# Patient Record
Sex: FEMALE | Race: WHITE | NOT HISPANIC OR LATINO | Employment: OTHER | URBAN - METROPOLITAN AREA
[De-identification: names, ages, dates, MRNs, and addresses within clinical notes are randomized per-mention and may not be internally consistent; named-entity substitution may affect disease eponyms.]

---

## 2017-04-04 ENCOUNTER — TRANSCRIBE ORDERS (OUTPATIENT)
Dept: ADMINISTRATIVE | Facility: HOSPITAL | Age: 67
End: 2017-04-04

## 2017-04-04 DIAGNOSIS — Z13.820 SCREENING FOR OSTEOPOROSIS: Primary | ICD-10-CM

## 2017-04-12 ENCOUNTER — HOSPITAL ENCOUNTER (OUTPATIENT)
Dept: RADIOLOGY | Age: 67
Discharge: HOME | End: 2017-04-12
Payer: MEDICARE

## 2017-04-12 DIAGNOSIS — Z13.820 SCREENING FOR OSTEOPOROSIS: ICD-10-CM

## 2017-04-12 PROCEDURE — 77080 DXA BONE DENSITY AXIAL: CPT

## 2017-09-15 ENCOUNTER — GENERIC CONVERSION - ENCOUNTER (OUTPATIENT)
Dept: OTHER | Facility: OTHER | Age: 67
End: 2017-09-15

## 2017-09-26 ENCOUNTER — ALLSCRIPTS OFFICE VISIT (OUTPATIENT)
Dept: OTHER | Facility: OTHER | Age: 67
End: 2017-09-26

## 2017-10-27 NOTE — PROGRESS NOTES
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)   2  Anxiety disorder (300 00) (F41 9)    Plan  Anxiety    · LORazepam 1 MG Oral Tablet; 2 tablets at night time   Rx By: Mariella Alcantara; Dispense: 45 Days ; #:90 Tablet; Refill: 3;For: Anxiety; CARLEE = Y; Print Rx  Malignant neoplasm of upper-outer quadrant of right female breast    · (1) CANCER ANTIGEN 15-3; Status:Active; Requested for:10Sep2018; Perform:[facility]; [place]; For:Malignant neoplasm of upper-outer quadrant of right female breast; Ordered By:Proothi, Mode;   · (1) CBC/PLT/DIFF; Status:Active; Requested for:10Sep2018; Perform:[facility]; [place]; For:Malignant neoplasm of upper-outer quadrant of right female breast; Ordered By:Proothi, Mode;   · (1) COMPREHENSIVE METABOLIC PANEL; Status:Active; Requested for:10Sep2018; Perform:[facility]; [place]; For:Malignant neoplasm of upper-outer quadrant of right female breast; Ordered By:Proothi, Mode;   · MAMMO SCREENING LEFT W 3D & CAD; Status:Active; Requested GXJ:[id number]  01:00PM;    Perform:[facility]; Due:43Ycc5326; Last Updated By:Mikala Otero; 9/26/2017 10:01:12 AM;Ordered; For:Malignant neoplasm of upper-outer quadrant of right female breast; Ordered By:Proothi, Mode;   · We recommend that you examine your own breasts for lumps and other changes ;  Status:Complete;   Done: [id number]   Ordered; For:Malignant neoplasm of upper-outer quadrant of right female breast; Ordered By:Proothi, Mode;   · Follow-up visit in 1 year Evaluation and Treatment  Follow-up  Status: Hold For -  Scheduling  Requested for: 28LMS9809   Ordered; For: Malignant neoplasm of upper-outer quadrant of right female breast; Ordered By: Mariella Alcantara Performed:  Due: 27XLX9365    Discussion/Summary  Discussion Summary:   Follow-up visit for breast cancer and anxiety   She is under surveillance for breast cancer  She is on lorazepam at night time for anxiety and insomnia  Several years ago she was diagnosed to have recurrent right breast cancer in the right supraclavicular area   She had radiation and Femara for 10 years that she finished in April 2015  She is not on systemic therapy for cancer at present  She had metastatic workup in 2015 and that was negative  She comes to our office once yearly  She still has painful neuropathy in her right hand as before  she has arthritic symptoms  She has anxiety  Her son was recently diagnosed to have type 1 diabetes mellitus and hyperthyroidism  Patient is very much concerned about that  She has asked for renewal of her lorazepam  She has problem sleeping without lorazepam    She has been taking calcium and vitamin D to prevent osteoporosis  examination and test results are as recorded  Negative mammography for malignancy  She remains in remission from breast cancer  She will like to come back in one year  She will stay in touch  Condition discussed and explained  Questions answered  Also discussed self breast examination, eating healthy foods and exercises as tolerated  Renewed lorazepam prescription  Counseling Documentation With Imm: The patient was counseled regarding diagnostic results,-- prognosis,-- patient and family education,-- impressions  total time of encounter was 30 minutes-- and-- 20 minutes was spent counseling  Goals and Barriers: The patient has the current Goals: Cure from breast cancer  The patent has the current Barriers: No barriers  Patient's Capacity to Self-Care: Patient is able to Self-Care  Medication SE Review and Pt Understands Tx: The treatment plan was reviewed with the patient/guardian  The patient/guardian understands and agrees with the treatment plan   Self Referrals:   Self Referrals: No   Understands and agrees with treatment plan: The treatment plan was reviewed with the patient/guardian   The patient/guardian understands and agrees with the treatment plan      Chief Complaint  Chief Complaint: Chief Complaint:   The patient presents to the office today with breast cancer  Chief Complaint Free Text Note Form: Follow-up visit for breast cancer and anxiety      History of Present Illness  Free Text HPI: Follow-up visit for breast cancer and anxiety  She is under surveillance for breast cancer  She is on lorazepam at night time for anxiety and insomnia  Several years ago she was diagnosed to have recurrent right breast cancer in the right supraclavicular area   She had radiation and Femara for 10 years that she finished in April 2015  She is not on systemic therapy for cancer at present  She had metastatic workup in 2015 and that was negative  She comes to our office once yearly  She still has painful neuropathy in her right hand as before  she has arthritic symptoms  She has anxiety  Her son was recently diagnosed to have type 1 diabetes mellitus and hyperthyroidism  Patient is very much concerned about that  She has asked for renewal of her lorazepam  She has problem sleeping without lorazepam    She has been taking calcium and vitamin D to prevent osteoporosis  Review of Systems       Reviewed 14 systems  Other symptoms as in history of present illness  Complete-Female:   Constitutional: feeling tired-- and-- Tired at times, but-- No fever, no chills, feels well, no tiredness, no recent weight gain or weight loss,-- no fever,-- not feeling poorly,-- no chills-- and-- no recent weight loss  Eyes: No complaints of eye pain, no red eyes, no eyesight problems, no discharge, no dry eyes, no itching of eyes  ENT: no complaints of earache, no loss of hearing, no nose bleeds, no nasal discharge, no sore throat, no hoarseness  Cardiovascular: No complaints of slow heart rate, no fast heart rate, no chest pain, no palpitations, no leg claudication, no lower extremity edema     Respiratory: No complaints of shortness of breath, no wheezing, no cough, no SOB on exertion, no orthopnea, no PND  Gastrointestinal: No complaints of abdominal pain, no constipation, no nausea or vomiting, no diarrhea, no bloody stools  Genitourinary: No complaints of dysuria, no incontinence, no pelvic pain, no dysmenorrhea, no vaginal discharge or bleeding  Musculoskeletal: joint stiffness, but-- no arthralgias,-- no joint swelling,-- no myalgias-- and-- no limb swelling  Integumentary: No complaints of skin rash or lesions, no itching, no skin wounds, no breast pain or lump  Neurological: limb weakness-- and-- Right hand, but-- as noted in HPI,-- no headache,-- no numbness,-- no tingling,-- no confusion,-- no dizziness,-- no convulsions,-- no fainting-- and-- no difficulty walking  Psychiatric: anxiety-- and-- sleep disturbances, but-- no personality change,-- no depression-- and-- no emotional problems  Endocrine: No complaints of proptosis, no hot flashes, no muscle weakness, no deepening of the voice, no feelings of weakness  Hematologic/Lymphatic: No complaints of swollen glands, no swollen glands in the neck, does not bleed easily, does not bruise easily  ROS Reviewed:   ROS reviewed  Active Problems  1  Anxiety (300 00) (F41 9)   2  Anxiety disorder (300 00) (F41 9)   3  Breast cancer screening, high risk patient (V76 11) (Z12 31)   4  Cancer of breast, female (174 9) (C50 919)   5  Malignant Female Breast Neoplasm Of Ectopic Sites (174 8)   6  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)    Past Medical History           Reviewed and no change     Surgical History  Surgical History Reviewed: The surgical history was reviewed and updated today  Family History  Mother    1  Family history of diabetes mellitus (V18 0) (Z83 3)  Son    2  Family history of diabetes mellitus (V18 0) (Z83 3)   3   Family history of hyperthyroidism (V18 19) (Z83 49)  Family History Reviewed: The family history was reviewed and updated today  Social History   · Never A Smoker  Social History Reviewed: The social history was reviewed and is unchanged  Current Meds   1  Aspirin EC 81 MG Oral Tablet Delayed Release; TAKE 1 TABLET DAILY; Therapy: (Recorded:09Nov2012) to Recorded   2  BusPIRone HCl - 15 MG Oral Tablet; Therapy: 46MRG4270 to (Evaluate:38Zky4664) Recorded   3  Calcium 500 MG TABS Recorded   4  Femara 2 5 MG Oral Tablet; TAKE 1 TABLET DAILY; Therapy: 76RKF0285 to (Evaluate:12Mar2015)  Requested for: 90PIH3329; Last   Rx:17Mar2014 Ordered   5  Fish Oil CAPS; Therapy: (Gildardo Luna) to Recorded   6  Letrozole 2 5 MG Oral Tablet; take 1 tablet once daily; Therapy: 38QOF2290 to (Evaluate:12May2015)  Requested for: 13WHO4519; Last   Rx:13Nov2014 Ordered   7  Lisinopril 10 MG Oral Tablet; Therapy: 26AXB4150 to (Last Rx:99Mxc9648)  Requested for: 43KFD1627 Ordered   8  LORazepam 1 MG Oral Tablet; 2 tablets at night time; Therapy: 79WVZ2034 to (Evaluate:14Oct2017)  Requested for: 54Res1275; Last   Rx:54Eik4331 Ordered   9  MetFORMIN HCl ER (OSM) 1000 MG Oral Tablet Extended Release 24 Hour; Therapy: 73Nzy9552 to (Evaluate:18Oct2015) Recorded   10  Multiple Vitamins Oral Tablet Recorded   11  Pravastatin Sodium 40 MG Oral Tablet; Therapy: 96DEK8957 to (Evaluate:11Gnm8554) Recorded   12  Simvastatin 80 MG Oral Tablet; Therapy: 90LHD0577 to (Last Rx:61Vbj8134)  Requested for: 86Yfd2331 Ordered    Allergies  1  No Known Drug Allergies    Vitals  Vital Signs    Recorded: 29NDF5989 09:27AM   Temperature 97 7 F   Heart Rate 65   Respiration 15   Systolic 852   Diastolic 72   Height 5 ft 5 in   Weight 143 lb 5 oz   BMI Calculated 23 85   BSA Calculated 1 72   O2 Saturation 99   Pain Scale 7            Stable     Physical Exam                   Patient is alert and oriented and not in any acute distress  Stable vital signs  No icterus  No oral thrush  No palpable neck mass  Lung fields are clear to percussion and auscultation  Heart rate is regular     Abdomen soft and nontender  No palpable abdominal mass  No ascites  No edema of ankles  No calf tenderness  Has  neurological deficit in the right hand as before    No skin rash  No lymphedema  No peripheral palpable lymphadenopathy  Good distal arterial pulses  Performance status one  ECOG 1       Results/Data  Results Form:   Results   Lab Results Normal blood counts, chemistry and CA 15?3  Negative mammography for malignancy        Future Appointments    Date/Time Provider Specialty Site   10/02/2018 10:00 AM Shelby Cardenas MD Hematology Oncology [facility]     Signatures   Electronically signed by : Shazia Massey MD; Sep 26 2017 10:20AM EST                       (Author)

## 2018-01-12 VITALS
TEMPERATURE: 97.7 F | WEIGHT: 143.31 LBS | RESPIRATION RATE: 15 BRPM | HEART RATE: 65 BPM | HEIGHT: 65 IN | DIASTOLIC BLOOD PRESSURE: 72 MMHG | OXYGEN SATURATION: 99 % | SYSTOLIC BLOOD PRESSURE: 118 MMHG | BODY MASS INDEX: 23.88 KG/M2

## 2018-04-12 DIAGNOSIS — F41.9 ANXIETY: Primary | ICD-10-CM

## 2018-04-12 RX ORDER — LORAZEPAM 1 MG/1
TABLET ORAL
Qty: 90 TABLET | Refills: 3 | Status: SHIPPED | OUTPATIENT
Start: 2018-04-12 | End: 2018-10-16 | Stop reason: SDUPTHER

## 2018-04-12 NOTE — TELEPHONE ENCOUNTER
Requesting a refill of her Lorazepam 1mg  She takes 2 tabs at bedtime  Last time was given 90 tabs  [facility] [address] [facility] [address]

## 2018-08-23 LAB — HBA1C MFR BLD HPLC: 6.5 %

## 2018-09-10 DIAGNOSIS — C50.411 MALIGNANT NEOPLASM OF UPPER-OUTER QUADRANT OF RIGHT FEMALE BREAST (HCC): ICD-10-CM

## 2018-10-01 ENCOUNTER — TELEPHONE (OUTPATIENT)
Dept: HEMATOLOGY ONCOLOGY | Facility: CLINIC | Age: 68
End: 2018-10-01

## 2018-10-01 NOTE — TELEPHONE ENCOUNTER
Called Keny Huitron to see if she had her labs completed  She stated that she had them done at [facility]  Called and waiting for the results to be faxed over

## 2018-10-02 ENCOUNTER — OFFICE VISIT (OUTPATIENT)
Dept: HEMATOLOGY ONCOLOGY | Facility: CLINIC | Age: 68
End: 2018-10-02
Payer: MEDICARE

## 2018-10-02 VITALS
WEIGHT: 145.3 LBS | HEIGHT: 64 IN | OXYGEN SATURATION: 97 % | TEMPERATURE: 98.1 F | RESPIRATION RATE: 18 BRPM | HEART RATE: 73 BPM | DIASTOLIC BLOOD PRESSURE: 78 MMHG | BODY MASS INDEX: 24.81 KG/M2 | SYSTOLIC BLOOD PRESSURE: 130 MMHG

## 2018-10-02 DIAGNOSIS — Z85.3 HISTORY OF BREAST CANCER: Primary | ICD-10-CM

## 2018-10-02 DIAGNOSIS — R74.8 ELEVATED ALKALINE PHOSPHATASE LEVEL: ICD-10-CM

## 2018-10-02 PROCEDURE — 99214 OFFICE O/P EST MOD 30 MIN: CPT | Performed by: INTERNAL MEDICINE

## 2018-10-02 RX ORDER — ASPIRIN 81 MG/1
1 TABLET ORAL DAILY
COMMUNITY
End: 2019-01-16 | Stop reason: ALTCHOICE

## 2018-10-02 RX ORDER — METFORMIN HYDROCHLORIDE 1000 MG/1
1000 TABLET, FILM COATED, EXTENDED RELEASE ORAL 2 TIMES DAILY WITH MEALS
COMMUNITY
End: 2019-01-20 | Stop reason: HOSPADM

## 2018-10-02 RX ORDER — LISINOPRIL 5 MG/1
5 TABLET ORAL DAILY
COMMUNITY
End: 2019-04-22 | Stop reason: SDUPTHER

## 2018-10-02 RX ORDER — CHOLECALCIFEROL (VITAMIN D3) 125 MCG
2000 CAPSULE ORAL DAILY
COMMUNITY
End: 2019-08-13 | Stop reason: DRUGHIGH

## 2018-10-02 RX ORDER — PRAVASTATIN SODIUM 40 MG
40 TABLET ORAL DAILY
COMMUNITY
Start: 2018-08-15 | End: 2019-03-26 | Stop reason: ALTCHOICE

## 2018-10-02 NOTE — LETTER
October 2, 2018     [address]  [place] [id number]    Patient: Zuri Bhandari   YOB: 1950   Date of Visit: 10/2/2018       Dear Dr Gordon Guillen:    Thank you for referring Roberth Hermosillo to me for evaluation  Below are my notes for this consultation  If you have questions, please do not hesitate to call me  I look forward to following your patient along with you  Sincerely,        Eufemia De MD        CC: No Recipients  Eufemia De MD  10/2/2018 10:17 PM  Sign at close encounter    HPI:   In 1496-4626 patient was diagnosed to have recurrent right breast cancer in the right supraclavicular area and she had radiation and she took Femara for 10 years that she finished in 2015  Workup for metastatic disease in 2015 was negative and since then she has been under surveillance  She was having some pain in the left shoulder area where x-rays showed degenerative changes  She has been taking NSAIDs and will be starting physical therapy  Patient's PCP has been managing that  Patient states there is improvement in pain and functionality at left shoulder        Current Outpatient Prescriptions:     aspirin (ASPIRIN EC ADULT LOW STRENGTH) 81 mg EC tablet, Take 1 tablet by mouth daily, Disp: , Rfl:     Cholecalciferol (VITAMIN D3) 2000 units TABS, Take 2,000 Units by mouth daily, Disp: , Rfl:     liraglutide (VICTOZA) injection, Inject 0 6 mg under the skin, Disp: , Rfl:     lisinopril (ZESTRIL) 10 mg tablet, Take 10 mg by mouth, Disp: , Rfl:     LORazepam (ATIVAN) 1 mg tablet, Take 2 tablets of 1 mg Ativan at night, Disp: 90 tablet, Rfl: 3    metFORMIN (GLUMETZA) 1000 MG (MOD) 24 hr tablet, Take 1,000 mg by mouth, Disp: , Rfl:     MULTIPLE VITAMINS PO, Take by mouth, Disp: , Rfl:     Omega-3 Fatty Acids (FISH OIL PO), Take 2 g by mouth, Disp: , Rfl:     pravastatin (PRAVACHOL) 40 mg tablet, , Disp: , Rfl:     No Known Allergies     No history exists  ROS:  10/02/18 Reviewed 13 systems:  Presently no headaches, seizures, dizziness, diplopia, dysphagia, hoarseness, chest pain, palpitations, shortness of breath, cough, hemoptysis, abdominal pain, nausea, vomiting, change in bowel habits, melena, hematuria, fever, chills, bleeding, bone pains, skin rash, weight loss,  tiredness ,  weakness, numbness,  claudication and gait problem  No frequent infections  Not unusually sensitive to heat or cold  No swelling of the ankles  No swollen glands  Patient is anxious  No GYN symptoms Other symptoms are in HPI        /78 (BP Location: Left arm, Patient Position: Sitting, Cuff Size: Adult)   Pulse 73   Temp 98 1 °F (36 7 °C)   Resp 18   Ht 5' 4" (1 626 m)   Wt 65 9 kg (145 lb 4 8 oz)   SpO2 97%   BMI 24 94 kg/m²      Physical Exam:  Alert, oriented, not in distress, no icterus, no oral thrush, no palpable neck mass, clear lung fields, regular heart rate, abdomen  soft and non tender, no palpable abdominal mass, no ascites, no edema of ankles, no calf tenderness, no focal neurological deficit, no skin rash, no palpable lymphadenopathy, good arterial pulses, no clubbing  Patient is anxious  Performance status 1  Reconstructed surgery right breast   Also plastic surgery on left breast   No lymphedema  IMAGING:   Degenerative changes left shoulder    LABS:  Normal blood counts  Normal chemistry except for alkaline   Phosphatase 157 and blood sugar 104  Normal tumor marker CA 15-3  Normal TSH  Reviewed and discussed with patient  Assessment and plan: In 3670-0155 patient was diagnosed to have recurrent right breast cancer in the right supraclavicular area and she had radiation and she took Femara for 10 years that she finished in 2015  Workup for metastatic disease in 2015 was negative and since then she has been under surveillance  She was having some pain in the left shoulder area where x-rays showed degenerative changes  She has been taking NSAIDs and will be starting physical therapy  Patient's PCP has been managing that  Patient states there is improvement in pain and functionality at left shoulder     Physical examination and test results are as recorded and discussed  Breast cancer remains in remission  Goal is cure from breast cancer  Condition discussed and explained  Questions answered  She gets mammography annually  Discussed the importance of self-breast examination, eating healthy foods, staying active and health screening test   She wants to come back in 1 year  She knows that if there is a problem related to our speciality to please call our office and come sooner  She is self-care  For elevated alkaline phosphatase she will go for isoenzyme test this month and call to discuss results  1  History of breast cancer    - Cancer antigen 15-3; Future  - CBC and differential; Future  - Comprehensive metabolic panel; Future    2  Elevated alkaline phosphatase level    - Alkaline phosphatase, isoenzymes; Future        Patient voiced understanding and agreement in the discussion  Counseling / Coordination of Care   Greater than 50% of total time was spent with the patient and / or family counseling and / or coordination of care

## 2018-10-03 DIAGNOSIS — F41.9 ANXIETY: ICD-10-CM

## 2018-10-03 RX ORDER — LORAZEPAM 1 MG/1
TABLET ORAL
Qty: 90 TABLET | Refills: 1 | OUTPATIENT
Start: 2018-10-03

## 2018-10-08 LAB
ALP BONE CFR SERPL: 37 % (ref 28–66)
ALP INTEST CFR SERPL: 0 % (ref 1–24)
ALP LIVER CFR SERPL: 63 % (ref 25–69)
ALP MACROHEPATIC CFR SERPL: 0 %
ALP PLAC CFR SERPL: 0 %
ALP SERPL-CCNC: 120 U/L (ref 33–130)

## 2018-10-11 ENCOUNTER — TELEPHONE (OUTPATIENT)
Dept: HEMATOLOGY ONCOLOGY | Facility: CLINIC | Age: 68
End: 2018-10-11

## 2018-10-11 NOTE — TELEPHONE ENCOUNTER
PATIENT CALLED FOR REFILL OF LORAZEPAM 1MG, ALSO SHE WAS TOLD TO CALL    [address] [facility] HER RESULTS ARE IN  [phone]

## 2018-10-11 NOTE — TELEPHONE ENCOUNTER
Called and spoke with the patient in regards to her ALK phos isoenzyme results  Patient's levels were all within normal range  Patient's CA 15-3 level from 9/10/18 was 23  Patient verbally understood  Pulled [place] drug [place] on this patient  Patient's Ativan script written on 4/12/18 (that also had 3 refills) was initially filled on 9/3/18 and then refilled again on 10/3/18  Patient made aware that she should have 2 refills left and to contact her pharmacy  Patient directed to call her PCP in regards to pain that she is experiencing on her side

## 2018-10-16 ENCOUNTER — TELEPHONE (OUTPATIENT)
Dept: HEMATOLOGY ONCOLOGY | Facility: CLINIC | Age: 68
End: 2018-10-16

## 2018-10-16 DIAGNOSIS — F41.9 ANXIETY: ICD-10-CM

## 2018-10-16 RX ORDER — LORAZEPAM 1 MG/1
TABLET ORAL
Qty: 90 TABLET | Refills: 2 | Status: ON HOLD | OUTPATIENT
Start: 2018-10-16 | End: 2018-12-24

## 2018-10-16 NOTE — TELEPHONE ENCOUNTER
Patient's Ativan prescription from 2018 is   A new prescription for Ativan was [facility]

## 2018-10-16 NOTE — TELEPHONE ENCOUNTER
Iram Gardner called and wanted to know if she could get a refill on Lorazepam 1mg for 2 @bedtime  She called the pharmacy and they said there was no refill  [facility] [phone]  If you have any other questions for Iram Gardner please call her home phone

## 2018-11-07 ENCOUNTER — APPOINTMENT (EMERGENCY)
Dept: CT IMAGING | Facility: HOSPITAL | Age: 68
End: 2018-11-07
Payer: MEDICARE

## 2018-11-07 ENCOUNTER — HOSPITAL ENCOUNTER (EMERGENCY)
Facility: HOSPITAL | Age: 68
Discharge: HOME | End: 2018-11-07
Attending: EMERGENCY MEDICINE
Payer: MEDICARE

## 2018-11-07 VITALS
TEMPERATURE: 97.6 F | RESPIRATION RATE: 18 BRPM | SYSTOLIC BLOOD PRESSURE: 124 MMHG | DIASTOLIC BLOOD PRESSURE: 60 MMHG | OXYGEN SATURATION: 100 % | HEART RATE: 76 BPM

## 2018-11-07 DIAGNOSIS — S09.90XA INJURY OF HEAD, INITIAL ENCOUNTER: ICD-10-CM

## 2018-11-07 DIAGNOSIS — R55 SYNCOPE: ICD-10-CM

## 2018-11-07 DIAGNOSIS — R55 VASOVAGAL EPISODE: Primary | ICD-10-CM

## 2018-11-07 LAB
ALBUMIN SERPL BCP-MCNC: 3.3 G/DL (ref 3.5–5)
ALP SERPL-CCNC: 149 U/L (ref 46–116)
ALT SERPL W P-5'-P-CCNC: 23 U/L (ref 12–78)
ANION GAP SERPL CALCULATED.3IONS-SCNC: 7 MMOL/L (ref 4–13)
APTT PPP: 28 SECONDS (ref 24–36)
AST SERPL W P-5'-P-CCNC: 18 U/L (ref 5–45)
BASOPHILS # BLD AUTO: 0.04 THOUSANDS/ΜL (ref 0–0.1)
BASOPHILS NFR BLD AUTO: 1 % (ref 0–1)
BILIRUB SERPL-MCNC: 0.5 MG/DL (ref 0.2–1)
BUN SERPL-MCNC: 21 MG/DL (ref 5–25)
CALCIUM SERPL-MCNC: 9 MG/DL (ref 8.3–10.1)
CHLORIDE SERPL-SCNC: 105 MMOL/L (ref 100–108)
CO2 SERPL-SCNC: 27 MMOL/L (ref 21–32)
CREAT SERPL-MCNC: 0.98 MG/DL (ref 0.6–1.3)
EOSINOPHIL # BLD AUTO: 0.03 THOUSAND/ΜL (ref 0–0.61)
EOSINOPHIL NFR BLD AUTO: 0 % (ref 0–6)
ERYTHROCYTE [DISTWIDTH] IN BLOOD BY AUTOMATED COUNT: 13.9 % (ref 11.6–15.1)
GFR SERPL CREATININE-BSD FRML MDRD: 59 ML/MIN/1.73SQ M
GLUCOSE SERPL-MCNC: 89 MG/DL (ref 65–140)
HCT VFR BLD AUTO: 32.8 % (ref 34.8–46.1)
HGB BLD-MCNC: 10.6 G/DL (ref 11.5–15.4)
IMM GRANULOCYTES # BLD AUTO: 0.05 THOUSAND/UL (ref 0–0.2)
IMM GRANULOCYTES NFR BLD AUTO: 1 % (ref 0–2)
INR PPP: 1.17 (ref 0.86–1.17)
LYMPHOCYTES # BLD AUTO: 1.28 THOUSANDS/ΜL (ref 0.6–4.47)
LYMPHOCYTES NFR BLD AUTO: 17 % (ref 14–44)
MCH RBC QN AUTO: 28 PG (ref 26.8–34.3)
MCHC RBC AUTO-ENTMCNC: 32.3 G/DL (ref 31.4–37.4)
MCV RBC AUTO: 87 FL (ref 82–98)
MONOCYTES # BLD AUTO: 0.39 THOUSAND/ΜL (ref 0.17–1.22)
MONOCYTES NFR BLD AUTO: 5 % (ref 4–12)
NEUTROPHILS # BLD AUTO: 5.88 THOUSANDS/ΜL (ref 1.85–7.62)
NEUTS SEG NFR BLD AUTO: 76 % (ref 43–75)
NRBC BLD AUTO-RTO: 0 /100 WBCS
PLATELET # BLD AUTO: 362 THOUSANDS/UL (ref 149–390)
PMV BLD AUTO: 9.2 FL (ref 8.9–12.7)
POTASSIUM SERPL-SCNC: 4.9 MMOL/L (ref 3.5–5.3)
PROT SERPL-MCNC: 6.6 G/DL (ref 6.4–8.2)
PROTHROMBIN TIME: 14.6 SECONDS (ref 11.8–14.2)
RBC # BLD AUTO: 3.78 MILLION/UL (ref 3.81–5.12)
SODIUM SERPL-SCNC: 139 MMOL/L (ref 136–145)
TROPONIN I SERPL-MCNC: <0.02 NG/ML
WBC # BLD AUTO: 7.67 THOUSAND/UL (ref 4.31–10.16)

## 2018-11-07 PROCEDURE — 80053 COMPREHEN METABOLIC PANEL: CPT | Performed by: EMERGENCY MEDICINE

## 2018-11-07 PROCEDURE — 99284 EMERGENCY DEPT VISIT MOD MDM: CPT

## 2018-11-07 PROCEDURE — 70450 CT HEAD/BRAIN W/O DYE: CPT

## 2018-11-07 PROCEDURE — 96360 HYDRATION IV INFUSION INIT: CPT

## 2018-11-07 PROCEDURE — 85610 PROTHROMBIN TIME: CPT | Performed by: EMERGENCY MEDICINE

## 2018-11-07 PROCEDURE — 36415 COLL VENOUS BLD VENIPUNCTURE: CPT | Performed by: EMERGENCY MEDICINE

## 2018-11-07 PROCEDURE — 85730 THROMBOPLASTIN TIME PARTIAL: CPT | Performed by: EMERGENCY MEDICINE

## 2018-11-07 PROCEDURE — 93005 ELECTROCARDIOGRAM TRACING: CPT

## 2018-11-07 PROCEDURE — 84484 ASSAY OF TROPONIN QUANT: CPT | Performed by: EMERGENCY MEDICINE

## 2018-11-07 PROCEDURE — 96361 HYDRATE IV INFUSION ADD-ON: CPT

## 2018-11-07 PROCEDURE — 85025 COMPLETE CBC W/AUTO DIFF WBC: CPT | Performed by: EMERGENCY MEDICINE

## 2018-11-07 RX ADMIN — SODIUM CHLORIDE 1000 ML: 0.9 INJECTION, SOLUTION INTRAVENOUS at 17:52

## 2018-11-07 NOTE — ED PROVIDER NOTES
History  Chief Complaint   Patient presents with    Syncope     pt presents ambulatory s/p fall off chair +LOC +head strike s/p syncopal episode  Patient presents to the emergency department after a witnessed syncopal episode  Patient was a box constant was sitting down and she felt dizzy and felt overheated stating that the place where she was in the store felt warm  She denies chest pain palpitations but then had a very brief syncopal episode where she did strike her head  She has mild headache but denies neck pain  She states she feels currently asymptomatic and denies chest pain sob palpitations or dizziness at this time  She denies neck pain and denies long bone trauma  She denies numbness tingling or weakness in the extremities  She denies nausea or vomiting  She refuses analgesics for pain at this time  She states that she had eaten lunch and has been moving her bowels and urinating normally  She denies fever chills rash  Prior to Admission Medications   Prescriptions Last Dose Informant Patient Reported? Taking?    Cholecalciferol (VITAMIN D3) 2000 units TABS  Self Yes No   Sig: Take 2,000 Units by mouth daily   LORazepam (ATIVAN) 1 mg tablet   No No   Sig: Take 2 tablets of 1 mg Ativan at night   MULTIPLE VITAMINS PO  Self Yes No   Sig: Take by mouth   Omega-3 Fatty Acids (FISH OIL PO)  Self Yes No   Sig: Take 2 g by mouth   aspirin (ASPIRIN EC ADULT LOW STRENGTH) 81 mg EC tablet  Self Yes No   Sig: Take 1 tablet by mouth daily   liraglutide (VICTOZA) injection  Self Yes No   Sig: Inject 0 6 mg under the skin   lisinopril (ZESTRIL) 10 mg tablet  Self Yes No   Sig: Take 10 mg by mouth   metFORMIN (GLUMETZA) 1000 MG (MOD) 24 hr tablet  Self Yes No   Sig: Take 1,000 mg by mouth   pravastatin (PRAVACHOL) 40 mg tablet  Self Yes No      Facility-Administered Medications: None       Past Medical History:   Diagnosis Date    Cancer ([facility] [address] )     breast    Diabetes mellitus ([facility] [address] )     Tendonitis        Past Surgical History:   Procedure Laterality Date    CHOLECYSTECTOMY      MASTECTOMY      NECK SURGERY         History reviewed  No pertinent family history  I have reviewed and agree with the history as documented  Social History   Substance Use Topics    Smoking status: Never Smoker    Smokeless tobacco: Never Used    Alcohol use Not on file        Review of Systems   Constitutional: Negative for activity change, appetite change, chills, diaphoresis, fatigue, fever and unexpected weight change  HENT: Negative for congestion, drooling, rhinorrhea, sore throat, trouble swallowing and voice change  Eyes: Negative for photophobia and visual disturbance  Respiratory: Negative for cough, choking, chest tightness, shortness of breath, wheezing and stridor  Cardiovascular: Negative for chest pain, palpitations and leg swelling  Gastrointestinal: Negative for abdominal distention, abdominal pain, anal bleeding, blood in stool, constipation, diarrhea, nausea and vomiting  Genitourinary: Negative for difficulty urinating, flank pain, hematuria, urgency, vaginal bleeding, vaginal discharge and vaginal pain  Musculoskeletal: Negative for back pain, neck pain and neck stiffness  Skin: Negative for rash and wound  Neurological: Positive for syncope and headaches  Negative for dizziness, tremors, seizures, facial asymmetry, speech difficulty, weakness, light-headedness and numbness  Hematological: Does not bruise/bleed easily  Psychiatric/Behavioral: Negative for confusion  Physical Exam  Physical Exam   Constitutional: She is oriented to person, place, and time  She appears well-developed and well-nourished  Nontoxic appearance  No respiratory distress  Patient looks comfortable sitting upright on the stretcher  She can engage in normal conversation and answer simple questions  HENT:   Head: Normocephalic and atraumatic     Right Ear: External ear normal    Left Ear: External ear normal    Nose: Nose normal    Mouth/Throat: Oropharynx is clear and moist  No oropharyngeal exudate  Small frontal forehead abrasion  No bony step-offs or hematomas  No lacerations  Eyes: Pupils are equal, round, and reactive to light  Conjunctivae and EOM are normal    Neck: Normal range of motion  Neck supple  Cardiovascular: Normal rate, regular rhythm, normal heart sounds and intact distal pulses  Pulmonary/Chest: Effort normal and breath sounds normal  No respiratory distress  She has no wheezes  She has no rales  She exhibits no tenderness  Abdominal: Soft  Bowel sounds are normal  She exhibits no distension and no mass  There is no tenderness  There is no rebound and no guarding  No hernia  Musculoskeletal: Normal range of motion  She exhibits no edema, tenderness or deformity  Neurological: She is alert and oriented to person, place, and time  She has normal reflexes  She displays normal reflexes  No cranial nerve deficit or sensory deficit  She exhibits normal muscle tone  Coordination normal    Skin: Skin is warm and dry  No rash noted  Psychiatric: She has a normal mood and affect  Her behavior is normal  Judgment and thought content normal    Nursing note and vitals reviewed        Vital Signs  ED Triage Vitals   Temperature Pulse Respirations Blood Pressure SpO2   11/07/18 1615 11/07/18 1615 11/07/18 1615 11/07/18 1615 11/07/18 1615   97 6 °F (36 4 °C) 72 18 137/60 100 %      Temp Source Heart Rate Source Patient Position - Orthostatic VS BP Location FiO2 (%)   11/07/18 1615 11/07/18 1615 11/07/18 1800 11/07/18 1800 --   Oral Monitor Sitting Right arm       Pain Score       11/07/18 1800       No Pain           Vitals:    11/07/18 1615 11/07/18 1800 11/07/18 1900   BP: 137/60 131/60 124/60   Pulse: 72 76 76   Patient Position - Orthostatic VS:  Sitting Lying       Visual Acuity      ED Medications  Medications   sodium chloride 0 9 % bolus 1,000 mL (1,000 mL Intravenous New Bag 11/7/18 1752)       Diagnostic Studies  Results Reviewed     Procedure Component Value Units Date/Time    Protime-INR [[id number]]  (Abnormal) Collected:  11/07/18 1840    Lab Status:  Final result Specimen:  Blood from Arm, Left Updated:  11/07/18 1910     Protime 14 6 (H) seconds      INR 1 17    APTT [[id number]]  (Normal) Collected:  11/07/18 1840    Lab Status:  Final result Specimen:  Blood from Arm, Left Updated:  11/07/18 1910     PTT 28 seconds     Troponin I [[id number]]  (Normal) Collected:  11/07/18 1840    Lab Status:  Final result Specimen:  Blood from Arm, Left Updated:  11/07/18 1905     Troponin I <0 02 ng/mL     Comprehensive metabolic panel [[id number]]  (Abnormal) Collected:  11/07/18 1840    Lab Status:  Final result Specimen:  Blood from Arm, Left Updated:  11/07/18 1903     Sodium 139 mmol/L      Potassium 4 9 mmol/L      Chloride 105 mmol/L      CO2 27 mmol/L      ANION GAP 7 mmol/L      BUN 21 mg/dL      Creatinine 0 98 mg/dL      Glucose 89 mg/dL      Calcium 9 0 mg/dL      AST 18 U/L      ALT 23 U/L      Alkaline Phosphatase 149 (H) U/L      Total Protein 6 6 g/dL      Albumin 3 3 (L) g/dL      Total Bilirubin 0 50 mg/dL      eGFR 59 ml/min/1 73sq m     Narrative:         National Kid[employer] Disease Education Program recommendations are as follows:  GFR calculation is accurate only with a steady state creatinine  Chronic Kidney disease less than 60 ml/min/1 73 sq  meters  Kidney failure less than 15 ml/min/1 73 sq  meters      CBC and differential [[id number]]  (Abnormal) Collected:  11/07/18 1840    Lab Status:  Final result Specimen:  Blood from Arm, Left Updated:  11/07/18 1846     WBC 7 67 Thousand/uL      RBC 3 78 (L) Million/uL      Hemoglobin 10 6 (L) g/dL      Hematocrit 32 8 (L) %      MCV 87 fL      MCH 28 0 pg      MCHC 32 3 g/dL      RDW 13 9 %      MPV 9 2 fL      Platelets 210 Thousands/uL      nRBC 0 /100 WBCs      Neutrophils Relative 76 (H) %      Immat GRANS % 1 %      Lymphocytes Relative 17 %      Monocytes Relative 5 %      Eosinophils Relative 0 %      Basophils Relative 1 %      Neutrophils Absolute 5 88 Thousands/µL      Immature Grans Absolute 0 05 Thousand/uL      Lymphocytes Absolute 1 28 Thousands/µL      Monocytes Absolute 0 39 Thousand/µL      Eosinophils Absolute 0 03 Thousand/µL      Basophils Absolute 0 04 Thousands/µL                  CT head without contrast   Final Result by Guerda Alcala DO (11/07 1655)      No acute intracranial abnormality  Age-related atrophy and findings most compatible with chronic small vessel ischemic disease  Workstation performed: [id number]                    Procedures  ECG 12 Lead Documentation  Date/Time: 11/7/2018 4:41 PM  Performed by: Spenser Gray by: Jyoti Salas     ECG reviewed by me, the ED Provider: yes    Patient location:  ED  Previous ECG:     Previous ECG:  Unavailable  Interpretation:     Interpretation: normal    Rate:     ECG rate:  74  Rhythm:     Rhythm: sinus rhythm    Ectopy:     Ectopy: none    QRS:     QRS axis:  Normal    QRS intervals:  Normal  Conduction:     Conduction: normal    ST segments:     ST segments:  Normal  T waves:     T waves: normal             Phone Contacts  ED Phone Contact    ED Course  ED Course as of Nov 07 1932 Wed Nov 07, 2018 1927 Patient is stable for discharge  I suspect a benign vaso vagal episode  Patient feels baseline  Her lab workup including troponin and EKG are unremarkable  She is asymptomatic at discharge                                  [facility]  CritCare Time    Disposition  Final diagnoses:   Vasovagal episode   Syncope   Injury of head, initial encounter     Time reflects when diagnosis was documented in both MDM as applicable and the Disposition within this note     Time User Action Codes Description Comment    11/7/2018  7:31 PM Waqar Balderas Add [R55] Vasovagal episode     11/7/2018  7:31 PM Ashley Lamb Add [R55] Syncope 11/7/2018  7:31 PM Daryl Lamb Add [S09 [id number]] Injury of head, initial encounter       ED Disposition     ED Disposition Condition Comment    Discharge  [address] [facility] [address] discharge to home/self care  Condition at discharge: Stable        Follow-up Information     Follow up With Specialties Details Why [facility] physician  Schedule an appointment as soon as possible for a visit            Patient's Medications   Discharge Prescriptions    No medications on file     No discharge procedures on file      ED Provider  Electronically Signed by           Juwan Lopez MD  11/07/18 8982

## 2018-11-08 LAB
ATRIAL RATE: 74 BPM
P AXIS: 68 DEGREES
PR INTERVAL: 162 MS
QRS AXIS: 15 DEGREES
QRSD INTERVAL: 68 MS
QT INTERVAL: 382 MS
QTC INTERVAL: 424 MS
T WAVE AXIS: 29 DEGREES
VENTRICULAR RATE: 74 BPM

## 2018-11-08 PROCEDURE — 93010 ELECTROCARDIOGRAM REPORT: CPT | Performed by: INTERNAL MEDICINE

## 2018-11-08 NOTE — DISCHARGE INSTRUCTIONS
Head Injury   WHAT YOU NEED TO KNOW:   What causes a head injury? A head injury is most often caused by a blow to the head  This may occur from a fall, bicycle injury, sports injury, being struck in the head, or a motor vehicle accident  What are the symptoms of a head injury? You may have an open wound, swelling, or bruising on your head  Right after the injury, you may be confused  Symptoms may last anywhere from a few hours to a few weeks  You may have any of the following:  · Mild to moderate headache    · Dizziness or loss of balance    · Nausea or vomiting    · Change in mood, such as feeling restless or irritable    · Trouble thinking, remembering, or concentrating    · Ringing in the ears or neck pain    · Drowsiness or decreased amount of energy    · Trouble sleeping  How is a head injury diagnosed? · Tell your healthcare provider about your injury and symptoms  The provider will do an exam to check your brain function  He or she will check how your pupils react to light  He will check your memory, hand grasp, and balance  · You may need a CT scan to check for bleeding or major damage to your skull or brain  You may be given contrast liquid to help the pictures show up better  Tell a healthcare provider if you have ever had an allergic reaction to contrast liquid  How is a head injury treated? You may be given medicine to decrease pain  Other treatments may depend on how severe your head injury is  How can I manage my symptoms? · Rest  or do quiet activities for 24 to 48 hours  Limit your time watching TV, using the computer, or doing tasks that require a lot of thinking  Slowly return to your normal activities as directed  Do not play sports or do activities that may cause you to get hit in the head  Ask your healthcare provider when you can return to sports  · Apply ice  on your head for 15 to 20 minutes every hour or as directed  Use an ice pack, or put crushed ice in a plastic bag  Cover it with a towel before you apply it to your skin  Ice helps prevent tissue damage and decreases swelling and pain  · Have someone stay with you for 24 hours  or as directed  This person can monitor you for complications and call [phone]  When you are awake the person should ask you a few questions to see if you are thinking clearly  An example would be to ask your name or your address  How can I help prevent another head injury? · Wear a helmet that fits properly  Do this when you play sports, or ride a bike, scooter, or skateboard  Helmets help decrease your risk of a serious head injury  Talk to your healthcare provider about other ways you can protect yourself if you play sports  · Wear your seat belt every time you are in a car  This helps to decrease your risk for a head injury if you are in a car accident  Call 911 or have someone else call for any of the following:   · You cannot be woken  · You have a seizure  · You stop responding to others or you faint  · You have blurry or double vision  · Your speech becomes slurred or confused  · You have arm or leg weakness, loss of feeling, or new problems with coordination  · Your pupils are larger than usual or one pupil is a different size than the other  · You have blood or clear fluid coming out of your ears or nose  When should I seek immediate care? · You have repeated or forceful vomiting  · You feel confused  · Your headache gets worse or becomes severe  · You or someone caring for you notices that you are harder to wake than usual   When should I contact my healthcare provider? · Your symptoms last longer than 6 weeks after the injury  · You have questions or concerns about your condition or care  CARE AGREEMENT:   You have the right to help plan your care  Learn about your health condition and how it may be treated  Discuss treatment options with your caregivers to decide what care you want to receive  You always have the right to refuse treatment  The above information is an  only  It is not intended as medical advice for individual conditions or treatments  Talk to your doctor, nurse or pharmacist before following any medical regimen to see if it is safe and effective for you  © 2017 [employer] [employer] Information is for End User's use only and may not be sold, redistributed or otherwise used for commercial purposes  All illustrations and images included in CareNotes® are the copyrighted property of [employer]  or [employer]  Syncope   AMBULATORY CARE:   Syncope  is also called fainting or passing out  Syncope is a sudden, temporary loss of consciousness, followed by a fall from a standing or sitting position  Syncope ranges from not serious to a sign of a more serious condition that needs to be treated  You can control some health conditions that cause syncope  Your healthcare providers can help you create a plan to manage syncope and prevent episodes  Signs and symptoms that may occur before syncope include the following:   · Cold, clammy, and sweaty skin     · Fast breathing and a racing, pounding heartbeat     · Feeling more tired than usual     · Nausea, a warm feeling, and sweating    · A headache, or feeling lightheaded or dizzy    · Tingling sensation or numbness     · Spots in front of your eyes, blurred vision, or double vision  Seek care immediately if:   · You are bleeding because you hit your head when you fainted  · You suddenly have double vision, difficulty speaking, numbness, and cannot move your arms or legs  · You have chest pain and trouble breathing  · You vomit blood or material that looks like coffee grounds  · You see blood in your bowel movement  Contact your healthcare provider if:   · You have new or worsening symptoms  · You have another syncope episode      · You have a headache, fast heartbeat, or feel too dizzy to stand up     · You have questions or concerns about your condition or care  Treatment for syncope  depends on the cause of your syncope  To prevent syncope from happening again, you may  need any of the following:  · Medicines  may be needed to treat any medical conditions that are causing your syncope  These may include medicines to help your heart pump strongly and regularly  Your healthcare provider may also make changes to any medicines that are causing syncope  · Tilt training  involves training yourself to stand for 10 to 30 minutes each day against a wall  This helps your body decrease the effects of posture changes and reduces the number of fainting spells  Manage syncope:   · Keep a record of your syncope episodes  Include your symptoms and your activity before and after the episode  The record can help your healthcare provider find the cause of your syncope and help you manage episodes  · Sit or lie down when needed  This includes when you feel dizzy, your throat is getting tight, and your vision changes  Raise your legs above the level of your heart  · Take slow, deep breaths if you start to breathe faster with anxiety or fear  This can help decrease dizziness and the feeling that you might faint  · Check your blood pressure often  This is important if you take medicine to lower your blood pressure  Check your blood pressure when you are lying down and when you are standing  Ask how often to check during the day  Keep a record of your blood pressure numbers  Your healthcare provider may use the record to help plan your treatment  Prevent a syncope episode:   · Move slowly and let yourself get used to one position before you move to another position  This is very important when you change from a lying or sitting position to a standing position  Take some deep breaths before you stand up from a lying position  Stand up slowly  Sudden movements may cause a fainting spell   Sit on the side of the bed or couch for a few minutes before you stand up  If you are on bedrest, try to be upright for about 2 hours each day, or as directed  Do not lock your legs if you are standing for a long period of time  Move your legs and bend your knees to keep blood flowing  · Follow your healthcare provider's recommendations  Your provider may  recommend that you drink more liquids to prevent dehydration  You may also need to have more salt to keep your blood pressure from dropping too low and causing syncope  Your provider will tell you how much liquid and sodium to have each day  · Watch for signs of low blood sugar  These include hunger, nervousness, sweating, and fast or fluttery heartbeats  Talk with your healthcare provider about ways to keep your blood sugar level steady  · Do not strain if you are constipated  You may faint if you strain to have a bowel movement  Walking is the best way to get your bowels moving  Eat foods high in fiber to make it easier to have a bowel movement  Good examples are high-fiber cereals, beans, vegetables, and whole-grain breads  Prune juice may help make bowel movements softer  · Be careful in hot weather  Heat can cause a syncope episode  Limit activity done outside on hot days  Physical activity in hot weather can lead to dehydration  This can cause an episode  Follow up with your healthcare provider as directed:  Write down your questions so you remember to ask them during your visits  © 2017 [employer] [employer] Information is for End User's use only and may not be sold, redistributed or otherwise used for commercial purposes  All illustrations and images included in CareNotes® are the copyrighted property of [employer]  or [employer]  The above information is an  only  It is not intended as medical advice for individual conditions or treatments   Talk to your doctor, nurse or pharmacist before following any medical regimen to see if it is safe and effective for you  Syncope   WHAT YOU NEED TO KNOW:   What is syncope? Syncope is also called fainting or passing out  Syncope is a sudden, temporary loss of consciousness, followed by a fall from a standing or sitting position  A syncope episode is usually short  What causes syncope? Syncope is caused by a decrease in blood flow to the brain  When blood flow to the brain decreases, oxygen to the brain also decreases  Any of the following conditions may cause syncope:  · A heart condition, such as a narrow artery or an irregular heartbeat    · A medical condition such as severe anemia, uncontrolled diabetes, or a nerve disorder    · Dehydration    · Certain medicines, such as blood pressure medicines, heart medicines, or antidepressants    · Problems with the blood vessels of your brain    · A rapid drop in blood pressure after a body position change, such as moving from lying to sitting or standing    · Straining during bowel movements, a cough or sneeze, or a stressful or fearful situation    · A medical condition that affects your lungs, such as pneumonia or asthma, or hyperventilation (breathing too quickly)  What signs and symptoms may occur before syncope? · Cold, clammy, and sweaty skin     · Fast breathing and a racing, pounding heartbeat     · Feeling more tired than usual     · Nausea, a warm feeling, and sweating    · A headache, or feeling lightheaded or dizzy    · Tingling sensation or numbness     · Spots in front of your eyes, blurred vision, or double vision  How is the cause of syncope diagnosed? Your healthcare provider will examine you  He or she will ask if you have other medical conditions  He or she may order the following tests to find out what is causing your symptoms:  · Blood tests  may be done to check your electrolyte levels, such as sodium  A problem with your electrolytes can lead to syncope      · Telemetry  is continuous monitoring of your heart rhythm  Sticky pads placed on your skin connect to an EKG machine that records your heart rhythm  · An echocardiogram  is a type of ultrasound  Sound waves are used to show the structure and function of your heart  · A stress test  may show the changes that take place in your heart while it is under stress  Stress may be placed on your heart with exercise or medicine  Ask for more information about this test     · A tilt table test  is used to check your heart and your blood pressure when you change positions  You will lie on a table during this test  The table will move in different positions  The strength and rhythm of your heartbeat will be measured as the table moves  How is syncope treated? Treatment depends on the cause of your syncope  To prevent syncope from happening again, you may  need any of the following:  · Medicines  may be needed to help your heart pump strongly and regularly  Your healthcare provider may also make changes to any medicines that are causing syncope  · Tilt training  involves training yourself to stand for 10 to 30 minutes each day against a wall  This helps your body decrease the effects of posture changes and reduces the number of fainting spells  What can I do to manage syncope? · Keep a record of your syncope episodes  Include your symptoms and your activity before and after the episode  The record can help your healthcare provider find the cause of your syncope and help you manage episodes  · Sit or lie down when needed  This includes when you feel dizzy, your throat is getting tight, and your vision changes  Raise your legs above the level of your heart  · Take slow, deep breaths if you start to breathe faster with anxiety or fear  This can help decrease dizziness and the feeling that you might faint  · Check your blood pressure often  This is important if you take medicine to lower your blood pressure   Check your blood pressure when you are lying down and when you are standing  Ask how often to check during the day  Keep a record of your blood pressure numbers  Your healthcare provider may use the record to help plan your treatment  What can I do to prevent a syncope episode? · Move slowly and let yourself get used to one position before you move to another position  This is very important when you change from a lying or sitting position to a standing position  Take some deep breaths before you stand up from a lying position  Stand up slowly  Sudden movements may cause a fainting spell  Sit on the side of the bed or couch for a few minutes before you stand up  If you are on bedrest, try to be upright for about 2 hours each day, or as directed  Do not lock your legs if you are standing for a long period of time  Move your legs and bend your knees to keep blood flowing  · Follow your healthcare provider's recommendations  Your provider may  recommend that you drink more liquids to prevent dehydration  You may also need to have more salt to keep your blood pressure from dropping too low and causing syncope  Your provider will tell you how much liquid and sodium to have each day  · Watch for signs of low blood sugar  These include hunger, nervousness, sweating, and fast or fluttery heartbeats  Talk with your healthcare provider about ways to keep your blood sugar level steady  · Do not strain if you are constipated  You may faint if you strain to have a bowel movement  Walking is the best way to get your bowels moving  Eat foods high in fiber to make it easier to have a bowel movement  Good examples are high-fiber cereals, beans, vegetables, and whole-grain breads  Prune juice may help make bowel movements softer  · Be careful in hot weather  Heat can cause a syncope episode  Limit activity done outside on hot days  Physical activity in hot weather can lead to dehydration  This can cause an episode    When should I seek immediate care?   · You are bleeding because you hit your head when you fainted  · You suddenly have double vision, difficulty speaking, numbness, and cannot move your arms or legs  · You have chest pain and trouble breathing  · You vomit blood or material that looks like coffee grounds  · You see blood in your bowel movement  When should I contact my healthcare provider? · You have new or worsening symptoms  · You have another syncope episode  · You have a headache, fast heartbeat, or feel too dizzy to stand up  · You have questions or concerns about your condition or care  CARE AGREEMENT:   You have the right to help plan your care  Learn about your health condition and how it may be treated  Discuss treatment options with your caregivers to decide what care you want to receive  You always have the right to refuse treatment  The above information is an  only  It is not intended as medical advice for individual conditions or treatments  Talk to your doctor, nurse or pharmacist before following any medical regimen to see if it is safe and effective for you  © 2017 [facility] Information is for End User's use only and may not be sold, redistributed or otherwise used for commercial purposes  All illustrations and images included in CareNotes® are the copyrighted property of [employer]  or [employer]nie Car[employer]

## 2018-11-08 NOTE — ED NOTES
Discharge instruction given to patient, no questions or concerns at this time  Patient able to ambulate out without difficulty        Jarrett Lincoln RN  11/07/18 2908

## 2018-12-13 ENCOUNTER — TELEPHONE (OUTPATIENT)
Dept: HEMATOLOGY ONCOLOGY | Facility: CLINIC | Age: 68
End: 2018-12-13

## 2018-12-13 DIAGNOSIS — D64.9 ANEMIA, UNSPECIFIED TYPE: Primary | ICD-10-CM

## 2018-12-13 NOTE — TELEPHONE ENCOUNTER
Pt called and wanted to make sure that we were aware that she had an episode on 11/7/18 that she was shopping and past out  She was then taken to the ER to be evaluated  She was found to have very low BP and low iron  She is drinking protein shakes for her BP and eating a lot of red meats for iron as well as taking iron supplements  Also since her last visit on 10/2/18 she has had no appetite, losing 10 lbs and hassome severe low back pain at times  Pt wants to if if Dr Frieda Chapman would like to see her in the office or order some testing on her for anything  Pt is just concerned about everything that has happened within the past few months  Please call pt back

## 2018-12-13 NOTE — PROGRESS NOTES
Patient was in the emergency room on 11/07/2018 with vasovagal episode  She called today to report that her iron was low  I looked at the lab and hemoglobin was 10 6  I spoke with patient  She will be getting anemia workup and come to our office for visit    See orders

## 2018-12-15 ENCOUNTER — APPOINTMENT (OUTPATIENT)
Dept: LAB | Facility: CLINIC | Age: 68
End: 2018-12-15
Payer: MEDICARE

## 2018-12-15 DIAGNOSIS — D64.9 ANEMIA, UNSPECIFIED TYPE: ICD-10-CM

## 2018-12-15 LAB
BASOPHILS # BLD AUTO: 0.02 THOUSANDS/ΜL (ref 0–0.1)
BASOPHILS NFR BLD AUTO: 0 % (ref 0–1)
EOSINOPHIL # BLD AUTO: 0.02 THOUSAND/ΜL (ref 0–0.61)
EOSINOPHIL NFR BLD AUTO: 0 % (ref 0–6)
ERYTHROCYTE [DISTWIDTH] IN BLOOD BY AUTOMATED COUNT: 14.2 % (ref 11.6–15.1)
ERYTHROCYTE [SEDIMENTATION RATE] IN BLOOD: 74 MM/HOUR (ref 0–20)
FERRITIN SERPL-MCNC: 176 NG/ML (ref 8–388)
FOLATE SERPL-MCNC: >20 NG/ML (ref 3.1–17.5)
HCT VFR BLD AUTO: 32.5 % (ref 34.8–46.1)
HGB BLD-MCNC: 10.3 G/DL (ref 11.5–15.4)
IMM GRANULOCYTES # BLD AUTO: 0.01 THOUSAND/UL (ref 0–0.2)
IMM GRANULOCYTES NFR BLD AUTO: 0 % (ref 0–2)
IRON SATN MFR SERPL: 11 %
IRON SERPL-MCNC: 30 UG/DL (ref 50–170)
LDH SERPL-CCNC: 316 U/L (ref 81–234)
LYMPHOCYTES # BLD AUTO: 0.89 THOUSANDS/ΜL (ref 0.6–4.47)
LYMPHOCYTES NFR BLD AUTO: 20 % (ref 14–44)
MCH RBC QN AUTO: 27.5 PG (ref 26.8–34.3)
MCHC RBC AUTO-ENTMCNC: 31.7 G/DL (ref 31.4–37.4)
MCV RBC AUTO: 87 FL (ref 82–98)
MONOCYTES # BLD AUTO: 0.59 THOUSAND/ΜL (ref 0.17–1.22)
MONOCYTES NFR BLD AUTO: 13 % (ref 4–12)
NEUTROPHILS # BLD AUTO: 3.03 THOUSANDS/ΜL (ref 1.85–7.62)
NEUTS SEG NFR BLD AUTO: 67 % (ref 43–75)
NRBC BLD AUTO-RTO: 0 /100 WBCS
PLATELET # BLD AUTO: 367 THOUSANDS/UL (ref 149–390)
PMV BLD AUTO: 10.1 FL (ref 8.9–12.7)
RBC # BLD AUTO: 3.75 MILLION/UL (ref 3.81–5.12)
RETICS # AUTO: NORMAL 10*3/UL (ref 14097–95744)
RETICS # CALC: 1.06 % (ref 0.37–1.87)
TIBC SERPL-MCNC: 284 UG/DL (ref 250–450)
VIT B12 SERPL-MCNC: 126 PG/ML (ref 100–900)
WBC # BLD AUTO: 4.56 THOUSAND/UL (ref 4.31–10.16)

## 2018-12-15 PROCEDURE — 85025 COMPLETE CBC W/AUTO DIFF WBC: CPT

## 2018-12-15 PROCEDURE — 83550 IRON BINDING TEST: CPT

## 2018-12-15 PROCEDURE — 83540 ASSAY OF IRON: CPT

## 2018-12-15 PROCEDURE — 82728 ASSAY OF FERRITIN: CPT

## 2018-12-15 PROCEDURE — 82607 VITAMIN B-12: CPT

## 2018-12-15 PROCEDURE — 36415 COLL VENOUS BLD VENIPUNCTURE: CPT

## 2018-12-15 PROCEDURE — 85045 AUTOMATED RETICULOCYTE COUNT: CPT

## 2018-12-15 PROCEDURE — 82746 ASSAY OF FOLIC ACID SERUM: CPT

## 2018-12-15 PROCEDURE — 83615 LACTATE (LD) (LDH) ENZYME: CPT

## 2018-12-15 PROCEDURE — 85652 RBC SED RATE AUTOMATED: CPT

## 2018-12-18 ENCOUNTER — TELEPHONE (OUTPATIENT)
Dept: HEMATOLOGY ONCOLOGY | Facility: CLINIC | Age: 68
End: 2018-12-18

## 2018-12-18 ENCOUNTER — TRANSCRIBE ORDERS (OUTPATIENT)
Dept: LAB | Facility: CLINIC | Age: 68
End: 2018-12-18

## 2018-12-18 ENCOUNTER — APPOINTMENT (OUTPATIENT)
Dept: LAB | Facility: CLINIC | Age: 68
End: 2018-12-18
Payer: MEDICARE

## 2018-12-18 LAB — HEMOCCULT STL QL IA: POSITIVE

## 2018-12-18 PROCEDURE — G0328 FECAL BLOOD SCRN IMMUNOASSAY: HCPCS | Performed by: INTERNAL MEDICINE

## 2018-12-18 NOTE — TELEPHONE ENCOUNTER
Please inform pt she needs to make appt and labs will be reviewed then  Dr Jessica Lopez will not refill Robaxin  Call PCP

## 2018-12-18 NOTE — TELEPHONE ENCOUNTER
Pt called and wanted her lab results from 12/15/18  Also she wanted to know if Dr Ifeoma Smith will refill her Robaxin for her, she is taking it for her low back pain  She originally got it when she went to the ER at [facility] in November but she only has 2 pills left  She is taking the Robaxin 500mg, 1 tablet 3 times daily   Please call pt back

## 2018-12-18 NOTE — TELEPHONE ENCOUNTER
Called and informed patient of the information per Dr Ilan Sousa   Scheduled a F/U visit with Dr Ilan Sousa for 1/22/18 at 2:00 pm

## 2018-12-20 ENCOUNTER — TELEPHONE (OUTPATIENT)
Dept: HEMATOLOGY ONCOLOGY | Facility: CLINIC | Age: 68
End: 2018-12-20

## 2018-12-20 DIAGNOSIS — D50.8 OTHER IRON DEFICIENCY ANEMIA: Primary | ICD-10-CM

## 2018-12-20 DIAGNOSIS — R19.5 HEME POSITIVE STOOL: ICD-10-CM

## 2018-12-20 NOTE — TELEPHONE ENCOUNTER
Spoke with patient regarding her positive stool test   Advised her earlier follow-up with our office  she refused to come to any other office besides [facility]  She will come to [facility] on 1/15/19 at 8:30 with me  Please add patient to my schedule above  Also, please set up consult with GI for heme positive stool

## 2018-12-21 ENCOUNTER — HOSPITAL ENCOUNTER (EMERGENCY)
Facility: HOSPITAL | Age: 68
Discharge: HOME | End: 2018-12-21
Attending: EMERGENCY MEDICINE
Payer: MEDICARE

## 2018-12-21 ENCOUNTER — APPOINTMENT (EMERGENCY)
Dept: CT IMAGING | Facility: HOSPITAL | Age: 68
End: 2018-12-21
Payer: MEDICARE

## 2018-12-21 ENCOUNTER — TELEPHONE (OUTPATIENT)
Dept: HEMATOLOGY ONCOLOGY | Facility: CLINIC | Age: 68
End: 2018-12-21

## 2018-12-21 VITALS
WEIGHT: 123.24 LBS | SYSTOLIC BLOOD PRESSURE: 118 MMHG | TEMPERATURE: 98.9 F | OXYGEN SATURATION: 96 % | DIASTOLIC BLOOD PRESSURE: 56 MMHG | RESPIRATION RATE: 16 BRPM | HEART RATE: 72 BPM | BODY MASS INDEX: 21.15 KG/M2

## 2018-12-21 DIAGNOSIS — R10.9 ABDOMINAL PAIN: ICD-10-CM

## 2018-12-21 DIAGNOSIS — S22.080A COMPRESSION FRACTURE OF T12 VERTEBRA (HCC): Primary | ICD-10-CM

## 2018-12-21 LAB
ABO GROUP BLD: NORMAL
ALBUMIN SERPL BCP-MCNC: 3.3 G/DL (ref 3.5–5)
ALP SERPL-CCNC: 257 U/L (ref 46–116)
ALT SERPL W P-5'-P-CCNC: 22 U/L (ref 12–78)
ANION GAP SERPL CALCULATED.3IONS-SCNC: 14 MMOL/L (ref 4–13)
APTT PPP: 32 SECONDS (ref 26–38)
AST SERPL W P-5'-P-CCNC: 57 U/L (ref 5–45)
BASOPHILS # BLD AUTO: 0.03 THOUSANDS/ΜL (ref 0–0.1)
BASOPHILS NFR BLD AUTO: 1 % (ref 0–1)
BILIRUB SERPL-MCNC: 0.5 MG/DL (ref 0.2–1)
BILIRUB UR QL STRIP: NEGATIVE
BLD GP AB SCN SERPL QL: NEGATIVE
BUN SERPL-MCNC: 15 MG/DL (ref 5–25)
CALCIUM SERPL-MCNC: 9.1 MG/DL (ref 8.3–10.1)
CHLORIDE SERPL-SCNC: 102 MMOL/L (ref 100–108)
CK SERPL-CCNC: 37 U/L (ref 26–192)
CLARITY UR: CLEAR
CO2 SERPL-SCNC: 25 MMOL/L (ref 21–32)
COLOR UR: YELLOW
CREAT SERPL-MCNC: 0.68 MG/DL (ref 0.6–1.3)
EOSINOPHIL # BLD AUTO: 0.06 THOUSAND/ΜL (ref 0–0.61)
EOSINOPHIL NFR BLD AUTO: 2 % (ref 0–6)
ERYTHROCYTE [DISTWIDTH] IN BLOOD BY AUTOMATED COUNT: 14.3 % (ref 11.6–15.1)
GFR SERPL CREATININE-BSD FRML MDRD: 90 ML/MIN/1.73SQ M
GLUCOSE SERPL-MCNC: 151 MG/DL (ref 65–140)
GLUCOSE UR STRIP-MCNC: NEGATIVE MG/DL
HCT VFR BLD AUTO: 33 % (ref 34.8–46.1)
HGB BLD-MCNC: 10.7 G/DL (ref 11.5–15.4)
HGB UR QL STRIP.AUTO: NEGATIVE
IMM GRANULOCYTES # BLD AUTO: 0.02 THOUSAND/UL (ref 0–0.2)
IMM GRANULOCYTES NFR BLD AUTO: 1 % (ref 0–2)
INR PPP: 1.11 (ref 0.86–1.17)
KETONES UR STRIP-MCNC: NEGATIVE MG/DL
LACTATE SERPL-SCNC: 1.6 MMOL/L (ref 0.5–2)
LEUKOCYTE ESTERASE UR QL STRIP: NEGATIVE
LIPASE SERPL-CCNC: 123 U/L (ref 73–393)
LYMPHOCYTES # BLD AUTO: 0.99 THOUSANDS/ΜL (ref 0.6–4.47)
LYMPHOCYTES NFR BLD AUTO: 25 % (ref 14–44)
MCH RBC QN AUTO: 27.9 PG (ref 26.8–34.3)
MCHC RBC AUTO-ENTMCNC: 32.4 G/DL (ref 31.4–37.4)
MCV RBC AUTO: 86 FL (ref 82–98)
MONOCYTES # BLD AUTO: 0.6 THOUSAND/ΜL (ref 0.17–1.22)
MONOCYTES NFR BLD AUTO: 15 % (ref 4–12)
NEUTROPHILS # BLD AUTO: 2.32 THOUSANDS/ΜL (ref 1.85–7.62)
NEUTS SEG NFR BLD AUTO: 56 % (ref 43–75)
NITRITE UR QL STRIP: NEGATIVE
NRBC BLD AUTO-RTO: 0 /100 WBCS
PH UR STRIP.AUTO: 5.5 [PH] (ref 4.5–8)
PLATELET # BLD AUTO: 321 THOUSANDS/UL (ref 149–390)
PMV BLD AUTO: 9.2 FL (ref 8.9–12.7)
POTASSIUM SERPL-SCNC: 3.6 MMOL/L (ref 3.5–5.3)
PROT SERPL-MCNC: 6.8 G/DL (ref 6.4–8.2)
PROT UR STRIP-MCNC: NEGATIVE MG/DL
PROTHROMBIN TIME: 14 SECONDS (ref 11.8–14.2)
RBC # BLD AUTO: 3.84 MILLION/UL (ref 3.81–5.12)
RH BLD: POSITIVE
SODIUM SERPL-SCNC: 141 MMOL/L (ref 136–145)
SP GR UR STRIP.AUTO: 1.01 (ref 1–1.03)
SPECIMEN EXPIRATION DATE: NORMAL
UROBILINOGEN UR QL STRIP.AUTO: 0.2 E.U./DL
WBC # BLD AUTO: 4.02 THOUSAND/UL (ref 4.31–10.16)

## 2018-12-21 PROCEDURE — 80053 COMPREHEN METABOLIC PANEL: CPT | Performed by: EMERGENCY MEDICINE

## 2018-12-21 PROCEDURE — 96361 HYDRATE IV INFUSION ADD-ON: CPT

## 2018-12-21 PROCEDURE — 74177 CT ABD & PELVIS W/CONTRAST: CPT

## 2018-12-21 PROCEDURE — 85025 COMPLETE CBC W/AUTO DIFF WBC: CPT | Performed by: EMERGENCY MEDICINE

## 2018-12-21 PROCEDURE — 82550 ASSAY OF CK (CPK): CPT | Performed by: EMERGENCY MEDICINE

## 2018-12-21 PROCEDURE — 86900 BLOOD TYPING SEROLOGIC ABO: CPT | Performed by: EMERGENCY MEDICINE

## 2018-12-21 PROCEDURE — 86901 BLOOD TYPING SEROLOGIC RH(D): CPT | Performed by: EMERGENCY MEDICINE

## 2018-12-21 PROCEDURE — 36415 COLL VENOUS BLD VENIPUNCTURE: CPT | Performed by: EMERGENCY MEDICINE

## 2018-12-21 PROCEDURE — 83690 ASSAY OF LIPASE: CPT | Performed by: EMERGENCY MEDICINE

## 2018-12-21 PROCEDURE — 81003 URINALYSIS AUTO W/O SCOPE: CPT

## 2018-12-21 PROCEDURE — 85610 PROTHROMBIN TIME: CPT | Performed by: EMERGENCY MEDICINE

## 2018-12-21 PROCEDURE — 83605 ASSAY OF LACTIC ACID: CPT | Performed by: EMERGENCY MEDICINE

## 2018-12-21 PROCEDURE — 86850 RBC ANTIBODY SCREEN: CPT | Performed by: EMERGENCY MEDICINE

## 2018-12-21 PROCEDURE — 96374 THER/PROPH/DIAG INJ IV PUSH: CPT

## 2018-12-21 PROCEDURE — 96375 TX/PRO/DX INJ NEW DRUG ADDON: CPT

## 2018-12-21 PROCEDURE — 93005 ELECTROCARDIOGRAM TRACING: CPT

## 2018-12-21 PROCEDURE — 85730 THROMBOPLASTIN TIME PARTIAL: CPT | Performed by: EMERGENCY MEDICINE

## 2018-12-21 PROCEDURE — 99285 EMERGENCY DEPT VISIT HI MDM: CPT

## 2018-12-21 RX ORDER — SODIUM CHLORIDE 9 MG/ML
125 INJECTION, SOLUTION INTRAVENOUS CONTINUOUS
Status: DISCONTINUED | OUTPATIENT
Start: 2018-12-21 | End: 2018-12-21 | Stop reason: HOSPADM

## 2018-12-21 RX ORDER — OXYCODONE HYDROCHLORIDE AND ACETAMINOPHEN 5; 325 MG/1; MG/1
1 TABLET ORAL EVERY 4 HOURS PRN
Qty: 20 TABLET | Refills: 0 | Status: SHIPPED | OUTPATIENT
Start: 2018-12-21 | End: 2018-12-24 | Stop reason: HOSPADM

## 2018-12-21 RX ORDER — HYDROMORPHONE HCL/PF 1 MG/ML
0.5 SYRINGE (ML) INJECTION ONCE
Status: COMPLETED | OUTPATIENT
Start: 2018-12-21 | End: 2018-12-21

## 2018-12-21 RX ORDER — ONDANSETRON 2 MG/ML
4 INJECTION INTRAMUSCULAR; INTRAVENOUS ONCE
Status: COMPLETED | OUTPATIENT
Start: 2018-12-21 | End: 2018-12-21

## 2018-12-21 RX ADMIN — SODIUM CHLORIDE 1000 ML: 0.9 INJECTION, SOLUTION INTRAVENOUS at 02:52

## 2018-12-21 RX ADMIN — ONDANSETRON 4 MG: 2 INJECTION INTRAMUSCULAR; INTRAVENOUS at 03:03

## 2018-12-21 RX ADMIN — HYDROMORPHONE HYDROCHLORIDE 0.5 MG: 1 INJECTION, SOLUTION INTRAMUSCULAR; INTRAVENOUS; SUBCUTANEOUS at 03:05

## 2018-12-21 RX ADMIN — IOHEXOL 100 ML: 350 INJECTION, SOLUTION INTRAVENOUS at 04:40

## 2018-12-21 RX ADMIN — IOHEXOL 50 ML: 240 INJECTION, SOLUTION INTRATHECAL; INTRAVASCULAR; INTRAVENOUS; ORAL at 03:07

## 2018-12-21 NOTE — DISCHARGE INSTRUCTIONS
Abdominal Pain   WHAT YOU NEED TO KNOW:   Abdominal pain can be dull, achy, or sharp  You may have pain in one area of your abdomen, or in your entire abdomen  Your pain may be caused by a condition such as constipation, food sensitivity or poisoning, infection, or a blockage  Abdominal pain can also be from a hernia, appendicitis, or an ulcer  Liver, gallbladder, or kidney conditions can also cause abdominal pain  The cause of your abdominal pain may be unknown  DISCHARGE INSTRUCTIONS:   Return to the emergency department if:   · You have new chest pain or shortness of breath  · You have pulsing pain in your upper abdomen or lower back that suddenly becomes constant  · Your pain is in the right lower abdominal area and worsens with movement  · You have a fever over 100 4°F (38°C) or shaking chills  · You are vomiting and cannot keep food or liquids down  · Your pain does not improve or gets worse over the next 8 to 12 hours  · You see blood in your vomit or bowel movements, or they look black and tarry  · Your skin or the whites of your eyes turn yellow  · You are a woman and have a large amount of vaginal bleeding that is not your monthly period  Contact your healthcare provider if:   · You have pain in your lower back  · You are a man and have pain in your testicles  · You have pain when you urinate  · You have questions or concerns about your condition or care  Follow up with your healthcare provider within 24 hours or as directed:  Write down your questions so you remember to ask them during your visits  Medicines:   · Medicines  may be given to calm your stomach and prevent vomiting or to decrease pain  Ask how to take pain medicine safely  · Take your medicine as directed  Contact your healthcare provider if you think your medicine is not helping or if you have side effects  Tell him of her if you are allergic to any medicine   Keep a list of the medicines, vitamins, and herbs you take  Include the amounts, and when and why you take them  Bring the list or the pill bottles to follow-up visits  Carry your medicine list with you in case of an emergency  © 2017 [address] [employer] Information is for End User's use only and may not be sold, redistributed or otherwise used for commercial purposes  All illustrations and images included in CareNotes® are the copyrighted property of A D A M , Inc  or Arnie Carreon  The above information is an  only  It is not intended as medical advice for individual conditions or treatments  Talk to your doctor, nurse or pharmacist before following any medical regimen to see if it is safe and effective for you  Vertebral Compression Fracture   WHAT YOU NEED TO KNOW:   A vertebral compression fracture (VCF) is a break in a part of the vertebra  Vertebrae are the round, strong bones that form your spine  VCFs most often occur in the thoracic (middle) and lumbar (lower) areas of your spine  Fractures may be mild to severe  DISCHARGE INSTRUCTIONS:   Medicines: You may need any of the following:  · NSAIDs , such as ibuprofen, help decrease swelling, pain, and fever  This medicine is available with or without a doctor's order  NSAIDs can cause stomach bleeding or kidney problems in certain people  If you take blood thinner medicine, always ask if NSAIDs are safe for you  Always read the medicine label and follow directions  Do not give these medicines to children under 10months of age without direction from your child's healthcare provider  · Acetaminophen  decreases pain and fever  It is available without a doctor's order  Ask how much to take and how often to take it  Follow directions  Acetaminophen can cause liver damage if not taken correctly  · Prescription pain medicine  may be given  Ask your healthcare provider how to take this medicine safely      · Bisphosphonates and calcitonin  may be recommended to help your bones get stronger  They can decrease the pain of a VCF caused by osteoporosis, and decrease your risk for another fracture  · Take your medicine as directed  Contact your healthcare provider if you think your medicine is not helping or if you have side effects  Tell him or her if you are allergic to any medicine  Keep a list of the medicines, vitamins, and herbs you take  Include the amounts, and when and why you take them  Bring the list or the pill bottles to follow-up visits  Carry your medicine list with you in case of an emergency  Follow up with your healthcare provider as directed: You may need to return for x-rays or other tests  Write down your questions so you remember to ask them during your visits  Heat and ice:   · Apply ice  on your back for 15 to 20 minutes every hour or as directed  Use an ice pack, or put crushed ice in a plastic bag  Cover it with a towel  Ice helps prevent tissue damage and decreases swelling and pain  · Apply heat  on your back for 20 to 30 minutes every 2 hours for as many days as directed  Heat helps decrease pain and muscle spasms  Activity:   · Avoid activities that may make the pain worse, such as picking up heavy objects  When the pain decreases, begin normal, slow movements as directed by your healthcare provider  Your healthcare provider may have you do weight-bearing exercises such as walking  You may also do non-weight-bearing exercises such as swimming and bicycling  · You may need to use a walker or cane  Ask your healthcare provider for more information about how to use a cane or a walker  · When you  objects, bend at the hips and knees  Never bend from the waist only  Use bent knees and your leg muscles as you lift the object   While you lift the object, keep it close to your chest  Try not to twist or lift anything above your waist   Physical and occupational therapy:  Your healthcare provider may recommend physical and occupational therapy  A physical therapist teaches you exercises to help improve movement and strength, and to decrease pain  An occupational therapist teaches you skills to help with your daily activities  Manage pain during sleep:   · Do not sleep on a waterbed  Waterbeds do not provide good back support  · Sleep on a firm mattress  You may also put a ½ to 1-inch piece of plywood between the mattress and box spring  · Sleep on your back with a pillow under your knees  This will decrease pressure on your back  You may also sleep on your side with 1 or both of your knees bent and a pillow between them  It may also be helpful to sleep on your stomach with a pillow under you at waist level  Contact your healthcare provider if:   · You are not hungry, and you are losing weight  · You cannot sleep or rest because of back pain  · You have pain or swelling in your back that is getting worse, or does not go away  · You have questions or concerns about your condition or care  Return to the emergency department if:   · You feel lightheaded, short of breath, and have chest pain  · You cough up blood  · Your arm or leg feels warm, tender, and painful  It may look swollen and red  · You have new problems urinating or having bowel movements  · You have severe pain in your back after falling, bending forward, sneezing, or coughing strongly  · You suddenly cannot feel your legs  · You suddenly have trouble moving your arms or legs  © 2017 [address] [employer] Information is for End User's use only and may not be sold, redistributed or otherwise used for commercial purposes  All illustrations and images included in CareNotes® are the copyrighted property of [employer] , [employer]  or Arnie Carreon  The above information is an  only  It is not intended as medical advice for individual conditions or treatments   Talk to your doctor, nurse or pharmacist before following any medical regimen to see if it is safe and effective for you

## 2018-12-21 NOTE — ED PROVIDER NOTES
History  Chief Complaint   Patient presents with    Abdominal Pain     patient comes in with generalized abdominal pain that radiates to the lower back  Patient states she was here about a month ago and was diagnosed with anemia  She went to the doctor on saturday and had a stool sample brought into the doctor on tuesday and recieved a call today saying her stool had been positive for blood in it  Patient has nausea with no diarrhea or vomiting  Patient is a 76year old female with nausea and bilateral back pain with radiation to abdomen tonight  No diarrhea  No fever  Had stool tested for occult blood recent which was positive  Was last seen in this ED on 11/7/18 for syncope and has anemia  States she drove here  Has had prior ccy  [facility] website checked on this patient and last Rx filled was on 11/19/18 for ativan for 30 day supply  No urinary sx  Has had recent back x-rays which showed arthritic changes  History provided by:  Patient   used: No    Abdominal Pain   Associated symptoms: nausea    Associated symptoms: no diarrhea, no fever and no vomiting        Prior to Admission Medications   Prescriptions Last Dose Informant Patient Reported? Taking?    Cholecalciferol (VITAMIN D3) 2000 units TABS  Self Yes Yes   Sig: Take 2,000 Units by mouth daily   LORazepam (ATIVAN) 1 mg tablet   No Yes   Sig: Take 2 tablets of 1 mg Ativan at night   MULTIPLE VITAMINS PO  Self Yes Yes   Sig: Take by mouth   Omega-3 Fatty Acids (FISH OIL PO)  Self Yes Yes   Sig: Take 2 g by mouth   aspirin (ASPIRIN EC ADULT LOW STRENGTH) 81 mg EC tablet  Self Yes Yes   Sig: Take 1 tablet by mouth daily   liraglutide (VICTOZA) injection  Self Yes Yes   Sig: Inject 0 6 mg under the skin   lisinopril (ZESTRIL) 10 mg tablet  Self Yes Yes   Sig: Take 5 mg by mouth     metFORMIN (GLUMETZA) 1000 MG (MOD) 24 hr tablet  Self Yes Yes   Sig: Take 1,000 mg by mouth   pravastatin (PRAVACHOL) 40 mg tablet  Self Yes Yes Facility-Administered Medications: None       Past Medical History:   Diagnosis Date    [facility] [address] [facility]     breast    [facility]     Tendonitis        Past Surgical History:   Procedure Laterality Date    CHOLECYSTECTOMY      MASTECTOMY      Right side reconstruction    NECK SURGERY         History reviewed  No pertinent family history  I have reviewed and agree with the history as documented  Social History   Substance Use Topics    Smoking status: Never Smoker    Smokeless tobacco: Never Used    Alcohol use No        Review of Systems   Constitutional: Negative for fever  Gastrointestinal: Positive for abdominal pain and nausea  Negative for diarrhea and vomiting  Genitourinary: Negative for difficulty urinating  Musculoskeletal: Positive for back pain  All other systems reviewed and are negative  Physical Exam  Physical Exam   Constitutional: She is oriented to person, place, and time  She appears well-developed and well-nourished  She appears distressed (moderate)  HENT:   Head: Normocephalic and atraumatic  Mouth/Throat: Oropharynx is clear and moist    Eyes: No scleral icterus  Neck: No JVD present  No tracheal deviation present  Cardiovascular: Normal rate, regular rhythm and normal heart sounds  No murmur heard  Pulmonary/Chest: Effort normal and breath sounds normal  No stridor  No respiratory distress  She has no wheezes  She has no rales  Abdominal: Soft  Bowel sounds are normal  She exhibits no distension  There is tenderness (diffuse)  There is no rebound and no guarding  No CVAT  Musculoskeletal: She exhibits no edema or deformity  Neurological: She is alert and oriented to person, place, and time  Skin: Skin is warm and dry  No rash noted  Psychiatric:   Somewhat anxious  Nursing note and vitals reviewed        Vital Signs  ED Triage Vitals   Temperature Pulse Respirations Blood Pressure SpO2   12/21/18 0212 12/21/18 0210 12/21/18 0210 12/21/18 0210 12/21/18 0210   98 9 °F (37 2 °C) 75 16 136/75 99 %      Temp Source Heart Rate Source Patient Position - Orthostatic VS BP Location FiO2 (%)   12/21/18 0212 12/21/18 0210 12/21/18 0210 12/21/18 0210 --   Oral Monitor Lying Right arm       Pain Score       12/21/18 0305       8           Vitals:    12/21/18 0430 12/21/18 0500 12/21/18 0515 12/21/18 0530   BP:       Pulse: 70 74 72 72   Patient Position - Orthostatic VS:           Visual Acuity      ED Medications  Medications   sodium chloride 0 9 % infusion (not administered)   sodium chloride 0 9 % bolus 1,000 mL (0 mL Intravenous Stopped 12/21/18 0414)   ondansetron (ZOFRAN) injection 4 mg (4 mg Intravenous Given 12/21/18 0303)   HYDROmorphone (DILAUDID) injection 0 5 mg (0 5 mg Intravenous Given 12/21/18 0305)   iohexol (OMNIPAQUE) 240 MG/ML solution 50 mL (50 mL Oral Given 12/21/18 0307)   iohexol (OMNIPAQUE) 350 MG/ML injection (SINGLE-DOSE) 100 mL (100 mL Intravenous Given 12/21/18 0440)       Diagnostic Studies  Results Reviewed     Procedure Component Value Units Date/Time    ED Urine Macroscopic [[id number]] Collected:  12/21/18 0428    Lab Status:  Final result Specimen:  Urine Updated:  12/21/18 0430     Color, UA Yellow     Clarity, UA Clear     pH, UA 5 5     Leukocytes, UA Negative     Nitrite, UA Negative     Protein, UA Negative mg/dl      Glucose, UA Negative mg/dl      Ketones, UA Negative mg/dl      Urobilinogen, UA 0 2 E U /dl      Bilirubin, UA Negative     Blood, UA Negative     Specific Gravity, UA 1 010    Narrative:       CLINITEK RESULT    Lactic acid, plasma [[id number]]  (Normal) Collected:  12/21/18 0248    Lab Status:  Final result Specimen:  Blood from Arm, Left Updated:  12/21/18 0316     LACTIC ACID 1 6 mmol/L     Narrative:         Result may be elevated if tourniquet was used during collection      Comprehensive metabolic panel [[id number]]  (Abnormal) Collected:  12/21/18 0248    Lab Status:  Final result Specimen: Blood from Arm, Left Updated:  12/21/18 0313     Sodium 141 mmol/L      Potassium 3 6 mmol/L      Chloride 102 mmol/L      CO2 25 mmol/L      ANION GAP 14 (H) mmol/L      BUN 15 mg/dL      Creatinine 0 68 mg/dL      Glucose 151 (H) mg/dL      Calcium 9 1 mg/dL      AST 57 (H) U/L      ALT 22 U/L      Alkaline Phosphatase 257 (H) U/L      Total Protein 6 8 g/dL      Albumin 3 3 (L) g/dL      Total Bilirubin 0 50 mg/dL      eGFR 90 ml/min/1 73sq m     Narrative:         National Kidney Disease Education Program recommendations are as follows:  GFR calculation is accurate only with a steady state creatinine  Chronic Kidney disease less than 60 ml/min/1 73 sq  meters  Kidney failure less than 15 ml/min/1 73 sq  meters      Lipase [[id number]]  (Normal) Collected:  12/21/18 0248    Lab Status:  Final result Specimen:  Blood from Arm, Left Updated:  12/21/18 0313     Lipase 123 u/L     CK Total with Reflex CKMB [[id number]]  (Normal) Collected:  12/21/18 0248    Lab Status:  Final result Specimen:  Blood from Arm, Left Updated:  12/21/18 0313     Total CK 37 U/L     Protime-INR [[id number]]  (Normal) Collected:  12/21/18 0248    Lab Status:  Final result Specimen:  Blood from Arm, Left Updated:  12/21/18 0308     Protime 14 0 seconds      INR 1 11    APTT [[id number]]  (Normal) Collected:  12/21/18 0248    Lab Status:  Final result Specimen:  Blood from Arm, Left Updated:  12/21/18 0308     PTT 32 seconds     CBC and differential [[id number]]  (Abnormal) Collected:  12/21/18 0248    Lab Status:  Final result Specimen:  Blood from Arm, Left Updated:  12/21/18 0256     WBC 4 02 (L) Thousand/uL      RBC 3 84 Million/uL      Hemoglobin 10 7 (L) g/dL      Hematocrit 33 0 (L) %      MCV 86 fL      MCH 27 9 pg      MCHC 32 4 g/dL      RDW 14 3 %      MPV 9 2 fL      Platelets 921 Thousands/uL      nRBC 0 /100 WBCs      Neutrophils Relative 56 %      Immat GRANS % 1 %      Lymphocytes Relative 25 %      Monocytes Relative 15 (H) % Eosinophils Relative 2 %      Basophils Relative 1 %      Neutrophils Absolute 2 32 Thousands/µL      Immature Grans Absolute 0 02 Thousand/uL      Lymphocytes Absolute 0 99 Thousands/µL      Monocytes Absolute 0 60 Thousand/µL      Eosinophils Absolute 0 06 Thousand/µL      Basophils Absolute 0 03 Thousands/µL                  CT abdomen pelvis with contrast   ED Interpretation by Jacinto Newell MD (12/21 0521)   FINDINGS:      ABDOMEN      LOWER CHEST:  Mild hypoventilatory changes at the lung bases and lingula  LIVER/BILIARY TREE: Ami Reap is mild intra and extrahepatic bile duct distention within normal limits for patient's age and postcholecystectomy reservoir distention   Otherwise unremarkable  GALLBLADDER: Mary Lou Notice is surgically absent  SPLEEN:  Unremarkable  PANCREAS:  Unremarkable  ADRENAL GLANDS:  Unremarkable  KIDNEYS/URETERS: One or more sharply circumscribed subcentimeter renal hypodensities are noted  These lesions are too small to accurately characterize, but are statistically most likely to represent benign cortical renal cyst(s)   According to the    guidelines published in the [facility] Paper of the ACR Incidental Findings Committee (Radiology 2010), no further workup of these lesions is recommended   Additional right renal simple cyst(s) is(are) present  Marval Numbers otherwise unremarkable   No perinephric    collection  STOMACH AND BOWEL:  Moderate stool in the colon   No bowel obstruction   IBJMDD redundant sigmoid colon  APPENDIX:  A normal appendix was visualized  ABDOMINOPELVIC CAVITY:  No ascites or free intraperitoneal air  No lymphadenopathy  VESSELS:  Prominent left adnexal vessels may represent chronic pelvic vascular congestion syndrome in the appropriate clinical context   Otherwise unremarkable  PELVIS      REPRODUCTIVE ORGANS:  2 cm left uterine body intramural leiomyoma         URINARY BLADDER:  Unremarkable        ABDOMINAL WALL/INGUINAL REGIONS:  Subcentimeter ventral umbilical abdominal wall diastases containing fat   No bowel herniation  No inguinal hernia or mass  OSSEOUS STRUCTURES:  No acute fracture   Degenerative changes of the spine, pubic symphysis, and multiple joints   Multifocal patchy osseous sclerosis   Mild compression fracture T12 with 5 mm retropulsion   Trace adjacent soft tissue thickening   This    may represent acute to subacute pathologic fracture  Impression:        No acute intra-abdominal or intrapelvic process  Mild acute to subacute compression fracture T12 with 5 mm posterior retropulsion   Given multifocal diffuse patchy osseous sclerosis elsewhere, cannot exclude pathologic fracture  The study was marked in [facility] for immediate notification  Workstation performed: [id number]         Final Result by Sharmaine Valentin MD (12/21 0515)      No acute intra-abdominal or intrapelvic process  Mild acute to subacute compression fracture T12 with 5 mm posterior retropulsion  Given multifocal diffuse patchy osseous sclerosis elsewhere, cannot exclude pathologic fracture  The study was marked in [facility] for immediate notification  Workstation performed: [id number]                    Procedures  ECG 12 Lead Documentation  Date/Time: 12/21/2018 3:00 AM  Performed by: Jeremy Sol  Authorized by: Jeremy Sol     Indications / Diagnosis:  Abdominal pain  ECG reviewed by me, the ED Provider: yes    Patient location:  ED  Previous ECG:     Previous ECG:  Compared to current    Comparison ECG info:  11/7/18    Similarity:  Changes noted (PVC and PAC now)  Rate:     ECG rate:  77    ECG rate assessment: normal    Rhythm:     Rhythm: sinus rhythm    Ectopy:     Ectopy: PAC and PVCs      PVCs:  Infrequent  QRS:     QRS axis:  Normal  Conduction:     Conduction normal: low voltage      ST segments:     ST segments:  Normal  T waves:     T waves: normal Phone Contacts  ED Phone Contact    ED Course  ED Course as of Dec 21 0653   Fri Dec 21, 2018   0311 Labs and CT d/w patient  Patient fell about 1 week ago and was seen at [facility] and had back x-ray then  Patient will rest until this AM and states she is okay to drive home  MDM  Number of Diagnoses or Management Options  Diagnosis management comments: DDx including but not limited to: appendicitis, gastroenteritis, gastritis, PUD, GERD, gastroparesis, hepatitis, pancreatitis, colitis, enteritis, diverticulitis, food poisoning, mesenteric adenitis, mesenteric ischemia, IBD, IBS, ileus, bowel obstruction, volvulus, internal hernia, AAA, choledocholithiasis, perforated viscus, splenic etiology, constipation, pelvic pathology, renal colic, pyelonephritis, UTI, rhabdomyolysis; doubt cardiac etiology  CritCare Time    Disposition  Final diagnoses:   Compression fracture of T12 vertebra ([facility] [address] )   Abdominal pain     Time reflects when diagnosis was documented in both MDM as applicable and the Disposition within this note     Time User Action Codes Description Comment    12/21/2018  5:33 AM Althia Belts Add [S22 080A] Compression fracture of T12 vertebra ([facility] [address] )     12/21/2018  5:33 AM Althia Belts Add [R10 9] Abdominal pain       ED Disposition     None      Follow-up Information     Follow up With Specialties Details Why Contact Info Additional Information    Breanne Beach MD Family Medicine Call in 1 day Return sooner if increased pain, fever, vomiting, diarrhea, difficulty breathing or urinating, weakness, numbness, incontinence  No driving with percocet  Do not use acetaminophen with percocet    [facility] [address] Orthopedic Surgery Call in 1 day  [address] [phone]  [address] [place] [address] [facility] [place], [place], [place], [phone]          Patient's Medications   Discharge Prescriptions    OXYCODONE-ACETAMINOPHEN (PERCOCET) 5-325 MG PER TABLET    Take 1 tablet by mouth every 4 (four) hours as needed for moderate pain for up to 4 days Max Daily Amount: 6 tablets       Start Date: 12/21/2018End Date: 12/25/2018       Order Dose: 1 tablet       Quantity: 20 tablet    Refills: 0     No discharge procedures on file      ED Provider  Electronically Signed by           Maile Phoenix, MD  12/21/18 7278

## 2018-12-21 NOTE — ED NOTES
Pt is alert and oriented  Pt VSS  Pt ambulated safely with a steady gait off of unit        Rafael Baird RN  12/21/18 1979

## 2018-12-22 ENCOUNTER — HOSPITAL ENCOUNTER (EMERGENCY)
Facility: HOSPITAL | Age: 68
End: 2018-12-22
Attending: EMERGENCY MEDICINE | Admitting: EMERGENCY MEDICINE
Payer: MEDICARE

## 2018-12-22 ENCOUNTER — HOSPITAL ENCOUNTER (INPATIENT)
Facility: HOSPITAL | Age: 68
LOS: 1 days | Discharge: HOME HEALTH | DRG: 543 | End: 2018-12-24
Attending: HOSPITALIST | Admitting: HOSPITALIST
Payer: MEDICARE

## 2018-12-22 VITALS
OXYGEN SATURATION: 97 % | SYSTOLIC BLOOD PRESSURE: 134 MMHG | TEMPERATURE: 98.5 F | HEART RATE: 85 BPM | DIASTOLIC BLOOD PRESSURE: 62 MMHG | BODY MASS INDEX: 21.68 KG/M2 | WEIGHT: 126.32 LBS | RESPIRATION RATE: 18 BRPM

## 2018-12-22 DIAGNOSIS — M54.9 INTRACTABLE BACK PAIN: Primary | ICD-10-CM

## 2018-12-22 DIAGNOSIS — Z85.3 HISTORY OF BREAST CANCER: ICD-10-CM

## 2018-12-22 DIAGNOSIS — C50.911 MALIGNANT NEOPLASM OF RIGHT FEMALE BREAST, UNSPECIFIED ESTROGEN RECEPTOR STATUS, UNSPECIFIED SITE OF BREAST (HCC): Chronic | ICD-10-CM

## 2018-12-22 DIAGNOSIS — D50.8 OTHER IRON DEFICIENCY ANEMIA: Chronic | ICD-10-CM

## 2018-12-22 DIAGNOSIS — F41.9 ANXIETY: ICD-10-CM

## 2018-12-22 DIAGNOSIS — C79.9 METASTASIS (HCC): ICD-10-CM

## 2018-12-22 DIAGNOSIS — M48.54XA NON-TRAUMATIC COMPRESSION FRACTURE OF TWELFTH THORACIC VERTEBRA, INITIAL ENCOUNTER: ICD-10-CM

## 2018-12-22 DIAGNOSIS — F41.9 ANXIETY DISORDER: Chronic | ICD-10-CM

## 2018-12-22 DIAGNOSIS — R74.8 ELEVATED ALKALINE PHOSPHATASE LEVEL: ICD-10-CM

## 2018-12-22 DIAGNOSIS — G95.29 OTHER CORD COMPRESSION (HCC): ICD-10-CM

## 2018-12-22 DIAGNOSIS — M48.54XA: ICD-10-CM

## 2018-12-22 DIAGNOSIS — S22.009A THORACIC SPINE FRACTURE (HCC): Primary | ICD-10-CM

## 2018-12-22 LAB
ANION GAP SERPL CALCULATED.3IONS-SCNC: 10 MMOL/L (ref 4–13)
ATRIAL RATE: 77 BPM
BASOPHILS # BLD AUTO: 0.01 THOUSANDS/ΜL (ref 0–0.1)
BASOPHILS NFR BLD AUTO: 0 % (ref 0–1)
BUN SERPL-MCNC: 10 MG/DL (ref 5–25)
CALCIUM SERPL-MCNC: 9.5 MG/DL (ref 8.3–10.1)
CHLORIDE SERPL-SCNC: 100 MMOL/L (ref 100–108)
CO2 SERPL-SCNC: 27 MMOL/L (ref 21–32)
CREAT SERPL-MCNC: 0.7 MG/DL (ref 0.6–1.3)
EOSINOPHIL # BLD AUTO: 0.02 THOUSAND/ΜL (ref 0–0.61)
EOSINOPHIL NFR BLD AUTO: 0 % (ref 0–6)
ERYTHROCYTE [DISTWIDTH] IN BLOOD BY AUTOMATED COUNT: 14.1 % (ref 11.6–15.1)
GFR SERPL CREATININE-BSD FRML MDRD: 89 ML/MIN/1.73SQ M
GLUCOSE SERPL-MCNC: 210 MG/DL (ref 65–140)
HCT VFR BLD AUTO: 32.8 % (ref 34.8–46.1)
HGB BLD-MCNC: 10.5 G/DL (ref 11.5–15.4)
IMM GRANULOCYTES # BLD AUTO: 0.03 THOUSAND/UL (ref 0–0.2)
IMM GRANULOCYTES NFR BLD AUTO: 1 % (ref 0–2)
LYMPHOCYTES # BLD AUTO: 0.86 THOUSANDS/ΜL (ref 0.6–4.47)
LYMPHOCYTES NFR BLD AUTO: 18 % (ref 14–44)
MCH RBC QN AUTO: 27.2 PG (ref 26.8–34.3)
MCHC RBC AUTO-ENTMCNC: 32 G/DL (ref 31.4–37.4)
MCV RBC AUTO: 85 FL (ref 82–98)
MONOCYTES # BLD AUTO: 0.67 THOUSAND/ΜL (ref 0.17–1.22)
MONOCYTES NFR BLD AUTO: 14 % (ref 4–12)
NEUTROPHILS # BLD AUTO: 3.14 THOUSANDS/ΜL (ref 1.85–7.62)
NEUTS SEG NFR BLD AUTO: 67 % (ref 43–75)
NRBC BLD AUTO-RTO: 0 /100 WBCS
P AXIS: 65 DEGREES
PLATELET # BLD AUTO: 344 THOUSANDS/UL (ref 149–390)
PMV BLD AUTO: 9.4 FL (ref 8.9–12.7)
POTASSIUM SERPL-SCNC: 3.7 MMOL/L (ref 3.5–5.3)
PR INTERVAL: 152 MS
QRS AXIS: 0 DEGREES
QRSD INTERVAL: 76 MS
QT INTERVAL: 388 MS
QTC INTERVAL: 439 MS
RBC # BLD AUTO: 3.86 MILLION/UL (ref 3.81–5.12)
SODIUM SERPL-SCNC: 137 MMOL/L (ref 136–145)
T WAVE AXIS: 53 DEGREES
VENTRICULAR RATE: 77 BPM
WBC # BLD AUTO: 4.73 THOUSAND/UL (ref 4.31–10.16)

## 2018-12-22 PROCEDURE — 96376 TX/PRO/DX INJ SAME DRUG ADON: CPT

## 2018-12-22 PROCEDURE — 85025 COMPLETE CBC W/AUTO DIFF WBC: CPT | Performed by: EMERGENCY MEDICINE

## 2018-12-22 PROCEDURE — 36415 COLL VENOUS BLD VENIPUNCTURE: CPT | Performed by: EMERGENCY MEDICINE

## 2018-12-22 PROCEDURE — 1124F ACP DISCUSS-NO DSCNMKR DOCD: CPT | Performed by: FAMILY MEDICINE

## 2018-12-22 PROCEDURE — 82948 REAGENT STRIP/BLOOD GLUCOSE: CPT

## 2018-12-22 PROCEDURE — 99284 EMERGENCY DEPT VISIT MOD MDM: CPT

## 2018-12-22 PROCEDURE — 96374 THER/PROPH/DIAG INJ IV PUSH: CPT

## 2018-12-22 PROCEDURE — 93010 ELECTROCARDIOGRAM REPORT: CPT | Performed by: INTERNAL MEDICINE

## 2018-12-22 PROCEDURE — 80048 BASIC METABOLIC PNL TOTAL CA: CPT | Performed by: EMERGENCY MEDICINE

## 2018-12-22 RX ORDER — HYDROMORPHONE HCL/PF 1 MG/ML
0.5 SYRINGE (ML) INJECTION ONCE
Status: COMPLETED | OUTPATIENT
Start: 2018-12-22 | End: 2018-12-22

## 2018-12-22 RX ADMIN — HYDROMORPHONE HYDROCHLORIDE 0.5 MG: 1 INJECTION, SOLUTION INTRAMUSCULAR; INTRAVENOUS; SUBCUTANEOUS at 22:56

## 2018-12-22 RX ADMIN — HYDROMORPHONE HYDROCHLORIDE 0.5 MG: 1 INJECTION, SOLUTION INTRAMUSCULAR; INTRAVENOUS; SUBCUTANEOUS at 21:58

## 2018-12-23 ENCOUNTER — APPOINTMENT (INPATIENT)
Dept: RADIOLOGY | Facility: HOSPITAL | Age: 68
DRG: 543 | End: 2018-12-23
Payer: MEDICARE

## 2018-12-23 PROBLEM — D64.9 ANEMIA: Chronic | Status: ACTIVE | Noted: 2018-12-13

## 2018-12-23 PROBLEM — E11.9 DIABETES MELLITUS TYPE II, NON INSULIN DEPENDENT (HCC): Chronic | Status: ACTIVE | Noted: 2018-12-23

## 2018-12-23 PROBLEM — E83.42 HYPOMAGNESEMIA: Status: ACTIVE | Noted: 2018-12-23

## 2018-12-23 PROBLEM — Z85.3 HISTORY OF BREAST CANCER: Chronic | Status: ACTIVE | Noted: 2018-10-02

## 2018-12-23 PROBLEM — E11.9 DIABETES MELLITUS TYPE II, NON INSULIN DEPENDENT (HCC): Status: ACTIVE | Noted: 2018-12-23

## 2018-12-23 PROBLEM — E87.6 HYPOKALEMIA: Status: ACTIVE | Noted: 2018-12-23

## 2018-12-23 PROBLEM — M48.54XA COMPRESSION FRACTURE OF THORACIC SPINE, NON-TRAUMATIC (HCC): Status: ACTIVE | Noted: 2018-12-23

## 2018-12-23 PROBLEM — R19.5 HEME POSITIVE STOOL: Status: RESOLVED | Noted: 2018-12-20 | Resolved: 2018-12-23

## 2018-12-23 LAB
ALBUMIN SERPL BCP-MCNC: 3.1 G/DL (ref 3.5–5)
ALP SERPL-CCNC: 233 U/L (ref 46–116)
ALT SERPL W P-5'-P-CCNC: 22 U/L (ref 12–78)
ANION GAP SERPL CALCULATED.3IONS-SCNC: 6 MMOL/L (ref 4–13)
ANION GAP SERPL CALCULATED.3IONS-SCNC: 8 MMOL/L (ref 4–13)
AST SERPL W P-5'-P-CCNC: 26 U/L (ref 5–45)
BILIRUB SERPL-MCNC: 0.63 MG/DL (ref 0.2–1)
BUN SERPL-MCNC: 7 MG/DL (ref 5–25)
BUN SERPL-MCNC: 8 MG/DL (ref 5–25)
CALCIUM SERPL-MCNC: 8.5 MG/DL (ref 8.3–10.1)
CALCIUM SERPL-MCNC: 8.9 MG/DL (ref 8.3–10.1)
CHLORIDE SERPL-SCNC: 102 MMOL/L (ref 100–108)
CHLORIDE SERPL-SCNC: 102 MMOL/L (ref 100–108)
CO2 SERPL-SCNC: 26 MMOL/L (ref 21–32)
CO2 SERPL-SCNC: 28 MMOL/L (ref 21–32)
CREAT SERPL-MCNC: 0.45 MG/DL (ref 0.6–1.3)
CREAT SERPL-MCNC: 0.47 MG/DL (ref 0.6–1.3)
ERYTHROCYTE [DISTWIDTH] IN BLOOD BY AUTOMATED COUNT: 14.2 % (ref 11.6–15.1)
GFR SERPL CREATININE-BSD FRML MDRD: 102 ML/MIN/1.73SQ M
GFR SERPL CREATININE-BSD FRML MDRD: 103 ML/MIN/1.73SQ M
GLUCOSE SERPL-MCNC: 114 MG/DL (ref 65–140)
GLUCOSE SERPL-MCNC: 116 MG/DL (ref 65–140)
GLUCOSE SERPL-MCNC: 118 MG/DL (ref 65–140)
GLUCOSE SERPL-MCNC: 122 MG/DL (ref 65–140)
GLUCOSE SERPL-MCNC: 129 MG/DL (ref 65–140)
GLUCOSE SERPL-MCNC: 186 MG/DL (ref 65–140)
HCT VFR BLD AUTO: 29.6 % (ref 34.8–46.1)
HGB BLD-MCNC: 9.3 G/DL (ref 11.5–15.4)
MAGNESIUM SERPL-MCNC: 1.5 MG/DL (ref 1.6–2.6)
MCH RBC QN AUTO: 26.8 PG (ref 26.8–34.3)
MCHC RBC AUTO-ENTMCNC: 31.4 G/DL (ref 31.4–37.4)
MCV RBC AUTO: 85 FL (ref 82–98)
PLATELET # BLD AUTO: 282 THOUSANDS/UL (ref 149–390)
PMV BLD AUTO: 9.5 FL (ref 8.9–12.7)
POTASSIUM SERPL-SCNC: 3.2 MMOL/L (ref 3.5–5.3)
POTASSIUM SERPL-SCNC: 3.7 MMOL/L (ref 3.5–5.3)
PROT SERPL-MCNC: 7.1 G/DL (ref 6.4–8.2)
RBC # BLD AUTO: 3.47 MILLION/UL (ref 3.81–5.12)
SODIUM SERPL-SCNC: 136 MMOL/L (ref 136–145)
SODIUM SERPL-SCNC: 136 MMOL/L (ref 136–145)
WBC # BLD AUTO: 4.28 THOUSAND/UL (ref 4.31–10.16)

## 2018-12-23 PROCEDURE — 99223 1ST HOSP IP/OBS HIGH 75: CPT | Performed by: HOSPITALIST

## 2018-12-23 PROCEDURE — 99232 SBSQ HOSP IP/OBS MODERATE 35: CPT | Performed by: NURSE PRACTITIONER

## 2018-12-23 PROCEDURE — G8978 MOBILITY CURRENT STATUS: HCPCS

## 2018-12-23 PROCEDURE — 97167 OT EVAL HIGH COMPLEX 60 MIN: CPT

## 2018-12-23 PROCEDURE — G8987 SELF CARE CURRENT STATUS: HCPCS

## 2018-12-23 PROCEDURE — 80048 BASIC METABOLIC PNL TOTAL CA: CPT | Performed by: HOSPITALIST

## 2018-12-23 PROCEDURE — 84080 ASSAY ALKALINE PHOSPHATASES: CPT | Performed by: HOSPITALIST

## 2018-12-23 PROCEDURE — G8988 SELF CARE GOAL STATUS: HCPCS

## 2018-12-23 PROCEDURE — 72157 MRI CHEST SPINE W/O & W/DYE: CPT

## 2018-12-23 PROCEDURE — 97163 PT EVAL HIGH COMPLEX 45 MIN: CPT

## 2018-12-23 PROCEDURE — 85027 COMPLETE CBC AUTOMATED: CPT | Performed by: HOSPITALIST

## 2018-12-23 PROCEDURE — G8979 MOBILITY GOAL STATUS: HCPCS

## 2018-12-23 PROCEDURE — 83735 ASSAY OF MAGNESIUM: CPT | Performed by: HOSPITALIST

## 2018-12-23 PROCEDURE — 72158 MRI LUMBAR SPINE W/O & W/DYE: CPT

## 2018-12-23 PROCEDURE — 80053 COMPREHEN METABOLIC PANEL: CPT | Performed by: NURSE PRACTITIONER

## 2018-12-23 PROCEDURE — A9585 GADOBUTROL INJECTION: HCPCS | Performed by: INTERNAL MEDICINE

## 2018-12-23 PROCEDURE — 82948 REAGENT STRIP/BLOOD GLUCOSE: CPT

## 2018-12-23 PROCEDURE — 71260 CT THORAX DX C+: CPT

## 2018-12-23 PROCEDURE — 99222 1ST HOSP IP/OBS MODERATE 55: CPT | Performed by: NEUROLOGICAL SURGERY

## 2018-12-23 RX ORDER — POTASSIUM CHLORIDE 20 MEQ/1
40 TABLET, EXTENDED RELEASE ORAL ONCE
Status: COMPLETED | OUTPATIENT
Start: 2018-12-23 | End: 2018-12-23

## 2018-12-23 RX ORDER — OXYCODONE HYDROCHLORIDE 5 MG/1
5 TABLET ORAL EVERY 4 HOURS PRN
Status: DISCONTINUED | OUTPATIENT
Start: 2018-12-23 | End: 2018-12-24

## 2018-12-23 RX ORDER — ACETAMINOPHEN 325 MG/1
975 TABLET ORAL EVERY 8 HOURS SCHEDULED
Status: DISCONTINUED | OUTPATIENT
Start: 2018-12-23 | End: 2018-12-24

## 2018-12-23 RX ORDER — LORAZEPAM 1 MG/1
1 TABLET ORAL EVERY 8 HOURS PRN
Status: DISCONTINUED | OUTPATIENT
Start: 2018-12-23 | End: 2018-12-24 | Stop reason: HOSPADM

## 2018-12-23 RX ORDER — POLYETHYLENE GLYCOL 3350 17 G/17G
17 POWDER, FOR SOLUTION ORAL DAILY PRN
Status: DISCONTINUED | OUTPATIENT
Start: 2018-12-23 | End: 2018-12-24 | Stop reason: HOSPADM

## 2018-12-23 RX ORDER — SENNOSIDES 8.6 MG
1 TABLET ORAL DAILY
Status: DISCONTINUED | OUTPATIENT
Start: 2018-12-23 | End: 2018-12-24

## 2018-12-23 RX ORDER — FERROUS SULFATE 325(65) MG
325 TABLET ORAL
Status: DISCONTINUED | OUTPATIENT
Start: 2018-12-23 | End: 2018-12-24 | Stop reason: HOSPADM

## 2018-12-23 RX ORDER — PRAVASTATIN SODIUM 40 MG
40 TABLET ORAL
Status: DISCONTINUED | OUTPATIENT
Start: 2018-12-23 | End: 2018-12-24 | Stop reason: HOSPADM

## 2018-12-23 RX ORDER — MELATONIN
2000 DAILY
Status: DISCONTINUED | OUTPATIENT
Start: 2018-12-23 | End: 2018-12-24 | Stop reason: HOSPADM

## 2018-12-23 RX ORDER — ASPIRIN 81 MG/1
81 TABLET ORAL DAILY
Status: DISCONTINUED | OUTPATIENT
Start: 2018-12-23 | End: 2018-12-24 | Stop reason: HOSPADM

## 2018-12-23 RX ORDER — MAGNESIUM SULFATE HEPTAHYDRATE 40 MG/ML
2 INJECTION, SOLUTION INTRAVENOUS ONCE
Status: COMPLETED | OUTPATIENT
Start: 2018-12-23 | End: 2018-12-23

## 2018-12-23 RX ORDER — LISINOPRIL 5 MG/1
5 TABLET ORAL DAILY
Status: DISCONTINUED | OUTPATIENT
Start: 2018-12-23 | End: 2018-12-24 | Stop reason: HOSPADM

## 2018-12-23 RX ORDER — DOCUSATE SODIUM 100 MG/1
100 CAPSULE, LIQUID FILLED ORAL 2 TIMES DAILY
Status: DISCONTINUED | OUTPATIENT
Start: 2018-12-23 | End: 2018-12-24

## 2018-12-23 RX ORDER — CHLORAL HYDRATE 500 MG
2000 CAPSULE ORAL DAILY
Status: DISCONTINUED | OUTPATIENT
Start: 2018-12-23 | End: 2018-12-24 | Stop reason: HOSPADM

## 2018-12-23 RX ORDER — ONDANSETRON 2 MG/ML
4 INJECTION INTRAMUSCULAR; INTRAVENOUS EVERY 6 HOURS PRN
Status: DISCONTINUED | OUTPATIENT
Start: 2018-12-23 | End: 2018-12-24

## 2018-12-23 RX ORDER — MORPHINE SULFATE 15 MG/1
30 TABLET ORAL EVERY 4 HOURS PRN
Status: DISCONTINUED | OUTPATIENT
Start: 2018-12-23 | End: 2018-12-24 | Stop reason: HOSPADM

## 2018-12-23 RX ADMIN — INSULIN LISPRO 1 UNITS: 100 INJECTION, SOLUTION INTRAVENOUS; SUBCUTANEOUS at 11:22

## 2018-12-23 RX ADMIN — LORAZEPAM 1 MG: 1 TABLET ORAL at 22:08

## 2018-12-23 RX ADMIN — IOHEXOL 85 ML: 350 INJECTION, SOLUTION INTRAVENOUS at 13:22

## 2018-12-23 RX ADMIN — OXYCODONE HYDROCHLORIDE 5 MG: 5 TABLET ORAL at 16:23

## 2018-12-23 RX ADMIN — ACETAMINOPHEN 975 MG: 325 TABLET ORAL at 00:51

## 2018-12-23 RX ADMIN — ACETAMINOPHEN 975 MG: 325 TABLET ORAL at 06:40

## 2018-12-23 RX ADMIN — SENNOSIDES 8.6 MG: 8.6 TABLET, FILM COATED ORAL at 11:10

## 2018-12-23 RX ADMIN — PRAVASTATIN SODIUM 40 MG: 40 TABLET ORAL at 18:41

## 2018-12-23 RX ADMIN — LISINOPRIL 5 MG: 5 TABLET ORAL at 11:10

## 2018-12-23 RX ADMIN — ACETAMINOPHEN 975 MG: 325 TABLET ORAL at 14:59

## 2018-12-23 RX ADMIN — VITAMIN D, TAB 1000IU (100/BT) 2000 UNITS: 25 TAB at 11:11

## 2018-12-23 RX ADMIN — MORPHINE SULFATE 30 MG: 15 TABLET ORAL at 21:06

## 2018-12-23 RX ADMIN — MAGNESIUM SULFATE IN WATER 2 G: 40 INJECTION, SOLUTION INTRAVENOUS at 13:01

## 2018-12-23 RX ADMIN — DOCUSATE SODIUM 100 MG: 100 CAPSULE, LIQUID FILLED ORAL at 11:10

## 2018-12-23 RX ADMIN — GADOBUTROL 5 ML: 604.72 INJECTION INTRAVENOUS at 10:51

## 2018-12-23 RX ADMIN — FERROUS SULFATE TAB 325 MG (65 MG ELEMENTAL FE) 325 MG: 325 (65 FE) TAB at 11:10

## 2018-12-23 RX ADMIN — POTASSIUM CHLORIDE 40 MEQ: 1500 TABLET, EXTENDED RELEASE ORAL at 13:01

## 2018-12-23 RX ADMIN — DOCUSATE SODIUM 100 MG: 100 CAPSULE, LIQUID FILLED ORAL at 18:35

## 2018-12-23 RX ADMIN — OXYCODONE HYDROCHLORIDE 5 MG: 5 TABLET ORAL at 06:40

## 2018-12-23 RX ADMIN — ASPIRIN 81 MG: 81 TABLET, COATED ORAL at 11:11

## 2018-12-23 RX ADMIN — ENOXAPARIN SODIUM 40 MG: 40 INJECTION SUBCUTANEOUS at 11:11

## 2018-12-23 RX ADMIN — OXYCODONE HYDROCHLORIDE 5 MG: 5 TABLET ORAL at 00:52

## 2018-12-23 RX ADMIN — Medication 2000 MG: at 11:10

## 2018-12-23 RX ADMIN — ACETAMINOPHEN 975 MG: 325 TABLET ORAL at 22:09

## 2018-12-23 NOTE — PLAN OF CARE
Problem: Potential for Falls  Goal: Patient will remain free of falls  INTERVENTIONS:  - Assess patient frequently for physical needs  -  Identify cognitive and physical deficits and behaviors that affect risk of falls    -  [place] fall precautions as indicated by assessment   - Educate patient/family on patient safety including physical limitations  - Instruct patient to call for assistance with activity based on assessment  - Modify environment to reduce risk of injury  - Consider OT/PT consult to assist with strengthening/mobility   Outcome: Progressing      Problem: Prexisting or High Potential for Compromised Skin Integrity  Goal: Skin integrity is maintained or improved  INTERVENTIONS:  - Identify patients at risk for skin breakdown  - Assess and monitor skin integrity  - Assess and monitor nutrition and hydration status  - Monitor labs (i e  albumin)  - Assess for incontinence   - Turn and reposition patient  - Assist with mobility/ambulation  - Relieve pressure over bony prominences  - Avoid friction and shearing  - Provide appropriate hygiene as needed including keeping skin clean and dry  - Evaluate need for skin moisturizer/barrier cream  - Collaborate with interdisciplinary team (i e  Nutrition, Rehabilitation, etc )   - Patient/family teaching   Outcome: Progressing      Problem: PAIN - ADULT  Goal: Verbalizes/displays adequate comfort level or baseline comfort level  Interventions:  - Encourage patient to monitor pain and request assistance  - Assess pain using appropriate pain scale  - Administer analgesics based on type and severity of pain and evaluate response  - Implement non-pharmacological measures as appropriate and evaluate response  - Consider cultural and social influences on pain and pain management  - Notify physician/advanced practitioner if interventions unsuccessful or patient reports new pain   Outcome: Progressing      Problem: INFECTION - ADULT  Goal: Absence or prevention of progression during hospitalization  INTERVENTIONS:  - Assess and monitor for signs and symptoms of infection  - Monitor lab/diagnostic results  - Monitor all insertion sites, i e  indwelling lines, tubes, and drains  - Monitor endotracheal (as able) and nasal secretions for changes in amount and color  - [place] appropriate cooling/warming therapies per order  - Administer medications as ordered  - Instruct and encourage patient and family to use good hand hygiene technique  - Identify and instruct in appropriate isolation precautions for identified infection/condition   Outcome: Progressing    Goal: Absence of fever/infection during neutropenic period  INTERVENTIONS:  - Monitor WBC  - Implement neutropenic guidelines  Outcome: Progressing      Problem: SAFETY ADULT  Goal: Maintain or return to baseline ADL function  INTERVENTIONS:  -  Assess patient's ability to carry out ADLs; assess patient's baseline for ADL function and identify physical deficits which impact ability to perform ADLs (bathing, care of mouth/teeth, toileting, grooming, dressing, etc )  - Assess/evaluate cause of self-care deficits   - Assess range of motion  - Assess patient's mobility; develop plan if impaired  - Assess patient's need for assistive devices and provide as appropriate  - Encourage maximum independence but intervene and supervise when necessary  ¯ Involve family in performance of ADLs  ¯ Assess for home care needs following discharge   ¯ Request OT consult to assist with ADL evaluation and planning for discharge  ¯ Provide patient education as appropriate   Outcome: Progressing    Goal: Maintain or return mobility status to optimal level  INTERVENTIONS:  - Assess patient's baseline mobility status (ambulation, transfers, stairs, etc )    - Identify cognitive and physical deficits and behaviors that affect mobility  - Identify mobility aids required to assist with transfers and/or ambulation (gait belt, sit-to-stand, lift, walker, cane, etc )  - [place] fall precautions as indicated by assessment  - Record patient progress and toleration of activity level on Mobility SBAR; progress patient to next Phase/Stage  - Instruct patient to call for assistance with activity based on assessment  - Request Rehabilitation consult to assist with strengthening/weightbearing, etc    Outcome: Progressing      Problem: DISCHARGE PLANNING  Goal: Discharge to home or other facility with appropriate resources  INTERVENTIONS:  - Identify barriers to discharge w/patient and caregiver  - Arrange for needed discharge resources and transportation as appropriate  - Identify discharge learning needs (meds, wound care, etc )  - Arrange for interpretive services to assist at discharge as needed  - Refer to Case Management Department for coordinating discharge planning if the patient needs post-hospital services based on physician/advanced practitioner order or complex needs related to functional status, cognitive ability, or social support system   Outcome: Progressing      Problem: Knowledge Deficit  Goal: Patient/family/caregiver demonstrates understanding of disease process, treatment plan, medications, and discharge instructions  Complete learning assessment and assess knowledge base    Interventions:  - Provide teaching at level of understanding  - Provide teaching via preferred learning methods   Outcome: Progressing

## 2018-12-23 NOTE — ASSESSMENT & PLAN NOTE
Lab Results   Component Value Date    HGBA1C 6 5 08/23/2018       Recent Labs      12/23/18   0052   POCGLU  122     Hold oral hypoglycemic continue Accu-Cheks insulin sliding scale    Blood Sugar Average: Last 72 hrs:  Pt with bs of 122

## 2018-12-23 NOTE — PROGRESS NOTES
Progress Note - Zuri Bhandari 1950, 76 y o  female MRN: [id number]    Unit/Bed#: [facility] 928-01 Encounter: [id number]    Primary Care Provider: Reggie Gutierrez MD   Date and time admitted to hospital: 12/22/2018 11:47 PM        * Compression fracture of thoracic spine, non-traumatic [facility])   Assessment & Plan    CT of abdomen revealed "Mild acute to subacute compression fracture T12 with 5 mm posterior retropulsion  Given multifocal diffuse patchy osseous sclerosis elsewhere, cannot exclude pathologic fracture "  Neurosurgeon on  call ,Dr Norman Doan, recommended transferred to [facility] for neurosurgery evaluation  Will proceed with MRI of thoracic and lumbar spine as recommended by neurosurgeon ordered not yet done  Further recommendations and imaging study pending neurosurgery evaluation none at this time discussed with isaak ricardo  Continue current pain management including Tylenol around the clock  Acute pain management consult, will likely occur Monday   Apply TLSO brace, makes pt feel more secure   Will order ct chest abd pelvis w contrast per discussion with neuro surgery   Just received mri results back, noted to have diffuse mets discussed vaguely with pt at this time (very emotional)      Diabetes mellitus type II, non insulin dependent ([facility] [address] )   Assessment & Plan    Lab Results   Component Value Date    HGBA1C 6 5 08/23/2018       Recent Labs      12/23/18   0052   POCGLU  122     Hold oral hypoglycemic continue Accu-Cheks insulin sliding scale    Blood Sugar Average: Last 72 hrs:  Pt with bs of 122       Anemia   Assessment & Plan    Anemia workup as per onco showed low iron, has h/o  FOBT positive stool   Will defer need of GI to the primary team  Hemoglobin stable 9 3/29 6  Continue ferrous sulfate       Elevated alkaline phosphatase level   Assessment & Plan    Secondary to # 1    Will recheck      History of breast cancer   Assessment & Plan    Patient complaining on poor appetite approximately 10 lb weight lost and back pain since 2018 as documented by medical assistant on 18  Oncology to follow  Patient follows with oncologist Dr Lopez Pleasant Hopechrist Tijerina will see her later   Nutritionist consult placed     Anxiety disorder   Assessment & Plan    Continue Ativan p r n  Hypomagnesemia   Assessment & Plan    chk labs in am will order 2 grams mag with kdur      Hypokalemia   Assessment & Plan    Will replete with 40meq po and add iv mag   chk labs in am          VTE Pharmacologic Prophylaxis:   Pharmacologic: Enoxaparin (Lovenox)  Mechanical VTE Prophylaxis in Place: Yes    Patient Centered Rounds: I have performed bedside rounds with nursing staff today  Discussions with Specialists or Other Care Team Provider: nursing     Education and Discussions with Family / Patient: patient     Time Spent for Care: 45 minutes  More than 50% of total time spent on counseling and coordination of care as described above  Current Length of Stay: 0 day(s)    Current Patient Status: Inpatient   Certification Statement: The patient will continue to require additional inpatient hospital stay due to ongoing workup and monitoring     Discharge Plan:     Code Status: Level 1 - Full Code      Subjective:   More than 45 minutes spent with patient in regards to patient's history and patient is fearful of potential diagnosis  Patient reports  passed about 2 and off years ago and it is very difficult around the holidays  Patient reports pain in her upper back and in her left shoulder  She also reports poor appetite and loss of weight although she attributes some of it to depression  She feels it is hard still after these years in regards to loss of her   Pt is very emotional   Pt denies any fevers chills nausea or vomiting but rather constipation         Objective:     Vitals:   Temp (24hrs), Av 5 °F (36 9 °C), Min:97 7 °F (36 5 °C), Max:99 3 °F (37 4 °C)    Temp:  [97 7 °F (36 5 °C)-99 3 °F (37 4 °C)] 97 7 °F (36 5 °C)  HR:  [76-97] 76  Resp:  [18] 18  BP: (107-134)/(56-86) 126/75  SpO2:  [97 %-99 %] 97 %  Body mass index is 22 38 kg/m²  Input and Output Summary (last 24 hours): Intake/Output Summary (Last 24 hours) at 12/23/18 1244  Last data filed at 12/23/18 9468   Gross per 24 hour   Intake              240 ml   Output                0 ml   Net              240 ml       Physical Exam:     Physical Exam   Constitutional: She is oriented to person, place, and time  No distress  HENT:   Head: Normocephalic and atraumatic  Eyes: Conjunctivae are normal  Right eye exhibits no discharge  Left eye exhibits no discharge  No scleral icterus  Neck: No JVD present  No tracheal deviation present  No thyromegaly present  Cardiovascular: Normal rate  Exam reveals no gallop and no friction rub  No murmur heard  Pulmonary/Chest: No stridor  No respiratory distress  She has no wheezes  She has no rales  She exhibits no tenderness  Abdominal: She exhibits no distension and no mass  There is no tenderness  There is no rebound and no guarding  Musculoskeletal: She exhibits tenderness (left shoulder )  She exhibits no edema  Lymphadenopathy:     She has no cervical adenopathy  Neurological: She is oriented to person, place, and time  Skin: Skin is warm and dry  No rash noted  She is not diaphoretic  No erythema  No pallor     Psychiatric:   Depressed emotional          Additional Data:     Labs:      Results from last 7 days  Lab Units 12/23/18  0515 12/22/18  2157   WBC Thousand/uL 4 28* 4 73   HEMOGLOBIN g/dL 9 3* 10 5*   HEMATOCRIT % 29 6* 32 8*   PLATELETS Thousands/uL 282 344   NEUTROS PCT %  --  67   LYMPHS PCT %  --  18   MONOS PCT %  --  14*   EOS PCT %  --  0       Results from last 7 days  Lab Units 12/23/18  0515  12/21/18  0248   SODIUM mmol/L 136  < > 141   POTASSIUM mmol/L 3 2*  < > 3 6   CHLORIDE mmol/L 102  < > 102   CO2 mmol/L 28  < > 25   BUN mg/dL 8  < > 15   CREATININE mg/dL 0 45*  < > 0 68   ANION GAP mmol/L 6  < > 14*   CALCIUM mg/dL 8 5  < > 9 1   ALBUMIN g/dL  --   --  3 3*   TOTAL BILIRUBIN mg/dL  --   --  0 50   ALK PHOS U/L  --   --  257*   ALT U/L  --   --  22   AST U/L  --   --  57*   GLUCOSE RANDOM mg/dL 116  < > 151*   < > = values in this interval not displayed  Results from last 7 days  Lab Units 12/21/18  0248   INR  1 11       Results from last 7 days  Lab Units 12/23/18  1121 12/23/18  0052   POC GLUCOSE mg/dl 186* 122           Results from last 7 days  Lab Units 12/21/18  0248   LACTIC ACID mmol/L 1 6           * I Have Reviewed All Lab Data Listed Above  * Additional Pertinent Lab Tests Reviewed:  All Labs Within Last 24 Hours Reviewed    Imaging:    Imaging Reports Reviewed Today Include: reviewed       Recent Cultures (last 7 days):           Last 24 Hours Medication List:     Current Facility-Administered Medications:  acetaminophen 975 mg Oral Q8H [address] Nancy Daley MD   aspirin 81 mg Oral Daily Nancy Daley MD   cholecalciferol 2,000 Units Oral Daily Nancy Daley MD   docusate sodium 100 mg Oral BID Nancy Daley MD   enoxaparin 40 mg Subcutaneous Daily Nancy Daley MD   ferrous sulfate 325 mg Oral Daily With Breakfast Nancy Daley MD   fish oil 2,000 mg Oral Daily Nancy Daley MD   gadobutrol 5 mL Intravenous Once in imaging Xiomara Gonzales MD   insulin lispro 1-5 Units Subcutaneous TID AC Nancy Daley MD   insulin lispro 1-5 Units Subcutaneous HS Nancy Daley MD   lisinopril 5 mg Oral Daily Nancy Daley MD   LORazepam 1 mg Oral Q8H PRN Nancy Daley MD   magnesium sulfate 2 g Intravenous Once [employer], CRNP   morphine 30 mg Oral Q4H PRN Nancy Daley MD   morphine injection 2 mg Intravenous Q3H PRN Nancy Daley MD   ondansetron 4 mg Intravenous Q6H PRN Nancy Daley MD   oxyCODONE 5 mg Oral Q4H PRN Nancy Daley MD   polyethylene glycol 17 g Oral Daily PRN Nancy Daley MD potassium chloride 40 mEq Oral Once CAITIE Weeks   pravastatin 40 mg Oral Daily With Liya Hsieh MD   senna 1 tablet Oral Daily Timur Garzon MD        Today, Patient Was Seen By: CAITIE Weeks    ** Please Note: Dictation voice to text software may have been used in the creation of this document   **

## 2018-12-23 NOTE — PLAN OF CARE
Problem: OCCUPATIONAL THERAPY ADULT  Goal: Performs self-care activities at highest level of function for planned discharge setting  See evaluation for individualized goals  Treatment Interventions: ADL retraining, Functional transfer training, Endurance training, Patient/family training, Equipment evaluation/education, Compensatory technique education, Activityengagement          See flowsheet documentation for full assessment, interventions and recommendations  Limitation: Decreased ADL status, Decreased endurance, Decreased self-care trans, Decreased high-level ADLs  Prognosis: Good  Assessment: Pt is a 76 y o  female who [facility]/2018 with Compression fracture of thoracic spine, non-traumatic ([facility] [address] )   Pt's problem list also includes PMH of DM, previous surgery, cancer history and breast ca, anemia, anxiety disorder  At baseline pt was completing adls and mobility independently  Pt lives alone in multilevel home  Currently pt requires min assist for overall ADLS and min assist for functional mobility/transfers  Pt currently presents with impairments in the following categories -steps to enter environment, limited home support, difficulty performing ADLS and difficulty performing IADLS  activity tolerance, endurance and standing balance/tolerance  These impairments, as well as pt's fatigue, pain, spinal precautions, decreased caregiver support, risk for falls and home environment  limit pt's ability to safely engage in all baseline areas of occupation, includingbathing, dressing, toileting, functional mobility/transfers, community mobility, laundry , driving, house maintenance, medication management, meal prep, cleaning, social participation  and leisure activities  From OT standpoint, recommend home with family support upon D/C  OT will continue to follow to address the below stated goals        OT Discharge Recommendation: Home with family support

## 2018-12-23 NOTE — H&P
H&P- Trinidad Meyer 1950, 76 y o  female MRN: [id number]    Unit/Bed#: [facility] 928-01 Encounter: [id number]    Primary Care Provider: Amanda Lowery MD   Date and time admitted to hospital: 12/22/2018 11:47 PM        * Compression fracture of thoracic spine, non-traumatic [facility])   Assessment & Plan    CT of abdomen revealed "Mild acute to subacute compression fracture T12 with 5 mm posterior retropulsion  Given multifocal diffuse patchy osseous sclerosis elsewhere, cannot exclude pathologic fracture "  Neurosurgeon on  call ,Dr Naif Mcelroy, recommended transferred to [facility] for neurosurgery evaluation  Will proceed with MRI of thoracic and lumbar spine as recommended by neurosurgeon  Further recommendations and imaging study pending neurosurgery evaluation  Continue current pain management including Tylenol around the clock  Acute pain management consult  Apply TLSO braces     Elevated alkaline phosphatase level   Assessment & Plan    Secondary to # 1  Will recheck      History of breast cancer   Assessment & Plan    Patient complaining on poor appetite approximately 10 lb weight lost and back pain since October of 2018 as documented by medical assistant on 12/13/18  Oncology to follow  Patient follows with oncologist Dr Woodruff Anchors consult placed     Anemia   Assessment & Plan    Anemia workup as per onco showed low iron, has h/o  FOBT positive stool   Will defer need of GI to the primary team  Hemoglobin stable  Continue ferrous sulfate       Anxiety disorder   Assessment & Plan    Continue Ativan p r n       Diabetes mellitus type II, non insulin dependent [facility])   Assessment & Plan    Lab Results   Component Value Date    HGBA1C 6 5 08/23/2018       Recent Labs      12/23/18   0052   POCGLU  122     Hold oral hypoglycemic continue Accu-Cheks insulin sliding scale    Blood Sugar Average: Last 72 hrs:           VTE Prophylaxis: Enoxaparin (Lovenox)  / sequential compression device   Code Status: Full  POLST: There is no POLST form on file for this patient (pre-hospital)  Discussion with family:  No family members at bedside    Anticipated Length of Stay:  Patient will be admitted on an Inpatient basis with an anticipated length of stay of  > 2 midnights  Justification for Hospital Stay:  Neurosurgery and oncology consult    Total Time for Visit, including Counseling / Coordination of Care: 45 minutes  Greater than 50% of this total time spent on direct patient counseling and coordination of care  Chief Complaint:   Back pain    History of Present Illness:    Gloria Mcdonald is a 76 y o  female with history of right breast cancer diagnosed in 1996, in 2004- 2005 recurrence of right breast cancer in the right supraclavicular area, s/p Rt and Femara X 10  Years, finished in 2015 who presented to [facility] ER for evaluation of back pain  Upon my assessment patient reports that she has been having 8-10/10 lower back pain, getting worse at rest, nonradiating over the past 2 months   3 days ago she was seen in the emergency room of the other hospital,  x-ray was unremarkable and she  was discharged   given ongoing pain she went to the [facility] ER where imaging studies revealed "Mild acute to subacute compression fracture T12 with 5 mm posterior retropulsion  Given multifocal diffuse patchy osseous sclerosis elsewhere, cannot exclude pathologic fracture "  Patient denies neurologic deficit, bowel or urinary incontinence or retention  She relays a gradual unintentional weight loss ( approximately 10 lb over the past 2-3 months), decreased appetite and activities  She sustained fall in early November of 2018, no back trauma, CT of the head was negative   Alk phos elevated, hemoglobin at baseline 10 5    Neurosurgery on-call recommended transferred to [facility] for neurosurgery evaluation  Patient admitted on an  inpatient basis to the hospitalist service    Review of Systems:    Review of Systems   Constitutional: Positive for activity change, appetite change, fatigue and unexpected weight change  Musculoskeletal: Positive for back pain  Past Medical and Surgical History:     Past Medical History:   Diagnosis Date    Cancer [facility])     breast    Compression fracture of thoracic vertebra (HCC)     [facility] mellitus ([facility] [address] )     Tendonitis        Past Surgical History:   Procedure Laterality Date    CHOLECYSTECTOMY      MASTECTOMY      Right side reconstruction    NECK SURGERY      TONSILLECTOMY         Meds/Allergies:    Prior to Admission medications    Medication Sig Start Date End Date Taking? Authorizing Provider   aspirin (ASPIRIN EC ADULT LOW STRENGTH) 81 mg EC tablet Take 1 tablet by mouth daily    Historical Provider, MD   Cholecalciferol (VITAMIN D3) 2000 units TABS Take 2,000 Units by mouth daily    Historical Provider, MD   liraglutide (VICTOZA) injection Inject 0 6 mg under the skin daily      Historical Provider, MD   lisinopril (ZESTRIL) 5 mg tablet Take 5 mg by mouth daily      Historical Provider, MD   LORazepam (ATIVAN) 1 mg tablet Take 2 tablets of 1 mg Ativan at night 10/16/18   Dorota Bustamante MD   metFORMIN (GLUMETZA) 1000 MG (MOD) 24 hr tablet Take 1,000 mg by mouth 2 (two) times a day with meals      Historical Provider, MD   MULTIPLE VITAMINS PO Take by mouth    Historical Provider, MD   Omega-3 Fatty Acids (FISH OIL PO) Take 2 g by mouth    Historical Provider, MD   oxyCODONE-acetaminophen (PERCOCET) 5-325 mg per tablet Take 1 tablet by mouth every 4 (four) hours as needed for moderate pain for up to 4 days Max Daily Amount: 6 tablets 12/21/18 12/25/18  Dagoberto Plunkett MD   pravastatin (PRAVACHOL) 40 mg tablet Take 40 mg by mouth daily   8/15/18   Historical Provider, MD     I have reviewed home medications with a medical source (PCP, Pharmacy, other)  Allergies: No Known Allergies    Social History:     Marital Status:     Occupation: none  Patient Pre-hospital Living Situation: home  Patient Pre-hospital Level of Mobility: reg  Patient Pre-hospital Diet Restrictions: reg  Substance Use History:   History   Alcohol Use No     History   Smoking Status    Never Smoker   Smokeless Tobacco    Never Used     History   Drug Use No       Family History:    non-contributory    Physical Exam:     Vitals:   Blood Pressure: 107/86 (12/22/18 2345)  Pulse: 86 (12/22/18 2345)  Temperature: 99 3 °F (37 4 °C) (12/22/18 2345)  Temp Source: Oral (12/22/18 2345)  Respirations: 18 (12/22/18 2345)  Height: 5' 3" (160 cm) (12/22/18 2345)  SpO2: 97 % (12/22/18 2345)    Physical Exam   Constitutional: No distress  HENT:   Head: Normocephalic  Eyes: Pupils are equal, round, and reactive to light  Cardiovascular: Regular rhythm  Pulmonary/Chest: No respiratory distress  Abdominal: She exhibits no distension  Musculoskeletal: She exhibits tenderness  Spinal   Neurological: She is alert  No cranial nerve deficit  Psychiatric: She has a normal mood and affect  Additional Data:     Lab Results: I have personally reviewed pertinent reports          Results from last 7 days  Lab Units 12/22/18 2157   WBC Thousand/uL 4 73   HEMOGLOBIN g/dL 10 5*   HEMATOCRIT % 32 8*   PLATELETS Thousands/uL 344   NEUTROS PCT % 67   LYMPHS PCT % 18   MONOS PCT % 14*   EOS PCT % 0       Results from last 7 days  Lab Units 12/22/18 2157 12/21/18 0248   SODIUM mmol/L 137 141   POTASSIUM mmol/L 3 7 3 6   CHLORIDE mmol/L 100 102   CO2 mmol/L 27 25   BUN mg/dL 10 15   CREATININE mg/dL 0 70 0 68   ANION GAP mmol/L 10 14*   CALCIUM mg/dL 9 5 9 1   ALBUMIN g/dL  --  3 3*   TOTAL BILIRUBIN mg/dL  --  0 50   ALK PHOS U/L  --  257*   ALT U/L  --  22   AST U/L  --  57*   GLUCOSE RANDOM mg/dL 210* 151*       Results from last 7 days  Lab Units 12/21/18  0248   INR  1 11       Results from last 7 days  Lab Units 12/23/18  0052   POC GLUCOSE mg/dl 122           Results from last 7 days  Lab Units 12/21/18 0248 LACTIC ACID mmol/L 1 6       Imaging: I have personally reviewed pertinent reports  MRI inpatient order    (Results Pending)         Allscri\[facility] [place] [facility]\"" / [facility] Records Reviewed: Yes     ** Please Note: This note has been constructed using a voice recognition system   **

## 2018-12-23 NOTE — ASSESSMENT & PLAN NOTE
Patient complaining on poor appetite approximately 10 lb weight lost and back pain since October of 2018 as documented by medical assistant on 12/13/18  Oncology to follow  Patient follows with oncologist Dr Juliana Sage consult placed

## 2018-12-23 NOTE — ED PROVIDER NOTES
History  Chief Complaint   Patient presents with    Back Pain     pt presents with low back that radiates around abdomen  reports that she was diagnosed with compression fx on thursday morning  states "its so severe i want to be admitted this time"  reports fall was back in novenber when she passed out in store  also reports low grade fevers and loss of appetite       History provided by:  Patient  Back Pain   Location:  Thoracic spine  Quality:  Aching  Radiates to: Around to her abdomen  Pain severity:  Severe  Pain is:  Same all the time  Onset quality:  Gradual  Duration:  2 days  Timing:  Constant  Progression:  Worsening  Chronicity:  New  Context comment:  Diagnosed with a compression fracture, been taking Percocet not sleeping not eating pain severely worsening prompting repeat visit  Relieved by:  None tried  Worsened by:  Nothing  Ineffective treatments:  None tried  Associated symptoms: no abdominal pain, no abdominal swelling, no bladder incontinence, no bowel incontinence, no chest pain, no dysuria, no fever, no headaches, no numbness, no paresthesias, no tingling, no weakness and no weight loss        Prior to Admission Medications   Prescriptions Last Dose Informant Patient Reported? Taking?    Cholecalciferol (VITAMIN D3) 2000 units TABS  Self Yes No   Sig: Take 2,000 Units by mouth daily   LORazepam (ATIVAN) 1 mg tablet   No No   Sig: Take 2 tablets of 1 mg Ativan at night   MULTIPLE VITAMINS PO  Self Yes No   Sig: Take by mouth   Omega-3 Fatty Acids (FISH OIL PO)  Self Yes No   Sig: Take 2 g by mouth   aspirin (ASPIRIN EC ADULT LOW STRENGTH) 81 mg EC tablet  Self Yes No   Sig: Take 1 tablet by mouth daily   liraglutide (VICTOZA) injection  Self Yes No   Sig: Inject 0 6 mg under the skin daily     lisinopril (ZESTRIL) 5 mg tablet  Self Yes No   Sig: Take 5 mg by mouth daily     metFORMIN (GLUMETZA) 1000 MG (MOD) 24 hr tablet  Self Yes No   Sig: Take 1,000 mg by mouth 2 (two) times a day with meals oxyCODONE-acetaminophen (PERCOCET) 5-325 mg per tablet   No No   Sig: Take 1 tablet by mouth every 4 (four) hours as needed for moderate pain for up to 4 days Max Daily Amount: 6 tablets   pravastatin (PRAVACHOL) 40 mg tablet  Self Yes No   Sig: Take 40 mg by mouth daily        Facility-Administered Medications: None       Past Medical History:   Diagnosis Date    [facility] )     breast    Compression fracture of thoracic vertebra (HCC)     Diabetes mellitus ([facility] )     Tendonitis        Past Surgical History:   Procedure Laterality Date    CHOLECYSTECTOMY      MASTECTOMY      Right side reconstruction    NECK SURGERY      TONSILLECTOMY         No family history on file  I have reviewed and agree with the history as documented  Social History   Substance Use Topics    Smoking status: Never Smoker    Smokeless tobacco: Never Used    Alcohol use No        Review of Systems   Constitutional: Negative for activity change, chills, diaphoresis, fever and weight loss  HENT: Negative for congestion, sinus pressure and sore throat  Eyes: Negative for pain and visual disturbance  Respiratory: Negative for cough, chest tightness, shortness of breath, wheezing and stridor  Cardiovascular: Negative for chest pain and palpitations  Gastrointestinal: Negative for abdominal distention, abdominal pain, bowel incontinence, constipation, diarrhea, nausea and vomiting  Genitourinary: Negative for bladder incontinence, dysuria and frequency  Musculoskeletal: Positive for back pain  Negative for neck pain and neck stiffness  Skin: Negative for rash  Neurological: Negative for dizziness, tingling, speech difficulty, weakness, light-headedness, numbness, headaches and paresthesias  Physical Exam  Physical Exam   Constitutional: She is oriented to person, place, and time  She appears well-developed  No distress  HENT:   Head: Normocephalic and atraumatic     Eyes: Pupils are equal, round, and reactive to light  Neck: Normal range of motion  Neck supple  No tracheal deviation present  Cardiovascular: Normal rate, regular rhythm, normal heart sounds and intact distal pulses  No murmur heard  Pulmonary/Chest: Effort normal and breath sounds normal  No stridor  No respiratory distress  Abdominal: Soft  She exhibits no distension  There is no tenderness  There is no rebound and no guarding  Musculoskeletal: Normal range of motion  Neurological: She is alert and oriented to person, place, and time  Skin: Skin is warm and dry  She is not diaphoretic  No erythema  No pallor  Psychiatric: She has a normal mood and affect  Vitals reviewed        Vital Signs  ED Triage Vitals   Temperature Pulse Respirations Blood Pressure SpO2   12/22/18 2141 12/22/18 2133 12/22/18 2133 12/22/18 2133 12/22/18 2133   98 5 °F (36 9 °C) 97 18 133/56 99 %      Temp Source Heart Rate Source Patient Position - Orthostatic VS BP Location FiO2 (%)   12/22/18 2141 12/22/18 2133 12/22/18 2133 12/22/18 2133 --   Oral Monitor Sitting Right arm       Pain Score       12/22/18 2133       Worst Possible Pain           Vitals:    12/22/18 2133 12/22/18 2250   BP: 133/56 134/62   Pulse: 97 85   Patient Position - Orthostatic VS: Sitting Sitting       Visual Acuity      ED Medications  Medications   HYDROmorphone (DILAUDID) injection 0 5 mg (not administered)   HYDROmorphone (DILAUDID) injection 0 5 mg (0 5 mg Intravenous Given 12/22/18 2158)       Diagnostic Studies  Results Reviewed     Procedure Component Value Units Date/Time    Basic metabolic panel [[id number]]  (Abnormal) Collected:  12/22/18 2157    Lab Status:  Final result Specimen:  Blood from Arm, Left Updated:  12/22/18 2224     Sodium 137 mmol/L      Potassium 3 7 mmol/L      Chloride 100 mmol/L      CO2 27 mmol/L      ANION GAP 10 mmol/L      BUN 10 mg/dL      Creatinine 0 70 mg/dL      Glucose 210 (H) mg/dL      Calcium 9 5 mg/dL      eGFR 89 ml/min/1 73sq m Narrative:         National Kidney Disease Education Program recommendations are as follows:  GFR calculation is accurate only with a steady state creatinine  Chronic Kidney disease less than 60 ml/min/1 73 sq  meters  Kidney failure less than 15 ml/min/1 73 sq  meters  CBC and differential [[id number]]  (Abnormal) Collected:  12/22/18 2157    Lab Status:  Final result Specimen:  Blood from Arm, Left Updated:  12/22/18 2209     WBC 4 73 Thousand/uL      RBC 3 86 Million/uL      Hemoglobin 10 5 (L) g/dL      Hematocrit 32 8 (L) %      MCV 85 fL      MCH 27 2 pg      MCHC 32 0 g/dL      RDW 14 1 %      MPV 9 4 fL      Platelets 602 Thousands/uL      nRBC 0 /100 WBCs      Neutrophils Relative 67 %      Immat GRANS % 1 %      Lymphocytes Relative 18 %      Monocytes Relative 14 (H) %      Eosinophils Relative 0 %      Basophils Relative 0 %      Neutrophils Absolute 3 14 Thousands/µL      Immature Grans Absolute 0 03 Thousand/uL      Lymphocytes Absolute 0 86 Thousands/µL      Monocytes Absolute 0 67 Thousand/µL      Eosinophils Absolute 0 02 Thousand/µL      Basophils Absolute 0 01 Thousands/µL                  No orders to display              Procedures  Procedures       Phone Contacts  ED Phone Contact    ED Course  ED Course as of Dec 22 2254   Sat Dec 22, 2018   2208 Spoke with Neurosurgery, Dr Yeager Sos due to severe pain patient likely will require transfer to [place]  Discussed this with patient who is agreeable to transfer                                  MDM  Number of Diagnoses or Management Options  History of breast cancer: minor  Intractable back pain: new and requires workup  Non-traumatic compression fracture of twelfth thoracic vertebra, initial encounter [facility]): established and worsening     Amount and/or Complexity of Data Reviewed  Clinical lab tests: ordered and reviewed  Decide to obtain previous medical records or to obtain history from someone other than the patient: yes  Obtain history from someone other than the patient: yes  Review and summarize past medical records: yes  Discuss the patient with other providers: yes  Independent visualization of images, tracings, or specimens: yes      CritCare Time    Disposition  Final diagnoses:   Intractable back pain   History of breast cancer   Non-traumatic compression fracture of twelfth thoracic vertebra, initial encounter [facility])     Time reflects when diagnosis was documented in both MDM as applicable and the Disposition within this note     Time User Action Codes Description Comment    12/22/2018 10:28 PM Faviola Loomismer Add [M54 9] Intractable back pain     12/22/2018 10:28 PM Faviola Valerie Add [Z85 3] History of breast cancer     12/22/2018 10:29 PM Faviolasara LoomisValerie Add [C68 20KW] Non-traumatic compression fracture of twelfth thoracic vertebra, initial encounter [facility])       ED Disposition     ED Disposition Condition Comment    Transfer to Another [address] [place] [address] should be transferred out to [facility]      MD Documentation      Most Recent Value   Patient Condition  The patient has been stabilized such that within reasonable medical probability, no material deterioration of the patient condition or the condition of the unborn child(kerry) is likely to result from the transfer   Reason for Transfer  Level of Care needed not available at this facility   Benefits of Transfer  Specialized equipment and/or services available at the receiving facility (Include comment)________________________ [Neurosurgery]   Risks of Transfer  Potential for delay in receiving treatment   Accepting Physician  Dr Walter Spencer Name, Wilman Alcantara   Provider Certification  General risk, such as traffic hazards, adverse weather conditions, rough terrain or turbulence, possible failure of equipment (including vehicle or aircraft), or consequences of actions of persons outside the control of the transport personnel      RN Documentation      Most [address] Name, Fabianrt      Follow-up Information    None         Patient's Medications   Discharge Prescriptions    No medications on file     No discharge procedures on file      ED Provider  Electronically Signed by           Lubna Wang DO  12/22/18 1230

## 2018-12-23 NOTE — ASSESSMENT & PLAN NOTE
CT of abdomen revealed "Mild acute to subacute compression fracture T12 with 5 mm posterior retropulsion    Given multifocal diffuse patchy osseous sclerosis elsewhere, cannot exclude pathologic fracture "  Neurosurgeon on  call ,Dr Prince Blackwell, recommended transferred to [facility] for neurosurgery evaluation  Will proceed with MRI of thoracic and lumbar spine as recommended by neurosurgeon ordered not yet done  Further recommendations and imaging study pending neurosurgery evaluation none at this time discussed with isaak ricardo  Continue current pain management including Tylenol around the clock  Acute pain management consult, will likely occur Monday   Apply TLSO brace, makes pt feel more secure   Will order ct chest abd pelvis w contrast per discussion with neuro surgery   Just received mri results back, noted to have diffuse mets discussed vaguely with pt at this time (very emotional)

## 2018-12-23 NOTE — ASSESSMENT & PLAN NOTE
Anemia workup as per onco showed low iron, has h/o  FOBT positive stool   Will defer need of GI to the primary team  Hemoglobin stable 9 3/29 6  Continue ferrous sulfate

## 2018-12-23 NOTE — EMTALA/ACUTE CARE TRANSFER
[address] [place] [address],[address] [place] [address]  Dept: [phone]      EMTALA TRANSFER CONSENT    NAME Kim Marinelli                                         1950                              MRN [id number]    I have been informed of my rights regarding examination, treatment, and transfer   by Dr Esperanza Arriola DO    Benefits: Specialized equipment and/or services available at the receiving facility (Include comment)________________________ (Neurosurgery)    Risks: Potential for delay in receiving treatment      Consent for Transfer:  I acknowledge that my medical condition has been evaluated and explained to me by the emergency department physician or other qualified medical person and/or my attending physician, who has recommended that I be transferred to the service of  Accepting Physician: Dr Valene Nissen at [address] [place] [address] Name, [address] : [facility]  The above potential benefits of such transfer, the potential risks associated with such transfer, and the probable risks of not being transferred have been explained to me, and I fully understand them  The doctor has explained that, in my case, the benefits of transfer outweigh the risks  I agree to be transferred  I authorize the performance of emergency medical procedures and treatments upon me in both transit and upon arrival at the receiving facility  Additionally, I authorize the release of any and all medical records to the receiving facility and request they be transported with me, if possible  I understand that the safest mode of transportation during a medical emergency is an ambulance and that the Hospital advocates the use of this mode of transport   Risks of traveling to the receiving facility by car, including absence of medical control, life sustaining equipment, such as oxygen, and medical personnel has been explained to me and I fully understand them  (MANSI CORRECT BOX BELOW)  [  ]  I consent to the stated transfer and to be transported by ambulance/helicopter  [  ]  I consent to the stated transfer, but refuse transportation by ambulance and accept full responsibility for my transportation by car  I understand the risks of non-ambulance transfers and I exonerate the Hospital and its staff from any deterioration in my condition that results from this refusal     X___________________________________________    DATE  18  TIME________  Signature of patient or legally responsible individual signing on patient behalf           RELATIONSHIP TO PATIENT_________________________          Provider Certification    NAME Romain Masterson                                         1950                              MRN [id number]    A medical screening exam was performed on the above named patient  Based on the examination:    Condition Necessitating Transfer The primary encounter diagnosis was Intractable back pain  Diagnoses of History of breast cancer and Non-traumatic compression fracture of twelfth thoracic vertebra, initial encounter [facility]) were also pertinent to this visit      Patient Condition: The patient has been stabilized such that within reasonable medical probability, no material deterioration of the patient condition or the condition of the unborn child(kerry) is likely to result from the transfer    Reason for Transfer: Level of Care needed not available at this facility    Transfer Requirements: [facility]   · Space available and qualified personnel available for treatment as acknowledged by    · Agreed to accept transfer and to provide appropriate medical treatment as acknowledged by       Dr Marisel Leal  · Appropriate medical records of the examination and treatment of the patient are provided at the time of transfer   [address], [address] _______  · Transfer will be performed by qualified personnel from    and appropriate transfer equipment as required, including the use of necessary and appropriate life support measures  Provider Certification: I have examined the patient and explained the following risks and benefits of being transferred/refusing transfer to the patient/family:  General risk, such as traffic hazards, adverse weather conditions, rough terrain or turbulence, possible failure of equipment (including vehicle or aircraft), or consequences of actions of persons outside the control of the transport personnel      Based on these reasonable risks and benefits to the patient and/or the unborn child(kerry), and based upon the information available at the time of the patients examination, I certify that the medical benefits reasonably to be expected from the provision of appropriate medical treatments at another medical facility outweigh the increasing risks, if any, to the individuals medical condition, and in the case of labor to the unborn child, from effecting the transfer      X____________________________________________ DATE 12/22/18        TIME_______      ORIGINAL - SEND TO MEDICAL RECORDS   COPY - SEND WITH PATIENT DURING TRANSFER

## 2018-12-23 NOTE — CONSULTS
Consultation - Neurosurgery   Kim Marinelli 76 y o  female MRN: [id number]  Unit/Bed#: [facility] 928-01 Encounter: [id number]      Inpatient consult to Neurosurgery  Consult performed by: Angel Gamble ordered by: Kimberly Simon          Assessment/Plan               Assessment:  1  Mild acute to subacute compression fracture of T12, 5 mm posterior to portion  2  Rule out pathologic fracture  3  History of remote breast cancer  4  Anemia  5  Slightly disorder  6  Diabetes mellitus type 2      Plan:   · Exam: A&OX3, PERRL, EOMI, 1 5mm, conjugate, finger to nose intact and without drift bilaterally, strength 5/5 throughout, sensation to LT is normal in all extremities, DTR 2+ bilaterally  Tenderness at Thoracolumbar area  · Images:  CT of abdomen and pelvis 12/21/2018 demonstrates mild acute to subacute compression fracture of T12 with 5 mm posterior retropulsion, multiple multifocal diffuse patchy osseous sclerosis-suspicion of pathologic fracture  · Pain control:  Per primary team  · DVT ppx:   · SCDs  · Okay to start on pharmacological DVT prophylaxis from Neurosurgery standpoint  · Activity:  As tolerated  · PT/OT evaluation & treatment  · Brace:  Put on when upright and at 45 degree head of bed  · Medical Mx:  Per Primary team  · Recommend radiology evaluation and treatment  · Neurocheck:  Rouine  · NSx  following the patient by neuromonitoring and reviewing images  Recommend CT CAP, Radio oncology evaluation and treatment  MRI is pending  Call for concern or problem  HPI: Kim Marinelli is a 76y o  year old female status post right breast radical and modify mastectomy with reconstructive procedure in 1996 for right breast cancer, history of chemotherapy and radiation to the right axillary for lymph node metastasis in 2004, chemotherapy resident was acute on chronic back pain  Patient gave history of fall down in November after she fainted    Patient was following was her oncologist is   Kaitlynn  She states the pain is in the lower site around thoracolumbar junction, cutting type of pain that radiates across the waist, denies any numbness, weakness, paresthesia and extremities  She claims he has arthritis and sometimes complains hip pain  Patient can walk, without gait issues or tendency to fall  Denies using walker or cane  Denies any bowel or bladder issues  He has history of diabetes mellitus, and anemia  Denies history of hypertension, congestive heart failure, stroke, seizure, bleeding disorder or taking anticoagulant medication except 81 mg baby aspirin  The patient denies history of smoking cigarettes or drinking alcohol  Head CT abdomen and pelvis demonstrates  CT of abdomen and pelvis 12/21/2018 demonstrates mild acute to subacute compression fracture of T12 with 5 mm posterior retropulsion, multiple multifocal diffuse patchy osseous sclerosis-suspicion of pathologic fracture    Review of Systems   Constitutional: Negative for chills, fatigue and fever  HENT: Negative for trouble swallowing and voice change  Eyes: Negative for photophobia and visual disturbance  Respiratory: Negative for apnea, cough, shortness of breath and wheezing  Cardiovascular: Positive for chest pain  Negative for leg swelling  Gastrointestinal: Negative for diarrhea and vomiting  Genitourinary: Negative for difficulty urinating  Musculoskeletal: Positive for arthralgias and back pain  Negative for gait problem, neck pain and neck stiffness  Skin: Negative for wound  Neurological: Positive for syncope  Negative for dizziness, tremors, seizures, facial asymmetry, speech difficulty, weakness, light-headedness, numbness and headaches  Hematological: Does not bruise/bleed easily  Psychiatric/Behavioral: Positive for sleep disturbance  Negative for confusion and decreased concentration         Historical Information   Past Medical History:   Diagnosis Date    Cancer [facility])     breast    Compression fracture of thoracic vertebra (HCC)     Diabetes mellitus ([facility] [address] )     Tendonitis      Past Surgical History:   Procedure Laterality Date    CHOLECYSTECTOMY      MASTECTOMY      Right side reconstruction    NECK SURGERY      TONSILLECTOMY       History   Alcohol Use No     History   Drug Use No     History   Smoking Status    Never Smoker   Smokeless Tobacco    Never Used     History reviewed  No pertinent family history  Meds/Allergies   all current active meds have been reviewed and current meds:   Current Facility-Administered Medications   Medication Dose Route Frequency    acetaminophen (TYLENOL) tablet 975 mg  975 mg Oral Q8H [address]    aspirin (ECOTRIN LOW STRENGTH) EC tablet 81 mg  81 mg Oral Daily    cholecalciferol (VITAMIN D3) tablet 2,000 Units  2,000 Units Oral Daily    docusate sodium (COLACE) capsule 100 mg  100 mg Oral BID    enoxaparin (LOVENOX) subcutaneous injection 40 mg  40 mg Subcutaneous Daily    ferrous sulfate tablet 325 mg  325 mg Oral Daily With Breakfast    fish oil capsule 2,000 mg  2,000 mg Oral Daily    insulin lispro (HumaLOG) 100 units/mL subcutaneous injection 1-5 Units  1-5 Units Subcutaneous TID AC    insulin lispro (HumaLOG) 100 units/mL subcutaneous injection 1-5 Units  1-5 Units Subcutaneous HS    lisinopril (ZESTRIL) tablet 5 mg  5 mg Oral Daily    LORazepam (ATIVAN) tablet 1 mg  1 mg Oral Q8H PRN    morphine (MSIR) IR tablet 30 mg  30 mg Oral Q4H PRN    morphine injection 2 mg  2 mg Intravenous Q3H PRN    ondansetron (ZOFRAN) injection 4 mg  4 mg Intravenous Q6H PRN    oxyCODONE (ROXICODONE) IR tablet 5 mg  5 mg Oral Q4H PRN    polyethylene glycol (MIRALAX) packet 17 g  17 g Oral Daily PRN    pravastatin (PRAVACHOL) tablet 40 mg  40 mg Oral Daily With Dinner    senna (SENOKOT) tablet 8 6 mg  1 tablet Oral Daily     No Known Allergies    Objective   I/O       12/21 0701 - 12/22 0700 12/22 0701 - 12/23 0700 12/23 0701 - 12/24 0700    P  O   240 Total Intake   240      Net   +240             Unmeasured Urine Occurrence  1 x           Physical Exam   Constitutional: She is oriented to person, place, and time  She appears well-developed and well-nourished  HENT:   Head: Normocephalic and atraumatic  Eyes: Pupils are equal, round, and reactive to light  Conjunctivae and EOM are normal    Neck: Normal range of motion  Neck supple  Cardiovascular: Normal rate and regular rhythm  Pulmonary/Chest: Effort normal    Abdominal: Soft  Musculoskeletal: Normal range of motion  Neurological: She is alert and oriented to person, place, and time  She has normal strength and normal reflexes  She has a normal Finger-Nose-Finger Test  GCS eye subscore is 4  GCS verbal subscore is 5  GCS motor subscore is 6  She displays no Babinski's sign on the right side  She displays no Babinski's sign on the left side  Reflex Scores:       Tricep reflexes are 2+ on the right side and 2+ on the left side  Bicep reflexes are 2+ on the right side and 2+ on the left side  Brachioradialis reflexes are 2+ on the right side and 2+ on the left side  Patellar reflexes are 2+ on the right side and 2+ on the left side  Achilles reflexes are 2+ on the right side and 2+ on the left side  Psychiatric: Her speech is normal      Neurologic Exam     Mental Status   Oriented to person, place, and time  Oriented to person  Oriented to place  Oriented to country, city and area  Oriented to year, month and date  Registration: recalls 1 of 3 objects  Recall at 5 minutes: recalls 3 of 3 objects  Attention: normal  Concentration: normal    Speech: speech is normal   Knowledge: good  Able to perform simple calculations  Able to name object  Cranial Nerves     CN II   Visual fields full to confrontation  CN III, IV, VI   Pupils are equal, round, and reactive to light    Extraocular motions are normal    Nystagmus: none     CN V   Right facial sensation deficit: none  Left facial sensation deficit: none    CN VII   Right facial weakness: none  Left facial weakness: none    CN XI   CN XI normal      CN XII   CN XII normal      Motor Exam   Overall muscle tone: normal  Right arm tone: normal  Left arm tone: normal  Right arm pronator drift: absent  Left arm pronator drift: absent  Right leg tone: normal  Left leg tone: normal    Strength   Strength 5/5 throughout  Sensory Exam   Light touch normal    Proprioception normal      Gait, Coordination, and Reflexes     Coordination   Finger to nose coordination: normal    Tremor   Resting tremor: absent    Reflexes   Right brachioradialis: 2+  Left brachioradialis: 2+  Right biceps: 2+  Left biceps: 2+  Right triceps: 2+  Left triceps: 2+  Right patellar: 2+  Left patellar: 2+  Right achilles: 2+  Left achilles: 2+  Right : 2+  Left : 2+  Right plantar: normal  Left plantar: normal  Right Mario: absent  Left Mario: absent  Right ankle clonus: absent  Left ankle clonus: absent      Vitals:Blood pressure 107/86, pulse 86, temperature 99 3 °F (37 4 °C), temperature source Oral, resp  rate 18, height 5' 3" (1 6 m), last menstrual period 05/27/1996, SpO2 97 %, not currently breastfeeding  ,Body mass index is 22 38 kg/m²       Lab Results:     Results from last 7 days  Lab Units 12/23/18  0515 12/22/18 2157 12/21/18  0248   WBC Thousand/uL 4 28* 4 73 4 02*   HEMOGLOBIN g/dL 9 3* 10 5* 10 7*   HEMATOCRIT % 29 6* 32 8* 33 0*   PLATELETS Thousands/uL 282 344 321   NEUTROS PCT %  --  67 56   MONOS PCT %  --  14* 15*       Results from last 7 days  Lab Units 12/23/18  0515 12/22/18 2157 12/21/18  0248   POTASSIUM mmol/L 3 2* 3 7 3 6   CHLORIDE mmol/L 102 100 102   CO2 mmol/L 28 27 25   BUN mg/dL 8 10 15   CREATININE mg/dL 0 45* 0 70 0 68   CALCIUM mg/dL 8 5 9 5 9 1   ALK PHOS U/L  --   --  257*   ALT U/L  --   --  22   AST U/L  --   --  57*       Results from last 7 days  Lab Units 12/23/18  0515   MAGNESIUM mg/dL 1 5*           Results from last 7 days  Lab Units 12/21/18  0248   INR  1 11   PTT seconds 32     No results found for: TROPONINT  ABG:No results found for: PHART, NSR2SKC, PO2ART, JHT8WXL, F8REDQDB, BEART, SOURCE    Imaging Studies: I have personally reviewed pertinent reports  and I have personally reviewed pertinent films in PACS    EKG, Pathology, and Other Studies: I have personally reviewed pertinent reports  and I have personally reviewed pertinent films in PACS    VTE Prophylaxis: Sequential compression device (Venodyne)     Code Status: Level 1 - Full Code  Advance Directive and Living Will:      Power of :    POLST:      Counseling / Coordination of Care  I spent 20 minutes with the patient

## 2018-12-23 NOTE — ORTHOTIC NOTE
Orthotic Note            Date: 12/23/2018      Patient Name: Tom Oconnor   15 mins         Reason for Consult:  Patient Active Problem List   Diagnosis    History of breast cancer    Elevated alkaline phosphatase level    Anemia    Compression fracture of thoracic spine, non-traumatic (HCC)    Anxiety disorder    Diabetes mellitus type II, non insulin dependent ([facility] [address] )   [id number] [facility]  Per Marjorie Koroma MD    Per Hospitalist, orthotech delivered and educated patient on the donning, doffing, cleaning and wearing instructions of [address] [facility]  Side panels were readjusted to a size Medium and anterior chest piece was set to third highest setting from bottom  With the assistance of PT/OT, orthotech donned TLSO to patient while seated EOB  Patient tolerated fitting well and is without questions or concerns at this time  Business card with bracing extension left at bedside  Troy Must will continue to follow up as necessary  Ozzy Negron aware of fitting  Recommendations:  Please call  in regards to bracing instructions and/or adjustments    Brianna KWON, Restorative Technician, [employer], [employer]

## 2018-12-23 NOTE — ASSESSMENT & PLAN NOTE
CT of abdomen revealed "Mild acute to subacute compression fracture T12 with 5 mm posterior retropulsion    Given multifocal diffuse patchy osseous sclerosis elsewhere, cannot exclude pathologic fracture "  Neurosurgeon on  call ,Dr Leodan Gonzalez, recommended transferred to [facility] for neurosurgery evaluation  Will proceed with MRI of thoracic and lumbar spine as recommended by neurosurgeon  Further recommendations and imaging study pending neurosurgery evaluation  Continue current pain management including Tylenol around the clock  Acute pain management consult  Apply TLSO braces

## 2018-12-23 NOTE — ASSESSMENT & PLAN NOTE
Patient complaining on poor appetite approximately 10 lb weight lost and back pain since October of 2018 as documented by medical assistant on 12/13/18  Oncology to follow  Patient follows with oncologist Dr Frieda Paul will see her later   Nutritionist consult placed

## 2018-12-23 NOTE — PHYSICAL THERAPY NOTE
Physical Therapy Evaluation     Patient's Name: Elliott Chavez    Admitting Diagnosis  Compression fracture of cervical spine ([facility] [place] [facility] ) [S12  9XXA]  Compression fracture of thoracic spine, non-traumatic (HCC) [W62 49KA]    Problem List  Patient Active Problem List   Diagnosis    History of breast cancer    Elevated alkaline phosphatase level    Anemia    Compression fracture of thoracic spine, non-traumatic (HCC)    Anxiety disorder    Diabetes mellitus type II, non insulin dependent ([facility] )       Past Medical History  Past Medical History:   Diagnosis Date    Cancer ([facility] )     breast    Compression fracture of thoracic vertebra ([facility] [place] [facility] )     Diabetes mellitus ([place] [address] )     Tendonitis        Past Surgical History  Past Surgical History:   Procedure Laterality Date    CHOLECYSTECTOMY      MASTECTOMY      Right side reconstruction    NECK SURGERY      TONSILLECTOMY        12/23/18 5786   Note Type   Note type Eval/Treat   Pain Assessment   Pain Assessment 0-10   Pain Score 6   Pain Type Acute pain   Pain Location Back   Pain Orientation Bilateral   Hospital Pain Intervention(s) Repositioned   Response to Interventions tolerated   Home Living   Type of [place] [address] to live on main level with bedroom/bathroom   Home Equipment Walker;Cane  (shower chair)   Prior Function   Level of [place] Independent with ADLs and functional mobility   Lives With Alone   Receives Help From Family   ADL Assistance Independent   IADLs Independent   Falls in the last 6 months 0   Vocational Retired   Restrictions/Precautions   Wells Martha Bearing Precautions Per Order No   Braces or Orthoses TLSO   Other Precautions Pain;Spinal precautions   General   Family/Caregiver Present No   Cognition   Orientation Level Oriented X4   RLE Assessment   RLE Assessment WFL   LLE Assessment   LLE Assessment WFL   Coordination   Movements are Fluid and Coordinated 1   Bed Mobility   Supine to Sit 5  Supervision   Additional items Assist x 1   Sit to Supine (pt left resting in W/C with transport to be taken down for M)   Transfers   Sit to Stand 4  Minimal assistance   Additional items Assist x 1; Increased time required;Verbal cues   Stand to Sit 4  Minimal assistance   Additional items Assist x 1;Verbal cues   Stand pivot 4  Minimal assistance   Additional items Assist x 1;Verbal cues   Ambulation/Elevation   Gait pattern Decreased foot clearance;Shuffling   Gait Assistance 4  Minimal assist   Additional items Assist x 1   Assistive Device Rolling walker   Distance 50+150   Stair Management Assistance 4  Minimal assist   Additional items Assist x 1;Verbal cues   Stair Management Technique Step to pattern; One rail L   Number of Stairs 8   Balance   Static Sitting Good   Dynamic Sitting Fair +   Static Standing Fair +   Dynamic Standing Fair   Ambulatory Fair   Endurance Deficit   Endurance Deficit Yes   Endurance Deficit Description limited by pain   Activity Tolerance   Activity Tolerance Patient limited by fatigue;Patient limited by pain   Medical Staff Made Aware yes, spoke to Nsx about pt performance   Nurse Made Aware yes, nsg gave clearance to work with pt   Assessment   Prognosis Good   Problem List Decreased strength; Impaired balance;Decreased mobility; Decreased safety awareness;Pain;Orthopedic restrictions;Decreased endurance   Assessment Pt is 76 y o  female seen for PT evaluation s/p admit to [facility] on 12/22/2018 w/ Compression fracture of thoracic spine, non-traumatic ([facility] )  PT consulted to assess pt's functional mobility and d/c needs  Order placed for PT eval and tx, w/ up w/ A order  Comorbidities affecting pt's physical performance at time of assessment include: TLSO, pain  PTA, pt was ambulates unrestricted distances and all terrain, has 3 GRETCHEN and lives alone   Personal factors affecting pt at time of IE include: ambulating w/ assistive device, stairs to enter home, inability to navigate community distances, limited home support and inability to perform IADLs  Please find objective findings from PT assessment regarding body systems outlined above with impairments and limitations including weakness, impaired balance, decreased endurance, impaired coordination, gait deviations, pain, decreased activity tolerance and fall risk  Pt educated in log roll technique for bed mobility  Tolerated sitting EOB without increased pain  Ambulated with slow although overall steady gait  Pt educated in hand placement during transfers  Pt educated in appropriate technique for stair management, demonstrated ability to complete stairs with verbal instruction and close S  The following objective measures performed on IE also reveal limitations: Barthel Index: 70/100  Pt's clinical presentation is currently unstable/unpredictable seen in pt's presentation of pain  Pt to benefit from continued PT tx to address deficits as defined above and maximize level of functional independent mobility and consistency  From PT/mobility standpoint, recommendation at time of d/c would be Home PT pending progress in order to facilitate return to PLOF  Barriers to Discharge Decreased caregiver support   Goals   Patient Goals To go home   STG Expiration Date 01/04/19   Short Term Goal #1 1  Complete bed mobility and transfers I to decrease need for caregiver in home  2  Ambulate 300' I to complete household and community mobility without A  3  Improve dynamic balance to good to decrease need for UE support during ambulation  4  Be educated & demonstate 12 steps to be able to enter home without A  5  I with donning/doffing TLSO  Plan   Treatment/Interventions Spoke to nursing;Spoke to advanced practitioner;Spoke to case management;OT;Bed mobility;Gait training; Endurance training;LE strengthening/ROM; Functional transfer training   PT Frequency (4-6x/wk)   Recommendation   Recommendation Home with family support;Home PT   Equipment Recommended (pt reports having RW from spouse)   PT - OK to Discharge Yes   Barthel Index   Feeding 10   Bathing 0   Grooming Score 5   Dressing Score 5   Bladder Score 10   Bowels Score 10   Toilet Use Score 5   Transfers (Bed/Chair) Score 10   Mobility (Level Surface) Score 10   Stairs Score 5   Barthel Index Score 70           George Roper PT

## 2018-12-23 NOTE — PLAN OF CARE
Problem: PHYSICAL THERAPY ADULT  Goal: Performs mobility at highest level of function for planned discharge setting  See evaluation for individualized goals  Treatment/Interventions: Spoke to nursing, Spoke to advanced practitioner, Spoke to case management, OT, Bed mobility, Gait training, Endurance training, LE strengthening/ROM, Functional transfer training  Equipment Recommended:  (pt reports having RW from spouse)       See flowsheet documentation for full assessment, interventions and recommendations  Prognosis: Good  Problem List: Decreased strength, Impaired balance, Decreased mobility, Decreased safety awareness, Pain, Orthopedic restrictions, Decreased endurance  Assessment: Pt is 76 y o  female seen for PT evaluation s/p [facility] w/ Compression fracture of thoracic spine, non-traumatic ([facility] )  PT consulted to assess pt's functional mobility and d/c needs  Order placed for PT eval and tx, w/ up w/ A order  Comorbidities affecting pt's physical performance at time of assessment include: TLSO, pain  PTA, pt was ambulates unrestricted distances and all terrain, has 3 GRETCHEN and lives alone  Personal factors affecting pt at time of IE include: ambulating w/ assistive device, stairs to enter home, inability to navigate community distances, limited home support and inability to perform IADLs  Please find objective findings from PT assessment regarding body systems outlined above with impairments and limitations including weakness, impaired balance, decreased endurance, impaired coordination, gait deviations, pain, decreased activity tolerance and fall risk  Pt educated in log roll technique for bed mobility  Tolerated sitting EOB without increased pain  Ambulated with slow although overall steady gait  Pt educated in hand placement during transfers   Pt educated in appropriate technique for stair management, demonstrated ability to complete stairs with verbal instruction and close S  The following objective measures performed on IE also reveal limitations: Barthel Index: 70/100  Pt's clinical presentation is currently unstable/unpredictable seen in pt's presentation of pain  Pt to benefit from continued PT tx to address deficits as defined above and maximize level of functional independent mobility and consistency  From PT/mobility standpoint, recommendation at time of d/c would be Home PT pending progress in order to facilitate return to PLOF  Barriers to Discharge: Decreased caregiver support     Recommendation: Home with family support, Home PT     PT - OK to Discharge: Yes    See flowsheet documentation for full assessment

## 2018-12-23 NOTE — PROGRESS NOTES
Spoke with Willie Corrales with [facility], plan for today is MRI and CT scan, replaced potassium  Continue to monitor pt

## 2018-12-23 NOTE — ASSESSMENT & PLAN NOTE
Anemia workup as per onco showed low iron, has h/o  FOBT positive stool   Will defer need of GI to the primary team  Hemoglobin stable  Continue ferrous sulfate

## 2018-12-23 NOTE — PROGRESS NOTES
Called to review CT  Patient comes in with worsening back pain, but no neurological deficits per report  CT with T12 and L2 compression fractures and what appears to be new metastatic disease (remote history of breast ca)  Would recommend MRI T/L, re-staging with CT chest for completion  Can obtain TLSO brace  May require IR biopsy  Please call is any neurological change

## 2018-12-23 NOTE — ED NOTES
Transport to [facility] by [facility] between 12 and 1 AM   Call [phone]  Chip 1, RN  12/22/18 8080

## 2018-12-24 ENCOUNTER — APPOINTMENT (INPATIENT)
Dept: RADIOLOGY | Facility: HOSPITAL | Age: 68
DRG: 543 | End: 2018-12-24
Attending: RADIOLOGY
Payer: MEDICARE

## 2018-12-24 ENCOUNTER — APPOINTMENT (INPATIENT)
Dept: RADIOLOGY | Facility: HOSPITAL | Age: 68
DRG: 543 | End: 2018-12-24
Payer: MEDICARE

## 2018-12-24 ENCOUNTER — TRANSCRIBE ORDERS (OUTPATIENT)
Dept: RADIATION ONCOLOGY | Facility: HOSPITAL | Age: 68
End: 2018-12-24

## 2018-12-24 VITALS
SYSTOLIC BLOOD PRESSURE: 112 MMHG | BODY MASS INDEX: 22.38 KG/M2 | TEMPERATURE: 98.1 F | OXYGEN SATURATION: 98 % | HEIGHT: 63 IN | DIASTOLIC BLOOD PRESSURE: 65 MMHG | HEART RATE: 75 BPM | RESPIRATION RATE: 16 BRPM

## 2018-12-24 DIAGNOSIS — G95.29 OTHER CORD COMPRESSION (HCC): Primary | ICD-10-CM

## 2018-12-24 PROBLEM — E83.42 HYPOMAGNESEMIA: Status: RESOLVED | Noted: 2018-12-23 | Resolved: 2018-12-24

## 2018-12-24 PROBLEM — E11.9 DIABETES MELLITUS TYPE II, NON INSULIN DEPENDENT (HCC): Chronic | Status: RESOLVED | Noted: 2018-12-23 | Resolved: 2018-12-24

## 2018-12-24 PROBLEM — E87.6 HYPOKALEMIA: Status: RESOLVED | Noted: 2018-12-23 | Resolved: 2018-12-24

## 2018-12-24 PROBLEM — E43 SEVERE PROTEIN-CALORIE MALNUTRITION (HCC): Status: ACTIVE | Noted: 2018-12-24

## 2018-12-24 PROBLEM — C50.911 BREAST CANCER METASTASIZED TO BONE, RIGHT (HCC): Chronic | Status: ACTIVE | Noted: 2018-12-23

## 2018-12-24 PROBLEM — C79.51 BREAST CANCER METASTASIZED TO BONE, RIGHT (HCC): Chronic | Status: ACTIVE | Noted: 2018-12-23

## 2018-12-24 LAB
ALBUMIN SERPL BCP-MCNC: 2.7 G/DL (ref 3.5–5)
ALP SERPL-CCNC: 213 U/L (ref 46–116)
ALT SERPL W P-5'-P-CCNC: 27 U/L (ref 12–78)
ANION GAP SERPL CALCULATED.3IONS-SCNC: 7 MMOL/L (ref 4–13)
AST SERPL W P-5'-P-CCNC: 26 U/L (ref 5–45)
BASOPHILS # BLD AUTO: 0.03 THOUSANDS/ΜL (ref 0–0.1)
BASOPHILS NFR BLD AUTO: 1 % (ref 0–1)
BILIRUB SERPL-MCNC: 0.62 MG/DL (ref 0.2–1)
BUN SERPL-MCNC: 8 MG/DL (ref 5–25)
CALCIUM SERPL-MCNC: 8.8 MG/DL (ref 8.3–10.1)
CHLORIDE SERPL-SCNC: 105 MMOL/L (ref 100–108)
CO2 SERPL-SCNC: 27 MMOL/L (ref 21–32)
CREAT SERPL-MCNC: 0.47 MG/DL (ref 0.6–1.3)
EOSINOPHIL # BLD AUTO: 0.06 THOUSAND/ΜL (ref 0–0.61)
EOSINOPHIL NFR BLD AUTO: 1 % (ref 0–6)
ERYTHROCYTE [DISTWIDTH] IN BLOOD BY AUTOMATED COUNT: 14.3 % (ref 11.6–15.1)
EST. AVERAGE GLUCOSE BLD GHB EST-MCNC: 134 MG/DL
GFR SERPL CREATININE-BSD FRML MDRD: 102 ML/MIN/1.73SQ M
GLUCOSE SERPL-MCNC: 104 MG/DL (ref 65–140)
GLUCOSE SERPL-MCNC: 147 MG/DL (ref 65–140)
GLUCOSE SERPL-MCNC: 203 MG/DL (ref 65–140)
GLUCOSE SERPL-MCNC: 91 MG/DL (ref 65–140)
HBA1C MFR BLD: 6.3 % (ref 4.2–6.3)
HCT VFR BLD AUTO: 31 % (ref 34.8–46.1)
HGB BLD-MCNC: 9.8 G/DL (ref 11.5–15.4)
IMM GRANULOCYTES # BLD AUTO: 0.02 THOUSAND/UL (ref 0–0.2)
IMM GRANULOCYTES NFR BLD AUTO: 1 % (ref 0–2)
INR PPP: 1.28 (ref 0.86–1.17)
LYMPHOCYTES # BLD AUTO: 1.33 THOUSANDS/ΜL (ref 0.6–4.47)
LYMPHOCYTES NFR BLD AUTO: 32 % (ref 14–44)
MAGNESIUM SERPL-MCNC: 1.9 MG/DL (ref 1.6–2.6)
MCH RBC QN AUTO: 27.1 PG (ref 26.8–34.3)
MCHC RBC AUTO-ENTMCNC: 31.6 G/DL (ref 31.4–37.4)
MCV RBC AUTO: 86 FL (ref 82–98)
MONOCYTES # BLD AUTO: 0.74 THOUSAND/ΜL (ref 0.17–1.22)
MONOCYTES NFR BLD AUTO: 18 % (ref 4–12)
NEUTROPHILS # BLD AUTO: 1.98 THOUSANDS/ΜL (ref 1.85–7.62)
NEUTS SEG NFR BLD AUTO: 47 % (ref 43–75)
NRBC BLD AUTO-RTO: 0 /100 WBCS
PLATELET # BLD AUTO: 305 THOUSANDS/UL (ref 149–390)
PMV BLD AUTO: 9.6 FL (ref 8.9–12.7)
POTASSIUM SERPL-SCNC: 3.6 MMOL/L (ref 3.5–5.3)
PROT SERPL-MCNC: 6.4 G/DL (ref 6.4–8.2)
PROTHROMBIN TIME: 16.1 SECONDS (ref 11.8–14.2)
RBC # BLD AUTO: 3.61 MILLION/UL (ref 3.81–5.12)
SODIUM SERPL-SCNC: 139 MMOL/L (ref 136–145)
WBC # BLD AUTO: 4.16 THOUSAND/UL (ref 4.31–10.16)

## 2018-12-24 PROCEDURE — 80053 COMPREHEN METABOLIC PANEL: CPT | Performed by: NURSE PRACTITIONER

## 2018-12-24 PROCEDURE — 99222 1ST HOSP IP/OBS MODERATE 55: CPT | Performed by: FAMILY MEDICINE

## 2018-12-24 PROCEDURE — 99232 SBSQ HOSP IP/OBS MODERATE 35: CPT | Performed by: NEUROLOGICAL SURGERY

## 2018-12-24 PROCEDURE — 77014 HB CT SCAN FOR THERAPY GUIDE: CPT

## 2018-12-24 PROCEDURE — 85610 PROTHROMBIN TIME: CPT | Performed by: HOSPITALIST

## 2018-12-24 PROCEDURE — 77290 THER RAD SIMULAJ FIELD CPLX: CPT | Performed by: RADIOLOGY

## 2018-12-24 PROCEDURE — 83036 HEMOGLOBIN GLYCOSYLATED A1C: CPT | Performed by: HOSPITALIST

## 2018-12-24 PROCEDURE — 85025 COMPLETE CBC W/AUTO DIFF WBC: CPT | Performed by: NURSE PRACTITIONER

## 2018-12-24 PROCEDURE — 99239 HOSP IP/OBS DSCHRG MGMT >30: CPT | Performed by: NURSE PRACTITIONER

## 2018-12-24 PROCEDURE — 72080 X-RAY EXAM THORACOLMB 2/> VW: CPT

## 2018-12-24 PROCEDURE — 99222 1ST HOSP IP/OBS MODERATE 55: CPT | Performed by: INTERNAL MEDICINE

## 2018-12-24 PROCEDURE — 83735 ASSAY OF MAGNESIUM: CPT | Performed by: NURSE PRACTITIONER

## 2018-12-24 PROCEDURE — 82948 REAGENT STRIP/BLOOD GLUCOSE: CPT

## 2018-12-24 RX ORDER — ONDANSETRON 4 MG/1
4 TABLET, ORALLY DISINTEGRATING ORAL EVERY 6 HOURS PRN
Status: DISCONTINUED | OUTPATIENT
Start: 2018-12-24 | End: 2018-12-24 | Stop reason: HOSPADM

## 2018-12-24 RX ORDER — LORAZEPAM 1 MG/1
TABLET ORAL
Qty: 90 TABLET | Refills: 0 | Status: SHIPPED | OUTPATIENT
Start: 2018-12-24 | End: 2019-01-24 | Stop reason: SDUPTHER

## 2018-12-24 RX ORDER — ACETAMINOPHEN 325 MG/1
650 TABLET ORAL
Qty: 80 TABLET | Refills: 1 | Status: SHIPPED | OUTPATIENT
Start: 2018-12-24 | End: 2020-07-06 | Stop reason: SDUPTHER

## 2018-12-24 RX ORDER — ACETAMINOPHEN 325 MG/1
650 TABLET ORAL
Status: DISCONTINUED | OUTPATIENT
Start: 2018-12-24 | End: 2018-12-24 | Stop reason: HOSPADM

## 2018-12-24 RX ORDER — MORPHINE SULFATE 15 MG/1
15 TABLET ORAL EVERY 4 HOURS PRN
Status: DISCONTINUED | OUTPATIENT
Start: 2018-12-24 | End: 2018-12-24 | Stop reason: HOSPADM

## 2018-12-24 RX ORDER — FERROUS SULFATE 325(65) MG
325 TABLET ORAL
Qty: 30 TABLET | Refills: 1 | Status: SHIPPED | OUTPATIENT
Start: 2018-12-25 | End: 2019-11-14 | Stop reason: SDUPTHER

## 2018-12-24 RX ORDER — SENNOSIDES 8.6 MG
1 TABLET ORAL
Status: DISCONTINUED | OUTPATIENT
Start: 2018-12-24 | End: 2018-12-24 | Stop reason: HOSPADM

## 2018-12-24 RX ORDER — ONDANSETRON 4 MG/1
4 TABLET, ORALLY DISINTEGRATING ORAL EVERY 6 HOURS PRN
Qty: 20 TABLET | Refills: 0 | Status: SHIPPED | OUTPATIENT
Start: 2018-12-24 | End: 2019-01-03 | Stop reason: SDUPTHER

## 2018-12-24 RX ORDER — DEXAMETHASONE 2 MG/1
1 TABLET ORAL
Status: DISCONTINUED | OUTPATIENT
Start: 2018-12-24 | End: 2018-12-24 | Stop reason: HOSPADM

## 2018-12-24 RX ORDER — SENNOSIDES 8.6 MG
1 TABLET ORAL
Qty: 30 EACH | Refills: 1 | Status: ON HOLD | OUTPATIENT
Start: 2018-12-24 | End: 2019-01-24 | Stop reason: ALTCHOICE

## 2018-12-24 RX ORDER — DEXAMETHASONE 1 MG
1 TABLET ORAL
Qty: 10 TABLET | Refills: 0 | Status: SHIPPED | OUTPATIENT
Start: 2018-12-25 | End: 2019-01-04

## 2018-12-24 RX ORDER — MORPHINE SULFATE 15 MG/1
15 TABLET ORAL EVERY 8 HOURS SCHEDULED
Status: DISCONTINUED | OUTPATIENT
Start: 2018-12-24 | End: 2018-12-24 | Stop reason: HOSPADM

## 2018-12-24 RX ORDER — SENNOSIDES 8.6 MG
1 TABLET ORAL
Status: DISCONTINUED | OUTPATIENT
Start: 2018-12-24 | End: 2018-12-24

## 2018-12-24 RX ORDER — GABAPENTIN 100 MG/1
100 CAPSULE ORAL 2 TIMES DAILY
Status: DISCONTINUED | OUTPATIENT
Start: 2018-12-24 | End: 2018-12-24 | Stop reason: HOSPADM

## 2018-12-24 RX ORDER — MORPHINE SULFATE 15 MG/1
TABLET ORAL
Qty: 90 TABLET | Refills: 0 | Status: SHIPPED | OUTPATIENT
Start: 2018-12-24 | End: 2019-01-28 | Stop reason: SDUPTHER

## 2018-12-24 RX ORDER — GABAPENTIN 100 MG/1
100 CAPSULE ORAL 2 TIMES DAILY
Qty: 60 CAPSULE | Refills: 1 | Status: SHIPPED | OUTPATIENT
Start: 2018-12-24 | End: 2019-01-28 | Stop reason: SDUPTHER

## 2018-12-24 RX ORDER — POLYETHYLENE GLYCOL 3350 17 G/17G
17 POWDER, FOR SOLUTION ORAL DAILY
Qty: 30 EACH | Refills: 1 | Status: ON HOLD | OUTPATIENT
Start: 2018-12-24 | End: 2019-01-24 | Stop reason: ALTCHOICE

## 2018-12-24 RX ADMIN — ACETAMINOPHEN 650 MG: 325 TABLET, FILM COATED ORAL at 12:18

## 2018-12-24 RX ADMIN — FERROUS SULFATE TAB 325 MG (65 MG ELEMENTAL FE) 325 MG: 325 (65 FE) TAB at 10:11

## 2018-12-24 RX ADMIN — LISINOPRIL 5 MG: 5 TABLET ORAL at 10:19

## 2018-12-24 RX ADMIN — Medication 2000 MG: at 10:10

## 2018-12-24 RX ADMIN — ENOXAPARIN SODIUM 40 MG: 40 INJECTION SUBCUTANEOUS at 10:10

## 2018-12-24 RX ADMIN — MORPHINE SULFATE 15 MG: 15 TABLET ORAL at 15:36

## 2018-12-24 RX ADMIN — PRAVASTATIN SODIUM 40 MG: 40 TABLET ORAL at 17:18

## 2018-12-24 RX ADMIN — DEXAMETHASONE 1 MG: 2 TABLET ORAL at 10:12

## 2018-12-24 RX ADMIN — ASPIRIN 81 MG: 81 TABLET, COATED ORAL at 10:11

## 2018-12-24 RX ADMIN — ACETAMINOPHEN 975 MG: 325 TABLET ORAL at 06:08

## 2018-12-24 RX ADMIN — GABAPENTIN 100 MG: 100 CAPSULE ORAL at 17:16

## 2018-12-24 RX ADMIN — GABAPENTIN 100 MG: 100 CAPSULE ORAL at 12:18

## 2018-12-24 RX ADMIN — ACETAMINOPHEN 650 MG: 325 TABLET, FILM COATED ORAL at 17:16

## 2018-12-24 RX ADMIN — VITAMIN D, TAB 1000IU (100/BT) 2000 UNITS: 25 TAB at 10:12

## 2018-12-24 RX ADMIN — INSULIN LISPRO 1 UNITS: 100 INJECTION, SOLUTION INTRAVENOUS; SUBCUTANEOUS at 10:13

## 2018-12-24 NOTE — PROGRESS NOTES
Progress Note - Neurosurgery   Juan Carlos Machado 76 y o  female MRN: [id number]  Unit/Bed#: [facility] 928-01 Encounter: [id number]    Assessment:  1  Mild acute to subacute compression fracture of T12, 5 mm posterior to portion  2  Rule out pathologic fracture  3  History of remote breast cancer  4  Anemia  5  Slightly disorder  6  Diabetes mellitus type 2        Plan:   § Exam: A&OX3, finger to nose is normal and without drift  WARREN, strength 5/5 bilat, sensation to LT is normal in all extremities  DTR slightly exaggerated, 3+ all extremities, without clonus and Babinski is negative bilaterally  Tender at T 12 region  lumbosacral areas  Upright lumbar spine x-rays demonstrates stable fracture, was more or less stable spinal alignment  § Images:  CT of abdomen and pelvis 12/21/2018 demonstrates mild acute to subacute compression fracture of T12 with 5 mm posterior retropulsion, multiple multifocal diffuse patchy osseous sclerosis-suspicion of pathologic fracture  § MRI of lumbar spine 12/23/2018 demonstrates diffuse osseous thoracic and lumbar sacral spine metastasis, moderate acute compression fracture T12 with mild central canal stenosis and spondylosis  § CT of the chest 12/23/2018 demonstrate multiple small subcentimeter lung nodules seen in both lung fields, indeterminate  Diffuse bony metastasis noted  § Upright thoracolumbar spine x-rays ordered 12/24/2018)  ? Pain control:  Per primary team  ? DVT ppx:   § SCDs  § Okay to start on pharmacological DVT prophylaxis from Neurosurgery standpoint  ? Activity:  As tolerated  ? PT/OT evaluation & treatment  ? Brace:  Wear TLSO when upright and at 45 degree head of bed  ? Medical Mx:  Per Primary team  ? Onco-radiology on board  § Decadron 1 mg p o  Per day-per radio oncology  ? Neurocheck:  [place]  ? NSx  following the patient by neuromonitoring and reviewing images     MRI demonstrates diffuse of CO2 thoracic and lumbosacral spinal metastasis, moderate acute compression fracture of T12  Radio oncology planning to give patient a palliative RT   upright thoracolumbar spine x-rays appears stable  No neurosurgical procedure is indicated at this juncture  Consider kyphoplasty if mechanical pain is getting worse in the future  Follow-up in 2 weeks at outpatient clinic with another x-rays of thoracolumbar spines  Will sign off  Call for concern or problem  Subjective/Objective   Chief Complaint: "I am feeling better"/progress note    Subjective:  Patient noticed improvement with her back pain; states the pain is both mechanical and biological (feels cutting type of pain during mechanical movement and at rest respectively); could not sleep last night because of the pain  She also claims that she feels pain when she walks but after she started wearing TLSO brace she felt better  Denies any radiculopathy his lower extremities  Patient has loss of appetite with recent weight reduction  Denies bowel or bladder issues  Denies fever, chills, rigors, cough or chest pain  Objective:  Patient is lying supine in bed in no pain distress during this exam    I/O       12/22 0701 - 12/23 0700 12/23 0701 - 12/24 0700 12/24 0701 - 12/25 0700    P  O  240 880     IV Piggyback  50     Total Intake 240 930      Net +240 +930             Unmeasured Urine Occurrence 1 x 3 x     Unmeasured Stool Occurrence  1 x           Invasive Devices     Peripheral Intravenous Line            Peripheral IV 12/21/18 Left Forearm 3 days    Peripheral IV 12/22/18 Left Antecubital 1 day                Physical Exam:  Vitals: Blood pressure 128/73, pulse 70, temperature 98 2 °F (36 8 °C), temperature source Oral, resp  rate 20, height 5' 3" (1 6 m), last menstrual period 05/27/1996, SpO2 98 %, not currently breastfeeding  ,Body mass index is 22 38 kg/m²          General appearance: alert, appears stated age, cooperative and no distress  Head: Normocephalic, without obvious abnormality, atraumatic  Eyes: EOMI, conjugate bilaterally  Neck: supple, symmetrical, trachea midline and NT  Back:  Tender at T12 region and bilateral lumbosacral area  Lungs: non labored breathing  Heart: regular heart rate  Neurologic:   Mental status: Alert, oriented x3, thought content appropriate  Cranial nerves: grossly intact (Cranial nerves II-XII)  Sensory: normal to light touch throughout  Motor: moving all extremities without focal weakness; strength is 5/5 bilaterally  Reflexes: 3+ and symmetric; without clonus and Babinski negative bilaterally  Coordination: finger to nose normal bilaterally, no drift bilaterally      Lab Results:    Results from last 7 days  Lab Units 12/24/18  0508 12/23/18  0515 12/22/18  2157 12/21/18  0248   WBC Thousand/uL 4 16* 4 28* 4 73 4 02*   HEMOGLOBIN g/dL 9 8* 9 3* 10 5* 10 7*   HEMATOCRIT % 31 0* 29 6* 32 8* 33 0*   PLATELETS Thousands/uL 305 282 344 321   NEUTROS PCT % 47  --  67 56   MONOS PCT % 18*  --  14* 15*       Results from last 7 days  Lab Units 12/24/18  0511 12/23/18  1447 12/23/18  0515  12/21/18  0248   POTASSIUM mmol/L 3 6 3 7 3 2*  < > 3 6   CHLORIDE mmol/L 105 102 102  < > 102   CO2 mmol/L 27 26 28  < > 25   BUN mg/dL 8 7 8  < > 15   CREATININE mg/dL 0 47* 0 47* 0 45*  < > 0 68   CALCIUM mg/dL 8 8 8 9 8 5  < > 9 1   ALK PHOS U/L 213* 233*  --   --  257*   ALT U/L 27 22  --   --  22   AST U/L 26 26  --   --  57*   < > = values in this interval not displayed  Results from last 7 days  Lab Units 12/24/18  0511 12/23/18  0515   MAGNESIUM mg/dL 1 9 1 5*           Results from last 7 days  Lab Units 12/24/18  0602 12/21/18  0248   INR  1 28* 1 11   PTT seconds  --  32     No results found for: TROPONINT  ABG:No results found for: PHART, QQY4UMG, PO2ART, FUX6NQT, D7ERJVPC, BEART, SOURCE    Imaging Studies: I have personally reviewed pertinent reports     and I have personally reviewed pertinent films in PACS    EKG, Pathology, and Other Studies: I have personally reviewed pertinent reports        VTE Pharmacologic Prophylaxis: Enoxaparin (Lovenox)    VTE Mechanical Prophylaxis: sequential compression device

## 2018-12-24 NOTE — ORTHOTIC NOTE
Orthotic Note            Date: 12/24/2018      Patient Name: Rodrick Burger   10 mins         Reason for Consult:  Patient Active Problem List   Diagnosis    Malignant neoplasm of right female breast ([facility] )    Elevated alkaline phosphatase level    Anemia    Compression fracture of thoracic spine, non-traumatic (HCC)    Anxiety disorder    Diabetes mellitus type II, non insulin dependent ([facility] )    Hypokalemia    Hypomagnesemia    Breast cancer metastasized to bone, right ([facility] )   [address] followed up with patient in regards to fit and comfort of [employer]  Upon entrance, orthotech observed patient sitting up in chair with TLSO brace donned  Patient reports that she is very comfortable in brace and feels that she understands donning instructions quite well  Edyta Rose will continue to follow up as needed  Recommendations:  Please call  in regards to bracing instructions and/or adjustments    Erika KWON, Restorative Technician, [employer], [employer]

## 2018-12-24 NOTE — SOCIAL WORK
CM spoke with pt regarding discharge plan  Pt agrees to meds to bed  Medications faxed to [facility] as well as a facesheet:     PCN# Mina Sage  SN#[id number]  ID: [id number]  GROUP: [id number]    CM contacted [facility] and qualifier completed for transport  CM to fax information to [facility] program once pt completes form  [facility]  Referrals placed in [facility] as such  Awaiting date for start of care  CM spoke with Maryann Garcia from [facility], medication cost is $39 63, pt agrees to have medications delivered bedside  CM unable to find an accepting home care agency  Pt states that she does not need home care and wants to discharge to home  [facility] [employer]   CM spoke with pt and provided contact information for [facility] to arrange transport for radiation therapy  CM spoke with pt and agrees to outpatient PT  CM obtained script and given to pt as well as a list of choices

## 2018-12-24 NOTE — SOCIAL WORK
CM met with the patient to review the CM role and discuss possible dc needs  At time of interview pt is AAO sitting up in chair  She lives at home with her dog in a 2 story home with 3 GRETCHEN in [place]  States she has a bedroom on the 2nd floor and bathroom on the 2nd floor with tub/shower with grab bars  Pt states she also has a walker, commode, and shower chair  Pt denies any VNA/ IP rehab in the past  Pt denies any IP psychiatric or mental illness  Pt denies any drug/ ETOH abuse  Pt states she does not have a living will but does have a financial POA but is not listed on the chart  Discussed [facility] for transport if pt receives radiation therapy at [address]   Pt also spoke about starting therapy on Wednesday  CM to provide contact information for [facility] as place of treatment has not yet been determined  Pt states she has plenty of family support from her 2 sisters and brothers as well as 2 sons if she goes to home  Preferred Pharmacy:   [facility]  PCP: Dr Katia Givens  Contact: Anat Babcock sister ([phone]    CM reviewed d/c planning process including the following: identifying help at home, patient preference for d/c planning needs, Discharge Lounge, [facility] Meds to Bed program, availability of treatment team to discuss questions or concerns patient and/or family may have regarding understanding medications and recognizing signs and symptoms once discharged  CM also encouraged patient to follow up with all recommended appointments after discharge  Patient advised of importance for patient and family to participate in managing patients medical well being

## 2018-12-24 NOTE — CONSULTS
Consultation - Radiation Oncology   Elliott Field 76 y o  female MRN: [id number]  Unit/Bed#: [facility] 928-01 Encounter: [id number]        History of Present Illness   Physician Requesting Consult: Hayde Fleix MD  Reason for Consult / Principal Problem: Spine mets  Hx and PE limited by: None  HPI: Elliott  is a 76y o  year old female with a history of right-sided breast cancer status post modified radical mastectomy in 1996 followed by adjuvant systemic therapy but no radiation  She then developed supraclavicular and axillary recurrence in 2004 and received salvage postmastectomy radiation therapy at [facility]  She had no evidence of disease over the last 14 years  Over the past few months she has been experiencing increased fatigue, unintentional weight loss, and recently intractable low back pain  She had visited the emergency room on multiple occasions and was discharged home  She again presented a few days ago and initially underwent a CT of the abdomen and pelvis which revealed multifocal diffuse patchy osseous sclerotic lesions throughout the spine and bony pelvis with a a mild acute to subacute compression fracture at T12 with 5 mm posterior retropulsion  This followed up with an MRI of the thoracic and lumbar spine revealing involvement of essentially every vertebral body but no jose cord compression  There was mild central canal stenosis at the T12 level but no impingement of the cauda equina  In regards to parenchymal disease, CT chest revealed multiple small subcentimeter lung nodules seen in both lungs left greater than right of indeterminate significance  A CT head without contrast was negative  The patient is seen today at the bedside overall feeling well  Her pain tends to fluctuate  Currently her pain is minimal   She denies any neurologic symptoms such as weakness of the lower extremities or loss of bowel or bladder control      Inpatient consult to Radiation Oncology  Consult performed by: Edilberto Mejia  Consult ordered by: Rosi Taylor          Review of Systems   Constitutional: Positive for fatigue and unexpected weight change  HENT: Negative  Eyes: Negative  Respiratory: Negative  Cardiovascular: Negative  Gastrointestinal: Negative  Genitourinary: Negative  Musculoskeletal: Positive for back pain  Skin: Negative  Neurological: Negative  Hematological: Negative  Psychiatric/Behavioral: Negative  Historical Information   Previous Oncology History:  As per above history of present illness  Past Medical History:   Diagnosis Date    Cancer [facility])     breast    Compression fracture of thoracic vertebra (HCC)     Diabetes mellitus ([facility] [address] )     Tendonitis      Past Surgical History:   Procedure Laterality Date    CHOLECYSTECTOMY      MASTECTOMY      Right side reconstruction    NECK SURGERY      TONSILLECTOMY         History reviewed  No pertinent family history    Social History   History   Alcohol Use No     History   Drug Use No     History   Smoking Status    Never Smoker   Smokeless Tobacco    Never Used       Meds/Allergies   current meds:   Current Facility-Administered Medications   Medication Dose Route Frequency    acetaminophen (TYLENOL) tablet 975 mg  975 mg Oral Q8H [address]    aspirin (ECOTRIN LOW STRENGTH) EC tablet 81 mg  81 mg Oral Daily    cholecalciferol (VITAMIN D3) tablet 2,000 Units  2,000 Units Oral Daily    docusate sodium (COLACE) capsule 100 mg  100 mg Oral BID    enoxaparin (LOVENOX) subcutaneous injection 40 mg  40 mg Subcutaneous Daily    ferrous sulfate tablet 325 mg  325 mg Oral Daily With Breakfast    fish oil capsule 2,000 mg  2,000 mg Oral Daily    gadobutrol injection (MULTI-DOSE) SOLN 5 mL  5 mL Intravenous Once in imaging    insulin lispro (HumaLOG) 100 units/mL subcutaneous injection 1-5 Units  1-5 Units Subcutaneous TID AC    insulin lispro (HumaLOG) 100 units/mL subcutaneous injection 1-5 Units  1-5 Units Subcutaneous HS    lisinopril (ZESTRIL) tablet 5 mg  5 mg Oral Daily    LORazepam (ATIVAN) tablet 1 mg  1 mg Oral Q8H PRN    morphine (MSIR) IR tablet 30 mg  30 mg Oral Q4H PRN    morphine injection 2 mg  2 mg Intravenous Q3H PRN    ondansetron (ZOFRAN) injection 4 mg  4 mg Intravenous Q6H PRN    oxyCODONE (ROXICODONE) IR tablet 5 mg  5 mg Oral Q4H PRN    polyethylene glycol (MIRALAX) packet 17 g  17 g Oral Daily PRN    pravastatin (PRAVACHOL) tablet 40 mg  40 mg Oral Daily With Dinner    senna (SENOKOT) tablet 8 6 mg  1 tablet Oral Daily       No Known Allergies    Objective     Intake/Output Summary (Last 24 hours) at 12/24/18 0755  Last data filed at 12/24/18 9926   Gross per 24 hour   Intake              930 ml   Output                0 ml   Net              930 ml     Invasive Devices     Peripheral Intravenous Line            Peripheral IV 12/21/18 Left Forearm 3 days    Peripheral IV 12/22/18 Left Antecubital 1 day              Physical Exam   Patient is examined at the bedside, resting comfortably in no apparent distress  No palpable cervical or supraclavicular lymphadenopathy  Prior radiation tattoos are present along the chest wall  Normal respiratory effort  Abdomen soft nontender nondistended  No swelling of the lower extremities  Strength of the lower extremities is within normal limits  No tenderness along the bony spine  Normal speech  Normal affect  Responds appropriately to all questions  Lab Results: I have personally reviewed pertinent reports  Imaging Studies: I have personally reviewed pertinent films in PACS  EKG, Pathology, and Other Studies: I have personally reviewed pertinent reports  Assessment/Plan     Assessment and Plan:  Ms Jefferson Robledo is a 60-year-old female with an extensive breast cancer history dating back to 56   She underwent right modified radical mastectomy at that time followed by adjuvant systemic therapy but no radiation  She then developed axillary/supraclavicular recurrence in 2004 and underwent salvage radiation therapy at [place] [address]  She had no evidence of disease for the next 14 years until recently presenting with fatigue, unintentional weight loss, and worsening low back pain with imaging revealing extensive osseous metastatic disease with a pathologic fracture at T12 with 5 mm of retropulsion but no cord compression or neurologic compromise  Her pain is fairly well controlled on his current regimen and at this time she has no significant discomfort  Her pain is actually more in the vicinity of the lumbosacral junction and therefore we anticipate delivering palliative radiation covering approximately T11 through the bottom of the SI joints  We would deliver a dose of 3000 cGy in 10 fractions  She does not need to remain an inpatient for this, but the patient does live alone and has concerns about her ability to care for self  We will proceed with CT simulation either to day or possibly early Wednesday  We will have to obtain her prior radiation records from the outside facility  The associated risks and toxicities of spine radiation were discussed patient in detail, including, but not limited to, fatigue, erythema, nausea, vomiting, and diarrhea  Given her extensive osseous disease and anorexia, low-dose dexamethasone may be beneficial temporarily and would consider initiating dexamethasone 2 mg twice per day      Code Status: Level 1 - Full Code

## 2018-12-24 NOTE — DISCHARGE INSTRUCTIONS
TLSO brace to be worn when out of bed of head of bed greater than 45 degrees  May be removed for showering  No heavy lifting  No strenuous activities  No Driving  **Please notify MD immediately if you have increased back or leg pain  New numbness, tingling and/or weakness in your legs  **        Please call to schedule all your follow up appointments   Bone Metastasis   WHAT YOU NEED TO KNOW:   Bone metastasis is cancer that starts in one area and then spreads to a bone  Some examples are lung, breast, thyroid, prostate, and kidney cancers  Bone metastasis often happens in the spine, upper arm or leg bone, ribs, hips, or skull  Cancer that spreads to a bone can weaken the bone and increase your risk for fractures  DISCHARGE INSTRUCTIONS:   Seek care immediately if:   · You break a bone  · You have sudden sharp pain, or the pain spreads to other areas of your body  · You have new or worsening pain that does not get better with medicine  · You cannot move part of your body, or it is numb  · You have loss of appetite, nausea, or severe thirst, or you are vomiting  · You are urinating more than usual, or you feel tired, weak, confused, or sleepy  · You have back or neck pain, constipation, and trouble urinating  Contact your healthcare provider if:   · You have muscle weakness, or you are unusually tired  · You have new pain that seems to be coming from a bone  · You have questions or concerns about your condition or care  Medicines:   · Prescription pain medicine  may be given  Ask how to take this medicine safely  · NSAIDs , such as ibuprofen, help decrease swelling, pain, and fever  This medicine is available with or without a doctor's order  NSAIDs can cause stomach bleeding or kidney problems in certain people  If you take blood thinner medicine, always ask your healthcare provider if NSAIDs are safe for you  Always read the medicine label and follow directions      · Take your medicine as directed  Contact your healthcare provider if you think your medicine is not helping or if you have side effects  Tell him or her if you are allergic to any medicine  Keep a list of the medicines, vitamins, and herbs you take  Include the amounts, and when and why you take them  Bring the list or the pill bottles to follow-up visits  Carry your medicine list with you in case of an emergency  Follow up with your healthcare provider or oncologist as directed: You may need ongoing tests, bone scans, or treatment  Write down your questions so you remember to ask them during your visits  Do not smoke:  Smoking increases your risk for new or returning cancer, and can cause bone loss  Smoking can also delay healing after treatment  Ask your healthcare provider for information if you currently smoke and need help quitting  Limit or do not drink alcohol as directed:  Alcohol can decrease bone mineral density and weaken your bones  Limit alcohol to 2 drinks per day if you are a man  Limit alcohol to 1 drink per day if you are a woman  A drink of alcohol is 12 ounces of beer, 5 ounces of wine, or 1½ ounces of liquor  Manage your bone metastasis:   · Prevent falls  Wear shoes that fit well and have soles that   Wear shoes both inside and outside  Remove objects from walkways and stairs so you do not trip on them  Place cords for telephones and lamps out of the way so that you do not need to walk over them  Remove small rugs or secure them with double-sided tape  Install bright lights in your home  Use night lights to help light paths to the bathroom or kitchen  · Eat a variety of healthy foods  Healthy foods include fruits, vegetables, lean meats, beans, and low-fat dairy products  Your healthcare provider may recommend that you get more calcium and vitamin D  Calcium and vitamin D work together to build and protect bones   Good sources of calcium are dairy products, broccoli, tofu, almonds, and canned sardines  Vitamin D is in fish oils, some vegetables, and fortified milk, cereal, and bread  Vitamin D is also formed in the skin when it is exposed to the sun  Ask your healthcare provider how much sunlight is safe for you  · Go to physical or occupational therapy as directed  A physical therapist can teach you safe exercises to strengthen your bones and muscles  Strong muscles can help protect bones  An occupational therapist can teach you how to do your daily activities safely  · Keep a pain diary  Include where you feel the pain and if anything helped relieve it  Bring your pain diary to follow-up visits with your healthcare providers  © 2017 [employer] Information is for End User's use only and may not be sold, redistributed or otherwise used for commercial purposes  All illustrations and images included in CareNotes® are the copyrighted property of [employer] Inc  or [employer]  The above information is an  only  It is not intended as medical advice for individual conditions or treatments  Talk to your doctor, nurse or pharmacist before following any medical regimen to see if it is safe and effective for you

## 2018-12-24 NOTE — PHYSICIAN ADVISOR
Current patient class: Inpatient  The patient is currently on Hospital Day: 2 at [address] [place] [address]      This patient was originally admitted to the hospital under observation status  After admission the patient was reevaluated and determined to require further hospitalization  The patient is now documented to require at least a 2nd midnight in the hospital  As such the patient is now expected to satisfy the 2 midnight benchmark and is therefore eligible for inpatient admission  After review of the relevant documentation, labs, vital signs and test results, the patient is appropriate for INPATIENT ADMISSION  Admission to the hospital as an inpatient is a complex decision making process which requires the practitioner to consider the patients presenting complaint, history and physical examination and all relevant testing  With this in mind, in this case, the patient was deemed appropriate for INPATIENT ADMISSION  After review of the documentation and testing available at the time of the admission I concur with this clinical determination of medical necessity  Rationale is as follows: The patient is a 76 yrs Female who presented to the ED at 12/22/18 9:41 pm at [address] for back pain - she  was taking percocet at home however presented to ED for worsening pain and transferred to [address] [place] [address] for Neurosurgery evaluation   Neurosurgery recommended MRI thoracic and lumbar spine - which appears to show diffuse mets to T and L spine , T12 pathologic fracture - recommended radiation oncology and medical oncology consult    Patient  Currently on PO and IV narcotics for pain control and acute pain service has been consulted   Patient appropriate for inpatient admission for ongoing workup and management of pathologic fracture and pain control          The patients vitals on arrival were ED Triage Vitals   Temperature Pulse Respirations Blood Pressure SpO2 12/22/18 2345 12/22/18 2345 12/22/18 2345 12/22/18 2345 12/22/18 2345   99 3 °F (37 4 °C) 86 18 107/86 97 %      Temp Source Heart Rate Source Patient Position - Orthostatic VS BP Location FiO2 (%)   12/22/18 2345 12/23/18 1559 12/22/18 2345 12/22/18 2345 --   Oral Monitor Lying Left arm       Pain Score       12/23/18 0051       6           Past Medical History:   Diagnosis Date    Cancer [facility])     breast    Compression fracture of thoracic vertebra (HCC)     Diabetes mellitus ([facility] [address] )     Tendonitis      Past Surgical History:   Procedure Laterality Date    CHOLECYSTECTOMY      MASTECTOMY      Right side reconstruction    NECK SURGERY      TONSILLECTOMY         The patient was admitted to the hospital at [place] [address] on 12/23/18 for the following diagnosis:  Compression fracture of cervical spine ([facility] [address] ) [S12  9XXA]  Compression fracture of thoracic spine, non-traumatic ([facility] [address] ) [M48 54XA]    Consults have been placed to:   IP CONSULT TO ACUTE PAIN SERVICE  IP CONSULT TO NEUROSURGERY  IP CONSULT TO ONCOLOGY  IP CONSULT TO NUTRITION SERVICES  IP CONSULT TO RADIATION ONCOLOGY    Vitals:    12/22/18 2345 12/23/18 1110 12/23/18 1559 12/23/18 1625   BP: 107/86 126/75 128/73    BP Location: Left arm  Left arm    Pulse: 86 76 70    Resp: 18 18 20    Temp: 99 3 °F (37 4 °C) 97 7 °F (36 5 °C) 98 2 °F (36 8 °C)    TempSrc: Oral  Oral    SpO2: 97% 97% 96% 98%   Height: 5' 3" (1 6 m)          Most recent labs:    Recent Labs      12/21/18   0248   12/23/18   0515  12/23/18   1447   WBC  4 02*   < >  4 28*   --    HGB  10 7*   < >  9 3*   --    HCT  33 0*   < >  29 6*   --    PLT  321   < >  282   --    K  3 6   < >  3 2*  3 7   CALCIUM  9 1   < >  8 5  8 9   BUN  15   < >  8  7   CREATININE  0 68   < >  0 45*  0 47*   LIPASE  123   --    --    --    INR  1 11   --    --    --    CKTOTAL  37   --    --    --    AST  57*   --    --   26   ALT  22   --    --   22   ALKPHOS  257*   --    --   233*    < > = values in this interval not displayed         Scheduled Meds:  Current Facility-Administered Medications:  acetaminophen 975 mg Oral Q8H [address] Ashley Montano MD   aspirin 81 mg Oral Daily Ashley Montano MD   cholecalciferol 2,000 Units Oral Daily Ashley Montano MD   docusate sodium 100 mg Oral BID Ashley Montano MD   enoxaparin 40 mg Subcutaneous Daily Ashley Montano MD   ferrous sulfate 325 mg Oral Daily With Breakfast Ashley Montano MD   fish oil 2,000 mg Oral Daily Ashley Montano MD   gadobutrol 5 mL Intravenous Once in imaging Aren Abel MD   insulin lispro 1-5 Units Subcutaneous TID AC Ashley Montano MD   insulin lispro 1-5 Units Subcutaneous HS Ashley Montano MD   lisinopril 5 mg Oral Daily Ashley Montano MD   LORazepam 1 mg Oral Q8H PRN Ashley Montano MD   morphine 30 mg Oral Q4H PRN Ashley Montano MD   morphine injection 2 mg Intravenous Q3H PRN Ashley Montano MD   ondansetron 4 mg Intravenous Q6H PRN Ashley Montano MD   oxyCODONE 5 mg Oral Q4H PRN Ashley Montano MD   polyethylene glycol 17 g Oral Daily PRN Ashley Montano MD   pravastatin 40 mg Oral Daily With Joy Gandhi MD   senna 1 tablet Oral Daily Ashley Montano MD     Continuous Infusions:   PRN Meds: gadobutrol    LORazepam    morphine    morphine injection    ondansetron    oxyCODONE    polyethylene glycol    Surgical procedures (if appropriate):

## 2018-12-24 NOTE — CONSULTS
Consultation - Palliative and Supportive Care   Esteban Salcedo 76 y o  female [id number]    Patient Active Problem List   Diagnosis    Malignant neoplasm of right female breast ([address] )    Elevated alkaline phosphatase level    Anemia    Compression fracture of thoracic spine, non-traumatic (HCC)    Anxiety disorder    Diabetes mellitus type II, non insulin dependent (HCC)    Hypokalemia    Hypomagnesemia   - Pathologic fracture of T12  - Acute grief re: medical illness    Plan:  1  Symptom management - back pain improved  Poor appetite persists  Anxiety controlled, but suboptimally managed   - Defer SSRI/SNRI management at this time, will reconsider mirtazapine as an outpt  - Start low-dose steroids; pt has anxiety and DM that would likely be worsened by high-dose steroids  - Scheduled MSIR at 15mg PO TID with hold parameters   - PRN MSIR 15-30mg q4hrs PRN    - start gabapentin 100mg PO BID   - continue with home lorazepam for neuropathy and anxiety    2  Goals - full care, no limits   - Pt competent, but a but overwhelmed by new dx of Stage IV disease    - Son involved in pt's cares, supportive  She is otherwise , and has anxiety that is rather disabling to her  - Recommend home care f/up with [facility]  - Pt to consider chemo as an outpt  XRT to begin Wednesday  Code Status: FULL - Level 1   Decisional apparatus:  Patient is competent on my exam today  If competence is lost, patient's substitute decision maker would default to children by [place] Act 169  Advance Directive / Living Will / POLST:  None on file     I have reviewed the patient's controlled substance dispensing history in the Prescription Drug Monitoring Program in compliance with the [facility] regulations before prescribing any controlled substances  We appreciate the invitation to be involved in this patient's care  We will return Wednesday, after holiday    Please do not hesitate to reach our on call provider through our clinic answering service at  should you have acute symptom control concerns  Tayo Rodriguez MD  Palliative and Supportive Care  Pager: [phone]       IDENTIFICATION:  Inpatient consult to Palliative Care  Consult performed by: Saida Oliveira ordered by: Lyndon Hughes        Physician Requesting Consult: Joanna Alfaro MD  Reason for Consult / Principal Problem: pain  Hx and PE limited by: none    HISTORY OF PRESENT ILLNESS:       Chris Jain is a 76 y o  female who presents with back pain  Pt is known to have a decades-old history of breast cancer, first dxe3d in '96 in R breast   She had that breast removed (modified radical), and took standard therapy  A recurrence in 2004 was noted in the ipsilateral axilla, and she underwent XRT to the area, as well as restarting hormonal therapy  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, likely as a result of tumor compression +/- radiation neuropathy  She stopped hormonal therapy in roughly 2015  She has had no evidence of disease since her axillary recurrence  Over past few weeks, she has lost weight; had progressive back pain with worsening function; impaired sleep d/t pain; and an inability to tolerate certain foods -- pranav meats -- d/t taste changes  She is brought to hospital for an inablity to control pain, and scans and studies show no acute neurologic injury, but with multilevel spinal involvement from tumors, and increased signal in sternum consistent with metastasis  There are signs of pulmonary nodularities as well  Review of Systems   Constitution: Positive for decreased appetite and weight loss  Negative for diaphoresis, weakness and malaise/fatigue  HENT: Negative for ear discharge, hoarse voice and nosebleeds  Eyes: Negative for vision loss in left eye and vision loss in right eye  Cardiovascular: Negative for chest pain, cyanosis and dyspnea on exertion     Respiratory: Negative for cough, snoring and sputum production  Endocrine: Negative for polydipsia, polyphagia and polyuria  Hematologic/Lymphatic: Negative for adenopathy and bleeding problem  Skin: Negative for flushing, itching and poor wound healing  Musculoskeletal: Negative for falls, joint pain and joint swelling  Gastrointestinal: Negative for bloating, abdominal pain, diarrhea and dysphagia  Genitourinary: Negative for flank pain, frequency and hematuria  Neurological: Negative for difficulty with concentration, disturbances in coordination and excessive daytime sleepiness  Psychiatric/Behavioral: Negative for depression, hallucinations and suicidal ideas  The patient has insomnia  Grief; shock       Past Medical History:   Diagnosis Date    Cancer [facility])     breast    Compression fracture of thoracic vertebra (HCC)     Diabetes mellitus ([facility] )     Tendonitis      Past Surgical History:   Procedure Laterality Date    CHOLECYSTECTOMY      MASTECTOMY      Right side reconstruction    NECK SURGERY      TONSILLECTOMY       Social History     Social History    Marital status:      Spouse name: N/A    Number of children: N/A    Years of education: N/A     Occupational History    Not on file  Social History Main Topics    Smoking status: Never Smoker    Smokeless tobacco: Never Used    Alcohol use No    Drug use: No    Sexual activity: Not on file     Other Topics Concern    Not on file     Social History Narrative    No narrative on file     History reviewed  No pertinent family history      MEDICATIONS / ALLERGIES:    current meds:   Current Facility-Administered Medications   Medication Dose Route Frequency    acetaminophen (TYLENOL) tablet 650 mg  650 mg Oral 4x Daily (with meals and at bedtime)    aspirin (ECOTRIN LOW STRENGTH) EC tablet 81 mg  81 mg Oral Daily    cholecalciferol (VITAMIN D3) tablet 2,000 Units  2,000 Units Oral Daily    dexamethasone (DECADRON) tablet 1 mg  1 mg Oral Daily With Breakfast    enoxaparin (LOVENOX) subcutaneous injection 40 mg  40 mg Subcutaneous Daily    ferrous sulfate tablet 325 mg  325 mg Oral Daily With Breakfast    fish oil capsule 2,000 mg  2,000 mg Oral Daily    gadobutrol injection (MULTI-DOSE) SOLN 5 mL  5 mL Intravenous Once in imaging    insulin lispro (HumaLOG) 100 units/mL subcutaneous injection 1-5 Units  1-5 Units Subcutaneous TID AC    insulin lispro (HumaLOG) 100 units/mL subcutaneous injection 1-5 Units  1-5 Units Subcutaneous HS    lisinopril (ZESTRIL) tablet 5 mg  5 mg Oral Daily    LORazepam (ATIVAN) tablet 1 mg  1 mg Oral Q8H PRN    morphine (MSIR) IR tablet 15 mg  15 mg Oral Q4H PRN    morphine (MSIR) IR tablet 15 mg  15 mg Oral Q8H [address]    morphine (MSIR) IR tablet 30 mg  30 mg Oral Q4H PRN    ondansetron (ZOFRAN-ODT) dispersible tablet 4 mg  4 mg Oral Q6H PRN    polyethylene glycol (MIRALAX) packet 17 g  17 g Oral Daily PRN    pravastatin (PRAVACHOL) tablet 40 mg  40 mg Oral Daily With Dinner    senna (SENOKOT) tablet 8 6 mg  1 tablet Oral HS       No Known Allergies    OBJECTIVE:    Physical Exam  Physical Exam   Constitutional: She is oriented to person, place, and time  She appears well-developed and well-nourished  No distress  Wearing spinal brace   HENT:   Head: Normocephalic and atraumatic  Right Ear: External ear normal    Left Ear: External ear normal    Mouth/Throat: Oropharynx is clear and moist    Eyes: Pupils are equal, round, and reactive to light  Conjunctivae and EOM are normal  Right eye exhibits no discharge  Left eye exhibits no discharge  Neck: Normal range of motion  No tracheal deviation present  Cardiovascular: Normal rate and regular rhythm  Pulmonary/Chest: Effort normal  No stridor  No respiratory distress  Abdominal: Soft  She exhibits no distension  Musculoskeletal: She exhibits no deformity  Neurological: She is alert and oriented to person, place, and time   No cranial nerve deficit  Skin: Skin is warm and dry  No rash noted  She is not diaphoretic  No erythema  No pallor  Psychiatric: She has a normal mood and affect  Her behavior is normal  Judgment and thought content normal        Lab Results:   I have personally reviewed pertinent labs  , CBC:   Lab Results   Component Value Date    WBC 4 16 (L) 12/24/2018    HGB 9 8 (L) 12/24/2018    HCT 31 0 (L) 12/24/2018    MCV 86 12/24/2018     12/24/2018    MCH 27 1 12/24/2018    MCHC 31 6 12/24/2018    RDW 14 3 12/24/2018    MPV 9 6 12/24/2018    NRBC 0 12/24/2018   , CMP:   Lab Results   Component Value Date    SODIUM 139 12/24/2018    K 3 6 12/24/2018     12/24/2018    CO2 27 12/24/2018    BUN 8 12/24/2018    CREATININE 0 47 (L) 12/24/2018    CALCIUM 8 8 12/24/2018    AST 26 12/24/2018    ALT 27 12/24/2018    ALKPHOS 213 (H) 12/24/2018    EGFR 102 12/24/2018   elevated AlkPhos likely an effect of bony destruction  Imaging Studies: reviewed imaging reports, indicating disease as stated above  EKG, Pathology, and Other Studies: none new    Counseling / Coordination of Care  Total floor / unit time spent today 80+ minutes  Greater than 50% of total time was spent with the patient and / or family counseling and / or coordination of care   A description of the counseling / coordination of care: adjustment of parenteral controlled substances for advanced pain and symptoms, and review of the patient's controlled substance dispensing history in the Prescription Drug Monitoring Program in compliance with the [facility] regulations before prescribing said controlled substances; consideration of hospice vs treatment; symptom review; review and assessment of decisional apparatus and capacity, applicable legal codes and extant documents;

## 2018-12-24 NOTE — UTILIZATION REVIEW
Initial Clinical Review    Admission: Date/Time/Statement: 12/23/18 @ 0018     Orders Placed This Encounter   Procedures    Inpatient Admission     Standing Status:   Standing     Number of Occurrences:   1     Order Specific Question:   Admitting Physician     Answer:   Mitch Matias     Order Specific Question:   Level of Care     Answer:   Med Surg [16]     Order Specific Question:   Estimated length of stay     Answer:   More than 2 Midnights     Order Specific Question:   Certification     Answer:   I certify that inpatient services are medically necessary for this patient for a duration of greater than two midnights  See H&P and MD Progress Notes for additional information about the patient's course of treatment  ED: Date/Time/Mode of Arrival: [address] [place] [address] ED    Chief Complaint: back pain  History of Illness: Ivette Roper is a 76 y o  female with history of right breast cancer diagnosed in 1996, in 2004- 2005 recurrence of right breast cancer in the right supraclavicular area, s/p Rt and Femara X 10  Years, finished in 2015 who presented to [facility] for evaluation of back pain  Upon my assessment patient reports that she has been having 8-10/10 lower back pain, getting worse at rest, nonradiating over the past 2 months   3 days ago she was seen in the emergency room of the other hospital,  x-ray was unremarkable and she  was discharged   given ongoing pain she went to the [facility] where imaging studies revealed "Mild acute to subacute compression fracture T12 with 5 mm posterior retropulsion   Given multifocal diffuse patchy osseous sclerosis elsewhere, cannot exclude pathologic fracture "  Patient denies neurologic deficit, bowel or urinary incontinence or retention  She relays a gradual unintentional weight loss ( approximately 10 lb over the past 2-3 months), decreased appetite and activities    She sustained fall in early November of 2018, no back trauma, CT of the head was negative   Neurosurgery on-call recommended transferred to [facility] for neurosurgery evaluation      ED Vital Signs:   ED Triage Vitals   Temperature Pulse Respirations Blood Pressure SpO2   12/22/18 2345 12/22/18 2345 12/22/18 2345 12/22/18 2345 12/22/18 2345   99 3 °F (37 4 °C) 86 18 107/86 97 %      Temp Source Heart Rate Source Patient Position - Orthostatic VS BP Location FiO2 (%)   12/22/18 2345 12/23/18 1559 12/22/18 2345 12/22/18 2345 --   Oral Monitor Lying Left arm       Pain Score       12/23/18 0051       6        Wt Readings from Last 1 Encounters:   12/22/18 57 3 kg (126 lb 5 2 oz)       Vital Signs (abnormal): WNL    Abnormal Labs/Diagnostic Test Results: HGB 10 5, HCT 32 8  GLUCOSE 210    CT A/P 12/21 -- Mild acute to subacute compression fracture T12 with 5 mm posterior retropulsion   Given multifocal diffuse patchy osseous sclerosis elsewhere, cannot exclude pathologic fracture  MRI T-SPINE 12/23 -- Development of diffuse thoracic vertebral metastases   Moderate acute compression deformity T12 with moderate central canal stenosis without spinal cord compression  Probable mediastinal adenopathy    MRI LUMBAR SPINE 12/23 --Diffuse osseous thoracic and lumbosacral spinal metastases  Moderate acute compression fracture T12 with mild central canal stenosis  Multilevel degenerative spondylosis    CT CHEST 12/23 -- Diffuse osseous thoracic and lumbosacral spinal metastases  Moderate acute compression fracture T12 with mild central canal stenosis  Multilevel degenerative spondylosis    Medical/Surgical History:    Active Ambulatory Problems     Diagnosis Date Noted    History of breast cancer 10/02/2018    Elevated alkaline phosphatase level 10/02/2018    Anemia 12/13/2018     Resolved Ambulatory Problems     Diagnosis Date Noted    Heme positive stool 12/20/2018     Past Medical History:   Diagnosis Date    Cancer [facility])     Compression fracture of thoracic vertebra ([address] )     Diabetes mellitus ([address] )     Tendonitis        Admitting Diagnosis: Compression fracture of cervical spine ([address] ) [S12  9XXA]  Compression fracture of thoracic spine, non-traumatic (HCC) [Z95 [id number]]    Age/Sex: 76 y o  female    Assessment/Plan:   * Compression fracture of thoracic spine, non-traumatic (HCC)   Assessment & Plan     CT of abdomen revealed "Mild acute to subacute compression fracture T12 with 5 mm posterior retropulsion   Given multifocal diffuse patchy osseous sclerosis elsewhere, cannot exclude pathologic fracture "  Neurosurgeon on  call ,Dr Norman Doan, recommended transferred to [facility] for neurosurgery evaluation  Will proceed with MRI of thoracic and lumbar spine as recommended by neurosurgeon  Further recommendations and imaging study pending neurosurgery evaluation  Continue current pain management including Tylenol around the clock  Acute pain management consult  Apply TLSO braces     Anticipated Length of Stay:  Patient will be admitted on an Inpatient basis with an anticipated length of stay of  > 2 midnights     Justification for Hospital Stay:  Neurosurgery and oncology consult         Admission Orders:  Scheduled Meds:   Current Facility-Administered Medications:  acetaminophen 975 mg Oral Q8H [address] Queen Zeynep MD   aspirin 81 mg Oral Daily Queen Zeynep MD   cholecalciferol 2,000 Units Oral Daily Queen Zeynep MD   docusate sodium 100 mg Oral BID Queen Zeynep MD   enoxaparin 40 mg Subcutaneous Daily Queen Zeynep MD   ferrous sulfate 325 mg Oral Daily With Breakfast Queen Zeynep MD   fish oil 2,000 mg Oral Daily Queen Zeynep MD   gadobutrol 5 mL Intravenous Once in imaging Amanda Sloan MD   insulin lispro 1-5 Units Subcutaneous TID AC Queen Zeynep MD   insulin lispro 1-5 Units Subcutaneous HS Queen Zeynep MD   lisinopril 5 mg Oral Daily Queen Zeynep MD   LORazepam 1 mg Oral Q8H PRN Queen Zeynep MD   morphine 30 mg Oral Q4H PRN Adebayo Ladd Huey Rob MD   morphine injection 2 mg Intravenous Q3H PRN Yoko Kaz, MD   ondansetron 4 mg Intravenous Q6H PRN Yoko Mccurdy MD   oxyCODONE 5 mg Oral Q4H PRN Yoko Mccurdy, MD   polyethylene glycol 17 g Oral Daily PRN Yoko Mccurdy, MD   pravastatin 40 mg Oral Daily With Lennice Burkitt, MD   senna 1 tablet Oral Daily Yokoroger Mccurdy, MD     SPINE BRACE  SCD'S  FINGERSTICK GLUCOSE CHECKS AC & HS  UP WITH ASSIST  CONS CARB DIET  PT/OT EVALS  CONSULT NS, ACUTE PAIN SERVICE, ONCOLOGY, RADIATION ONCOLOGY

## 2018-12-24 NOTE — CONSULTS
Patient: Marina Hernandez  Patient MRN: [id number]  Service date: 12/24/2018  Attending Physician:       CHIEF COMPLAIN  No chief complaint on file  Heme / Oncology history:  Marina Hernandez is a 76 y o  female     History of breast cancer  - initially diagnosed in 1996:  Right breast mastectomy followed by adjuvant chemotherapy, unclear chemo, no adjuvant radiation   - locally relapsed with supraclavicular lymph node in 2004, received salvage radiation at [facility], then on [address] [place] [address] for 10 years from  2005 to 2015  - not presented with back pain and diffuse bone lesion  HISTORY OF PRESENT ILLNESS:  Marina Hernandez is a 76 y o  female who has below described history of breast cancer, anxiety disorder, history of anemia presents with diffuse bone lesion, Oncology was consulted for comanagement  Patient confirmed above history, reported also has been losing weight for the past 2 months, has been having the pain for about 2 months  PROBLEM LIST:  Patient Active Problem List   Diagnosis    Elevated alkaline phosphatase level    Anemia    Compression fracture of thoracic spine, non-traumatic (HCC)    Anxiety disorder    Breast cancer metastasized to bone, right [facility])       ASSESSMENT/PLAN:  Marina Hernandez is a 76 y o  female with:    1) diffuse metastatic bone lesion  - most likely malignancy, possible recurrent breast cancer   - neurosurgery has evaluate patient, considered radiation therapy, possible kyphoplasty in the future  - right on evaluate patient, recommend palliative radiation possible as outpatient  - further medical oncology standpoint, patient will 1st need biopsy to establish diagnosis, also for receptor status if this is a recurrent breast cancer  Systemic treatment option will be depending on further analysis  Regarding 1 to do the biopsy, due to the holiday, patient decided to postpone,  arrange biopsy as outpatient      - follow with primary oncologist appointment has been made     - recommend to give a dose of Zometa IV  2)  history of breast cancer  - as above  3):  Anemia normocytic    4) leukocytopenia mild  greater than 50% of the time spent in counseling or coordination of care including discussions of treatment instructions  All of the patient's questions were answered to their satisfactory during this discussion   Silviano Head MD PhD  Hematology / [facility]:   has a past medical history of Cancer [facility]); Compression fracture of thoracic vertebra ([facility] ); [facility] ); and Tendonitis  PAST SURGICAL HISTORY:   has a past surgical history that includes Cholecystectomy; Neck surgery; Mastectomy; and Tonsillectomy      CURRENT MEDICATIONS  Scheduled Meds:  Current Facility-Administered Medications:  acetaminophen 650 mg Oral 4x Daily (with meals and at bedtime) Kaia Tenorio MD   aspirin 81 mg Oral Daily Cesar Menezes MD   cholecalciferol 2,000 Units Oral Daily Cesar Menezes MD   dexamethasone 1 mg Oral Daily With Breakfast Kaia Tenorio MD   enoxaparin 40 mg Subcutaneous Daily Cesar Menezes MD   ferrous sulfate 325 mg Oral Daily With Breakfast Cesar Menezes MD   fish oil 2,000 mg Oral Daily Cesar Menezes MD   gabapentin 100 mg Oral BID Kaia Tenorio MD   gadobutrol 5 mL Intravenous Once in imaging Meet Roper MD   insulin lispro 1-5 Units Subcutaneous TID AC Cesar Menezes MD   insulin lispro 1-5 Units Subcutaneous HS Cesar Menezes MD   lisinopril 5 mg Oral Daily Cesar Menezes MD   LORazepam 1 mg Oral Q8H PRN Cesar Menezes MD   morphine 15 mg Oral Q4H PRN Kaia Tenorio MD   morphine 15 mg Oral [facility] Kaia Tenorio MD   morphine 30 mg Oral Q4H PRN Cesar Menezes MD   ondansetron 4 mg Oral Q6H PRN Kaia Tenorio MD   polyethylene glycol 17 g Oral Daily PRN Cesar Menezes MD   pravastatin 40 mg Oral Daily With [facility] Yolanda Rocha MD   senna 1 tablet Oral HS Sasha Raza MD     Continuous Infusions:   PRN Meds: gadobutrol    LORazepam    morphine    morphine    ondansetron    polyethylene glycol    SOCIAL HISTORY:   reports that she has never smoked  She has never used smokeless tobacco  She reports that she does not drink alcohol or use drugs  FAMILY HISTORY:  family history is not on file  ALLERGIES:  has No Known Allergies  REVIEW OF SYSTEMS:  Please note that a 14-point review of systems was performed to include Constitutional, HEENT, Respiratory, CVS, GI, , Musculoskeletal, Integumentary, Neurologic, Rheumatologic, Endocrinologic, Psychiatric, Lymphatic, and Hematologic/Oncologic systems were reviewed and are negative unless otherwise stated in HPI  Positive and negative findings pertinent to this evaluation are incorporated into the history of present illness  PHYSICAL EXAMINATION:  Vital Signs: Temp:  [98 °F (36 7 °C)-98 2 °F (36 8 °C)] 98 °F (36 7 °C)  HR:  [70-78] 78  Resp:  [18-20] 18  BP: (116-128)/(67-73) 116/67  Body mass index is 22 38 kg/m²  Body surface area is 1 59 meters squared  Constitutional: Alert and oriented  HEENT: Anicteric, PERRLA  Chest: Decreased breathing sound bilaterally, No wheezes/rales/rhonchi  CVS: Regular rhythm  Normal rate  Abdomen: Soft, nontender, nondistended  No palpable organomegaly  Extremities: No cyanosis/clubbing/edema  Integumentary: No obvious rashes or bruises  Musculoskeletal: No obvious bony or joint deformities  Psychiatric: Appropriate affect and mood  Lymph Node Survey: No palpable preauricular, submandibular, cervical, supraclavicular, axillary, epitrochlear or inguinal lymphadenopathy      LABS:    Results from last 7 days  Lab Units 12/24/18  0508 12/23/18  0515 12/22/18  2157 12/21/18  0248   WBC Thousand/uL 4 16* 4 28* 4 73 4 02*   HEMATOCRIT % 31 0* 29 6* 32 8* 33 0*   PLATELETS Thousands/uL 305 282 344 321   NEUTROS PCT % 47  -- 67 56   MONOS PCT % 18*  --  14* 15*       Results from last 7 days  Lab Units 12/24/18  0511 12/23/18  1447 12/23/18  0515   POTASSIUM mmol/L 3 6 3 7 3 2*   CHLORIDE mmol/L 105 102 102   CO2 mmol/L 27 26 28   BUN mg/dL 8 7 8       Results from last 7 days  Lab Units 12/24/18  0511 12/23/18  1447 12/23/18  0515 12/21/18  0248   MAGNESIUM mg/dL 1 9  --  1 5*  --    ALBUMIN g/dL 2 7* 3 1*  --  3 3*   ALK PHOS U/L 213* 233*  --  257*   ALT U/L 27 22  --  22   AST U/L 26 26  --  57*       Results from last 7 days  Lab Units 12/24/18  0602 12/21/18  0248   INR  1 28* 1 11   PTT seconds  --  32           Invalid input(s): TNI,  PCT              IMAGING:  CT chest w contrast   Final Result   Multiple Small subcentimeter lung nodule seen in the both lungs left greater than right  These are indeterminate  Suggest short interval follow-up CT at 3 months      Diffuse bony metastatic disease      Compression deformity with the fracture of superior and inferior endplate and retropulsion of posterior vertebral margin with about 50-60% loss of vertebral height and moderate central canal narrowing at T12 vertebra as seen on the recent MRI               Workstation performed: [id number]         MRI thoracic spine w wo contrast   Final Result   Development of diffuse thoracic vertebral metastases    Moderate acute compression deformity T12 with moderate central canal stenosis without spinal cord compression      Probable mediastinal adenopathy      Findings personally discussed by phone with Dr Rajesh Benavides at 12:15 PM, 12/23/2018               Workstation performed: [id number]         MRI lumbar spine w wo contrast   Final Result   Diffuse osseous thoracic and lumbosacral spinal metastases      Moderate acute compression fracture T12 with mild central canal stenosis      Multilevel degenerative spondylosis      Findings personally discussed by phone with Dr Rajesh Benavides at 12:15 PM, 12/23/2018            Workstation performed: [id number]         CT RADIATION THERAPY    (Results Pending)   XR spine thoracolumbar 2 vw    (Results Pending)

## 2018-12-24 NOTE — DISCHARGE SUMMARY
Discharge Summary - [facility]    Patient Information: Lebron Ye 76 y o  female MRN: [id number]  Unit/Bed#: [facility] 928-01 Encounter: [id number]    Discharging Physician / Practitioner: CAITIE Jama  PCP: All Russell MD  Admission Date: 12/22/2018  Discharge Date: 12/24/18    Disposition:     Home with VNA Services (Reminder: Complete face to face encounter)    Reason for Admission: back pain     Discharge Diagnoses:     Principal Problem:    Compression fracture of thoracic spine, non-traumatic ([facility] [address] )  Active Problems:    Malignant neoplasm of right female breast ([facility] [address] )    Elevated alkaline phosphatase level    Anemia    Diabetes mellitus type II, non insulin dependent (HCC)    Anxiety disorder    Hypokalemia    Hypomagnesemia    Breast cancer metastasized to bone, right ([facility] [address] )  Resolved Problems:    * No resolved hospital problems  *      Consultations During Hospital Stay:  · Dr Christine Sol for palliative care   · Dr Kaity Obando radiation oncology   · Dr Shobha Miller neurosurgery     Procedures Performed:     · CT abdomen and pelvis with IV contrast: No acute intra-abdominal or intrapelvic process  Mild acute to subacute compression fracture T12 with 5 mm posterior retropulsion   Given multifocal diffuse patchy osseous sclerosis elsewhere, cannot exclude pathologic fracture    · MRI thoracic spine with and without contrast:Development of diffuse thoracic vertebral metastases   Moderate acute compression deformity T12 with moderate central canal stenosis without spinal cord compression  Probable mediastinal adenopathy  · CT chest with contrast:Multiple Small subcentimeter lung nodule seen in the both lungs left greater than right   These are indeterminate   Suggest short interval follow-up CT at 3 months  Diffuse bony metastatic disease  Compression deformity with the fracture of superior and inferior endplate and retropulsion of posterior vertebral margin with about 50-60% loss of vertebral height and moderate central canal narrowing at T12 vertebra as seen on the recent MRI    Significant Findings / Test Results:     · See above     Incidental Findings:   CT chest with contrast:Multiple Small subcentimeter lung nodule seen in the both lungs left greater than right   These are indeterminate   Suggest short interval follow-up CT at 3 months    Test Results Pending at Discharge (will require follow up): · None      Outpatient Tests Requested:  · Call schedule follow-up within 1 week with PCP  · Follow-up with Oncology on Thursday  · Call to schedule follow-up with palliative care  · Call to schedule follow-up with Radiation Oncology    Complications:  None     Hospital Course:     Sylvester Mckeon is a 76 y o  female patient who originally presented to the hospital on 12/22/2018 due to back pain  Patient has history of right breast cancer diagnosed 1996, in 2004 through 2005 recurrence of right breast cancer in the right supraclavicular area  Patient presented to [facility] for evaluation of back pain  Patient reported pain was an 8/10 on the lower back getting worse at rest, nonradiating over the past 2 months  Three days prior to admission patient had gone to another hospital which she was discharged status post x-ray revealing no acute findings  Patient then went to [facility] where images revealed mild acute to subacute compression fracture of T12 with 5 mm posterior retropulsion  Neurosurgery were contacted and patient was transferred to [facility] for further evaluation of pathologic fracture  Patient had denied any neurological deficits, bowel or urinary incontinence or retention  She related gradually unintentional weight loss of approximately 10 lb over the last 2-3 months  Pt was transferred to [place], with further imaging revealing metastatic disease of the spine  At this time patient has been fitted with the TLSO brace for stability    She seen Radiation Oncology for radiation treatment as early as this coming week  Patient will continue to follow with Oncology as prior, 1st appointment scheduled day after Christmas  Palliative medicine a prescribed medications for discharge referring SSRI/SNRI management this however reconsider mirtazapine as an outpatient, in anxious patient  Recommendations to start low-dose steroids in setting of anxiety with continue to monitor  And will order MSIR at 50 mg p o  T i d  With hold parameters  And MSIR 15-30 mg q 4 hours p r n  [place] Patient will be started on gabapentin 100 mg p o  B i d  And will continue her home lorazepam for neuropathy and anxiety  Patient is extremely overwhelmed by new diagnosis of stage IV disease  However patient does have very supportive family although she lives by herself  She recently lost her spouse approximately 2-1/2 years ago to cancer  She will discharged home with follow-up VNA, who will see patient after the Oc holiday  Patient will undergo x-ray of spine with brace to see if cervically aligned and then patient will be medically stable for discharge home  In discussion with Oncology patient will as soon as possible require bone biopsy that will be coordinated by Dr Duglas Villareal as patient has decided to go home rather than stay for biopsy over holiday  Condition at Discharge: fair     Discharge Day Visit / Exam:     Subjective:  Patient is a little anxious filling out forms eager to leave and go home before Christmas  Patient has some pain but improved  Patient wearing brace  No complaints at this time understands plan of care discussed with son at bedside patient to be discharged today with plan for outpatient visit with Dr Duglas Villareal day after Christmas  Patient then has appointment for radiation treatment Wednesday    Vitals: Blood Pressure: 116/67 (12/24/18 1018)  Pulse: 78 (12/24/18 1018)  Temperature: 98 °F (36 7 °C) (12/24/18 1018)  Temp Source: Oral (12/24/18 1018)  Respirations: 18 (12/24/18 1018)  Height: 5' 3" (160 cm) (12/22/18 2345)  SpO2: 97 % (12/24/18 1018)  Exam:   Physical Exam   Constitutional: She is oriented to person, place, and time  Eyes: Conjunctivae are normal  Right eye exhibits no discharge  Left eye exhibits no discharge  No scleral icterus  Neck: No JVD present  No tracheal deviation present  No thyromegaly present  Cardiovascular: Normal rate  Exam reveals no gallop and no friction rub  No murmur heard  Pulmonary/Chest: No stridor  No respiratory distress  She has no wheezes  She has no rales  She exhibits no tenderness  Abdominal: She exhibits no distension and no mass  There is no tenderness  There is no rebound and no guarding  Musculoskeletal: She exhibits no edema, tenderness or deformity  Lymphadenopathy:     She has no cervical adenopathy  Neurological: She is oriented to person, place, and time  Skin: No rash noted  No erythema  No pallor  Psychiatric: She has a normal mood and affect  Discussion with Family: son at bedside     Discharge instructions/Information to patient and family:   See after visit summary for information provided to patient and family  Provisions for Follow-Up Care:  See after visit summary for information related to follow-up care and any pertinent home health orders  Planned Readmission: high risk      Discharge Statement:  I spent 50 minutes discharging the patient  This time was spent on the day of discharge  I had direct contact with the patient on the day of discharge  Greater than 50% of the total time was spent examining patient, answering all patient questions, arranging and discussing plan of care with patient as well as directly providing post-discharge instructions  Additional time then spent on discharge activities  Discharge Medications:  See after visit summary for reconciled discharge medications provided to patient and family        ** Please Note: This note has been constructed using a voice recognition system **

## 2018-12-26 ENCOUNTER — APPOINTMENT (OUTPATIENT)
Dept: RADIATION ONCOLOGY | Facility: HOSPITAL | Age: 68
End: 2018-12-26
Attending: RADIOLOGY
Payer: MEDICARE

## 2018-12-26 LAB — ALP BONE SERPL-MCNC: 62.6 UG/L

## 2018-12-26 PROCEDURE — 77334 RADIATION TREATMENT AID(S): CPT | Performed by: RADIOLOGY

## 2018-12-26 PROCEDURE — 77295 3-D RADIOTHERAPY PLAN: CPT | Performed by: RADIOLOGY

## 2018-12-26 PROCEDURE — 77387 GUIDANCE FOR RADJ TX DLVR: CPT | Performed by: RADIOLOGY

## 2018-12-26 PROCEDURE — 77300 RADIATION THERAPY DOSE PLAN: CPT | Performed by: RADIOLOGY

## 2018-12-26 PROCEDURE — 77412 RADIATION TX DELIVERY LVL 3: CPT | Performed by: RADIOLOGY

## 2018-12-26 PROCEDURE — 77417 THER RADIOLOGY PORT IMAGE(S): CPT | Performed by: RADIOLOGY

## 2018-12-27 ENCOUNTER — APPOINTMENT (OUTPATIENT)
Dept: RADIATION ONCOLOGY | Facility: HOSPITAL | Age: 68
End: 2018-12-27
Attending: RADIOLOGY
Payer: MEDICARE

## 2018-12-27 ENCOUNTER — OFFICE VISIT (OUTPATIENT)
Dept: HEMATOLOGY ONCOLOGY | Facility: CLINIC | Age: 68
End: 2018-12-27
Payer: MEDICARE

## 2018-12-27 VITALS
DIASTOLIC BLOOD PRESSURE: 62 MMHG | OXYGEN SATURATION: 98 % | BODY MASS INDEX: 22.68 KG/M2 | WEIGHT: 128 LBS | TEMPERATURE: 98.4 F | HEIGHT: 63 IN | HEART RATE: 84 BPM | RESPIRATION RATE: 20 BRPM | SYSTOLIC BLOOD PRESSURE: 130 MMHG

## 2018-12-27 DIAGNOSIS — D50.8 OTHER IRON DEFICIENCY ANEMIA: Chronic | ICD-10-CM

## 2018-12-27 DIAGNOSIS — F41.9 ANXIETY DISORDER, UNSPECIFIED TYPE: Chronic | ICD-10-CM

## 2018-12-27 DIAGNOSIS — R74.8 ELEVATED ALKALINE PHOSPHATASE LEVEL: ICD-10-CM

## 2018-12-27 DIAGNOSIS — Z17.0 MALIGNANT NEOPLASM OF RIGHT BREAST IN FEMALE, ESTROGEN RECEPTOR POSITIVE, UNSPECIFIED SITE OF BREAST (HCC): Primary | ICD-10-CM

## 2018-12-27 DIAGNOSIS — M48.54XA: ICD-10-CM

## 2018-12-27 DIAGNOSIS — C79.51 BREAST CANCER METASTASIZED TO BONE, RIGHT (HCC): Primary | Chronic | ICD-10-CM

## 2018-12-27 DIAGNOSIS — C50.911 MALIGNANT NEOPLASM OF RIGHT BREAST IN FEMALE, ESTROGEN RECEPTOR POSITIVE, UNSPECIFIED SITE OF BREAST (HCC): Primary | ICD-10-CM

## 2018-12-27 DIAGNOSIS — C79.51 BONE METASTASIS (HCC): ICD-10-CM

## 2018-12-27 DIAGNOSIS — C50.911 BREAST CANCER METASTASIZED TO BONE, RIGHT (HCC): Primary | Chronic | ICD-10-CM

## 2018-12-27 PROBLEM — R91.8 LUNG NODULES: Status: ACTIVE | Noted: 2018-12-27

## 2018-12-27 PROCEDURE — 77387 GUIDANCE FOR RADJ TX DLVR: CPT | Performed by: RADIOLOGY

## 2018-12-27 PROCEDURE — 77331 SPECIAL RADIATION DOSIMETRY: CPT | Performed by: RADIOLOGY

## 2018-12-27 PROCEDURE — 99215 OFFICE O/P EST HI 40 MIN: CPT | Performed by: INTERNAL MEDICINE

## 2018-12-27 PROCEDURE — 77412 RADIATION TX DELIVERY LVL 3: CPT | Performed by: RADIOLOGY

## 2018-12-27 NOTE — LETTER
December 27, 2018     [address]  [facility] [address]    Patient: Chris Jain   YOB: 1950   Date of Visit: 12/27/2018       Dear Dr Huong Zimmer:    Thank you for referring Navid Llamas to me for evaluation  Below are my notes for this consultation  If you have questions, please do not hesitate to call me  I look forward to following your patient along with you  Sincerely,        Reyes Cisse MD        CC: No Recipients  Reyes Cisse MD  12/27/2018 11:25 PM  Sign at close encounter    HPI:   Patient is here with her sister and brother-in-law  She is here because of extensive bony metastatic disease to thoracic and lumbar spine with acute compression fracture of T12 and patient has restarted radiation to T12 area after she was evaluated by medical oncologist, radiation oncologist and neurosurgeon at [facility] last week  She also had CT scan chest that showed bilateral sub cm multiple nodules suspected to be metastatic  Patient had not have biopsy to confirm metastatic cancer from previously known breast cancer probably because of urgency and patient in lot of pain and no other primary site on CT scans and known to have breast cancer  Breast cancer was initially diagnosed in 1996 and patient had right breast mastectomy followed by adjuvant chemotherapy  She had recurrent disease in the right supraclavicular lymph node in 2004 and for that she had surgery and [facility] for 10 years until 2015  Evaluation in 2015 was negative for any evidence of metastatic disease but she has metastatic disease now  1 option is to hold radiation and biopsy T12 because biopsy after completion of radiation may or may not show metastatic disease  Discussed this frankly with patient and her family that we do not have tissue confirmation    They would prefer to continue with radiation and taken for granted that what we are dealing with is metastatic cancer from breast cancer  Alkaline phosphatase  level is high  Patient is wearing a special back support to support collapsed T12 and help controlling pain with immobilization  Patient has noticed decreased appetite  Minimal exertional dyspnea  Tiredness  Recently she was found to have anemia and Hemoccult-positive stool and she has appointment with a gastroenterologist     She is anxious  Current Outpatient Prescriptions:     acetaminophen (TYLENOL) 325 mg tablet, Take 2 tablets (650 mg total) by mouth 4 (four) times a day (with meals and at bedtime), Disp: 80 tablet, Rfl: 1    aspirin (ASPIRIN EC ADULT LOW STRENGTH) 81 mg EC tablet, Take 1 tablet by mouth daily, Disp: , Rfl:     Cholecalciferol (VITAMIN D3) 2000 units TABS, Take 2,000 Units by mouth daily, Disp: , Rfl:     dexamethasone (DECADRON) 1 mg tablet, Take 1 tablet (1 mg total) by mouth daily with breakfast for 10 days, Disp: 10 tablet, Rfl: 0    ferrous sulfate 325 (65 Fe) mg tablet, Take 1 tablet (325 mg total) by mouth daily with breakfast, Disp: 30 tablet, Rfl: 1    gabapentin (NEURONTIN) 100 mg capsule, Take 1 capsule (100 mg total) by mouth 2 (two) times a day, Disp: 60 capsule, Rfl: 1    liraglutide (VICTOZA) injection, Inject 0 6 mg under the skin daily  , Disp: , Rfl:     lisinopril (ZESTRIL) 5 mg tablet, Take 5 mg by mouth daily  , Disp: , Rfl:     LORazepam (ATIVAN) 1 mg tablet, May use one daily PRN anxiety/panic  Take 2mg (2 tablets) qhs , Disp: 90 tablet, Rfl: 0    metFORMIN (GLUMETZA) 1000 MG (MOD) 24 hr tablet, Take 1,000 mg by mouth 2 (two) times a day with meals  , Disp: , Rfl:     morphine (MSIR) 15 mg tablet, Use 1 tab TID scheduled to anticipate cancer pain    May have 1-2 tabs q4hrs PRN mod-severe pain , Disp: 90 tablet, Rfl: 0    MULTIPLE VITAMINS PO, Take by mouth, Disp: , Rfl:     Omega-3 Fatty Acids (FISH OIL PO), Take 2 g by mouth, Disp: , Rfl:     ondansetron (ZOFRAN-ODT) 4 mg disintegrating tablet, Take 1 tablet (4 mg total) by mouth every 6 (six) hours as needed for nausea or vomiting, Disp: 20 tablet, Rfl: 0    polyethylene glycol (MIRALAX) 17 g packet, Take 17 g by mouth daily, Disp: 30 each, Rfl: 1    pravastatin (PRAVACHOL) 40 mg tablet, Take 40 mg by mouth daily  , Disp: , Rfl:     senna (SENOKOT) 8 6 mg, Take 1 tablet (8 6 mg total) by mouth daily at bedtime Hold for loose stool , Disp: 30 each, Rfl: 1    No Known Allergies     No history exists  ROS:  12/27/18 Reviewed 13 systems:  Presently no other neurological, cardiac, pulmonary, GI and  symptoms other than mentioned above in HPI  No symptom like     fever, chills, bleeding, bone pains, skin rash,  weakness, numbness,  claudication and gait problem  No frequent infections  Not unusually sensitive to heat or cold  No swelling of the ankles  No swollen glands  Patient is anxious  Other symptoms are in HPI        /62 (BP Location: Left arm, Patient Position: Sitting, Cuff Size: Adult)   Pulse 84   Temp 98 4 °F (36 9 °C)   Resp 20   Ht 5' 3 25" (1 607 m)   Wt 58 1 kg (128 lb)   LMP 05/27/1996 (Approximate)   SpO2 98%   BMI 22 50 kg/m²      Physical Exam:    Patient is alert and oriented  She is not in acute distress  Vital signs are stable  There is no scleral icterus  No oral thrush  No palpable neck mass  Lung fields are clear to percussion and auscultation  Heart rate is regular  No palpable abdominal mass  No ascites  No edema of ankles  No calf tenderness  No peripheral palpable lymphadenopathy in neck and axillary areas  No focal neurological deficit  No skin rash  She has back brace  She is anxious  Performance status 2 on the ECOG scale                                   IMPRESSION:  Diffuse osseous thoracic and lumbosacral spinal metastases     Moderate acute compression fracture T12 with mild central canal stenosis     Multilevel degenerative spondylosis     Findings personally discussed by phone with Dr Filiberto Curiel at 12:15 PM, 12/23/2018           Workstation performed: [id number]      Imaging     MRI lumbar spine w wo contrast (Order #[id number]) on 12/23/2018 - Imaging Information     IMPRESSION:  Development of diffuse thoracic vertebral metastases  Moderate acute compression deformity T12 with moderate central canal stenosis without spinal cord compression     Probable mediastinal adenopathy     Findings personally discussed by phone with Dr Filiberto Curiel at 12:15 PM, 12/23/2018              Workstation performed: [id number]      Imaging     MRI thoracic spine w wo contrast (Order #[id number]) on 12/23/2018 - Imaging Information   IMPRESSION:  Multiple Small subcentimeter lung nodule seen in the both lungs left greater than right  These are indeterminate  Suggest short interval follow-up CT at 3 months     Diffuse bony metastatic disease     Compression deformity with the fracture of superior and inferior endplate and retropulsion of posterior vertebral margin with about 50-60% loss of vertebral height and moderate central canal narrowing at T12 vertebra as seen on the recent MRI              Workstation performed: [id number]      Imaging     CT chest w contrast (Order #[id number]) on 12/23/2018 - Imaging Information   IMPRESSION:     No acute intra-abdominal or intrapelvic process      Mild acute to subacute compression fracture T12 with 5 mm posterior retropulsion    Given multifocal diffuse patchy osseous sclerosis elsewhere, cannot exclude pathologic fracture      The study was marked in EPIC for immediate notification                     Workstation performed: [id number]      Imaging     CT abdomen pelvis with contrast (Order #[id number]) on 12/21/2018 - Imaging Information     LABS:  Results for orders placed or performed during the hospital encounter of 09/80/38   Basic metabolic panel   Result Value Ref Range    Sodium 136 136 - 145 mmol/L    Potassium 3 2 (L) 3 5 - 5 3 mmol/L    Chloride 102 100 - 108 mmol/L    CO2 28 21 - 32 mmol/L    ANION GAP 6 4 - 13 mmol/L    BUN 8 5 - 25 mg/dL    Creatinine 0 45 (L) 0 60 - 1 30 mg/dL    Glucose 116 65 - 140 mg/dL    Calcium 8 5 8 3 - 10 1 mg/dL    eGFR 103 ml/min/1 73sq m   Magnesium   Result Value Ref Range    Magnesium 1 5 (L) 1 6 - 2 6 mg/dL   CBC (With Platelets)   Result Value Ref Range    WBC 4 28 (L) 4 31 - 10 16 Thousand/uL    RBC 3 47 (L) 3 81 - 5 12 Million/uL    Hemoglobin 9 3 (L) 11 5 - 15 4 g/dL    Hematocrit 29 6 (L) 34 8 - 46 1 %    MCV 85 82 - 98 fL    MCH 26 8 26 8 - 34 3 pg    MCHC 31 4 31 4 - 37 4 g/dL    RDW 14 2 11 6 - 15 1 %    Platelets 800 272 - 934 Thousands/uL    MPV 9 5 8 9 - 12 7 fL   Alkaline phosphatase, bone specific   Result Value Ref Range    Tandem-R Ostase 62 6 ug/L   Comprehensive metabolic panel   Result Value Ref Range    Sodium 136 136 - 145 mmol/L    Potassium 3 7 3 5 - 5 3 mmol/L    Chloride 102 100 - 108 mmol/L    CO2 26 21 - 32 mmol/L    ANION GAP 8 4 - 13 mmol/L    BUN 7 5 - 25 mg/dL    Creatinine 0 47 (L) 0 60 - 1 30 mg/dL    Glucose 114 65 - 140 mg/dL    Calcium 8 9 8 3 - 10 1 mg/dL    AST 26 5 - 45 U/L    ALT 22 12 - 78 U/L    Alkaline Phosphatase 233 (H) 46 - 116 U/L    Total Protein 7 1 6 4 - 8 2 g/dL    Albumin 3 1 (L) 3 5 - 5 0 g/dL    Total Bilirubin 0 63 0 20 - 1 00 mg/dL    eGFR 102 ml/min/1 73sq m   Protime-INR   Result Value Ref Range    Protime 16 1 (H) 11 8 - 14 2 seconds    INR 1 28 (H) 0 86 - 1 17   Hemoglobin A1c w/EAG Estimation (Orders if not completed within the last 90 days)   Result Value Ref Range    Hemoglobin A1C 6 3 4 2 - 6 3 %     mg/dl   CBC and differential   Result Value Ref Range    WBC 4 16 (L) 4 31 - 10 16 Thousand/uL    RBC 3 61 (L) 3 81 - 5 12 Million/uL    Hemoglobin 9 8 (L) 11 5 - 15 4 g/dL    Hematocrit 31 0 (L) 34 8 - 46 1 %    MCV 86 82 - 98 fL    MCH 27 1 26 8 - 34 3 pg    MCHC 31 6 31 4 - 37 4 g/dL    RDW 14 3 11 6 - 15 1 %    MPV 9 6 8 9 - 12 7 fL    Platelets 101 218 - 828 Thousands/uL    nRBC 0 /100 WBCs    Neutrophils Relative 47 43 - 75 %    Immat GRANS % 1 0 - 2 %    Lymphocytes Relative 32 14 - 44 %    Monocytes Relative 18 (H) 4 - 12 %    Eosinophils Relative 1 0 - 6 %    Basophils Relative 1 0 - 1 %    Neutrophils Absolute 1 98 1 85 - 7 62 Thousands/µL    Immature Grans Absolute 0 02 0 00 - 0 20 Thousand/uL    Lymphocytes Absolute 1 33 0 60 - 4 47 Thousands/µL    Monocytes Absolute 0 74 0 17 - 1 22 Thousand/µL    Eosinophils Absolute 0 06 0 00 - 0 61 Thousand/µL    Basophils Absolute 0 03 0 00 - 0 10 Thousands/µL   Comprehensive metabolic panel   Result Value Ref Range    Sodium 139 136 - 145 mmol/L    Potassium 3 6 3 5 - 5 3 mmol/L    Chloride 105 100 - 108 mmol/L    CO2 27 21 - 32 mmol/L    ANION GAP 7 4 - 13 mmol/L    BUN 8 5 - 25 mg/dL    Creatinine 0 47 (L) 0 60 - 1 30 mg/dL    Glucose 91 65 - 140 mg/dL    Calcium 8 8 8 3 - 10 1 mg/dL    AST 26 5 - 45 U/L    ALT 27 12 - 78 U/L    Alkaline Phosphatase 213 (H) 46 - 116 U/L    Total Protein 6 4 6 4 - 8 2 g/dL    Albumin 2 7 (L) 3 5 - 5 0 g/dL    Total Bilirubin 0 62 0 20 - 1 00 mg/dL    eGFR 102 ml/min/1 73sq m   Magnesium   Result Value Ref Range    Magnesium 1 9 1 6 - 2 6 mg/dL   Fingerstick Glucose (POCT)   Result Value Ref Range    POC Glucose 186 (H) 65 - 140 mg/dl   Fingerstick Glucose (POCT)   Result Value Ref Range    POC Glucose 118 65 - 140 mg/dl   Fingerstick Glucose (POCT)   Result Value Ref Range    POC Glucose 129 65 - 140 mg/dl   Fingerstick Glucose (POCT)   Result Value Ref Range    POC Glucose 203 (H) 65 - 140 mg/dl   Fingerstick Glucose (POCT)   Result Value Ref Range    POC Glucose 104 65 - 140 mg/dl   Fingerstick Glucose (POCT)   Result Value Ref Range    POC Glucose 147 (H) 65 - 140 mg/dl     Labs, Imaging, & Other studies:   All pertinent labs and imaging studies were personally reviewed    Lab Results   Component Value Date    K 3 6 12/24/2018     12/24/2018    CO2 27 12/24/2018    BUN 8 12/24/2018    CREATININE 0 47 (L) 12/24/2018    CALCIUM 8 8 12/24/2018    AST 26 12/24/2018    ALT 27 12/24/2018    ALKPHOS 213 (H) 12/24/2018    EGFR 102 12/24/2018     Lab Results   Component Value Date    WBC 4 16 (L) 12/24/2018    HGB 9 8 (L) 12/24/2018    HCT 31 0 (L) 12/24/2018    MCV 86 12/24/2018     12/24/2018       Reviewed and discussed with patient  Assessment and plan:  Patient is here with her sister and brother-in-law  She is here because of extensive bony metastatic disease to thoracic and lumbar spine with acute compression fracture of T12 and patient has restarted radiation to T12 area after she was evaluated by medical oncologist, radiation oncologist and neurosurgeon at [facility] last week  She also had CT scan chest that showed bilateral sub cm multiple nodules suspected to be metastatic  Patient had not have biopsy to confirm metastatic cancer from previously known breast cancer probably because of urgency and patient in lot of pain and no other primary site on CT scans and known to have breast cancer  Breast cancer was initially diagnosed in 1996 and patient had right breast mastectomy followed by adjuvant chemotherapy  She had recurrent disease in the right supraclavicular lymph node in 2004 and for that she had surgery and [facility] for 10 years until 2015  Evaluation in 2015 was negative for any evidence of metastatic disease but she has metastatic disease now  1 option is to hold radiation and biopsy T12 because biopsy after completion of radiation may or may not show metastatic disease  Discussed this frankly with patient and her family that we do not have tissue confirmation  They would prefer to continue with radiation and taken for granted that what we are dealing with is metastatic cancer from breast cancer  Alkaline phosphatase  level is high    Patient is wearing a special back support to support collapsed T12 and help controlling pain with immobilization  Patient has noticed decreased appetite  Minimal exertional dyspnea  Tiredness  Recently she was found to have anemia and Hemoccult-positive stool and she has appointment with a gastroenterologist     She is anxious     Physical examination and test results are as recorded and discussed  Discussed total picture in very much detail  Discussed systemic therapy and I would favor a combination of Faslodex and Ibrance and also Xgeva with calcium and vitamin-D  Discussed these medications with her and the family in very much detail, side effects and benefits  Patient signed informed consent for Faslodex Freddy Olson and To[place] Pipe  She has no problem with her teeth  These medications will start soon  Patient already had CT brain, CT chest abdomen and pelvis and MRI scans of thoracic and lumbar spine  She will have bone scan  If there is going to be an easily available site to biopsy she would have a chance to have the biopsy for tissue confirmation  In the meantime she and her family are comfortable with present approach  All discussed in detail  Questions answered  Also discussed the importance of self-breast examination, eating healthy foods, nutritional supplements, staying active, wearing a back brace and health screening tests later  Patient is capable of self-care with some assistance   1  Breast cancer metastasized to bone, right ([facility] [address] )    - NM bone scan whole body; Future  - Cancer antigen 27 29  - CBC and differential  - CEA    2  Elevated alkaline phosphatase level      3  Bone metastasis ([facility] [address] )    - NM bone scan whole body; Future  - Cancer antigen 27 29  - CBC and differential  - CEA    4  Other iron deficiency anemia    - Ferritin  - Iron Saturation %    5  Anxiety disorder, unspecified type      6   Non-traumatic compression fracture of first thoracic vertebra, initial encounter [facility])          Patient voiced understanding and agreement in the discussion  Counseling / Coordination of Care:  50 min   Greater than 50% of total time was spent with the patient and / or family counseling and / or coordination of care

## 2018-12-27 NOTE — PATIENT INSTRUCTIONS
Blood tests and bone scan as ordered    Starting on Faslodex, Ibrance and Xgeva with calcium and vitamin-D

## 2018-12-27 NOTE — PROGRESS NOTES
HPI:   Patient is here with her sister and brother-in-law  She is here because of extensive bony metastatic disease to thoracic and lumbar spine with acute compression fracture of T12 and patient has restarted radiation to T12 area after she was evaluated by medical oncologist, radiation oncologist and neurosurgeon at [facility] last week  She also had CT scan chest that showed bilateral sub cm multiple nodules suspected to be metastatic  Patient had not have biopsy to confirm metastatic cancer from previously known breast cancer probably because of urgency and patient in lot of pain and no other primary site on CT scans and known to have breast cancer  Breast cancer was initially diagnosed in 1996 and patient had right breast mastectomy followed by adjuvant chemotherapy  She had recurrent disease in the right supraclavicular lymph node in 2004 and for that she had surgery and [facility] for 10 years until 2015  Evaluation in 2015 was negative for any evidence of metastatic disease but she has metastatic disease now  1 option is to hold radiation and biopsy T12 because biopsy after completion of radiation may or may not show metastatic disease  Discussed this frankly with patient and her family that we do not have tissue confirmation  They would prefer to continue with radiation and taken for granted that what we are dealing with is metastatic cancer from breast cancer  Alkaline phosphatase  level is high  Patient is wearing a special back support to support collapsed T12 and help controlling pain with immobilization  Patient has noticed decreased appetite  Minimal exertional dyspnea  Tiredness  Recently she was found to have anemia and Hemoccult-positive stool and she has appointment with a gastroenterologist     She is anxious                              Current Outpatient Prescriptions:     acetaminophen (TYLENOL) 325 mg tablet, Take 2 tablets (650 mg total) by mouth 4 (four) times a day (with meals and at bedtime), Disp: 80 tablet, Rfl: 1    aspirin (ASPIRIN EC ADULT LOW STRENGTH) 81 mg EC tablet, Take 1 tablet by mouth daily, Disp: , Rfl:     Cholecalciferol (VITAMIN D3) 2000 units TABS, Take 2,000 Units by mouth daily, Disp: , Rfl:     dexamethasone (DECADRON) 1 mg tablet, Take 1 tablet (1 mg total) by mouth daily with breakfast for 10 days, Disp: 10 tablet, Rfl: 0    ferrous sulfate 325 (65 Fe) mg tablet, Take 1 tablet (325 mg total) by mouth daily with breakfast, Disp: 30 tablet, Rfl: 1    gabapentin (NEURONTIN) 100 mg capsule, Take 1 capsule (100 mg total) by mouth 2 (two) times a day, Disp: 60 capsule, Rfl: 1    liraglutide (VICTOZA) injection, Inject 0 6 mg under the skin daily  , Disp: , Rfl:     lisinopril (ZESTRIL) 5 mg tablet, Take 5 mg by mouth daily  , Disp: , Rfl:     LORazepam (ATIVAN) 1 mg tablet, May use one daily PRN anxiety/panic  Take 2mg (2 tablets) qhs , Disp: 90 tablet, Rfl: 0    metFORMIN (GLUMETZA) 1000 MG (MOD) 24 hr tablet, Take 1,000 mg by mouth 2 (two) times a day with meals  , Disp: , Rfl:     morphine (MSIR) 15 mg tablet, Use 1 tab TID scheduled to anticipate cancer pain  May have 1-2 tabs q4hrs PRN mod-severe pain , Disp: 90 tablet, Rfl: 0    MULTIPLE VITAMINS PO, Take by mouth, Disp: , Rfl:     Omega-3 Fatty Acids (FISH OIL PO), Take 2 g by mouth, Disp: , Rfl:     ondansetron (ZOFRAN-ODT) 4 mg disintegrating tablet, Take 1 tablet (4 mg total) by mouth every 6 (six) hours as needed for nausea or vomiting, Disp: 20 tablet, Rfl: 0    polyethylene glycol (MIRALAX) 17 g packet, Take 17 g by mouth daily, Disp: 30 each, Rfl: 1    pravastatin (PRAVACHOL) 40 mg tablet, Take 40 mg by mouth daily  , Disp: , Rfl:     senna (SENOKOT) 8 6 mg, Take 1 tablet (8 6 mg total) by mouth daily at bedtime Hold for loose stool , Disp: 30 each, Rfl: 1    No Known Allergies     No history exists         ROS:  12/27/18 Reviewed 13 systems:  Presently no other neurological, cardiac, pulmonary, GI and  symptoms other than mentioned above in HPI  No symptom like     fever, chills, bleeding, bone pains, skin rash,  weakness, numbness,  claudication and gait problem  No frequent infections  Not unusually sensitive to heat or cold  No swelling of the ankles  No swollen glands  Patient is anxious  Other symptoms are in HPI        /62 (BP Location: Left arm, Patient Position: Sitting, Cuff Size: Adult)   Pulse 84   Temp 98 4 °F (36 9 °C)   Resp 20   Ht 5' 3 25" (1 607 m)   Wt 58 1 kg (128 lb)   LMP 05/27/1996 (Approximate)   SpO2 98%   BMI 22 50 kg/m²     Physical Exam:    Patient is alert and oriented  She is not in acute distress  Vital signs are stable  There is no scleral icterus  No oral thrush  No palpable neck mass  Lung fields are clear to percussion and auscultation  Heart rate is regular  No palpable abdominal mass  No ascites  No edema of ankles  No calf tenderness  No peripheral palpable lymphadenopathy in neck and axillary areas  No focal neurological deficit  No skin rash  She has back brace  She is anxious  Performance status 2 on the ECOG scale  IMPRESSION:  Diffuse osseous thoracic and lumbosacral spinal metastases     Moderate acute compression fracture T12 with mild central canal stenosis     Multilevel degenerative spondylosis     Findings personally discussed by phone with Dr Nita Gupta at 12:15 PM, 12/23/2018           Workstation performed: [id number]      Imaging     MRI lumbar spine w wo contrast (Order #[id number]) on 12/23/2018 - Imaging Information     IMPRESSION:  Development of diffuse thoracic vertebral metastases    Moderate acute compression deformity T12 with moderate central canal stenosis without spinal cord compression     Probable mediastinal adenopathy     Findings personally discussed by phone with Dr Nita Gupta at 12:15 PM, 12/23/2018              Workstation performed: [id number]      Imaging     MRI thoracic spine w wo contrast (Order #[id number]) on 12/23/2018 - Imaging Information   IMPRESSION:  Multiple Small subcentimeter lung nodule seen in the both lungs left greater than right  These are indeterminate  Suggest short interval follow-up CT at 3 months     Diffuse bony metastatic disease     Compression deformity with the fracture of superior and inferior endplate and retropulsion of posterior vertebral margin with about 50-60% loss of vertebral height and moderate central canal narrowing at T12 vertebra as seen on the recent MRI              Workstation performed: [id number]      Imaging     CT chest w contrast (Order #[id number]) on 12/23/2018 - Imaging Information   IMPRESSION:     No acute intra-abdominal or intrapelvic process      Mild acute to subacute compression fracture T12 with 5 mm posterior retropulsion    Given multifocal diffuse patchy osseous sclerosis elsewhere, cannot exclude pathologic fracture      The study was marked in EPIC for immediate notification                     Workstation performed: [id number]      Imaging     CT abdomen pelvis with contrast (Order #[id number]) on 12/21/2018 - Imaging Information     LABS:  Results for orders placed or performed during the hospital encounter of 71/57/60   Basic metabolic panel   Result Value Ref Range    Sodium 136 136 - 145 mmol/L    Potassium 3 2 (L) 3 5 - 5 3 mmol/L    Chloride 102 100 - 108 mmol/L    CO2 28 21 - 32 mmol/L    ANION GAP 6 4 - 13 mmol/L    BUN 8 5 - 25 mg/dL    Creatinine 0 45 (L) 0 60 - 1 30 mg/dL    Glucose 116 65 - 140 mg/dL    Calcium 8 5 8 3 - 10 1 mg/dL    eGFR 103 ml/min/1 73sq m   Magnesium   Result Value Ref Range    Magnesium 1 5 (L) 1 6 - 2 6 mg/dL   CBC (With Platelets)   Result Value Ref Range    WBC 4 28 (L) 4 31 - 10 16 Thousand/uL    RBC 3 47 (L) 3 81 - 5 12 Million/uL    Hemoglobin 9 3 (L) 11 5 - 15 4 g/dL    Hematocrit 29 6 (L) 34 8 - 46 1 %    MCV 85 82 - 98 fL MCH 26 8 26 8 - 34 3 pg    MCHC 31 4 31 4 - 37 4 g/dL    RDW 14 2 11 6 - 15 1 %    Platelets 464 155 - 902 Thousands/uL    MPV 9 5 8 9 - 12 7 fL   Alkaline phosphatase, bone specific   Result Value Ref Range    Tandem-R Ostase 62 6 ug/L   Comprehensive metabolic panel   Result Value Ref Range    Sodium 136 136 - 145 mmol/L    Potassium 3 7 3 5 - 5 3 mmol/L    Chloride 102 100 - 108 mmol/L    CO2 26 21 - 32 mmol/L    ANION GAP 8 4 - 13 mmol/L    BUN 7 5 - 25 mg/dL    Creatinine 0 47 (L) 0 60 - 1 30 mg/dL    Glucose 114 65 - 140 mg/dL    Calcium 8 9 8 3 - 10 1 mg/dL    AST 26 5 - 45 U/L    ALT 22 12 - 78 U/L    Alkaline Phosphatase 233 (H) 46 - 116 U/L    Total Protein 7 1 6 4 - 8 2 g/dL    Albumin 3 1 (L) 3 5 - 5 0 g/dL    Total Bilirubin 0 63 0 20 - 1 00 mg/dL    eGFR 102 ml/min/1 73sq m   Protime-INR   Result Value Ref Range    Protime 16 1 (H) 11 8 - 14 2 seconds    INR 1 28 (H) 0 86 - 1 17   Hemoglobin A1c w/EAG Estimation (Orders if not completed within the last 90 days)   Result Value Ref Range    Hemoglobin A1C 6 3 4 2 - 6 3 %     mg/dl   CBC and differential   Result Value Ref Range    WBC 4 16 (L) 4 31 - 10 16 Thousand/uL    RBC 3 61 (L) 3 81 - 5 12 Million/uL    Hemoglobin 9 8 (L) 11 5 - 15 4 g/dL    Hematocrit 31 0 (L) 34 8 - 46 1 %    MCV 86 82 - 98 fL    MCH 27 1 26 8 - 34 3 pg    MCHC 31 6 31 4 - 37 4 g/dL    RDW 14 3 11 6 - 15 1 %    MPV 9 6 8 9 - 12 7 fL    Platelets 676 362 - 913 Thousands/uL    nRBC 0 /100 WBCs    Neutrophils Relative 47 43 - 75 %    Immat GRANS % 1 0 - 2 %    Lymphocytes Relative 32 14 - 44 %    Monocytes Relative 18 (H) 4 - 12 %    Eosinophils Relative 1 0 - 6 %    Basophils Relative 1 0 - 1 %    Neutrophils Absolute 1 98 1 85 - 7 62 Thousands/µL    Immature Grans Absolute 0 02 0 00 - 0 20 Thousand/uL    Lymphocytes Absolute 1 33 0 60 - 4 47 Thousands/µL    Monocytes Absolute 0 74 0 17 - 1 22 Thousand/µL    Eosinophils Absolute 0 06 0 00 - 0 61 Thousand/µL    Basophils Absolute 0 03 0 00 - 0 10 Thousands/µL   Comprehensive metabolic panel   Result Value Ref Range    Sodium 139 136 - 145 mmol/L    Potassium 3 6 3 5 - 5 3 mmol/L    Chloride 105 100 - 108 mmol/L    CO2 27 21 - 32 mmol/L    ANION GAP 7 4 - 13 mmol/L    BUN 8 5 - 25 mg/dL    Creatinine 0 47 (L) 0 60 - 1 30 mg/dL    Glucose 91 65 - 140 mg/dL    Calcium 8 8 8 3 - 10 1 mg/dL    AST 26 5 - 45 U/L    ALT 27 12 - 78 U/L    Alkaline Phosphatase 213 (H) 46 - 116 U/L    Total Protein 6 4 6 4 - 8 2 g/dL    Albumin 2 7 (L) 3 5 - 5 0 g/dL    Total Bilirubin 0 62 0 20 - 1 00 mg/dL    eGFR 102 ml/min/1 73sq m   Magnesium   Result Value Ref Range    Magnesium 1 9 1 6 - 2 6 mg/dL   Fingerstick Glucose (POCT)   Result Value Ref Range    POC Glucose 186 (H) 65 - 140 mg/dl   Fingerstick Glucose (POCT)   Result Value Ref Range    POC Glucose 118 65 - 140 mg/dl   Fingerstick Glucose (POCT)   Result Value Ref Range    POC Glucose 129 65 - 140 mg/dl   Fingerstick Glucose (POCT)   Result Value Ref Range    POC Glucose 203 (H) 65 - 140 mg/dl   Fingerstick Glucose (POCT)   Result Value Ref Range    POC Glucose 104 65 - 140 mg/dl   Fingerstick Glucose (POCT)   Result Value Ref Range    POC Glucose 147 (H) 65 - 140 mg/dl     Labs, Imaging, & Other studies:   All pertinent labs and imaging studies were personally reviewed    Lab Results   Component Value Date    K 3 6 12/24/2018     12/24/2018    CO2 27 12/24/2018    BUN 8 12/24/2018    CREATININE 0 47 (L) 12/24/2018    CALCIUM 8 8 12/24/2018    AST 26 12/24/2018    ALT 27 12/24/2018    ALKPHOS 213 (H) 12/24/2018    EGFR 102 12/24/2018     Lab Results   Component Value Date    WBC 4 16 (L) 12/24/2018    HGB 9 8 (L) 12/24/2018    HCT 31 0 (L) 12/24/2018    MCV 86 12/24/2018     12/24/2018       Reviewed and discussed with patient  Assessment and plan:  Patient is here with her sister and brother-in-law    She is here because of extensive bony metastatic disease to thoracic and lumbar spine with acute compression fracture of T12 and patient has restarted radiation to T12 area after she was evaluated by medical oncologist, radiation oncologist and neurosurgeon at [facility] last week  She also had CT scan chest that showed bilateral sub cm multiple nodules suspected to be metastatic  Patient had not have biopsy to confirm metastatic cancer from previously known breast cancer probably because of urgency and patient in lot of pain and no other primary site on CT scans and known to have breast cancer  Breast cancer was initially diagnosed in 1996 and patient had right breast mastectomy followed by adjuvant chemotherapy  She had recurrent disease in the right supraclavicular lymph node in 2004 and for that she had surgery and [facility] for 10 years until 2015  Evaluation in 2015 was negative for any evidence of metastatic disease but she has metastatic disease now  1 option is to hold radiation and biopsy T12 because biopsy after completion of radiation may or may not show metastatic disease  Discussed this frankly with patient and her family that we do not have tissue confirmation  They would prefer to continue with radiation and taken for granted that what we are dealing with is metastatic cancer from breast cancer  Alkaline phosphatase  level is high  Patient is wearing a special back support to support collapsed T12 and help controlling pain with immobilization  Patient has noticed decreased appetite  Minimal exertional dyspnea  Tiredness  Recently she was found to have anemia and Hemoccult-positive stool and she has appointment with a gastroenterologist     She is anxious     Physical examination and test results are as recorded and discussed  Discussed total picture in very much detail  Discussed systemic therapy and I would favor a combination of Faslodex and Ibrance and also Xgeva with calcium and vitamin-D    Discussed these medications with her and the family in very much detail, side effects and benefits  Patient signed informed consent for Faslodex Keith Blankenshipoe and Santosh Villasenor  She has no problem with her teeth  These medications will start soon  Patient already had CT brain, CT chest abdomen and pelvis and MRI scans of thoracic and lumbar spine  She will have bone scan  If there is going to be an easily available site to biopsy she would have a chance to have the biopsy for tissue confirmation  In the meantime she and her family are comfortable with present approach  All discussed in detail  Questions answered  Also discussed the importance of self-breast examination, eating healthy foods, nutritional supplements, staying active, wearing a back brace and health screening tests later  Patient is capable of self-care with some assistance   1  Breast cancer metastasized to bone, right ([facility] [place] [address] )    - NM bone scan whole body; Future  - Cancer antigen 27 29  - CBC and differential  - CEA    2  Elevated alkaline phosphatase level      3  Bone metastasis ([facility] [place] [address] )    - NM bone scan whole body; Future  - Cancer antigen 27 29  - CBC and differential  - CEA    4  Other iron deficiency anemia    - Ferritin  - Iron Saturation %    5  Anxiety disorder, unspecified type      6  Non-traumatic compression fracture of first thoracic vertebra, initial encounter [facility])          Patient voiced understanding and agreement in the discussion  Counseling / Coordination of Care:  50 min   Greater than 50% of total time was spent with the patient and / or family counseling and / or coordination of care

## 2018-12-28 ENCOUNTER — APPOINTMENT (OUTPATIENT)
Dept: RADIATION ONCOLOGY | Facility: HOSPITAL | Age: 68
End: 2018-12-28
Attending: RADIOLOGY
Payer: MEDICARE

## 2018-12-28 ENCOUNTER — TELEPHONE (OUTPATIENT)
Dept: HEMATOLOGY ONCOLOGY | Facility: CLINIC | Age: 68
End: 2018-12-28

## 2018-12-28 ENCOUNTER — DOCUMENTATION (OUTPATIENT)
Dept: HEMATOLOGY ONCOLOGY | Facility: CLINIC | Age: 68
End: 2018-12-28

## 2018-12-28 PROCEDURE — 77387 GUIDANCE FOR RADJ TX DLVR: CPT | Performed by: RADIOLOGY

## 2018-12-28 PROCEDURE — 77412 RADIATION TX DELIVERY LVL 3: CPT | Performed by: RADIOLOGY

## 2018-12-28 NOTE — PROGRESS NOTES
Approved lucia thru [address] [place] [address] Drive ID#  [id number]     [facility] CARD ID# [id number]  N#  [id number]  [facility] [place] [id number]  [facility]  [id number]     VALID 12-28-18  THRU 11-29-19

## 2018-12-28 NOTE — TELEPHONE ENCOUNTER
Dr Aidan Smyth from [facility] wants to hold off the medication IBRANCE 125 mg that was prescribed yesterday until 01/09/2019 when the patient will be done with the radiation treatment  It is okay patient keep infusion  Any further discussion please call [phone]  Thanks

## 2018-12-30 ENCOUNTER — TELEPHONE (OUTPATIENT)
Dept: HEMATOLOGY ONCOLOGY | Facility: CLINIC | Age: 68
End: 2018-12-30

## 2018-12-30 NOTE — TELEPHONE ENCOUNTER
Call from Radiation oncologist Dr Arcadio Viramontes to our office that he would like Ibrance to be delayed until completion of radiation on 01/09/2019  I spoke with patient on 12/28/2018 and advised accordingly    She can start Faslodex as scheduled this coming week but not Ibrance until completion of radiation to her spine

## 2018-12-31 ENCOUNTER — TELEPHONE (OUTPATIENT)
Dept: NUTRITION | Facility: HOSPITAL | Age: 68
End: 2018-12-31

## 2018-12-31 ENCOUNTER — APPOINTMENT (OUTPATIENT)
Dept: RADIATION ONCOLOGY | Facility: HOSPITAL | Age: 68
End: 2018-12-31
Attending: RADIOLOGY
Payer: MEDICARE

## 2018-12-31 DIAGNOSIS — C79.51 SECONDARY MALIGNANT NEOPLASM OF BONE AND BONE MARROW (HCC): Primary | ICD-10-CM

## 2018-12-31 DIAGNOSIS — C79.52 SECONDARY MALIGNANT NEOPLASM OF BONE AND BONE MARROW (HCC): Primary | ICD-10-CM

## 2018-12-31 PROCEDURE — 77387 GUIDANCE FOR RADJ TX DLVR: CPT | Performed by: RADIOLOGY

## 2018-12-31 PROCEDURE — 77412 RADIATION TX DELIVERY LVL 3: CPT | Performed by: RADIOLOGY

## 2018-12-31 RX ORDER — LAMOTRIGINE 25 MG/1
500 TABLET ORAL ONCE
Status: COMPLETED | OUTPATIENT
Start: 2019-01-02 | End: 2019-01-02

## 2018-12-31 NOTE — TELEPHONE ENCOUNTER
Called pt to establish care after receiving notification from Jose Grant that pt experiencing nutrition impact symptoms that may warrant oncology nutrition care (nausea, wt loss, diarrhea)  Per our phone discussion pt reports nausea/diarrhea resolved today but still having trouble consuming adequate amounts of food and experiencing dysgeusia, especially with meats  Briefly discussed small, frequent meals and trying more plant-based protein sources  Pt agreeable to discussing further in full nutrition consult (to be carried out via phone call) this coming Friday 1/4/19

## 2019-01-02 ENCOUNTER — APPOINTMENT (OUTPATIENT)
Dept: RADIATION ONCOLOGY | Facility: HOSPITAL | Age: 69
End: 2019-01-02
Attending: RADIOLOGY
Payer: MEDICARE

## 2019-01-02 ENCOUNTER — HOSPITAL ENCOUNTER (OUTPATIENT)
Dept: INFUSION CENTER | Facility: HOSPITAL | Age: 69
Discharge: HOME | End: 2019-01-02
Payer: MEDICARE

## 2019-01-02 ENCOUNTER — TELEPHONE (OUTPATIENT)
Dept: HEMATOLOGY ONCOLOGY | Facility: CLINIC | Age: 69
End: 2019-01-02

## 2019-01-02 VITALS
HEART RATE: 78 BPM | SYSTOLIC BLOOD PRESSURE: 110 MMHG | DIASTOLIC BLOOD PRESSURE: 54 MMHG | RESPIRATION RATE: 16 BRPM | TEMPERATURE: 98.8 F

## 2019-01-02 PROCEDURE — 96401 CHEMO ANTI-NEOPL SQ/IM: CPT

## 2019-01-02 PROCEDURE — 77387 GUIDANCE FOR RADJ TX DLVR: CPT | Performed by: RADIOLOGY

## 2019-01-02 PROCEDURE — 77412 RADIATION TX DELIVERY LVL 3: CPT | Performed by: RADIOLOGY

## 2019-01-02 PROCEDURE — 77336 RADIATION PHYSICS CONSULT: CPT | Performed by: RADIOLOGY

## 2019-01-02 PROCEDURE — 96402 CHEMO HORMON ANTINEOPL SQ/IM: CPT

## 2019-01-02 RX ADMIN — DENOSUMAB 120 MG: 120 INJECTION SUBCUTANEOUS at 08:44

## 2019-01-02 RX ADMIN — FULVESTRANT 500 MG: 50 INJECTION INTRAMUSCULAR at 08:50

## 2019-01-02 NOTE — TELEPHONE ENCOUNTER
Pt had chemo and radiation today and indicated that she is not having a dark stool, very lose and feeling nausea  Wanted to let Dr Arben Sneed know

## 2019-01-02 NOTE — PROGRESS NOTES
Education and hand out to pt on Faslodex  Pt  receiving Radiation   Having some problems with nausea  She is taking ondansetron,and Ativan  Radiation nurse told her to take ondansetron every 6 hours as ordered  Instructed to call physician with further nausea issues  Pt also having some difficulty eating and drinking fluids  She states that she is aware of it and trying hard to drink enough  Instructed to call physician if unable to take in enough fluids  Pt to radiation at 10 am today  Instructed her to speak with the nurse and see the physician there if needed

## 2019-01-02 NOTE — TELEPHONE ENCOUNTER
Spoke with pt who had first Faslodex today and started [place]  Has been receiving RT  Informed sx most likely not from Faslodex  Has hx of heme positive stool and supposed to see a GI doctor but lost phone # and provided  Asked if needed anything for nausea and has Ondansetron and using  Pt will call GI for an appt

## 2019-01-03 ENCOUNTER — APPOINTMENT (OUTPATIENT)
Dept: RADIATION ONCOLOGY | Facility: HOSPITAL | Age: 69
End: 2019-01-03
Attending: RADIOLOGY
Payer: MEDICARE

## 2019-01-03 DIAGNOSIS — C79.51 BONE METASTASIS (HCC): Primary | ICD-10-CM

## 2019-01-03 DIAGNOSIS — C50.911 MALIGNANT NEOPLASM OF RIGHT FEMALE BREAST, UNSPECIFIED ESTROGEN RECEPTOR STATUS, UNSPECIFIED SITE OF BREAST (HCC): Chronic | ICD-10-CM

## 2019-01-03 PROCEDURE — 77412 RADIATION TX DELIVERY LVL 3: CPT | Performed by: STUDENT IN AN ORGANIZED HEALTH CARE EDUCATION/TRAINING PROGRAM

## 2019-01-03 PROCEDURE — 77387 GUIDANCE FOR RADJ TX DLVR: CPT | Performed by: STUDENT IN AN ORGANIZED HEALTH CARE EDUCATION/TRAINING PROGRAM

## 2019-01-03 PROCEDURE — 77417 THER RADIOLOGY PORT IMAGE(S): CPT | Performed by: STUDENT IN AN ORGANIZED HEALTH CARE EDUCATION/TRAINING PROGRAM

## 2019-01-03 RX ORDER — ONDANSETRON 4 MG/1
4 TABLET, ORALLY DISINTEGRATING ORAL EVERY 6 HOURS PRN
Qty: 20 TABLET | Refills: 0 | Status: SHIPPED | OUTPATIENT
Start: 2019-01-03 | End: 2019-01-05 | Stop reason: SDUPTHER

## 2019-01-04 ENCOUNTER — DOCUMENTATION (OUTPATIENT)
Dept: HEMATOLOGY ONCOLOGY | Facility: CLINIC | Age: 69
End: 2019-01-04

## 2019-01-04 ENCOUNTER — OFFICE VISIT (OUTPATIENT)
Dept: NUTRITION | Facility: HOSPITAL | Age: 69
End: 2019-01-04

## 2019-01-04 ENCOUNTER — APPOINTMENT (OUTPATIENT)
Dept: RADIATION ONCOLOGY | Facility: HOSPITAL | Age: 69
End: 2019-01-04
Attending: RADIOLOGY
Payer: MEDICARE

## 2019-01-04 DIAGNOSIS — Z71.3 NUTRITIONAL COUNSELING: Primary | ICD-10-CM

## 2019-01-04 PROCEDURE — 77387 GUIDANCE FOR RADJ TX DLVR: CPT | Performed by: RADIOLOGY

## 2019-01-04 PROCEDURE — 77412 RADIATION TX DELIVERY LVL 3: CPT | Performed by: RADIOLOGY

## 2019-01-04 NOTE — PROGRESS NOTES
12/27/2018 received notification pt will be starting Ibrance 125 mg  However, rx ins information is needed so I can submit for auth     12/28/2018  Received ins information  IBETH  ID #[id number]  [facility] [place] #[id number]  PCN #[id number]  GRP #[id number]    Asked clinical if the patient was ER/AK positive and HER-2 negative since it was not stated in the chart  12/31/2018 reminded clinical that the insurance needs to know if the pt is ER/AK positive and HER-2 neg    1/2/2018 per clinical the only thing she was able to find is that the pt is ER/AK positive  I had to restart the auth since there was too much time that lapsed between the initiation of the auth and the answering of the question  Restarted the auth  The insurance needs to know if the pt is HER-2 negative  Auth request can not be completed without this answer  1/3/2018  Asked Dr Farshad Veliz this question    1/4/2018  Received a response back from Dr Farshad Veliz  Per Dr Josh Pedroza, the pt was dx to have breast ca in 1996 and recurrent disease in the clavicular area in 2004, now metastatic in 2018  There was tissue diagnosis in 1996 and 2004 but we were not checking HER-2 status at that time  She had tested positive for hormone receptors  Historically she should be HER-2 negative to have indolent course and recurrence after a long time  She did not have a biopsy done at this time  He would say that it would be safe to say that she was HER-2 negative  Answered the questiion stating she is HER-2 negative  Submitted for auth    Received approval letter from [employer]  Garrel League is valid from 10/6/2018 through 1/3/2022  Notified clinical, [employer], and financial   Per financial pt is receiving a $15,000 lucia from [employer]

## 2019-01-04 NOTE — PATIENT INSTRUCTIONS
· In times of decreased appetite, continue small, frequent meals as able  · Incorporate a source of protein with every meal and snack  For example, pair pretzels with cottage cheese or peanut butter, pair fruit with a cup of yogurt  · If meats taste poorly, incorporating sweet flavors to their preparation may help improve their taste  Consider marinating in fruit-based marinade, sweet and sour sauce, honey, lemon juice, or sweetener  · Soy sauce or italian dressing may also help to improve the taste of meat  · Try meat cold or at room temperature as it may taste better this way  · If you remain unable to tolerate meats, incorporate alternative protein sources such as eggs, tofu, dairy, beans, nuts/seeds and nut butters, edamame, protein powders, etc   · Continue to drink oral nutrition supplements like Boost or Ensure as able  Boost Plus, Ensure Plus, and Ensure Enlive are 3 good, high-calorie options  Choose those with 350 or more calories per bottle

## 2019-01-04 NOTE — PROGRESS NOTES
Outpatient Oncology Nutrition Education  Type of Consult:  Nutrition Education  Care Location: Telephone Call    Reason for referral: RN request ([address] d/t pt experiencing nausea, diarrhea, wt loss  Malnutrition Screening Tool (MST) Triggers: scored a 2 indicating 2-13# (0 9-6 kg) recent wt loss and is eating poorly due to a decreased appetite  Discussion/Summary: Melania Kline declined offer for full assessment at this time  Nutrition education re: current nutrition impact symptoms performed instead  We reviewed MNT strategies for poor appetite and dysgeusia  Regarding poor appetite, discussed small, frequent meals that include a source of protein  Discussed ways to add calories or protein to current meal and snack options  Reviewed dietary sources of protein  Encouraged intake of nutritional supplements, encouraged choosing those with 350+ calories such as Ensure Plus or Boost Plus  Pt has dysgeusia to meats  Encouraged patient to try sweet or fruity marinades or trying meats/fish cold or at room temperature as this can sometimes help to improve the taste of meats  We discussed alternative protein sources such as dairy, eggs, beans, nut butters, etc  Pt reports she was having nausea and loose stools but these issues have resolved with medications  Discussed:   · the importance of weight maintenance during CA tx  · indications & use of oral nutrition supplements    · how to modify foods for anticipated nutrition impact symptoms pt may experience during CA tx  · high protein foods to include at all meals and snacks    · ways to increase overall calorie intake    · encouraged eating every 2-3 hours (5-6 small meals/day)  · MNT for: dysgeusia and poor appetite      All questions and concerns addressed during todays visit  Melania Kline has RD contact information      Barriers: None  Readiness to change: action  Comprehension: verbalizes understanding  Expected Compliance: good    Materials Provided: not applicable - phone call visit  Encouraged pt to ask for Eating Hints book at her next visit to Radiation Oncology department  Nutrition Recommendations:  · In times of decreased appetite, continue small, frequent meals as able  · Incorporate a source of protein with every meal and snack  For example, pair pretzels with cottage cheese or peanut butter, pair fruit with a cup of yogurt  · If meats taste poorly, incorporating sweet flavors to their preparation may help improve their taste  Consider marinating in fruit-based marinade, sweet and sour sauce, honey, lemon juice, or sweetener  · Soy sauce or italian dressing may also help to improve the taste of meat  · Try meat cold or at room temperature as it may taste better this way  · If you remain unable to tolerate meats, incorporate alternative protein sources such as eggs, tofu, dairy, beans, nuts/seeds and nut butters, edamame, protein powders, etc   · Continue to drink oral nutrition supplements like Boost or Ensure as able  Boost Plus, Ensure Plus, and Ensure Enlive are 3 good, high-calorie options  Choose those with 350 or more calories per bottle      Follow Up Plan: Patient to call RD as needed  Recommend Referral to Other Providers: none at this time

## 2019-01-05 DIAGNOSIS — C50.911 MALIGNANT NEOPLASM OF RIGHT FEMALE BREAST, UNSPECIFIED ESTROGEN RECEPTOR STATUS, UNSPECIFIED SITE OF BREAST (HCC): Chronic | ICD-10-CM

## 2019-01-05 RX ORDER — ONDANSETRON 4 MG/1
4 TABLET, ORALLY DISINTEGRATING ORAL EVERY 6 HOURS PRN
Qty: 20 TABLET | Refills: 1 | Status: SHIPPED | OUTPATIENT
Start: 2019-01-05 | End: 2019-01-05 | Stop reason: SDUPTHER

## 2019-01-05 RX ORDER — ONDANSETRON 4 MG/1
4 TABLET, ORALLY DISINTEGRATING ORAL EVERY 6 HOURS PRN
Qty: 20 TABLET | Refills: 1 | Status: SHIPPED | OUTPATIENT
Start: 2019-01-05 | End: 2019-01-28 | Stop reason: SDUPTHER

## 2019-01-07 ENCOUNTER — APPOINTMENT (OUTPATIENT)
Dept: RADIATION ONCOLOGY | Facility: HOSPITAL | Age: 69
End: 2019-01-07
Attending: RADIOLOGY
Payer: MEDICARE

## 2019-01-07 PROCEDURE — 77387 GUIDANCE FOR RADJ TX DLVR: CPT | Performed by: RADIOLOGY

## 2019-01-07 PROCEDURE — 77412 RADIATION TX DELIVERY LVL 3: CPT | Performed by: RADIOLOGY

## 2019-01-08 ENCOUNTER — APPOINTMENT (OUTPATIENT)
Dept: RADIATION ONCOLOGY | Facility: HOSPITAL | Age: 69
End: 2019-01-08
Payer: MEDICARE

## 2019-01-08 ENCOUNTER — HOSPITAL ENCOUNTER (OUTPATIENT)
Dept: INFUSION CENTER | Facility: HOSPITAL | Age: 69
Discharge: HOME | End: 2019-01-08
Payer: MEDICARE

## 2019-01-08 ENCOUNTER — APPOINTMENT (OUTPATIENT)
Dept: RADIATION ONCOLOGY | Facility: HOSPITAL | Age: 69
End: 2019-01-08
Attending: RADIOLOGY
Payer: MEDICARE

## 2019-01-08 ENCOUNTER — TELEPHONE (OUTPATIENT)
Dept: HEMATOLOGY ONCOLOGY | Facility: CLINIC | Age: 69
End: 2019-01-08

## 2019-01-08 VITALS
TEMPERATURE: 97.5 F | SYSTOLIC BLOOD PRESSURE: 111 MMHG | HEART RATE: 72 BPM | RESPIRATION RATE: 18 BRPM | DIASTOLIC BLOOD PRESSURE: 56 MMHG

## 2019-01-08 DIAGNOSIS — C79.51 BREAST CANCER METASTASIZED TO BONE, RIGHT (HCC): Primary | Chronic | ICD-10-CM

## 2019-01-08 DIAGNOSIS — C50.911 BREAST CANCER METASTASIZED TO BONE, RIGHT (HCC): Primary | Chronic | ICD-10-CM

## 2019-01-08 PROCEDURE — 77412 RADIATION TX DELIVERY LVL 3: CPT | Performed by: RADIOLOGY

## 2019-01-08 PROCEDURE — 77387 GUIDANCE FOR RADJ TX DLVR: CPT | Performed by: RADIOLOGY

## 2019-01-08 PROCEDURE — 96360 HYDRATION IV INFUSION INIT: CPT

## 2019-01-08 PROCEDURE — 96361 HYDRATE IV INFUSION ADD-ON: CPT

## 2019-01-08 RX ADMIN — SODIUM CHLORIDE 1000 ML: 0.9 INJECTION, SOLUTION INTRAVENOUS at 11:30

## 2019-01-08 NOTE — TELEPHONE ENCOUNTER
Call from [facility] in [place] to report pt's BP 92/62 and appears dehydrated  Tomorrow is last RT treatment  Pt continues to complain of dry heaves and is using Ondansetron  Med Onc treatment is Tripp Almaraz, Faslodex and [employer]  [facility] will schedule pt for hydration

## 2019-01-08 NOTE — PLAN OF CARE
Problem: Potential for Falls  Goal: Patient will remain free of falls  INTERVENTIONS:  - Assess patient frequently for physical needs  -  Identify cognitive and physical deficits and behaviors that affect risk of falls    -  [place] fall precautions as indicated by assessment   - Educate patient/family on patient safety including physical limitations  - Instruct patient to call for assistance with activity based on assessment  - Modify environment to reduce risk of injury  - Consider OT/PT consult to assist with strengthening/mobility   Outcome: Progressing

## 2019-01-09 ENCOUNTER — APPOINTMENT (OUTPATIENT)
Dept: RADIATION ONCOLOGY | Facility: HOSPITAL | Age: 69
End: 2019-01-09
Attending: RADIOLOGY
Payer: MEDICARE

## 2019-01-09 PROCEDURE — 77336 RADIATION PHYSICS CONSULT: CPT | Performed by: RADIOLOGY

## 2019-01-10 ENCOUNTER — TELEPHONE (OUTPATIENT)
Dept: HEMATOLOGY ONCOLOGY | Facility: CLINIC | Age: 69
End: 2019-01-10

## 2019-01-10 NOTE — TELEPHONE ENCOUNTER
Called pt  Has not started [place] yet  Stated not to start until RT completed which was completed yesterday  Has not had labs yet  Instructed to get labs then start Ibrance  Verbalized understanding

## 2019-01-10 NOTE — TELEPHONE ENCOUNTER
The patient called stating that she was supposed to start ibrance today the day after radiation  She stated that she was really sick with neusea and vomiting yesterday, she wants to make sure she is still okay to start the ibrance today  She is feeling a little better today  Please call with instruction

## 2019-01-14 ENCOUNTER — HOSPITAL ENCOUNTER (OUTPATIENT)
Dept: NUCLEAR MEDICINE | Facility: HOSPITAL | Age: 69
Discharge: HOME | End: 2019-01-14
Attending: INTERNAL MEDICINE
Payer: MEDICARE

## 2019-01-14 DIAGNOSIS — C79.51 BREAST CANCER METASTASIZED TO BONE, RIGHT (HCC): Chronic | ICD-10-CM

## 2019-01-14 DIAGNOSIS — C50.911 BREAST CANCER METASTASIZED TO BONE, RIGHT (HCC): Chronic | ICD-10-CM

## 2019-01-14 DIAGNOSIS — C79.51 BONE METASTASIS (HCC): ICD-10-CM

## 2019-01-14 PROCEDURE — A9503 TC99M MEDRONATE: HCPCS

## 2019-01-14 PROCEDURE — 78306 BONE IMAGING WHOLE BODY: CPT

## 2019-01-15 ENCOUNTER — OFFICE VISIT (OUTPATIENT)
Dept: GASTROENTEROLOGY | Facility: AMBULARY SURGERY CENTER | Age: 69
End: 2019-01-15
Payer: MEDICARE

## 2019-01-15 VITALS
RESPIRATION RATE: 14 BRPM | DIASTOLIC BLOOD PRESSURE: 72 MMHG | HEART RATE: 83 BPM | HEIGHT: 63 IN | SYSTOLIC BLOOD PRESSURE: 112 MMHG | BODY MASS INDEX: 20.8 KG/M2 | WEIGHT: 117.4 LBS | TEMPERATURE: 99.7 F

## 2019-01-15 DIAGNOSIS — R19.5 HEME POSITIVE STOOL: ICD-10-CM

## 2019-01-15 DIAGNOSIS — D50.8 OTHER IRON DEFICIENCY ANEMIA: ICD-10-CM

## 2019-01-15 DIAGNOSIS — R11.0 NAUSEA: Primary | ICD-10-CM

## 2019-01-15 DIAGNOSIS — R63.0 LOSS OF APPETITE: ICD-10-CM

## 2019-01-15 PROBLEM — D64.9 ABSOLUTE ANEMIA: Status: ACTIVE | Noted: 2019-01-15

## 2019-01-15 PROCEDURE — 99214 OFFICE O/P EST MOD 30 MIN: CPT | Performed by: INTERNAL MEDICINE

## 2019-01-15 RX ORDER — LAMOTRIGINE 25 MG/1
250 TABLET ORAL ONCE
Status: COMPLETED | OUTPATIENT
Start: 2019-01-16 | End: 2019-01-16

## 2019-01-15 RX ORDER — OMEPRAZOLE 40 MG/1
40 CAPSULE, DELAYED RELEASE ORAL DAILY
Qty: 30 CAPSULE | Refills: 3 | Status: SHIPPED | OUTPATIENT
Start: 2019-01-15 | End: 2019-05-31 | Stop reason: SDUPTHER

## 2019-01-15 RX ORDER — SUCRALFATE ORAL 1 G/10ML
1 SUSPENSION ORAL 2 TIMES DAILY
Qty: 420 ML | Refills: 2 | Status: SHIPPED | OUTPATIENT
Start: 2019-01-15 | End: 2019-02-26

## 2019-01-15 NOTE — LETTER
January 15, 2019     Ricco Cotto, [address] [facility]  [address] [place] [address]    Patient: Baron Tovar   YOB: 1950   Date of Visit: 1/15/2019       Dear Dr Todd Schmid:    Thank you for referring Cori Scott to me for evaluation  Below are my notes for this consultation  If you have questions, please do not hesitate to call me  I look forward to following your patient along with you  Sincerely,        David Miles MD        CC: MD David Domínguez MD  1/15/2019  4:58 PM  Sign at close encounter  Consultation - [address] [facility]  Baron Tovar 76 y o  female MRN: [id number]  Unit/Bed#:  Encounter: [id number]        Consults    ASSESSMENT/PLAN:   1  Positive FOBT /nausea/ poor p o  Intake /weight loss- with reports of black color stool several weeks ago, suspect may have peptic ulcer disease versus gastritis versus radiation induced esophagitis  - will start on  Pantoprazole 40 mg daily   -  Start on Carafate 1 g b i d , 2 hr separate from other medications  -  Discussed avoiding all NSAIDs, take acetaminophen if necessary for fever or headaches  -  Monitor for melena, hematochezia or coffee-ground emesis  Patient is instructed to go to the emergency room if she has  Any signs of overt bleeding   - will plan for EGD as soon as possible to assess for peptic ulcer disease   -  Meanwhile discussed with patient eating small meals, supplementing with nutritional supplements such as Ensure or boost   - while she does need a colonoscopy for positive fecal occult blood tests, will proceed with EGD 1st as patient has had difficulty tolerating oral intake    Once patient is better able to tolerate liquids,  Will give her prep for colonoscopy, will schedule follow-up after EGD            ______________________________________________________________________    Reason for Consult / Principal Problem: [unfilled]    HPI: Claude Marine Clem Joshua is a 76y o  year old female  With history of breast cancer, now with  Bony metastatic disease, status post radiation, last treatment 1 week ago, presents for evaluation of nausea and poor appetite  Patient states that ever since she began chemo treatment, she has been increasingly nauseous and has been unable to keep anything down  She denies dysphagia, hematemesis  Or odynophagia  She states that several weeks ago she did have dark-colored stools, but they have now   Become normal   She states that she also had diarrhea at the beginning of the radiation but was told that this may be secondary to treatment  She states that diarrhea is also improving  She is most concerned about the weight loss and poor appetite  She recently underwent stool testing which came back positive for blood  She states that she had colonoscopy over 10 years ago, does not recall the results  Denies family history of GI malignancy  Most recent blood work shows hemoglobin of 9 8, MCV 86, platelets 212, INR 8 06  BUN was 8, alkaline phosphatase 213, albumin 2 7  Review of Systems: The remainder of the review of systems was negative except for the pertinent positives noted in HPI  Historical Information   Past Medical History:   Diagnosis Date    Cancer [facility])     breast 1996    [facility]     bone      Compression fracture of thoracic vertebra (HCC)     Diabetes mellitus ([facility] )     Tendonitis      Past Surgical History:   Procedure Laterality Date    CHOLECYSTECTOMY      MASTECTOMY      Right side reconstruction    NECK SURGERY      TONSILLECTOMY       Social History   History   Alcohol Use No     History   Drug Use No     History   Smoking Status    Never Smoker   Smokeless Tobacco    Never Used     History reviewed  No pertinent family history  Meds/Allergies       (Not in a hospital admission)  No current facility-administered medications for this visit        Facility-Administered Medications Ordered in Other Visits   Medication Dose Route Frequency    [START ON 1/16/2019] fulvestrant (FASLODEX) IM injection 250 mg  250 mg Intramuscular Once    And    [START ON 1/16/2019] fulvestrant (FASLODEX) IM injection 250 mg  250 mg Intramuscular Once       No Known Allergies    Objective     Blood pressure 112/72, pulse 83, temperature 99 7 °F (37 6 °C), temperature source Tympanic, resp  rate 14, height 5' 3" (1 6 m), weight 53 3 kg (117 lb 6 4 oz), last menstrual period 05/27/1996, not currently breastfeeding  [unfilled]    PHYSICAL EXAM     GEN: well nourished, well developed, no acute distress  HEENT: anicteric, MMM, no cervical or supraclavicular lymphadenopathy  CV: RRR, no m/r/g  CHEST: CTA b/l, no WRR  ABD: +BS, soft, NT/ND, no hepatosplenomegaly  EXT: no c/c/e  SKIN: no rashes,  NEURO: aaox3    Lab Results:   No visits with results within 1 Day(s) from this visit  Latest known visit with results is:   Lab Requisition on 01/08/2019   Component Date Value    Case Report 01/08/2019                      Value:Surgical Pathology Report                         Case: [id number]                                   Authorizing Provider:  Dee Rodriguez MD         Collected:           01/08/2019                   Pathologist:           Meron Nguyen MD      Received:            01/08/2019 0808              Specimen:    Lymph Node, right supraclavicular core biopsy, (14 slides [id number]  [facility];                collected on 6/28/2004)                                                                    Final Diagnosis 01/08/2019                      Value: This result contains rich text formatting which cannot be displayed here   Note 01/08/2019                      Value: This result contains rich text formatting which cannot be displayed here   Additional Information 01/08/2019                      Value: This result contains rich text formatting which cannot be displayed here     Codyven Yanni Description 01/08/2019                      Value: This result contains rich text formatting which cannot be displayed here      Clinical Information 01/08/2019                      Value:History of breast CA     Imaging Studies: I have personally reviewed pertinent films in PACS

## 2019-01-15 NOTE — PATIENT INSTRUCTIONS
Patient is scheduled for an Egd at [facility] on 1/24/19  Also patient is scheduled for a 6 week follow up with Ryan Kiser

## 2019-01-15 NOTE — PROGRESS NOTES
Consultation - [address] [facility]  Baron Tovar 76 y o  female MRN: [id number]  Unit/Bed#:  Encounter: [id number]        Consults    ASSESSMENT/PLAN:   1  Positive FOBT /nausea/ poor p o  Intake /weight loss- with reports of black color stool several weeks ago, suspect may have peptic ulcer disease versus gastritis versus radiation induced esophagitis  - will start on  Pantoprazole 40 mg daily   -  Start on Carafate 1 g b i d , 2 hr separate from other medications  -  Discussed avoiding all NSAIDs, take acetaminophen if necessary for fever or headaches  -  Monitor for melena, hematochezia or coffee-ground emesis  Patient is instructed to go to the emergency room if she has  Any signs of overt bleeding   - will plan for EGD as soon as possible to assess for peptic ulcer disease   -  Meanwhile discussed with patient eating small meals, supplementing with nutritional supplements such as Ensure or boost   - while she does need a colonoscopy for positive fecal occult blood tests, will proceed with EGD 1st as patient has had difficulty tolerating oral intake  Once patient is better able to tolerate liquids,  Will give her prep for colonoscopy, will schedule follow-up after EGD            ______________________________________________________________________    Reason for Consult / Principal Problem: [unfilled]    HPI: Baron Tovar is a 76y o  year old female  With history of breast cancer, now with  Bony metastatic disease, status post radiation, last treatment 1 week ago, presents for evaluation of nausea and poor appetite  Patient states that ever since she began chemo treatment, she has been increasingly nauseous and has been unable to keep anything down  She denies dysphagia, hematemesis  Or odynophagia    She states that several weeks ago she did have dark-colored stools, but they have now   Become normal   She states that she also had diarrhea at the beginning of the radiation but was told that this may be secondary to treatment  She states that diarrhea is also improving  She is most concerned about the weight loss and poor appetite  She recently underwent stool testing which came back positive for blood  She states that she had colonoscopy over 10 years ago, does not recall the results  Denies family history of GI malignancy  Most recent blood work shows hemoglobin of 9 8, MCV 86, platelets 189, INR 3 69  BUN was 8, alkaline phosphatase 213, albumin 2 7  Review of Systems: The remainder of the review of systems was negative except for the pertinent positives noted in HPI  Historical Information   Past Medical History:   Diagnosis Date    Cancer [facility])     breast 1996    Cancer [facility] )     bone      Compression fracture of thoracic vertebra (HCC)     Diabetes mellitus ([facility] )     Tendonitis      Past Surgical History:   Procedure Laterality Date    CHOLECYSTECTOMY      MASTECTOMY      Right side reconstruction    NECK SURGERY      TONSILLECTOMY       Social History   History   Alcohol Use No     History   Drug Use No     History   Smoking Status    Never Smoker   Smokeless Tobacco    Never Used     History reviewed  No pertinent family history  Meds/Allergies       (Not in a hospital admission)  No current facility-administered medications for this visit  Facility-Administered Medications Ordered in Other Visits   Medication Dose Route Frequency    [START ON 1/16/2019] fulvestrant (FASLODEX) IM injection 250 mg  250 mg Intramuscular Once    And    [START ON 1/16/2019] fulvestrant (FASLODEX) IM injection 250 mg  250 mg Intramuscular Once       No Known Allergies    Objective     Blood pressure 112/72, pulse 83, temperature 99 7 °F (37 6 °C), temperature source Tympanic, resp  rate 14, height 5' 3" (1 6 m), weight 53 3 kg (117 lb 6 4 oz), last menstrual period 05/27/1996, not currently breastfeeding      [unfilled]    PHYSICAL EXAM     GEN: well nourished, well developed, no acute distress  HEENT: anicteric, MMM, no cervical or supraclavicular lymphadenopathy  CV: RRR, no m/r/g  CHEST: CTA b/l, no WRR  ABD: +BS, soft, NT/ND, no hepatosplenomegaly  EXT: no c/c/e  SKIN: no rashes,  NEURO: aaox3    Lab Results:   No visits with results within 1 Day(s) from this visit  Latest known visit with results is:   Lab Requisition on 01/08/2019   Component Date Value    Case Report 01/08/2019                      Value:Surgical Pathology Report                         Case: [id number]                                   Authorizing Provider:  Stanislaw Ly MD         Collected:           01/08/2019                   Pathologist:           Diana Luna MD      Received:            01/08/2019 0808              Specimen:    Lymph Node, right supraclavicular core biopsy, (14 slides [id number]  [facility];                collected on 6/28/2004)                                                                    Final Diagnosis 01/08/2019                      Value: This result contains rich text formatting which cannot be displayed here   Note 01/08/2019                      Value: This result contains rich text formatting which cannot be displayed here   Additional Information 01/08/2019                      Value: This result contains rich text formatting which cannot be displayed here  Davis Gross Description 01/08/2019                      Value: This result contains rich text formatting which cannot be displayed here      Clinical Information 01/08/2019                      Value:History of breast CA     Imaging Studies: I have personally reviewed pertinent films in PACS

## 2019-01-16 ENCOUNTER — HOSPITAL ENCOUNTER (OUTPATIENT)
Dept: INFUSION CENTER | Facility: CLINIC | Age: 69
Discharge: HOME | DRG: 640 | End: 2019-01-16
Payer: MEDICARE

## 2019-01-16 ENCOUNTER — HOSPITAL ENCOUNTER (EMERGENCY)
Facility: HOSPITAL | Age: 69
Discharge: HOME | DRG: 640 | End: 2019-01-16
Attending: EMERGENCY MEDICINE | Admitting: EMERGENCY MEDICINE
Payer: MEDICARE

## 2019-01-16 ENCOUNTER — TELEPHONE (OUTPATIENT)
Dept: HEMATOLOGY ONCOLOGY | Facility: CLINIC | Age: 69
End: 2019-01-16

## 2019-01-16 VITALS
TEMPERATURE: 98.2 F | DIASTOLIC BLOOD PRESSURE: 69 MMHG | RESPIRATION RATE: 18 BRPM | SYSTOLIC BLOOD PRESSURE: 124 MMHG | HEART RATE: 81 BPM

## 2019-01-16 VITALS
OXYGEN SATURATION: 100 % | TEMPERATURE: 99.4 F | DIASTOLIC BLOOD PRESSURE: 60 MMHG | SYSTOLIC BLOOD PRESSURE: 124 MMHG | RESPIRATION RATE: 16 BRPM | HEART RATE: 76 BPM

## 2019-01-16 DIAGNOSIS — E86.0 DEHYDRATION DUE TO RADIATION: ICD-10-CM

## 2019-01-16 DIAGNOSIS — R11.10 VOMITING: Primary | ICD-10-CM

## 2019-01-16 LAB
ALBUMIN SERPL BCP-MCNC: 3 G/DL (ref 3.5–5)
ALP SERPL-CCNC: 175 U/L (ref 46–116)
ALT SERPL W P-5'-P-CCNC: 25 U/L (ref 12–78)
ANION GAP SERPL CALCULATED.3IONS-SCNC: 9 MMOL/L (ref 4–13)
AST SERPL W P-5'-P-CCNC: 36 U/L (ref 5–45)
ATRIAL RATE: 71 BPM
BASOPHILS # BLD MANUAL: 0 THOUSAND/UL (ref 0–0.1)
BASOPHILS NFR MAR MANUAL: 0 % (ref 0–1)
BILIRUB SERPL-MCNC: 1 MG/DL (ref 0.2–1)
BUN SERPL-MCNC: 8 MG/DL (ref 5–25)
CALCIUM SERPL-MCNC: 6.5 MG/DL (ref 8.3–10.1)
CANCER AG27-29 SERPL-ACNC: 23 U/ML (ref 0–42.3)
CEA SERPL-MCNC: 3.8 NG/ML (ref 0–3)
CHLORIDE SERPL-SCNC: 103 MMOL/L (ref 100–108)
CO2 SERPL-SCNC: 26 MMOL/L (ref 21–32)
CREAT SERPL-MCNC: 0.49 MG/DL (ref 0.6–1.3)
EOSINOPHIL # BLD MANUAL: 0.48 THOUSAND/UL (ref 0–0.4)
EOSINOPHIL NFR BLD MANUAL: 19 % (ref 0–6)
ERYTHROCYTE [DISTWIDTH] IN BLOOD BY AUTOMATED COUNT: 16.2 % (ref 11.6–15.1)
FERRITIN SERPL-MCNC: 134 NG/ML (ref 8–388)
GFR SERPL CREATININE-BSD FRML MDRD: 100 ML/MIN/1.73SQ M
GLUCOSE SERPL-MCNC: 118 MG/DL (ref 65–140)
HCT VFR BLD AUTO: 35.7 % (ref 34.8–46.1)
HGB BLD-MCNC: 11.5 G/DL (ref 11.5–15.4)
IRON SATN MFR SERPL: 35 %
IRON SERPL-MCNC: 67 UG/DL (ref 50–170)
LYMPHOCYTES # BLD AUTO: 0.46 THOUSAND/UL (ref 0.6–4.47)
LYMPHOCYTES # BLD AUTO: 18 % (ref 14–44)
MCH RBC QN AUTO: 27.3 PG (ref 26.8–34.3)
MCHC RBC AUTO-ENTMCNC: 32.2 G/DL (ref 31.4–37.4)
MCV RBC AUTO: 85 FL (ref 82–98)
MONOCYTES # BLD AUTO: 0.48 THOUSAND/UL (ref 0–1.22)
MONOCYTES NFR BLD: 19 % (ref 4–12)
NEUTROPHILS # BLD MANUAL: 1.12 THOUSAND/UL (ref 1.85–7.62)
NEUTS BAND NFR BLD MANUAL: 2 % (ref 0–8)
NEUTS SEG NFR BLD AUTO: 42 % (ref 43–75)
NRBC BLD AUTO-RTO: 0 /100 WBCS
NRBC BLD AUTO-RTO: 1 /100 WBC (ref 0–2)
P AXIS: 64 DEGREES
PLATELET # BLD AUTO: 174 THOUSANDS/UL (ref 149–390)
PLATELET BLD QL SMEAR: ADEQUATE
PMV BLD AUTO: 9.8 FL (ref 8.9–12.7)
POTASSIUM SERPL-SCNC: 3.7 MMOL/L (ref 3.5–5.3)
PR INTERVAL: 164 MS
PROT SERPL-MCNC: 6 G/DL (ref 6.4–8.2)
QRS AXIS: -37 DEGREES
QRSD INTERVAL: 70 MS
QT INTERVAL: 374 MS
QTC INTERVAL: 406 MS
RBC # BLD AUTO: 4.21 MILLION/UL (ref 3.81–5.12)
SODIUM SERPL-SCNC: 138 MMOL/L (ref 136–145)
T WAVE AXIS: 41 DEGREES
TIBC SERPL-MCNC: 190 UG/DL (ref 250–450)
TOTAL CELLS COUNTED SPEC: 100
VENTRICULAR RATE: 71 BPM
WBC # BLD AUTO: 2.55 THOUSAND/UL (ref 4.31–10.16)

## 2019-01-16 PROCEDURE — 82378 CARCINOEMBRYONIC ANTIGEN: CPT | Performed by: PHYSICIAN ASSISTANT

## 2019-01-16 PROCEDURE — 96402 CHEMO HORMON ANTINEOPL SQ/IM: CPT

## 2019-01-16 PROCEDURE — 99284 EMERGENCY DEPT VISIT MOD MDM: CPT

## 2019-01-16 PROCEDURE — 93010 ELECTROCARDIOGRAM REPORT: CPT | Performed by: INTERNAL MEDICINE

## 2019-01-16 PROCEDURE — 80053 COMPREHEN METABOLIC PANEL: CPT | Performed by: PHYSICIAN ASSISTANT

## 2019-01-16 PROCEDURE — 36415 COLL VENOUS BLD VENIPUNCTURE: CPT | Performed by: PHYSICIAN ASSISTANT

## 2019-01-16 PROCEDURE — 93005 ELECTROCARDIOGRAM TRACING: CPT

## 2019-01-16 PROCEDURE — 82728 ASSAY OF FERRITIN: CPT | Performed by: PHYSICIAN ASSISTANT

## 2019-01-16 PROCEDURE — 83540 ASSAY OF IRON: CPT | Performed by: PHYSICIAN ASSISTANT

## 2019-01-16 PROCEDURE — 85027 COMPLETE CBC AUTOMATED: CPT | Performed by: PHYSICIAN ASSISTANT

## 2019-01-16 PROCEDURE — 86300 IMMUNOASSAY TUMOR CA 15-3: CPT | Performed by: PHYSICIAN ASSISTANT

## 2019-01-16 PROCEDURE — 96361 HYDRATE IV INFUSION ADD-ON: CPT

## 2019-01-16 PROCEDURE — 83550 IRON BINDING TEST: CPT | Performed by: PHYSICIAN ASSISTANT

## 2019-01-16 PROCEDURE — 96374 THER/PROPH/DIAG INJ IV PUSH: CPT

## 2019-01-16 PROCEDURE — 96375 TX/PRO/DX INJ NEW DRUG ADDON: CPT

## 2019-01-16 PROCEDURE — 85007 BL SMEAR W/DIFF WBC COUNT: CPT | Performed by: PHYSICIAN ASSISTANT

## 2019-01-16 RX ORDER — LIDOCAINE 50 MG/G
1 PATCH TOPICAL ONCE
Status: DISCONTINUED | OUTPATIENT
Start: 2019-01-16 | End: 2019-01-16 | Stop reason: HOSPADM

## 2019-01-16 RX ORDER — KETOROLAC TROMETHAMINE 30 MG/ML
15 INJECTION, SOLUTION INTRAMUSCULAR; INTRAVENOUS ONCE
Status: COMPLETED | OUTPATIENT
Start: 2019-01-16 | End: 2019-01-16

## 2019-01-16 RX ORDER — ONDANSETRON 2 MG/ML
4 INJECTION INTRAMUSCULAR; INTRAVENOUS ONCE
Status: COMPLETED | OUTPATIENT
Start: 2019-01-16 | End: 2019-01-16

## 2019-01-16 RX ADMIN — FULVESTRANT 250 MG: 50 INJECTION INTRAMUSCULAR at 10:03

## 2019-01-16 RX ADMIN — LIDOCAINE 1 PATCH: 50 PATCH CUTANEOUS at 05:53

## 2019-01-16 RX ADMIN — SODIUM CHLORIDE 500 ML: 0.9 INJECTION, SOLUTION INTRAVENOUS at 06:27

## 2019-01-16 RX ADMIN — SODIUM CHLORIDE 1000 ML: 0.9 INJECTION, SOLUTION INTRAVENOUS at 05:05

## 2019-01-16 RX ADMIN — FULVESTRANT 250 MG: 50 INJECTION INTRAMUSCULAR at 10:02

## 2019-01-16 RX ADMIN — KETOROLAC TROMETHAMINE 15 MG: 30 INJECTION, SOLUTION INTRAMUSCULAR at 05:45

## 2019-01-16 RX ADMIN — ONDANSETRON 4 MG: 2 INJECTION INTRAMUSCULAR; INTRAVENOUS at 05:06

## 2019-01-16 NOTE — TELEPHONE ENCOUNTER
I called patient 3 times a day to give her report on her bone scan and order x-rays of femurs  Line was busy all the time

## 2019-01-16 NOTE — PROGRESS NOTES
faslodex given in left and right buttocks and patient tolerated it well   Next appointment confirmed and avs given

## 2019-01-16 NOTE — DISCHARGE INSTRUCTIONS
Acute Nausea and Vomiting   WHAT YOU NEED TO KNOW:   Acute nausea and vomiting start suddenly, worsen quickly, and last a short time  DISCHARGE INSTRUCTIONS:   Return to the emergency department if:   · You see blood in your vomit or your bowel movements  · You have sudden, severe pain in your chest and upper abdomen after hard vomiting or retching  · You have swelling in your neck and chest      · You are dizzy, cold, and thirsty and your eyes and mouth are dry  · You are urinating very little or not at all  · You have muscle weakness, leg cramps, and trouble breathing  · Your heart is beating much faster than normal      · You continue to vomit for more than 48 hours  Contact your healthcare provider if:   · You have frequent dry heaves (vomiting but nothing comes out)  · Your nausea and vomiting does not get better or go away after you use medicine  · You have questions or concerns about your condition or treatment  Medicines: You may need any of the following:  · Medicines  may be given to calm your stomach and stop your vomiting  You may also need medicines to help you feel more relaxed or to stop nausea and vomiting caused by motion sickness  · Gastrointestinal stimulants  are used to help empty your stomach and bowels  This may help decrease nausea and vomiting  · Take your medicine as directed  Contact your healthcare provider if you think your medicine is not helping or if you have side effects  Tell him or her if you are allergic to any medicine  Keep a list of the medicines, vitamins, and herbs you take  Include the amounts, and when and why you take them  Bring the list or the pill bottles to follow-up visits  Carry your medicine list with you in case of an emergency  Prevent or manage acute nausea and vomiting:   · Do not drink alcohol  Alcohol may upset or irritate your stomach  Too much alcohol can also cause acute nausea and vomiting  · Control stress    Headaches due to stress may cause nausea and vomiting  Find ways to relax and manage your stress  Get more rest and sleep  · Drink more liquids as directed  Vomiting can lead to dehydration  It is important to drink more liquids to help replace lost body fluids  Ask your healthcare provider how much liquid to drink each day and which liquids are best for you  Your provider may recommend that you drink an oral rehydration solution (ORS)  ORS contains water, salts, and sugar that are needed to replace the lost body fluids  Ask what kind of ORS to use, how much to drink, and where to get it  · Eat smaller meals, more often  Eat small amounts of food every 2 to 3 hours, even if you are not hungry  Food in your stomach may decrease your nausea  · Talk to your healthcare provider before you take over-the-counter (OTC) medicines  These medicines can cause serious problems if you use certain other medicines, or you have a medical condition  You may have problems if you use too much or use them for longer than the label says  Follow directions on the label carefully  Follow up with your healthcare provider as directed:  Write down your questions so you remember to ask them during your follow-up visits  © 2017 [employer] Information is for End User's use only and may not be sold, redistributed or otherwise used for commercial purposes  All illustrations and images included in CareNotes® are the copyrighted property of [employer]  or Arnie Carreon  The above information is an  only  It is not intended as medical advice for individual conditions or treatments  Talk to your doctor, nurse or pharmacist before following any medical regimen to see if it is safe and effective for you

## 2019-01-16 NOTE — PLAN OF CARE
Problem: Potential for Falls  Goal: Patient will remain free of falls  INTERVENTIONS:  - Assess patient frequently for physical needs  -  Identify cognitive and physical deficits and behaviors that affect risk of falls    -  [place] fall precautions as indicated by assessment   - Educate patient/family on patient safety including physical limitations  - Instruct patient to call for assistance with activity based on assessment  - Modify environment to reduce risk of injury  - Consider OT/PT consult to assist with strengthening/mobility   Outcome: Progressing

## 2019-01-16 NOTE — PROGRESS NOTES
Patient is here for faslodex  She came to infusion in a wheelchair and was just discharged from the ER today  She went to the ER very early this am for complaints of nausea/vomiting  Patient states they gave her 2 bags of fluids and discharged her  Patient states she finished radiation on Wednesday  She has not been able to eat much at all for at least a month because of the nausea and vomiting  She has been having episodes of diarrhea as well  She saw her gastroenterologist yesterday and he prescribed her some pantoprazole and carafate  He also is scheduling her for a colonoscopy and EGD  Patient is also suppose to start Ibrance but was told to hold off because her white count is low  Her ANC is 1 12 and ALK Phos is 175 which is an improvement  Patient states she feels very weak as well  Her VSS at this time  Spoke to Tato Gant and informed her of all of this  She stated she would notify Dr Grecia Atwood and call back to let me know if patient is ok for faslodex today

## 2019-01-16 NOTE — ED PROVIDER NOTES
History  Chief Complaint   Patient presents with    Vomiting     Vomiting, nausea and diarrhea since November  Finished radiation last 01/9/19      77-year-old female presents due to nausea, vomiting ongoing for months worse over the past week  Patient has significant history breast cancer mets to bone, last radiation therapy 1 week ago, currently on chemo oral   Patient was evaluated by GI specialist today, suspected esophagitis secondary to radiation therapy, started on Carafate, given nystatin mouthwash for oral thrush, plans for endoscopy  Patient presents today due to feeling dehydrated, unable to keep nutrition down  Patient reports diarrhea intermittently, states this has improved has had loose stools  , no black or bloody stools Patient denies fevers chills sweats, chest pain shortness of breath, abdominal pain, back pain, dysuria, rash, hematemesis, hematochezia, hemoptysis, syncope, odynophagia, dysphagia, dizziness, headache, and no other complaints at this time  History provided by:  Patient   used: No    Vomiting   Severity:  Moderate  Timing:  Constant  Number of daily episodes:  "several"  Quality:  Stomach contents  Feeding tolerance: has been able to tolerate liquids better than solids, though worse today  Progression:  Worsening  Chronicity:  New  Recent urination:  Normal  Context: not post-tussive and not self-induced    Relieved by:  Nothing  Worsened by:  Nothing  Ineffective treatments: lemon drops  Associated symptoms: diarrhea (improved)    Associated symptoms: no abdominal pain, no arthralgias, no chills, no cough, no fever, no headaches, no myalgias, no sore throat and no URI    Risk factors: no alcohol use, no diabetes, not pregnant, no suspect food intake and no travel to endemic areas        Prior to Admission Medications   Prescriptions Last Dose Informant Patient Reported? Taking?    Cholecalciferol (VITAMIN D3) 2000 units TABS 1/16/2019 at Unknown time Self Yes Yes   Sig: Take 2,000 Units by mouth daily   LORazepam (ATIVAN) 1 mg tablet 1/16/2019 at Unknown time Self No Yes   Sig: May use one daily PRN anxiety/panic  Take 2mg (2 tablets) qhs  MULTIPLE VITAMINS PO 1/15/2019 at Unknown time Self Yes Yes   Sig: Take by mouth   Omega-3 Fatty Acids (FISH OIL PO) 1/16/2019 at Unknown time Self Yes Yes   Sig: Take 2 g by mouth   Palbociclib (IBRANCE) 125 MG Not Taking at Unknown time  No No   Sig: Take 1 capsule (125 mg total) by mouth daily For 21 days then 7 off   Patient not taking: Reported on 1/16/2019    acetaminophen (TYLENOL) 325 mg tablet 1/16/2019 at Unknown time Self No Yes   Sig: Take 2 tablets (650 mg total) by mouth 4 (four) times a day (with meals and at bedtime)   ferrous sulfate 325 (65 Fe) mg tablet 1/16/2019 at Unknown time Self No Yes   Sig: Take 1 tablet (325 mg total) by mouth daily with breakfast   gabapentin (NEURONTIN) 100 mg capsule 1/16/2019 at Unknown time Self No Yes   Sig: Take 1 capsule (100 mg total) by mouth 2 (two) times a day   liraglutide (VICTOZA) injection Not Taking at Unknown time Self Yes No   Sig: Inject 0 6 mg under the skin daily     lisinopril (ZESTRIL) 5 mg tablet 1/16/2019 at Unknown time Self Yes Yes   Sig: Take 5 mg by mouth daily     metFORMIN (GLUMETZA) 1000 MG (MOD) 24 hr tablet Not Taking at Unknown time Self Yes No   Sig: Take 1,000 mg by mouth 2 (two) times a day with meals     morphine (MSIR) 15 mg tablet 1/16/2019 at Unknown time Self No Yes   Sig: Use 1 tab TID scheduled to anticipate cancer pain  May have 1-2 tabs q4hrs PRN mod-severe pain     nystatin (MYCOSTATIN) 100,000 units/mL suspension 1/15/2019 at Unknown time  No Yes   Sig: Apply 5 mL (500,000 Units total) to the mouth or throat 4 (four) times a day   omeprazole (PriLOSEC) 40 MG capsule 1/16/2019 at Unknown time  No Yes   Sig: Take 1 capsule (40 mg total) by mouth daily   ondansetron (ZOFRAN-ODT) 4 mg disintegrating tablet 1/16/2019 at Unknown time  No Yes   Sig: Take 1 tablet (4 mg total) by mouth every 6 (six) hours as needed for nausea or vomiting   polyethylene glycol (MIRALAX) 17 g packet Not Taking at Unknown time Self No No   Sig: Take 17 g by mouth daily   Patient not taking: Reported on 1/15/2019    pravastatin (PRAVACHOL) 40 mg tablet 1/15/2019 at Unknown time Self Yes Yes   Sig: Take 40 mg by mouth daily     senna (SENOKOT) 8 6 mg Not Taking at Unknown time Self No No   Sig: Take 1 tablet (8 6 mg total) by mouth daily at bedtime Hold for loose stool  Patient not taking: Reported on 1/15/2019    sucralfate (CARAFATE) 1 g/10 mL suspension   No No   Sig: Take 10 mL (1 g total) by mouth 2 (two) times a day      Facility-Administered Medications: None       Past Medical History:   Diagnosis Date    Cancer [facility])     breast 1996    [facility]     bone      [facility] vertebra ([facility] )     Diabetes mellitus ([facility] )     Tendonitis        Past Surgical History:   Procedure Laterality Date    CHOLECYSTECTOMY      MASTECTOMY      Right side reconstruction    NECK SURGERY      TONSILLECTOMY         History reviewed  No pertinent family history  I have reviewed and agree with the history as documented  Social History   Substance Use Topics    Smoking status: Never Smoker    Smokeless tobacco: Never Used    Alcohol use No        Review of Systems   Constitutional: Positive for activity change (chronic), appetite change (chronic) and fatigue (chronic with radiation therapy)  Negative for chills, diaphoresis and fever  HENT: Negative for congestion, dental problem, ear discharge, ear pain, nosebleeds, rhinorrhea, sinus pain, sinus pressure, sore throat, tinnitus and trouble swallowing  Eyes: Negative for pain, discharge, redness, itching and visual disturbance  Respiratory: Negative for apnea, cough, choking, chest tightness, shortness of breath, wheezing and stridor      Cardiovascular: Negative for chest pain, palpitations and leg swelling  Gastrointestinal: Positive for diarrhea (improved) and vomiting  Negative for abdominal distention, abdominal pain, anal bleeding, blood in stool, constipation and nausea  Genitourinary: Negative for decreased urine volume, difficulty urinating, dysuria and hematuria  Musculoskeletal: Negative for arthralgias, back pain, gait problem, joint swelling, myalgias, neck pain and neck stiffness  Skin: Negative for rash and wound  Neurological: Negative for dizziness, speech difficulty, light-headedness, numbness and headaches  Physical Exam  Physical Exam   Constitutional: She is oriented to person, place, and time  She appears well-developed and well-nourished  No distress  Appears malnourished     HENT:   Head: Normocephalic and atraumatic  Mucous membranes appear to be dry   Eyes: Pupils are equal, round, and reactive to light  Neck: Normal range of motion  Neck supple  Cardiovascular: Normal rate, regular rhythm, normal heart sounds and intact distal pulses  Exam reveals no gallop and no friction rub  No murmur heard  Pulmonary/Chest: Effort normal and breath sounds normal  No respiratory distress  She has no wheezes  She has no rales  She exhibits no tenderness  Abdominal: Soft  Bowel sounds are normal  She exhibits no distension and no mass  There is no tenderness (mild discomfort)  There is no rebound and no guarding  No hernia  Musculoskeletal: Normal range of motion  Cervical back: Normal         Thoracic back: She exhibits tenderness and pain  She exhibits no spasm  Lumbar back: She exhibits tenderness and pain  She exhibits normal range of motion, no bony tenderness and no spasm  Neurological: She is alert and oriented to person, place, and time  Skin: Skin is warm  Capillary refill takes less than 2 seconds  No rash noted  She is not diaphoretic  No erythema  Nursing note and vitals reviewed        Vital Signs  ED Triage Vitals   Temperature Pulse Respirations Blood Pressure SpO2   01/16/19 0332 01/16/19 0332 01/16/19 0332 01/16/19 0333 01/16/19 0332   99 4 °F (37 4 °C) 81 16 130/62 100 %      Temp Source Heart Rate Source Patient Position - Orthostatic VS BP Location FiO2 (%)   01/16/19 0332 01/16/19 0332 01/16/19 0332 01/16/19 0332 --   Oral Monitor Lying Left arm       Pain Score       01/16/19 0545       7           Vitals:    01/16/19 0332 01/16/19 0333 01/16/19 0547 01/16/19 0631   BP:  130/62 116/62 119/57   Pulse: 81  70 71   Patient Position - Orthostatic VS: Lying Lying Lying Lying       Visual Acuity      ED Medications  Medications   lidocaine (LIDODERM) 5 % patch 1 patch (1 patch Topical Medication Applied 1/16/19 0553)   sodium chloride 0 9 % bolus 1,000 mL (0 mL Intravenous Stopped 1/16/19 0605)   ondansetron (ZOFRAN) injection 4 mg (4 mg Intravenous Given 1/16/19 0506)   ketorolac (TORADOL) injection 15 mg (15 mg Intravenous Given 1/16/19 0545)   sodium chloride 0 9 % bolus 500 mL (500 mL Intravenous New Bag 1/16/19 0627)       Diagnostic Studies  Results Reviewed     Procedure Component Value Units Date/Time    Cancer antigen 27 29 [[id number]]     Lab Status:  No result Specimen:  Blood     CEA [[id number]]     Lab Status:  No result Specimen:  Blood     Iron Saturation % [[id number]]     Lab Status:  No result Specimen:  Blood     Ferritin [[id number]]     Lab Status:  No result Specimen:  Blood     CBC and differential [[id number]]  (Abnormal) Collected:  01/16/19 0503    Lab Status:  Final result Specimen:  Blood from Arm, Left Updated:  01/16/19 0541     WBC 2 55 (L) Thousand/uL      RBC 4 21 Million/uL      Hemoglobin 11 5 g/dL      Hematocrit 35 7 %      MCV 85 fL      MCH 27 3 pg      MCHC 32 2 g/dL      RDW 16 2 (H) %      MPV 9 8 fL      Platelets 747 Thousands/uL      nRBC 0 /100 WBCs     Narrative: This is an appended report  These results have been appended to a previously verified report      Comprehensive metabolic panel [[id number]]  (Abnormal) Collected:  01/16/19 0503    Lab Status:  Final result Specimen:  Blood from Arm, Left Updated:  01/16/19 0529     Sodium 138 mmol/L      Potassium 3 7 mmol/L      Chloride 103 mmol/L      CO2 26 mmol/L      ANION GAP 9 mmol/L      BUN 8 mg/dL      Creatinine 0 49 (L) mg/dL      Glucose 118 mg/dL      Calcium 6 5 (L) mg/dL      AST 36 U/L      ALT 25 U/L      Alkaline Phosphatase 175 (H) U/L      Total Protein 6 0 (L) g/dL      Albumin 3 0 (L) g/dL      Total Bilirubin 1 00 mg/dL      eGFR 100 ml/min/1 73sq m     Narrative:         National Kidney Disease Education Program recommendations are as follows:  GFR calculation is accurate only with a steady state creatinine  Chronic Kidney disease less than 60 ml/min/1 73 sq  meters  Kidney failure less than 15 ml/min/1 73 sq  meters  No orders to display              Procedures  Procedures       Phone Contacts  ED Phone Contact    ED Course  ED Course as of Jan 16 0758 Wed Jan 16, 2019   3059 OP labs drawn in ED for patient per request of physician, pt has appt for f/u today  MDM  Number of Diagnoses or Management Options  Diagnosis management comments: 70-year-old female presents due to nausea, vomiting ongoing for months worse over the past week  Patient has significant history breast cancer mets to bone, last radiation therapy 1 week ago, currently on chemo oral   Patient was evaluated by GI specialist today, suspected esophagitis secondary to radiation therapy, started on Carafate, given nystatin mouthwash for oral thrush, plans for endoscopy  Patient presents today due to feeling dehydrated, unable to keep nutrition down  Patient reports diarrhea intermittently, states this has improved has had loose stools  , no black or bloody stools Patient denies fevers chills sweats, chest pain shortness of breath, abdominal pain, back pain, dysuria, rash, hematemesis, hematochezia, hemoptysis, syncope, odynophagia, dysphagia, dizziness, headache, and no other complaints at this time  VS reviewed and WNL  Exam reveals dry mucous membranes, TTP generalized over T spine, L spine chronic per patient, mild abdominal discomfort, though no other acute findings  FOBT negative  Will give IVF, check labs, EKG  Reassess  Amount and/or Complexity of Data Reviewed  Tests in the radiology section of CPT®: ordered and reviewed  Tests in the medicine section of CPT®: ordered and reviewed  Review and summarize past medical records: yes  Discuss the patient with other providers: yes  Independent visualization of images, tracings, or specimens: yes    Risk of Complications, Morbidity, and/or Mortality  Presenting problems: moderate  Diagnostic procedures: moderate  Management options: moderate    Patient Progress  Patient progress: improved    CritCare Time    Disposition  Final diagnoses:   Vomiting - likely secondary to radiation therapy   Dehydration due to radiation     Time reflects when diagnosis was documented in both MDM as applicable and the Disposition within this note     Time User Action Codes Description Comment    1/16/2019  7:19 AM Demetris Paul Add [R11 10] Vomiting     1/16/2019  7:19 AM Demetris Paul Modify [R11 10] Vomiting likely secondary to radiation therapy    1/16/2019  7:19 AM Percy Ott Add [E86 0,  W88  8XXA] Dehydration due to radiation       ED Disposition     ED Disposition Condition Comment    Discharge  Tyler Ko discharge to home/self care      Condition at discharge: Good        Follow-up Information     Follow up With Specialties Details Why Contact Info Additional Information    Amrita Caban MD Family Medicine  As needed [facility] [address] [facility] Emergency Medicine  If symptoms worsen [address] [address]  [phone]  ED, [address] [place] [address] Fawad More[address] [place], [place], [address]    Your Hematologist/Oncologist               Patient's Medications   Discharge Prescriptions    No medications on file     No discharge procedures on file      ED Provider  Electronically Signed by           Tal Esquivel PA-C  01/16/19 3935

## 2019-01-16 NOTE — PROGRESS NOTES
Boyd Cortez rn called back and stated per Dr Trini Frank patient is ok to get her faslodex today as ordered

## 2019-01-17 ENCOUNTER — TELEPHONE (OUTPATIENT)
Dept: HEMATOLOGY ONCOLOGY | Facility: CLINIC | Age: 69
End: 2019-01-17

## 2019-01-17 DIAGNOSIS — C79.51 BONE METASTASIS (HCC): Primary | ICD-10-CM

## 2019-01-17 NOTE — TELEPHONE ENCOUNTER
I spoke with patient and she will go for x-rays of both femurs and also she will start taking Ibrance next week  She is done with palliative radiation  She has office appointment this month

## 2019-01-18 ENCOUNTER — APPOINTMENT (EMERGENCY)
Dept: RADIOLOGY | Facility: HOSPITAL | Age: 69
DRG: 640 | End: 2019-01-18
Payer: MEDICARE

## 2019-01-18 ENCOUNTER — HOSPITAL ENCOUNTER (INPATIENT)
Facility: HOSPITAL | Age: 69
LOS: 2 days | Discharge: HOME HEALTH | DRG: 640 | End: 2019-01-20
Attending: EMERGENCY MEDICINE | Admitting: INTERNAL MEDICINE
Payer: MEDICARE

## 2019-01-18 DIAGNOSIS — E83.51 HYPOCALCEMIA: ICD-10-CM

## 2019-01-18 DIAGNOSIS — C79.51 BREAST CANCER METASTASIZED TO BONE, RIGHT (HCC): Chronic | ICD-10-CM

## 2019-01-18 DIAGNOSIS — E87.6 HYPOKALEMIA: ICD-10-CM

## 2019-01-18 DIAGNOSIS — C79.9 METASTATIC CANCER (HCC): ICD-10-CM

## 2019-01-18 DIAGNOSIS — R20.2 PARESTHESIAS: Primary | ICD-10-CM

## 2019-01-18 DIAGNOSIS — E83.42 HYPOMAGNESEMIA: ICD-10-CM

## 2019-01-18 DIAGNOSIS — C50.911 BREAST CANCER METASTASIZED TO BONE, RIGHT (HCC): Chronic | ICD-10-CM

## 2019-01-18 DIAGNOSIS — E43 SEVERE PROTEIN-CALORIE MALNUTRITION (HCC): ICD-10-CM

## 2019-01-18 PROBLEM — R20.0 FACIAL NUMBNESS: Status: ACTIVE | Noted: 2019-01-18

## 2019-01-18 PROBLEM — D72.819 LEUKOPENIA: Status: ACTIVE | Noted: 2019-01-18

## 2019-01-18 LAB
ALBUMIN SERPL BCP-MCNC: 2.8 G/DL (ref 3.5–5)
ALP SERPL-CCNC: 182 U/L (ref 46–116)
ALT SERPL W P-5'-P-CCNC: 29 U/L (ref 12–78)
ANION GAP SERPL CALCULATED.3IONS-SCNC: 7 MMOL/L (ref 4–13)
APTT PPP: 32 SECONDS (ref 26–38)
AST SERPL W P-5'-P-CCNC: 24 U/L (ref 5–45)
BASOPHILS # BLD AUTO: 0.01 THOUSANDS/ΜL (ref 0–0.1)
BASOPHILS NFR BLD AUTO: 1 % (ref 0–1)
BILIRUB SERPL-MCNC: 1 MG/DL (ref 0.2–1)
BUN SERPL-MCNC: 5 MG/DL (ref 5–25)
CALCIUM SERPL-MCNC: 6.2 MG/DL (ref 8.3–10.1)
CHLORIDE SERPL-SCNC: 106 MMOL/L (ref 100–108)
CO2 SERPL-SCNC: 28 MMOL/L (ref 21–32)
CREAT SERPL-MCNC: 0.55 MG/DL (ref 0.6–1.3)
EOSINOPHIL # BLD AUTO: 0.3 THOUSAND/ΜL (ref 0–0.61)
EOSINOPHIL NFR BLD AUTO: 15 % (ref 0–6)
ERYTHROCYTE [DISTWIDTH] IN BLOOD BY AUTOMATED COUNT: 16.2 % (ref 11.6–15.1)
GFR SERPL CREATININE-BSD FRML MDRD: 97 ML/MIN/1.73SQ M
GLUCOSE SERPL-MCNC: 104 MG/DL (ref 65–140)
GLUCOSE SERPL-MCNC: 113 MG/DL (ref 65–140)
GLUCOSE SERPL-MCNC: 94 MG/DL (ref 65–140)
HCT VFR BLD AUTO: 33.9 % (ref 34.8–46.1)
HGB BLD-MCNC: 11 G/DL (ref 11.5–15.4)
IMM GRANULOCYTES # BLD AUTO: 0.04 THOUSAND/UL (ref 0–0.2)
IMM GRANULOCYTES NFR BLD AUTO: 2 % (ref 0–2)
INR PPP: 1.3 (ref 0.86–1.17)
LIPASE SERPL-CCNC: 40 U/L (ref 73–393)
LYMPHOCYTES # BLD AUTO: 0.22 THOUSANDS/ΜL (ref 0.6–4.47)
LYMPHOCYTES NFR BLD AUTO: 11 % (ref 14–44)
MAGNESIUM SERPL-MCNC: 1.4 MG/DL (ref 1.6–2.6)
MCH RBC QN AUTO: 27.3 PG (ref 26.8–34.3)
MCHC RBC AUTO-ENTMCNC: 32.4 G/DL (ref 31.4–37.4)
MCV RBC AUTO: 84 FL (ref 82–98)
MONOCYTES # BLD AUTO: 0.39 THOUSAND/ΜL (ref 0.17–1.22)
MONOCYTES NFR BLD AUTO: 20 % (ref 4–12)
NEUTROPHILS # BLD AUTO: 1 THOUSANDS/ΜL (ref 1.85–7.62)
NEUTS SEG NFR BLD AUTO: 51 % (ref 43–75)
NRBC BLD AUTO-RTO: 0 /100 WBCS
PLATELET # BLD AUTO: 165 THOUSANDS/UL (ref 149–390)
PMV BLD AUTO: 9.8 FL (ref 8.9–12.7)
POTASSIUM SERPL-SCNC: 2.6 MMOL/L (ref 3.5–5.3)
PROT SERPL-MCNC: 5.7 G/DL (ref 6.4–8.2)
PROTHROMBIN TIME: 15.8 SECONDS (ref 11.8–14.2)
RBC # BLD AUTO: 4.03 MILLION/UL (ref 3.81–5.12)
SODIUM SERPL-SCNC: 141 MMOL/L (ref 136–145)
WBC # BLD AUTO: 1.96 THOUSAND/UL (ref 4.31–10.16)

## 2019-01-18 PROCEDURE — 96365 THER/PROPH/DIAG IV INF INIT: CPT

## 2019-01-18 PROCEDURE — 83690 ASSAY OF LIPASE: CPT | Performed by: EMERGENCY MEDICINE

## 2019-01-18 PROCEDURE — 87505 NFCT AGENT DETECTION GI: CPT | Performed by: EMERGENCY MEDICINE

## 2019-01-18 PROCEDURE — 99223 1ST HOSP IP/OBS HIGH 75: CPT | Performed by: INTERNAL MEDICINE

## 2019-01-18 PROCEDURE — 83735 ASSAY OF MAGNESIUM: CPT | Performed by: EMERGENCY MEDICINE

## 2019-01-18 PROCEDURE — 87493 C DIFF AMPLIFIED PROBE: CPT | Performed by: EMERGENCY MEDICINE

## 2019-01-18 PROCEDURE — 73552 X-RAY EXAM OF FEMUR 2/>: CPT

## 2019-01-18 PROCEDURE — 93005 ELECTROCARDIOGRAM TRACING: CPT

## 2019-01-18 PROCEDURE — 96361 HYDRATE IV INFUSION ADD-ON: CPT

## 2019-01-18 PROCEDURE — 96375 TX/PRO/DX INJ NEW DRUG ADDON: CPT

## 2019-01-18 PROCEDURE — 36415 COLL VENOUS BLD VENIPUNCTURE: CPT | Performed by: EMERGENCY MEDICINE

## 2019-01-18 PROCEDURE — 80053 COMPREHEN METABOLIC PANEL: CPT | Performed by: EMERGENCY MEDICINE

## 2019-01-18 PROCEDURE — 85025 COMPLETE CBC W/AUTO DIFF WBC: CPT | Performed by: EMERGENCY MEDICINE

## 2019-01-18 PROCEDURE — 85730 THROMBOPLASTIN TIME PARTIAL: CPT | Performed by: EMERGENCY MEDICINE

## 2019-01-18 PROCEDURE — 99285 EMERGENCY DEPT VISIT HI MDM: CPT

## 2019-01-18 PROCEDURE — 82948 REAGENT STRIP/BLOOD GLUCOSE: CPT

## 2019-01-18 PROCEDURE — 85610 PROTHROMBIN TIME: CPT | Performed by: EMERGENCY MEDICINE

## 2019-01-18 RX ORDER — ONDANSETRON 2 MG/ML
4 INJECTION INTRAMUSCULAR; INTRAVENOUS ONCE
Status: COMPLETED | OUTPATIENT
Start: 2019-01-18 | End: 2019-01-18

## 2019-01-18 RX ORDER — LISINOPRIL 5 MG/1
5 TABLET ORAL DAILY
Status: DISCONTINUED | OUTPATIENT
Start: 2019-01-19 | End: 2019-01-20 | Stop reason: HOSPADM

## 2019-01-18 RX ORDER — MORPHINE SULFATE 15 MG/1
15 TABLET ORAL EVERY 4 HOURS PRN
Status: DISCONTINUED | OUTPATIENT
Start: 2019-01-18 | End: 2019-01-20 | Stop reason: HOSPADM

## 2019-01-18 RX ORDER — ACETAMINOPHEN 325 MG/1
650 TABLET ORAL
Status: DISCONTINUED | OUTPATIENT
Start: 2019-01-18 | End: 2019-01-20 | Stop reason: HOSPADM

## 2019-01-18 RX ORDER — LORAZEPAM 1 MG/1
2 TABLET ORAL EVERY 8 HOURS PRN
Status: DISCONTINUED | OUTPATIENT
Start: 2019-01-18 | End: 2019-01-20 | Stop reason: HOSPADM

## 2019-01-18 RX ORDER — POTASSIUM CHLORIDE 14.9 MG/ML
20 INJECTION INTRAVENOUS ONCE
Status: COMPLETED | OUTPATIENT
Start: 2019-01-18 | End: 2019-01-18

## 2019-01-18 RX ORDER — SUCRALFATE ORAL 1 G/10ML
1000 SUSPENSION ORAL
Status: DISCONTINUED | OUTPATIENT
Start: 2019-01-18 | End: 2019-01-20 | Stop reason: HOSPADM

## 2019-01-18 RX ORDER — POTASSIUM CHLORIDE 14.9 MG/ML
20 INJECTION INTRAVENOUS
Status: COMPLETED | OUTPATIENT
Start: 2019-01-19 | End: 2019-01-19

## 2019-01-18 RX ORDER — PRAVASTATIN SODIUM 40 MG
40 TABLET ORAL
Status: DISCONTINUED | OUTPATIENT
Start: 2019-01-18 | End: 2019-01-20 | Stop reason: HOSPADM

## 2019-01-18 RX ORDER — ONDANSETRON 2 MG/ML
4 INJECTION INTRAMUSCULAR; INTRAVENOUS EVERY 6 HOURS PRN
Status: DISCONTINUED | OUTPATIENT
Start: 2019-01-18 | End: 2019-01-20

## 2019-01-18 RX ORDER — SODIUM CHLORIDE 9 MG/ML
100 INJECTION, SOLUTION INTRAVENOUS CONTINUOUS
Status: DISCONTINUED | OUTPATIENT
Start: 2019-01-18 | End: 2019-01-20 | Stop reason: HOSPADM

## 2019-01-18 RX ORDER — POTASSIUM CHLORIDE 20 MEQ/1
40 TABLET, EXTENDED RELEASE ORAL ONCE
Status: COMPLETED | OUTPATIENT
Start: 2019-01-18 | End: 2019-01-18

## 2019-01-18 RX ORDER — GABAPENTIN 100 MG/1
100 CAPSULE ORAL 2 TIMES DAILY
Status: DISCONTINUED | OUTPATIENT
Start: 2019-01-18 | End: 2019-01-20 | Stop reason: HOSPADM

## 2019-01-18 RX ORDER — PANTOPRAZOLE SODIUM 40 MG/1
40 TABLET, DELAYED RELEASE ORAL
Status: DISCONTINUED | OUTPATIENT
Start: 2019-01-19 | End: 2019-01-20 | Stop reason: HOSPADM

## 2019-01-18 RX ORDER — CLOTRIMAZOLE 10 MG/1
10 LOZENGE ORAL; TOPICAL
Status: DISCONTINUED | OUTPATIENT
Start: 2019-01-18 | End: 2019-01-20 | Stop reason: HOSPADM

## 2019-01-18 RX ORDER — FERROUS SULFATE 325(65) MG
325 TABLET ORAL
Status: DISCONTINUED | OUTPATIENT
Start: 2019-01-19 | End: 2019-01-20 | Stop reason: HOSPADM

## 2019-01-18 RX ORDER — MELATONIN
2000 DAILY
Status: DISCONTINUED | OUTPATIENT
Start: 2019-01-19 | End: 2019-01-20 | Stop reason: HOSPADM

## 2019-01-18 RX ORDER — MAGNESIUM SULFATE HEPTAHYDRATE 40 MG/ML
2 INJECTION, SOLUTION INTRAVENOUS ONCE
Status: COMPLETED | OUTPATIENT
Start: 2019-01-18 | End: 2019-01-18

## 2019-01-18 RX ORDER — POTASSIUM CHLORIDE 29.8 MG/ML
40 INJECTION INTRAVENOUS ONCE
Status: DISCONTINUED | OUTPATIENT
Start: 2019-01-19 | End: 2019-01-18 | Stop reason: SDUPTHER

## 2019-01-18 RX ADMIN — MORPHINE SULFATE 2 MG: 2 INJECTION, SOLUTION INTRAMUSCULAR; INTRAVENOUS at 15:06

## 2019-01-18 RX ADMIN — ONDANSETRON 4 MG: 2 INJECTION INTRAMUSCULAR; INTRAVENOUS at 18:46

## 2019-01-18 RX ADMIN — GABAPENTIN 100 MG: 100 CAPSULE ORAL at 18:46

## 2019-01-18 RX ADMIN — POTASSIUM CHLORIDE 20 MEQ: 14.9 INJECTION, SOLUTION INTRAVENOUS at 14:58

## 2019-01-18 RX ADMIN — ONDANSETRON 4 MG: 2 INJECTION INTRAMUSCULAR; INTRAVENOUS at 13:15

## 2019-01-18 RX ADMIN — SODIUM CHLORIDE 100 ML/HR: 0.9 INJECTION, SOLUTION INTRAVENOUS at 18:06

## 2019-01-18 RX ADMIN — ACETAMINOPHEN 650 MG: 325 TABLET, FILM COATED ORAL at 22:25

## 2019-01-18 RX ADMIN — SODIUM CHLORIDE 1000 ML: 0.9 INJECTION, SOLUTION INTRAVENOUS at 13:08

## 2019-01-18 RX ADMIN — MAGNESIUM SULFATE HEPTAHYDRATE 2 G: 40 INJECTION, SOLUTION INTRAVENOUS at 13:59

## 2019-01-18 RX ADMIN — ACETAMINOPHEN 650 MG: 325 TABLET, FILM COATED ORAL at 18:46

## 2019-01-18 RX ADMIN — SUCRALFATE 1000 MG: 1 SUSPENSION ORAL at 18:45

## 2019-01-18 RX ADMIN — CALCIUM GLUCONATE 1 G: 98 INJECTION, SOLUTION INTRAVENOUS at 22:23

## 2019-01-18 RX ADMIN — CALCIUM GLUCONATE 1 G: 98 INJECTION, SOLUTION INTRAVENOUS at 18:06

## 2019-01-18 RX ADMIN — CLOTRIMAZOLE 10 MG: 10 LOZENGE ORAL at 18:46

## 2019-01-18 RX ADMIN — SODIUM CHLORIDE 100 ML/HR: 0.9 INJECTION, SOLUTION INTRAVENOUS at 14:58

## 2019-01-18 RX ADMIN — PRAVASTATIN SODIUM 40 MG: 40 TABLET ORAL at 18:46

## 2019-01-18 RX ADMIN — CLOTRIMAZOLE 10 MG: 10 LOZENGE ORAL at 22:23

## 2019-01-18 RX ADMIN — POTASSIUM CHLORIDE 40 MEQ: 1500 TABLET, EXTENDED RELEASE ORAL at 14:06

## 2019-01-18 NOTE — ASSESSMENT & PLAN NOTE
· POA, calcium noted to be 6 2 in the ER  When accounted for albumin at 2 8 her calcium is actually 7 1  Status post 1 gram of Calcium Gluconate in the ER     · Will order 1 more gram of IV calcium gluconate  · Recheck CMP in AM

## 2019-01-18 NOTE — ED PROVIDER NOTES
History  Chief Complaint   Patient presents with    Weakness - Generalized     Pt states that she has been having vomiting and diarrhea for the past couple days, Pt also states that she has some facial tingling  Patient is a 69-year-old female who presents to the emergency department relating that she finished radiation therapy last week  She relates I still have vomiting and diarrhea    She notes that she started with vomiting and diarrhea yesterday morning  She relates that she felt okay overnight but then had return of the nausea and vomiting today  She relates that she was advised to have x-rays of both femurs following a nuclear bone scan  This had been ordered by Dr Roland Morse and she relates that she was in the radiology department about to have the x-rays when she started experiencing a tingling in both sides of the face  She then noticed this in both arms and now in the entire body  It has calmed slightly though still affects the generalized areas  She did not appreciate any weakness along with this nor difficulty with speech  She has had some mild difficulties with swallowing due to thrush though reports use of the nystatin medication for this  She relates that she has been eating and drinking minimally over the last few days due to decreased appetite, nausea and vomiting  She has vomited up some of her medications at times  She relates that she did receive Faslodex 2 days ago along with IV fluids  She relates that her white blood cell count was down at that time  She was scheduled to start Ibrance on Monday  She was recently found to have extensive metastatic disease felt to be likely from known breast cancer treated in 1996 and again in 2004  Prior to Admission Medications   Prescriptions Last Dose Informant Patient Reported? Taking?    Cholecalciferol (VITAMIN D3) 2000 units TABS  Self Yes Yes   Sig: Take 2,000 Units by mouth daily   LORazepam (ATIVAN) 1 mg tablet  Self No Yes   Sig: May use one daily PRN anxiety/panic  Take 2mg (2 tablets) qhs  MULTIPLE VITAMINS PO Not Taking at Unknown time Self Yes No   Sig: Take by mouth   Omega-3 Fatty Acids (FISH OIL PO) Not Taking at Unknown time Self Yes No   Sig: Take 2 g by mouth   Palbociclib (IBRANCE) 125 MG Not Taking at Unknown time  No No   Sig: Take 1 capsule (125 mg total) by mouth daily For 21 days then 7 off   Patient not taking: Reported on 1/16/2019    acetaminophen (TYLENOL) 325 mg tablet  Self No Yes   Sig: Take 2 tablets (650 mg total) by mouth 4 (four) times a day (with meals and at bedtime)   ferrous sulfate 325 (65 Fe) mg tablet  Self No Yes   Sig: Take 1 tablet (325 mg total) by mouth daily with breakfast   gabapentin (NEURONTIN) 100 mg capsule  Self No Yes   Sig: Take 1 capsule (100 mg total) by mouth 2 (two) times a day   liraglutide (VICTOZA) injection Not Taking at Unknown time Self Yes No   Sig: Inject 0 6 mg under the skin daily     lisinopril (ZESTRIL) 5 mg tablet  Self Yes Yes   Sig: Take 5 mg by mouth daily     metFORMIN (GLUMETZA) 1000 MG (MOD) 24 hr tablet Not Taking at Unknown time Self Yes No   Sig: Take 1,000 mg by mouth 2 (two) times a day with meals     morphine (MSIR) 15 mg tablet  Self No Yes   Sig: Use 1 tab TID scheduled to anticipate cancer pain  May have 1-2 tabs q4hrs PRN mod-severe pain     nystatin (MYCOSTATIN) 100,000 units/mL suspension   No Yes   Sig: Apply 5 mL (500,000 Units total) to the mouth or throat 4 (four) times a day   omeprazole (PriLOSEC) 40 MG capsule   No Yes   Sig: Take 1 capsule (40 mg total) by mouth daily   ondansetron (ZOFRAN-ODT) 4 mg disintegrating tablet   No Yes   Sig: Take 1 tablet (4 mg total) by mouth every 6 (six) hours as needed for nausea or vomiting   polyethylene glycol (MIRALAX) 17 g packet Not Taking at Unknown time Self No No   Sig: Take 17 g by mouth daily   Patient not taking: Reported on 1/15/2019    pravastatin (PRAVACHOL) 40 mg tablet  Self Yes Yes   Sig: Take 40 mg by mouth daily     senna (SENOKOT) 8 6 mg Not Taking at Unknown time Self No No   Sig: Take 1 tablet (8 6 mg total) by mouth daily at bedtime Hold for loose stool  Patient not taking: Reported on 1/15/2019    sucralfate (CARAFATE) 1 g/10 mL suspension   No Yes   Sig: Take 10 mL (1 g total) by mouth 2 (two) times a day      Facility-Administered Medications: None       Past Medical History:   Diagnosis Date    Cancer [facility])     breast 1996    Cancer ([place] [address] )     bone      Compression fracture of thoracic vertebra ([place] [address] )     Diabetes mellitus ([place] [address] )     Tendonitis        Past Surgical History:   Procedure Laterality Date    CHOLECYSTECTOMY      MASTECTOMY      Right side reconstruction    NECK SURGERY      TONSILLECTOMY         History reviewed  No pertinent family history  I have reviewed and agree with the history as documented  Social History   Substance Use Topics    Smoking status: Never Smoker    Smokeless tobacco: Never Used    Alcohol use No        Review of Systems   Constitutional: Positive for chills  Negative for diaphoresis (Patient denies having appreciated any rigors) and fever  Cardiovascular: Negative for chest pain  Skin: Negative for rash  Neurological: Negative for dizziness and headaches  All other systems reviewed and are negative  Physical Exam  Physical Exam   Constitutional: She is oriented to person, place, and time  She appears well-developed  Very thin habitus   HENT:   Head: Normocephalic  Eyes: Conjunctivae and EOM are normal    Cardiovascular: Normal rate and regular rhythm  Pulses:       Posterior tibial pulses are 2+ on the right side, and 2+ on the left side  Pulmonary/Chest: Effort normal and breath sounds normal    Abdominal: Soft  Bowel sounds are normal  There is tenderness (Upper abdomen without guarding)  Musculoskeletal: Normal range of motion  She exhibits no edema     Neurological: She is alert and oriented to person, place, and time    Clear speech  No facial asymmetry/ deficit appreciated  Pupils briskly reactive to light  EOMI  No nystagmus  Strong eye closure & symmetric brow raise  Ability to insufflate cheeks, protrude tongue midline & range this laterally  Symmetric/ intact sensation in the upper, mid & lower portions of the face  5/5 strength on head turn, shoulder shrug, bicep& tricep movement against resistance b/l   5/5 strength on b/l hand , pincer grasp, finger abduction, dorsi & volar flexion, hip flexion, dorsi & plantar flexion  Symmetric grossly intact sensation in the UE & LE  No dysmetria  Skin: Skin is warm and dry  Psychiatric: She has a normal mood and affect  Her behavior is normal    Nursing note and vitals reviewed        Vital Signs  ED Triage Vitals [01/18/19 1244]   Temperature Pulse Respirations Blood Pressure SpO2   97 9 °F (36 6 °C) 76 16 144/75 100 %      Temp Source Heart Rate Source Patient Position - Orthostatic VS BP Location FiO2 (%)   Oral Monitor Lying Left arm --      Pain Score       No Pain           Vitals:    01/18/19 1244 01/18/19 1510   BP: 144/75 125/69   Pulse: 76 70   Patient Position - Orthostatic VS: Lying Lying       Visual Acuity  Visual Acuity      Most Recent Value   L Pupil Size (mm)  3          ED Medications  Medications   potassium chloride 20 mEq IVPB (premix) (20 mEq Intravenous New Bag 1/18/19 1458)   calcium gluconate 1 g in sodium chloride 0 9 % 100 mL IVPB (not administered)   sodium chloride 0 9 % infusion (100 mL/hr Intravenous New Bag 1/18/19 1458)   sodium chloride 0 9 % bolus 1,000 mL (0 mL Intravenous Stopped 1/18/19 1518)   ondansetron (ZOFRAN) injection 4 mg (4 mg Intravenous Given 1/18/19 1315)   magnesium sulfate 2 g/50 mL IVPB (premix) 2 g (0 g Intravenous Stopped 1/18/19 1455)   potassium chloride (K-DUR,KLOR-CON) CR tablet 40 mEq (40 mEq Oral Given 1/18/19 1406)   morphine injection 2 mg (2 mg Intravenous Given 1/18/19 1506)       Diagnostic Studies  Results Reviewed     Procedure Component Value Units Date/Time    Comprehensive metabolic panel [[id number]]  (Abnormal) Collected:  01/18/19 1317    Lab Status:  Final result Specimen:  Blood from Arm, Left Updated:  01/18/19 1358     Sodium 141 mmol/L      Potassium 2 6 (LL) mmol/L      Chloride 106 mmol/L      CO2 28 mmol/L      ANION GAP 7 mmol/L      BUN 5 mg/dL      Creatinine 0 55 (L) mg/dL      Glucose 113 mg/dL      Calcium 6 2 (L) mg/dL      AST 24 U/L      ALT 29 U/L      Alkaline Phosphatase 182 (H) U/L      Total Protein 5 7 (L) g/dL      Albumin 2 8 (L) g/dL      Total Bilirubin 1 00 mg/dL      eGFR 97 ml/min/1 73sq m     Narrative:         National Kidney Disease Education Program recommendations are as follows:  GFR calculation is accurate only with a steady state creatinine  Chronic Kidney disease less than 60 ml/min/1 73 sq  meters  Kidney failure less than 15 ml/min/1 73 sq  meters      CBC and differential [[id number]]  (Abnormal) Collected:  01/18/19 1317    Lab Status:  Final result Specimen:  Blood from Arm, Left Updated:  01/18/19 1347     WBC 1 96 (LL) Thousand/uL      RBC 4 03 Million/uL      Hemoglobin 11 0 (L) g/dL      Hematocrit 33 9 (L) %      MCV 84 fL      MCH 27 3 pg      MCHC 32 4 g/dL      RDW 16 2 (H) %      MPV 9 8 fL      Platelets 767 Thousands/uL      nRBC 0 /100 WBCs      Neutrophils Relative 51 %      Immat GRANS % 2 %      Lymphocytes Relative 11 (L) %      Monocytes Relative 20 (H) %      Eosinophils Relative 15 (H) %      Basophils Relative 1 %      Neutrophils Absolute 1 00 (L) Thousands/µL      Immature Grans Absolute 0 04 Thousand/uL      Lymphocytes Absolute 0 22 (L) Thousands/µL      Monocytes Absolute 0 39 Thousand/µL      Eosinophils Absolute 0 30 Thousand/µL      Basophils Absolute 0 01 Thousands/µL     Magnesium [[id number]]  (Abnormal) Collected:  01/18/19 1317    Lab Status:  Final result Specimen:  Blood from Arm, Left Updated:  01/18/19 8096 Magnesium 1 4 (L) mg/dL     Protime-INR [[id number]]  (Abnormal) Collected:  01/18/19 1317    Lab Status:  Final result Specimen:  Blood from Arm, Left Updated:  01/18/19 1338     Protime 15 8 (H) seconds      INR 1 30 (H)    APTT [[id number]]  (Normal) Collected:  01/18/19 1317    Lab Status:  Final result Specimen:  Blood from Arm, Left Updated:  01/18/19 1338     PTT 32 seconds     Lipase [[id number]]  (Abnormal) Collected:  01/18/19 1317    Lab Status:  Final result Specimen:  Blood from Arm, Left Updated:  01/18/19 1333     Lipase 40 (L) u/L     Clostridium difficile toxin by PCR [[id number]]     Lab Status:  No result Specimen:  Stool     Stool Enteric Bacterial Panel by PCR [[id number]]     Lab Status:  No result Specimen:  Stool                  XR femur 2 views LEFT   Final Result by Shelley Briones MD (01/18 1524)      Significant metastatic lesions seen within the femoral shaft most predominantly within the lower diaphysis region with some cortical thinning along the anterior aspect of the endosteal surface of the cortex  This might predispose to pathologic fracture  Consider orthopedic consultation  Workstation performed: [id number]         XR femur 2 views RIGHT   Final Result by Shelley Briones MD (01/18 1520)      No significant lytic or blastic bone lesion seen in the right femur to correspond to the bone scan abnormality              Workstation performed: [id number]                    Procedures  ECG 12 Lead Documentation  Date/Time: 1/18/2019 2:45 PM  Performed by: Harsh Ash  Authorized by: Harsh Ash     ECG reviewed by me, the ED Provider: yes    Patient location:  ED and bedside  Rate:     ECG rate:  69    ECG rate assessment: normal    Rhythm:     Rhythm: sinus rhythm    Ectopy:     Ectopy: none    QRS:     QRS axis:  Normal    QRS intervals:  Normal  Conduction:     Conduction: normal    ST segments:     ST segments:  Normal  T waves:     T waves: normal    Other findings:     Other findings: U wave             Phone Contacts  ED Phone Contact    ED Course  ED Course as of Jan 18 1605   Fri Jan 18, 2019   1350 Magnesium is returned at 1 4  IV repletion ordered  White blood cell count has lowered from 2 5 to 1 9 and then last couple of days  This is not unexpected  Patient does not report fevers or chills and does not have abnormal vital signs/ SIRS    1402 Potassium level just returned at 2 6  IV and oral potassium ordered to raise this  This is likely factoring into patient's paresthesias as initially suspected  Femur x-rays will be completed  New order required for these given ED location of test performance  1405 Calcium 6 2  Corrected this is 7 2     1442 Patient was prescribed calcium supplements though notes that she had difficulty taking these given there large size  She recently started calcium chewable tablets but did not tolerate these well given her significant nausea over the last few days  Discussed study results and plan for admission to [facility]  Patient demonstrates understanding and is agreeable to this  1542 Patient updated on study results from femur x-rays  She has been seen by [facility]  Abdominal discomfort improved                                MDM  The patient presented with a condition in which there was a high probability of imminent or life-threatening deterioration, and critical care services (excluding separately billable procedures) totalled 30-74 minutes (30)          Disposition  Final diagnoses:   Paresthesias   Hypocalcemia   Hypomagnesemia   Hypokalemia   Metastatic cancer ([facility] [address] )     Time reflects when diagnosis was documented in both MDM as applicable and the Disposition within this note     Time User Action Codes Description Comment    1/18/2019  2:50 PM Erin Garcia Add [R20 2] Paresthesias     1/18/2019  2:50 PM Erin WYMAN Add [E83 51] Hypocalcemia     1/18/2019  2:50 PM Yuly Carmella Add [E83 42] Hypomagnesemia     1/18/2019  2:51 PM Yuly Carmella A Add [E87 6] Hypokalemia     1/18/2019  2:51 PM Yuly Carmella A Add [C79 9] Metastatic cancer ([facility] [address] )     1/18/2019  4:01 PM Michael Mortensen Modify [R20 2] Paresthesias     1/18/2019  4:01 PM Michael Mortensen Modify [E83 51] Hypocalcemia     1/18/2019  4:01 PM Michael Mortensen Modify [E83 42] Hypomagnesemia     1/18/2019  4:01 PM Michael Mortensen Modify [E87 6] Hypokalemia     1/18/2019  4:01 PM Michael Mortensen Add [C50 911,  C79 51] Breast cancer metastasized to bone, right ([facility] [address] )     1/18/2019  4:01 PM Michael Mortensen Add [E43] Severe protein-calorie malnutrition [facility])       ED Disposition     ED Disposition Condition Comment    Admit  Case was discussed with Dr Joey Tucker and the patient's admission status was agreed to be Admission Status: inpatient status to the service of Dr Joey Tucker  Follow-up Information    None         Patient's Medications   Discharge Prescriptions    No medications on file     No discharge procedures on file      ED Provider  Electronically Signed by           James Keyes MD  01/18/19 8218

## 2019-01-18 NOTE — ASSESSMENT & PLAN NOTE
· POA, first noted when she was going for her X-Rays today  Bilateral in nature in the face and arms  Resolved  Non-focal neurologic exam  Doubt stroke   Suspect paresthesias from profound electrolyte derangements   · Admit patient to med/surg under observation status with telemetry monitoring   · Repletion as per respective problems   · Monitor labs

## 2019-01-18 NOTE — H&P
[address] [facility]  H&P- Valencia Valentin 1950, 76 y o  female MRN: [id number]    Unit/Bed#: IESHA Encounter: [id number]    Primary Care Provider: Dayanna Santos MD   Date and time admitted to hospital: 1/18/2019 12:39 PM        * Paresthesias   Assessment & Plan    · POA, first noted when she was going for her X-Rays today  Bilateral in nature in the face and arms  Resolved  Non-focal neurologic exam  Doubt stroke  Suspect paresthesias from profound electrolyte derangements   · Admit patient to med/surg under observation status with telemetry monitoring   · Repletion as per respective problems   · Monitor labs      Hypokalemia   Assessment & Plan    · POA, noted to be 2 6 in the ER  Status post 60 mEq KCl (20 IV and 40 PO) in the ER   · Will order 40 mEq IV for total of 100 mEq  · Recheck Potassium  · Telemetry for 24 hours      Hypomagnesemia   Assessment & Plan    · POA, noted to be 1 4 Status post 2 gram of Mg in the ER   · Recheck Mg in AM      Hypocalcemia   Assessment & Plan    · POA, calcium noted to be 6 2 in the ER  When accounted for albumin at 2 8 her calcium is actually 7 1  Status post 1 gram of Calcium Gluconate in the ER  · Will order 1 more gram of IV calcium gluconate  · Recheck CMP in AM      Leukopenia   Assessment & Plan    · Noted leukopenia at 1 96 as well as an [address] of 1000  This is relatively stable for her readings  · Monitor for fevers  · Monitor CBCD daily   · Consider hematology evaluation if she becomes absolutely neutropenic      Severe protein-calorie malnutrition ([address] )   Assessment & Plan    Malnutrition Findings:           BMI Findings: Body mass index is 20 23 kg/m²  · Appreciate nutritionist consultation      Breast cancer metastasized to bone, right [facility])   Assessment & Plan    · Patient is known to Dr Ifeoma Smith  She started Faslodex infusion and is due to be started on Ibrance, but this is being held as per Dr Ifeoma Smith   Now noted is metastasis to the right femur   · Given new x-ray findings will consult oncology   · Should be started with Palliative Care and she has an appointment next week      Compression fracture of thoracic spine, non-traumatic ([address] )   Assessment & Plan    · This is a known problem  She has a back brace  · Continue to use back brace as directed        VTE Prophylaxis: Enoxaparin (Lovenox)  / reason for no mechanical VTE prophylaxis none needed as per VTE protocol   Code Status: Full Code   POLST: POLST form is not discussed and not completed at this time  Discussion with family: Discussed with multiple family members at bedside     Anticipated Length of Stay:  Patient will be admitted on an Inpatient basis with an anticipated length of stay of  Greater than 2 midnights  Justification for Hospital Stay: Multiple electrolyte abnormalities needing IV replacement     Total Time for Visit, including Counseling / Coordination of Care: 1 hour  Greater than 50% of this total time spent on direct patient counseling and coordination of care  Chief Complaint:   "My face started feeling funny"    History of Present Illness:    Coral Gomez is a 76 y o  female with a remote history of breast cancer status post right mastectomy now metastatic to bone who presents with facial paresthesias  The patient had been sent for x-rays of her bilateral femurs by Dr Naima Camacho, but when she got to the x-ray she stated that she started to feel numbness on both sides of her face  She reports that this had spread to her arms and the technician there mentioned that she should go to the ER for further evaluation  The patient denied any dizziness, change in vision, or headaches  She denied difficulty speaking or swallowing  She reports that this is resolving at this time  Otherwise the patient reports that she has had poor appetite that has been ongoing since around her last hospital admission around [place]   She reports that since then she has not eaten much of anything  She reports that she gets nauseous frequently and occasionally has vomiting and/or dry heaves  She reports that over the last few days she was not even able to keep her pills down  She states that additionally she gets diarrhea that was black for some time, but now has gone back to being regular brown in color  She reports that she was taking Zofran and Imodium for these problems which helped slightly, but sometimes she would have break through episodes of vomiting or diarrhea  She reports that she has just started Faslodex infusions, but is unsure if these have affected her differently in any way  She reports that she was supposed to see Palliative Care earlier this week, but could not make the appointment due to not feeling well  She states that she has an appointment next Thursday with GI to do an EGD to make sure she doesn't have any damage from radiation that she had done to her spine  She states that during her last admission she had been on steroids for 10 days, but they were stopped and she reports that they did not really help her appetite at all  Review of Systems:    Review of Systems   Constitutional: Positive for appetite change and fatigue  Negative for chills, diaphoresis and fever  HENT: Positive for trouble swallowing  Negative for congestion, rhinorrhea and sore throat  Eyes: Negative for visual disturbance  Respiratory: Negative for cough, chest tightness, shortness of breath and wheezing  Cardiovascular: Negative for chest pain, palpitations and leg swelling  Gastrointestinal: Positive for diarrhea, nausea and vomiting  Negative for abdominal pain, blood in stool and constipation  Genitourinary: Negative for dysuria and hematuria  Musculoskeletal: Positive for back pain  Negative for arthralgias and myalgias  Neurological: Positive for weakness and numbness  Negative for dizziness, syncope, speech difficulty, light-headedness and headaches     All other systems reviewed and are negative  Past Medical and Surgical History:     Past Medical History:   Diagnosis Date    Cancer [facility])     breast 1996    [facility]     bone      Compression [facility] of [facility] vertebra [facility]     [facility]     Tendonitis        Past Surgical History:   Procedure Laterality Date    CHOLECYSTECTOMY      MASTECTOMY      Right side reconstruction    NECK SURGERY      TONSILLECTOMY         Meds/Allergies:    Prior to Admission medications    Medication Sig Start Date End Date Taking? Authorizing Provider   acetaminophen (TYLENOL) 325 mg tablet Take 2 tablets (650 mg total) by mouth 4 (four) times a day (with meals and at bedtime) 12/24/18  Yes Harper Prasad MD   Cholecalciferol (VITAMIN D3) 2000 units TABS Take 2,000 Units by mouth daily   Yes Historical Provider, MD   ferrous sulfate 325 (65 Fe) mg tablet Take 1 tablet (325 mg total) by mouth daily with breakfast 12/25/18  Yes Harper Prasad MD   gabapentin (NEURONTIN) 100 mg capsule Take 1 capsule (100 mg total) by mouth 2 (two) times a day 12/24/18  Yes Harper Prasad MD   lisinopril (ZESTRIL) 5 mg tablet Take 5 mg by mouth daily     Yes Historical Provider, MD   LORazepam (ATIVAN) 1 mg tablet May use one daily PRN anxiety/panic  Take 2mg (2 tablets) qhs  12/24/18  Yes Harper Prasad MD   morphine (MSIR) 15 mg tablet Use 1 tab TID scheduled to anticipate cancer pain  May have 1-2 tabs q4hrs PRN mod-severe pain   12/24/18  Yes Harper Prasad MD   nystatin (MYCOSTATIN) 100,000 units/mL suspension Apply 5 mL (500,000 Units total) to the mouth or throat 4 (four) times a day 1/8/19  Yes Remberto Stubbs MD   omeprazole (PriLOSEC) 40 MG capsule Take 1 capsule (40 mg total) by mouth daily 1/15/19  Yes Luis Jordan MD   ondansetron (ZOFRAN-ODT) 4 mg disintegrating tablet Take 1 tablet (4 mg total) by mouth every 6 (six) hours as needed for nausea or vomiting 1/5/19  Yes Eddi Mitchell MD pravastatin (PRAVACHOL) 40 mg tablet Take 40 mg by mouth daily   8/15/18  Yes Historical Provider, MD   sucralfate (CARAFATE) 1 g/10 mL suspension Take 10 mL (1 g total) by mouth 2 (two) times a day 1/15/19  Yes Radha Caballero MD   liraglutide (VICTOZA) injection Inject 0 6 mg under the skin daily      Historical Provider, MD   metFORMIN (GLUMETZA) 1000 MG (MOD) 24 hr tablet Take 1,000 mg by mouth 2 (two) times a day with meals      Historical Provider, MD   MULTIPLE VITAMINS PO Take by mouth    Historical Provider, MD   Omega-3 Fatty Acids (FISH OIL PO) Take 2 g by mouth    Historical Provider, MD   Palbociclib (IBRANCE) 125 MG Take 1 capsule (125 mg total) by mouth daily For 21 days then 7 off  Patient not taking: Reported on 1/16/2019 12/27/18   Lester Joaquin MD   polyethylene glycol (MIRALAX) 17 g packet Take 17 g by mouth daily  Patient not taking: Reported on 1/15/2019  12/24/18   Paul Armstrong MD   senna (SENOKOT) 8 6 mg Take 1 tablet (8 6 mg total) by mouth daily at bedtime Hold for loose stool  Patient not taking: Reported on 1/15/2019  12/24/18   Paul Armstrong MD     I have reviewed home medications with patient personally  Allergies: No Known Allergies    Social History:     Marital Status:    Occupation: None  Patient Pre-hospital Living Situation: Home  Patient Pre-hospital Level of Mobility: Full  Patient Pre-hospital Diet Restrictions: None  Substance Use History:   History   Alcohol Use No     History   Smoking Status    Never Smoker   Smokeless Tobacco    Never Used     History   Drug Use No       Family History:    History reviewed  No pertinent family history      Physical Exam:     Vitals:   Blood Pressure: 125/69 (01/18/19 1510)  Pulse: 70 (01/18/19 1510)  Temperature: 97 9 °F (36 6 °C) (01/18/19 1244)  Temp Source: Oral (01/18/19 1244)  Respirations: 17 (01/18/19 1510)  Weight - Scale: 51 8 kg (114 lb 3 2 oz) (01/18/19 1244)  SpO2: 100 % (01/18/19 1510)    Physical Exam   Constitutional: She is oriented to person, place, and time  Vital signs are normal  She appears well-developed  She appears cachectic  Non-toxic appearance  She appears ill  No distress  HENT:   Head: Normocephalic and atraumatic  Mouth/Throat: Mucous membranes are dry  Does have some thrush      Eyes: Pupils are equal, round, and reactive to light  Conjunctivae and EOM are normal  No scleral icterus  Right pupil is round and reactive  Left pupil is round and reactive  Pupils are equal    Neck: Neck supple  Cardiovascular: Normal rate, regular rhythm, S1 normal, S2 normal, normal heart sounds and intact distal pulses  Exam reveals no S3 and no S4  No murmur heard  Pulmonary/Chest: Effort normal and breath sounds normal  No accessory muscle usage  No respiratory distress  She has no decreased breath sounds  She has no wheezes  She has no rhonchi  She has no rales  She exhibits no tenderness  Abdominal: Soft  Bowel sounds are normal  She exhibits no distension and no mass  There is no tenderness  There is no rigidity, no rebound and no guarding  Neurological: She is alert and oriented to person, place, and time  She has normal strength  She is not disoriented  She displays no tremor  No cranial nerve deficit or sensory deficit  She displays no seizure activity  GCS eye subscore is 4  GCS verbal subscore is 5  GCS motor subscore is 6  Skin: Skin is warm and dry  Additional Data:     Lab Results: I have personally reviewed pertinent reports          Results from last 7 days  Lab Units 01/18/19  1317 01/16/19  0503   WBC Thousand/uL 1 96* 2 55*   HEMOGLOBIN g/dL 11 0* 11 5   HEMATOCRIT % 33 9* 35 7   PLATELETS Thousands/uL 165 174   BANDS PCT %  --  2   NEUTROS PCT % 51  --    LYMPHS PCT % 11*  --    LYMPHO PCT %  --  18   MONOS PCT % 20*  --    MONO PCT %  --  19*   EOS PCT % 15* 19*       Results from last 7 days  Lab Units 01/18/19  1317   SODIUM mmol/L 141 POTASSIUM mmol/L 2 6*   CHLORIDE mmol/L 106   CO2 mmol/L 28   BUN mg/dL 5   CREATININE mg/dL 0 55*   ANION GAP mmol/L 7   CALCIUM mg/dL 6 2*   ALBUMIN g/dL 2 8*   TOTAL BILIRUBIN mg/dL 1 00   ALK PHOS U/L 182*   ALT U/L 29   AST U/L 24   GLUCOSE RANDOM mg/dL 113       Results from last 7 days  Lab Units 01/18/19  1317   INR  1 30*                   Imaging: I have personally reviewed pertinent reports  XR femur 2 views LEFT   Final Result by Margaux Barba MD (01/18 9999)      Significant metastatic lesions seen within the femoral shaft most predominantly within the lower diaphysis region with some cortical thinning along the anterior aspect of the endosteal surface of the cortex  This might predispose to pathologic fracture  Consider orthopedic consultation  Workstation performed: [id number]         XR femur 2 views RIGHT   Final Result by Margaux Barba MD (01/18 0904)      No significant lytic or blastic bone lesion seen in the right femur to correspond to the bone scan abnormality  Workstation performed: [id number]             EKG, Pathology, and Other Studies Reviewed on Admission:   · EKG: NSR, 69 BPM  · XR Left Femur: Significant metastatic lesions in the femoral shaft most predominantly within the lower diaphysis region with some cortical thinning along the anterior aspect of the endosteal surface of the cortex  This might predispose to pathological fracture  · XR Right Femur: No significant lytic or blastic bone lesion seen in the right femur to correspond to the bone scan abnormality  Allscripts / Epic Records Reviewed: Yes     ** Please Note: This note has been constructed using a voice recognition system   **

## 2019-01-18 NOTE — ASSESSMENT & PLAN NOTE
· POA, noted to be 2 6 in the ER   Status post 60 mEq KCl (20 IV and 40 PO) in the ER   · Will order 40 mEq IV for total of 100 mEq  · Recheck Potassium  · Telemetry for 24 hours

## 2019-01-18 NOTE — ASSESSMENT & PLAN NOTE
· Noted leukopenia at 1 96 as well as an [address] of 1000   This is relatively stable for her readings  · Monitor for fevers  · Monitor CBCD daily   · Consider hematology evaluation if she becomes absolutely neutropenic

## 2019-01-18 NOTE — ASSESSMENT & PLAN NOTE
· Patient is known to Dr Ilan Sousa  She started Faslodex infusion and is due to be started on Ibrance, but this is being held as per Dr Ilan Sousa   Now noted is metastasis to the right femur   · Given new x-ray findings will consult oncology   · Should be started with Palliative Care and she has an appointment next week

## 2019-01-18 NOTE — ASSESSMENT & PLAN NOTE
Malnutrition Findings:           BMI Findings: Body mass index is 20 23 kg/m²     · Appreciate nutritionist consultation

## 2019-01-19 PROBLEM — E83.42 HYPOMAGNESEMIA: Status: RESOLVED | Noted: 2018-12-23 | Resolved: 2019-01-19

## 2019-01-19 LAB
ALBUMIN SERPL BCP-MCNC: 2.2 G/DL (ref 3.5–5)
ALP SERPL-CCNC: 153 U/L (ref 46–116)
ALT SERPL W P-5'-P-CCNC: 20 U/L (ref 12–78)
ANION GAP SERPL CALCULATED.3IONS-SCNC: 8 MMOL/L (ref 4–13)
AST SERPL W P-5'-P-CCNC: 17 U/L (ref 5–45)
BASOPHILS # BLD AUTO: 0.01 THOUSANDS/ΜL (ref 0–0.1)
BASOPHILS NFR BLD AUTO: 0 % (ref 0–1)
BILIRUB SERPL-MCNC: 0.9 MG/DL (ref 0.2–1)
BUN SERPL-MCNC: 2 MG/DL (ref 5–25)
CALCIUM SERPL-MCNC: 6.1 MG/DL (ref 8.3–10.1)
CAMPYLOBACTER DNA SPEC NAA+PROBE: NORMAL
CHLORIDE SERPL-SCNC: 110 MMOL/L (ref 100–108)
CO2 SERPL-SCNC: 24 MMOL/L (ref 21–32)
CREAT SERPL-MCNC: 0.45 MG/DL (ref 0.6–1.3)
EOSINOPHIL # BLD AUTO: 0.37 THOUSAND/ΜL (ref 0–0.61)
EOSINOPHIL NFR BLD AUTO: 15 % (ref 0–6)
ERYTHROCYTE [DISTWIDTH] IN BLOOD BY AUTOMATED COUNT: 16.4 % (ref 11.6–15.1)
GFR SERPL CREATININE-BSD FRML MDRD: 103 ML/MIN/1.73SQ M
GLUCOSE SERPL-MCNC: 106 MG/DL (ref 65–140)
GLUCOSE SERPL-MCNC: 111 MG/DL (ref 65–140)
GLUCOSE SERPL-MCNC: 143 MG/DL (ref 65–140)
GLUCOSE SERPL-MCNC: 87 MG/DL (ref 65–140)
GLUCOSE SERPL-MCNC: 96 MG/DL (ref 65–140)
HCT VFR BLD AUTO: 30.7 % (ref 34.8–46.1)
HGB BLD-MCNC: 10 G/DL (ref 11.5–15.4)
IMM GRANULOCYTES # BLD AUTO: 0.06 THOUSAND/UL (ref 0–0.2)
IMM GRANULOCYTES NFR BLD AUTO: 2 % (ref 0–2)
LYMPHOCYTES # BLD AUTO: 0.25 THOUSANDS/ΜL (ref 0.6–4.47)
LYMPHOCYTES NFR BLD AUTO: 10 % (ref 14–44)
MAGNESIUM SERPL-MCNC: 1.6 MG/DL (ref 1.6–2.6)
MCH RBC QN AUTO: 27.2 PG (ref 26.8–34.3)
MCHC RBC AUTO-ENTMCNC: 32.6 G/DL (ref 31.4–37.4)
MCV RBC AUTO: 84 FL (ref 82–98)
MONOCYTES # BLD AUTO: 0.5 THOUSAND/ΜL (ref 0.17–1.22)
MONOCYTES NFR BLD AUTO: 20 % (ref 4–12)
NEUTROPHILS # BLD AUTO: 1.33 THOUSANDS/ΜL (ref 1.85–7.62)
NEUTS SEG NFR BLD AUTO: 53 % (ref 43–75)
NRBC BLD AUTO-RTO: 0 /100 WBCS
PLATELET # BLD AUTO: 142 THOUSANDS/UL (ref 149–390)
PMV BLD AUTO: 9.9 FL (ref 8.9–12.7)
POTASSIUM SERPL-SCNC: 3.5 MMOL/L (ref 3.5–5.3)
PROT SERPL-MCNC: 4.5 G/DL (ref 6.4–8.2)
RBC # BLD AUTO: 3.67 MILLION/UL (ref 3.81–5.12)
SALMONELLA DNA SPEC QL NAA+PROBE: NORMAL
SHIGA TOXIN STX GENE SPEC NAA+PROBE: NORMAL
SHIGELLA DNA SPEC QL NAA+PROBE: NORMAL
SODIUM SERPL-SCNC: 142 MMOL/L (ref 136–145)
WBC # BLD AUTO: 2.52 THOUSAND/UL (ref 4.31–10.16)

## 2019-01-19 PROCEDURE — 99222 1ST HOSP IP/OBS MODERATE 55: CPT | Performed by: INTERNAL MEDICINE

## 2019-01-19 PROCEDURE — 80053 COMPREHEN METABOLIC PANEL: CPT | Performed by: PHYSICIAN ASSISTANT

## 2019-01-19 PROCEDURE — 99232 SBSQ HOSP IP/OBS MODERATE 35: CPT | Performed by: INTERNAL MEDICINE

## 2019-01-19 PROCEDURE — 83735 ASSAY OF MAGNESIUM: CPT | Performed by: PHYSICIAN ASSISTANT

## 2019-01-19 PROCEDURE — 82948 REAGENT STRIP/BLOOD GLUCOSE: CPT

## 2019-01-19 PROCEDURE — 85025 COMPLETE CBC W/AUTO DIFF WBC: CPT | Performed by: PHYSICIAN ASSISTANT

## 2019-01-19 RX ORDER — POTASSIUM CHLORIDE 20 MEQ/1
40 TABLET, EXTENDED RELEASE ORAL ONCE
Status: COMPLETED | OUTPATIENT
Start: 2019-01-19 | End: 2019-01-19

## 2019-01-19 RX ADMIN — PRAVASTATIN SODIUM 40 MG: 40 TABLET ORAL at 16:04

## 2019-01-19 RX ADMIN — SUCRALFATE 1000 MG: 1 SUSPENSION ORAL at 16:04

## 2019-01-19 RX ADMIN — ONDANSETRON 4 MG: 2 INJECTION INTRAMUSCULAR; INTRAVENOUS at 02:35

## 2019-01-19 RX ADMIN — POTASSIUM CHLORIDE 40 MEQ: 1500 TABLET, EXTENDED RELEASE ORAL at 15:10

## 2019-01-19 RX ADMIN — POTASSIUM CHLORIDE 20 MEQ: 14.9 INJECTION, SOLUTION INTRAVENOUS at 02:14

## 2019-01-19 RX ADMIN — POTASSIUM CHLORIDE 20 MEQ: 14.9 INJECTION, SOLUTION INTRAVENOUS at 00:18

## 2019-01-19 RX ADMIN — FERROUS SULFATE TAB 325 MG (65 MG ELEMENTAL FE) 325 MG: 325 (65 FE) TAB at 08:43

## 2019-01-19 RX ADMIN — ACETAMINOPHEN 650 MG: 325 TABLET, FILM COATED ORAL at 08:43

## 2019-01-19 RX ADMIN — SUCRALFATE 1000 MG: 1 SUSPENSION ORAL at 06:25

## 2019-01-19 RX ADMIN — GABAPENTIN 100 MG: 100 CAPSULE ORAL at 08:43

## 2019-01-19 RX ADMIN — ACETAMINOPHEN 650 MG: 325 TABLET, FILM COATED ORAL at 16:04

## 2019-01-19 RX ADMIN — MORPHINE SULFATE 15 MG: 15 TABLET ORAL at 22:04

## 2019-01-19 RX ADMIN — ONDANSETRON 4 MG: 2 INJECTION INTRAMUSCULAR; INTRAVENOUS at 16:04

## 2019-01-19 RX ADMIN — MORPHINE SULFATE 15 MG: 15 TABLET ORAL at 08:43

## 2019-01-19 RX ADMIN — ACETAMINOPHEN 650 MG: 325 TABLET, FILM COATED ORAL at 21:31

## 2019-01-19 RX ADMIN — LISINOPRIL 5 MG: 5 TABLET ORAL at 08:43

## 2019-01-19 RX ADMIN — MORPHINE SULFATE 15 MG: 15 TABLET ORAL at 13:49

## 2019-01-19 RX ADMIN — VITAMIN D, TAB 1000IU (100/BT) 2000 UNITS: 25 TAB at 08:43

## 2019-01-19 RX ADMIN — CLOTRIMAZOLE 10 MG: 10 LOZENGE ORAL at 11:57

## 2019-01-19 RX ADMIN — CLOTRIMAZOLE 10 MG: 10 LOZENGE ORAL at 21:31

## 2019-01-19 RX ADMIN — SODIUM CHLORIDE 100 ML/HR: 0.9 INJECTION, SOLUTION INTRAVENOUS at 03:48

## 2019-01-19 RX ADMIN — CLOTRIMAZOLE 10 MG: 10 LOZENGE ORAL at 06:25

## 2019-01-19 RX ADMIN — SODIUM CHLORIDE 100 ML/HR: 0.9 INJECTION, SOLUTION INTRAVENOUS at 13:54

## 2019-01-19 RX ADMIN — ENOXAPARIN SODIUM 40 MG: 40 INJECTION SUBCUTANEOUS at 08:43

## 2019-01-19 RX ADMIN — ACETAMINOPHEN 650 MG: 325 TABLET, FILM COATED ORAL at 12:53

## 2019-01-19 RX ADMIN — CALCIUM GLUCONATE 1 G: 98 INJECTION, SOLUTION INTRAVENOUS at 15:10

## 2019-01-19 RX ADMIN — ONDANSETRON 4 MG: 2 INJECTION INTRAMUSCULAR; INTRAVENOUS at 22:05

## 2019-01-19 RX ADMIN — CLOTRIMAZOLE 10 MG: 10 LOZENGE ORAL at 15:10

## 2019-01-19 RX ADMIN — PANTOPRAZOLE SODIUM 40 MG: 40 TABLET, DELAYED RELEASE ORAL at 06:25

## 2019-01-19 RX ADMIN — GABAPENTIN 100 MG: 100 CAPSULE ORAL at 17:06

## 2019-01-19 NOTE — MALNUTRITION/BMI
This medical record reflects one or more clinical indicators suggestive of malnutrition and/or morbid obesity  Malnutrition Findings:   Malnutrition type: Acute illness  Degree of Malnutrition: Other severe protein calorie malnutrition (treated with nutrition consult, addition of oral nutrition supplements  )  Malnutrition Characteristics: Weight loss, Inadequate energy (22% weight loss x 3 months, <=50% of energy needs for >=5 days  )    BMI Findings: Body mass index is 20 03 kg/m²  See Nutrition note dated 1/19/18 for additional details  Completed nutrition assessment is viewable in the nutrition documentation

## 2019-01-19 NOTE — PHYSICIAN ADVISOR
Current patient class: Inpatient  The patient is currently on Hospital Day: 2 at [facility]        The patient was admitted to the hospital  on 1/18/19 at  for the following diagnosis:  Hypocalcemia [E83 51]  Hypokalemia [E87 6]  Hypomagnesemia [E83 42]  Metastatic cancer ([facility] [address] ) [C79 9]  Paresthesias [R20 2]  Severe protein-calorie malnutrition ([facility] ) [E43]  Facial numbness [R20 0]  Breast cancer metastasized to bone, right ([facility] [address] ) [C50 911, C79 51]       There is documentation in the medical record of an expected length of stay of at least 2 midnights  The patient is therefore expected to satisfy the 2 midnight benchmark and given the 2 midnight presumption is appropriate for INPATIENT ADMISSION  Given this expectation of a satisfying stay, CMS instructs us that the patient is most often appropriate for inpatient admission under part A provided medical necessity is documented in the chart  After review of the relevant documentation, labs, vital signs and test results, the patient is appropriate for INPATIENT ADMISSION  Admission to the hospital as an inpatient is a complex decision making process which requires the practitioner to consider the patients presenting complaint, history and physical examination and all relevant testing  With this in mind, in this case, the patient was deemed appropriate for INPATIENT ADMISSION  After review of the documentation and testing available at the time of the admission I concur with this clinical determination of medical necessity  The patient does have an inpatient admission within the previous 30 days  The patient was admitted on 1/16/19 and discharged on 1/16/19 as an inpatient  The patient therefore required readmission review  In this case the patient should be considered a SEPARATE and UNRELATED INPATIENT ADMISSION  The patient had been discharged in stable condition with a completed care plan   There were no unresolved acute medical issues at the time of discharged which would have reasonably been expected to prompt this readmission  Rationale is as follows: The patient is a 76 yrs   Female who presented to the ED at 1/18/2019 12:39 PM with a chief complaint of Weakness - Generalized (Pt states that she has been having vomiting and diarrhea for the past couple days, Pt also states that she has some facial tingling  )     Patient admitted with a report of vomiting, diarrhea decreased appetitie and paresthesias  She was recently hospitalized for back pain at which time she was found to have a compression fx of her thoracic spine which was thought  Be secondary to metastatic disease  On this admission, she was noted to have a low potassium and calcium level  She was seen by Heme/Onc and they believe this current problem may be related to a treatment of her cancer  Even though this admission appears to revolve around her metastatic disease, it is for a completely different etiologic disorder  It would be appropriate to consider this admission as SEPARATE and UNRELATED to the previous admission      The patients vitals on arrival were ED Triage Vitals [01/18/19 1244]   Temperature Pulse Respirations Blood Pressure SpO2   97 9 °F (36 6 °C) 76 16 144/75 100 %      Temp Source Heart Rate Source Patient Position - Orthostatic VS BP Location FiO2 (%)   Oral Monitor Lying Left arm --      Pain Score       No Pain           Past Medical History:   Diagnosis Date    [facility] [address] )     breast 1996    Cancer ([facility] [address] )     bone      Compression fracture of thoracic vertebra ([facility] [facility] )     Diabetes mellitus ([facility] [address] )     Tendonitis      Past Surgical History:   Procedure Laterality Date    CHOLECYSTECTOMY      MASTECTOMY      Right side reconstruction    NECK SURGERY      TONSILLECTOMY             Consults have been placed to:   IP CONSULT TO ONCOLOGY  IP CONSULT TO NUTRITION SERVICES    Vitals:    01/18/19 1654 01/18/19 2220 01/19/19 0706 01/19/19 1500   BP: 113/66 117/60 130/66 135/63   BP Location: Left arm Left arm Left arm Left arm   Pulse: 76 74 71 75   Resp: 16 16 18 18   Temp: 99 6 °F (37 6 °C) 98 6 °F (37 °C)  99 °F (37 2 °C)   TempSrc: Oral Oral  Oral   SpO2: 99% 98% 99% 96%   Weight: 51 3 kg (113 lb 1 5 oz)      Height: 5' 3" (1 6 m)          Most recent labs:    Recent Labs      01/18/19   1317  01/19/19   0544   WBC  1 96*  2 52*   HGB  11 0*  10 0*   HCT  33 9*  30 7*   PLT  165  142*   K  2 6*  3 5   CALCIUM  6 2*  6 1*   BUN  5  2*   CREATININE  0 55*  0 45*   LIPASE  40*   --    INR  1 30*   --    AST  24  17   ALT  29  20   ALKPHOS  182*  153*       Scheduled Meds:  Current Facility-Administered Medications:  acetaminophen 650 mg Oral 4x Daily (with meals and at bedtime) Kelly Carrasco PA-C    cholecalciferol 2,000 Units Oral Daily Matty Shaffer PA-C    clotrimazole 10 mg Oral 5x Daily Matty Brink PA-C    enoxaparin 40 mg Subcutaneous Daily Matty Kwok DeenaBART    ferrous sulfate 325 mg Oral Daily With Breakfast Matty Brink PA-C    gabapentin 100 mg Oral BID Matty Brink PA-C    insulin lispro 1-5 Units Subcutaneous TID  Matty Brink PA-C    lisinopril 5 mg Oral Daily Matty Brink PA-C    LORazepam 2 mg Oral Q8H PRN Matty Shaffer PA-C    morphine 15 mg Oral Q4H PRN Matty Brink PA-C    ondansetron 4 mg Intravenous Q6H PRN Matty Brink PA-C    pantoprazole 40 mg Oral Early Morning Matty Brink PA-C    pravastatin 40 mg Oral Daily With Gonzalez'sBART    sodium chloride 100 mL/hr Intravenous Continuous Tameka Morales MD Last Rate: 100 mL/hr (01/19/19 1354)   sucralfate 1,000 mg Oral BID AC Matty Godfreyens, PA-C      Continuous Infusions:  sodium chloride 100 mL/hr Last Rate: 100 mL/hr (01/19/19 1274)     PRN Meds:  LORazepam    morphine    ondansetron    Surgical procedures (if appropriate):

## 2019-01-19 NOTE — ASSESSMENT & PLAN NOTE
· POA, first noted when she was going for her X-Rays today  Bilateral in nature in the face and arms  Resolved     · Continue to monitor electrolytes LEFT HIP INFECTION

## 2019-01-19 NOTE — ASSESSMENT & PLAN NOTE
· Noted leukopenia at 1 96 that increased to 2 5 today  ANC = 1300  No fevers   This is relatively stable for her readings  · No further work up

## 2019-01-19 NOTE — UTILIZATION REVIEW
Initial Clinical Review    Admission: Date/Time/Statement: inpatient 1/18/19 @ 1452   Orders Placed This Encounter   Procedures    Inpatient Admission (expected length of stay for this patient is greater than two midnights)     Standing Status:   Standing     Number of Occurrences:   1     Order Specific Question:   Admitting Physician     Answer:   Rey Atkins [[id number]]     Order Specific Question:   Level of Care     Answer:   Med Surg [[id number]]     Order Specific Question:   Estimated length of stay     Answer:   More than 2 Midnights     Order Specific Question:   Certification     Answer:   I certify that inpatient services are medically necessary for this patient for a duration of greater than two midnights  See H&P and MD Progress Notes for additional information about the patient's course of treatment  ED: Date/Time/Mode of Arrival:   ED Arrival Information     Expected Arrival Acuity Means of Arrival Escorted By Service Admission Type    - 1/18/2019 12:23 Urgent Walk-In Family Member General Medicine Urgent    Arrival Complaint    Facial Numbness        Chief Complaint:   Chief Complaint   Patient presents with    Weakness - Generalized     Pt states that she has been having vomiting and diarrhea for the past couple days, Pt also states that she has some facial tingling  History of Illness:  19-year-old female who presents to the emergency department relating that she finished radiation therapy last week  She relates I still have vomiting and diarrhea    She notes that she started with vomiting and diarrhea yesterday morning  She relates that she felt okay overnight but then had return of the nausea and vomiting today  She relates that she was advised to have x-rays of both femurs following a nuclear bone scan  This had been ordered by Dr Aman Schwab and she relates that she was in the radiology department about to have the x-rays when she started experiencing a tingling in both sides of the face  She then noticed this in both arms and now in the entire body  It has calmed slightly though still affects the generalized areas  She did not appreciate any weakness along with this nor difficulty with speech  She has had some mild difficulties with swallowing due to thrush though reports use of the nystatin medication for this  She relates that she has been eating and drinking minimally over the last few days due to decreased appetite, nausea and vomiting  She has vomited up some of her medications at times  She relates that she did receive Faslodex 2 days ago along with IV fluids  She relates that her white blood cell count was down at that time  She was scheduled to start Ibrance on Monday  She was recently found to have extensive metastatic disease felt to be likely from known breast cancer treated in 1996 and again in 2004  ED Vital Signs:   ED Triage Vitals [01/18/19 1244]   Temperature Pulse Respirations Blood Pressure SpO2   97 9 °F (36 6 °C) 76 16 144/75 100 %      Temp Source Heart Rate Source Patient Position - Orthostatic VS BP Location FiO2 (%)   Oral Monitor Lying Left arm --      Pain Score       No Pain        Wt Readings from Last 1 Encounters:   01/18/19 51 3 kg (113 lb 1 5 oz)   Pertinent Labs/Diagnostic Test Results:   k 2 6, 3 5   Creat   55   Ca 6 2, 6 1    Alk hpos 182, 153   t prot 5 7, 4 5   Alb 2 8, 2 2   Mg 1 4  Lipase 40   Wbc 1 96, 2 52   hgb 11, 10  hct 33 9, 30 7   Plate 146  Pt 28 9  Inr 1 3  R femur: No significant lytic or blastic bone lesion seen in the right femur to correspond to the bone scan abnormality  L femur: Significant metastatic lesions seen within the femoral shaft most predominantly within the lower diaphysis region with some cortical thinning along the anterior aspect of the endosteal surface of the cortex   This might predispose to pathologic fracture  1/14 outpt nuc bone scan: Extensive and widespread osseous metastatic disease        ED Treatment: Medication Administration from 01/18/2019 1223 to 01/18/2019 1632       Date/Time Order Dose Route Action     01/18/2019 1308 sodium chloride 0 9 % bolus 1,000 mL 1,000 mL Intravenous New Bag     01/18/2019 1315 ondansetron (ZOFRAN) injection 4 mg 4 mg Intravenous Given     01/18/2019 1359 magnesium sulfate 2 g/50 mL IVPB (premix) 2 g 2 g Intravenous New Bag     01/18/2019 1458 potassium chloride 20 mEq IVPB (premix) 20 mEq Intravenous New Bag     01/18/2019 1406 potassium chloride (K-DUR,KLOR-CON) CR tablet 40 mEq 40 mEq Oral Given     01/18/2019 1506 morphine injection 2 mg 2 mg Intravenous Given     01/18/2019 1458 sodium chloride 0 9 % infusion 100 mL/hr Intravenous New Bag        Past Medical/Surgical History:    Active Ambulatory Problems     Diagnosis Date Noted    Elevated alkaline phosphatase level 10/02/2018    Anemia 12/13/2018    Compression fracture of thoracic spine, non-traumatic (HCC) 12/23/2018    Anxiety disorder 09/20/2016    Hypokalemia 12/23/2018    Hypomagnesemia 12/23/2018    Breast cancer metastasized to bone, right (HCC) 12/23/2018    Severe protein-calorie malnutrition (HCC) 12/24/2018    Bone metastasis ([facility] ) 12/27/2018    Lung nodules 12/27/2018    Absolute anemia 01/15/2019    Heme positive stool 01/15/2019     Resolved Ambulatory Problems     Diagnosis Date Noted    Malignant neoplasm of right female breast ([facility] ) 10/02/1996    Heme positive stool 12/20/2018    Diabetes mellitus type II, non insulin dependent ([facility] ) 12/23/2018     Past Medical History:   Diagnosis Date    Cancer ([address] )     Cancer ([address] )     Compression fracture of thoracic vertebra ([address] )     Diabetes mellitus ([address] )     Tendonitis      Admitting Diagnosis: Hypocalcemia [E83 51]  Hypokalemia [E87 6]  Hypomagnesemia [E83 42]  Metastatic cancer (HCC) [C79 9]  Paresthesias [R20 2]  Severe protein-calorie malnutrition (HCC) [E43]  Facial numbness [R20 0]  Breast cancer metastasized to bone, right ([address] ) [C50 911, C79 51]  Age/Sex: 76 y o  female  Assessment/Plan: 75 yo fem to ED from outpatient radiology admitted as inpatient due to facial paresthesias radiating to arms  Also having frequent nausea, vomiting, and dry heaves, unable to keep pills down  Is undergoing radiation/chemo for breast ca          * Paresthesias   Assessment & Plan     · POA, first noted when she was going for her X-Rays today  Bilateral in nature in the face and arms  Resolved  Non-focal neurologic exam  Doubt stroke  Suspect paresthesias from profound electrolyte derangements   ? Admit patient to med/surg under observation status with telemetry monitoring   ? Repletion as per respective problems   ? Monitor labs       Hypokalemia   Assessment & Plan     · POA, noted to be 2 6 in the ER  Status post 60 mEq KCl (20 IV and 40 PO) in the ER   ? Will order 40 mEq IV for total of 100 mEq  ? Recheck Potassium  ? Telemetry for 24 hours       Hypomagnesemia   Assessment & Plan     · POA, noted to be 1 4 Status post 2 gram of Mg in the ER   ? Recheck Mg in AM       Hypocalcemia   Assessment & Plan     · POA, calcium noted to be 6 2 in the ER  When accounted for albumin at 2 8 her calcium is actually 7 1  Status post 1 gram of Calcium Gluconate in the ER  ? Will order 1 more gram of IV calcium gluconate  ? Recheck CMP in AM       Leukopenia   Assessment & Plan     · Noted leukopenia at 1 96 as well as an [address] of 1000  This is relatively stable for her readings  ? Monitor for fevers  ? Monitor CBCD daily   ? Consider hematology evaluation if she becomes absolutely neutropenic    Anticipated Length of Stay:  Patient will be admitted on an Inpatient basis with an anticipated length of stay of  Greater than 2 midnights     Justification for Hospital Stay: Multiple electrolyte abnormalities needing IV replacement   Admission Orders:  Scheduled Meds:   Current Facility-Administered Medications:  acetaminophen 650 mg Oral 4x Daily (with meals and at bedtime) cholecalciferol 2,000 Units Oral Daily   clotrimazole 10 mg Oral 5x Daily   enoxaparin 40 mg Subcutaneous Daily   ferrous sulfate 325 mg Oral Daily With Breakfast   gabapentin 100 mg Oral BID   insulin lispro 1-5 Units Subcutaneous TID AC   lisinopril 5 mg Oral Daily   LORazepam 2 mg Oral Q8H PRN   morphine 15 mg Oral Q4H PRN x 1   ondansetron 4 mg Intravenous Q6H PRN x 3   pantoprazole 40 mg Oral Early Morning   pravastatin 40 mg Oral Daily With Dinner   sodium chloride 100 mL/hr Intravenous Continuous   sucralfate 1,000 mg Oral BID AC   IV ca gluc x 2  IV  Mg x 1  Gluc checks ac / hs  Activity as toña  Tele  Up w/assist  Stool for c   Diff  hse diet  Cons oncology  Cons nutritiong

## 2019-01-19 NOTE — PLAN OF CARE
GASTROINTESTINAL - ADULT     Minimal or absence of nausea and/or vomiting Progressing     Maintains or returns to baseline bowel function Progressing        HEMATOLOGIC - ADULT     Maintains hematologic stability Progressing        METABOLIC, FLUID AND ELECTROLYTES - ADULT     Electrolytes maintained within normal limits Progressing        NEUROSENSORY - ADULT     Achieves stable or improved neurological status Progressing        Nutrition/Hydration-ADULT     Nutrient/Hydration intake appropriate for improving, restoring or maintaining nutritional needs Progressing        PAIN - ADULT     Verbalizes/displays adequate comfort level or baseline comfort level Progressing        Potential for Falls     Patient will remain free of falls Progressing

## 2019-01-19 NOTE — CONSULTS
Consultation - Valencia Valentin 76 y o  female MRN: [id number]    Unit/Bed#: -01 Encounter: [id number]      Assessment/Plan     Assessment:  In summary, this is a 79-year-old female history of probable advanced/recurrent breast cancer  She had been started on treatment with Faslodex  Luz Pries had been planned but not initiated  She underwent radiation therapy to the T12 vertebra  Hypocalcemia is likely result of Xgeva administered 2 weeks ago coupled with decreased p  O  Intake  This was corrected parenterally with improvement in facial numbness  CNS evaluation is not indicated, in my opinion  I believe her nausea, vomiting, diarrhea oral 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  The symptoms seem to be starting to improve  EGD had been planned although I am not sure the utility is high enough to warrant this procedure  Neutropenia a relative contraindication  Neutropenia and anemia may be reflective of radiation myelosuppression  Bone marrow metastases considered although peripheral smear does not show findings suggestive of this process  Lastly, we discussed the issue of her cancer  We reviewed that it is likely that this represents recurrent breast cancer based on her local recurrence and current distribution of disease  Previous local recurrence was not associated with tumor marker elevation thus the current normal marker does not rule out breast cancer  When the opportunity presents biopsy would be favored to verify breast cancer and assess biochemistry  I reviewed the above with the patient  She voiced understanding and agreement  Plan:  See above  History of Present Illness     HPI: Valencia Valentin is a 76y o  year old female who presents with Bilateral facial paresthesia  1996 patient was diagnosed with breast cancer  She had right mastectomy followed by adjuvant chemotherapy  2004 she had recurrent disease in the right supraclavicular lymph node, resected    Adjuvant Femara ended 2015 December 21st patient presented with abdominal pain  CT of the chest abdomen pelvis showed acute/subacute T12 compression fracture and diffuse patchy osseous sclerosis MRI T-spine showed moderate acute compression deformity T12 with moderate canal stenosis, no cord compression  Diffuse thoracic vertebral metastases  CT chest showed multiple subcentimeter bilateral pulmonary nodules  She was started on radiation therapy  She was planned to start Faslodex and Jannette Zamora was deferred until radiation completed on January 9th  She now presents with the above complaint  WBC 1 9, hemoglobin 11 0, platelet count 878, 66% monocytes, 15% eosinophils  ANC 1000  CEA 3 8, CA 27 29 23 0, iron saturation 35%, ferritin 134  CMP showed potassium 2 6, creatinine 0 5, calcium 6 2, albumin 2 8, total protein 5 7, alk-phos 182  Magnesium 1 4  Inpatient consult to Oncology  Consult performed by: Stacie Beebe ordered by: Becca Cherry          Review of Systems   Constitutional: Positive for appetite change and unexpected weight change (20-25 lb weight loss over 2-3 months )  Negative for diaphoresis, fatigue and fever  HENT: Negative for sinus pain  Eyes: Negative for discharge  Respiratory: Negative for cough and shortness of breath  Cardiovascular: Negative for chest pain  Gastrointestinal: Positive for diarrhea, nausea and vomiting  Negative for abdominal pain and constipation  Endocrine: Negative for cold intolerance  Genitourinary: Negative for difficulty urinating and hematuria  Musculoskeletal: Positive for back pain  Negative for joint swelling  Skin: Negative for rash  Allergic/Immunologic: Negative for environmental allergies  Neurological: Negative for dizziness and headaches  Hematological: Negative for adenopathy  Psychiatric/Behavioral: Negative for agitation         Historical Information   Past Medical History:   Diagnosis Date    [facility] breast 1996    Cancer ([facility] )     bone      Compression fracture of thoracic vertebra (HCC)     Diabetes mellitus ([facility] )     Tendonitis      Past Surgical History:   Procedure Laterality Date    CHOLECYSTECTOMY      MASTECTOMY      Right side reconstruction    NECK SURGERY      TONSILLECTOMY       Social History   History   Alcohol Use No     History   Drug Use No     History   Smoking Status    Never Smoker   Smokeless Tobacco    Never Used     Family History: History reviewed  No pertinent family history  Meds/Allergies   all current active meds have been reviewed  No Known Allergies    Objective   Vitals: Blood pressure 130/66, pulse 71, temperature 98 6 °F (37 °C), temperature source Oral, resp  rate 18, height 5' 3" (1 6 m), weight 51 3 kg (113 lb 1 5 oz), last menstrual period 05/27/1996, SpO2 99 %, not currently breastfeeding  Intake/Output Summary (Last 24 hours) at 01/19/19 0928  Last data filed at 01/19/19 0348   Gross per 24 hour   Intake             2050 ml   Output                0 ml   Net             2050 ml     Invasive Devices     Peripheral Intravenous Line            Peripheral IV 01/18/19 Left Antecubital less than 1 day                Physical Exam   Constitutional: She is oriented to person, place, and time  She appears well-developed  HENT:   Head: Normocephalic  Eyes: Pupils are equal, round, and reactive to light  Neck: Neck supple  Cardiovascular: Normal rate  No murmur heard  Pulmonary/Chest: No respiratory distress  She has no wheezes  She has no rales  Abdominal: Soft  She exhibits no distension  There is no tenderness  There is no rebound  Musculoskeletal: She exhibits no edema  Lymphadenopathy:     She has no cervical adenopathy  Neurological: She is alert and oriented to person, place, and time  She displays normal reflexes  Skin: Skin is warm  No rash noted  Psychiatric: She has a normal mood and affect   Thought content normal        Lab Results: I have personally reviewed pertinent reports  Imaging Studies: I have personally reviewed pertinent reports  EKG, Pathology, and Other Studies: I have personally reviewed pertinent reports  Code Status: Level 1 - Full Code  Advance Directive and Living Will:      Power of :    POLST:      Counseling / Coordination of Care  Total floor / unit time spent today 40 minutes  Greater than 50% of total time was spent with the patient and / or family counseling and / or coordination of care  A description of the counseling / coordination of care: see note

## 2019-01-19 NOTE — ASSESSMENT & PLAN NOTE
· POA, calcium noted to be 6 2 in the ER, stable at 6 1, corrects to 7 5 with low albumin at 2 2 today   Status post 2 gram total of Calcium Gluconate   · Will order 1 more gram of IV calcium gluconate  · Recheck CMP in AM

## 2019-01-19 NOTE — ASSESSMENT & PLAN NOTE
· Patient is known to Dr Sara Roberts  She started Faslodex infusion and is due to be started on Ibrance, but this is being held as per Dr Sara Roberts   Now noted is metastasis to the right femur   · Appreciate oncology recommendations   · Outpatient follow up   · Should be started with Palliative Care and she has an appointment next week

## 2019-01-19 NOTE — PROGRESS NOTES
[facility]  Progress Note - Karmen Vargas 1950, 76 y o  female MRN: [id number]    Unit/Bed#: -[place] Encounter: [id number]    Primary Care Provider: Pricilla Quinteros MD   Date and time admitted to hospital: 1/18/2019 12:39 PM        * Paresthesias   Assessment & Plan    · POA, first noted when she was going for her X-Rays today  Bilateral in nature in the face and arms  Resolved  · Continue to monitor electrolytes      Hypokalemia   Assessment & Plan    · POA, noted to be 2 6 in the ER  Improved to 3 5  · Will order 40 mEq PO today   · Recheck CMP in AM       Hypomagnesemiaresolved as of 1/19/2019   Assessment & Plan    · POA, resolved     Hypocalcemia   Assessment & Plan    · POA, calcium noted to be 6 2 in the ER, stable at 6 1, corrects to 7 5 with low albumin at 2 2 today  Status post 2 gram total of Calcium Gluconate   · Will order 1 more gram of IV calcium gluconate  · Recheck CMP in AM      Leukopenia   Assessment & Plan    · Noted leukopenia at 1 96 that increased to 2 5 today  ANC = 1300  No fevers  This is relatively stable for her readings  · No further work up       Severe protein-calorie malnutrition [facility])   Assessment & Plan    Malnutrition Findings:           BMI Findings: Body mass index is 20 03 kg/m²  · Appreciate nutritionist consultation      Breast cancer metastasized to bone, right [facility])   Assessment & Plan    · Patient is known to Dr Aman Schwab  She started Faslodex infusion and is due to be started on Ibrance, but this is being held as per Dr Aman Schwab  Now noted is metastasis to the right femur   · Appreciate oncology recommendations   · Outpatient follow up   · Should be started with Palliative Care and she has an appointment next week      Compression fracture of thoracic spine, non-traumatic [facility])   Assessment & Plan    · This is a known problem   She has a back brace  · Continue to use back brace as directed        VTE Pharmacologic Prophylaxis: Pharmacologic: Enoxaparin (Lovenox)  Mechanical VTE Prophylaxis in Place: Yes    Patient Centered Rounds: I have performed bedside rounds with nursing staff today  Discussions with Specialists or Other Care Team Provider: Discussed with [facility]    Education and Discussions with Family / Patient: Discussed with patient, declined family update     Time Spent for Care: 30 minutes  More than 50% of total time spent on counseling and coordination of care as described above  Current Length of Stay: 1 day(s)    Current Patient Status: Inpatient   Certification Statement: The patient will continue to require additional inpatient hospital stay due to on going electrolyte correction     Discharge Plan: tomorrow if electrolytes remain stable and continue to correct     Code Status: Level 1 - Full Code      Subjective:   Patient reports that she feels much better today  Still reports reduced appetite, but was able to eat some sandwich and broth today  Denies vomiting or diarrhea  Denies fevers or chills  Objective:     Vitals:   Temp (24hrs), Av 1 °F (37 3 °C), Min:98 6 °F (37 °C), Max:99 6 °F (37 6 °C)    Temp:  [98 6 °F (37 °C)-99 6 °F (37 6 °C)] 98 6 °F (37 °C)  HR:  [70-76] 71  Resp:  [16-18] 18  BP: (113-130)/(60-69) 130/66  SpO2:  [98 %-100 %] 99 %  Body mass index is 20 03 kg/m²  Input and Output Summary (last 24 hours): Intake/Output Summary (Last 24 hours) at 19 1424  Last data filed at 19 1354   Gross per 24 hour   Intake             3050 ml   Output                0 ml   Net             3050 ml       Physical Exam:     Physical Exam   Constitutional: She is oriented to person, place, and time  Vital signs are normal  She appears well-developed  She appears cachectic  Non-toxic appearance  No distress  HENT:   Head: Normocephalic and atraumatic  Eyes: Pupils are equal, round, and reactive to light  Conjunctivae and EOM are normal    Neck: Neck supple     Cardiovascular: Normal rate, regular rhythm, S1 normal, S2 normal, normal heart sounds and intact distal pulses  Exam reveals no S3 and no S4  No murmur heard  Pulmonary/Chest: Effort normal and breath sounds normal  No accessory muscle usage  No respiratory distress  She has no decreased breath sounds  She has no wheezes  She has no rhonchi  She has no rales  She exhibits no tenderness  Abdominal: Soft  Bowel sounds are normal  She exhibits no distension and no mass  There is no tenderness  There is no rigidity, no rebound and no guarding  Neurological: She is alert and oriented to person, place, and time  She is not disoriented  GCS eye subscore is 4  GCS verbal subscore is 5  GCS motor subscore is 6  Skin: Skin is warm and dry  Additional Data:     Labs:      Results from last 7 days  Lab Units 01/19/19  0544   WBC Thousand/uL 2 52*   HEMOGLOBIN g/dL 10 0*   HEMATOCRIT % 30 7*   PLATELETS Thousands/uL 142*   NEUTROS PCT % 53   LYMPHS PCT % 10*   MONOS PCT % 20*   EOS PCT % 15*       Results from last 7 days  Lab Units 01/19/19  0544   POTASSIUM mmol/L 3 5   CHLORIDE mmol/L 110*   CO2 mmol/L 24   BUN mg/dL 2*   CREATININE mg/dL 0 45*   CALCIUM mg/dL 6 1*   ALK PHOS U/L 153*   ALT U/L 20   AST U/L 17       Results from last 7 days  Lab Units 01/18/19  1317   INR  1 30*       * I Have Reviewed All Lab Data Listed Above  * Additional Pertinent Lab Tests Reviewed:  [facility] Admission Reviewed    Imaging:    Imaging Reports Reviewed Today Include: None  Imaging Personally Reviewed by Myself Includes:  None    Recent Cultures (last 7 days):           Last 24 Hours Medication List:     Current Facility-Administered Medications:  acetaminophen 650 mg Oral 4x Daily (with meals and at bedtime) Matty Hart PA-C    calcium gluconate 1 g Intravenous Once Matty Hart PA-C    cholecalciferol 2,000 Units Oral Daily Matty Hart PA-C    clotrimazole 10 mg Oral 5x Daily Matty Brink PA-C    enoxaparin 40 mg Subcutaneous Daily Matty Ornelas PA-C    ferrous sulfate 325 mg Oral Daily With Breakfast Matty Brink PA-C    gabapentin 100 mg Oral BID Matty Ornelas PA-C    insulin lispro 1-5 Units Subcutaneous TID AC Matty Brink PA-C    lisinopril 5 mg Oral Daily Matty Brink PA-C    LORazepam 2 mg Oral Q8H PRN Matty Ornelas PA-C    morphine 15 mg Oral Q4H PRN Matty Brink PA-C    ondansetron 4 mg Intravenous Q6H PRN Matty Ornelas PA-C    pantoprazole 40 mg Oral Early Morning Matty Brink PA-C    potassium chloride 40 mEq Oral Once Matty Ornelas PA-C    pravastatin 40 mg Oral Daily With Gonzalez'sBART    sodium chloride 100 mL/hr Intravenous Continuous Donna Garcia MD Last Rate: 100 mL/hr (01/19/19 1354)   sucralfate 1,000 mg Oral BID AC Matty Ornelas PA-C         Today, Patient Was Seen By: Nathaly Jimenez PA-C    ** Please Note: Dictation voice to text software may have been used in the creation of this document   **

## 2019-01-19 NOTE — ASSESSMENT & PLAN NOTE
Malnutrition Findings:           BMI Findings: Body mass index is 20 03 kg/m²     · Appreciate nutritionist consultation

## 2019-01-19 NOTE — ASSESSMENT & PLAN NOTE
· POA, noted to be 2 6 in the ER   Improved to 3 5  · Will order 40 mEq PO today   · Recheck CMP in AM

## 2019-01-20 VITALS
RESPIRATION RATE: 18 BRPM | DIASTOLIC BLOOD PRESSURE: 60 MMHG | HEART RATE: 79 BPM | OXYGEN SATURATION: 100 % | SYSTOLIC BLOOD PRESSURE: 120 MMHG | HEIGHT: 63 IN | BODY MASS INDEX: 20.04 KG/M2 | TEMPERATURE: 98.8 F | WEIGHT: 113.1 LBS

## 2019-01-20 PROBLEM — R20.2 PARESTHESIAS: Status: RESOLVED | Noted: 2019-01-18 | Resolved: 2019-01-20

## 2019-01-20 LAB
ALBUMIN SERPL BCP-MCNC: 2.2 G/DL (ref 3.5–5)
ALP SERPL-CCNC: 158 U/L (ref 46–116)
ALT SERPL W P-5'-P-CCNC: 20 U/L (ref 12–78)
ANION GAP SERPL CALCULATED.3IONS-SCNC: 8 MMOL/L (ref 4–13)
AST SERPL W P-5'-P-CCNC: 16 U/L (ref 5–45)
ATRIAL RATE: 69 BPM
BILIRUB SERPL-MCNC: 0.7 MG/DL (ref 0.2–1)
BUN SERPL-MCNC: 3 MG/DL (ref 5–25)
C DIFF TOX GENS STL QL NAA+PROBE: NORMAL
CALCIUM SERPL-MCNC: 6.2 MG/DL (ref 8.3–10.1)
CHLORIDE SERPL-SCNC: 110 MMOL/L (ref 100–108)
CO2 SERPL-SCNC: 24 MMOL/L (ref 21–32)
CREAT SERPL-MCNC: 0.49 MG/DL (ref 0.6–1.3)
GFR SERPL CREATININE-BSD FRML MDRD: 100 ML/MIN/1.73SQ M
GLUCOSE SERPL-MCNC: 101 MG/DL (ref 65–140)
GLUCOSE SERPL-MCNC: 130 MG/DL (ref 65–140)
GLUCOSE SERPL-MCNC: 95 MG/DL (ref 65–140)
P AXIS: 71 DEGREES
POTASSIUM SERPL-SCNC: 3.4 MMOL/L (ref 3.5–5.3)
PR INTERVAL: 162 MS
PROT SERPL-MCNC: 4.6 G/DL (ref 6.4–8.2)
QRS AXIS: -33 DEGREES
QRSD INTERVAL: 70 MS
QT INTERVAL: 380 MS
QTC INTERVAL: 407 MS
SODIUM SERPL-SCNC: 142 MMOL/L (ref 136–145)
T WAVE AXIS: 54 DEGREES
VENTRICULAR RATE: 69 BPM

## 2019-01-20 PROCEDURE — 80053 COMPREHEN METABOLIC PANEL: CPT | Performed by: PHYSICIAN ASSISTANT

## 2019-01-20 PROCEDURE — 99239 HOSP IP/OBS DSCHRG MGMT >30: CPT | Performed by: INTERNAL MEDICINE

## 2019-01-20 PROCEDURE — 82948 REAGENT STRIP/BLOOD GLUCOSE: CPT

## 2019-01-20 PROCEDURE — 93010 ELECTROCARDIOGRAM REPORT: CPT | Performed by: INTERNAL MEDICINE

## 2019-01-20 RX ORDER — METOCLOPRAMIDE HYDROCHLORIDE 5 MG/ML
10 INJECTION INTRAMUSCULAR; INTRAVENOUS EVERY 6 HOURS PRN
Status: DISCONTINUED | OUTPATIENT
Start: 2019-01-20 | End: 2019-01-20 | Stop reason: HOSPADM

## 2019-01-20 RX ORDER — POTASSIUM CHLORIDE 20 MEQ/1
40 TABLET, EXTENDED RELEASE ORAL ONCE
Status: COMPLETED | OUTPATIENT
Start: 2019-01-20 | End: 2019-01-20

## 2019-01-20 RX ORDER — POTASSIUM CHLORIDE 20 MEQ/1
20 TABLET, EXTENDED RELEASE ORAL DAILY
Qty: 30 TABLET | Refills: 0 | Status: SHIPPED | OUTPATIENT
Start: 2019-01-20 | End: 2019-02-26 | Stop reason: SDUPTHER

## 2019-01-20 RX ORDER — METOCLOPRAMIDE 10 MG/1
10 TABLET ORAL EVERY 6 HOURS PRN
Qty: 40 TABLET | Refills: 0 | Status: SHIPPED | OUTPATIENT
Start: 2019-01-20 | End: 2019-03-26 | Stop reason: ALTCHOICE

## 2019-01-20 RX ORDER — UREA 10 %
1 LOTION (ML) TOPICAL DAILY
Qty: 30 TABLET | Refills: 0 | Status: SHIPPED | OUTPATIENT
Start: 2019-01-20 | End: 2021-02-15 | Stop reason: HOSPADM

## 2019-01-20 RX ADMIN — ENOXAPARIN SODIUM 40 MG: 40 INJECTION SUBCUTANEOUS at 09:17

## 2019-01-20 RX ADMIN — POTASSIUM CHLORIDE 40 MEQ: 1500 TABLET, EXTENDED RELEASE ORAL at 11:49

## 2019-01-20 RX ADMIN — FERROUS SULFATE TAB 325 MG (65 MG ELEMENTAL FE) 325 MG: 325 (65 FE) TAB at 09:17

## 2019-01-20 RX ADMIN — SODIUM CHLORIDE 100 ML/HR: 0.9 INJECTION, SOLUTION INTRAVENOUS at 00:24

## 2019-01-20 RX ADMIN — SODIUM CHLORIDE 100 ML/HR: 0.9 INJECTION, SOLUTION INTRAVENOUS at 09:30

## 2019-01-20 RX ADMIN — SUCRALFATE 1000 MG: 1 SUSPENSION ORAL at 06:03

## 2019-01-20 RX ADMIN — ACETAMINOPHEN 650 MG: 325 TABLET, FILM COATED ORAL at 09:17

## 2019-01-20 RX ADMIN — METOCLOPRAMIDE 10 MG: 5 INJECTION, SOLUTION INTRAMUSCULAR; INTRAVENOUS at 09:17

## 2019-01-20 RX ADMIN — VITAMIN D, TAB 1000IU (100/BT) 2000 UNITS: 25 TAB at 09:17

## 2019-01-20 RX ADMIN — PANTOPRAZOLE SODIUM 40 MG: 40 TABLET, DELAYED RELEASE ORAL at 06:03

## 2019-01-20 RX ADMIN — LISINOPRIL 5 MG: 5 TABLET ORAL at 09:17

## 2019-01-20 RX ADMIN — GABAPENTIN 100 MG: 100 CAPSULE ORAL at 09:17

## 2019-01-20 RX ADMIN — METOCLOPRAMIDE 10 MG: 5 INJECTION, SOLUTION INTRAMUSCULAR; INTRAVENOUS at 02:12

## 2019-01-20 NOTE — PLAN OF CARE
Problem: DISCHARGE PLANNING - CARE MANAGEMENT  Goal: Discharge to post-acute care or home with appropriate resources  INTERVENTIONS:  - Conduct assessment to determine patient/family and health care team treatment goals, and need for post-acute services based on payer coverage, community resources, and patient preferences, and barriers to discharge  - Address psychosocial, clinical, and financial barriers to discharge as identified in assessment in conjunction with the patient/family and health care team  - Arrange appropriate level of post-acute services according to patients   needs and preference and payer coverage in collaboration with the physician and health care team  - Communicate with and update the patient/family, physician, and health care team regarding progress on the discharge plan  - Arrange appropriate transportation to post-acute venues   Outcome: Completed Date Met: 01/20/19  CM received response from [facility] via [address] stating that they will start patient on Tuesday

## 2019-01-20 NOTE — PROGRESS NOTES
Pt reports nausea after morning pills administered and would like to hold off on the clotrimazole for now  Call bell within reach  Will monitor

## 2019-01-20 NOTE — ASSESSMENT & PLAN NOTE
· POA, calcium noted to be 6 2 in the ER, stable at 6 2, corrects to 7 6 with low albumin at 2 2 today   Status post 3 gram total of Calcium Gluconate   · Start PO Calcium supplementation

## 2019-01-20 NOTE — PLAN OF CARE
DISCHARGE PLANNING - CARE MANAGEMENT     Discharge to post-acute care or home with appropriate resources Completed        GASTROINTESTINAL - ADULT     Minimal or absence of nausea and/or vomiting Completed     Maintains or returns to baseline bowel function Completed        HEMATOLOGIC - ADULT     Maintains hematologic stability Completed        METABOLIC, FLUID AND ELECTROLYTES - ADULT     Electrolytes maintained within normal limits Completed        NEUROSENSORY - ADULT     Achieves stable or improved neurological status Completed        Nutrition/Hydration-ADULT     Nutrient/Hydration intake appropriate for improving, restoring or maintaining nutritional needs Completed        PAIN - ADULT     Verbalizes/displays adequate comfort level or baseline comfort level Completed        Potential for Falls     Patient will remain free of falls Completed

## 2019-01-20 NOTE — ASSESSMENT & PLAN NOTE
Malnutrition Findings:   Malnutrition type: Acute illness  Degree of Malnutrition: Other severe protein calorie malnutrition (treated with nutrition consult, addition of oral nutrition supplements  )    BMI Findings: Body mass index is 20 03 kg/m²     · Appreciate nutritionist consultation

## 2019-01-20 NOTE — DISCHARGE SUMMARY
[address] Internal Medicine  Discharge- Yareli Morse 1950, 76 y o  female MRN: [id number]    Unit/Bed#: -[facility] Encounter: [id number]    Primary Care Provider: Lyndsey Harrison MD   Date and time admitted to hospital: 1/18/2019 12:39 PM        * Paresthesiasresolved as of 1/20/2019   Assessment & Plan    · POA, first noted when she was going for her X-Rays today  Bilateral in nature in the face and arms  Resolved  · Continue to monitor electrolytes      Hypokalemia   Assessment & Plan    · POA, noted to be 2 6 in the ER  Stable at 3 4  · Will order 40 mEq PO today   · Continue KCl 20 mEq PO QD outpatient   · Stable for discharge      Hypocalcemia   Assessment & Plan    · POA, calcium noted to be 6 2 in the ER, stable at 6 2, corrects to 7 6 with low albumin at 2 2 today  Status post 3 gram total of Calcium Gluconate   · Start PO Calcium supplementation      Leukopenia   Assessment & Plan    · Noted leukopenia at 1 96 that increased to 2 5 yesterday  [address] = 1300 yesterday  No fevers  This is relatively stable for her readings  · No further work up       Severe protein-calorie malnutrition ([address] )   Assessment & Plan    Malnutrition Findings:   Malnutrition type: Acute illness  Degree of Malnutrition: Other severe protein calorie malnutrition (treated with nutrition consult, addition of oral nutrition supplements  )    BMI Findings: Body mass index is 20 03 kg/m²  · Appreciate nutritionist consultation      Breast cancer metastasized to bone, right [facility])   Assessment & Plan    · Patient is known to Dr Tika Last  She started Faslodex infusion and is due to be started on Ibrance, but this is being held as per Dr Tika Last   Now noted is metastasis to the right femur   · Appreciate oncology recommendations   · Outpatient follow up   · Should be started with Palliative Care and she has an appointment next week      Compression fracture of thoracic spine, non-traumatic [facility])   Assessment & Plan · This is a known problem  She has a back brace  · Continue to use back brace as directed        Discharging Physician / Practitioner: Anabela Connell PA-C  PCP: Griselda Smyth MD  Admission Date:   Admission Orders     Ordered        01/18/19 1452  Inpatient Admission (expected length of stay for this patient is greater than two midnights)  Once             Discharge Date: 01/20/19    Resolved Problems  Date Reviewed: 1/20/2019          Resolved    Hypomagnesemia 1/19/2019     Resolved by  Anabela Connell PA-C    * (Principal)Paresthesias 1/20/2019     Resolved by  Anabela Connell PA-C          Consultations During Hospital Stay:  · Oncology - Dr Maxime Brizuela    Procedures Performed:   · XR Femur Right   · XR Femur Left     Significant Findings / Test Results:   · XR Femur Right - No significant lytic or blastic bone lesion seen in the right femur to correspond to the bone scan abnormality   · XR Femur Left - Significant metastatic lesions seen within the femoral shaft most predominantly within the lower diaphysis region with some cortical thinning along the anterior aspect of the endosteal surface of the cortex  This might predispose to pathologic fracture  Incidental Findings:   · As above      Test Results Pending at Discharge (will require follow up): · None     Outpatient Tests Requested:  · None    Complications:  None    Reason for Admission: electrolyte abnormalities     Hospital Course:     Christina Herrera is a 76 y o  female patient who originally presented to the hospital on 1/18/2019 due to paresthesias that she felt in her face during a bone scan  On admission the patient was found to have multiple electrolyte abnormalities such as hypokalemia, hypocalcemia, and hypomagnesemia  The patient was also found to be leukopenic, however this was already known  In the emergency department she received potassium, calcium, and magnesium supplementation and she was admitted for further treatment    She was given additional electrolyte replacement, which was calculated to bring up her levels appropriately  Her potassium and magnesium responded nicely, however her calcium lag behind  This was mostly due to a low albumin level which made her calcium remains falsely low, however was also likely secondary to her Kalee Frees that she had recently started  She was given multiple doses of calcium gluconate and her paresthesias resolved  The patient does have chronic issues with vomiting and diarrhea likely secondary to esophagitis and gastritis secondary to radiation that she received previously  For this she was prescribed Reglan to be taken as needed orally as an outpatient  Additionally the patient was started on potassium and calcium supplementation to be taken daily  She destructive to follow up with Gastroenterology for her outpatient EGD that was previously scheduled and she was continued on PPI and Carafate therapies  She was additionally instructed to follow up with palliative care which was also previously scheduled for January 28th  VNA was set up through case management  Please see above list of diagnoses and related plan for additional information  Condition at Discharge: stable     Discharge Day Visit / Exam:     Subjective:  Patient reports she still has a poor appetite, however this is a chronic issue  She states she did have some dry heaving, however no actual stomach contents came up  She states that she did have a few episodes of diarrhea overnight that is also normal for her  She states that she would like to go home  Denies any fevers or chills  Denies any blood in her stool    Vitals: Blood Pressure: 120/60 (01/20/19 0830)  Pulse: 79 (01/20/19 0830)  Temperature: 98 8 °F (37 1 °C) (01/19/19 2300)  Temp Source: Oral (01/19/19 2300)  Respirations: 18 (01/20/19 0830)  Height: 5' 3" (160 cm) (01/18/19 1654)  Weight - Scale: 51 3 kg (113 lb 1 5 oz) (01/18/19 1654)  SpO2: 100 % (01/20/19 0830)  Exam:   Physical Exam   Constitutional: She is oriented to person, place, and time  Vital signs are normal  She appears well-developed and well-nourished  Non-toxic appearance  No distress  HENT:   Head: Normocephalic and atraumatic  Eyes: Pupils are equal, round, and reactive to light  Conjunctivae and EOM are normal    Neck: Neck supple  Cardiovascular: Normal rate, regular rhythm, S1 normal, S2 normal, normal heart sounds and intact distal pulses  Exam reveals no S3 and no S4  No murmur heard  Pulmonary/Chest: Effort normal and breath sounds normal  No accessory muscle usage  No respiratory distress  She has no decreased breath sounds  She has no wheezes  She has no rhonchi  She has no rales  She exhibits no tenderness  Abdominal: Soft  Bowel sounds are normal  She exhibits no distension and no mass  There is no tenderness  There is no rigidity, no rebound and no guarding  Neurological: She is alert and oriented to person, place, and time  No sensory deficit  Skin: Skin is warm and dry  Discussion with Family: None requested     Discharge instructions/Information to patient and family:   See after visit summary for information provided to patient and family  Provisions for Follow-Up Care:  See after visit summary for information related to follow-up care and any pertinent home health orders  Disposition:     Home with VNA Services (Reminder: Complete face to face encounter)    For Discharges to [facility] SNF:   · Not Applicable to this Patient - Not Applicable to this Patient    Planned Readmission: None     Discharge Statement:  I spent 45 minutes discharging the patient  This time was spent on the day of discharge  I had direct contact with the patient on the day of discharge   Greater than 50% of the total time was spent examining patient, answering all patient questions, arranging and discussing plan of care with patient as well as directly providing post-discharge instructions  Additional time then spent on discharge activities  Discharge Medications:  See after visit summary for reconciled discharge medications provided to patient and family        ** Please Note: This note has been constructed using a voice recognition system **

## 2019-01-20 NOTE — ASSESSMENT & PLAN NOTE
· POA, first noted when she was going for her X-Rays today  Bilateral in nature in the face and arms  Resolved     · Continue to monitor electrolytes

## 2019-01-20 NOTE — PLAN OF CARE
Problem: DISCHARGE PLANNING - CARE MANAGEMENT  Goal: Discharge to post-acute care or home with appropriate resources  INTERVENTIONS:  - Conduct assessment to determine patient/family and health care team treatment goals, and need for post-acute services based on payer coverage, community resources, and patient preferences, and barriers to discharge  - Address psychosocial, clinical, and financial barriers to discharge as identified in assessment in conjunction with the patient/family and health care team  - Arrange appropriate level of post-acute services according to patients   needs and preference and payer coverage in collaboration with the physician and health care team  - Communicate with and update the patient/family, physician, and health care team regarding progress on the discharge plan  - Arrange appropriate transportation to post-acute venues  Outcome: Progressing  Per SLIM provider, patient to discharge today, requested CM visit for possible VNA  CM met with patient at bedside  Patient lives alone, undergoing chemo/radiation and requests referral to VNA  Patient states that she is open to any VNA agency  [facility] is not accepting referrals, so referral placed to both [facility] and [facility] per [facility]  Patient states that she uses [facility] to chemo / oncology appointments, has both palliative appointment and oncology appointment scheduled within the next week  Awaiting response  Patient states that her sister in law will transport home  CM will continue to follow through discharge

## 2019-01-20 NOTE — ASSESSMENT & PLAN NOTE
· Patient is known to Dr Romelia Douglas  She started Faslodex infusion and is due to be started on Ibrance, but this is being held as per Dr Romelia Douglas   Now noted is metastasis to the right femur   · Appreciate oncology recommendations   · Outpatient follow up   · Should be started with Palliative Care and she has an appointment next week

## 2019-01-20 NOTE — SOCIAL WORK
Per SLIM provider, patient to discharge today, requested CM visit for possible VNA  CM met with patient at bedside  Patient lives alone, undergoing chemo/radiation and requests referral to VNA  Patient states that she is open to any VNA agency  [facility] is not accepting referrals, so referral placed to both [facility] and [facility] to accept  Patient states that she uses [facility] to chemo / oncology appointments, has both palliative appointment and oncology appointment scheduled within the next week  Patient states that her sister in law will transport home  CM will continue to follow through discharge

## 2019-01-20 NOTE — ASSESSMENT & PLAN NOTE
· POA, noted to be 2 6 in the ER   Stable at 3 4  · Will order 40 mEq PO today   · Continue KCl 20 mEq PO QD outpatient   · Stable for discharge

## 2019-01-20 NOTE — PLAN OF CARE
Problem: Potential for Falls  Goal: Patient will remain free of falls  INTERVENTIONS:  - Assess patient frequently for physical needs  -  Identify cognitive and physical deficits and behaviors that affect risk of falls  -  [place] fall precautions as indicated by assessment   - Educate patient/family on patient safety including physical limitations  - Instruct patient to call for assistance with activity based on assessment  - Modify environment to reduce risk of injury  - Consider OT/PT consult to assist with strengthening/mobility   Outcome: Progressing      Problem: Nutrition/Hydration-ADULT  Goal: Nutrient/Hydration intake appropriate for improving, restoring or maintaining nutritional needs  Monitor and assess patient's nutrition/hydration status for malnutrition (ex- brittle hair, bruises, dry skin, pale skin and conjunctiva, muscle wasting, smooth red tongue, and disorientation)  Collaborate with interdisciplinary team and initiate plan and interventions as ordered  Monitor patient's weight and dietary intake as ordered or per policy  Utilize nutrition screening tool and intervene per policy  Determine patient's food preferences and provide high-protein, high-caloric foods as appropriate       INTERVENTIONS:  - Monitor oral intake, urinary output, labs, and treatment plans  - Assess nutrition and hydration status and recommend course of action  - Evaluate amount of meals eaten  - Assist patient with eating if necessary   - Allow adequate time for meals  - Recommend/ encourage appropriate diets, oral nutritional supplements, and vitamin/mineral supplements  - Order, calculate, and assess calorie counts as needed  - Recommend, monitor, and adjust tube feedings and TPN/PPN based on assessed needs  - Assess need for intravenous fluids  - Provide specific nutrition/hydration education as appropriate  - Include patient/family/caregiver in decisions related to nutrition   Outcome: Progressing      Problem: PAIN - ADULT  Goal: Verbalizes/displays adequate comfort level or baseline comfort level  Interventions:  - Encourage patient to monitor pain and request assistance  - Assess pain using appropriate pain scale  - Administer analgesics based on type and severity of pain and evaluate response  - Implement non-pharmacological measures as appropriate and evaluate response  - Consider cultural and social influences on pain and pain management  - Notify physician/advanced practitioner if interventions unsuccessful or patient reports new pain   Outcome: Progressing      Problem: NEUROSENSORY - ADULT  Goal: Achieves stable or improved neurological status  INTERVENTIONS  - Monitor and report changes in neurological status  - Initiate measures to prevent increased intracranial pressure  - Maintain blood pressure and fluid volume within ordered parameters to optimize cerebral perfusion  - Monitor temperature, glucose, and sodium or any other associated labs   Initiate appropriate interventions as ordered  - Monitor for seizure activity   - Administer anti-seizure medications as ordered   Outcome: Progressing      Problem: GASTROINTESTINAL - ADULT  Goal: Minimal or absence of nausea and/or vomiting  INTERVENTIONS:  - Administer IV fluids as ordered to ensure adequate hydration  - Maintain NPO status until nausea and vomiting are resolved  - Nasogastric tube as ordered  - Administer ordered antiemetic medications as needed  - Provide nonpharmacologic comfort measures as appropriate  - Advance diet as tolerated, if ordered  - Nutrition services referral to assist patient with adequate nutrition and appropriate food choices   Outcome: Progressing    Goal: Maintains or returns to baseline bowel function  INTERVENTIONS:  - Assess bowel function  - Encourage oral fluids to ensure adequate hydration  - Administer IV fluids as ordered to ensure adequate hydration  - Administer ordered medications as needed  - Encourage mobilization and activity  - Nutrition services referral to assist patient with appropriate food choices   Outcome: Progressing      Problem: METABOLIC, FLUID AND ELECTROLYTES - ADULT  Goal: Electrolytes maintained within normal limits  INTERVENTIONS:  - Monitor labs and assess patient for signs and symptoms of electrolyte imbalances  - Administer electrolyte replacement as ordered  - Monitor response to electrolyte replacements, including repeat lab results as appropriate  - Instruct patient on fluid and nutrition as appropriate   Outcome: Progressing      Problem: HEMATOLOGIC - ADULT  Goal: Maintains hematologic stability  INTERVENTIONS  - Assess for signs and symptoms of bleeding or hemorrhage  - Monitor labs  - Administer supportive blood products/factors as ordered and appropriate   Outcome: Progressing

## 2019-01-20 NOTE — SOCIAL WORK
CM received response from [facility] via [facility] stating that they will start patient on Tuesday

## 2019-01-20 NOTE — ASSESSMENT & PLAN NOTE
· Noted leukopenia at 1 96 that increased to 2 5 yesterday  [address] = 1300 yesterday  No fevers   This is relatively stable for her readings  · No further work up

## 2019-01-21 ENCOUNTER — TELEPHONE (OUTPATIENT)
Dept: HEMATOLOGY ONCOLOGY | Facility: CLINIC | Age: 69
End: 2019-01-21

## 2019-01-21 NOTE — TELEPHONE ENCOUNTER
Pt just discharged from hospital and questioning when to start [id number] [place]  Instructed not to start today  Pt has an appt tomorrow and will review plan at that time  Verbalized understanding

## 2019-01-22 ENCOUNTER — OFFICE VISIT (OUTPATIENT)
Dept: HEMATOLOGY ONCOLOGY | Facility: CLINIC | Age: 69
End: 2019-01-22
Payer: MEDICARE

## 2019-01-22 VITALS
TEMPERATURE: 97.8 F | DIASTOLIC BLOOD PRESSURE: 68 MMHG | HEART RATE: 82 BPM | RESPIRATION RATE: 18 BRPM | SYSTOLIC BLOOD PRESSURE: 114 MMHG | WEIGHT: 122 LBS | HEIGHT: 63 IN | OXYGEN SATURATION: 94 % | BODY MASS INDEX: 21.62 KG/M2

## 2019-01-22 DIAGNOSIS — R19.7 DIARRHEA, UNSPECIFIED TYPE: ICD-10-CM

## 2019-01-22 DIAGNOSIS — R60.0 LOCALIZED EDEMA: ICD-10-CM

## 2019-01-22 DIAGNOSIS — E87.6 HYPOKALEMIA: ICD-10-CM

## 2019-01-22 DIAGNOSIS — E83.42 HYPOMAGNESEMIA: ICD-10-CM

## 2019-01-22 DIAGNOSIS — C50.911 BREAST CANCER METASTASIZED TO BONE, RIGHT (HCC): Primary | Chronic | ICD-10-CM

## 2019-01-22 DIAGNOSIS — E83.51 HYPOCALCEMIA: ICD-10-CM

## 2019-01-22 DIAGNOSIS — M48.54XA: ICD-10-CM

## 2019-01-22 DIAGNOSIS — D70.8 OTHER NEUTROPENIA (HCC): ICD-10-CM

## 2019-01-22 DIAGNOSIS — E86.0 DEHYDRATION: ICD-10-CM

## 2019-01-22 DIAGNOSIS — E88.09 HYPOALBUMINEMIA: ICD-10-CM

## 2019-01-22 DIAGNOSIS — C79.51 BREAST CANCER METASTASIZED TO BONE, RIGHT (HCC): Primary | Chronic | ICD-10-CM

## 2019-01-22 DIAGNOSIS — D70.9 NEUTROPENIA, UNSPECIFIED TYPE (HCC): ICD-10-CM

## 2019-01-22 PROCEDURE — 99215 OFFICE O/P EST HI 40 MIN: CPT | Performed by: INTERNAL MEDICINE

## 2019-01-22 NOTE — LETTER
January 23, 2019     Keren Moseley [address]  [facility] [address]    Patient: Gloria Mcdonald   YOB: 1950   Date of Visit: 1/22/2019       Dear Dr Tasia Hurley:    Thank you for referring Poly Irvin to me for evaluation  Below are my notes for this consultation  If you have questions, please do not hesitate to call me  I look forward to following your patient along with you  Sincerely,        Beau Miller MD        CC: No Recipients  Beau Miller MD  1/23/2019 12:00 AM  Sign at close encounter    HPI:   Patient is here with her 2 sister-in-laws  Follow-up visit for extensive bony metastatic disease presumably from previously known hormone receptor-positive right breast cancer  In December patient was hospitalized with acute compression fracture of T12  There was no biopsy  He was started on radiation and that has been completed  We had a long discussion about treatment plan and decision was Faslodex plus Ibrance and also Xgeva with calcium and vitamin-D  She has received 1 dose of Faslodex and Xgeva  She has not started [employer] yet  She was hospitalized from 01/18/2019-01/20/2019 with diarrhea, dehydration, electrolyte imbalance  C difficile toxin test was negative  Patient received intravenous fluids with potassium and magnesium  She has used Imodium  She has much less diarrhea  She has generalized weakness and tiredness  Less pain in her lower back  She has back brace  She feels she could use more intravenous fluid on outpatient basis and will like that to be arranged because of diarrhea and dehydration that has not fully recovered yet  For management of pain and anxiety she will be seen by palliative care  She would like medication for oral thrush to be renewed  Oral thrush as significantly improved    She had bone scan recently and that showed extensive metastatic disease and also cortical thinning of left femur needing orthopedic consultation and if not surgery and radiation and that is being arranged  If surgery then maybe we will have  tissue  sample to study                                                                                                  Breast cancer was initially diagnosed in 1996 and patient had right breast mastectomy followed by adjuvant chemotherapy  She had recurrent disease in the right supraclavicular lymph node in 2004 and for that she had surgery and [facility] for 10 years until 2015  Evaluation in 2015 was negative for any evidence of metastatic disease but she has extensive metastatic disease now  Recently she was found to have anemia and Hemoccult-positive stool and she has appointment with a gastroenterologist     She is anxious               Current Outpatient Prescriptions:     acetaminophen (TYLENOL) 325 mg tablet, Take 2 tablets (650 mg total) by mouth 4 (four) times a day (with meals and at bedtime), Disp: 80 tablet, Rfl: 1    calcium carbonate (OS-CLARENCE) 1250 (500 Ca) MG chewable tablet, Chew 1 tablet (1,250 mg total) daily, Disp: 30 tablet, Rfl: 0    Cholecalciferol (VITAMIN D3) 2000 units TABS, Take 2,000 Units by mouth daily, Disp: , Rfl:     ferrous sulfate 325 (65 Fe) mg tablet, Take 1 tablet (325 mg total) by mouth daily with breakfast, Disp: 30 tablet, Rfl: 1    gabapentin (NEURONTIN) 100 mg capsule, Take 1 capsule (100 mg total) by mouth 2 (two) times a day, Disp: 60 capsule, Rfl: 1    liraglutide (VICTOZA) injection, Inject 0 6 mg under the skin daily  , Disp: , Rfl:     lisinopril (ZESTRIL) 5 mg tablet, Take 5 mg by mouth daily  , Disp: , Rfl:     LORazepam (ATIVAN) 1 mg tablet, May use one daily PRN anxiety/panic    Take 2mg (2 tablets) qhs , Disp: 90 tablet, Rfl: 0    metoclopramide (REGLAN) 10 mg tablet, Take 1 tablet (10 mg total) by mouth every 6 (six) hours as needed (Nausea or Vomiting), Disp: 40 tablet, Rfl: 0    morphine (MSIR) 15 mg tablet, Use 1 tab TID scheduled to anticipate cancer pain  May have 1-2 tabs q4hrs PRN mod-severe pain , Disp: 90 tablet, Rfl: 0    MULTIPLE VITAMINS PO, Take by mouth, Disp: , Rfl:     nystatin (MYCOSTATIN) 100,000 units/mL suspension, Apply 5 mL (500,000 Units total) to the mouth or throat 4 (four) times a day, Disp: 60 mL, Rfl: 0    Omega-3 Fatty Acids (FISH OIL PO), Take 2 g by mouth, Disp: , Rfl:     omeprazole (PriLOSEC) 40 MG capsule, Take 1 capsule (40 mg total) by mouth daily, Disp: 30 capsule, Rfl: 3    ondansetron (ZOFRAN-ODT) 4 mg disintegrating tablet, Take 1 tablet (4 mg total) by mouth every 6 (six) hours as needed for nausea or vomiting, Disp: 20 tablet, Rfl: 1    Palbociclib (IBRANCE) 125 MG, Take 1 capsule (125 mg total) by mouth daily For 21 days then 7 off, Disp: 21 capsule, Rfl: 5    polyethylene glycol (MIRALAX) 17 g packet, Take 17 g by mouth daily, Disp: 30 each, Rfl: 1    potassium chloride (K-DUR,KLOR-CON) 20 mEq tablet, Take 1 tablet (20 mEq total) by mouth daily, Disp: 30 tablet, Rfl: 0    pravastatin (PRAVACHOL) 40 mg tablet, Take 40 mg by mouth daily  , Disp: , Rfl:     senna (SENOKOT) 8 6 mg, Take 1 tablet (8 6 mg total) by mouth daily at bedtime Hold for loose stool , Disp: 30 each, Rfl: 1    sucralfate (CARAFATE) 1 g/10 mL suspension, Take 10 mL (1 g total) by mouth 2 (two) times a day, Disp: 420 mL, Rfl: 2    No Known Allergies     No history exists  ROS:  01/22/19 Reviewed 13 systems:  Presently no headaches, seizures, dizziness, diplopia, dysphagia, hoarseness, chest pain, palpitations, shortness of breath, cough, hemoptysis, abdominal pain, nausea, vomiting,  melena, hematuria, fever, chills, active bleeding,  skin rash, weight loss,  numbness,  claudication   No frequent infections  Not unusually sensitive to heat or cold  Has some swelling of the ankles  No swollen glands  Patient is anxious    No GYN symptoms Other symptoms are in HPI        /68 (BP Location: Left arm, Patient Position: Sitting, Cuff Size: Adult)   Pulse 82   Temp 97 8 °F (36 6 °C)   Resp 18   Ht 5' 2 5" (1 588 m)   Wt 55 3 kg (122 lb)   LMP 05/27/1996 (Approximate)   SpO2 94%   BMI 21 96 kg/m²      Physical Exam:  Alert, oriented, not in acute distress, no icterus, no oral thrush, has dryness of the tongue, no palpable neck mass, clear lung fields, regular heart rate, abdomen  soft and non tender, no palpable abdominal mass, no ascites, trace edema of ankles, no calf tenderness, no focal neurological deficit, generalized weakness, no skin rash, no palpable lymphadenopathy in the neck and axillary areas, good arterial pulses, no clubbing  Patient is anxious  Performance status 3   She has a walker    IMAGING:  No orders to display       LABS:  Results for orders placed or performed during the hospital encounter of 01/18/19   Clostridium difficile toxin by PCR   Result Value Ref Range    C difficile toxin by PCR NEGATIVE for C difficle toxin by PCR  NEGATIVE for C difficle toxin by PCR      Stool Enteric Bacterial Panel by PCR   Result Value Ref Range    Salmonella sp PCR None Detected None Detected    Shigella sp/Enteroinvasive E  coli (EIEC) PCR None Detected None Detected    Campylobacter sp (jejuni and coli) PCR None Detected None Detected    Shiga toxin 1/Shiga toxin 2 genes PCR None Detected None Detected   CBC and differential   Result Value Ref Range    WBC 1 96 (LL) 4 31 - 10 16 Thousand/uL    RBC 4 03 3 81 - 5 12 Million/uL    Hemoglobin 11 0 (L) 11 5 - 15 4 g/dL    Hematocrit 33 9 (L) 34 8 - 46 1 %    MCV 84 82 - 98 fL    MCH 27 3 26 8 - 34 3 pg    MCHC 32 4 31 4 - 37 4 g/dL    RDW 16 2 (H) 11 6 - 15 1 %    MPV 9 8 8 9 - 12 7 fL    Platelets 343 757 - 140 Thousands/uL    nRBC 0 /100 WBCs    Neutrophils Relative 51 43 - 75 %    Immat GRANS % 2 0 - 2 %    Lymphocytes Relative 11 (L) 14 - 44 %    Monocytes Relative 20 (H) 4 - 12 %    Eosinophils Relative 15 (H) 0 - 6 % Basophils Relative 1 0 - 1 %    Neutrophils Absolute 1 00 (L) 1 85 - 7 62 Thousands/µL    Immature Grans Absolute 0 04 0 00 - 0 20 Thousand/uL    Lymphocytes Absolute 0 22 (L) 0 60 - 4 47 Thousands/µL    Monocytes Absolute 0 39 0 17 - 1 22 Thousand/µL    Eosinophils Absolute 0 30 0 00 - 0 61 Thousand/µL    Basophils Absolute 0 01 0 00 - 0 10 Thousands/µL   Comprehensive metabolic panel   Result Value Ref Range    Sodium 141 136 - 145 mmol/L    Potassium 2 6 (LL) 3 5 - 5 3 mmol/L    Chloride 106 100 - 108 mmol/L    CO2 28 21 - 32 mmol/L    ANION GAP 7 4 - 13 mmol/L    BUN 5 5 - 25 mg/dL    Creatinine 0 55 (L) 0 60 - 1 30 mg/dL    Glucose 113 65 - 140 mg/dL    Calcium 6 2 (L) 8 3 - 10 1 mg/dL    AST 24 5 - 45 U/L    ALT 29 12 - 78 U/L    Alkaline Phosphatase 182 (H) 46 - 116 U/L    Total Protein 5 7 (L) 6 4 - 8 2 g/dL    Albumin 2 8 (L) 3 5 - 5 0 g/dL    Total Bilirubin 1 00 0 20 - 1 00 mg/dL    eGFR 97 ml/min/1 73sq m   Magnesium   Result Value Ref Range    Magnesium 1 4 (L) 1 6 - 2 6 mg/dL   Protime-INR   Result Value Ref Range    Protime 15 8 (H) 11 8 - 14 2 seconds    INR 1 30 (H) 0 86 - 1 17   APTT   Result Value Ref Range    PTT 32 26 - 38 seconds   Lipase   Result Value Ref Range    Lipase 40 (L) 73 - 393 u/L   Comprehensive metabolic panel   Result Value Ref Range    Sodium 142 136 - 145 mmol/L    Potassium 3 5 3 5 - 5 3 mmol/L    Chloride 110 (H) 100 - 108 mmol/L    CO2 24 21 - 32 mmol/L    ANION GAP 8 4 - 13 mmol/L    BUN 2 (L) 5 - 25 mg/dL    Creatinine 0 45 (L) 0 60 - 1 30 mg/dL    Glucose 96 65 - 140 mg/dL    Calcium 6 1 (L) 8 3 - 10 1 mg/dL    AST 17 5 - 45 U/L    ALT 20 12 - 78 U/L    Alkaline Phosphatase 153 (H) 46 - 116 U/L    Total Protein 4 5 (L) 6 4 - 8 2 g/dL    Albumin 2 2 (L) 3 5 - 5 0 g/dL    Total Bilirubin 0 90 0 20 - 1 00 mg/dL    eGFR 103 ml/min/1 73sq m   Magnesium   Result Value Ref Range    Magnesium 1 6 1 6 - 2 6 mg/dL   CBC and differential   Result Value Ref Range    WBC 2 52 (L) 4 31 - 10 16 Thousand/uL    RBC 3 67 (L) 3 81 - 5 12 Million/uL    Hemoglobin 10 0 (L) 11 5 - 15 4 g/dL    Hematocrit 30 7 (L) 34 8 - 46 1 %    MCV 84 82 - 98 fL    MCH 27 2 26 8 - 34 3 pg    MCHC 32 6 31 4 - 37 4 g/dL    RDW 16 4 (H) 11 6 - 15 1 %    MPV 9 9 8 9 - 12 7 fL    Platelets 478 (L) 520 - 390 Thousands/uL    nRBC 0 /100 WBCs    Neutrophils Relative 53 43 - 75 %    Immat GRANS % 2 0 - 2 %    Lymphocytes Relative 10 (L) 14 - 44 %    Monocytes Relative 20 (H) 4 - 12 %    Eosinophils Relative 15 (H) 0 - 6 %    Basophils Relative 0 0 - 1 %    Neutrophils Absolute 1 33 (L) 1 85 - 7 62 Thousands/µL    Immature Grans Absolute 0 06 0 00 - 0 20 Thousand/uL    Lymphocytes Absolute 0 25 (L) 0 60 - 4 47 Thousands/µL    Monocytes Absolute 0 50 0 17 - 1 22 Thousand/µL    Eosinophils Absolute 0 37 0 00 - 0 61 Thousand/µL    Basophils Absolute 0 01 0 00 - 0 10 Thousands/µL   Comprehensive metabolic panel   Result Value Ref Range    Sodium 142 136 - 145 mmol/L    Potassium 3 4 (L) 3 5 - 5 3 mmol/L    Chloride 110 (H) 100 - 108 mmol/L    CO2 24 21 - 32 mmol/L    ANION GAP 8 4 - 13 mmol/L    BUN 3 (L) 5 - 25 mg/dL    Creatinine 0 49 (L) 0 60 - 1 30 mg/dL    Glucose 101 65 - 140 mg/dL    Calcium 6 2 (L) 8 3 - 10 1 mg/dL    AST 16 5 - 45 U/L    ALT 20 12 - 78 U/L    Alkaline Phosphatase 158 (H) 46 - 116 U/L    Total Protein 4 6 (L) 6 4 - 8 2 g/dL    Albumin 2 2 (L) 3 5 - 5 0 g/dL    Total Bilirubin 0 70 0 20 - 1 00 mg/dL    eGFR 100 ml/min/1 73sq m   ECG 12 lead   Result Value Ref Range    Ventricular Rate 69 BPM    Atrial Rate 69 BPM    MS Interval 162 ms    QRSD Interval 70 ms    QT Interval 380 ms    QTC Interval 407 ms    P Axis 71 degrees    QRS Axis -33 degrees    T Wave Axis 54 degrees   Fingerstick Glucose (POCT)   Result Value Ref Range    POC Glucose 94 65 - 140 mg/dl   Fingerstick Glucose (POCT)   Result Value Ref Range    POC Glucose 104 65 - 140 mg/dl   Fingerstick Glucose (POCT)   Result Value Ref Range    POC Glucose 111 65 - 140 mg/dl   Fingerstick Glucose (POCT)   Result Value Ref Range    POC Glucose 143 (H) 65 - 140 mg/dl   Fingerstick Glucose (POCT)   Result Value Ref Range    POC Glucose 87 65 - 140 mg/dl   Fingerstick Glucose (POCT)   Result Value Ref Range    POC Glucose 106 65 - 140 mg/dl   Fingerstick Glucose (POCT)   Result Value Ref Range    POC Glucose 95 65 - 140 mg/dl   Fingerstick Glucose (POCT)   Result Value Ref Range    POC Glucose 130 65 - 140 mg/dl     Labs, Imaging, & Other studies:   All pertinent labs and imaging studies were personally reviewed    Lab Results   Component Value Date    K 3 4 (L) 01/20/2019     (H) 01/20/2019    CO2 24 01/20/2019    BUN 3 (L) 01/20/2019    CREATININE 0 49 (L) 01/20/2019    CALCIUM 6 2 (L) 01/20/2019    AST 16 01/20/2019    ALT 20 01/20/2019    ALKPHOS 158 (H) 01/20/2019    EGFR 100 01/20/2019     Lab Results   Component Value Date    WBC 2 52 (L) 01/19/2019    HGB 10 0 (L) 01/19/2019    HCT 30 7 (L) 01/19/2019    MCV 84 01/19/2019     (L) 01/19/2019   No results found for: SEDRATE    Reviewed and discussed with patient  Assessment and plan:  Patient is here with her 2 sister-in-laws  Follow-up visit for extensive bony metastatic disease presumably from previously known hormone receptor-positive right breast cancer  In December patient was hospitalized with acute compression fracture of T12  There was no biopsy  He was started on radiation and that has been completed  We had a long discussion about treatment plan and decision was Faslodex plus Ibrance and also Xgeva with calcium and vitamin-D  She has received 1 dose of Faslodex and Xgeva  She has not started [employer] [place] yet  She was hospitalized from 01/18/2019-01/20/2019 with diarrhea, dehydration, electrolyte imbalance  C difficile toxin test was negative  Patient received intravenous fluids with potassium and magnesium  She has used Imodium  She has much less diarrhea    She has generalized weakness and tiredness  Less pain in her lower back  She has back brace  She feels she could use more intravenous fluid on outpatient basis and will like that to be arranged because of diarrhea and dehydration that has not fully recovered yet  For management of pain and anxiety she will be seen by palliative care  She would like medication for oral thrush to be renewed  Oral thrush as significantly improved  She had bone scan recently and that showed extensive metastatic disease and also cortical thinning of left femur needing orthopedic consultation and if not surgery and radiation and that is being arranged  If surgery then maybe we will have  tissue  sample to study                                                                                                  Breast cancer was initially diagnosed in 1996 and patient had right breast mastectomy followed by adjuvant chemotherapy  She had recurrent disease in the right supraclavicular lymph node in 2004 and for that she had surgery and [facility] for 10 years until 2015  Evaluation in 2015 was negative for any evidence of metastatic disease but she has extensive metastatic disease now  Recently she was found to have anemia and Hemoccult-positive stool and she has appointment with a gastroenterologist     She is anxious     Physical examination and test results are as recorded and discussed  Discussed total picture in very much detail  She is being continued on Faslodex and Xgeva  Tripp Favia is on hold until there is improvement in neutrophil counts  She has instructions about febrile neutropenia  She will have orthopedic consultation fall bony disease left femur and possibility of impending fracture  Patient will not put weight on that leg that much  If no surgery and she will have radiation to that area  If surgery we might have tissue sample to study as above    Arranging intravenous fluids with potassium and magnesium twice a week  Renewed Mycostatin oral suspension  See orders below  All discussed in very much detail from start in [address] till now  Questions answered     They understand she has stage IV extensive metastatic disease especially to the bones  Also discussed the importance of self-breast examination, eating healthy foods, nutritional supplements rich in protein, staying active, wearing a back brace and health screening tests later  Patient is capable of self-care with some assistance   1  Breast cancer metastasized to bone, right (HCC)    - Comprehensive metabolic panel; Standing  - nystatin (MYCOSTATIN) 100,000 units/mL suspension; Apply 5 mL (500,000 Units total) to the mouth or throat 4 (four) times a day  Dispense: 60 mL; Refill: 0  - Comprehensive metabolic panel  - Ambulatory referral to Orthopedic Surgery; Future    2  Non-traumatic compression fracture of first thoracic vertebra, initial encounter ([facility] )    - Comprehensive metabolic panel; Standing  - Comprehensive metabolic panel    3  Diarrhea, unspecified type    - Comprehensive metabolic panel; Standing  - Magnesium; Standing  - Comprehensive metabolic panel  - Magnesium    4  Dehydration      5  Neutropenia, unspecified type ([facility] )    - CBC and differential; Standing  - CBC and differential    6  Other neutropenia (HCC)    - CBC and differential; Standing  - CBC and differential    7  Localized edema      8  Hypoalbuminemia    - Comprehensive metabolic panel; Standing  - Comprehensive metabolic panel    9  Hypomagnesemia      10  Hypokalemia    - Comprehensive metabolic panel; Standing  - Magnesium; Standing  - Comprehensive metabolic panel  - Magnesium    11  Hypocalcemia    - Comprehensive metabolic panel; Standing  - Comprehensive metabolic panel          Patient voiced understanding and agreement in the discussion  Counseling / Coordination of Care: 55 min     Greater than 50% of total time was spent with the patient and / or family counseling and / or coordination of care

## 2019-01-22 NOTE — PATIENT INSTRUCTIONS
Blood tests, IV hydration, orthopedic consultation, continuation of Faslodex but to hold Ibrance  Febrile neutropenia precautions    Emergency room if fever, chills, bleeding, excessive diarrhea and other medical emergencies

## 2019-01-23 ENCOUNTER — HOSPITAL ENCOUNTER (OUTPATIENT)
Dept: INFUSION CENTER | Facility: CLINIC | Age: 69
Discharge: HOME | End: 2019-01-23
Payer: MEDICARE

## 2019-01-23 ENCOUNTER — OFFICE VISIT (OUTPATIENT)
Dept: OBGYN CLINIC | Facility: CLINIC | Age: 69
End: 2019-01-23
Payer: MEDICARE

## 2019-01-23 VITALS
SYSTOLIC BLOOD PRESSURE: 122 MMHG | BODY MASS INDEX: 21.62 KG/M2 | WEIGHT: 122 LBS | HEIGHT: 63 IN | DIASTOLIC BLOOD PRESSURE: 66 MMHG | HEART RATE: 86 BPM

## 2019-01-23 VITALS
SYSTOLIC BLOOD PRESSURE: 113 MMHG | TEMPERATURE: 97.8 F | RESPIRATION RATE: 18 BRPM | DIASTOLIC BLOOD PRESSURE: 55 MMHG | HEART RATE: 86 BPM

## 2019-01-23 DIAGNOSIS — M89.9 LYTIC BONE LESION OF FEMUR: Primary | ICD-10-CM

## 2019-01-23 DIAGNOSIS — F41.9 ANXIETY: ICD-10-CM

## 2019-01-23 PROCEDURE — 96360 HYDRATION IV INFUSION INIT: CPT

## 2019-01-23 PROCEDURE — 96361 HYDRATE IV INFUSION ADD-ON: CPT

## 2019-01-23 PROCEDURE — 99203 OFFICE O/P NEW LOW 30 MIN: CPT | Performed by: ORTHOPAEDIC SURGERY

## 2019-01-23 RX ORDER — LORAZEPAM 1 MG/1
TABLET ORAL
Qty: 60 TABLET | Refills: 2 | OUTPATIENT
Start: 2019-01-23

## 2019-01-23 RX ADMIN — POTASSIUM CHLORIDE: 2 INJECTION, SOLUTION, CONCENTRATE INTRAVENOUS at 09:01

## 2019-01-23 NOTE — PROGRESS NOTES
HPI:   Patient is here with her 2 sister-in-laws  Follow-up visit for extensive bony metastatic disease presumably from previously known hormone receptor-positive right breast cancer  In December patient was hospitalized with acute compression fracture of T12  There was no biopsy  He was started on radiation and that has been completed  We had a long discussion about treatment plan and decision was Faslodex plus Ibrance and also Xgeva with calcium and vitamin-D  She has received 1 dose of Faslodex and Xgeva  She has not started [place] yet  She was hospitalized from 01/18/2019-01/20/2019 with diarrhea, dehydration, electrolyte imbalance  C difficile toxin test was negative  Patient received intravenous fluids with potassium and magnesium  She has used Imodium  She has much less diarrhea  She has generalized weakness and tiredness  Less pain in her lower back  She has back brace  She feels she could use more intravenous fluid on outpatient basis and will like that to be arranged because of diarrhea and dehydration that has not fully recovered yet  For management of pain and anxiety she will be seen by palliative care  She would like medication for oral thrush to be renewed  Oral thrush as significantly improved  She had bone scan recently and that showed extensive metastatic disease and also cortical thinning of left femur needing orthopedic consultation and if not surgery and radiation and that is being arranged  If surgery then maybe we will have  tissue  sample to study                                                                                                  Breast cancer was initially diagnosed in 1996 and patient had right breast mastectomy followed by adjuvant chemotherapy  She had recurrent disease in the right supraclavicular lymph node in 2004 and for that she had surgery and [facility] for 10 years until 2015    Evaluation in 2015 was negative for any evidence of metastatic disease but she has extensive metastatic disease now  Recently she was found to have anemia and Hemoccult-positive stool and she has appointment with a gastroenterologist     She is anxious               Current Outpatient Prescriptions:     acetaminophen (TYLENOL) 325 mg tablet, Take 2 tablets (650 mg total) by mouth 4 (four) times a day (with meals and at bedtime), Disp: 80 tablet, Rfl: 1    calcium carbonate (OS-CLARENCE) 1250 (500 Ca) MG chewable tablet, Chew 1 tablet (1,250 mg total) daily, Disp: 30 tablet, Rfl: 0    Cholecalciferol (VITAMIN D3) 2000 units TABS, Take 2,000 Units by mouth daily, Disp: , Rfl:     ferrous sulfate 325 (65 Fe) mg tablet, Take 1 tablet (325 mg total) by mouth daily with breakfast, Disp: 30 tablet, Rfl: 1    gabapentin (NEURONTIN) 100 mg capsule, Take 1 capsule (100 mg total) by mouth 2 (two) times a day, Disp: 60 capsule, Rfl: 1    liraglutide (VICTOZA) injection, Inject 0 6 mg under the skin daily  , Disp: , Rfl:     lisinopril (ZESTRIL) 5 mg tablet, Take 5 mg by mouth daily  , Disp: , Rfl:     LORazepam (ATIVAN) 1 mg tablet, May use one daily PRN anxiety/panic  Take 2mg (2 tablets) qhs , Disp: 90 tablet, Rfl: 0    metoclopramide (REGLAN) 10 mg tablet, Take 1 tablet (10 mg total) by mouth every 6 (six) hours as needed (Nausea or Vomiting), Disp: 40 tablet, Rfl: 0    morphine (MSIR) 15 mg tablet, Use 1 tab TID scheduled to anticipate cancer pain    May have 1-2 tabs q4hrs PRN mod-severe pain , Disp: 90 tablet, Rfl: 0    MULTIPLE VITAMINS PO, Take by mouth, Disp: , Rfl:     nystatin (MYCOSTATIN) 100,000 units/mL suspension, Apply 5 mL (500,000 Units total) to the mouth or throat 4 (four) times a day, Disp: 60 mL, Rfl: 0    Omega-3 Fatty Acids (FISH OIL PO), Take 2 g by mouth, Disp: , Rfl:     omeprazole (PriLOSEC) 40 MG capsule, Take 1 capsule (40 mg total) by mouth daily, Disp: 30 capsule, Rfl: 3    ondansetron (ZOFRAN-ODT) 4 mg disintegrating tablet, Take 1 tablet (4 mg total) by mouth every 6 (six) hours as needed for nausea or vomiting, Disp: 20 tablet, Rfl: 1    Palbociclib (IBRANCE) 125 MG, Take 1 capsule (125 mg total) by mouth daily For 21 days then 7 off, Disp: 21 capsule, Rfl: 5    polyethylene glycol (MIRALAX) 17 g packet, Take 17 g by mouth daily, Disp: 30 each, Rfl: 1    potassium chloride (K-DUR,KLOR-CON) 20 mEq tablet, Take 1 tablet (20 mEq total) by mouth daily, Disp: 30 tablet, Rfl: 0    pravastatin (PRAVACHOL) 40 mg tablet, Take 40 mg by mouth daily  , Disp: , Rfl:     senna (SENOKOT) 8 6 mg, Take 1 tablet (8 6 mg total) by mouth daily at bedtime Hold for loose stool , Disp: 30 each, Rfl: 1    sucralfate (CARAFATE) 1 g/10 mL suspension, Take 10 mL (1 g total) by mouth 2 (two) times a day, Disp: 420 mL, Rfl: 2    No Known Allergies     No history exists  ROS:  01/22/19 Reviewed 13 systems:  Presently no headaches, seizures, dizziness, diplopia, dysphagia, hoarseness, chest pain, palpitations, shortness of breath, cough, hemoptysis, abdominal pain, nausea, vomiting,  melena, hematuria, fever, chills, active bleeding,  skin rash, weight loss,  numbness,  claudication   No frequent infections  Not unusually sensitive to heat or cold  Has some swelling of the ankles  No swollen glands  Patient is anxious    No GYN symptoms Other symptoms are in HPI        /68 (BP Location: Left arm, Patient Position: Sitting, Cuff Size: Adult)   Pulse 82   Temp 97 8 °F (36 6 °C)   Resp 18   Ht 5' 2 5" (1 588 m)   Wt 55 3 kg (122 lb)   LMP 05/27/1996 (Approximate)   SpO2 94%   BMI 21 96 kg/m²     Physical Exam:  Alert, oriented, not in acute distress, no icterus, no oral thrush, has dryness of the tongue, no palpable neck mass, clear lung fields, regular heart rate, abdomen  soft and non tender, no palpable abdominal mass, no ascites, trace edema of ankles, no calf tenderness, no focal neurological deficit, generalized weakness, no skin rash, no palpable lymphadenopathy in the neck and axillary areas, good arterial pulses, no clubbing  Patient is anxious  Performance status 3   She has a walker    IMAGING:  No orders to display       LABS:  Results for orders placed or performed during the hospital encounter of 01/18/19   Clostridium difficile toxin by PCR   Result Value Ref Range    C difficile toxin by PCR NEGATIVE for C difficle toxin by PCR  NEGATIVE for C difficle toxin by PCR      Stool Enteric Bacterial Panel by PCR   Result Value Ref Range    Salmonella sp PCR None Detected None Detected    Shigella sp/Enteroinvasive E  coli (EIEC) PCR None Detected None Detected    Campylobacter sp (jejuni and coli) PCR None Detected None Detected    Shiga toxin 1/Shiga toxin 2 genes PCR None Detected None Detected   CBC and differential   Result Value Ref Range    WBC 1 96 (LL) 4 31 - 10 16 Thousand/uL    RBC 4 03 3 81 - 5 12 Million/uL    Hemoglobin 11 0 (L) 11 5 - 15 4 g/dL    Hematocrit 33 9 (L) 34 8 - 46 1 %    MCV 84 82 - 98 fL    MCH 27 3 26 8 - 34 3 pg    MCHC 32 4 31 4 - 37 4 g/dL    RDW 16 2 (H) 11 6 - 15 1 %    MPV 9 8 8 9 - 12 7 fL    Platelets 671 643 - 622 Thousands/uL    nRBC 0 /100 WBCs    Neutrophils Relative 51 43 - 75 %    Immat GRANS % 2 0 - 2 %    Lymphocytes Relative 11 (L) 14 - 44 %    Monocytes Relative 20 (H) 4 - 12 %    Eosinophils Relative 15 (H) 0 - 6 %    Basophils Relative 1 0 - 1 %    Neutrophils Absolute 1 00 (L) 1 85 - 7 62 Thousands/µL    Immature Grans Absolute 0 04 0 00 - 0 20 Thousand/uL    Lymphocytes Absolute 0 22 (L) 0 60 - 4 47 Thousands/µL    Monocytes Absolute 0 39 0 17 - 1 22 Thousand/µL    Eosinophils Absolute 0 30 0 00 - 0 61 Thousand/µL    Basophils Absolute 0 01 0 00 - 0 10 Thousands/µL   Comprehensive metabolic panel   Result Value Ref Range    Sodium 141 136 - 145 mmol/L    Potassium 2 6 (LL) 3 5 - 5 3 mmol/L    Chloride 106 100 - 108 mmol/L    CO2 28 21 - 32 mmol/L    ANION GAP 7 4 - 13 mmol/L BUN 5 5 - 25 mg/dL    Creatinine 0 55 (L) 0 60 - 1 30 mg/dL    Glucose 113 65 - 140 mg/dL    Calcium 6 2 (L) 8 3 - 10 1 mg/dL    AST 24 5 - 45 U/L    ALT 29 12 - 78 U/L    Alkaline Phosphatase 182 (H) 46 - 116 U/L    Total Protein 5 7 (L) 6 4 - 8 2 g/dL    Albumin 2 8 (L) 3 5 - 5 0 g/dL    Total Bilirubin 1 00 0 20 - 1 00 mg/dL    eGFR 97 ml/min/1 73sq m   Magnesium   Result Value Ref Range    Magnesium 1 4 (L) 1 6 - 2 6 mg/dL   Protime-INR   Result Value Ref Range    Protime 15 8 (H) 11 8 - 14 2 seconds    INR 1 30 (H) 0 86 - 1 17   APTT   Result Value Ref Range    PTT 32 26 - 38 seconds   Lipase   Result Value Ref Range    Lipase 40 (L) 73 - 393 u/L   Comprehensive metabolic panel   Result Value Ref Range    Sodium 142 136 - 145 mmol/L    Potassium 3 5 3 5 - 5 3 mmol/L    Chloride 110 (H) 100 - 108 mmol/L    CO2 24 21 - 32 mmol/L    ANION GAP 8 4 - 13 mmol/L    BUN 2 (L) 5 - 25 mg/dL    Creatinine 0 45 (L) 0 60 - 1 30 mg/dL    Glucose 96 65 - 140 mg/dL    Calcium 6 1 (L) 8 3 - 10 1 mg/dL    AST 17 5 - 45 U/L    ALT 20 12 - 78 U/L    Alkaline Phosphatase 153 (H) 46 - 116 U/L    Total Protein 4 5 (L) 6 4 - 8 2 g/dL    Albumin 2 2 (L) 3 5 - 5 0 g/dL    Total Bilirubin 0 90 0 20 - 1 00 mg/dL    eGFR 103 ml/min/1 73sq m   Magnesium   Result Value Ref Range    Magnesium 1 6 1 6 - 2 6 mg/dL   CBC and differential   Result Value Ref Range    WBC 2 52 (L) 4 31 - 10 16 Thousand/uL    RBC 3 67 (L) 3 81 - 5 12 Million/uL    Hemoglobin 10 0 (L) 11 5 - 15 4 g/dL    Hematocrit 30 7 (L) 34 8 - 46 1 %    MCV 84 82 - 98 fL    MCH 27 2 26 8 - 34 3 pg    MCHC 32 6 31 4 - 37 4 g/dL    RDW 16 4 (H) 11 6 - 15 1 %    MPV 9 9 8 9 - 12 7 fL    Platelets 327 (L) 474 - 390 Thousands/uL    nRBC 0 /100 WBCs    Neutrophils Relative 53 43 - 75 %    Immat GRANS % 2 0 - 2 %    Lymphocytes Relative 10 (L) 14 - 44 %    Monocytes Relative 20 (H) 4 - 12 %    Eosinophils Relative 15 (H) 0 - 6 %    Basophils Relative 0 0 - 1 %    Neutrophils Absolute 1 33 (L) 1 85 - 7 62 Thousands/µL    Immature Grans Absolute 0 06 0 00 - 0 20 Thousand/uL    Lymphocytes Absolute 0 25 (L) 0 60 - 4 47 Thousands/µL    Monocytes Absolute 0 50 0 17 - 1 22 Thousand/µL    Eosinophils Absolute 0 37 0 00 - 0 61 Thousand/µL    Basophils Absolute 0 01 0 00 - 0 10 Thousands/µL   Comprehensive metabolic panel   Result Value Ref Range    Sodium 142 136 - 145 mmol/L    Potassium 3 4 (L) 3 5 - 5 3 mmol/L    Chloride 110 (H) 100 - 108 mmol/L    CO2 24 21 - 32 mmol/L    ANION GAP 8 4 - 13 mmol/L    BUN 3 (L) 5 - 25 mg/dL    Creatinine 0 49 (L) 0 60 - 1 30 mg/dL    Glucose 101 65 - 140 mg/dL    Calcium 6 2 (L) 8 3 - 10 1 mg/dL    AST 16 5 - 45 U/L    ALT 20 12 - 78 U/L    Alkaline Phosphatase 158 (H) 46 - 116 U/L    Total Protein 4 6 (L) 6 4 - 8 2 g/dL    Albumin 2 2 (L) 3 5 - 5 0 g/dL    Total Bilirubin 0 70 0 20 - 1 00 mg/dL    eGFR 100 ml/min/1 73sq m   ECG 12 lead   Result Value Ref Range    Ventricular Rate 69 BPM    Atrial Rate 69 BPM    IA Interval 162 ms    QRSD Interval 70 ms    QT Interval 380 ms    QTC Interval 407 ms    P Axis 71 degrees    QRS Axis -33 degrees    T Wave Axis 54 degrees   Fingerstick Glucose (POCT)   Result Value Ref Range    POC Glucose 94 65 - 140 mg/dl   Fingerstick Glucose (POCT)   Result Value Ref Range    POC Glucose 104 65 - 140 mg/dl   Fingerstick Glucose (POCT)   Result Value Ref Range    POC Glucose 111 65 - 140 mg/dl   Fingerstick Glucose (POCT)   Result Value Ref Range    POC Glucose 143 (H) 65 - 140 mg/dl   Fingerstick Glucose (POCT)   Result Value Ref Range    POC Glucose 87 65 - 140 mg/dl   Fingerstick Glucose (POCT)   Result Value Ref Range    POC Glucose 106 65 - 140 mg/dl   Fingerstick Glucose (POCT)   Result Value Ref Range    POC Glucose 95 65 - 140 mg/dl   Fingerstick Glucose (POCT)   Result Value Ref Range    POC Glucose 130 65 - 140 mg/dl     Labs, Imaging, & Other studies:   All pertinent labs and imaging studies were personally reviewed    Lab Results   Component Value Date    K 3 4 (L) 01/20/2019     (H) 01/20/2019    CO2 24 01/20/2019    BUN 3 (L) 01/20/2019    CREATININE 0 49 (L) 01/20/2019    CALCIUM 6 2 (L) 01/20/2019    AST 16 01/20/2019    ALT 20 01/20/2019    ALKPHOS 158 (H) 01/20/2019    EGFR 100 01/20/2019     Lab Results   Component Value Date    WBC 2 52 (L) 01/19/2019    HGB 10 0 (L) 01/19/2019    HCT 30 7 (L) 01/19/2019    MCV 84 01/19/2019     (L) 01/19/2019   No results found for: [facility] [employer] [facility] and discussed with patient  Assessment and plan:  Patient is here with her 2 sister-in-laws  Follow-up visit for extensive bony metastatic disease presumably from previously known hormone receptor-positive right breast cancer  In December patient was hospitalized with acute compression fracture of T12  There was no biopsy  He was started on radiation and that has been completed  We had a long discussion about treatment plan and decision was Faslodex plus Ibrance and also Xgeva with calcium and vitamin-D  She has received 1 dose of Faslodex and Xgeva  She has not started [place] yet  She was hospitalized from 01/18/2019-01/20/2019 with diarrhea, dehydration, electrolyte imbalance  C difficile toxin test was negative  Patient received intravenous fluids with potassium and magnesium  She has used Imodium  She has much less diarrhea  She has generalized weakness and tiredness  Less pain in her lower back  She has back brace  She feels she could use more intravenous fluid on outpatient basis and will like that to be arranged because of diarrhea and dehydration that has not fully recovered yet  For management of pain and anxiety she will be seen by palliative care  She would like medication for oral thrush to be renewed  Oral thrush as significantly improved    She had bone scan recently and that showed extensive metastatic disease and also cortical thinning of left femur needing orthopedic consultation and if not surgery and radiation and that is being arranged  If surgery then maybe we will have  tissue  sample to study                                                                                                  Breast cancer was initially diagnosed in 1996 and patient had right breast mastectomy followed by adjuvant chemotherapy  She had recurrent disease in the right supraclavicular lymph node in 2004 and for that she had surgery and [facility] for 10 years until 2015  Evaluation in 2015 was negative for any evidence of metastatic disease but she has extensive metastatic disease now  Recently she was found to have anemia and Hemoccult-positive stool and she has appointment with a gastroenterologist     She is anxious     Physical examination and test results are as recorded and discussed  Discussed total picture in very much detail  She is being continued on Faslodex and Xgeva  [address] [place] is on hold until there is improvement in neutrophil counts  She has instructions about febrile neutropenia  She will have orthopedic consultation fall bony disease left femur and possibility of impending fracture  Patient will not put weight on that leg that much  If no surgery and she will have radiation to that area  If surgery we might have tissue sample to study as above  Arranging intravenous fluids with potassium and magnesium twice a week  Renewed Mycostatin oral suspension  See orders below  All discussed in very much detail from start in 56 till now  Questions answered     They understand she has stage IV extensive metastatic disease especially to the bones  Also discussed the importance of self-breast examination, eating healthy foods, nutritional supplements rich in protein, staying active, wearing a back brace and health screening tests later  Patient is capable of self-care with some assistance   1   Breast cancer metastasized to bone, right (HCC)    - Comprehensive metabolic panel; Standing  - nystatin (MYCOSTATIN) 100,000 units/mL suspension; Apply 5 mL (500,000 Units total) to the mouth or throat 4 (four) times a day  Dispense: 60 mL; Refill: 0  - Comprehensive metabolic panel  - Ambulatory referral to Orthopedic Surgery; Future    2  Non-traumatic compression fracture of first thoracic vertebra, initial encounter ([facility] )    - Comprehensive metabolic panel; Standing  - Comprehensive metabolic panel    3  Diarrhea, unspecified type    - Comprehensive metabolic panel; Standing  - Magnesium; Standing  - Comprehensive metabolic panel  - Magnesium    4  Dehydration      5  Neutropenia, unspecified type ([facility] )    - CBC and differential; Standing  - CBC and differential    6  Other neutropenia (HCC)    - CBC and differential; Standing  - CBC and differential    7  Localized edema      8  Hypoalbuminemia    - Comprehensive metabolic panel; Standing  - Comprehensive metabolic panel    9  Hypomagnesemia      10  Hypokalemia    - Comprehensive metabolic panel; Standing  - Magnesium; Standing  - Comprehensive metabolic panel  - Magnesium    11  Hypocalcemia    - Comprehensive metabolic panel; Standing  - Comprehensive metabolic panel          Patient voiced understanding and agreement in the discussion  Counseling / Coordination of Care: 55 min  Greater than 50% of total time was spent with the patient and / or family counseling and / or coordination of care

## 2019-01-23 NOTE — PROGRESS NOTES
Patient Name:  Christina Herrera  MRN:  [id number]    Assessment     1  Lytic bone lesion of femur         Plan     Left distal femoral shaft lytic bone lesions  1  Patient has no pain in her lower extremities at this time  Explained that she may require prophylactic fixation in the future, at which time a biopsy will be obtained  She was instructed to contact me if she develops any pain, particularly in the left thigh, as this likely indicates significant compromise of the femoral cortex which can result in femur fracture  2  Home physical therapy for strengthening and knee brace as needed for activity to address ongoing weakness after radiation therapy  3  Follow-up in four weeks for repeat evaluation, or sooner as indicated  Chief Complaint     Evaluation of left femur lytic lesion      History of the Present Illness     Christina Herrera is a 76 y o  female reports to the office today for evaluation of her left leg  Patient has a known history of metastatic breast cancer  She recently underwent a bone scan which revealed signal in the left femur  She subsequently had x-rays which showed lytic lesions within the distal femoral shaft  She denies any pain in the left leg  She notes minimal discomfort only with weather changes  She denies any pain with ambulation  No numbness or tingling  She does note generalized weakness which she attributes recently completing radiation therapy  No fevers or chills  Physical Exam     /66   Pulse 86   Ht 5' 2 5" (1 588 m)   Wt 55 3 kg (122 lb)   LMP 05/27/1996 (Approximate)   BMI 21 96 kg/m²     Left lower extremity:  No gross deformity  Skin intact  No tenderness to palpation along the femur  Hip and knee range of motion is intact without discomfort  Negative MEG test   Negative logroll test   Motor and sensation intact left lower extremity  Skin warm and well perfused  Eyes:  Anicteric sclerae  Neck:  Supple    Lungs:  Unlabored breathing  Cardiovascular:  Capillary refill is less than 2 seconds  Skin:  Intact without erythema  Neurologic:  Sensation intact to light touch  Psychiatric:  Mood and affect are appropriate  Data Review     I have personally reviewed pertinent films in PACS, and my interpretation follows:    X-rays of bilateral femurs on 1/18/19 show multiple lytic lesions of the femoral diaphysis and greater trochanter  Past Medical History:   Diagnosis Date    Cancer [facility])     breast 1996    [facility]     bone      Compression fracture of thoracic vertebra (HCC)     Diabetes mellitus ([facility] )     Tendonitis        Past Surgical History:   Procedure Laterality Date    CHOLECYSTECTOMY      MASTECTOMY      Right side reconstruction    NECK SURGERY      TONSILLECTOMY         No Known Allergies    Current Outpatient Prescriptions on File Prior to Visit   Medication Sig Dispense Refill    acetaminophen (TYLENOL) 325 mg tablet Take 2 tablets (650 mg total) by mouth 4 (four) times a day (with meals and at bedtime) 80 tablet 1    calcium carbonate (OS-CLARENCE) 1250 (500 Ca) MG chewable tablet Chew 1 tablet (1,250 mg total) daily 30 tablet 0    Cholecalciferol (VITAMIN D3) 2000 units TABS Take 2,000 Units by mouth daily      ferrous sulfate 325 (65 Fe) mg tablet Take 1 tablet (325 mg total) by mouth daily with breakfast 30 tablet 1    gabapentin (NEURONTIN) 100 mg capsule Take 1 capsule (100 mg total) by mouth 2 (two) times a day 60 capsule 1    liraglutide (VICTOZA) injection Inject 0 6 mg under the skin daily        lisinopril (ZESTRIL) 5 mg tablet Take 5 mg by mouth daily        LORazepam (ATIVAN) 1 mg tablet May use one daily PRN anxiety/panic  Take 2mg (2 tablets) qhs  90 tablet 0    metoclopramide (REGLAN) 10 mg tablet Take 1 tablet (10 mg total) by mouth every 6 (six) hours as needed (Nausea or Vomiting) 40 tablet 0    morphine (MSIR) 15 mg tablet Use 1 tab TID scheduled to anticipate cancer pain  May have 1-2 tabs q4hrs PRN mod-severe pain  90 tablet 0    MULTIPLE VITAMINS PO Take by mouth      nystatin (MYCOSTATIN) 100,000 units/mL suspension Apply 5 mL (500,000 Units total) to the mouth or throat 4 (four) times a day 60 mL 0    Omega-3 Fatty Acids (FISH OIL PO) Take 2 g by mouth      omeprazole (PriLOSEC) 40 MG capsule Take 1 capsule (40 mg total) by mouth daily 30 capsule 3    ondansetron (ZOFRAN-ODT) 4 mg disintegrating tablet Take 1 tablet (4 mg total) by mouth every 6 (six) hours as needed for nausea or vomiting 20 tablet 1    Palbociclib (IBRANCE) 125 MG Take 1 capsule (125 mg total) by mouth daily For 21 days then 7 off 21 capsule 5    polyethylene glycol (MIRALAX) 17 g packet Take 17 g by mouth daily 30 each 1    potassium chloride (K-DUR,KLOR-CON) 20 mEq tablet Take 1 tablet (20 mEq total) by mouth daily 30 tablet 0    pravastatin (PRAVACHOL) 40 mg tablet Take 40 mg by mouth daily        senna (SENOKOT) 8 6 mg Take 1 tablet (8 6 mg total) by mouth daily at bedtime Hold for loose stool  30 each 1    sucralfate (CARAFATE) 1 g/10 mL suspension Take 10 mL (1 g total) by mouth 2 (two) times a day 420 mL 2     No current facility-administered medications on file prior to visit  Social History   Substance Use Topics    Smoking status: Never Smoker    Smokeless tobacco: Never Used    Alcohol use No       Family History   Problem Relation Age of Onset    Diabetes Mother     Cancer Mother     Lymphoma Father     KAROLINA disease Brother          Review of Systems     As stated in the HPI  All other systems were reviewed and are negative        Scribe Attestation    I,:   Jorje Catalan PA-C am acting as a scribe while in the presence of the attending physician :        I,:   Asael Guzmán MD personally performed the services described in this documentation    as scribed in my presence :

## 2019-01-24 ENCOUNTER — ANESTHESIA EVENT (OUTPATIENT)
Dept: GASTROENTEROLOGY | Facility: HOSPITAL | Age: 69
End: 2019-01-24
Payer: MEDICARE

## 2019-01-24 ENCOUNTER — TELEPHONE (OUTPATIENT)
Dept: GASTROENTEROLOGY | Facility: AMBULARY SURGERY CENTER | Age: 69
End: 2019-01-24

## 2019-01-24 ENCOUNTER — TELEPHONE (OUTPATIENT)
Dept: HEMATOLOGY ONCOLOGY | Facility: CLINIC | Age: 69
End: 2019-01-24

## 2019-01-24 ENCOUNTER — HOSPITAL ENCOUNTER (OUTPATIENT)
Facility: HOSPITAL | Age: 69
Setting detail: OUTPATIENT SURGERY
Discharge: HOME | End: 2019-01-24
Attending: INTERNAL MEDICINE | Admitting: INTERNAL MEDICINE
Payer: MEDICARE

## 2019-01-24 ENCOUNTER — ANESTHESIA (OUTPATIENT)
Dept: GASTROENTEROLOGY | Facility: HOSPITAL | Age: 69
End: 2019-01-24
Payer: MEDICARE

## 2019-01-24 VITALS
HEIGHT: 63 IN | HEART RATE: 74 BPM | SYSTOLIC BLOOD PRESSURE: 127 MMHG | DIASTOLIC BLOOD PRESSURE: 63 MMHG | WEIGHT: 121 LBS | TEMPERATURE: 97.3 F | RESPIRATION RATE: 16 BRPM | BODY MASS INDEX: 21.44 KG/M2 | OXYGEN SATURATION: 98 %

## 2019-01-24 DIAGNOSIS — F41.9 ANXIETY: ICD-10-CM

## 2019-01-24 DIAGNOSIS — R19.5 HEME POSITIVE STOOL: ICD-10-CM

## 2019-01-24 DIAGNOSIS — D50.8 OTHER IRON DEFICIENCY ANEMIA: ICD-10-CM

## 2019-01-24 LAB — GLUCOSE SERPL-MCNC: 119 MG/DL (ref 65–140)

## 2019-01-24 PROCEDURE — 82948 REAGENT STRIP/BLOOD GLUCOSE: CPT

## 2019-01-24 PROCEDURE — 43239 EGD BIOPSY SINGLE/MULTIPLE: CPT | Performed by: INTERNAL MEDICINE

## 2019-01-24 PROCEDURE — 88305 TISSUE EXAM BY PATHOLOGIST: CPT | Performed by: PATHOLOGY

## 2019-01-24 PROCEDURE — 88342 IMHCHEM/IMCYTCHM 1ST ANTB: CPT | Performed by: PATHOLOGY

## 2019-01-24 RX ORDER — LORAZEPAM 1 MG/1
TABLET ORAL
Qty: 20 TABLET | Refills: 0 | Status: SHIPPED | OUTPATIENT
Start: 2019-01-24 | End: 2019-01-28 | Stop reason: SDUPTHER

## 2019-01-24 RX ORDER — SODIUM CHLORIDE 9 MG/ML
125 INJECTION, SOLUTION INTRAVENOUS CONTINUOUS
Status: DISCONTINUED | OUTPATIENT
Start: 2019-01-24 | End: 2019-01-24 | Stop reason: HOSPADM

## 2019-01-24 RX ORDER — PROPOFOL 10 MG/ML
INJECTION, EMULSION INTRAVENOUS AS NEEDED
Status: DISCONTINUED | OUTPATIENT
Start: 2019-01-24 | End: 2019-01-24 | Stop reason: SURG

## 2019-01-24 RX ADMIN — PROPOFOL 150 MG: 10 INJECTION, EMULSION INTRAVENOUS at 08:50

## 2019-01-24 RX ADMIN — PROPOFOL 150 MG: 10 INJECTION, EMULSION INTRAVENOUS at 09:08

## 2019-01-24 RX ADMIN — PROPOFOL 20 MG: 10 INJECTION, EMULSION INTRAVENOUS at 09:12

## 2019-01-24 RX ADMIN — SODIUM CHLORIDE: 0.9 INJECTION, SOLUTION INTRAVENOUS at 08:49

## 2019-01-24 RX ADMIN — PROPOFOL 20 MG: 10 INJECTION, EMULSION INTRAVENOUS at 09:10

## 2019-01-24 NOTE — TELEPHONE ENCOUNTER
Pt called regarding medication Lorazepam which looks like it was dispensed by Dr Betty Robledo  Pt is going to follow up with [facility] to see if they have a script from 12/24  If not they are going to call our office back

## 2019-01-24 NOTE — TELEPHONE ENCOUNTER
----- Message from Josey Warner sent at 1/24/2019 12:13 PM EST -----      ----- Message -----  From: Leoncio Prado MD  Sent: 1/24/2019   9:20 AM  To: Josey Warner    The above patient needs to follow up with PA in the next 4-6 weeks to potentially schedule colonoscopy

## 2019-01-24 NOTE — H&P
History and Physical - [facility]  Severo Schneider 76 y o  female MRN: [id number]    HPI: Severo Schneider is a 76y o  year old female who presents for evaluation of black tarry stools, positive fecal occult blood does, weight loss, and poor appetite        Review of Systems    Historical Information   Past Medical History:   Diagnosis Date    Cancer [facility])     breast 1996    Cancer ([facility] [address] [facility] )     bone      [facility]     Diabetes mellitus ([facility] )     History of therapeutic radiation     Tendonitis      Past Surgical History:   Procedure Laterality Date    CHOLECYSTECTOMY      MASTECTOMY      Right side reconstruction    NECK SURGERY      TONSILLECTOMY       Social History   History   Alcohol Use No     History   Drug Use No     History   Smoking Status    Never Smoker   Smokeless Tobacco    Never Used     Family History   Problem Relation Age of Onset    Diabetes Mother     Cancer Mother     Lymphoma Father     KAROLINA disease Brother        Meds/Allergies     Prescriptions Prior to Admission   Medication    acetaminophen (TYLENOL) 325 mg tablet    calcium carbonate (OS-CLARENCE) 1250 (500 Ca) MG chewable tablet    Cholecalciferol (VITAMIN D3) 2000 units TABS    ferrous sulfate 325 (65 Fe) mg tablet    gabapentin (NEURONTIN) 100 mg capsule    lisinopril (ZESTRIL) 5 mg tablet    LORazepam (ATIVAN) 1 mg tablet    metoclopramide (REGLAN) 10 mg tablet    morphine (MSIR) 15 mg tablet    MULTIPLE VITAMINS PO    nystatin (MYCOSTATIN) 100,000 units/mL suspension    Omega-3 Fatty Acids (FISH OIL PO)    omeprazole (PriLOSEC) 40 MG capsule    ondansetron (ZOFRAN-ODT) 4 mg disintegrating tablet    potassium chloride (K-DUR,KLOR-CON) 20 mEq tablet    pravastatin (PRAVACHOL) 40 mg tablet    sucralfate (CARAFATE) 1 g/10 mL suspension    liraglutide (VICTOZA) injection    Palbociclib (IBRANCE) 125 MG       No Known Allergies    Objective     Blood pressure 104/58, pulse 83, resp  rate 18, height 5' 3" (1 6 m), weight 54 9 kg (121 lb), last menstrual period 05/27/1996, SpO2 98 %, not currently breastfeeding  PHYSICAL EXAM    Gen: NAD  CV: RRR  CHEST: Clear  ABD: soft, NT/ND  EXT: no edema  Neuro: AAO      ASSESSMENT/PLAN:  This is a 76y o  year old female here for evaluation of positive fecal occult blood test and history of black stools  PLAN:   Procedure:  EGD

## 2019-01-24 NOTE — ANESTHESIA PREPROCEDURE EVALUATION
Review of Systems/Medical History  Patient summary reviewed  Chart reviewed      Cardiovascular  Exercise tolerance (METS): >4,     Pulmonary  Negative pulmonary ROS        GI/Hepatic    No GERD ,        Negative  ROS        Endo/Other  Diabetes well controlled type 2 Insulin,      GYN    Breast cancer Right mastectomy       Hematology  Anemia ,     Musculoskeletal  Negative musculoskeletal ROS        Neurology  Negative neurology ROS      Psychology   Anxiety,              Physical Exam    Airway    Mallampati score: II  TM Distance: >3 FB  Neck ROM: full     Dental   No notable dental hx     Cardiovascular  Cardiovascular exam normal    Pulmonary  Pulmonary exam normal     Other Findings        Anesthesia Plan  ASA Score- 2     Anesthesia Type- IV sedation with anesthesia with ASA Monitors  Additional Monitors:   Airway Plan:         Plan Factors-  Patient did not smoke on day of surgery  Induction- intravenous  Postoperative Plan-     Informed Consent- Anesthetic plan and risks discussed with patient

## 2019-01-24 NOTE — OP NOTE
ESOPHAGOGASTRODUODENOSCOPY    PROCEDURE: EGD    SEDATION: Monitored anesthesia care, check anesthesia records    ASA Class: 2    INDICATIONS:  Black stool/positive fecal occult test     CONSENT:  Informed consent was obtained for the procedure, including sedation after explaining the risks and benefits of the procedure  Risks including but not limited to bleeding, perforation, infection, and missed lesion  PREPARATION:   Telemetry, pulse oximetry, blood pressure were monitored throughout the procedure  Patient was identified by myself both verbally and by visual inspection of ID band  DESCRIPTION:   Patient was placed in the left lateral decubitus position and was sedated with the above medication  The gastroscope was introduced in to the oropharynx and the esophagus was intubated under direct visualization  Scope was passed down the esophagus up to 2nd part of the duodenum  A careful inspection was made as the gastroscope was withdrawn, including a retroflexed view of the stomach; findings and interventions are described below  FINDINGS:    #1  Esophagus- normal appearing esophagus, regular squamocolumnar junction noted at 36 cm  #2  Stomach- erosive appearance of gastric mucosa within the fundus and cardia suggestive of severe gastritis  There were several erythematous polyps measuring between 2-3 mm each within the cardia and fundus-suspect may be inflammatory polyps, 2 of these polyps were removed using cold biopsy forceps  Antrum appeared normal   Pylorus was patent  Numerous biopsies were obtained from the antrum, body and cardia to assess for H pylori  Retroflexed view was notable for polyps only  #3  Duodenum- duodenal mucosa appeared normal within the bulb, sweep and D2  Biopsies were obtained nonetheless to assess for celiac  IMPRESSIONS:      1  Severe gastritis, suspect may be secondary to radiation    2  Numerous gastric polyps, suspect may be inflammatory  RECOMMENDATIONS:     1  Continue pantoprazole 40 mg daily  2  Continue Carafate 1 g b i d , 2 hours separate from other medications  3  Follow-up biopsy results in 2-3 weeks  4  Will need colonoscopy, previous colonoscopy was over 10 years ago and fecal occult blood test was positive  COMPLICATIONS:  None; patient tolerated the procedure well      SPECIMENS:    ID Type Source Tests Collected by Time Destination   1 : gastric polyps cold forcep Tissue Polyp, Stomach/Small Intestine TISSUE EXAM Radha Caballero MD 1/24/2019  9:11 AM    2 : gastric bx Tissue Stomach TISSUE EXAM Radha Caballero MD 1/24/2019  9:12 AM    3 : duodenum bx Tissue Duodenum TISSUE EXAM Radha Caballero MD 1/24/2019  9:14 AM        ESTIMATED BLOOD LOSS:  Minimal

## 2019-01-24 NOTE — DISCHARGE INSTR - AVS FIRST PAGE
ESOPHAGOGASTRODUODENOSCOPY    PROCEDURE: EGD    SEDATION: Monitored anesthesia care, check anesthesia records    ASA Class: 2    INDICATIONS:  Black stool/positive fecal occult test     CONSENT:  Informed consent was obtained for the procedure, including sedation after explaining the risks and benefits of the procedure  Risks including but not limited to bleeding, perforation, infection, and missed lesion  PREPARATION:   Telemetry, pulse oximetry, blood pressure were monitored throughout the procedure  Patient was identified by myself both verbally and by visual inspection of ID band  DESCRIPTION:   Patient was placed in the left lateral decubitus position and was sedated with the above medication  The gastroscope was introduced in to the oropharynx and the esophagus was intubated under direct visualization  Scope was passed down the esophagus up to 2nd part of the duodenum  A careful inspection was made as the gastroscope was withdrawn, including a retroflexed view of the stomach; findings and interventions are described below  FINDINGS:    #1  Esophagus- normal appearing esophagus, regular squamocolumnar junction noted at 36 cm  #2  Stomach- erosive appearance of gastric mucosa within the fundus and cardia suggestive of severe gastritis  There were several erythematous polyps measuring between 2-3 mm each within the cardia and fundus-suspect may be inflammatory polyps, 2 of these polyps were removed using cold biopsy forceps  Antrum appeared normal   Pylorus was patent  Numerous biopsies were obtained from the antrum, body and cardia to assess for H pylori  Retroflexed view was notable for polyps only  #3  Duodenum- duodenal mucosa appeared normal within the bulb, sweep and D2  Biopsies were obtained nonetheless to assess for celiac  IMPRESSIONS:      1  Severe gastritis, suspect may be secondary to radiation    2  Numerous gastric polyps, suspect may be inflammatory  RECOMMENDATIONS:     1  Continue pantoprazole 40 mg daily  2  Continue Carafate 1 g b i d , 2 hours separate from other medications  3  Follow-up biopsy results in 2-3 weeks  4  Will need colonoscopy, previous colonoscopy was over 10 years ago and fecal occult blood test was positive  COMPLICATIONS:  None; patient tolerated the procedure well      SPECIMENS:    ID Type Source Tests Collected by Time Destination   1 : gastric polyps cold forcep Tissue Polyp, Stomach/Small Intestine TISSUE EXAM Ana Lilia West MD 1/24/2019  9:11 AM    2 : gastric bx Tissue Stomach TISSUE EXAM Ana Lilia West MD 1/24/2019  9:12 AM    3 : duodenum bx Tissue Duodenum TISSUE EXAM Ana Lilia West MD 1/24/2019  9:14 AM        ESTIMATED BLOOD LOSS:  Minimal

## 2019-01-28 ENCOUNTER — OFFICE VISIT (OUTPATIENT)
Dept: PALLIATIVE MEDICINE | Facility: CLINIC | Age: 69
End: 2019-01-28
Payer: MEDICARE

## 2019-01-28 ENCOUNTER — SOCIAL WORK (OUTPATIENT)
Dept: PALLIATIVE MEDICINE | Facility: CLINIC | Age: 69
End: 2019-01-28
Payer: MEDICARE

## 2019-01-28 VITALS
OXYGEN SATURATION: 97 % | HEART RATE: 94 BPM | SYSTOLIC BLOOD PRESSURE: 100 MMHG | RESPIRATION RATE: 18 BRPM | DIASTOLIC BLOOD PRESSURE: 60 MMHG | TEMPERATURE: 98.5 F | HEIGHT: 63 IN | WEIGHT: 114.6 LBS | BODY MASS INDEX: 20.3 KG/M2

## 2019-01-28 DIAGNOSIS — F41.9 ANXIETY: ICD-10-CM

## 2019-01-28 DIAGNOSIS — E43 SEVERE PROTEIN-CALORIE MALNUTRITION (HCC): ICD-10-CM

## 2019-01-28 DIAGNOSIS — C79.51 BREAST CANCER METASTASIZED TO BONE, RIGHT (HCC): Chronic | ICD-10-CM

## 2019-01-28 DIAGNOSIS — C50.911 MALIGNANT NEOPLASM OF RIGHT FEMALE BREAST, UNSPECIFIED ESTROGEN RECEPTOR STATUS, UNSPECIFIED SITE OF BREAST (HCC): Chronic | ICD-10-CM

## 2019-01-28 DIAGNOSIS — C79.51 BONE METASTASIS (HCC): Primary | ICD-10-CM

## 2019-01-28 DIAGNOSIS — Z71.89 COORDINATION OF COMPLEX CARE: Primary | ICD-10-CM

## 2019-01-28 DIAGNOSIS — F41.9 ANXIETY DISORDER, UNSPECIFIED TYPE: Chronic | ICD-10-CM

## 2019-01-28 DIAGNOSIS — M48.54XA: ICD-10-CM

## 2019-01-28 DIAGNOSIS — C50.911 BREAST CANCER METASTASIZED TO BONE, RIGHT (HCC): Chronic | ICD-10-CM

## 2019-01-28 DIAGNOSIS — R19.7 DIARRHEA, UNSPECIFIED TYPE: ICD-10-CM

## 2019-01-28 DIAGNOSIS — R11.2 NON-INTRACTABLE VOMITING WITH NAUSEA, UNSPECIFIED VOMITING TYPE: ICD-10-CM

## 2019-01-28 DIAGNOSIS — M89.9 LYTIC BONE LESION OF FEMUR: ICD-10-CM

## 2019-01-28 PROCEDURE — 99214 OFFICE O/P EST MOD 30 MIN: CPT | Performed by: NURSE PRACTITIONER

## 2019-01-28 RX ORDER — LORAZEPAM 1 MG/1
TABLET ORAL
Qty: 60 TABLET | Refills: 0 | Status: SHIPPED | OUTPATIENT
Start: 2019-01-28 | End: 2019-03-04 | Stop reason: SDUPTHER

## 2019-01-28 RX ORDER — LOPERAMIDE HYDROCHLORIDE 2 MG/1
2 CAPSULE ORAL 4 TIMES DAILY PRN
Refills: 0
Start: 2019-01-28 | End: 2019-03-26 | Stop reason: ALTCHOICE

## 2019-01-28 RX ORDER — MORPHINE SULFATE 15 MG/1
TABLET ORAL
Qty: 75 TABLET | Refills: 0 | Status: SHIPPED | OUTPATIENT
Start: 2019-01-28 | End: 2019-02-25 | Stop reason: SDUPTHER

## 2019-01-28 RX ORDER — LAMOTRIGINE 25 MG/1
250 TABLET ORAL ONCE
Status: DISCONTINUED | OUTPATIENT
Start: 2019-01-29 | End: 2019-01-29

## 2019-01-28 RX ORDER — ONDANSETRON 8 MG/1
8 TABLET, ORALLY DISINTEGRATING ORAL 3 TIMES DAILY
Qty: 90 TABLET | Refills: 1 | Status: SHIPPED | OUTPATIENT
Start: 2019-01-28 | End: 2019-02-25 | Stop reason: SDUPTHER

## 2019-01-28 RX ORDER — GABAPENTIN 100 MG/1
100 CAPSULE ORAL 2 TIMES DAILY
Qty: 60 CAPSULE | Refills: 1 | Status: SHIPPED | OUTPATIENT
Start: 2019-01-28 | End: 2019-02-25 | Stop reason: SDUPTHER

## 2019-01-28 NOTE — PATIENT INSTRUCTIONS
- Try taking Imodium twice daily before you experience diarrhea for next one week then switch back to as-needed  - Try taking ondansetron (now 8mg) on schedule three times daily   - Ok to also take metoclopramide (Reglan) 2-3 x/day on a schedule    - No change to morphine, lorazepam, gabapentin  - Tylenol as needed

## 2019-01-28 NOTE — PROGRESS NOTES
Palliative and Supportive Care   Christine Lucio 76 y o  female [id number]    Assessment/Plan:  1  Bone metastasis ([place] [address] )    2  Malignant neoplasm of right female breast, unspecified estrogen receptor status, unspecified site of breast ([place] [address] )    3  Anxiety    4  Diarrhea, unspecified type    5  Non-intractable vomiting with nausea, unspecified vomiting type    6  Breast cancer metastasized to bone, right ([place] [address] )    7  Non-traumatic compression fracture of first thoracic vertebra, initial encounter ([place] [address] )    8  Lytic bone lesion of femur    9  Severe protein-calorie malnutrition ([place] [address] )    10  Anxiety disorder, unspecified type        Requested Prescriptions     Signed Prescriptions Disp Refills    gabapentin (NEURONTIN) 100 mg capsule 60 capsule 1     Sig: Take 1 capsule (100 mg total) by mouth 2 (two) times a day    LORazepam (ATIVAN) 1 mg tablet 60 tablet 0     Sig: Take 2mg (2 tablets) qhs   morphine (MSIR) 15 mg tablet 75 tablet 0     Sig: Use 1 tab BID scheduled to anticipate cancer pain  May have 1 tabs q4hrs PRN mod-severe pain   loperamide (IMODIUM) 2 mg capsule  0     Sig: Take 1 capsule (2 mg total) by mouth 4 (four) times a day as needed for diarrhea    ondansetron (ZOFRAN-ODT) 8 mg disintegrating tablet 90 tablet 1     Sig: Take 1 tablet (8 mg total) by mouth 3 (three) times a day     Diarrhea, n/v  - recommended taking Imodium BID for next week rather than waiting to take after experiencing diarrhea    - recommended taking ondansetron 8mg on schedule TID  - metoclopramide 2-3 x/day either PRN or also on a schedule  - continue pantoprazole and sucralfate as prescribed by GI    Cancer pain  - continue morphine IR 15mg BID  - continue gabapentin  - PRN Tylenol    Anxiety  - continue lorazepam for anxiety and brachial plexus pain  - consider SSRI or other agent    Consider dexamethasone for pain, appetite, nausea      Follow up in one month      Subjective    Chief Concern  Follow up visit for:  Symptom management         History of Present Illness  Patient ID: Christine Lucio is a 76 y o  female with metastatic disease to bone, presumed to be from known history of breast cancer in 1996 (s/p mastectomy and chemo) and recurrence in 2004 (s/p radiation (?) + letrozole for 10 yr)  Biopsy not done  In Dec 2018, presented with intractable back pain, ultimately found to have extensive bony disease with pathologic T12 fracture, for which she underwent and completed course of palliative radiation  She has started Faslodex and Xgeva; she will be also starting [place]  She has seen orthopedics for consideration of prophylactic fixation for concern for impending fracture with lytic bone lesions of left femur  She has been struggling with nausea, vomiting and diarrhea after radiation  Hospitalized 1/18 - 1/20 with paresthesias, found to have electrolyte abnormalities possibly related to diarrhea and vomiting  She is now receiving twice weekly infusions of IV fluids to maintain hydration  She is fatigued  Diarrhea has been improving  She is taking PRN Imodium, usually about BID after loose stools  She has been nauseated, vomiting and experiencing dry heaves  "I feel horrible " Taking ondansetron ODT or metoclopramide about every 6 hr ATC with some improvement in nausea  Oral thrush has been improving with nystatin  Appetite has been poor  She has been losing weight  Feels weak and fatigued  Noticing mild swelling of feet, which has been attributed to poor nutritional status  She is drinking Ensure  EDG was done on 1/24 for hemoccult positive stool, found to have severe gastritis (presumed to be related to radiation) and gastric polyps  Recommended to take pantoprazole and sucralfate, which she is  Pain of back has improved with radiation  Having some pain of bilateral lateral ribs/ flank region  She is taking morphine IR 15mg BID; she cut down on this from TID  Taking Tylenol PRN   Also has history of brachial plexus pain after cancer treatments, for this, she has been taking lorazepam 2mg at bedtime with improvement for long time  She has been feeling anxious and overwhelmed by new info of metastatic cancer, starting treatments, radiation side effects, possibility of orthopedic surgery  She is a ;   about 3 5 yr ago from prostate cancer  She has two sons; one lives locally  She has supportive sisters and sisters-in-law  Reports that she has AD and her son Pati Jiang is POA  The following portions of the patient's history were reviewed and updated as appropriate: allergies, current medications, past family history, past medical history, past social history, past surgical history and problem list       Visit Information    Accompanied By: No one  Source of History: Self  History Limitations: None    ROS  Review of Systems   Constitutional: Positive for activity change, appetite change, fatigue and unexpected weight change  Cardiovascular: Positive for leg swelling  Gastrointestinal: Positive for diarrhea, nausea and vomiting  Musculoskeletal: Positive for arthralgias and back pain  Psychiatric/Behavioral: The patient is nervous/anxious  Objective     Physical Exam   Constitutional: She is oriented to person, place, and time  She appears well-developed  She is cooperative  Thin, uncomfortable   Pulmonary/Chest: Effort normal    Neurological: She is alert and oriented to person, place, and time  Psychiatric: Her mood appears anxious   Cognition and memory are normal          /60 (BP Location: Left arm, Patient Position: Sitting, Cuff Size: Standard)   Pulse 94   Temp 98 5 °F (36 9 °C) (Oral)   Resp 18   Ht 5' 3" (1 6 m)   Wt 52 kg (114 lb 9 6 oz)   LMP 1996 (Approximate)   SpO2 97%   BMI 20 30 kg/m²       - ECOG Performance Status: 2      Current Outpatient Prescriptions:     acetaminophen (TYLENOL) 325 mg tablet, Take 2 tablets (650 mg total) by mouth 4 (four) times a day (with meals and at bedtime), Disp: 80 tablet, Rfl: 1    calcium carbonate (OS-CLARENCE) 1250 (500 Ca) MG chewable tablet, Chew 1 tablet (1,250 mg total) daily, Disp: 30 tablet, Rfl: 0    Cholecalciferol (VITAMIN D3) 2000 units TABS, Take 2,000 Units by mouth daily, Disp: , Rfl:     ferrous sulfate 325 (65 Fe) mg tablet, Take 1 tablet (325 mg total) by mouth daily with breakfast, Disp: 30 tablet, Rfl: 1    gabapentin (NEURONTIN) 100 mg capsule, Take 1 capsule (100 mg total) by mouth 2 (two) times a day, Disp: 60 capsule, Rfl: 1    lisinopril (ZESTRIL) 5 mg tablet, Take 5 mg by mouth daily  , Disp: , Rfl:     loperamide (IMODIUM) 2 mg capsule, Take 1 capsule (2 mg total) by mouth 4 (four) times a day as needed for diarrhea, Disp: , Rfl: 0    LORazepam (ATIVAN) 1 mg tablet, Take 2mg (2 tablets) qhs , Disp: 60 tablet, Rfl: 0    metoclopramide (REGLAN) 10 mg tablet, Take 1 tablet (10 mg total) by mouth every 6 (six) hours as needed (Nausea or Vomiting), Disp: 40 tablet, Rfl: 0    morphine (MSIR) 15 mg tablet, Use 1 tab BID scheduled to anticipate cancer pain    May have 1 tabs q4hrs PRN mod-severe pain , Disp: 75 tablet, Rfl: 0    MULTIPLE VITAMINS PO, Take by mouth, Disp: , Rfl:     nystatin (MYCOSTATIN) 100,000 units/mL suspension, Apply 5 mL (500,000 Units total) to the mouth or throat 4 (four) times a day, Disp: 60 mL, Rfl: 0    Omega-3 Fatty Acids (FISH OIL PO), Take 2 g by mouth, Disp: , Rfl:     omeprazole (PriLOSEC) 40 MG capsule, Take 1 capsule (40 mg total) by mouth daily, Disp: 30 capsule, Rfl: 3    ondansetron (ZOFRAN-ODT) 8 mg disintegrating tablet, Take 1 tablet (8 mg total) by mouth 3 (three) times a day, Disp: 90 tablet, Rfl: 1    Palbociclib (IBRANCE) 125 MG, Take 1 capsule (125 mg total) by mouth daily For 21 days then 7 off, Disp: 21 capsule, Rfl: 5    potassium chloride (K-DUR,KLOR-CON) 20 mEq tablet, Take 1 tablet (20 mEq total) by mouth daily, Disp: 30 tablet, Rfl: 0    pravastatin (PRAVACHOL) 40 mg tablet, Take 40 mg by mouth daily  , Disp: , Rfl:     sucralfate (CARAFATE) 1 g/10 mL suspension, Take 10 mL (1 g total) by mouth 2 (two) times a day, Disp: 420 mL, Rfl: Jermaine 00, [address] [facility] and Supportive [facility]          Representatives have queried the patient's controlled substance dispensing history in the Prescription Drug Monitoring Program in compliance with the [facility] regulations before I have prescribed any controlled substances  The prescription history is consistent with prescribed therapy and our practice policies

## 2019-01-28 NOTE — PROGRESS NOTES
LSW joined Tatyana DEVI) for patient's first appointment with Palliative Care  Patient has 2 sons - one is local and the other is in [place] ([facility]  According to Brett Mckeon in [place] ([facility]) is her designated POA  Patient is given a 5 wishes because she is unclear about what is included in her documentation  Patient will review and if needed will complete  Patient will bring her paperwork to her next appointment  Patient has a sister and 3 sister-in-laws that provide her with support  Patient's  passed 3 5 years ago with a very aggressive form of prostate cancer  Patient is living alone and her sister assists her with household needs and some ADLs (showering)  Patient does have a rolling walker (previously was her husbands)  Patient also makes good safety decisions (no going downstairs for the laundry)  Patient agrees that a medical alert system would be a good safety precaution  LSW will mail patient information  Patient has concerns over her lack of nutrition  LSW will give her information on [employer] and also the [facility] Nutrition class  Patient states that she is feeling overwhelmed with all of the medical information and is agreeable to be referred to a mental health therapist   Issa Melgar will verify patient's coverage and identify potential providers  LSW also suggested that she explore the [facility]  Patient will call for further follow-up from LSW as needed

## 2019-01-29 ENCOUNTER — HOSPITAL ENCOUNTER (OUTPATIENT)
Dept: INFUSION CENTER | Facility: CLINIC | Age: 69
Discharge: HOME | End: 2019-01-29
Payer: MEDICARE

## 2019-01-29 VITALS
SYSTOLIC BLOOD PRESSURE: 121 MMHG | HEART RATE: 76 BPM | DIASTOLIC BLOOD PRESSURE: 57 MMHG | TEMPERATURE: 97.1 F | RESPIRATION RATE: 20 BRPM

## 2019-01-29 DIAGNOSIS — C50.911 BREAST CANCER METASTASIZED TO BONE, RIGHT (HCC): Chronic | ICD-10-CM

## 2019-01-29 DIAGNOSIS — E83.51 HYPOCALCEMIA: Primary | ICD-10-CM

## 2019-01-29 DIAGNOSIS — C79.51 BREAST CANCER METASTASIZED TO BONE, RIGHT (HCC): Chronic | ICD-10-CM

## 2019-01-29 DIAGNOSIS — E83.51 HYPOCALCEMIA: ICD-10-CM

## 2019-01-29 DIAGNOSIS — D70.9 NEUTROPENIA, UNSPECIFIED TYPE (HCC): ICD-10-CM

## 2019-01-29 DIAGNOSIS — M48.54XA: ICD-10-CM

## 2019-01-29 DIAGNOSIS — R19.7 DIARRHEA, UNSPECIFIED TYPE: ICD-10-CM

## 2019-01-29 DIAGNOSIS — E88.09 HYPOALBUMINEMIA: ICD-10-CM

## 2019-01-29 DIAGNOSIS — D70.8 OTHER NEUTROPENIA (HCC): ICD-10-CM

## 2019-01-29 DIAGNOSIS — E87.6 HYPOKALEMIA: ICD-10-CM

## 2019-01-29 LAB
ALBUMIN SERPL BCP-MCNC: 2.7 G/DL (ref 3.5–5)
ALP SERPL-CCNC: 243 U/L (ref 46–116)
ALT SERPL W P-5'-P-CCNC: 32 U/L (ref 12–78)
ANION GAP SERPL CALCULATED.3IONS-SCNC: 9 MMOL/L (ref 4–13)
AST SERPL W P-5'-P-CCNC: 30 U/L (ref 5–45)
BASOPHILS # BLD AUTO: 0.03 THOUSANDS/ΜL (ref 0–0.1)
BASOPHILS NFR BLD AUTO: 1 % (ref 0–1)
BILIRUB SERPL-MCNC: 0.7 MG/DL (ref 0.2–1)
BUN SERPL-MCNC: 8 MG/DL (ref 5–25)
CALCIUM SERPL-MCNC: 7.3 MG/DL (ref 8.3–10.1)
CHLORIDE SERPL-SCNC: 105 MMOL/L (ref 100–108)
CO2 SERPL-SCNC: 28 MMOL/L (ref 21–32)
CREAT SERPL-MCNC: 0.49 MG/DL (ref 0.6–1.3)
EOSINOPHIL # BLD AUTO: 0.29 THOUSAND/ΜL (ref 0–0.61)
EOSINOPHIL NFR BLD AUTO: 9 % (ref 0–6)
ERYTHROCYTE [DISTWIDTH] IN BLOOD BY AUTOMATED COUNT: 17.6 % (ref 11.6–15.1)
GFR SERPL CREATININE-BSD FRML MDRD: 100 ML/MIN/1.73SQ M
GLUCOSE SERPL-MCNC: 141 MG/DL (ref 65–140)
HCT VFR BLD AUTO: 35.5 % (ref 34.8–46.1)
HGB BLD-MCNC: 11.3 G/DL (ref 11.5–15.4)
IMM GRANULOCYTES # BLD AUTO: 0.03 THOUSAND/UL (ref 0–0.2)
IMM GRANULOCYTES NFR BLD AUTO: 1 % (ref 0–2)
LYMPHOCYTES # BLD AUTO: 0.21 THOUSANDS/ΜL (ref 0.6–4.47)
LYMPHOCYTES NFR BLD AUTO: 7 % (ref 14–44)
MAGNESIUM SERPL-MCNC: 1.6 MG/DL (ref 1.6–2.6)
MCH RBC QN AUTO: 27.8 PG (ref 26.8–34.3)
MCHC RBC AUTO-ENTMCNC: 31.8 G/DL (ref 31.4–37.4)
MCV RBC AUTO: 87 FL (ref 82–98)
MONOCYTES # BLD AUTO: 0.41 THOUSAND/ΜL (ref 0.17–1.22)
MONOCYTES NFR BLD AUTO: 13 % (ref 4–12)
NEUTROPHILS # BLD AUTO: 2.26 THOUSANDS/ΜL (ref 1.85–7.62)
NEUTS SEG NFR BLD AUTO: 69 % (ref 43–75)
NRBC BLD AUTO-RTO: 0 /100 WBCS
PLATELET # BLD AUTO: 296 THOUSANDS/UL (ref 149–390)
PMV BLD AUTO: 9.9 FL (ref 8.9–12.7)
POTASSIUM SERPL-SCNC: 3.1 MMOL/L (ref 3.5–5.3)
PROT SERPL-MCNC: 5.5 G/DL (ref 6.4–8.2)
RBC # BLD AUTO: 4.07 MILLION/UL (ref 3.81–5.12)
SODIUM SERPL-SCNC: 142 MMOL/L (ref 136–145)
WBC # BLD AUTO: 3.23 THOUSAND/UL (ref 4.31–10.16)

## 2019-01-29 PROCEDURE — 96402 CHEMO HORMON ANTINEOPL SQ/IM: CPT

## 2019-01-29 PROCEDURE — 96361 HYDRATE IV INFUSION ADD-ON: CPT

## 2019-01-29 PROCEDURE — 96360 HYDRATION IV INFUSION INIT: CPT

## 2019-01-29 PROCEDURE — 83735 ASSAY OF MAGNESIUM: CPT

## 2019-01-29 PROCEDURE — 80053 COMPREHEN METABOLIC PANEL: CPT

## 2019-01-29 PROCEDURE — 85025 COMPLETE CBC W/AUTO DIFF WBC: CPT

## 2019-01-29 RX ORDER — LAMOTRIGINE 25 MG/1
250 TABLET ORAL ONCE
Status: COMPLETED | OUTPATIENT
Start: 2019-01-29 | End: 2019-01-29

## 2019-01-29 RX ADMIN — FULVESTRANT 250 MG: 50 INJECTION INTRAMUSCULAR at 11:13

## 2019-01-29 RX ADMIN — FULVESTRANT 250 MG: 50 INJECTION INTRAMUSCULAR at 11:15

## 2019-01-29 RX ADMIN — POTASSIUM CHLORIDE: 2 INJECTION, SOLUTION, CONCENTRATE INTRAVENOUS at 08:45

## 2019-01-29 NOTE — PROGRESS NOTES
Nurse spoke with Feli Powell Pa-C regarding pts  Calcium of 7 3 & she instructed nurse to hold Jo Ann Kind today & she will send an order for the same  Devyn reinforced pt  Should be taking Vitamin D 2000 units daily & nurse confirmed with pt  That she is taking as directed  Nurse reinforced the importance of taking Vitamin as directed by MD, pt  Is aware & verbalized understanding

## 2019-01-29 NOTE — PLAN OF CARE
Problem: Potential for Falls  Goal: Patient will remain free of falls  INTERVENTIONS:  - Assess patient frequently for physical needs  -  Identify cognitive and physical deficits and behaviors that affect risk of falls    -  [place] fall precautions as indicated by assessment   - Educate patient/family on patient safety including physical limitations  - Instruct patient to call for assistance with activity based on assessment  - Modify environment to reduce risk of injury  - Consider OT/PT consult to assist with strengthening/mobility   Outcome: Progressing

## 2019-01-29 NOTE — PROGRESS NOTES
Nurse spoke with ofc staff & they reported to Sherry Salazar Pa-C for nurse that pts  Calcium level was 6 2 on 1/20/19 & pt  Is ordered Xgeva today  Per ofc staff Devyn instructed nurse to draw a stat CMP which is ordered in computer  Nurse informed the pt  Of the same, pt  Agreeable & verbalized understanding

## 2019-01-29 NOTE — PROGRESS NOTES
Pt  Tolerated hydration & faslodex IM injections x 2  Injection given in each upper buttock w/out adverse reaction  Confirmed pts  Next appt   Pt  Declined AVS

## 2019-01-30 ENCOUNTER — HOSPITAL ENCOUNTER (OUTPATIENT)
Dept: INFUSION CENTER | Facility: CLINIC | Age: 69
Discharge: HOME | End: 2019-01-30

## 2019-01-30 PROBLEM — R11.2 NON-INTRACTABLE VOMITING WITH NAUSEA: Status: ACTIVE | Noted: 2019-01-30

## 2019-01-30 NOTE — PROGRESS NOTES
Time out received from [facility]  Increase potassium to 20 meq twice weekly  Draw labs stat weekly for treatment and wait for results  New orders to follow

## 2019-02-01 ENCOUNTER — HOSPITAL ENCOUNTER (OUTPATIENT)
Dept: INFUSION CENTER | Facility: CLINIC | Age: 69
Discharge: HOME | End: 2019-02-01
Payer: MEDICARE

## 2019-02-01 VITALS
RESPIRATION RATE: 16 BRPM | HEART RATE: 84 BPM | DIASTOLIC BLOOD PRESSURE: 70 MMHG | SYSTOLIC BLOOD PRESSURE: 130 MMHG | OXYGEN SATURATION: 99 % | TEMPERATURE: 98.1 F

## 2019-02-01 PROCEDURE — 96360 HYDRATION IV INFUSION INIT: CPT

## 2019-02-01 PROCEDURE — 96361 HYDRATE IV INFUSION ADD-ON: CPT

## 2019-02-01 RX ADMIN — POTASSIUM CHLORIDE: 2 INJECTION, SOLUTION, CONCENTRATE INTRAVENOUS at 08:49

## 2019-02-02 DIAGNOSIS — K29.00 ACUTE GASTRITIS WITHOUT HEMORRHAGE, UNSPECIFIED GASTRITIS TYPE: Primary | ICD-10-CM

## 2019-02-05 ENCOUNTER — HOSPITAL ENCOUNTER (OUTPATIENT)
Dept: INFUSION CENTER | Facility: CLINIC | Age: 69
Discharge: HOME | End: 2019-02-05
Payer: MEDICARE

## 2019-02-05 ENCOUNTER — TELEPHONE (OUTPATIENT)
Dept: GASTROENTEROLOGY | Facility: CLINIC | Age: 69
End: 2019-02-05

## 2019-02-05 VITALS
TEMPERATURE: 98 F | HEART RATE: 85 BPM | RESPIRATION RATE: 18 BRPM | DIASTOLIC BLOOD PRESSURE: 62 MMHG | SYSTOLIC BLOOD PRESSURE: 111 MMHG

## 2019-02-05 DIAGNOSIS — R19.7 DIARRHEA, UNSPECIFIED TYPE: ICD-10-CM

## 2019-02-05 DIAGNOSIS — D70.9 NEUTROPENIA, UNSPECIFIED TYPE (HCC): ICD-10-CM

## 2019-02-05 DIAGNOSIS — M48.54XA: ICD-10-CM

## 2019-02-05 DIAGNOSIS — C79.51 BREAST CANCER METASTASIZED TO BONE, RIGHT (HCC): Chronic | ICD-10-CM

## 2019-02-05 DIAGNOSIS — C50.911 BREAST CANCER METASTASIZED TO BONE, RIGHT (HCC): Chronic | ICD-10-CM

## 2019-02-05 DIAGNOSIS — E88.09 HYPOALBUMINEMIA: ICD-10-CM

## 2019-02-05 DIAGNOSIS — D70.8 OTHER NEUTROPENIA (HCC): ICD-10-CM

## 2019-02-05 DIAGNOSIS — E83.51 HYPOCALCEMIA: ICD-10-CM

## 2019-02-05 DIAGNOSIS — E87.6 HYPOKALEMIA: ICD-10-CM

## 2019-02-05 LAB
ALBUMIN SERPL BCP-MCNC: 2.7 G/DL (ref 3.5–5)
ALP SERPL-CCNC: 258 U/L (ref 46–116)
ALT SERPL W P-5'-P-CCNC: 30 U/L (ref 12–78)
ANION GAP SERPL CALCULATED.3IONS-SCNC: 7 MMOL/L (ref 4–13)
AST SERPL W P-5'-P-CCNC: 29 U/L (ref 5–45)
BASOPHILS # BLD AUTO: 0.03 THOUSANDS/ΜL (ref 0–0.1)
BASOPHILS NFR BLD AUTO: 1 % (ref 0–1)
BILIRUB SERPL-MCNC: 0.6 MG/DL (ref 0.2–1)
BUN SERPL-MCNC: 9 MG/DL (ref 5–25)
CALCIUM SERPL-MCNC: 7.5 MG/DL (ref 8.3–10.1)
CHLORIDE SERPL-SCNC: 106 MMOL/L (ref 100–108)
CO2 SERPL-SCNC: 26 MMOL/L (ref 21–32)
CREAT SERPL-MCNC: 0.52 MG/DL (ref 0.6–1.3)
EOSINOPHIL # BLD AUTO: 0.09 THOUSAND/ΜL (ref 0–0.61)
EOSINOPHIL NFR BLD AUTO: 2 % (ref 0–6)
ERYTHROCYTE [DISTWIDTH] IN BLOOD BY AUTOMATED COUNT: 18.8 % (ref 11.6–15.1)
GFR SERPL CREATININE-BSD FRML MDRD: 98 ML/MIN/1.73SQ M
GLUCOSE SERPL-MCNC: 117 MG/DL (ref 65–140)
HCT VFR BLD AUTO: 36.9 % (ref 34.8–46.1)
HGB BLD-MCNC: 11.6 G/DL (ref 11.5–15.4)
IMM GRANULOCYTES # BLD AUTO: 0.03 THOUSAND/UL (ref 0–0.2)
IMM GRANULOCYTES NFR BLD AUTO: 1 % (ref 0–2)
LYMPHOCYTES # BLD AUTO: 0.32 THOUSANDS/ΜL (ref 0.6–4.47)
LYMPHOCYTES NFR BLD AUTO: 7 % (ref 14–44)
MAGNESIUM SERPL-MCNC: 1.8 MG/DL (ref 1.6–2.6)
MCH RBC QN AUTO: 28 PG (ref 26.8–34.3)
MCHC RBC AUTO-ENTMCNC: 31.4 G/DL (ref 31.4–37.4)
MCV RBC AUTO: 89 FL (ref 82–98)
MONOCYTES # BLD AUTO: 0.4 THOUSAND/ΜL (ref 0.17–1.22)
MONOCYTES NFR BLD AUTO: 9 % (ref 4–12)
NEUTROPHILS # BLD AUTO: 3.57 THOUSANDS/ΜL (ref 1.85–7.62)
NEUTS SEG NFR BLD AUTO: 80 % (ref 43–75)
NRBC BLD AUTO-RTO: 0 /100 WBCS
PLATELET # BLD AUTO: 357 THOUSANDS/UL (ref 149–390)
PMV BLD AUTO: 9.4 FL (ref 8.9–12.7)
POTASSIUM SERPL-SCNC: 4.5 MMOL/L (ref 3.5–5.3)
PROT SERPL-MCNC: 5.6 G/DL (ref 6.4–8.2)
RBC # BLD AUTO: 4.15 MILLION/UL (ref 3.81–5.12)
SODIUM SERPL-SCNC: 139 MMOL/L (ref 136–145)
WBC # BLD AUTO: 4.44 THOUSAND/UL (ref 4.31–10.16)

## 2019-02-05 PROCEDURE — 96360 HYDRATION IV INFUSION INIT: CPT

## 2019-02-05 PROCEDURE — 83735 ASSAY OF MAGNESIUM: CPT

## 2019-02-05 PROCEDURE — 80053 COMPREHEN METABOLIC PANEL: CPT

## 2019-02-05 PROCEDURE — 96361 HYDRATE IV INFUSION ADD-ON: CPT

## 2019-02-05 PROCEDURE — 85025 COMPLETE CBC W/AUTO DIFF WBC: CPT

## 2019-02-05 RX ADMIN — POTASSIUM CHLORIDE: 2 INJECTION, SOLUTION, CONCENTRATE INTRAVENOUS at 13:08

## 2019-02-05 NOTE — TELEPHONE ENCOUNTER
----- Message from Fede Saunders MD sent at 2/2/2019  2:27 AM EST -----  Please inform the patient that polyp removed was hyperplastic, with intestinal metaplasia, would recommend repeat EGD in 6 months  Continue PPI as previously recommended  Gastric biopsies are inconclusive of H pylori, will proceed with stool test instead  She will also need to be scheduled for colonoscopy in the next several weeks for positive fecal occult blood test   Please have her follow up in the office thereafter

## 2019-02-05 NOTE — LETTER
February 18, 2019    [address]  [address] [address]      Dear Ms Valle:        If you have any questions or concerns, please don't hesitate to call      Sincerely,             Min Carvalho MA        CC: No Recipients

## 2019-02-05 NOTE — LETTER
February 5, 2019     [facility] [address] [id number]      Dear Ms Valle: We have attempted to reach you regarding your results  We ask that you please contact our office upon receipt of this letter to receive your results  Thank you in advance for your cooperation and assistance          Sincerely,   [facility]fe [facility] Staff  [phone]

## 2019-02-05 NOTE — PLAN OF CARE
Problem: Potential for Falls  Goal: Patient will remain free of falls  INTERVENTIONS:  - Assess patient frequently for physical needs  -  Identify cognitive and physical deficits and behaviors that affect risk of falls    -  [place] fall precautions as indicated by assessment   - Educate patient/family on patient safety including physical limitations  - Instruct patient to call for assistance with activity based on assessment  - Modify environment to reduce risk of injury  - Consider OT/PT consult to assist with strengthening/mobility   Outcome: Progressing

## 2019-02-05 NOTE — PROGRESS NOTES
Patiens labs are back  Her k is 4 5, mag 1 8 and calcium 7 5  Notified Maude Kearney Rn and she stated to notify Dr Belinda Penaloza notified  Patient stated she no longer has diarrhea  She still had nausea at times and is taking her zofran every 6 hours as ordered  She feels like she is doing better and is trying her best to eat and drink  Sr Belinda Penaloza stated we can still give patient the patient the hydration with the potassium and magnesium as ordered if pharmacy already mixed it and if not give it to her without it  He stated he is going to change her hydration to weekly instead of twice a week   Patient notified and verbalized understanding

## 2019-02-05 NOTE — PROGRESS NOTES
Patient tolerated her hydration without any adverse reactions   Next appointment confirmed and avs given

## 2019-02-05 NOTE — PROGRESS NOTES
Patient is here for peripheral labs and hydration  She offers no complaints at this time  Labs drawn per dr's orders   Will wait for results

## 2019-02-05 NOTE — PROGRESS NOTES
Time out taken from Dr Madelyn Call the hydration as ordered with the 20 of KCL and 1 gram of mag as ordered today if pharmacy already made it  If not just give the 500 cc of NSS over 2 hrs as ordered  Hydration will be changed to weekly  Cancel patient's appointment for Friday 2/8/19  Orders to follow

## 2019-02-06 ENCOUNTER — RADIATION ONCOLOGY FOLLOW-UP (OUTPATIENT)
Dept: RADIATION ONCOLOGY | Facility: HOSPITAL | Age: 69
End: 2019-02-06
Attending: RADIOLOGY
Payer: MEDICARE

## 2019-02-06 ENCOUNTER — CLINICAL SUPPORT (OUTPATIENT)
Dept: RADIATION ONCOLOGY | Facility: HOSPITAL | Age: 69
End: 2019-02-06

## 2019-02-06 VITALS
DIASTOLIC BLOOD PRESSURE: 65 MMHG | TEMPERATURE: 98.1 F | SYSTOLIC BLOOD PRESSURE: 110 MMHG | HEART RATE: 68 BPM | RESPIRATION RATE: 14 BRPM

## 2019-02-06 DIAGNOSIS — C50.911 BREAST CANCER METASTASIZED TO BONE, RIGHT (HCC): Primary | Chronic | ICD-10-CM

## 2019-02-06 DIAGNOSIS — C79.51 BREAST CANCER METASTASIZED TO BONE, RIGHT (HCC): Primary | Chronic | ICD-10-CM

## 2019-02-06 DIAGNOSIS — C79.51 BONE METASTASIS (HCC): Primary | ICD-10-CM

## 2019-02-06 PROCEDURE — 99215 OFFICE O/P EST HI 40 MIN: CPT | Performed by: RADIOLOGY

## 2019-02-06 NOTE — PROGRESS NOTES
Follow-up - Radiation Oncology   Karmen Vargas 1950 76 y o  female [id number]      History of Present Illness   Cancer Staging  No matching staging information was found for the patient  Karmen Vargas is a 76y o  year old female with a history of metastatic breast cancer as described below  She returns today for her 1st routine scheduled follow-up approximately 1 month status post completion of palliative radiation to T11 through the bottom of the SI joints  The patient experienced nausea and diarrhea during the 2nd half of her radiation course  This apparently worsened following completion of treatment leading to generalized weakness and dehydration with multiple emergency room visits and GI evaluation  She was found to have a positive fecal occult blood test but has not undergone colonoscopy  This is planned for the future  She did undergo an EGD which revealed severe gastritis in the gastric fundus and antrum  No esophagitis  She states that just recently her symptoms have begun to resolve and she is beginning to feel much better  She still struggles with decreased appetite and occasional nausea  She has been receiving IV fluid supplementation but this will be decreased from twice per week to once per week  Since completion of treatment she has also initiated therapy with faslodex  She has been unable to receive Xgeva due to hypocalcemia  Chidi Arce has also been on hold due to low blood counts  She was also evaluated by Orthopedic surgery due to concerns regarding impending pathologic fracture of the left femur  No intervention has occurred, but she is following up with orthopedic surgery in 2 weeks and plans will be made at that time regarding prophylactic vero placement ultimately to be followed by palliative radiation  She currently denies any significant discomfort in the left hip her thigh    The back pain that she was experiencing prior to radiation did improve following treatment but recently she feels as if the pain is again returning and she has been wearing her back brace more frequently, including today  Interval History    First follow up since completing T11 through SI joint radiation completed on 1/9/19 1/14/19 bone scan  IMPRESSION:  Extensive and widespread osseous metastatic disease  Consider radiographs of the femurs bilaterally to assess for any lesions predisposing to pathologic fracture  1/16/19 fulvestrant (FASLODEX) IM injection  Ibrance on hold due to low WBC    presented to the hospital on 1/18/2019 due to paresthesias that she felt in her face during a bone scan  On admission the patient was found to have multiple electrolyte abnormalities such as hypokalemia, hypocalcemia, and hypomagnesemia    1/18/19 Right femur x rays  IMPRESSION:   No significant lytic or blastic bone lesion seen in the right femur to correspond to the bone scan abnormality  1/18/19 Left femur x rays  IMPRESSION:     Significant metastatic lesions seen within the femoral shaft most predominantly within the lower diaphysis region with some cortical thinning along the anterior aspect of the endosteal surface of the cortex  This might predispose to pathologic fracture  Consider orthopedic consultation  1/22/19 Dr Ana Damon  "She had bone scan recently and that showed extensive metastatic disease and also cortical thinning of left femur needing orthopedic consultation and if not surgery and radiation and that is being arranged  If surgery then maybe we will have  tissue  sample to study"  Will continue IV hydration                                                                              1/23/19 Seen by Dr Enrique Anderson, ortho  Plan     Left distal femoral shaft lytic bone lesions  1  Patient has no pain in her lower extremities at this time  Explained that she may require prophylactic fixation in the future, at which time a biopsy will be obtained    She was instructed to contact me if she develops any pain, particularly in the left thigh, as this likely indicates significant compromise of the femoral cortex which can result in femur fracture  2  Home physical therapy for strengthening and knee brace as needed for activity to address ongoing weakness after radiation therapy  3  Follow-up in four weeks for repeat evaluation, or sooner as indicated    1/24/19 EGD  IMPRESSIONS:    1  Severe gastritis, suspect may be secondary to radiation  2  Numerous gastric polyps, suspect may be inflammatory  1/28/19 Marvell Cogan, CRNP  taking morphine IR 15mg BID and taking Tylenol PRN  Historical Information      Breast cancer metastasized to bone, right [facility])    1996 Surgery     right modified radical mastectomy followed by adjuvant systemic therapy but no radiation  6/28/2004 Biopsy     Final Diagnosis  A  Lymph Node, right supraclavicular, core biopsy (14 slides, [id number], [facility]; collected on 6/28/2004):  -  Consistent with metastatic carcinoma, compatible with breast origin (see note)  2004 -  Radiation     salvage radiation therapy at an outside facility to right supraclavicular nodes at [facility]         12/23/2018 Initial Diagnosis     Breast cancer metastasized to bone, right ([address] )       12/26/2018 - 1/9/2019 Radiation     3000 cGy in 10 fractions to  T11 through the bottom of the SI joints  Dr Kamilla Rice         1/16/2019 -  Chemotherapy     fulvestrant (FASLODEX) IM injection     Plan was to start Ibrance             Past Medical History:   Diagnosis Date    Cancer [facility])     breast 1996    Cancer [facility] [address] [facility]     bone      Compression fracture of thoracic vertebra (HCC)     Diabetes mellitus ([facility] [address] )     History of therapeutic radiation     Tendonitis      Past Surgical History:   Procedure Laterality Date    CHOLECYSTECTOMY      MASTECTOMY      Right side reconstruction    NECK SURGERY      [place] ESOPHAGOGASTRODUODENOSCOPY TRANSORAL DIAGNOSTIC N/A 1/24/2019    Procedure: ESOPHAGOGASTRODUODENOSCOPY (EGD); Surgeon: Lalitha Fisher MD;  [facility]; Service: Gastroenterology    TONSILLECTOMY         Social History   History   Alcohol Use No     History   Drug Use No     History   Smoking Status    Never Smoker   Smokeless Tobacco    Never Used         Meds/Allergies     Current Outpatient Prescriptions:     acetaminophen (TYLENOL) 325 mg tablet, Take 2 tablets (650 mg total) by mouth 4 (four) times a day (with meals and at bedtime), Disp: 80 tablet, Rfl: 1    calcium carbonate (OS-CLARENCE) 1250 (500 Ca) MG chewable tablet, Chew 1 tablet (1,250 mg total) daily, Disp: 30 tablet, Rfl: 0    Cholecalciferol (VITAMIN D3) 2000 units TABS, Take 2,000 Units by mouth daily, Disp: , Rfl:     ferrous sulfate 325 (65 Fe) mg tablet, Take 1 tablet (325 mg total) by mouth daily with breakfast, Disp: 30 tablet, Rfl: 1    gabapentin (NEURONTIN) 100 mg capsule, Take 1 capsule (100 mg total) by mouth 2 (two) times a day, Disp: 60 capsule, Rfl: 1    lisinopril (ZESTRIL) 5 mg tablet, Take 5 mg by mouth daily  , Disp: , Rfl:     loperamide (IMODIUM) 2 mg capsule, Take 1 capsule (2 mg total) by mouth 4 (four) times a day as needed for diarrhea, Disp: , Rfl: 0    LORazepam (ATIVAN) 1 mg tablet, Take 2mg (2 tablets) qhs , Disp: 60 tablet, Rfl: 0    metoclopramide (REGLAN) 10 mg tablet, Take 1 tablet (10 mg total) by mouth every 6 (six) hours as needed (Nausea or Vomiting), Disp: 40 tablet, Rfl: 0    morphine (MSIR) 15 mg tablet, Use 1 tab BID scheduled to anticipate cancer pain    May have 1 tabs q4hrs PRN mod-severe pain , Disp: 75 tablet, Rfl: 0    MULTIPLE VITAMINS PO, Take by mouth, Disp: , Rfl:     nystatin (MYCOSTATIN) 100,000 units/mL suspension, Apply 5 mL (500,000 Units total) to the mouth or throat 4 (four) times a day, Disp: 60 mL, Rfl: 0    Omega-3 Fatty Acids (FISH OIL PO), Take 2 g by mouth, Disp: , Rfl:     omeprazole (PriLOSEC) 40 MG capsule, Take 1 capsule (40 mg total) by mouth daily, Disp: 30 capsule, Rfl: 3    ondansetron (ZOFRAN-ODT) 8 mg disintegrating tablet, Take 1 tablet (8 mg total) by mouth 3 (three) times a day, Disp: 90 tablet, Rfl: 1    Palbociclib (IBRANCE) 125 MG, Take 1 capsule (125 mg total) by mouth daily For 21 days then 7 off, Disp: 21 capsule, Rfl: 5    potassium chloride (K-DUR,KLOR-CON) 20 mEq tablet, Take 1 tablet (20 mEq total) by mouth daily, Disp: 30 tablet, Rfl: 0    pravastatin (PRAVACHOL) 40 mg tablet, Take 40 mg by mouth daily  , Disp: , Rfl:     sucralfate (CARAFATE) 1 g/10 mL suspension, Take 10 mL (1 g total) by mouth 2 (two) times a day, Disp: 420 mL, Rfl: 2  No Known Allergies      Review of Systems   Review of Systems   Constitutional: Positive for activity change, appetite change (Poor, but some improvement), chills, fatigue and unexpected weight change  HENT: Positive for trouble swallowing  IV hydration once per week   Eyes: Negative  Respiratory: Negative  Cardiovascular: Positive for leg swelling (bilateral, left more prominent  )  Gastrointestinal: Positive for abdominal pain (One time last week - gas ) and nausea  Endocrine: Negative  Genitourinary: Negative  Musculoskeletal: Positive for arthralgias (achiness in legs ), back pain (Feels some improvement since radiation, but now starting to hurt more again ) and gait problem  Back pain 0/10-8/10 in T12 area  Wears brace  Using walker  Skin:        Right arm burn from stove  Allergic/Immunologic: Negative  Neurological: Positive for dizziness, syncope (had fainted while shopping in December), light-headedness (right side                                                            ) and numbness (right arm and hand)  Uses shower bar, and walker due to dizziness   Hematological: Negative        Screening  Tobacco  Current tobacco user: no  If yes, brief counseling provided: NA    Hypertension  Hypertension screening performed: yes  Normotensive:  no  If no, referred to PCP: n/a    Depression Screening  Screened for depression using PHQ-2: yes    Screened for depression using PHQ-9:  yes  Screening positive or negative:  positive  If score >4, was any of the following actions taken? Additional evaluation for depression, suicide risk assesment, referral to PCP or psychiatry, medication started:  yes    Advanced Care Planning for Patients >65 years  Advanced Care Planning Discussed:  yes  Patient named surrogate decision maker or care plan in chart: no      OBJECTIVE:   /65   Pulse 68   Temp 98 1 °F (36 7 °C)   Resp 14   LMP 05/27/1996 (Approximate)   Pain Assessment:  0  Karnofsky: 90 - Able to carry on normal activity; minor signs or symptoms of disease     Physical Exam   The patient presents today no apparent distress  Sclera anicteric  She is currently wearing a back brace  Normal respiratory effort  Normal speech  Normal affect      RESULTS    Lab Results:   Recent Results (from the past 672 hour(s))   CBC and differential    Collection Time: 01/16/19  5:03 AM   Result Value Ref Range    WBC 2 55 (L) 4 31 - 10 16 Thousand/uL    RBC 4 21 3 81 - 5 12 Million/uL    Hemoglobin 11 5 11 5 - 15 4 g/dL    Hematocrit 35 7 34 8 - 46 1 %    MCV 85 82 - 98 fL    MCH 27 3 26 8 - 34 3 pg    MCHC 32 2 31 4 - 37 4 g/dL    RDW 16 2 (H) 11 6 - 15 1 %    MPV 9 8 8 9 - 12 7 fL    Platelets 726 328 - 304 Thousands/uL    nRBC 0 /100 WBCs   Comprehensive metabolic panel    Collection Time: 01/16/19  5:03 AM   Result Value Ref Range    Sodium 138 136 - 145 mmol/L    Potassium 3 7 3 5 - 5 3 mmol/L    Chloride 103 100 - 108 mmol/L    CO2 26 21 - 32 mmol/L    ANION GAP 9 4 - 13 mmol/L    BUN 8 5 - 25 mg/dL    Creatinine 0 49 (L) 0 60 - 1 30 mg/dL    Glucose 118 65 - 140 mg/dL    Calcium 6 5 (L) 8 3 - 10 1 mg/dL    AST 36 5 - 45 U/L    ALT 25 12 - 78 U/L    Alkaline Phosphatase 175 (H) 46 - 116 U/L    Total Protein 6 0 (L) 6 4 - 8 2 g/dL    Albumin 3 0 (L) 3 5 - 5 0 g/dL    Total Bilirubin 1 00 0 20 - 1 00 mg/dL    eGFR 100 ml/min/1 73sq m   Manual Differential(PHLEBS Do Not Order)    Collection Time: 01/16/19  5:03 AM   Result Value Ref Range    Segmented % 42 (L) 43 - 75 %    Bands % 2 0 - 8 %    Lymphocytes % 18 14 - 44 %    Monocytes % 19 (H) 4 - 12 %    Eosinophils, % 19 (H) 0 - 6 %    Basophils % 0 0 - 1 %    Absolute Neutrophils 1 12 (L) 1 85 - 7 62 Thousand/uL    Lymphocytes Absolute 0 46 (L) 0 60 - 4 47 Thousand/uL    Monocytes Absolute 0 48 0 00 - 1 22 Thousand/uL    Eosinophils Absolute 0 48 (H) 0 00 - 0 40 Thousand/uL    Basophils Absolute 0 00 0 00 - 0 10 Thousand/uL    Total Counted 100     nRBC 1 0 - 2 /100 WBC    Platelet Estimate Adequate Adequate   ECG 12 lead    Collection Time: 01/16/19  6:09 AM   Result Value Ref Range    Ventricular Rate 71 BPM    Atrial Rate 71 BPM    KY Interval 164 ms    QRSD Interval 70 ms    QT Interval 374 ms    QTC Interval 406 ms    P Stanley 64 degrees    QRS Axis -37 degrees    T Wave Axis 41 degrees   Cancer antigen 27 29    Collection Time: 01/16/19  8:15 AM   Result Value Ref Range    CA 27 29 23 0 0 0 - 42 3 U/mL   Iron Saturation %    Collection Time: 01/16/19  8:15 AM   Result Value Ref Range    Iron Saturation 35 %    TIBC 190 (L) 250 - 450 ug/dL    Iron 67 50 - 170 ug/dL   Ferritin    Collection Time: 01/16/19  8:15 AM   Result Value Ref Range    Ferritin 134 8 - 388 ng/mL   CEA    Collection Time: 01/16/19  8:16 AM   Result Value Ref Range    CEA 3 8 (H) 0 0 - 3 0 ng/mL   CBC and differential    Collection Time: 01/18/19  1:17 PM   Result Value Ref Range    WBC 1 96 (LL) 4 31 - 10 16 Thousand/uL    RBC 4 03 3 81 - 5 12 Million/uL    Hemoglobin 11 0 (L) 11 5 - 15 4 g/dL    Hematocrit 33 9 (L) 34 8 - 46 1 %    MCV 84 82 - 98 fL    MCH 27 3 26 8 - 34 3 pg    MCHC 32 4 31 4 - 37 4 g/dL    RDW 16 2 (H) 11 6 - 15 1 %    MPV 9 8 8 9 - 12 7 fL    Platelets 581 053 - 731 Thousands/uL    nRBC 0 /100 WBCs    Neutrophils Relative 51 43 - 75 %    Immat GRANS % 2 0 - 2 %    Lymphocytes Relative 11 (L) 14 - 44 %    Monocytes Relative 20 (H) 4 - 12 %    Eosinophils Relative 15 (H) 0 - 6 %    Basophils Relative 1 0 - 1 %    Neutrophils Absolute 1 00 (L) 1 85 - 7 62 Thousands/µL    Immature Grans Absolute 0 04 0 00 - 0 20 Thousand/uL    Lymphocytes Absolute 0 22 (L) 0 60 - 4 47 Thousands/µL    Monocytes Absolute 0 39 0 17 - 1 22 Thousand/µL    Eosinophils Absolute 0 30 0 00 - 0 61 Thousand/µL    Basophils Absolute 0 01 0 00 - 0 10 Thousands/µL   Comprehensive metabolic panel    Collection Time: 01/18/19  1:17 PM   Result Value Ref Range    Sodium 141 136 - 145 mmol/L    Potassium 2 6 (LL) 3 5 - 5 3 mmol/L    Chloride 106 100 - 108 mmol/L    CO2 28 21 - 32 mmol/L    ANION GAP 7 4 - 13 mmol/L    BUN 5 5 - 25 mg/dL    Creatinine 0 55 (L) 0 60 - 1 30 mg/dL    Glucose 113 65 - 140 mg/dL    Calcium 6 2 (L) 8 3 - 10 1 mg/dL    AST 24 5 - 45 U/L    ALT 29 12 - 78 U/L    Alkaline Phosphatase 182 (H) 46 - 116 U/L    Total Protein 5 7 (L) 6 4 - 8 2 g/dL    Albumin 2 8 (L) 3 5 - 5 0 g/dL    Total Bilirubin 1 00 0 20 - 1 00 mg/dL    eGFR 97 ml/min/1 73sq m   Magnesium    Collection Time: 01/18/19  1:17 PM   Result Value Ref Range    Magnesium 1 4 (L) 1 6 - 2 6 mg/dL   Protime-INR    Collection Time: 01/18/19  1:17 PM   Result Value Ref Range    Protime 15 8 (H) 11 8 - 14 2 seconds    INR 1 30 (H) 0 86 - 1 17   APTT    Collection Time: 01/18/19  1:17 PM   Result Value Ref Range    PTT 32 26 - 38 seconds   Lipase    Collection Time: 01/18/19  1:17 PM   Result Value Ref Range    Lipase 40 (L) 73 - 393 u/L   ECG 12 lead    Collection Time: 01/18/19  2:44 PM   Result Value Ref Range    Ventricular Rate 69 BPM    Atrial Rate 69 BPM    GA Interval 162 ms    QRSD Interval 70 ms    QT Interval 380 ms    QTC Interval 407 ms    P Axis 71 degrees    QRS Axis -33 degrees    T Wave Axis 54 degrees   Fingerstick Glucose (POCT)    Collection Time: 01/18/19  6:51 PM   Result Value Ref Range    POC Glucose 94 65 - 140 mg/dl   Fingerstick Glucose (POCT)    Collection Time: 01/18/19  9:22 PM   Result Value Ref Range    POC Glucose 104 65 - 140 mg/dl   Clostridium difficile toxin by PCR    Collection Time: 01/18/19 11:41 PM   Result Value Ref Range    C difficile toxin by PCR NEGATIVE for C difficle toxin by PCR  NEGATIVE for C difficle toxin by PCR      Stool Enteric Bacterial Panel by PCR    Collection Time: 01/18/19 11:41 PM   Result Value Ref Range    Salmonella sp PCR None Detected None Detected    Shigella sp/Enteroinvasive E  coli (EIEC) PCR None Detected None Detected    Campylobacter sp (jejuni and coli) PCR None Detected None Detected    Shiga toxin 1/Shiga toxin 2 genes PCR None Detected None Detected   Comprehensive metabolic panel    Collection Time: 01/19/19  5:44 AM   Result Value Ref Range    Sodium 142 136 - 145 mmol/L    Potassium 3 5 3 5 - 5 3 mmol/L    Chloride 110 (H) 100 - 108 mmol/L    CO2 24 21 - 32 mmol/L    ANION GAP 8 4 - 13 mmol/L    BUN 2 (L) 5 - 25 mg/dL    Creatinine 0 45 (L) 0 60 - 1 30 mg/dL    Glucose 96 65 - 140 mg/dL    Calcium 6 1 (L) 8 3 - 10 1 mg/dL    AST 17 5 - 45 U/L    ALT 20 12 - 78 U/L    Alkaline Phosphatase 153 (H) 46 - 116 U/L    Total Protein 4 5 (L) 6 4 - 8 2 g/dL    Albumin 2 2 (L) 3 5 - 5 0 g/dL    Total Bilirubin 0 90 0 20 - 1 00 mg/dL    eGFR 103 ml/min/1 73sq m   Magnesium    Collection Time: 01/19/19  5:44 AM   Result Value Ref Range    Magnesium 1 6 1 6 - 2 6 mg/dL   CBC and differential    Collection Time: 01/19/19  5:44 AM   Result Value Ref Range    WBC 2 52 (L) 4 31 - 10 16 Thousand/uL    RBC 3 67 (L) 3 81 - 5 12 Million/uL    Hemoglobin 10 0 (L) 11 5 - 15 4 g/dL    Hematocrit 30 7 (L) 34 8 - 46 1 %    MCV 84 82 - 98 fL    MCH 27 2 26 8 - 34 3 pg    MCHC 32 6 31 4 - 37 4 g/dL    RDW 16 4 (H) 11 6 - 15 1 %    MPV 9 9 8 9 - 12 7 fL    Platelets 718 (L) 758 - 390 Thousands/uL    nRBC 0 /100 WBCs Neutrophils Relative 53 43 - 75 %    Immat GRANS % 2 0 - 2 %    Lymphocytes Relative 10 (L) 14 - 44 %    Monocytes Relative 20 (H) 4 - 12 %    Eosinophils Relative 15 (H) 0 - 6 %    Basophils Relative 0 0 - 1 %    Neutrophils Absolute 1 33 (L) 1 85 - 7 62 Thousands/µL    Immature Grans Absolute 0 06 0 00 - 0 20 Thousand/uL    Lymphocytes Absolute 0 25 (L) 0 60 - 4 47 Thousands/µL    Monocytes Absolute 0 50 0 17 - 1 22 Thousand/µL    Eosinophils Absolute 0 37 0 00 - 0 61 Thousand/µL    Basophils Absolute 0 01 0 00 - 0 10 Thousands/µL   Fingerstick Glucose (POCT)    Collection Time: 01/19/19  7:40 AM   Result Value Ref Range    POC Glucose 111 65 - 140 mg/dl   Fingerstick Glucose (POCT)    Collection Time: 01/19/19 11:02 AM   Result Value Ref Range    POC Glucose 143 (H) 65 - 140 mg/dl   Fingerstick Glucose (POCT)    Collection Time: 01/19/19  4:05 PM   Result Value Ref Range    POC Glucose 87 65 - 140 mg/dl   Fingerstick Glucose (POCT)    Collection Time: 01/19/19  8:59 PM   Result Value Ref Range    POC Glucose 106 65 - 140 mg/dl   Comprehensive metabolic panel    Collection Time: 01/20/19  6:01 AM   Result Value Ref Range    Sodium 142 136 - 145 mmol/L    Potassium 3 4 (L) 3 5 - 5 3 mmol/L    Chloride 110 (H) 100 - 108 mmol/L    CO2 24 21 - 32 mmol/L    ANION GAP 8 4 - 13 mmol/L    BUN 3 (L) 5 - 25 mg/dL    Creatinine 0 49 (L) 0 60 - 1 30 mg/dL    Glucose 101 65 - 140 mg/dL    Calcium 6 2 (L) 8 3 - 10 1 mg/dL    AST 16 5 - 45 U/L    ALT 20 12 - 78 U/L    Alkaline Phosphatase 158 (H) 46 - 116 U/L    Total Protein 4 6 (L) 6 4 - 8 2 g/dL    Albumin 2 2 (L) 3 5 - 5 0 g/dL    Total Bilirubin 0 70 0 20 - 1 00 mg/dL    eGFR 100 ml/min/1 73sq m   Fingerstick Glucose (POCT)    Collection Time: 01/20/19  8:07 AM   Result Value Ref Range    POC Glucose 95 65 - 140 mg/dl   Fingerstick Glucose (POCT)    Collection Time: 01/20/19 11:02 AM   Result Value Ref Range    POC Glucose 130 65 - 140 mg/dl   Fingerstick Glucose (POCT) Collection Time: 01/24/19  8:30 AM   Result Value Ref Range    POC Glucose 119 65 - 140 mg/dl   Tissue Exam    Collection Time: 01/24/19  9:11 AM   Result Value Ref Range    Case Report       Surgical Pathology Report                         Case: [id number]                                   Authorizing Provider:  Lucio Singh MD       Collected:           01/24/2019 0911              Ordering Location:     [facility]        Received:            01/24/2019 [facility]                                                           Pathologist:           Sophia Tavares MD                                                               Specimens:   A) - Polyp, Stomach/Small Intestine, gastric polyps cold forcep                                     B) - Stomach, gastric bx                                                                            C) - Duodenum, duodenum bx                                                                 Final Diagnosis       A  Stomach polyp (biopsy): - Inflamed hyperplastic polyp with focal intestinal metaplasia  - No H pylori identified on immunohistochemistry stain  - Negative for dysplasia     B  Stomach (biopsy):  - Chronic active oxyntic gastritis  - No H pylori identified on immunohistochemistry stain  - No intestinal metaplasia     Comment:  While no H pylori are identified on immunohistochemistry stain the overall features are suspicious for an H pylori infection  H pylori may not be detected secondary to recent antibiotic exposure or use of proton pump inhibitors, as well as the presence of intestinal metaplasia  Fecal antigen testing may be considered if clinically indicated  C  Duodenum (biopsy): - Active duodenitis  - Negative for dysplasia           Additional Information       All controls performed with the immunohistochemical stains reported above reacted appropriately    These tests were developed and their performance characteristics determined by [facility] or [facility]  They may not be cleared or approved by the [place]  Food and Drug Administration  The FDA has determined that such clearance or approval is not necessary  These tests are used for clinical purposes  They should not be regarded as investigational or for research  This laboratory has been approved by CLIA 88, designated as a high-complexity laboratory and is qualified to perform these tests  Gross Description       A  The specimen is received in formalin, labeled with the patient's name and medical record number, and is designated "gastric polyps  The specimen consists of 5 tan-pink soft tissue fragments ranging from 0 1 cm to 0 5 cm in greatest dimension  Entirely submitted  One screened cassette  B  The specimen is received in formalin, labeled with the patient's name and medical record number, and is designated "gastric biopsy  The specimen consists 2 tan-pink soft tissue fragments measuring 0 3 cm and 0 4 cm in greatest dimension  Entirely submitted  One screened cassette  C  The specimen is received in formalin, labeled with the patient's name and medical record number, and is designated "duodenum biopsy  The specimen consists of 2 tan-pink soft tissue fragments measuring 0 1 cm and 0 3 cm in greatest dimension  Entirely submitted  One screened cassette  Note: The estimated total formalin fixation time based upon information provided by the submitting clinician and the standard processing schedule is under 72 hours  [place]           Clinical Information       EGD  FINDINGS:     #1  Esophagus- normal appearing esophagus, regular squamocolumnar junction noted at 36 cm       #2  Stomach- erosive appearance of gastric mucosa within the fundus and cardia suggestive of severe gastritis    There were several erythematous polyps measuring between 2-3 mm each within the cardia and fundus-suspect may be inflammatory polyps, 2 of these polyps were removed using cold biopsy forceps  Antrum appeared normal   Pylorus was patent  Numerous biopsies were obtained from the antrum, body and cardia to assess for H pylori  Retroflexed view was notable for polyps only      #3  Duodenum- duodenal mucosa appeared normal within the bulb, sweep and D2  Biopsies were obtained nonetheless to assess for celiac     CBC and differential    Collection Time: 01/29/19  8:44 AM   Result Value Ref Range    WBC 3 23 (L) 4 31 - 10 16 Thousand/uL    RBC 4 07 3 81 - 5 12 Million/uL    Hemoglobin 11 3 (L) 11 5 - 15 4 g/dL    Hematocrit 35 5 34 8 - 46 1 %    MCV 87 82 - 98 fL    MCH 27 8 26 8 - 34 3 pg    MCHC 31 8 31 4 - 37 4 g/dL    RDW 17 6 (H) 11 6 - 15 1 %    MPV 9 9 8 9 - 12 7 fL    Platelets 094 157 - 042 Thousands/uL    nRBC 0 /100 WBCs    Neutrophils Relative 69 43 - 75 %    Immat GRANS % 1 0 - 2 %    Lymphocytes Relative 7 (L) 14 - 44 %    Monocytes Relative 13 (H) 4 - 12 %    Eosinophils Relative 9 (H) 0 - 6 %    Basophils Relative 1 0 - 1 %    Neutrophils Absolute 2 26 1 85 - 7 62 Thousands/µL    Immature Grans Absolute 0 03 0 00 - 0 20 Thousand/uL    Lymphocytes Absolute 0 21 (L) 0 60 - 4 47 Thousands/µL    Monocytes Absolute 0 41 0 17 - 1 22 Thousand/µL    Eosinophils Absolute 0 29 0 00 - 0 61 Thousand/µL    Basophils Absolute 0 03 0 00 - 0 10 Thousands/µL   Comprehensive metabolic panel    Collection Time: 01/29/19  8:44 AM   Result Value Ref Range    Sodium 142 136 - 145 mmol/L    Potassium 3 1 (L) 3 5 - 5 3 mmol/L    Chloride 105 100 - 108 mmol/L    CO2 28 21 - 32 mmol/L    ANION GAP 9 4 - 13 mmol/L    BUN 8 5 - 25 mg/dL    Creatinine 0 49 (L) 0 60 - 1 30 mg/dL    Glucose 141 (H) 65 - 140 mg/dL    Calcium 7 3 (L) 8 3 - 10 1 mg/dL    AST 30 5 - 45 U/L    ALT 32 12 - 78 U/L    Alkaline Phosphatase 243 (H) 46 - 116 U/L    Total Protein 5 5 (L) 6 4 - 8 2 g/dL    Albumin 2 7 (L) 3 5 - 5 0 g/dL    Total Bilirubin 0 70 0 20 - 1 00 mg/dL    eGFR 100 ml/min/1 73sq m   Magnesium    Collection Time: 01/29/19  8:44 AM   Result Value Ref Range    Magnesium 1 6 1 6 - 2 6 mg/dL   Magnesium    Collection Time: 02/05/19 12:04 PM   Result Value Ref Range    Magnesium 1 8 1 6 - 2 6 mg/dL   Comprehensive metabolic panel    Collection Time: 02/05/19 12:04 PM   Result Value Ref Range    Sodium 139 136 - 145 mmol/L    Potassium 4 5 3 5 - 5 3 mmol/L    Chloride 106 100 - 108 mmol/L    CO2 26 21 - 32 mmol/L    ANION GAP 7 4 - 13 mmol/L    BUN 9 5 - 25 mg/dL    Creatinine 0 52 (L) 0 60 - 1 30 mg/dL    Glucose 117 65 - 140 mg/dL    Calcium 7 5 (L) 8 3 - 10 1 mg/dL    AST 29 5 - 45 U/L    ALT 30 12 - 78 U/L    Alkaline Phosphatase 258 (H) 46 - 116 U/L    Total Protein 5 6 (L) 6 4 - 8 2 g/dL    Albumin 2 7 (L) 3 5 - 5 0 g/dL    Total Bilirubin 0 60 0 20 - 1 00 mg/dL    eGFR 98 ml/min/1 73sq m   CBC and differential    Collection Time: 02/05/19 12:04 PM   Result Value Ref Range    WBC 4 44 4 31 - 10 16 Thousand/uL    RBC 4 15 3 81 - 5 12 Million/uL    Hemoglobin 11 6 11 5 - 15 4 g/dL    Hematocrit 36 9 34 8 - 46 1 %    MCV 89 82 - 98 fL    MCH 28 0 26 8 - 34 3 pg    MCHC 31 4 31 4 - 37 4 g/dL    RDW 18 8 (H) 11 6 - 15 1 %    MPV 9 4 8 9 - 12 7 fL    Platelets 235 158 - 939 Thousands/uL    nRBC 0 /100 WBCs    Neutrophils Relative 80 (H) 43 - 75 %    Immat GRANS % 1 0 - 2 %    Lymphocytes Relative 7 (L) 14 - 44 %    Monocytes Relative 9 4 - 12 %    Eosinophils Relative 2 0 - 6 %    Basophils Relative 1 0 - 1 %    Neutrophils Absolute 3 57 1 85 - 7 62 Thousands/µL    Immature Grans Absolute 0 03 0 00 - 0 20 Thousand/uL    Lymphocytes Absolute 0 32 (L) 0 60 - 4 47 Thousands/µL    Monocytes Absolute 0 40 0 17 - 1 22 Thousand/µL    Eosinophils Absolute 0 09 0 00 - 0 61 Thousand/µL    Basophils Absolute 0 03 0 00 - 0 10 Thousands/µL       Imaging Studies:Xr Femur 2 Views Left    Result Date: 1/18/2019  Narrative: LEFT FEMUR INDICATION:   Discomfort, abnormal bone scan  COMPARISON:  Bone scan dated 1/14/2019 VIEWS:  XR FEMUR 2 VW LEFT FINDINGS: There is no fracture or dislocation  There is mild degenerative arthritis in the knee medially and laterally  There is a bone spur at the top of the patella, superficially  Corresponding to the areas of abnormal radiotracer uptake within the left femur, there are numerous areas of mixed lytic and sclerotic bony metastases visible predominantly along the femoral shaft  There are a few small isolated lucent lesions in the mid to upper femoral shaft with some endosteal scalloping noted  A more significant area of mixed lytic and sclerotic metastatic disease is seen within the lower femoral shaft  There does appear to be some endosteal scalloping and cortical thinning mostly along the anterior aspect of the bone on the lateral view over the lower portion of the femur  This does not appear critically thinned at this time, however, it might still predispose to pathologic fracture  Soft tissues are unremarkable  Impression: Significant metastatic lesions seen within the femoral shaft most predominantly within the lower diaphysis region with some cortical thinning along the anterior aspect of the endosteal surface of the cortex  This might predispose to pathologic fracture  Consider orthopedic consultation  Workstation performed: [id number]     Xr Femur 2 Views Right    Result Date: 1/18/2019  Narrative: RIGHT FEMUR INDICATION:   Discomfort, abnormal bone scan  COMPARISON:  Bone scan dated 1/14/2019 VIEWS:  XR FEMUR 2 VW RIGHT FINDINGS: There is no fracture or dislocation  No significant degenerative change seen at the hip joint  Perhaps mild joint space narrowing in the knee  There is some bone spur formation at the top of the patella, superficially  The area of abnormal radiotracer uptake along the diaphysis of the right femur on the prior bone scan does not seem to have any visible radiographic correlate    There is no significant lytic or blastic bone lesion identified  There does not appear to be  any cortical thinning which would predispose to a pathologic fracture at this time  There are atherosclerotic calcifications  Soft tissues are otherwise unremarkable  Impression: No significant lytic or blastic bone lesion seen in the right femur to correspond to the bone scan abnormality  Workstation performed: [id number]     Nm Bone Scan Whole Body    Result Date: 1/14/2019  Narrative: BONE SCAN  WHOLE BODY INDICATION: History of metastatic breast carcinoma  PREVIOUS FILM CORRELATION:    There are no prior bone scans available for comparison  Comparison is made to prior thoracic and lumbar MRI studies from December 2018 and prior head CT 3/19/2008 TECHNIQUE:   This study was performed following the intravenous administration of 26 6 mCi Tc-99m labeled MDP  Delayed, anterior and posterior whole body images were acquired, 2-3 hours after radiopharmaceutical administration  FINDINGS:  Examination demonstrates extensive osseous metastatic disease  Involvement of the following areas is noted: Skull and face bilaterally, cervical spine, left shoulder girdle and left humerus, multiple ribs, extensive involvement throughout the thoracic and lumbar spine, pelvic and sacral metastases bilaterally, left greater trochanteric lesion, and metastatic lesions within both femoral shafts, distal aspect of left femur, and left fibula         Impression: Extensive and widespread osseous metastatic disease  Consider radiographs of the femurs bilaterally to assess for any lesions predisposing to pathologic fracture  Workstation performed: [id number]           Assessment/Plan:  Orders Placed This Encounter   Procedures    MRI thoracic spine wo contrast    MRI lumbar spine wo contrast        Christine Naveed returns today approximately 1 month status post completion of palliative radiation therapy to T11 through the bottom of the SI joints    Her back pain initially improved with radiation therapy, but recently has again begun to worsen and she is wearing the back brace more frequently  She has inquired about the possibility of vertebroplasty, if the pain is possibly related to the pathologic fracture that occurred at T12  We have offered to refer her to ortho spine, but she prefers to wait until she sees Dr Emery Pedroza in 2 weeks and will discuss vertebroplasty and possible referral to 1 of his colleagues at that time  Should she undergo prophylactic stabilization of the left femur, we would then recommend palliative radiation to the left femur approximately 6 weeks postoperative  We have scheduled her for a repeat MRI of the thoracic and lumbar spine in 2 months and she will return to our department at that time  She will continue to follow closely with Medical Oncology  Thankfully her GI symptoms which developed during her course of radiation have now significantly subsided  Abiola Caraballo MD  8/2/9291,6:34 PM    Portions of the record may have been created with voice recognition software   Occasional wrong word or "sound a like" substitutions may have occurred due to the inherent limitations of voice recognition software   Read the chart carefully and recognize, using context, where substitutions have occurred

## 2019-02-06 NOTE — PROGRESS NOTES
TIME OUT with Paul Lunch RN    Hydration orders changing from 2 hours twice weekly to 3hours weekly starting 2/11/19 as scheduled  Labs will continue to be drawn stat weekly, ok to proceed prior to results  New orders pending  Schedule being adjusted

## 2019-02-08 ENCOUNTER — TELEPHONE (OUTPATIENT)
Dept: HEMATOLOGY ONCOLOGY | Facility: CLINIC | Age: 69
End: 2019-02-08

## 2019-02-08 RX ORDER — SODIUM CHLORIDE 9 MG/ML
20 INJECTION, SOLUTION INTRAVENOUS CONTINUOUS
Status: DISCONTINUED | OUTPATIENT
Start: 2019-02-11 | End: 2019-02-14 | Stop reason: HOSPADM

## 2019-02-08 NOTE — TELEPHONE ENCOUNTER
Spoke to [facility] and instructed her to fax the orders to our office  Once we receive them I will have Dr Belinda Penaloza review them to see if he can sign the orders

## 2019-02-08 NOTE — TELEPHONE ENCOUNTER
Kinza called from [address] asking if Dr Ifeoma Smith will sign off on orders for pt's care since he has referred her   Please call Kinza @ [phone]

## 2019-02-11 ENCOUNTER — HOSPITAL ENCOUNTER (OUTPATIENT)
Dept: INFUSION CENTER | Facility: CLINIC | Age: 69
Discharge: HOME | End: 2019-02-11
Payer: MEDICARE

## 2019-02-11 VITALS
SYSTOLIC BLOOD PRESSURE: 96 MMHG | HEART RATE: 84 BPM | DIASTOLIC BLOOD PRESSURE: 56 MMHG | TEMPERATURE: 98.5 F | RESPIRATION RATE: 14 BRPM

## 2019-02-11 DIAGNOSIS — E87.6 HYPOKALEMIA: ICD-10-CM

## 2019-02-11 DIAGNOSIS — E88.09 HYPOALBUMINEMIA: ICD-10-CM

## 2019-02-11 DIAGNOSIS — E83.51 HYPOCALCEMIA: ICD-10-CM

## 2019-02-11 DIAGNOSIS — R19.7 DIARRHEA, UNSPECIFIED TYPE: ICD-10-CM

## 2019-02-11 DIAGNOSIS — D70.8 OTHER NEUTROPENIA (HCC): ICD-10-CM

## 2019-02-11 DIAGNOSIS — C79.51 BREAST CANCER METASTASIZED TO BONE, RIGHT (HCC): Chronic | ICD-10-CM

## 2019-02-11 DIAGNOSIS — M48.54XA: ICD-10-CM

## 2019-02-11 DIAGNOSIS — C50.911 BREAST CANCER METASTASIZED TO BONE, RIGHT (HCC): Chronic | ICD-10-CM

## 2019-02-11 DIAGNOSIS — D70.9 NEUTROPENIA, UNSPECIFIED TYPE (HCC): ICD-10-CM

## 2019-02-11 LAB
ALBUMIN SERPL BCP-MCNC: 2.3 G/DL (ref 3.5–5)
ALP SERPL-CCNC: 185 U/L (ref 46–116)
ALT SERPL W P-5'-P-CCNC: 25 U/L (ref 12–78)
ANION GAP SERPL CALCULATED.3IONS-SCNC: 8 MMOL/L (ref 4–13)
AST SERPL W P-5'-P-CCNC: 20 U/L (ref 5–45)
BASOPHILS # BLD AUTO: 0.03 THOUSANDS/ΜL (ref 0–0.1)
BASOPHILS NFR BLD AUTO: 1 % (ref 0–1)
BILIRUB SERPL-MCNC: 0.4 MG/DL (ref 0.2–1)
BUN SERPL-MCNC: 8 MG/DL (ref 5–25)
CALCIUM SERPL-MCNC: 6.9 MG/DL (ref 8.3–10.1)
CHLORIDE SERPL-SCNC: 108 MMOL/L (ref 100–108)
CO2 SERPL-SCNC: 25 MMOL/L (ref 21–32)
CREAT SERPL-MCNC: 0.45 MG/DL (ref 0.6–1.3)
EOSINOPHIL # BLD AUTO: 0.05 THOUSAND/ΜL (ref 0–0.61)
EOSINOPHIL NFR BLD AUTO: 1 % (ref 0–6)
ERYTHROCYTE [DISTWIDTH] IN BLOOD BY AUTOMATED COUNT: 18.6 % (ref 11.6–15.1)
GFR SERPL CREATININE-BSD FRML MDRD: 103 ML/MIN/1.73SQ M
GLUCOSE SERPL-MCNC: 121 MG/DL (ref 65–140)
HCT VFR BLD AUTO: 37.4 % (ref 34.8–46.1)
HGB BLD-MCNC: 11.8 G/DL (ref 11.5–15.4)
IMM GRANULOCYTES # BLD AUTO: 0.01 THOUSAND/UL (ref 0–0.2)
IMM GRANULOCYTES NFR BLD AUTO: 0 % (ref 0–2)
LYMPHOCYTES # BLD AUTO: 0.23 THOUSANDS/ΜL (ref 0.6–4.47)
LYMPHOCYTES NFR BLD AUTO: 6 % (ref 14–44)
MAGNESIUM SERPL-MCNC: 2.2 MG/DL (ref 1.6–2.6)
MCH RBC QN AUTO: 28.6 PG (ref 26.8–34.3)
MCHC RBC AUTO-ENTMCNC: 31.6 G/DL (ref 31.4–37.4)
MCV RBC AUTO: 91 FL (ref 82–98)
MONOCYTES # BLD AUTO: 0.37 THOUSAND/ΜL (ref 0.17–1.22)
MONOCYTES NFR BLD AUTO: 10 % (ref 4–12)
NEUTROPHILS # BLD AUTO: 3.11 THOUSANDS/ΜL (ref 1.85–7.62)
NEUTS SEG NFR BLD AUTO: 82 % (ref 43–75)
NRBC BLD AUTO-RTO: 0 /100 WBCS
PLATELET # BLD AUTO: 291 THOUSANDS/UL (ref 149–390)
PMV BLD AUTO: 9.5 FL (ref 8.9–12.7)
POTASSIUM SERPL-SCNC: 4.8 MMOL/L (ref 3.5–5.3)
PROT SERPL-MCNC: 5 G/DL (ref 6.4–8.2)
RBC # BLD AUTO: 4.12 MILLION/UL (ref 3.81–5.12)
SODIUM SERPL-SCNC: 141 MMOL/L (ref 136–145)
WBC # BLD AUTO: 3.8 THOUSAND/UL (ref 4.31–10.16)

## 2019-02-11 PROCEDURE — 96360 HYDRATION IV INFUSION INIT: CPT

## 2019-02-11 PROCEDURE — 85025 COMPLETE CBC W/AUTO DIFF WBC: CPT

## 2019-02-11 PROCEDURE — 83735 ASSAY OF MAGNESIUM: CPT

## 2019-02-11 PROCEDURE — 80053 COMPREHEN METABOLIC PANEL: CPT

## 2019-02-11 PROCEDURE — 96361 HYDRATE IV INFUSION ADD-ON: CPT

## 2019-02-11 RX ADMIN — POTASSIUM CHLORIDE: 2 INJECTION, SOLUTION, CONCENTRATE INTRAVENOUS at 09:19

## 2019-02-11 RX ADMIN — SODIUM CHLORIDE 20 ML/HR: 0.9 INJECTION, SOLUTION INTRAVENOUS at 09:13

## 2019-02-11 NOTE — PROGRESS NOTES
Patient tolerated all treatment without incident and was discharged post   She offers no c/o  Redrew CMP via LAC with #23g butterfly prior to discharge due to first specimen being hemolyzed  Pressure dressing applied post   Patient tolerated well  Will call patient CMP results to her at home when received  Patient agreeable to this

## 2019-02-11 NOTE — PROGRESS NOTES
Patient here for labs and hydration with lytes  IV access obtained LFA with #22 introcan, labs drawn via this site but it then infiltrated with NS flush  Cathlon d/c and dry dressing applied and CDI   2nd attempt with success LH with #24g introcan, fluids infusing as ordered via this site  Patient tolerated all well

## 2019-02-11 NOTE — PROGRESS NOTES
Reported patient CMP results to her over the phone including her potassium, magnesium and calcium  These results drawn post hydration today as initial specimen was hemolyzed  Patient calcium continues to fluctuate between 6 2 and 7 5 even on supplements and having had xgeva held on 1/29/19  Patient next lab draw due with her hydration on 2/18/19

## 2019-02-15 RX ORDER — SODIUM CHLORIDE 9 MG/ML
20 INJECTION, SOLUTION INTRAVENOUS CONTINUOUS
Status: DISCONTINUED | OUTPATIENT
Start: 2019-02-18 | End: 2019-02-21 | Stop reason: HOSPADM

## 2019-02-18 ENCOUNTER — HOSPITAL ENCOUNTER (OUTPATIENT)
Dept: INFUSION CENTER | Facility: CLINIC | Age: 69
Discharge: HOME | End: 2019-02-18
Payer: MEDICARE

## 2019-02-18 ENCOUNTER — TRANSCRIBE ORDERS (OUTPATIENT)
Dept: LAB | Facility: CLINIC | Age: 69
End: 2019-02-18

## 2019-02-18 VITALS
DIASTOLIC BLOOD PRESSURE: 58 MMHG | RESPIRATION RATE: 20 BRPM | HEART RATE: 71 BPM | TEMPERATURE: 98.1 F | OXYGEN SATURATION: 98 % | SYSTOLIC BLOOD PRESSURE: 116 MMHG

## 2019-02-18 DIAGNOSIS — E83.51 HYPOCALCEMIA: ICD-10-CM

## 2019-02-18 DIAGNOSIS — C50.911 BREAST CANCER METASTASIZED TO BONE, RIGHT (HCC): Chronic | ICD-10-CM

## 2019-02-18 DIAGNOSIS — E87.6 HYPOKALEMIA: ICD-10-CM

## 2019-02-18 DIAGNOSIS — D70.9 NEUTROPENIA, UNSPECIFIED TYPE (HCC): ICD-10-CM

## 2019-02-18 DIAGNOSIS — R19.7 DIARRHEA, UNSPECIFIED TYPE: ICD-10-CM

## 2019-02-18 DIAGNOSIS — C79.51 BREAST CANCER METASTASIZED TO BONE, RIGHT (HCC): Chronic | ICD-10-CM

## 2019-02-18 DIAGNOSIS — M48.54XA: ICD-10-CM

## 2019-02-18 DIAGNOSIS — E88.09 HYPOALBUMINEMIA: ICD-10-CM

## 2019-02-18 DIAGNOSIS — D70.8 OTHER NEUTROPENIA (HCC): ICD-10-CM

## 2019-02-18 LAB
ALBUMIN SERPL BCP-MCNC: 2.5 G/DL (ref 3.5–5)
ALP SERPL-CCNC: 178 U/L (ref 46–116)
ALT SERPL W P-5'-P-CCNC: 27 U/L (ref 12–78)
ANION GAP SERPL CALCULATED.3IONS-SCNC: 5 MMOL/L (ref 4–13)
AST SERPL W P-5'-P-CCNC: 21 U/L (ref 5–45)
BASOPHILS # BLD AUTO: 0.02 THOUSANDS/ΜL (ref 0–0.1)
BASOPHILS NFR BLD AUTO: 1 % (ref 0–1)
BILIRUB SERPL-MCNC: 0.4 MG/DL (ref 0.2–1)
BUN SERPL-MCNC: 9 MG/DL (ref 5–25)
CALCIUM SERPL-MCNC: 7.9 MG/DL (ref 8.3–10.1)
CHLORIDE SERPL-SCNC: 107 MMOL/L (ref 100–108)
CO2 SERPL-SCNC: 29 MMOL/L (ref 21–32)
CREAT SERPL-MCNC: 0.51 MG/DL (ref 0.6–1.3)
EOSINOPHIL # BLD AUTO: 0.05 THOUSAND/ΜL (ref 0–0.61)
EOSINOPHIL NFR BLD AUTO: 1 % (ref 0–6)
ERYTHROCYTE [DISTWIDTH] IN BLOOD BY AUTOMATED COUNT: 18.6 % (ref 11.6–15.1)
GFR SERPL CREATININE-BSD FRML MDRD: 99 ML/MIN/1.73SQ M
GLUCOSE SERPL-MCNC: 108 MG/DL (ref 65–140)
HCT VFR BLD AUTO: 35.9 % (ref 34.8–46.1)
HGB BLD-MCNC: 11.9 G/DL (ref 11.5–15.4)
IMM GRANULOCYTES # BLD AUTO: 0.03 THOUSAND/UL (ref 0–0.2)
IMM GRANULOCYTES NFR BLD AUTO: 1 % (ref 0–2)
LYMPHOCYTES # BLD AUTO: 0.28 THOUSANDS/ΜL (ref 0.6–4.47)
LYMPHOCYTES NFR BLD AUTO: 7 % (ref 14–44)
MAGNESIUM SERPL-MCNC: 1.8 MG/DL (ref 1.6–2.6)
MCH RBC QN AUTO: 30.1 PG (ref 26.8–34.3)
MCHC RBC AUTO-ENTMCNC: 33.1 G/DL (ref 31.4–37.4)
MCV RBC AUTO: 91 FL (ref 82–98)
MONOCYTES # BLD AUTO: 0.39 THOUSAND/ΜL (ref 0.17–1.22)
MONOCYTES NFR BLD AUTO: 10 % (ref 4–12)
NEUTROPHILS # BLD AUTO: 3.09 THOUSANDS/ΜL (ref 1.85–7.62)
NEUTS SEG NFR BLD AUTO: 80 % (ref 43–75)
NRBC BLD AUTO-RTO: 0 /100 WBCS
PLATELET # BLD AUTO: 291 THOUSANDS/UL (ref 149–390)
PMV BLD AUTO: 9.8 FL (ref 8.9–12.7)
POTASSIUM SERPL-SCNC: 3.7 MMOL/L (ref 3.5–5.3)
PROT SERPL-MCNC: 5.4 G/DL (ref 6.4–8.2)
RBC # BLD AUTO: 3.96 MILLION/UL (ref 3.81–5.12)
SODIUM SERPL-SCNC: 141 MMOL/L (ref 136–145)
WBC # BLD AUTO: 3.86 THOUSAND/UL (ref 4.31–10.16)

## 2019-02-18 PROCEDURE — 96360 HYDRATION IV INFUSION INIT: CPT

## 2019-02-18 PROCEDURE — 96361 HYDRATE IV INFUSION ADD-ON: CPT

## 2019-02-18 PROCEDURE — 83735 ASSAY OF MAGNESIUM: CPT

## 2019-02-18 PROCEDURE — 80053 COMPREHEN METABOLIC PANEL: CPT

## 2019-02-18 PROCEDURE — 85025 COMPLETE CBC W/AUTO DIFF WBC: CPT

## 2019-02-18 RX ADMIN — POTASSIUM CHLORIDE: 2 INJECTION, SOLUTION, CONCENTRATE INTRAVENOUS at 09:33

## 2019-02-18 RX ADMIN — SODIUM CHLORIDE 20 ML/HR: 0.9 INJECTION, SOLUTION INTRAVENOUS at 09:33

## 2019-02-18 NOTE — PLAN OF CARE
Problem: Potential for Falls  Goal: Patient will remain free of falls  Description  INTERVENTIONS:  - Assess patient frequently for physical needs  -  Identify cognitive and physical deficits and behaviors that affect risk of falls  -  Bennett fall precautions as indicated by assessment   - Educate patient/family on patient safety including physical limitations  - Instruct patient to call for assistance with activity based on assessment  - Modify environment to reduce risk of injury  - Consider OT/PT consult to assist with strengthening/mobility  Outcome: Progressing     Problem: Knowledge Deficit  Goal: Patient/family/caregiver demonstrates understanding of disease process, treatment plan, medications, and discharge instructions  Description  Complete learning assessment and assess knowledge base    Interventions:  - Provide teaching at level of understanding  - Provide teaching via preferred learning methods  Outcome: Progressing

## 2019-02-18 NOTE — PROGRESS NOTES
Pt to clinic for hydration and peripheral lab draw, pt offers no complaints at this time, will continue to monitor, aware of next appointment, declines avs

## 2019-02-19 ENCOUNTER — APPOINTMENT (OUTPATIENT)
Dept: LAB | Facility: CLINIC | Age: 69
End: 2019-02-19
Payer: MEDICARE

## 2019-02-19 ENCOUNTER — OFFICE VISIT (OUTPATIENT)
Dept: HEMATOLOGY ONCOLOGY | Facility: CLINIC | Age: 69
End: 2019-02-19
Payer: MEDICARE

## 2019-02-19 ENCOUNTER — TRANSCRIBE ORDERS (OUTPATIENT)
Dept: LAB | Facility: CLINIC | Age: 69
End: 2019-02-19

## 2019-02-19 VITALS
TEMPERATURE: 98.9 F | WEIGHT: 115 LBS | HEART RATE: 76 BPM | DIASTOLIC BLOOD PRESSURE: 82 MMHG | HEIGHT: 63 IN | SYSTOLIC BLOOD PRESSURE: 124 MMHG | BODY MASS INDEX: 20.38 KG/M2 | RESPIRATION RATE: 18 BRPM | OXYGEN SATURATION: 98 %

## 2019-02-19 DIAGNOSIS — M48.54XA: Primary | ICD-10-CM

## 2019-02-19 DIAGNOSIS — K29.00 ACUTE GASTRITIS WITHOUT HEMORRHAGE, UNSPECIFIED GASTRITIS TYPE: ICD-10-CM

## 2019-02-19 DIAGNOSIS — C79.51 BREAST CANCER METASTASIZED TO BONE, RIGHT (HCC): Chronic | ICD-10-CM

## 2019-02-19 DIAGNOSIS — C50.911 MALIGNANT NEOPLASM OF RIGHT FEMALE BREAST, UNSPECIFIED ESTROGEN RECEPTOR STATUS, UNSPECIFIED SITE OF BREAST (HCC): Chronic | ICD-10-CM

## 2019-02-19 DIAGNOSIS — C50.911 BREAST CANCER METASTASIZED TO BONE, RIGHT (HCC): Chronic | ICD-10-CM

## 2019-02-19 PROBLEM — S22.000A COMPRESSION FRACTURE OF THORACIC VERTEBRA (HCC): Status: ACTIVE | Noted: 2019-02-19

## 2019-02-19 PROCEDURE — 99215 OFFICE O/P EST HI 40 MIN: CPT | Performed by: PHYSICIAN ASSISTANT

## 2019-02-19 PROCEDURE — 87338 HPYLORI STOOL AG IA: CPT

## 2019-02-19 NOTE — PROGRESS NOTES
Hematology/Oncology Outpatient Follow-up  Radhames Graft 76 y o  female 1950 [id number]    Date:  2/19/2019      Assessment and Plan:  1  Breast cancer metastasized to bone, right ([facility] [address] ), 2  Malignant neoplasm of right female breast, unspecified estrogen receptor status, unspecified site of breast [facility])  71-year-old female presents for follow-up regarding breast cancer with bone metastases  Patient has completed radiation therapy  She has been receiving treatment with Faslodex as below  Patient was admitted 01/18/19-01/20/2019  She recently presented secondary to paresthesias that she felt well getting a bone scan  She was found to have hypokalemia, hypocalcemia and hypomagnesemia  After receiving IV calcium her paresthesias resolved  Her performance status decreased after this  She really has required IV electrolyte in fluid hydration  She was receiving twice weekly now she is receiving once weekly  We will continue this for at least the next 1 week  Discussed that this is the reason for not starting Ibrance  yet  Patient was not will recovered  She is advised to start I brands tomorrow  She is aware of how she is to take this medication  We discussed that monitoring for her response will include imaging studies in the future  She is advised to continue follow-up with Radiation Oncology as well as orthopedic surgery  She has questions in regards to follow up with orthopedic surgeon  I advised her that she should continue to do this  Discussed that recommendations regarding physical therapy should be from orthopedic surgery  She states that she has not been having physical therapy at home  She is to discuss her limitations with orthopedic surgery as well  We also discussed how she can gain weight  Advised protein shakes  Explained had to make these on her own  She also continues to have minimal swelling of the left foot/ankle area    She states this has been present for a few weeks  It improves when she raises her legs  She states she sleeps a pills under her legs  Discussed that this is something to monitor  The fact that the fluid is getting better with position changes and non changing for weeks, no calf or leg pain, argues against a blood clot  Follow-up in 1 month  3  Non-traumatic compression fracture of first thoracic vertebra, initial encounter [facility])  She was advised to follow up with Neurosurgery regarding moderate compression fracture of T12  She states that she has not yet followed up with them  She continues to wear a back brace  Advised to follow up in x-rays prior to their office visit  - Ambulatory referral to Neurosurgery; Future    HPI:       Breast cancer metastasized to bone, right [facility])    1996 Surgery     right modified radical mastectomy followed by adjuvant systemic therapy but no radiation  6/28/2004 Biopsy     Final Diagnosis  A  Lymph Node, right supraclavicular, core biopsy (14 slides, [id number], [facility]; collected on 6/28/2004):  -  Consistent with metastatic carcinoma, compatible with breast origin (see note)  2004 -  Radiation     salvage radiation therapy at an outside facility to right supraclavicular nodes at [facility]         12/23/2018 Initial Diagnosis     Breast cancer metastasized to bone, right ([address] )       12/26/2018 - 1/9/2019 Radiation     3000 cGy in 10 fractions to  T11 through the bottom of the SI joints  Dr Eric Mak         1/16/2019 -  Chemotherapy     fulvestrant (FASLODEX) IM injection   Plan was to start Ibrance           Interval history:    ROS: Review of Systems   Constitutional: Positive for fatigue  Negative for activity change, appetite change (remains only fair), chills, fever and unexpected weight change  HENT: Negative for mouth sores and nosebleeds  Respiratory: Negative for cough and shortness of breath  Cardiovascular: Negative for chest pain, palpitations and leg swelling  Gastrointestinal: Negative for abdominal pain, blood in stool, constipation, diarrhea, nausea and vomiting  Genitourinary: Negative for difficulty urinating, dysuria and hematuria  Musculoskeletal: Positive for arthralgias and back pain  Negative for joint swelling and myalgias  Skin: Negative  Neurological: Positive for weakness  Negative for dizziness, light-headedness, numbness and headaches  Hematological: Negative  Psychiatric/Behavioral: The patient is nervous/anxious  Past Medical History:   Diagnosis Date    Cancer [facility])     breast 1996    Cancer ([place] [address] )     bone      Compression fracture of thoracic vertebra (HCC)     Diabetes mellitus ([facility] )     History of therapeutic radiation     Tendonitis        Past Surgical History:   Procedure Laterality Date    CHOLECYSTECTOMY      MASTECTOMY      Right side reconstruction    NECK SURGERY      UT ESOPHAGOGASTRODUODENOSCOPY TRANSORAL DIAGNOSTIC N/A 1/24/2019    Procedure: ESOPHAGOGASTRODUODENOSCOPY (EGD); Surgeon: Yolanda Hilario MD;  Location: [facility]; Service: Gastroenterology    TONSILLECTOMY         Social History     Socioeconomic History    Marital status:       Spouse name: Not on file    Number of children: Not on file    Years of education: Not on file    Highest education level: Not on file   Occupational History    Not on file   Social Needs    Financial resource strain: Not on file    Food insecurity:     Worry: Not on file     Inability: Not on file    Transportation needs:     Medical: Not on file     Non-medical: Not on file   Tobacco Use    Smoking status: Never Smoker    Smokeless tobacco: Never Used   Substance and Sexual Activity    Alcohol use: No    Drug use: No    Sexual activity: Not on file   Lifestyle    Physical activity:     Days per week: Not on file     Minutes per session: Not on file    Stress: Not on file   Relationships    Social connections:     Talks on phone: Not on file Gets together: Not on file     Attends [employer] service: Not on file     Active member of club or organization: Not on file     Attends meetings of clubs or organizations: Not on file     Relationship status: Not on file    Intimate partner violence:     Fear of current or ex partner: Not on file     Emotionally abused: Not on file     Physically abused: Not on file     Forced sexual activity: Not on file   Other Topics Concern    Not on file   Social History Narrative    Not on file       Family History   Problem Relation Age of Onset    Diabetes Mother     Cancer Mother     Lymphoma Father     KAROLINA disease Brother        No Known Allergies      Current Outpatient Medications:     acetaminophen (TYLENOL) 325 mg tablet, Take 2 tablets (650 mg total) by mouth 4 (four) times a day (with meals and at bedtime), Disp: 80 tablet, Rfl: 1    calcium carbonate (OS-CLARENCE) 1250 (500 Ca) MG chewable tablet, Chew 1 tablet (1,250 mg total) daily, Disp: 30 tablet, Rfl: 0    Cholecalciferol (VITAMIN D3) 2000 units TABS, Take 2,000 Units by mouth daily, Disp: , Rfl:     ferrous sulfate 325 (65 Fe) mg tablet, Take 1 tablet (325 mg total) by mouth daily with breakfast, Disp: 30 tablet, Rfl: 1    gabapentin (NEURONTIN) 100 mg capsule, Take 1 capsule (100 mg total) by mouth 2 (two) times a day, Disp: 60 capsule, Rfl: 1    lisinopril (ZESTRIL) 5 mg tablet, Take 5 mg by mouth daily  , Disp: , Rfl:     loperamide (IMODIUM) 2 mg capsule, Take 1 capsule (2 mg total) by mouth 4 (four) times a day as needed for diarrhea, Disp: , Rfl: 0    LORazepam (ATIVAN) 1 mg tablet, Take 2mg (2 tablets) qhs , Disp: 60 tablet, Rfl: 0    metoclopramide (REGLAN) 10 mg tablet, Take 1 tablet (10 mg total) by mouth every 6 (six) hours as needed (Nausea or Vomiting), Disp: 40 tablet, Rfl: 0    morphine (MSIR) 15 mg tablet, Use 1 tab BID scheduled to anticipate cancer pain    May have 1 tabs q4hrs PRN mod-severe pain , Disp: 75 tablet, Rfl: 0   MULTIPLE VITAMINS PO, Take by mouth, Disp: , Rfl:     nystatin (MYCOSTATIN) 100,000 units/mL suspension, Apply 5 mL (500,000 Units total) to the mouth or throat 4 (four) times a day, Disp: 60 mL, Rfl: 0    Omega-3 Fatty Acids (FISH OIL PO), Take 2 g by mouth, Disp: , Rfl:     omeprazole (PriLOSEC) 40 MG capsule, Take 1 capsule (40 mg total) by mouth daily, Disp: 30 capsule, Rfl: 3    ondansetron (ZOFRAN-ODT) 8 mg disintegrating tablet, Take 1 tablet (8 mg total) by mouth 3 (three) times a day, Disp: 90 tablet, Rfl: 1    Palbociclib (IBRANCE) 125 MG, Take 1 capsule (125 mg total) by mouth daily For 21 days then 7 off, Disp: 21 capsule, Rfl: 5    potassium chloride (K-DUR,KLOR-CON) 20 mEq tablet, Take 1 tablet (20 mEq total) by mouth daily, Disp: 30 tablet, Rfl: 0    pravastatin (PRAVACHOL) 40 mg tablet, Take 40 mg by mouth daily  , Disp: , Rfl:     sucralfate (CARAFATE) 1 g/10 mL suspension, Take 10 mL (1 g total) by mouth 2 (two) times a day, Disp: 420 mL, Rfl: 2  No current facility-administered medications for this visit  Facility-Administered Medications Ordered in Other Visits:     sodium chloride 0 9 % infusion, 20 mL/hr, Intravenous, Continuous, Marycruz Ingram MD, Stopped at 02/18/19 1242      Physical Exam:  [facility] 05/27/1996 (Approximate)     Physical Exam   Constitutional: She is oriented to person, place, and time  No distress  underweight   HENT:   Head: Normocephalic and atraumatic  Eyes: Conjunctivae are normal  No scleral icterus  Neck: Normal range of motion  Neck supple  Cardiovascular: Normal rate, regular rhythm and normal heart sounds  No murmur heard  Pulmonary/Chest: Effort normal and breath sounds normal  No respiratory distress  Abdominal: Soft  There is no tenderness  Musculoskeletal: She exhibits edema (minimal non-pitting edema; l>r)  She exhibits no tenderness  Ambulates  With walker    Back brace present   Lymphadenopathy:     She has no cervical adenopathy  Neurological: She is alert and oriented to person, place, and time  No cranial nerve deficit  Skin: Skin is warm and dry  Psychiatric: She has a normal mood and affect  Labs:  Lab Results   Component Value Date    WBC 3 86 (L) 02/18/2019    HGB 11 9 02/18/2019    HCT 35 9 02/18/2019    MCV 91 02/18/2019     02/18/2019     Lab Results   Component Value Date    K 3 7 02/18/2019     02/18/2019    CO2 29 02/18/2019    BUN 9 02/18/2019    CREATININE 0 51 (L) 02/18/2019    CALCIUM 7 9 (L) 02/18/2019    AST 21 02/18/2019    ALT 27 02/18/2019    ALKPHOS 178 (H) 02/18/2019    EGFR 99 02/18/2019       Patient voiced understanding and agreement in the above discussion  Aware to contact our office with questions/symptoms in the interim  I have spent 45 minutes with Patient and family today in which greater than 50% of this time was spent in counseling/coordination of care regarding Diagnostic results, Prognosis, Intructions for management, Patient and family education, Importance of tx compliance and Impressions

## 2019-02-19 NOTE — LETTER
February 20, 2019     MD Mallory [address]  [address]  [facility]    Patient: Sylvester Mckeon   YOB: 1950   Date of Visit: 2/19/2019       Dear Dr Tomi Collier: Thank you for referring Doug Ames to me for evaluation  Below are my notes for this consultation  If you have questions, please do not hesitate to call me  I look forward to following your patient along with you  Sincerely,        Johnna Rasmussen PA-C        CC: No Recipients  Johnna Rasmussen PA-C  2/20/2019 11:14 PM  Sign at close encounter  Hematology/Oncology Outpatient Follow-up  Sylvester Mckeon 76 y o  female 1950 [id number]    Date:  2/19/2019      Assessment and Plan:  1  Breast cancer metastasized to bone, right ([address] ), 2  Malignant neoplasm of right female breast, unspecified estrogen receptor status, unspecified site of breast [facility])  80-year-old female presents for follow-up regarding breast cancer with bone metastases  Patient has completed radiation therapy  She has been receiving treatment with Faslodex as below  Patient was admitted 01/18/19-01/20/2019  She recently presented secondary to paresthesias that she felt well getting a bone scan  She was found to have hypokalemia, hypocalcemia and hypomagnesemia  After receiving IV calcium her paresthesias resolved  Her performance status decreased after this  She really has required IV electrolyte in fluid hydration  She was receiving twice weekly now she is receiving once weekly  We will continue this for at least the next 1 week  Discussed that this is the reason for not starting Ibrance  yet  Patient was not will recovered  She is advised to start I brands tomorrow  She is aware of how she is to take this medication  We discussed that monitoring for her response will include imaging studies in the future  She is advised to continue follow-up with Radiation Oncology as well as orthopedic surgery      She has questions in regards to follow up with orthopedic surgeon  I advised her that she should continue to do this  Discussed that recommendations regarding physical therapy should be from orthopedic surgery  She states that she has not been having physical therapy at home  She is to discuss her limitations with orthopedic surgery as well  We also discussed how she can gain weight  Advised protein shakes  Explained had to make these on her own  She also continues to have minimal swelling of the left foot/ankle area  She states this has been present for a few weeks  It improves when she raises her legs  She states she sleeps a pills under her legs  Discussed that this is something to monitor  The fact that the fluid is getting better with position changes and non changing for weeks, no calf or leg pain, argues against a blood clot  Follow-up in 1 month  3  Non-traumatic compression fracture of first thoracic vertebra, initial encounter [facility])  She was advised to follow up with Neurosurgery regarding moderate compression fracture of T12  She states that she has not yet followed up with them  She continues to wear a back brace  Advised to follow up in x-rays prior to their office visit  - Ambulatory referral to Neurosurgery; Future    HPI:       Breast cancer metastasized to bone, right [facility])    1996 Surgery     right modified radical mastectomy followed by adjuvant systemic therapy but no radiation  6/28/2004 Biopsy     Final Diagnosis  A  Lymph Node, right supraclavicular, core biopsy (14 slides, [id number], [facility]; collected on 6/28/2004):  -  Consistent with metastatic carcinoma, compatible with breast origin (see note)               2004 -  Radiation     salvage radiation therapy at an outside facility to right supraclavicular nodes at [facility]         12/23/2018 Initial Diagnosis     Breast cancer metastasized to bone, right ([facility] )       12/26/2018 - 1/9/2019 Radiation     3000 cGy in 10 fractions to  T11 through the bottom of the SI joints  Dr Vandana Courtney         1/16/2019 -  Chemotherapy     fulvestrant (FASLODEX) IM injection   Plan was to start Ibrance           Interval history:    ROS: Review of Systems   Constitutional: Positive for fatigue  Negative for activity change, appetite change (remains only fair), chills, fever and unexpected weight change  HENT: Negative for mouth sores and nosebleeds  Respiratory: Negative for cough and shortness of breath  Cardiovascular: Negative for chest pain, palpitations and leg swelling  Gastrointestinal: Negative for abdominal pain, blood in stool, constipation, diarrhea, nausea and vomiting  Genitourinary: Negative for difficulty urinating, dysuria and hematuria  Musculoskeletal: Positive for arthralgias and back pain  Negative for joint swelling and myalgias  Skin: Negative  Neurological: Positive for weakness  Negative for dizziness, light-headedness, numbness and headaches  Hematological: Negative  Psychiatric/Behavioral: The patient is nervous/anxious  Past Medical History:   Diagnosis Date    Cancer [facility])     breast 1996    [facility]     bone      Compression fracture of thoracic vertebra (HCC)     Diabetes mellitus ([facility] )     History of therapeutic radiation     Tendonitis        Past Surgical History:   Procedure Laterality Date    CHOLECYSTECTOMY      MASTECTOMY      Right side reconstruction    NECK SURGERY      ME ESOPHAGOGASTRODUODENOSCOPY TRANSORAL DIAGNOSTIC N/A 1/24/2019    Procedure: ESOPHAGOGASTRODUODENOSCOPY (EGD); Surgeon: Eder Hampton MD;  [facility]; Service: Gastroenterology    TONSILLECTOMY         Social History     Socioeconomic History    Marital status:       Spouse name: Not on file    Number of children: Not on file    Years of education: Not on file    Highest education level: Not on file   Occupational History    Not on file   Social Needs    Financial resource strain: Not on file    Food insecurity:     Worry: Not on file     Inability: Not on file    Transportation needs:     Medical: Not on file     Non-medical: Not on file   Tobacco Use    Smoking status: Never Smoker    Smokeless tobacco: Never Used   Substance and Sexual Activity    Alcohol use: No    Drug use: No    Sexual activity: Not on file   Lifestyle    Physical activity:     Days per week: Not on file     Minutes per session: Not on file    Stress: Not on file   Relationships    Social connections:     Talks on phone: Not on file     Gets together: Not on file     Attends Muslim service: Not on file     Active member of club or organization: Not on file     Attends meetings of clubs or organizations: Not on file     Relationship status: Not on file    Intimate partner violence:     Fear of current or ex partner: Not on file     Emotionally abused: Not on file     Physically abused: Not on file     Forced sexual activity: Not on file   Other Topics Concern    Not on file   Social History Narrative    Not on file       Family History   Problem Relation Age of Onset    Diabetes Mother     Cancer Mother     Lymphoma Father     KAROLINA disease Brother        No Known Allergies      Current Outpatient Medications:     acetaminophen (TYLENOL) 325 mg tablet, Take 2 tablets (650 mg total) by mouth 4 (four) times a day (with meals and at bedtime), Disp: 80 tablet, Rfl: 1    calcium carbonate (OS-CLARENCE) 1250 (500 Ca) MG chewable tablet, Chew 1 tablet (1,250 mg total) daily, Disp: 30 tablet, Rfl: 0    Cholecalciferol (VITAMIN D3) 2000 units TABS, Take 2,000 Units by mouth daily, Disp: , Rfl:     ferrous sulfate 325 (65 Fe) mg tablet, Take 1 tablet (325 mg total) by mouth daily with breakfast, Disp: 30 tablet, Rfl: 1    gabapentin (NEURONTIN) 100 mg capsule, Take 1 capsule (100 mg total) by mouth 2 (two) times a day, Disp: 60 capsule, Rfl: 1    lisinopril (ZESTRIL) 5 mg tablet, Take 5 mg by mouth daily  , Disp: , Rfl:     loperamide (IMODIUM) 2 mg capsule, Take 1 capsule (2 mg total) by mouth 4 (four) times a day as needed for diarrhea, Disp: , Rfl: 0    LORazepam (ATIVAN) 1 mg tablet, Take 2mg (2 tablets) qhs , Disp: 60 tablet, Rfl: 0    metoclopramide (REGLAN) 10 mg tablet, Take 1 tablet (10 mg total) by mouth every 6 (six) hours as needed (Nausea or Vomiting), Disp: 40 tablet, Rfl: 0    morphine (MSIR) 15 mg tablet, Use 1 tab BID scheduled to anticipate cancer pain  May have 1 tabs q4hrs PRN mod-severe pain , Disp: 75 tablet, Rfl: 0    MULTIPLE VITAMINS PO, Take by mouth, Disp: , Rfl:     nystatin (MYCOSTATIN) 100,000 units/mL suspension, Apply 5 mL (500,000 Units total) to the mouth or throat 4 (four) times a day, Disp: 60 mL, Rfl: 0    Omega-3 Fatty Acids (FISH OIL PO), Take 2 g by mouth, Disp: , Rfl:     omeprazole (PriLOSEC) 40 MG capsule, Take 1 capsule (40 mg total) by mouth daily, Disp: 30 capsule, Rfl: 3    ondansetron (ZOFRAN-ODT) 8 mg disintegrating tablet, Take 1 tablet (8 mg total) by mouth 3 (three) times a day, Disp: 90 tablet, Rfl: 1    Palbociclib (IBRANCE) 125 MG, Take 1 capsule (125 mg total) by mouth daily For 21 days then 7 off, Disp: 21 capsule, Rfl: 5    potassium chloride (K-DUR,KLOR-CON) 20 mEq tablet, Take 1 tablet (20 mEq total) by mouth daily, Disp: 30 tablet, Rfl: 0    pravastatin (PRAVACHOL) 40 mg tablet, Take 40 mg by mouth daily  , Disp: , Rfl:     sucralfate (CARAFATE) 1 g/10 mL suspension, Take 10 mL (1 g total) by mouth 2 (two) times a day, Disp: 420 mL, Rfl: 2  No current facility-administered medications for this visit  Facility-Administered Medications Ordered in Other Visits:     sodium chloride 0 9 % infusion, 20 mL/hr, Intravenous, Continuous, Nika Box MD, Stopped at 02/18/19 1242      Physical Exam:  LMP 05/27/1996 (Approximate)     Physical Exam   Constitutional: She is oriented to person, place, and time  No distress  underweight   HENT:   Head: Normocephalic and atraumatic  Eyes: Conjunctivae are normal  No scleral icterus  Neck: Normal range of motion  Neck supple  Cardiovascular: Normal rate, regular rhythm and normal heart sounds  No murmur heard  Pulmonary/Chest: Effort normal and breath sounds normal  No respiratory distress  Abdominal: Soft  There is no tenderness  Musculoskeletal: She exhibits edema (minimal non-pitting edema; l>r)  She exhibits no tenderness  Ambulates  With walker    Back brace present   Lymphadenopathy:     She has no cervical adenopathy  Neurological: She is alert and oriented to person, place, and time  No cranial nerve deficit  Skin: Skin is warm and dry  Psychiatric: She has a normal mood and affect  Labs:  Lab Results   Component Value Date    WBC 3 86 (L) 02/18/2019    HGB 11 9 02/18/2019    HCT 35 9 02/18/2019    MCV 91 02/18/2019     02/18/2019     Lab Results   Component Value Date    K 3 7 02/18/2019     02/18/2019    CO2 29 02/18/2019    BUN 9 02/18/2019    CREATININE 0 51 (L) 02/18/2019    CALCIUM 7 9 (L) 02/18/2019    AST 21 02/18/2019    ALT 27 02/18/2019    ALKPHOS 178 (H) 02/18/2019    EGFR 99 02/18/2019       Patient voiced understanding and agreement in the above discussion  Aware to contact our office with questions/symptoms in the interim  I have spent 45 minutes with Patient and family today in which greater than 50% of this time was spent in counseling/coordination of care regarding Diagnostic results, Prognosis, Intructions for management, Patient and family education, Importance of tx compliance and Impressions

## 2019-02-20 LAB — H PYLORI AG STL QL IA: NEGATIVE

## 2019-02-21 ENCOUNTER — TELEPHONE (OUTPATIENT)
Dept: GASTROENTEROLOGY | Facility: CLINIC | Age: 69
End: 2019-02-21

## 2019-02-21 ENCOUNTER — TELEPHONE (OUTPATIENT)
Dept: HEMATOLOGY ONCOLOGY | Facility: CLINIC | Age: 69
End: 2019-02-21

## 2019-02-21 NOTE — TELEPHONE ENCOUNTER
Notes recorded by Beryle Homme, MD on 2/21/2019 at 11:49 AM EST  Please inform the patient that the stool studies are negative for H pylori

## 2019-02-21 NOTE — TELEPHONE ENCOUNTER
Can you please see if Dr Fitz Townsend can sign the treatment orders for this patient until Dr Duglas Villareal returns? Per Kinza the nurse at [facility] they cannot except orders from a PA, they need come for a doctor  Please let me know and I will contact [facility] to have them [facility] since you guys are there today  Thank you so much for your help

## 2019-02-21 NOTE — TELEPHONE ENCOUNTER
Tonya Mosqueda from [address] [place] [address] in [facility] called in about orders that needed to be completed by a physician  She stated that the orders could not be sign by Dr Alphonso Granados PA  She is requesting a call back      She can be reached at [phone], you can also speak to mary lou(nurse)

## 2019-02-21 NOTE — LETTER
February 21, 2019     [address] [facility] [id number]      Dear Ms Valle: We have attempted to reach you regarding your results  We ask that you please contact our office upon receipt of this letter to receive your results  Thank you in advance for your cooperation and assistance          Sincerely,   Ofe Freeman [facility] Staff  [phone]

## 2019-02-22 ENCOUNTER — OFFICE VISIT (OUTPATIENT)
Dept: OBGYN CLINIC | Facility: CLINIC | Age: 69
End: 2019-02-22
Payer: MEDICARE

## 2019-02-22 VITALS
DIASTOLIC BLOOD PRESSURE: 73 MMHG | SYSTOLIC BLOOD PRESSURE: 118 MMHG | HEIGHT: 63 IN | HEART RATE: 80 BPM | BODY MASS INDEX: 20.38 KG/M2 | WEIGHT: 115 LBS

## 2019-02-22 DIAGNOSIS — M89.9 LYTIC BONE LESION OF FEMUR: Primary | ICD-10-CM

## 2019-02-22 PROCEDURE — 99213 OFFICE O/P EST LOW 20 MIN: CPT | Performed by: ORTHOPAEDIC SURGERY

## 2019-02-22 NOTE — PROGRESS NOTES
Patient Name:  Emily Ho  MRN:  [id number]    Assessment & Plan     Left distal femoral shaft lytic bone lesions  1  Patient continues to have minimal to no pain in her lower extremities  Again, explained to the patient that she may still require prophylactic fixation in the future, at this time a biopsy will be performed  2  If she begins to develop pain in her lower extremities she was instructed to contact office and schedule an immediate appointment because it may indicate significant compromise of the femoral cortex which can result in a femur fracture  3  She is to continue with home exercise program for strengthening a knee brace as needed for activity  4  She will follow up on an as-needed basis  Subjective     51-year-old female presents to the office today for follow-up visit for her left leg  Patient has known history of metastatic breast cancer  Patient notes that she continues to have minimal to no pain in her left lower extremity  She ambulates with a rolling walker for gait imbalances  General ROS:  Negative for fever or chills  Neurological ROS:  Negative for numbness or tingling  Objective     /73   Pulse 80   Ht 5' 3" (1 6 m)   Wt 52 2 kg (115 lb)   LMP 05/27/1996 (Approximate)   BMI 20 37 kg/m²     Left Leg:  Skin is intact  No gross deformity  No tenderness to palpation along the femur  ROM of hip and knee are full and equal to the contralateral side  Log Roll test is negative  Patient is neurovascular intact distally  2+ DP pulse        Scribe Attestation    I,:   Delonte Elizabeth am acting as a scribe while in the presence of the attending physician :        I,:   Francisco Javier Holland MD personally performed the services described in this documentation    as scribed in my presence :

## 2019-02-22 NOTE — TELEPHONE ENCOUNTER
Faxed over documents, contacted facility and informed them orders can be faxed over to our office for Dr nAne-Marie Jaramillo to cover until Dr Roland Morse returns to office

## 2019-02-25 ENCOUNTER — TELEPHONE (OUTPATIENT)
Dept: HEMATOLOGY ONCOLOGY | Facility: CLINIC | Age: 69
End: 2019-02-25

## 2019-02-25 ENCOUNTER — SOCIAL WORK (OUTPATIENT)
Dept: PALLIATIVE MEDICINE | Facility: CLINIC | Age: 69
End: 2019-02-25
Payer: MEDICARE

## 2019-02-25 ENCOUNTER — OFFICE VISIT (OUTPATIENT)
Dept: PALLIATIVE MEDICINE | Facility: CLINIC | Age: 69
End: 2019-02-25
Payer: MEDICARE

## 2019-02-25 VITALS
OXYGEN SATURATION: 99 % | BODY MASS INDEX: 19.56 KG/M2 | SYSTOLIC BLOOD PRESSURE: 110 MMHG | DIASTOLIC BLOOD PRESSURE: 72 MMHG | HEART RATE: 86 BPM | WEIGHT: 110.4 LBS | TEMPERATURE: 98.4 F | RESPIRATION RATE: 18 BRPM | HEIGHT: 63 IN

## 2019-02-25 DIAGNOSIS — C50.911 MALIGNANT NEOPLASM OF RIGHT FEMALE BREAST, UNSPECIFIED ESTROGEN RECEPTOR STATUS, UNSPECIFIED SITE OF BREAST (HCC): Chronic | ICD-10-CM

## 2019-02-25 DIAGNOSIS — C50.911 BREAST CANCER METASTASIZED TO BONE, RIGHT (HCC): Chronic | ICD-10-CM

## 2019-02-25 DIAGNOSIS — Z71.89 COUNSELING AND COORDINATION OF CARE: Primary | ICD-10-CM

## 2019-02-25 DIAGNOSIS — C79.51 BREAST CANCER METASTASIZED TO BONE, RIGHT (HCC): Chronic | ICD-10-CM

## 2019-02-25 DIAGNOSIS — C79.51 BONE METASTASIS (HCC): Primary | ICD-10-CM

## 2019-02-25 DIAGNOSIS — G89.3 CANCER ASSOCIATED PAIN: ICD-10-CM

## 2019-02-25 PROCEDURE — 99214 OFFICE O/P EST MOD 30 MIN: CPT | Performed by: NURSE PRACTITIONER

## 2019-02-25 RX ORDER — ONDANSETRON 8 MG/1
8 TABLET, ORALLY DISINTEGRATING ORAL EVERY 8 HOURS PRN
Start: 2019-02-25 | End: 2019-04-08 | Stop reason: ALTCHOICE

## 2019-02-25 RX ORDER — GABAPENTIN 100 MG/1
100 CAPSULE ORAL 2 TIMES DAILY
Qty: 60 CAPSULE | Refills: 1 | Status: SHIPPED | OUTPATIENT
Start: 2019-02-25 | End: 2019-04-15 | Stop reason: SDUPTHER

## 2019-02-25 RX ORDER — MORPHINE SULFATE 15 MG/1
TABLET ORAL
Qty: 75 TABLET | Refills: 0 | Status: SHIPPED | OUTPATIENT
Start: 2019-02-25 | End: 2019-04-01 | Stop reason: SDUPTHER

## 2019-02-25 NOTE — PROGRESS NOTES
Palliative and Supportive Care   Janet Steel 76 y o  female [id number]    Assessment/Plan:  1  Bone metastasis ([facility] [address] )    2  Malignant neoplasm of right female breast, unspecified estrogen receptor status, unspecified site of breast ([facility] )    3  Breast cancer metastasized to bone, right ([facility] )    4  Cancer associated pain        Requested Prescriptions     Signed Prescriptions Disp Refills    gabapentin (NEURONTIN) 100 mg capsule 60 capsule 1     Sig: Take 1 capsule (100 mg total) by mouth 2 (two) times a day    morphine (MSIR) 15 mg tablet 75 tablet 0     Sig: Use 1 tab BID scheduled to anticipate cancer pain  May have 1 tabs q4hrs PRN mod-severe pain   ondansetron (ZOFRAN-ODT) 8 mg disintegrating tablet       Sig: Take 1 tablet (8 mg total) by mouth every 8 (eight) hours as needed for nausea or vomiting       Diarrhea, n/v, gastritis  - nearly resolved  - has not needed antiemetics or antidiarrheals  - continue pantoprazole and sucralfate as prescribed by GI     Cancer pain  - continue morphine IR 15mg BID  - continue gabapentin  - PRN Tylenol     Anxiety  - continue lorazepam for anxiety and brachial plexopathy  - consider SSRI or other agent         Follow up in two months      Subjective    Chief Concern  Follow up visit for:  Symptom management         History of Present Illness  Patient ID: Janet Steel is a 76 y o  female with metastatic disease to bone, presumed to be from known history of breast cancer in 1996 (s/p mastectomy and chemo) and recurrence in 2004 (s/p radiation (?) + letrozole for 10 yr)  Biopsy not done  Patient is on Faslodex and recently started Augustus  There has been consideration for prophylactic fixation of femur for impending fracture with lytic bone lesions, however patient prefers to hold off on surgery at this time and will reconsider in a couple months       Pain of back is stable and well-controlled after receiving radiation and with morphine IR 15mg BID, gabapentin 100mg BID, and PRN Tylenol  Nausea, vomiting and diarrhea are nearly resolved and she has been feeling much better  Appetite is improved  Sleeping well  Takes lorazepam at bedtime for history of brachial plexopathy after cancer treatments in the past       She is a ;   about 3 5 yr ago from prostate cancer  She has two sons; one lives locally  She has supportive sisters and sisters-in-law  The following portions of the patient's history were reviewed and updated as appropriate: allergies, current medications, past family history, past medical history, past social history, past surgical history and problem list       Visit Information    Accompanied By: No one  Source of History: Self  History Limitations: None    ROS  Review of Systems   Constitutional: Negative  Gastrointestinal: Negative  Musculoskeletal: Positive for back pain  Psychiatric/Behavioral: Negative for sleep disturbance  The patient is nervous/anxious  Objective     Physical Exam   Constitutional: She is oriented to person, place, and time  She is cooperative  No distress  thin   Pulmonary/Chest: Effort normal    Neurological: She is alert and oriented to person, place, and time  Skin: Skin is warm and dry  Psychiatric: She has a normal mood and affect   Cognition and memory are normal          /72 (BP Location: Left arm, Patient Position: Sitting, Cuff Size: Standard)   Pulse 86   Temp 98 4 °F (36 9 °C) (Oral)   Resp 18   Ht 5' 3" (1 6 m)   Wt 50 1 kg (110 lb 6 4 oz)   LMP 1996 (Approximate)   SpO2 99%   BMI 19 56 kg/m²           Current Outpatient Medications:     acetaminophen (TYLENOL) 325 mg tablet, Take 2 tablets (650 mg total) by mouth 4 (four) times a day (with meals and at bedtime), Disp: 80 tablet, Rfl: 1    calcium carbonate (OS-CLARENCE) 1250 (500 Ca) MG chewable tablet, Chew 1 tablet (1,250 mg total) daily, Disp: 30 tablet, Rfl: 0    Cholecalciferol (VITAMIN D3) 2000 units TABS, Take 2,000 Units by mouth daily, Disp: , Rfl:     ferrous sulfate 325 (65 Fe) mg tablet, Take 1 tablet (325 mg total) by mouth daily with breakfast, Disp: 30 tablet, Rfl: 1    gabapentin (NEURONTIN) 100 mg capsule, Take 1 capsule (100 mg total) by mouth 2 (two) times a day, Disp: 60 capsule, Rfl: 1    lisinopril (ZESTRIL) 5 mg tablet, Take 5 mg by mouth daily  , Disp: , Rfl:     LORazepam (ATIVAN) 1 mg tablet, Take 2mg (2 tablets) qhs , Disp: 60 tablet, Rfl: 0    morphine (MSIR) 15 mg tablet, Use 1 tab BID scheduled to anticipate cancer pain    May have 1 tabs q4hrs PRN mod-severe pain , Disp: 75 tablet, Rfl: 0    MULTIPLE VITAMINS PO, Take by mouth, Disp: , Rfl:     Omega-3 Fatty Acids (FISH OIL PO), Take 2 g by mouth, Disp: , Rfl:     omeprazole (PriLOSEC) 40 MG capsule, Take 1 capsule (40 mg total) by mouth daily, Disp: 30 capsule, Rfl: 3    Palbociclib (IBRANCE) 125 MG, Take 1 capsule (125 mg total) by mouth daily For 21 days then 7 off, Disp: 21 capsule, Rfl: 5    potassium chloride (K-DUR,KLOR-CON) 20 mEq tablet, Take 1 tablet (20 mEq total) by mouth daily, Disp: 30 tablet, Rfl: 0    pravastatin (PRAVACHOL) 40 mg tablet, Take 40 mg by mouth daily  , Disp: , Rfl:     sucralfate (CARAFATE) 1 g/10 mL suspension, Take 10 mL (1 g total) by mouth 2 (two) times a day, Disp: 420 mL, Rfl: 2    loperamide (IMODIUM) 2 mg capsule, Take 1 capsule (2 mg total) by mouth 4 (four) times a day as needed for diarrhea (Patient not taking: Reported on 2/25/2019), Disp: , Rfl: 0    metoclopramide (REGLAN) 10 mg tablet, Take 1 tablet (10 mg total) by mouth every 6 (six) hours as needed (Nausea or Vomiting) (Patient not taking: Reported on 2/25/2019), Disp: 40 tablet, Rfl: 0    ondansetron (ZOFRAN-ODT) 8 mg disintegrating tablet, Take 1 tablet (8 mg total) by mouth every 8 (eight) hours as needed for nausea or vomiting, Disp: , Rfl:       CAITIE Cuenca  [facility] Palliative and Supportive Care          Representatives have queried the patient's controlled substance dispensing history in the Prescription Drug Monitoring Program in compliance with the [facility] regulations before I have prescribed any controlled substances  The prescription history is consistent with prescribed therapy and our practice policies

## 2019-02-25 NOTE — TELEPHONE ENCOUNTER
Spoke to pt  Pt aware of results  Pt also had labs drawn for Hep c and hiv back in Aug  Put will bring those results in with her to her appt in March

## 2019-02-25 NOTE — PROGRESS NOTES
Outpatient Palliative and Supportive Care Social Work Note:    GARY and CAITIE Burt met with patient in the office for an follow up appointment  Family dynamics and social history:   Marital status:  3 5 years ago   Children: 2 sons, one local and the other lives in the [facility] area    Name of children if any:     Other family information: Patients sister and SILs are very helpful and supportive   Living Will: No   POA medical: No    POA agent: NA   5 Wishes document:  Not offered at this time    history: NA   Employment history: Retired    Cultural information (if applicable): Important race, gender identification, cultural, or ethnicity areas to note:     Patient strengths, social supports, and resources:   - Positive spirit   - Identifies good supports from her sibling and SILs    Needs and areas of concern:   - Symptom maintenance    Environment concerns/barriers:   - Lives alone    Discussed   - New treatment that started   - Symptoms    - Feeling better than past months    - Appetite good, nausea decreased, brace helping with pain   - Continues to get hydration as needed   - Family supports   - Following thru with a mental health counseling    Patient appeared alert and oriented  Patient presented as pleasant and in good spirits  Patient is coping well at this time  Patient is utilizing positive coping skills, seeking out counseling for support, and relying on family  Patientt did express understanding of and agreement with our discussion  We will continue to follow  I will return to assist/ patient next visit      LUIS M De Oliveira, GARY

## 2019-02-25 NOTE — TELEPHONE ENCOUNTER
The patient called in about her forms([facility]) that she said she gave during her last visit, she said the forms were for Ibrance  She said the forms have the wrong address and needed to be corrected to [address] instead of the [address]       She can be reached at [phone]

## 2019-02-26 ENCOUNTER — TELEPHONE (OUTPATIENT)
Dept: HEMATOLOGY ONCOLOGY | Facility: CLINIC | Age: 69
End: 2019-02-26

## 2019-02-26 ENCOUNTER — TELEPHONE (OUTPATIENT)
Dept: GASTROENTEROLOGY | Facility: CLINIC | Age: 69
End: 2019-02-26

## 2019-02-26 DIAGNOSIS — K29.01 OTHER ACUTE GASTRITIS WITH HEMORRHAGE: Primary | ICD-10-CM

## 2019-02-26 DIAGNOSIS — E87.6 HYPOKALEMIA: ICD-10-CM

## 2019-02-26 DIAGNOSIS — E87.6 HYPOKALEMIA: Primary | ICD-10-CM

## 2019-02-26 RX ORDER — LAMOTRIGINE 25 MG/1
250 TABLET ORAL ONCE
Status: COMPLETED | OUTPATIENT
Start: 2019-02-27 | End: 2019-02-27

## 2019-02-26 RX ORDER — POTASSIUM CHLORIDE 20 MEQ/1
20 TABLET, EXTENDED RELEASE ORAL DAILY
Qty: 30 TABLET | Refills: 0 | Status: SHIPPED | OUTPATIENT
Start: 2019-02-26 | End: 2019-03-26 | Stop reason: SDUPTHER

## 2019-02-26 RX ORDER — SODIUM CHLORIDE 9 MG/ML
20 INJECTION, SOLUTION INTRAVENOUS CONTINUOUS
Status: DISCONTINUED | OUTPATIENT
Start: 2019-02-27 | End: 2019-02-28 | Stop reason: HOSPADM

## 2019-02-26 RX ORDER — POTASSIUM CHLORIDE 20 MEQ/1
20 TABLET, EXTENDED RELEASE ORAL DAILY
Qty: 30 TABLET | Refills: 0 | Status: CANCELLED | OUTPATIENT
Start: 2019-02-26

## 2019-02-26 RX ORDER — SUCRALFATE 1 G/1
1 TABLET ORAL
Qty: 60 TABLET | Refills: 3 | Status: SHIPPED | OUTPATIENT
Start: 2019-02-26 | End: 2019-06-27 | Stop reason: SDUPTHER

## 2019-02-26 NOTE — TELEPHONE ENCOUNTER
lvm notifying pt to call office back  Was calling to notify pt med will be 25$ to  if that would be okay

## 2019-02-26 NOTE — TELEPHONE ENCOUNTER
[facility] PATIENT      She would like a call back regarding her sucralfate   She had a question about the copay and if it needs an [facility]

## 2019-02-26 NOTE — TELEPHONE ENCOUNTER
Patient called in for a refill for her Klor-Con 20 MEQ to be sent to [facility] on [address]  [facility]        Patient can be reached at [phone]

## 2019-02-26 NOTE — TELEPHONE ENCOUNTER
She should call PCP or one of her other specialists  Was last prescribed upon discharge from hospital but we won't be managing it

## 2019-02-27 ENCOUNTER — HOSPITAL ENCOUNTER (OUTPATIENT)
Dept: INFUSION CENTER | Facility: CLINIC | Age: 69
Discharge: HOME | End: 2019-02-27
Payer: MEDICARE

## 2019-02-27 VITALS
SYSTOLIC BLOOD PRESSURE: 112 MMHG | HEART RATE: 71 BPM | TEMPERATURE: 98.2 F | DIASTOLIC BLOOD PRESSURE: 64 MMHG | RESPIRATION RATE: 18 BRPM

## 2019-02-27 LAB
ALBUMIN SERPL BCP-MCNC: 2.9 G/DL (ref 3.5–5)
ALP SERPL-CCNC: 129 U/L (ref 46–116)
ALT SERPL W P-5'-P-CCNC: 25 U/L (ref 12–78)
ANION GAP SERPL CALCULATED.3IONS-SCNC: 9 MMOL/L (ref 4–13)
AST SERPL W P-5'-P-CCNC: 21 U/L (ref 5–45)
BASOPHILS # BLD AUTO: 0.02 THOUSANDS/ΜL (ref 0–0.1)
BASOPHILS NFR BLD AUTO: 1 % (ref 0–1)
BILIRUB SERPL-MCNC: 0.7 MG/DL (ref 0.2–1)
BUN SERPL-MCNC: 13 MG/DL (ref 5–25)
CALCIUM SERPL-MCNC: 7.8 MG/DL (ref 8.3–10.1)
CHLORIDE SERPL-SCNC: 108 MMOL/L (ref 100–108)
CO2 SERPL-SCNC: 26 MMOL/L (ref 21–32)
CREAT SERPL-MCNC: 0.53 MG/DL (ref 0.6–1.3)
EOSINOPHIL # BLD AUTO: 0.06 THOUSAND/ΜL (ref 0–0.61)
EOSINOPHIL NFR BLD AUTO: 3 % (ref 0–6)
ERYTHROCYTE [DISTWIDTH] IN BLOOD BY AUTOMATED COUNT: 18.3 % (ref 11.6–15.1)
GFR SERPL CREATININE-BSD FRML MDRD: 98 ML/MIN/1.73SQ M
GLUCOSE SERPL-MCNC: 110 MG/DL (ref 65–140)
HCT VFR BLD AUTO: 33.9 % (ref 34.8–46.1)
HGB BLD-MCNC: 10.5 G/DL (ref 11.5–15.4)
IMM GRANULOCYTES # BLD AUTO: 0.01 THOUSAND/UL (ref 0–0.2)
IMM GRANULOCYTES NFR BLD AUTO: 0 % (ref 0–2)
LYMPHOCYTES # BLD AUTO: 0.26 THOUSANDS/ΜL (ref 0.6–4.47)
LYMPHOCYTES NFR BLD AUTO: 12 % (ref 14–44)
MAGNESIUM SERPL-MCNC: 1.9 MG/DL (ref 1.6–2.6)
MCH RBC QN AUTO: 29 PG (ref 26.8–34.3)
MCHC RBC AUTO-ENTMCNC: 31 G/DL (ref 31.4–37.4)
MCV RBC AUTO: 94 FL (ref 82–98)
MONOCYTES # BLD AUTO: 0.11 THOUSAND/ΜL (ref 0.17–1.22)
MONOCYTES NFR BLD AUTO: 5 % (ref 4–12)
NEUTROPHILS # BLD AUTO: 1.81 THOUSANDS/ΜL (ref 1.85–7.62)
NEUTS SEG NFR BLD AUTO: 79 % (ref 43–75)
NRBC BLD AUTO-RTO: 0 /100 WBCS
PLATELET # BLD AUTO: 258 THOUSANDS/UL (ref 149–390)
PMV BLD AUTO: 9.3 FL (ref 8.9–12.7)
POTASSIUM SERPL-SCNC: 3.8 MMOL/L (ref 3.5–5.3)
PROT SERPL-MCNC: 5.6 G/DL (ref 6.4–8.2)
RBC # BLD AUTO: 3.62 MILLION/UL (ref 3.81–5.12)
SODIUM SERPL-SCNC: 143 MMOL/L (ref 136–145)
WBC # BLD AUTO: 2.27 THOUSAND/UL (ref 4.31–10.16)

## 2019-02-27 PROCEDURE — 96366 THER/PROPH/DIAG IV INF ADDON: CPT

## 2019-02-27 PROCEDURE — 96402 CHEMO HORMON ANTINEOPL SQ/IM: CPT

## 2019-02-27 PROCEDURE — 83735 ASSAY OF MAGNESIUM: CPT | Performed by: INTERNAL MEDICINE

## 2019-02-27 PROCEDURE — 85025 COMPLETE CBC W/AUTO DIFF WBC: CPT | Performed by: INTERNAL MEDICINE

## 2019-02-27 PROCEDURE — 80053 COMPREHEN METABOLIC PANEL: CPT | Performed by: INTERNAL MEDICINE

## 2019-02-27 PROCEDURE — 96365 THER/PROPH/DIAG IV INF INIT: CPT

## 2019-02-27 RX ADMIN — FULVESTRANT 250 MG: 50 INJECTION INTRAMUSCULAR at 08:25

## 2019-02-27 RX ADMIN — FULVESTRANT 250 MG: 50 INJECTION INTRAMUSCULAR at 08:26

## 2019-02-27 RX ADMIN — POTASSIUM CHLORIDE: 2 INJECTION, SOLUTION, CONCENTRATE INTRAVENOUS at 08:27

## 2019-02-27 RX ADMIN — SODIUM CHLORIDE 20 ML/HR: 0.9 INJECTION, SOLUTION INTRAVENOUS at 08:10

## 2019-02-27 NOTE — PROGRESS NOTES
Pt  tolerated treatment without incident  Perameters not met for Xgeva today  AVS declined  Next appt  confirmed

## 2019-02-28 RX ORDER — SODIUM CHLORIDE 9 MG/ML
20 INJECTION, SOLUTION INTRAVENOUS CONTINUOUS
Status: DISCONTINUED | OUTPATIENT
Start: 2019-03-04 | End: 2019-03-05 | Stop reason: HOSPADM

## 2019-03-04 ENCOUNTER — HOSPITAL ENCOUNTER (OUTPATIENT)
Dept: INFUSION CENTER | Facility: CLINIC | Age: 69
Discharge: HOME | End: 2019-03-04
Payer: MEDICARE

## 2019-03-04 VITALS
HEART RATE: 70 BPM | SYSTOLIC BLOOD PRESSURE: 140 MMHG | RESPIRATION RATE: 20 BRPM | TEMPERATURE: 97.7 F | DIASTOLIC BLOOD PRESSURE: 66 MMHG

## 2019-03-04 DIAGNOSIS — E88.09 HYPOALBUMINEMIA: ICD-10-CM

## 2019-03-04 DIAGNOSIS — E87.6 HYPOKALEMIA: ICD-10-CM

## 2019-03-04 DIAGNOSIS — M48.54XA: ICD-10-CM

## 2019-03-04 DIAGNOSIS — D70.8 OTHER NEUTROPENIA (HCC): ICD-10-CM

## 2019-03-04 DIAGNOSIS — F41.9 ANXIETY: ICD-10-CM

## 2019-03-04 DIAGNOSIS — D70.9 NEUTROPENIA, UNSPECIFIED TYPE (HCC): ICD-10-CM

## 2019-03-04 DIAGNOSIS — C50.911 BREAST CANCER METASTASIZED TO BONE, RIGHT (HCC): Chronic | ICD-10-CM

## 2019-03-04 DIAGNOSIS — C79.51 BREAST CANCER METASTASIZED TO BONE, RIGHT (HCC): Chronic | ICD-10-CM

## 2019-03-04 DIAGNOSIS — E83.51 HYPOCALCEMIA: ICD-10-CM

## 2019-03-04 DIAGNOSIS — R19.7 DIARRHEA, UNSPECIFIED TYPE: ICD-10-CM

## 2019-03-04 LAB
ALBUMIN SERPL BCP-MCNC: 3.2 G/DL (ref 3.5–5)
ALP SERPL-CCNC: 114 U/L (ref 46–116)
ALT SERPL W P-5'-P-CCNC: 27 U/L (ref 12–78)
ANION GAP SERPL CALCULATED.3IONS-SCNC: 11 MMOL/L (ref 4–13)
ANISOCYTOSIS BLD QL SMEAR: PRESENT
AST SERPL W P-5'-P-CCNC: 25 U/L (ref 5–45)
BASOPHILS # BLD MANUAL: 0.01 THOUSAND/UL (ref 0–0.1)
BASOPHILS NFR MAR MANUAL: 1 % (ref 0–1)
BILIRUB SERPL-MCNC: 0.4 MG/DL (ref 0.2–1)
BUN SERPL-MCNC: 14 MG/DL (ref 5–25)
CALCIUM SERPL-MCNC: 8.6 MG/DL (ref 8.3–10.1)
CHLORIDE SERPL-SCNC: 105 MMOL/L (ref 100–108)
CO2 SERPL-SCNC: 26 MMOL/L (ref 21–32)
CREAT SERPL-MCNC: 0.52 MG/DL (ref 0.6–1.3)
EOSINOPHIL # BLD MANUAL: 0.01 THOUSAND/UL (ref 0–0.4)
EOSINOPHIL NFR BLD MANUAL: 1 % (ref 0–6)
ERYTHROCYTE [DISTWIDTH] IN BLOOD BY AUTOMATED COUNT: 17.3 % (ref 11.6–15.1)
GFR SERPL CREATININE-BSD FRML MDRD: 98 ML/MIN/1.73SQ M
GLUCOSE SERPL-MCNC: 94 MG/DL (ref 65–140)
HCT VFR BLD AUTO: 32.9 % (ref 34.8–46.1)
HGB BLD-MCNC: 10.6 G/DL (ref 11.5–15.4)
LG PLATELETS BLD QL SMEAR: PRESENT
LYMPHOCYTES # BLD AUTO: 0.19 THOUSAND/UL (ref 0.6–4.47)
LYMPHOCYTES # BLD AUTO: 37 % (ref 14–44)
MAGNESIUM SERPL-MCNC: 1.8 MG/DL (ref 1.6–2.6)
MCH RBC QN AUTO: 29.6 PG (ref 26.8–34.3)
MCHC RBC AUTO-ENTMCNC: 32.2 G/DL (ref 31.4–37.4)
MCV RBC AUTO: 92 FL (ref 82–98)
MONOCYTES # BLD AUTO: 0.09 THOUSAND/UL (ref 0–1.22)
MONOCYTES NFR BLD: 17 % (ref 4–12)
NEUTROPHILS # BLD MANUAL: 0.21 THOUSAND/UL (ref 1.85–7.62)
NEUTS SEG NFR BLD AUTO: 41 % (ref 43–75)
NRBC BLD AUTO-RTO: 0 /100 WBCS
OVALOCYTES BLD QL SMEAR: PRESENT
PLATELET # BLD AUTO: 179 THOUSANDS/UL (ref 149–390)
PLATELET BLD QL SMEAR: ADEQUATE
PMV BLD AUTO: 9.8 FL (ref 8.9–12.7)
POLYCHROMASIA BLD QL SMEAR: PRESENT
POTASSIUM SERPL-SCNC: 4 MMOL/L (ref 3.5–5.3)
PROT SERPL-MCNC: 6.2 G/DL (ref 6.4–8.2)
RBC # BLD AUTO: 3.58 MILLION/UL (ref 3.81–5.12)
SODIUM SERPL-SCNC: 142 MMOL/L (ref 136–145)
TOTAL CELLS COUNTED SPEC: 100
VARIANT LYMPHS # BLD AUTO: 3 %
WBC # BLD AUTO: 0.5 THOUSAND/UL (ref 4.31–10.16)

## 2019-03-04 PROCEDURE — 96366 THER/PROPH/DIAG IV INF ADDON: CPT

## 2019-03-04 PROCEDURE — 80053 COMPREHEN METABOLIC PANEL: CPT

## 2019-03-04 PROCEDURE — 85027 COMPLETE CBC AUTOMATED: CPT

## 2019-03-04 PROCEDURE — 96365 THER/PROPH/DIAG IV INF INIT: CPT

## 2019-03-04 PROCEDURE — 83735 ASSAY OF MAGNESIUM: CPT

## 2019-03-04 PROCEDURE — 85007 BL SMEAR W/DIFF WBC COUNT: CPT

## 2019-03-04 RX ORDER — LORAZEPAM 1 MG/1
TABLET ORAL
Qty: 60 TABLET | Refills: 0 | Status: SHIPPED | OUTPATIENT
Start: 2019-03-04 | End: 2019-04-01 | Stop reason: SDUPTHER

## 2019-03-04 RX ADMIN — SODIUM CHLORIDE 20 ML/HR: 0.9 INJECTION, SOLUTION INTRAVENOUS at 13:10

## 2019-03-04 RX ADMIN — POTASSIUM CHLORIDE: 2 INJECTION, SOLUTION, CONCENTRATE INTRAVENOUS at 13:12

## 2019-03-04 NOTE — PROGRESS NOTES
Nurse spoke with Jaden Pulliam Pa-C & she is aware of pts  Wbc of 0 50 & ANC of 0 21 & Devyn stated any labs done within D1 & D15 of Ibrance will throw off counts & by D15 counts should be recovered  Nurse informed the pt  Of the same, pt  Is aware & verbalized understanding

## 2019-03-04 NOTE — PROGRESS NOTES
Nurse spoke with Johana Dutta RN & informed her of pts  CL wbc of 0 50 & ANC of 0 21 & nurse reviewed with pt  Neutropenic precautions  Pt  Is aware & verbalized understanding  Irma Walker stated she would discuss the same with the Lyndon Miguel and call nurse back

## 2019-03-06 ENCOUNTER — TELEPHONE (OUTPATIENT)
Dept: HEMATOLOGY ONCOLOGY | Facility: CLINIC | Age: 69
End: 2019-03-06

## 2019-03-06 ENCOUNTER — OFFICE VISIT (OUTPATIENT)
Dept: GASTROENTEROLOGY | Facility: AMBULARY SURGERY CENTER | Age: 69
End: 2019-03-06
Payer: MEDICARE

## 2019-03-06 VITALS
BODY MASS INDEX: 20.41 KG/M2 | HEART RATE: 76 BPM | WEIGHT: 115.2 LBS | SYSTOLIC BLOOD PRESSURE: 132 MMHG | HEIGHT: 63 IN | DIASTOLIC BLOOD PRESSURE: 66 MMHG | TEMPERATURE: 98.9 F | RESPIRATION RATE: 14 BRPM

## 2019-03-06 DIAGNOSIS — R19.5 FECAL OCCULT BLOOD TEST POSITIVE: ICD-10-CM

## 2019-03-06 DIAGNOSIS — K29.00 ACUTE SUPERFICIAL GASTRITIS WITHOUT HEMORRHAGE: Primary | ICD-10-CM

## 2019-03-06 DIAGNOSIS — Z12.11 COLON CANCER SCREENING: ICD-10-CM

## 2019-03-06 DIAGNOSIS — K31.A0 INTESTINAL METAPLASIA OF GASTRIC MUCOSA: ICD-10-CM

## 2019-03-06 PROCEDURE — 99214 OFFICE O/P EST MOD 30 MIN: CPT | Performed by: PHYSICIAN ASSISTANT

## 2019-03-06 NOTE — TELEPHONE ENCOUNTER
Azael Yeung called stating that she is on her second week of Ibrance and wants to know when she will get the mail delivery of the refill sent to her  Please review

## 2019-03-06 NOTE — PROGRESS NOTES
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: possibly related to radiation which has been completed  Symptoms much improved compared to previous  No further vomiting or abdominal pain  Appetite improved  -Continue PPI daily  -Continue Carafate once daily  -Non-ulcerogenic diet  -Repeat EGD in 6 months due to intestinal metaplasia    #2  Colon cancer screening: last colonoscopy 10 years ago, without polyps  -Plan for colonoscopy now that N/V resolved  However, now patient is neutropenia with ANC of 210 due to hormone therapy  SHe typically does 3 weeks on, 2 week off  SHe sees her oncologist on 3/26  Will discuss with them  Pending improvement with labs, may be able to plan for next month on her week off hormone therapy  -Will provide suprep sample and instructions for patient today  -She will call back after oncology appt at the end of this month to schedule  #2  FOBT positive: possibly related to severe gastritis but rule out colorectal lesions  -Colonoscopy as above  --------------------------------------------------------------------------------------------------------------------    Chief Complaint:  Follow-up gastritis, positive fecal occult blood test, colon cancer screening    HPI: Juan Carlos Machado is a 76 y o  female who presents today for follow up for gastritis, positive fecal occult blood test, colon cancer screening  The patient is currently on hormone therapy secondary to bony metastasis from breast cancer  She had gone through radiation previously which had prompted most of her upper GI symptoms and diarrhea  She is off the radiation now on reports much improved GI symptoms  She is eating well  She denies any further nausea or vomiting  She denies any abdominal pain  Her diarrhea has resolved  She denies any blood in the stool or black tarry stool  Unfortunately at this time, the patient is neutropenic on the hormone therapy  Her ANC is only 210  She is due to see her oncologist at the end of this month on the 26th  Patient denies any  Dysphagia  Patient's last colonoscopy was 10 years ago  The patient denies any polyps  Patient's last endoscopy was in January 2019  She was noted to have severe gastritis with some inflammatory polyps and intestinal metaplasia        Review of Systems:   General: negative for fever, night sweats, +unexpected weight loss  Psychological: negative for anxiety or depression  Ophthalmic: negative for blurry vision or scleral icterus  ENT: negative for headaches, oral lesions, sore throat, vocal changes or dysphagia  Hematological and Lymphatic: negative for pallor or swollen lymph nodes  Respiratory: negative for cough, shortness of breath or wheezing  Cardiovascular: negative for chest pain, edema or murmur  Gastrointestinal: as mentioned in HPI  Genito-Urinary: negative for dysuria or incontinence  Musculoskeletal: negative for joint pain, joint stiffness or joint swelling; bone pain  Dermatological: negative for pruritus, rash, or jaundice    Current Medications  Current Outpatient Medications   Medication Sig Dispense Refill    acetaminophen (TYLENOL) 325 mg tablet Take 2 tablets (650 mg total) by mouth 4 (four) times a day (with meals and at bedtime) 80 tablet 1    calcium carbonate (OS-CLARENCE) 1250 (500 Ca) MG chewable tablet Chew 1 tablet (1,250 mg total) daily 30 tablet 0    Cholecalciferol (VITAMIN D3) 2000 units TABS Take 2,000 Units by mouth daily      ferrous sulfate 325 (65 Fe) mg tablet Take 1 tablet (325 mg total) by mouth daily with breakfast 30 tablet 1    gabapentin (NEURONTIN) 100 mg capsule Take 1 capsule (100 mg total) by mouth 2 (two) times a day 60 capsule 1    lisinopril (ZESTRIL) 5 mg tablet Take 5 mg by mouth daily        LORazepam (ATIVAN) 1 mg tablet Take 2mg (2 tablets) qhs  60 tablet 0    morphine (MSIR) 15 mg tablet Use 1 tab BID scheduled to anticipate cancer pain  May have 1 tabs q4hrs PRN mod-severe pain  75 tablet 0    MULTIPLE VITAMINS PO Take by mouth      Omega-3 Fatty Acids (FISH OIL PO) Take 2 g by mouth      omeprazole (PriLOSEC) 40 MG capsule Take 1 capsule (40 mg total) by mouth daily 30 capsule 3    Palbociclib (IBRANCE) 125 MG Take 1 capsule (125 mg total) by mouth daily For 21 days then 7 off 21 capsule 5    potassium chloride (K-DUR,KLOR-CON) 20 mEq tablet Take 1 tablet (20 mEq total) by mouth daily 30 tablet 0    sucralfate (CARAFATE) 1 g tablet Take 1 tablet (1 g total) by mouth 2 (two) times a day before meals 60 tablet 3    loperamide (IMODIUM) 2 mg capsule Take 1 capsule (2 mg total) by mouth 4 (four) times a day as needed for diarrhea (Patient not taking: Reported on 2/25/2019)  0    metoclopramide (REGLAN) 10 mg tablet Take 1 tablet (10 mg total) by mouth every 6 (six) hours as needed (Nausea or Vomiting) (Patient not taking: Reported on 2/25/2019) 40 tablet 0    ondansetron (ZOFRAN-ODT) 8 mg disintegrating tablet Take 1 tablet (8 mg total) by mouth every 8 (eight) hours as needed for nausea or vomiting (Patient not taking: Reported on 3/6/2019)      pravastatin (PRAVACHOL) 40 mg tablet Take 40 mg by mouth daily         No current facility-administered medications for this visit  Past Medical History  Past Medical History:   Diagnosis Date    Cancer [facility])     breast 1996    Cancer ([address] )     bone      Compression fracture of thoracic vertebra (HCC)     Diabetes mellitus ([address] )     History of therapeutic radiation     Tendonitis        Past Surgical History  Past Surgical History:   Procedure Laterality Date    CHOLECYSTECTOMY      MASTECTOMY      Right side reconstruction    NECK SURGERY      NV ESOPHAGOGASTRODUODENOSCOPY TRANSORAL DIAGNOSTIC N/A 1/24/2019    Procedure: ESOPHAGOGASTRODUODENOSCOPY (EGD); Surgeon: Daisy Puentes MD;  Location[facility];   Service: Gastroenterology    TONSILLECTOMY         Past Social History Social History     Socioeconomic History    Marital status:       Spouse name: None    Number of children: None    Years of education: None    Highest education level: None   Occupational History    None   Social Needs    Financial resource strain: None    Food insecurity:     Worry: None     Inability: None    Transportation needs:     Medical: None     Non-medical: None   Tobacco Use    Smoking status: Never Smoker    Smokeless tobacco: Never Used   Substance and Sexual Activity    Alcohol use: No    Drug use: No    Sexual activity: None   Lifestyle    Physical activity:     Days per week: None     Minutes per session: None    Stress: None   Relationships    Social connections:     Talks on phone: None     Gets together: None     Attends [employer] service: None     Active member of club or organization: None     Attends meetings of clubs or organizations: None     Relationship status: None    Intimate partner violence:     Fear of current or ex partner: None     Emotionally abused: None     Physically abused: None     Forced sexual activity: None   Other Topics Concern    None   Social History Narrative    None       The following portions of the patient's history were reviewed and updated as appropriate: allergies, current medications, past family history, past medical history, past social history, past surgical history and problem list     Vital Signs  Vitals:    03/06/19 1301   BP: 132/66   BP Location: Left arm   Patient Position: Sitting   Cuff Size: Standard   Pulse: 76   Resp: 14   Temp: 98 9 °F (37 2 °C)   TempSrc: Tympanic   Weight: 52 3 kg (115 lb 3 2 oz)   Height: 5' 3" (1 6 m)       Physical Exam:  General appearance: alert, cooperative, no distress  HEENT: normocephalic, anicteric, no eye erythema or discharge, no oropharyngeal thrush  Neck: supple, trachea midline, no adenopathy  Lungs: CTA b/l, no rales, rhonchi, or wheezing, unlabored respirations  Heart: RRR, no murmur, rubs, or gallops  Abdomen: soft, non-tender, non-distended, normal bowel sounds, no masses or organomegaly  Rectal: deferred  Extremities: no cyanosis, clubbing, or edema  Musculoskeletal: using walker, back brace present  Skin: color and texture normal, no jaundice, no rashes or lesions  Psychiatric: alert and oriented, normal affect and behavior

## 2019-03-06 NOTE — TELEPHONE ENCOUNTER
Spoke to patient about her delivery of [facility], the speciality pharmacy will call her to set up her delivery closer to her due date  Also request sent to Deonte Quinonez regarding lucia from [employer]

## 2019-03-08 RX ORDER — SODIUM CHLORIDE 9 MG/ML
20 INJECTION, SOLUTION INTRAVENOUS CONTINUOUS
Status: DISCONTINUED | OUTPATIENT
Start: 2019-03-11 | End: 2019-03-14 | Stop reason: HOSPADM

## 2019-03-11 ENCOUNTER — TELEPHONE (OUTPATIENT)
Dept: HEMATOLOGY ONCOLOGY | Facility: CLINIC | Age: 69
End: 2019-03-11

## 2019-03-11 ENCOUNTER — HOSPITAL ENCOUNTER (OUTPATIENT)
Dept: INFUSION CENTER | Facility: CLINIC | Age: 69
Discharge: HOME | End: 2019-03-11
Payer: MEDICARE

## 2019-03-11 ENCOUNTER — HOSPITAL ENCOUNTER (OUTPATIENT)
Dept: RADIOLOGY | Facility: HOSPITAL | Age: 69
Discharge: HOME | End: 2019-03-11
Payer: MEDICARE

## 2019-03-11 VITALS
DIASTOLIC BLOOD PRESSURE: 68 MMHG | SYSTOLIC BLOOD PRESSURE: 113 MMHG | HEART RATE: 73 BPM | RESPIRATION RATE: 20 BRPM | TEMPERATURE: 98.4 F

## 2019-03-11 DIAGNOSIS — M48.54XA: ICD-10-CM

## 2019-03-11 LAB
ANISOCYTOSIS BLD QL SMEAR: PRESENT
BASOPHILS # BLD MANUAL: 0 THOUSAND/UL (ref 0–0.1)
BASOPHILS NFR MAR MANUAL: 0 % (ref 0–1)
EOSINOPHIL # BLD MANUAL: 0 THOUSAND/UL (ref 0–0.4)
EOSINOPHIL NFR BLD MANUAL: 0 % (ref 0–6)
ERYTHROCYTE [DISTWIDTH] IN BLOOD BY AUTOMATED COUNT: 17.4 % (ref 11.6–15.1)
HCT VFR BLD AUTO: 34.1 % (ref 34.8–46.1)
HGB BLD-MCNC: 10.6 G/DL (ref 11.5–15.4)
LYMPHOCYTES # BLD AUTO: 0.12 THOUSAND/UL (ref 0.6–4.47)
LYMPHOCYTES # BLD AUTO: 39 % (ref 14–44)
MACROCYTES BLD QL AUTO: PRESENT
MCH RBC QN AUTO: 29.7 PG (ref 26.8–34.3)
MCHC RBC AUTO-ENTMCNC: 31.1 G/DL (ref 31.4–37.4)
MCV RBC AUTO: 96 FL (ref 82–98)
MONOCYTES # BLD AUTO: 0.11 THOUSAND/UL (ref 0–1.22)
MONOCYTES NFR BLD: 38 % (ref 4–12)
NEUTROPHILS # BLD MANUAL: 0.06 THOUSAND/UL (ref 1.85–7.62)
NEUTS BAND NFR BLD MANUAL: 2 % (ref 0–8)
NEUTS SEG NFR BLD AUTO: 19 % (ref 43–75)
NRBC BLD AUTO-RTO: 0 /100 WBCS
OVALOCYTES BLD QL SMEAR: PRESENT
PLATELET # BLD AUTO: 128 THOUSANDS/UL (ref 149–390)
PLATELET BLD QL SMEAR: ADEQUATE
PMV BLD AUTO: 10.7 FL (ref 8.9–12.7)
RBC # BLD AUTO: 3.57 MILLION/UL (ref 3.81–5.12)
TOTAL CELLS COUNTED SPEC: 42
VARIANT LYMPHS # BLD AUTO: 2 %
WBC # BLD AUTO: 0.3 THOUSAND/UL (ref 4.31–10.16)

## 2019-03-11 PROCEDURE — 96366 THER/PROPH/DIAG IV INF ADDON: CPT

## 2019-03-11 PROCEDURE — 96365 THER/PROPH/DIAG IV INF INIT: CPT

## 2019-03-11 PROCEDURE — 72080 X-RAY EXAM THORACOLMB 2/> VW: CPT

## 2019-03-11 PROCEDURE — 85007 BL SMEAR W/DIFF WBC COUNT: CPT | Performed by: INTERNAL MEDICINE

## 2019-03-11 PROCEDURE — 85027 COMPLETE CBC AUTOMATED: CPT | Performed by: INTERNAL MEDICINE

## 2019-03-11 RX ADMIN — POTASSIUM CHLORIDE: 2 INJECTION, SOLUTION, CONCENTRATE INTRAVENOUS at 11:04

## 2019-03-11 RX ADMIN — SODIUM CHLORIDE 20 ML/HR: 0.9 INJECTION, SOLUTION INTRAVENOUS at 10:55

## 2019-03-11 NOTE — TELEPHONE ENCOUNTER
Patient at infusion center at [facility] and call came from oncology nurse with very low WBC count and she will inform the patient to stop Ibrance until further notice and give her instructions about febrile neutropenia  Patient is there receiving intravenous hydration with potassium and magnesium  Patient had blood drawn for CMP and magnesium but blood clotted and that test will not be done until next time

## 2019-03-11 NOTE — PROGRESS NOTES
Nurse spoke with  Dr Desean Tabares & informed him of pts  WBC of 0 30 & ANC of 0 60  Nurse also informed him that pts  CMP/MAG hemolyzed & pt  Has had 1 liter of nss with K+/mag in it & it's been  running for approx  1 1/2 hours now  Dr Desean Tabares instructed nurse to instruct pt  To stop taking Ibrance until further notice & to review neutropenic precautions with pt  & nurse does not need to redraw CMP/MAG and those labs can wait until next time  Nurse informed & instructed the pt  Of the same  Nurse reviewed neutropenic precautions with pt  As well, pt  Is agreeable & verbalized understanding

## 2019-03-13 ENCOUNTER — OFFICE VISIT (OUTPATIENT)
Dept: NEUROSURGERY | Facility: CLINIC | Age: 69
End: 2019-03-13
Payer: MEDICARE

## 2019-03-13 VITALS
RESPIRATION RATE: 16 BRPM | HEIGHT: 63 IN | SYSTOLIC BLOOD PRESSURE: 122 MMHG | HEART RATE: 78 BPM | BODY MASS INDEX: 19.84 KG/M2 | DIASTOLIC BLOOD PRESSURE: 78 MMHG | TEMPERATURE: 97.9 F | WEIGHT: 112 LBS

## 2019-03-13 DIAGNOSIS — M48.54XD NON-TRAUMATIC COMPRESSION FRACTURE OF T12 THORACIC VERTEBRA WITH ROUTINE HEALING, SUBSEQUENT ENCOUNTER: ICD-10-CM

## 2019-03-13 PROCEDURE — 99213 OFFICE O/P EST LOW 20 MIN: CPT | Performed by: PHYSICIAN ASSISTANT

## 2019-03-13 NOTE — LETTER
March 13, 2019     Amada Montiel MD  [address] [address]    Patient: Christine Lucio   YOB: 1950   Date of Visit: 3/13/2019       Dear Dr Lisa Fuentes:    Thank you for referring Chriss Rodarte to me for evaluation  Below are my notes for this consultation  If you have questions, please do not hesitate to call me  I look forward to following your patient along with you  Sincerely,        Karla King PA-C        CC: Carleen Lombard, PA-C Irean Duke, MD Nolia Calix, MD Luevenia Pine, PA-C  3/13/2019  2:35 PM  Sign at close encounter  Assessment/Plan:    Very pleasant 77-year-old female, returns for hospital follow-up, history of pathologic fracture T12, noted 12/21/18  She reports this was associated with a fall, and had ER visit [facility] 12/21/18 relative this issue  She did go home ultimately but came back on 12/22/18 and had inpatient stay through 12/24/18 for pain control  She has a history of metastatic breast cancer over a 10 year period, and follows with Dr Syed Hendrickson ([facility]), as well as Dr Marjorie Gu   She has completed a course of radiation therapy, 10 visits, completed on 1/9/19  She arrives with a TLSO brace appropriately applied in place, when questioned she reports she has been to decrease the amount of time wearing this over the last few weeks  She reports she has no low/mid back pain at this time  She does admit to being fatigue after certain activities but not necessarily associated with back pain  She has had updated plain films of the thoracic spine, 3/11/19  The studies were carefully reviewed in detail and compared with prior study 12/24/18 and CT chest abdomen pelvis 12/21/18, there has been minimal additional collapse from the original study in December otherwise overall alignment remains appropriate      She has also had bone scan 1/14/19, this study revealed multiple areas of osseous metastasis, including cervical spine, thoracic spine and lumbar spine as well as pelvis and sacrum  There are findings also in the left hip, both femoral shafts left greater than right and she has seen orthopedics relative these, pinning was offer however she refused at this time  On examination today motor exam of the lower extremities is 5 x 5 for power, there is no pain with palpation or percussion over the thoracolumbar spine, and no pain is noted with range of motion  At this juncture continue to wean from the TLSO brace  Gradually increase activities as tolerated over the next several weeks  Further follow-up with Neurosurgery will be on an as-needed basis  Should there be exacerbation of pain or any new findings on MRI thoracic, and lumbar spine return to Neurosurgery for reassessment  Once again kyphoplasty is a consideration should there be new symptoms or changes as discussed by Dr Patricia Gao in the past     These findings, impressions and recommendations are reviewed in great detail with the patient, she expressed understanding and agreement, her questions were answered completely and to her satisfaction  Diagnoses and all orders for this visit:    Non-traumatic compression fracture of T12 thoracic vertebra with routine healing, subsequent encounter  -     Ambulatory referral to Neurosurgery          Return if symptoms worsen or fail to improve  Subjective:      Patient ID: Emily Ho is a 76 y o  female  HPI    The following portions of the patient's history were reviewed and updated as appropriate: allergies, current medications, past family history, past medical history, past social history and past surgical history  Review of Systems   Constitutional: Negative  HENT: Negative  Eyes: Negative  Respiratory: Negative  Cardiovascular: Negative  Gastrointestinal: Negative  Endocrine: Negative  Musculoskeletal: Negative  Skin: Negative  Allergic/Immunologic: Negative  Neurological: Negative  Hematological: Negative  Psychiatric/Behavioral: Negative  All other systems reviewed and are negative  Objective:    Physical Exam   Constitutional: She is oriented to person, place, and time  She appears well-developed and well-nourished  HENT:   Head: Normocephalic and atraumatic  Eyes: Pupils are equal, round, and reactive to light  EOM are normal    Cardiovascular: Normal rate  Pulmonary/Chest: Effort normal and breath sounds normal    Neurological: She is alert and oriented to person, place, and time  Skin: Skin is warm and dry  Psychiatric: She has a normal mood and affect  Neurologic Exam     Mental Status   Oriented to person, place, and time  Cranial Nerves     CN III, IV, VI   Pupils are equal, round, and reactive to light  Extraocular motions are normal           THORACOLUMBAR SPINE   3/11/19     INDICATION:   M48 54XA: Collapsed vertebra, not elsewhere classified, thoracic region, initial encounter for fracture      COMPARISON: X-ray 12/24/18 and MRI 12/23/18      VIEWS:  XR SPINE THORACOLUMBAR 2 VW        FINDINGS:     Again seen is a T12 compression fracture, no significant change in vertebral body height or alignment     Age related degenerative changes are seen       There is no displacement of the paraspinal line       The pedicles appear intact      IMPRESSION:     1  Unchanged appearance of T12 compression deformity    2   Metastases are better seen on prior MRI

## 2019-03-13 NOTE — PATIENT INSTRUCTIONS
Gradually wean from the brace as tolerated over the next 7-10 days  Specifically, may start the day without brace after 2-3 hours return to brace for a 2-3 hour  Continue to alternate in this fashion gradually increasing the time without the brace  Relative to activities may gradually increase her lifting pushing or pulling as tolerated over the next few weeks  Further follow-up with Neurosurgery will be on an as needed basis  Should further follow-up be necessary following review of MRI thoracic and lumbar spine return to Neurosurgery as needed

## 2019-03-13 NOTE — PROGRESS NOTES
Assessment/Plan:    Very pleasant 80-year-old female, returns for hospital follow-up, history of pathologic fracture T12, noted 12/21/18  She reports this was associated with a fall, and had ER visit [facility] 12/21/18 relative this issue  She did go home ultimately but came back on 12/22/18 and had inpatient stay through 12/24/18 for pain control  She has a history of metastatic breast cancer over a 10 year period, and follows with Dr Haider Valero ([facility]), as well as Dr Keely Ramos   She has completed a course of radiation therapy, 10 visits, completed on 1/9/19  She arrives with a TLSO brace appropriately applied in place, when questioned she reports she has been to decrease the amount of time wearing this over the last few weeks  She reports she has no low/mid back pain at this time  She does admit to being fatigue after certain activities but not necessarily associated with back pain  She has had updated plain films of the thoracic spine, 3/11/19  The studies were carefully reviewed in detail and compared with prior study 12/24/18 and CT chest abdomen pelvis 12/21/18, there has been minimal additional collapse from the original study in December otherwise overall alignment remains appropriate  She has also had bone scan 1/14/19, this study revealed multiple areas of osseous metastasis, including cervical spine, thoracic spine and lumbar spine as well as pelvis and sacrum  There are findings also in the left hip, both femoral shafts left greater than right and she has seen orthopedics relative these, pinning was offer however she refused at this time  On examination today motor exam of the lower extremities is 5 x 5 for power, there is no pain with palpation or percussion over the thoracolumbar spine, and no pain is noted with range of motion  At this juncture continue to wean from the TLSO brace      Gradually increase activities as tolerated over the next several weeks     Further follow-up with Neurosurgery will be on an as-needed basis  Should there be exacerbation of pain or any new findings on MRI thoracic, and lumbar spine return to Neurosurgery for reassessment  Once again kyphoplasty is a consideration should there be new symptoms or changes as discussed by Dr Sathish Rowe in the past     These findings, impressions and recommendations are reviewed in great detail with the patient, she expressed understanding and agreement, her questions were answered completely and to her satisfaction  Diagnoses and all orders for this visit:    Non-traumatic compression fracture of T12 thoracic vertebra with routine healing, subsequent encounter  -     Ambulatory referral to Neurosurgery          Return if symptoms worsen or fail to improve  Subjective:      Patient ID: Radhames Maxwell is a 76 y o  female  HPI    The following portions of the patient's history were reviewed and updated as appropriate: allergies, current medications, past family history, past medical history, past social history and past surgical history  Review of Systems   Constitutional: Negative  HENT: Negative  Eyes: Negative  Respiratory: Negative  Cardiovascular: Negative  Gastrointestinal: Negative  Endocrine: Negative  Musculoskeletal: Negative  Skin: Negative  Allergic/Immunologic: Negative  Neurological: Negative  Hematological: Negative  Psychiatric/Behavioral: Negative  All other systems reviewed and are negative  Objective:    Physical Exam   Constitutional: She is oriented to person, place, and time  She appears well-developed and well-nourished  HENT:   Head: Normocephalic and atraumatic  Eyes: Pupils are equal, round, and reactive to light  EOM are normal    Cardiovascular: Normal rate  Pulmonary/Chest: Effort normal and breath sounds normal    Neurological: She is alert and oriented to person, place, and time     Skin: Skin is warm and dry  Psychiatric: She has a normal mood and affect  Neurologic Exam     Mental Status   Oriented to person, place, and time  Cranial Nerves     CN III, IV, VI   Pupils are equal, round, and reactive to light  Extraocular motions are normal           THORACOLUMBAR SPINE   3/11/19     INDICATION:   M48 54XA: Collapsed vertebra, not elsewhere classified, thoracic region, initial encounter for fracture      COMPARISON: X-ray 12/24/18 and MRI 12/23/18      VIEWS:  XR SPINE THORACOLUMBAR 2 VW        FINDINGS:     Again seen is a T12 compression fracture, no significant change in vertebral body height or alignment     Age related degenerative changes are seen       There is no displacement of the paraspinal line       The pedicles appear intact      IMPRESSION:     1  Unchanged appearance of T12 compression deformity    2   Metastases are better seen on prior MRI

## 2019-03-14 RX ORDER — SODIUM CHLORIDE 9 MG/ML
20 INJECTION, SOLUTION INTRAVENOUS CONTINUOUS
Status: DISCONTINUED | OUTPATIENT
Start: 2019-03-18 | End: 2019-03-21 | Stop reason: HOSPADM

## 2019-03-18 ENCOUNTER — HOSPITAL ENCOUNTER (OUTPATIENT)
Dept: INFUSION CENTER | Facility: CLINIC | Age: 69
Discharge: HOME | End: 2019-03-18
Payer: MEDICARE

## 2019-03-18 ENCOUNTER — TELEPHONE (OUTPATIENT)
Dept: HEMATOLOGY ONCOLOGY | Facility: CLINIC | Age: 69
End: 2019-03-18

## 2019-03-18 VITALS
SYSTOLIC BLOOD PRESSURE: 129 MMHG | OXYGEN SATURATION: 100 % | TEMPERATURE: 98.5 F | RESPIRATION RATE: 16 BRPM | DIASTOLIC BLOOD PRESSURE: 59 MMHG | HEART RATE: 75 BPM

## 2019-03-18 LAB
ALBUMIN SERPL BCP-MCNC: 3.1 G/DL (ref 3.5–5)
ALP SERPL-CCNC: 100 U/L (ref 46–116)
ALT SERPL W P-5'-P-CCNC: 27 U/L (ref 12–78)
ANION GAP SERPL CALCULATED.3IONS-SCNC: 6 MMOL/L (ref 4–13)
AST SERPL W P-5'-P-CCNC: 20 U/L (ref 5–45)
BASOPHILS # BLD MANUAL: 0.02 THOUSAND/UL (ref 0–0.1)
BASOPHILS NFR MAR MANUAL: 2 % (ref 0–1)
BILIRUB SERPL-MCNC: 0.5 MG/DL (ref 0.2–1)
BUN SERPL-MCNC: 9 MG/DL (ref 5–25)
CALCIUM SERPL-MCNC: 8.5 MG/DL (ref 8.3–10.1)
CHLORIDE SERPL-SCNC: 106 MMOL/L (ref 100–108)
CO2 SERPL-SCNC: 29 MMOL/L (ref 21–32)
CREAT SERPL-MCNC: 0.53 MG/DL (ref 0.6–1.3)
EOSINOPHIL # BLD MANUAL: 0 THOUSAND/UL (ref 0–0.4)
EOSINOPHIL NFR BLD MANUAL: 0 % (ref 0–6)
ERYTHROCYTE [DISTWIDTH] IN BLOOD BY AUTOMATED COUNT: 17.6 % (ref 11.6–15.1)
GFR SERPL CREATININE-BSD FRML MDRD: 98 ML/MIN/1.73SQ M
GLUCOSE SERPL-MCNC: 82 MG/DL (ref 65–140)
HCT VFR BLD AUTO: 32.9 % (ref 34.8–46.1)
HGB BLD-MCNC: 10.7 G/DL (ref 11.5–15.4)
LYMPHOCYTES # BLD AUTO: 0.17 THOUSAND/UL (ref 0.6–4.47)
LYMPHOCYTES # BLD AUTO: 22 % (ref 14–44)
MAGNESIUM SERPL-MCNC: 1.8 MG/DL (ref 1.6–2.6)
MCH RBC QN AUTO: 30.1 PG (ref 26.8–34.3)
MCHC RBC AUTO-ENTMCNC: 32.5 G/DL (ref 31.4–37.4)
MCV RBC AUTO: 93 FL (ref 82–98)
MONOCYTES # BLD AUTO: 0.29 THOUSAND/UL (ref 0–1.22)
MONOCYTES NFR BLD: 37 % (ref 4–12)
NEUTROPHILS # BLD MANUAL: 0.3 THOUSAND/UL (ref 1.85–7.62)
NEUTS SEG NFR BLD AUTO: 39 % (ref 43–75)
NRBC BLD AUTO-RTO: 0 /100 WBCS
PLATELET # BLD AUTO: 174 THOUSANDS/UL (ref 149–390)
PLATELET BLD QL SMEAR: ADEQUATE
PMV BLD AUTO: 9.4 FL (ref 8.9–12.7)
POTASSIUM SERPL-SCNC: 3.7 MMOL/L (ref 3.5–5.3)
PROT SERPL-MCNC: 6 G/DL (ref 6.4–8.2)
RBC # BLD AUTO: 3.55 MILLION/UL (ref 3.81–5.12)
SODIUM SERPL-SCNC: 141 MMOL/L (ref 136–145)
TOTAL CELLS COUNTED SPEC: 82
WBC # BLD AUTO: 0.78 THOUSAND/UL (ref 4.31–10.16)

## 2019-03-18 PROCEDURE — 83735 ASSAY OF MAGNESIUM: CPT | Performed by: INTERNAL MEDICINE

## 2019-03-18 PROCEDURE — 85007 BL SMEAR W/DIFF WBC COUNT: CPT | Performed by: INTERNAL MEDICINE

## 2019-03-18 PROCEDURE — 80053 COMPREHEN METABOLIC PANEL: CPT | Performed by: INTERNAL MEDICINE

## 2019-03-18 PROCEDURE — 96360 HYDRATION IV INFUSION INIT: CPT

## 2019-03-18 PROCEDURE — 85027 COMPLETE CBC AUTOMATED: CPT | Performed by: INTERNAL MEDICINE

## 2019-03-18 PROCEDURE — 96361 HYDRATE IV INFUSION ADD-ON: CPT

## 2019-03-18 RX ADMIN — SODIUM CHLORIDE 20 ML/HR: 0.9 INJECTION, SOLUTION INTRAVENOUS at 10:30

## 2019-03-18 RX ADMIN — POTASSIUM CHLORIDE: 2 INJECTION, SOLUTION, CONCENTRATE INTRAVENOUS at 10:50

## 2019-03-18 NOTE — TELEPHONE ENCOUNTER
Patient has profound neutropenia  I spoke with patient  She is not taking Ibrance and she will not take that until her next visit in the office and we will be lowering the dose  She has instructions about febrile neutropenia  We talked about Neupogen and she said she used to get lot of pain with Neupogen when she was on chemo several years ago and she will try without that

## 2019-03-18 NOTE — PROGRESS NOTES
Patient to [facility] for Lab testing / Hydration: Offers no complaints at present time: Left FA PIV initiated without incident: Labs drawn per MD order

## 2019-03-21 ENCOUNTER — TELEPHONE (OUTPATIENT)
Dept: HEMATOLOGY ONCOLOGY | Facility: CLINIC | Age: 69
End: 2019-03-21

## 2019-03-21 NOTE — TELEPHONE ENCOUNTER
Patient being discharged from [facility] as of today  If you have any questions, please call Carl Silva @ [phone]  Patient has office appt  With Devyn on 03/26/19

## 2019-03-26 ENCOUNTER — OFFICE VISIT (OUTPATIENT)
Dept: HEMATOLOGY ONCOLOGY | Facility: CLINIC | Age: 69
End: 2019-03-26
Payer: MEDICARE

## 2019-03-26 VITALS
BODY MASS INDEX: 20.38 KG/M2 | WEIGHT: 115 LBS | HEIGHT: 63 IN | RESPIRATION RATE: 18 BRPM | DIASTOLIC BLOOD PRESSURE: 80 MMHG | OXYGEN SATURATION: 98 % | TEMPERATURE: 98.2 F | SYSTOLIC BLOOD PRESSURE: 138 MMHG | HEART RATE: 70 BPM

## 2019-03-26 DIAGNOSIS — R26.2 AMBULATORY DYSFUNCTION: ICD-10-CM

## 2019-03-26 DIAGNOSIS — E87.6 HYPOKALEMIA: ICD-10-CM

## 2019-03-26 DIAGNOSIS — C79.51 BONE METASTASIS (HCC): ICD-10-CM

## 2019-03-26 DIAGNOSIS — C50.911 MALIGNANT NEOPLASM OF RIGHT FEMALE BREAST, UNSPECIFIED ESTROGEN RECEPTOR STATUS, UNSPECIFIED SITE OF BREAST (HCC): Chronic | ICD-10-CM

## 2019-03-26 DIAGNOSIS — D70.2 OTHER DRUG-INDUCED NEUTROPENIA (HCC): ICD-10-CM

## 2019-03-26 DIAGNOSIS — C50.911 BREAST CANCER METASTASIZED TO BONE, RIGHT (HCC): Primary | Chronic | ICD-10-CM

## 2019-03-26 DIAGNOSIS — C79.51 BREAST CANCER METASTASIZED TO BONE, RIGHT (HCC): Primary | Chronic | ICD-10-CM

## 2019-03-26 DIAGNOSIS — R91.8 LUNG NODULES: ICD-10-CM

## 2019-03-26 PROCEDURE — 99215 OFFICE O/P EST HI 40 MIN: CPT | Performed by: PHYSICIAN ASSISTANT

## 2019-03-26 RX ORDER — LAMOTRIGINE 25 MG/1
250 TABLET ORAL ONCE
Status: COMPLETED | OUTPATIENT
Start: 2019-03-27 | End: 2019-03-27

## 2019-03-26 RX ORDER — SODIUM CHLORIDE 9 MG/ML
20 INJECTION, SOLUTION INTRAVENOUS CONTINUOUS
Status: DISCONTINUED | OUTPATIENT
Start: 2019-03-27 | End: 2019-03-28 | Stop reason: HOSPADM

## 2019-03-26 NOTE — PROGRESS NOTES
Hematology/Oncology Outpatient Follow-up  Liza Hilliard 76 y o  female 1950 [id number]    Date:  3/26/2019      Assessment and Plan:  1  Breast cancer metastasized to bone, right ([facility] ), 2  Bone metastasis ([facility] ), 3  Malignant neoplasm of right female breast, unspecified estrogen receptor status, unspecified site of breast [facility])  66-year-old female with history of metastatic breast cancer  She is presently receiving treatment with Faslodex, [place]  Her performance status is improving  She has gained weight  She states that she is eating and drinking well  She had been getting IV fluids with magnesium and potassium twice weekly  Most recently she has been receiving once weekly  She is due for once weekly tomorrow  Due to her improvement in performance status as well as eating and drinking, tomorrow will be her last day of IV hydration with electrolyte  She and her sister-in-law as are in agreement with this plan as well  Encouraged ambulation as tolerated  Reviewed notes from Neurosurgery as well as orthopedic surgery regarding this  4  Other drug-induced neutropenia (HCC)  Patient developed neutropenia without febrile neutropenia or any other infection secondary to I rinse  I rinse was instructed to be held on 03/11/2019  This was day 19 of this cycle  Patient did not take 2 pills of the cycle  Discussed that due to this neutropenia we need to dose reduce medication  She will take 100 mg day 1 through 21  She will have repeat CBC tomorrow as scheduled and we will then determine the time of when she will re-initiate Ibrance  Patient's sister-in-law as asked questions regarding filling of therapy  Discussed that there is greater risk of re-initiation with more severe neutropenia  Will call with instructions after CBC completed tomorrow  5  Lung nodules  Patient had CT chest while inpatient in December 2018  This showed multiple small subcentimeter lung nodules in both lungs  These were noted to be indeterminate in no prior CT chest was able to could be compared  Discussed with patient that we will repeat imaging studies however we will wait to do this until after completing at least 2 cycles of therapy with Ibrance  Discussed that these could represent metastatic disease but also could represent benign lung nodules  6  Ambulatory dysfunction  Patient continues to have functional improvement however she continues to have weakness and decreased muscle tone secondary to her condition  Would like patient to be evaluated for PT at home  Order placed  - Ambulatory Referral to Home Health; Future      HPI:       Breast cancer metastasized to bone, right [facility])    1996 Surgery     right modified radical mastectomy followed by adjuvant systemic therapy but no radiation  6/28/2004 Biopsy     Final Diagnosis  A  Lymph Node, right supraclavicular, core biopsy (14 slides, [id number], [facility]; collected on 6/28/2004):  -  Consistent with metastatic carcinoma, compatible with breast origin (see note)  2004 -  Radiation     salvage radiation therapy at an outside facility to right supraclavicular nodes at [facility]         12/23/2018 Initial Diagnosis     Breast cancer metastasized to bone, right ([facility] )         12/26/2018 - 1/9/2019 Radiation     3000 cGy in 10 fractions to  T11 through the bottom of the SI joints  Dr Manuel Bhandari         1/16/2019 -  Chemotherapy     fulvestrant (FASLODEX) IM injection   Chemotherapy            2/20/2019 -  Chemotherapy     Ibrance 125 mg PO day 1-21 every 28 days            Interval history:  Patient had CBC on 03/04/2019 which showed leukopenia  Total WBC was 0 5  This was day 15 of patient's Ibrance cycle  Repeat on 3/11/19 WBC was 0 30  At this time patient was told to hold her Ibrance       ROS: Review of Systems   Constitutional: Positive for activity change (increased but still moving slow), appetite change (improved, eating better) and fatigue  Respiratory: Negative for cough and shortness of breath  Cardiovascular: Negative for chest pain, palpitations and leg swelling  Gastrointestinal: Negative for abdominal pain, blood in stool, constipation, diarrhea, nausea and vomiting  Genitourinary: Negative for difficulty urinating and dysuria  Musculoskeletal: Negative for arthralgias and back pain  Neurological: Negative for dizziness, light-headedness and headaches  Hematological: Negative for adenopathy  Does not bruise/bleed easily  Psychiatric/Behavioral: Negative  Past Medical History:   Diagnosis Date    Cancer [facility])     breast 1996    Cancer ([facility] )     bone      Compression fracture of thoracic vertebra (HCC)     Diabetes mellitus ([facility] )     History of therapeutic radiation     Tendonitis        Past Surgical History:   Procedure Laterality Date    CHOLECYSTECTOMY      MASTECTOMY      Right side reconstruction    NECK SURGERY      [place] ESOPHAGOGASTRODUODENOSCOPY TRANSORAL DIAGNOSTIC N/A 1/24/2019    Procedure: ESOPHAGOGASTRODUODENOSCOPY (EGD); Surgeon: Yolanda Hilario MD;  [facility]: [facility]; Service: Gastroenterology    TONSILLECTOMY         Social History     Socioeconomic History    Marital status:       Spouse name: Not on file    Number of children: Not on file    Years of education: Not on file    Highest education level: Not on file   Occupational History    Not on file   Social Needs    Financial resource strain: Not on file    Food insecurity:     Worry: Not on file     Inability: Not on file    Transportation needs:     Medical: Not on file     Non-medical: Not on file   Tobacco Use    Smoking status: Never Smoker    Smokeless tobacco: Never Used   Substance and Sexual Activity    Alcohol use: No    Drug use: No    Sexual activity: Not on file   Lifestyle    Physical activity:     Days per week: Not on file     Minutes per session: Not on file    Stress: Not on file   Relationships    Social connections:     Talks on phone: Not on file     Gets together: Not on file     Attends Judaism service: Not on file     Active member of club or organization: Not on file     Attends meetings of clubs or organizations: Not on file     Relationship status: Not on file    Intimate partner violence:     Fear of current or ex partner: Not on file     Emotionally abused: Not on file     Physically abused: Not on file     Forced sexual activity: Not on file   Other Topics Concern    Not on file   Social History Narrative    Not on file       Family History   Problem Relation Age of Onset    Diabetes Mother     Cancer Mother     Lymphoma Father     KAROLINA disease Brother        No Known Allergies      Current Outpatient Medications:     acetaminophen (TYLENOL) 325 mg tablet, Take 2 tablets (650 mg total) by mouth 4 (four) times a day (with meals and at bedtime), Disp: 80 tablet, Rfl: 1    calcium carbonate (OS-CLARENCE) 1250 (500 Ca) MG chewable tablet, Chew 1 tablet (1,250 mg total) daily, Disp: 30 tablet, Rfl: 0    Cholecalciferol (VITAMIN D3) 2000 units TABS, Take 2,000 Units by mouth daily, Disp: , Rfl:     ferrous sulfate 325 (65 Fe) mg tablet, Take 1 tablet (325 mg total) by mouth daily with breakfast, Disp: 30 tablet, Rfl: 1    gabapentin (NEURONTIN) 100 mg capsule, Take 1 capsule (100 mg total) by mouth 2 (two) times a day, Disp: 60 capsule, Rfl: 1    lisinopril (ZESTRIL) 5 mg tablet, Take 5 mg by mouth daily  , Disp: , Rfl:     LORazepam (ATIVAN) 1 mg tablet, Take 2mg (2 tablets) qhs , Disp: 60 tablet, Rfl: 0    morphine (MSIR) 15 mg tablet, Use 1 tab BID scheduled to anticipate cancer pain    May have 1 tabs q4hrs PRN mod-severe pain , Disp: 75 tablet, Rfl: 0    MULTIPLE VITAMINS PO, Take by mouth, Disp: , Rfl:     Omega-3 Fatty Acids (FISH OIL PO), Take 2 g by mouth, Disp: , Rfl:     omeprazole (PriLOSEC) 40 MG capsule, Take 1 capsule (40 mg total) by mouth daily, Disp: 30 capsule, Rfl: 3    potassium chloride (K-DUR,KLOR-CON) 20 mEq tablet, Take 1 tablet (20 mEq total) by mouth daily, Disp: 30 tablet, Rfl: 0    sucralfate (CARAFATE) 1 g tablet, Take 1 tablet (1 g total) by mouth 2 (two) times a day before meals, Disp: 60 tablet, Rfl: 3    ondansetron (ZOFRAN-ODT) 8 mg disintegrating tablet, Take 1 tablet (8 mg total) by mouth every 8 (eight) hours as needed for nausea or vomiting (Patient not taking: Reported on 3/26/2019), Disp: , Rfl:   No current facility-administered medications for this visit  Facility-Administered Medications Ordered in Other Visits:     [START ON 3/27/2019] denosumab (XGEVA) subcutaneous injection 120 mg, 120 mg, Subcutaneous, Once PRN, Storm Emanuel MD  Marilin Courser  [START ON 3/27/2019] fulvestrant (FASLODEX) IM injection 250 mg, 250 mg, Intramuscular, Once **AND** [START ON 3/27/2019] fulvestrant (FASLODEX) IM injection 250 mg, 250 mg, Intramuscular, Once, Storm Emanuel MD    [START ON 3/27/2019] potassium chloride 10 mEq, magnesium sulfate 1 g in sodium chloride 0 9 % 1,000 mL IVPB, , Intravenous, Once, Storm Emanuel MD    [START ON 3/27/2019] sodium chloride 0 9 % infusion, 20 mL/hr, Intravenous, Continuous, Storm Emanuel MD      Physical Exam:  /80 (BP Location: Left arm)   Pulse 70   Temp 98 2 °F (36 8 °C) (Tympanic Core)   Resp 18   Ht 5' 2 6" (1 59 m)   Wt 52 2 kg (115 lb)   LMP 05/27/1996 (LMP Unknown)   SpO2 98%   BMI 20 63 kg/m²     Physical Exam   Constitutional: She is oriented to person, place, and time  She appears well-developed and well-nourished  No distress  underweight   HENT:   Head: Normocephalic and atraumatic  Eyes: Conjunctivae are normal  No scleral icterus  Neck: Normal range of motion  Neck supple  Cardiovascular: Normal rate, regular rhythm and normal heart sounds  No murmur heard  Pulmonary/Chest: Effort normal and breath sounds normal  No respiratory distress  Abdominal: Soft   There is no tenderness  Musculoskeletal: Normal range of motion  She exhibits no edema or tenderness  Ambulating with walker    Lymphadenopathy:     She has no cervical adenopathy  Neurological: She is alert and oriented to person, place, and time  No cranial nerve deficit  Skin: Skin is warm and dry  Psychiatric: She has a normal mood and affect  Labs:  Lab Results   Component Value Date    WBC 0 78 (LL) 03/18/2019    HGB 10 7 (L) 03/18/2019    HCT 32 9 (L) 03/18/2019    MCV 93 03/18/2019     03/18/2019     Lab Results   Component Value Date    K 3 7 03/18/2019     03/18/2019    CO2 29 03/18/2019    BUN 9 03/18/2019    CREATININE 0 53 (L) 03/18/2019    CALCIUM 8 5 03/18/2019    AST 20 03/18/2019    ALT 27 03/18/2019    ALKPHOS 100 03/18/2019    EGFR 98 03/18/2019       Patient voiced understanding and agreement in the above discussion  Aware to contact our office with questions/symptoms in the interim

## 2019-03-27 ENCOUNTER — HOSPITAL ENCOUNTER (OUTPATIENT)
Dept: INFUSION CENTER | Facility: CLINIC | Age: 69
Discharge: HOME | End: 2019-03-27
Payer: MEDICARE

## 2019-03-27 VITALS
HEART RATE: 73 BPM | TEMPERATURE: 97.6 F | DIASTOLIC BLOOD PRESSURE: 60 MMHG | RESPIRATION RATE: 18 BRPM | SYSTOLIC BLOOD PRESSURE: 129 MMHG | OXYGEN SATURATION: 100 %

## 2019-03-27 LAB
ALBUMIN SERPL BCP-MCNC: 2.8 G/DL (ref 3.5–5)
ALP SERPL-CCNC: 96 U/L (ref 46–116)
ALT SERPL W P-5'-P-CCNC: 23 U/L (ref 12–78)
ANION GAP SERPL CALCULATED.3IONS-SCNC: 11 MMOL/L (ref 4–13)
AST SERPL W P-5'-P-CCNC: 19 U/L (ref 5–45)
BASOPHILS # BLD AUTO: 0.05 THOUSANDS/ΜL (ref 0–0.1)
BASOPHILS NFR BLD AUTO: 2 % (ref 0–1)
BILIRUB SERPL-MCNC: 0.3 MG/DL (ref 0.2–1)
BUN SERPL-MCNC: 14 MG/DL (ref 5–25)
CALCIUM SERPL-MCNC: 8.6 MG/DL (ref 8.3–10.1)
CHLORIDE SERPL-SCNC: 105 MMOL/L (ref 100–108)
CO2 SERPL-SCNC: 23 MMOL/L (ref 21–32)
CREAT SERPL-MCNC: 0.52 MG/DL (ref 0.6–1.3)
EOSINOPHIL # BLD AUTO: 0.02 THOUSAND/ΜL (ref 0–0.61)
EOSINOPHIL NFR BLD AUTO: 1 % (ref 0–6)
ERYTHROCYTE [DISTWIDTH] IN BLOOD BY AUTOMATED COUNT: 17.4 % (ref 11.6–15.1)
GFR SERPL CREATININE-BSD FRML MDRD: 98 ML/MIN/1.73SQ M
GLUCOSE SERPL-MCNC: 104 MG/DL (ref 65–140)
HCT VFR BLD AUTO: 31.9 % (ref 34.8–46.1)
HGB BLD-MCNC: 10.3 G/DL (ref 11.5–15.4)
IMM GRANULOCYTES # BLD AUTO: 0.04 THOUSAND/UL (ref 0–0.2)
IMM GRANULOCYTES NFR BLD AUTO: 2 % (ref 0–2)
LYMPHOCYTES # BLD AUTO: 0.3 THOUSANDS/ΜL (ref 0.6–4.47)
LYMPHOCYTES NFR BLD AUTO: 14 % (ref 14–44)
MAGNESIUM SERPL-MCNC: 2.1 MG/DL (ref 1.6–2.6)
MCH RBC QN AUTO: 30.7 PG (ref 26.8–34.3)
MCHC RBC AUTO-ENTMCNC: 32.3 G/DL (ref 31.4–37.4)
MCV RBC AUTO: 95 FL (ref 82–98)
MONOCYTES # BLD AUTO: 0.31 THOUSAND/ΜL (ref 0.17–1.22)
MONOCYTES NFR BLD AUTO: 14 % (ref 4–12)
NEUTROPHILS # BLD AUTO: 1.45 THOUSANDS/ΜL (ref 1.85–7.62)
NEUTS SEG NFR BLD AUTO: 67 % (ref 43–75)
NRBC BLD AUTO-RTO: 0 /100 WBCS
PLATELET # BLD AUTO: 279 THOUSANDS/UL (ref 149–390)
PMV BLD AUTO: 9 FL (ref 8.9–12.7)
POTASSIUM SERPL-SCNC: 4.1 MMOL/L (ref 3.5–5.3)
PROT SERPL-MCNC: 5.5 G/DL (ref 6.4–8.2)
RBC # BLD AUTO: 3.36 MILLION/UL (ref 3.81–5.12)
SODIUM SERPL-SCNC: 139 MMOL/L (ref 136–145)
WBC # BLD AUTO: 2.17 THOUSAND/UL (ref 4.31–10.16)

## 2019-03-27 PROCEDURE — 96402 CHEMO HORMON ANTINEOPL SQ/IM: CPT

## 2019-03-27 PROCEDURE — 85025 COMPLETE CBC W/AUTO DIFF WBC: CPT | Performed by: INTERNAL MEDICINE

## 2019-03-27 PROCEDURE — 83735 ASSAY OF MAGNESIUM: CPT | Performed by: INTERNAL MEDICINE

## 2019-03-27 PROCEDURE — 80053 COMPREHEN METABOLIC PANEL: CPT | Performed by: INTERNAL MEDICINE

## 2019-03-27 PROCEDURE — 96401 CHEMO ANTI-NEOPL SQ/IM: CPT

## 2019-03-27 RX ORDER — POTASSIUM CHLORIDE 1500 MG/1
TABLET, EXTENDED RELEASE ORAL
Qty: 30 TABLET | Refills: 0 | Status: SHIPPED | OUTPATIENT
Start: 2019-03-27 | End: 2019-04-24 | Stop reason: SDUPTHER

## 2019-03-27 RX ADMIN — DENOSUMAB 120 MG: 120 INJECTION SUBCUTANEOUS at 10:25

## 2019-03-27 RX ADMIN — POTASSIUM CHLORIDE: 2 INJECTION, SOLUTION, CONCENTRATE INTRAVENOUS at 09:47

## 2019-03-27 RX ADMIN — FULVESTRANT 250 MG: 50 INJECTION INTRAMUSCULAR at 13:25

## 2019-03-27 RX ADMIN — SODIUM CHLORIDE 20 ML/HR: 0.9 INJECTION, SOLUTION INTRAVENOUS at 13:10

## 2019-03-28 ENCOUNTER — TELEPHONE (OUTPATIENT)
Dept: HEMATOLOGY ONCOLOGY | Facility: CLINIC | Age: 69
End: 2019-03-28

## 2019-03-28 DIAGNOSIS — C50.911 MALIGNANT NEOPLASM OF RIGHT FEMALE BREAST, UNSPECIFIED ESTROGEN RECEPTOR STATUS, UNSPECIFIED SITE OF BREAST (HCC): Primary | ICD-10-CM

## 2019-03-28 DIAGNOSIS — D70.9 NEUTROPENIA, UNSPECIFIED TYPE (HCC): ICD-10-CM

## 2019-03-28 NOTE — TELEPHONE ENCOUNTER
I scheduled 4 more appointments for patient's Xgeva and Faslodex injections at the [address] [place] [address]  They were all scheduled for 4/24/19, /5/22/19, 6/19/19, 7/17/19  I called patient and gave her these dates and times  Patient was in agreement

## 2019-04-01 DIAGNOSIS — C50.911 MALIGNANT NEOPLASM OF RIGHT FEMALE BREAST, UNSPECIFIED ESTROGEN RECEPTOR STATUS, UNSPECIFIED SITE OF BREAST (HCC): Chronic | ICD-10-CM

## 2019-04-01 DIAGNOSIS — F41.9 ANXIETY: Primary | ICD-10-CM

## 2019-04-01 RX ORDER — LORAZEPAM 1 MG/1
TABLET ORAL
Qty: 60 TABLET | Refills: 0 | Status: SHIPPED | OUTPATIENT
Start: 2019-04-01 | End: 2019-04-22 | Stop reason: SDUPTHER

## 2019-04-01 RX ORDER — MORPHINE SULFATE 15 MG/1
TABLET ORAL
Qty: 75 TABLET | Refills: 0 | Status: SHIPPED | OUTPATIENT
Start: 2019-04-01 | End: 2019-05-16 | Stop reason: SDUPTHER

## 2019-04-04 ENCOUNTER — TELEPHONE (OUTPATIENT)
Dept: HEMATOLOGY ONCOLOGY | Facility: CLINIC | Age: 69
End: 2019-04-04

## 2019-04-04 ENCOUNTER — APPOINTMENT (OUTPATIENT)
Dept: LAB | Facility: CLINIC | Age: 69
End: 2019-04-04
Payer: MEDICARE

## 2019-04-04 LAB
ALBUMIN SERPL BCP-MCNC: 3.2 G/DL (ref 3.5–5)
ALP SERPL-CCNC: 85 U/L (ref 46–116)
ALT SERPL W P-5'-P-CCNC: 22 U/L (ref 12–78)
ANION GAP SERPL CALCULATED.3IONS-SCNC: 8 MMOL/L (ref 4–13)
AST SERPL W P-5'-P-CCNC: 16 U/L (ref 5–45)
BILIRUB SERPL-MCNC: 0.4 MG/DL (ref 0.2–1)
BUN SERPL-MCNC: 14 MG/DL (ref 5–25)
CALCIUM SERPL-MCNC: 8.3 MG/DL (ref 8.3–10.1)
CHLORIDE SERPL-SCNC: 106 MMOL/L (ref 100–108)
CO2 SERPL-SCNC: 26 MMOL/L (ref 21–32)
CREAT SERPL-MCNC: 0.68 MG/DL (ref 0.6–1.3)
GFR SERPL CREATININE-BSD FRML MDRD: 90 ML/MIN/1.73SQ M
GLUCOSE SERPL-MCNC: 122 MG/DL (ref 65–140)
POTASSIUM SERPL-SCNC: 4.7 MMOL/L (ref 3.5–5.3)
PROT SERPL-MCNC: 6.1 G/DL (ref 6.4–8.2)
SODIUM SERPL-SCNC: 140 MMOL/L (ref 136–145)

## 2019-04-04 PROCEDURE — 80053 COMPREHEN METABOLIC PANEL: CPT | Performed by: PHYSICIAN ASSISTANT

## 2019-04-04 PROCEDURE — 36415 COLL VENOUS BLD VENIPUNCTURE: CPT | Performed by: PHYSICIAN ASSISTANT

## 2019-04-05 ENCOUNTER — HOSPITAL ENCOUNTER (OUTPATIENT)
Dept: MRI IMAGING | Facility: HOSPITAL | Age: 69
Discharge: HOME | End: 2019-04-05
Attending: RADIOLOGY
Payer: MEDICARE

## 2019-04-05 DIAGNOSIS — C79.51 BREAST CANCER METASTASIZED TO BONE, RIGHT (HCC): ICD-10-CM

## 2019-04-05 DIAGNOSIS — C50.911 BREAST CANCER METASTASIZED TO BONE, RIGHT (HCC): ICD-10-CM

## 2019-04-05 PROCEDURE — 72148 MRI LUMBAR SPINE W/O DYE: CPT

## 2019-04-05 PROCEDURE — 72146 MRI CHEST SPINE W/O DYE: CPT

## 2019-04-08 ENCOUNTER — RADIATION ONCOLOGY FOLLOW-UP (OUTPATIENT)
Dept: RADIATION ONCOLOGY | Facility: HOSPITAL | Age: 69
End: 2019-04-08
Attending: RADIOLOGY
Payer: MEDICARE

## 2019-04-08 VITALS
OXYGEN SATURATION: 98 % | SYSTOLIC BLOOD PRESSURE: 102 MMHG | HEART RATE: 83 BPM | WEIGHT: 122.2 LBS | BODY MASS INDEX: 22.49 KG/M2 | RESPIRATION RATE: 16 BRPM | HEIGHT: 62 IN | DIASTOLIC BLOOD PRESSURE: 58 MMHG | TEMPERATURE: 99.1 F

## 2019-04-08 DIAGNOSIS — C79.51 BREAST CANCER METASTASIZED TO BONE, RIGHT (HCC): Primary | Chronic | ICD-10-CM

## 2019-04-08 DIAGNOSIS — C50.911 BREAST CANCER METASTASIZED TO BONE, RIGHT (HCC): Primary | Chronic | ICD-10-CM

## 2019-04-08 PROCEDURE — 99215 OFFICE O/P EST HI 40 MIN: CPT | Performed by: RADIOLOGY

## 2019-04-08 RX ORDER — SENNOSIDES 8.6 MG
1 TABLET ORAL AS NEEDED
COMMUNITY

## 2019-04-11 ENCOUNTER — TELEPHONE (OUTPATIENT)
Dept: HEMATOLOGY ONCOLOGY | Facility: CLINIC | Age: 69
End: 2019-04-11

## 2019-04-11 ENCOUNTER — APPOINTMENT (OUTPATIENT)
Dept: LAB | Facility: CLINIC | Age: 69
End: 2019-04-11
Payer: MEDICARE

## 2019-04-11 ENCOUNTER — TRANSCRIBE ORDERS (OUTPATIENT)
Dept: LAB | Facility: CLINIC | Age: 69
End: 2019-04-11

## 2019-04-13 ENCOUNTER — HOSPITAL ENCOUNTER (OUTPATIENT)
Dept: INFUSION CENTER | Facility: CLINIC | Age: 69
Discharge: HOME | End: 2019-04-13
Payer: MEDICARE

## 2019-04-13 VITALS — TEMPERATURE: 98.3 F

## 2019-04-13 PROCEDURE — 96372 THER/PROPH/DIAG INJ SC/IM: CPT

## 2019-04-13 RX ADMIN — TBO-FILGRASTIM 300 MCG: 300 INJECTION, SOLUTION SUBCUTANEOUS at 13:19

## 2019-04-13 NOTE — PROGRESS NOTES
To Infusion Center for granix injection  Pt is currently on Ibrance PO therapy at home--per med onc note, this is currently on hold secondary to neutropenia  She currently denies any signs/symptoms of infection including fever/shaking/chills/respiratory or urinary effects  Reviewed neutropenic reporting and precautions  Pt states understanding and agreement  SQ injection given as per orders  AVS provided

## 2019-04-15 DIAGNOSIS — C50.911 MALIGNANT NEOPLASM OF RIGHT FEMALE BREAST, UNSPECIFIED ESTROGEN RECEPTOR STATUS, UNSPECIFIED SITE OF BREAST (HCC): Chronic | ICD-10-CM

## 2019-04-16 RX ORDER — GABAPENTIN 100 MG/1
CAPSULE ORAL
Qty: 60 CAPSULE | Refills: 0 | Status: SHIPPED | OUTPATIENT
Start: 2019-04-16 | End: 2019-04-22 | Stop reason: SDUPTHER

## 2019-04-18 ENCOUNTER — APPOINTMENT (OUTPATIENT)
Dept: LAB | Facility: CLINIC | Age: 69
End: 2019-04-18
Payer: MEDICARE

## 2019-04-19 ENCOUNTER — OFFICE VISIT (OUTPATIENT)
Dept: HEMATOLOGY ONCOLOGY | Facility: CLINIC | Age: 69
End: 2019-04-19
Payer: MEDICARE

## 2019-04-19 VITALS
BODY MASS INDEX: 22.08 KG/M2 | WEIGHT: 120 LBS | SYSTOLIC BLOOD PRESSURE: 122 MMHG | HEART RATE: 76 BPM | HEIGHT: 62 IN | DIASTOLIC BLOOD PRESSURE: 76 MMHG | RESPIRATION RATE: 14 BRPM | TEMPERATURE: 98.7 F

## 2019-04-19 DIAGNOSIS — C79.51 BONE METASTASIS (HCC): ICD-10-CM

## 2019-04-19 DIAGNOSIS — C50.911 BREAST CANCER METASTASIZED TO BONE, RIGHT (HCC): ICD-10-CM

## 2019-04-19 DIAGNOSIS — Z17.0 MALIGNANT NEOPLASM OF RIGHT BREAST IN FEMALE, ESTROGEN RECEPTOR POSITIVE, UNSPECIFIED SITE OF BREAST (HCC): Chronic | ICD-10-CM

## 2019-04-19 DIAGNOSIS — R26.2 AMBULATORY DYSFUNCTION: Primary | ICD-10-CM

## 2019-04-19 DIAGNOSIS — R19.5 HEME POSITIVE STOOL: ICD-10-CM

## 2019-04-19 DIAGNOSIS — C79.51 BREAST CANCER METASTASIZED TO BONE, RIGHT (HCC): ICD-10-CM

## 2019-04-19 DIAGNOSIS — C50.911 MALIGNANT NEOPLASM OF RIGHT BREAST IN FEMALE, ESTROGEN RECEPTOR POSITIVE, UNSPECIFIED SITE OF BREAST (HCC): Chronic | ICD-10-CM

## 2019-04-19 PROCEDURE — 99214 OFFICE O/P EST MOD 30 MIN: CPT | Performed by: PHYSICIAN ASSISTANT

## 2019-04-19 RX ORDER — LAMOTRIGINE 25 MG/1
500 TABLET ORAL ONCE
Status: CANCELLED | OUTPATIENT
Start: 2019-05-22

## 2019-04-22 ENCOUNTER — OFFICE VISIT (OUTPATIENT)
Dept: PALLIATIVE MEDICINE | Facility: CLINIC | Age: 69
End: 2019-04-22
Payer: MEDICARE

## 2019-04-22 VITALS
HEART RATE: 80 BPM | WEIGHT: 120.4 LBS | BODY MASS INDEX: 22.16 KG/M2 | RESPIRATION RATE: 14 BRPM | HEIGHT: 62 IN | DIASTOLIC BLOOD PRESSURE: 70 MMHG | OXYGEN SATURATION: 98 % | SYSTOLIC BLOOD PRESSURE: 115 MMHG | TEMPERATURE: 98.3 F

## 2019-04-22 DIAGNOSIS — C50.911 MALIGNANT NEOPLASM OF RIGHT FEMALE BREAST, UNSPECIFIED ESTROGEN RECEPTOR STATUS, UNSPECIFIED SITE OF BREAST (HCC): Chronic | ICD-10-CM

## 2019-04-22 DIAGNOSIS — F41.9 ANXIETY: ICD-10-CM

## 2019-04-22 DIAGNOSIS — I10 HYPERTENSION, UNSPECIFIED TYPE: ICD-10-CM

## 2019-04-22 DIAGNOSIS — G89.3 CANCER ASSOCIATED PAIN: Primary | ICD-10-CM

## 2019-04-22 PROCEDURE — 99214 OFFICE O/P EST MOD 30 MIN: CPT | Performed by: NURSE PRACTITIONER

## 2019-04-22 RX ORDER — GABAPENTIN 100 MG/1
100 CAPSULE ORAL 2 TIMES DAILY
Qty: 60 CAPSULE | Refills: 1 | Status: SHIPPED | OUTPATIENT
Start: 2019-04-22 | End: 2019-06-17 | Stop reason: SDUPTHER

## 2019-04-22 RX ORDER — LISINOPRIL 5 MG/1
2.5 TABLET ORAL DAILY
Start: 2019-04-22 | End: 2019-05-02 | Stop reason: SDUPTHER

## 2019-04-22 RX ORDER — LORAZEPAM 1 MG/1
TABLET ORAL
Qty: 60 TABLET | Refills: 1 | Status: SHIPPED | OUTPATIENT
Start: 2019-04-22 | End: 2019-05-02 | Stop reason: SDUPTHER

## 2019-04-23 RX ORDER — LAMOTRIGINE 25 MG/1
500 TABLET ORAL ONCE
Status: COMPLETED | OUTPATIENT
Start: 2019-04-24 | End: 2019-04-24

## 2019-04-24 ENCOUNTER — HOSPITAL ENCOUNTER (OUTPATIENT)
Dept: INFUSION CENTER | Facility: CLINIC | Age: 69
Discharge: HOME | End: 2019-04-24
Payer: MEDICARE

## 2019-04-24 ENCOUNTER — TELEPHONE (OUTPATIENT)
Dept: HEMATOLOGY ONCOLOGY | Facility: CLINIC | Age: 69
End: 2019-04-24

## 2019-04-24 ENCOUNTER — APPOINTMENT (OUTPATIENT)
Dept: LAB | Facility: CLINIC | Age: 69
End: 2019-04-24
Payer: MEDICARE

## 2019-04-24 DIAGNOSIS — C79.52 SECONDARY MALIGNANT NEOPLASM OF BONE AND BONE MARROW (HCC): ICD-10-CM

## 2019-04-24 DIAGNOSIS — E87.6 HYPOKALEMIA: ICD-10-CM

## 2019-04-24 DIAGNOSIS — C79.51 SECONDARY MALIGNANT NEOPLASM OF BONE AND BONE MARROW (HCC): ICD-10-CM

## 2019-04-24 LAB
ALBUMIN SERPL BCP-MCNC: 3.5 G/DL (ref 3.5–5)
ALP SERPL-CCNC: 93 U/L (ref 46–116)
ALT SERPL W P-5'-P-CCNC: 22 U/L (ref 12–78)
ANION GAP SERPL CALCULATED.3IONS-SCNC: 9 MMOL/L (ref 4–13)
AST SERPL W P-5'-P-CCNC: 19 U/L (ref 5–45)
BASOPHILS # BLD AUTO: 0.05 THOUSANDS/ΜL (ref 0–0.1)
BASOPHILS NFR BLD AUTO: 1 % (ref 0–1)
BILIRUB SERPL-MCNC: 0.4 MG/DL (ref 0.2–1)
BUN SERPL-MCNC: 16 MG/DL (ref 5–25)
CALCIUM SERPL-MCNC: 9 MG/DL (ref 8.3–10.1)
CANCER AG27-29 SERPL-ACNC: 35.3 U/ML (ref 0–42.3)
CEA SERPL-MCNC: 2.7 NG/ML (ref 0–3)
CHLORIDE SERPL-SCNC: 103 MMOL/L (ref 100–108)
CO2 SERPL-SCNC: 27 MMOL/L (ref 21–32)
CREAT SERPL-MCNC: 0.7 MG/DL (ref 0.6–1.3)
EOSINOPHIL # BLD AUTO: 0.06 THOUSAND/ΜL (ref 0–0.61)
EOSINOPHIL NFR BLD AUTO: 2 % (ref 0–6)
ERYTHROCYTE [DISTWIDTH] IN BLOOD BY AUTOMATED COUNT: 15.3 % (ref 11.6–15.1)
FERRITIN SERPL-MCNC: 68 NG/ML (ref 8–388)
GFR SERPL CREATININE-BSD FRML MDRD: 89 ML/MIN/1.73SQ M
GLUCOSE P FAST SERPL-MCNC: 90 MG/DL (ref 65–99)
HCT VFR BLD AUTO: 34.4 % (ref 34.8–46.1)
HGB BLD-MCNC: 11 G/DL (ref 11.5–15.4)
IMM GRANULOCYTES # BLD AUTO: 0.06 THOUSAND/UL (ref 0–0.2)
IMM GRANULOCYTES NFR BLD AUTO: 2 % (ref 0–2)
IRON SATN MFR SERPL: 25 %
IRON SERPL-MCNC: 72 UG/DL (ref 50–170)
LYMPHOCYTES # BLD AUTO: 0.36 THOUSANDS/ΜL (ref 0.6–4.47)
LYMPHOCYTES NFR BLD AUTO: 10 % (ref 14–44)
MAGNESIUM SERPL-MCNC: 2.1 MG/DL (ref 1.6–2.6)
MCH RBC QN AUTO: 31.5 PG (ref 26.8–34.3)
MCHC RBC AUTO-ENTMCNC: 32 G/DL (ref 31.4–37.4)
MCV RBC AUTO: 99 FL (ref 82–98)
MONOCYTES # BLD AUTO: 0.29 THOUSAND/ΜL (ref 0.17–1.22)
MONOCYTES NFR BLD AUTO: 8 % (ref 4–12)
NEUTROPHILS # BLD AUTO: 2.88 THOUSANDS/ΜL (ref 1.85–7.62)
NEUTS SEG NFR BLD AUTO: 77 % (ref 43–75)
NRBC BLD AUTO-RTO: 0 /100 WBCS
PLATELET # BLD AUTO: 257 THOUSANDS/UL (ref 149–390)
PMV BLD AUTO: 9.8 FL (ref 8.9–12.7)
POTASSIUM SERPL-SCNC: 4.3 MMOL/L (ref 3.5–5.3)
PROT SERPL-MCNC: 6.6 G/DL (ref 6.4–8.2)
RBC # BLD AUTO: 3.49 MILLION/UL (ref 3.81–5.12)
SODIUM SERPL-SCNC: 139 MMOL/L (ref 136–145)
TIBC SERPL-MCNC: 293 UG/DL (ref 250–450)
WBC # BLD AUTO: 3.7 THOUSAND/UL (ref 4.31–10.16)

## 2019-04-24 PROCEDURE — 96402 CHEMO HORMON ANTINEOPL SQ/IM: CPT

## 2019-04-24 PROCEDURE — 83550 IRON BINDING TEST: CPT | Performed by: INTERNAL MEDICINE

## 2019-04-24 PROCEDURE — 86300 IMMUNOASSAY TUMOR CA 15-3: CPT | Performed by: INTERNAL MEDICINE

## 2019-04-24 PROCEDURE — 82728 ASSAY OF FERRITIN: CPT | Performed by: INTERNAL MEDICINE

## 2019-04-24 PROCEDURE — 36415 COLL VENOUS BLD VENIPUNCTURE: CPT | Performed by: INTERNAL MEDICINE

## 2019-04-24 PROCEDURE — 82378 CARCINOEMBRYONIC ANTIGEN: CPT | Performed by: INTERNAL MEDICINE

## 2019-04-24 PROCEDURE — 83735 ASSAY OF MAGNESIUM: CPT | Performed by: INTERNAL MEDICINE

## 2019-04-24 PROCEDURE — 83540 ASSAY OF IRON: CPT | Performed by: INTERNAL MEDICINE

## 2019-04-24 PROCEDURE — 96401 CHEMO ANTI-NEOPL SQ/IM: CPT

## 2019-04-24 PROCEDURE — 85025 COMPLETE CBC W/AUTO DIFF WBC: CPT | Performed by: INTERNAL MEDICINE

## 2019-04-24 PROCEDURE — 80053 COMPREHEN METABOLIC PANEL: CPT | Performed by: INTERNAL MEDICINE

## 2019-04-24 RX ORDER — POTASSIUM CHLORIDE 1500 MG/1
TABLET, EXTENDED RELEASE ORAL
Qty: 30 TABLET | Refills: 0 | Status: SHIPPED | OUTPATIENT
Start: 2019-04-24 | End: 2019-05-17 | Stop reason: SDUPTHER

## 2019-04-24 RX ADMIN — FULVESTRANT 500 MG: 50 INJECTION INTRAMUSCULAR at 12:13

## 2019-04-24 RX ADMIN — DENOSUMAB 120 MG: 120 INJECTION SUBCUTANEOUS at 12:10

## 2019-04-24 NOTE — PROGRESS NOTES
To Infusion Center for xgeva and faslodex injections  Ju Maynard is currently on hold due to neutropenia  Pt denies any S/S of infection including fever, shaking chills, respiratory or urinary symptoms  Labs drawn today and pending--pt will restart Ibrance when instructed to by Med Onc  Pt confirms that she is taking po calcium daily  She denies any issues with her mouth, gums or teeth  All injections given as per orders without incident  Pt declines AVS but confirms next appointment

## 2019-05-01 ENCOUNTER — APPOINTMENT (OUTPATIENT)
Dept: LAB | Facility: CLINIC | Age: 69
End: 2019-05-01
Payer: MEDICARE

## 2019-05-01 ENCOUNTER — TELEPHONE (OUTPATIENT)
Dept: HEMATOLOGY ONCOLOGY | Facility: CLINIC | Age: 69
End: 2019-05-01

## 2019-05-01 DIAGNOSIS — C50.911 MALIGNANT NEOPLASM OF RIGHT BREAST IN FEMALE, ESTROGEN RECEPTOR POSITIVE, UNSPECIFIED SITE OF BREAST (HCC): Primary | ICD-10-CM

## 2019-05-01 DIAGNOSIS — C79.51 BONE METASTASIS (HCC): ICD-10-CM

## 2019-05-01 DIAGNOSIS — Z17.0 MALIGNANT NEOPLASM OF RIGHT BREAST IN FEMALE, ESTROGEN RECEPTOR POSITIVE, UNSPECIFIED SITE OF BREAST (HCC): Primary | ICD-10-CM

## 2019-05-01 LAB
ALBUMIN SERPL BCP-MCNC: 3.4 G/DL (ref 3.5–5)
ALP SERPL-CCNC: 89 U/L (ref 46–116)
ALT SERPL W P-5'-P-CCNC: 25 U/L (ref 12–78)
ANION GAP SERPL CALCULATED.3IONS-SCNC: 7 MMOL/L (ref 4–13)
AST SERPL W P-5'-P-CCNC: 17 U/L (ref 5–45)
BASOPHILS # BLD MANUAL: 0.08 THOUSAND/UL (ref 0–0.1)
BASOPHILS NFR MAR MANUAL: 4 % (ref 0–1)
BILIRUB SERPL-MCNC: 0.5 MG/DL (ref 0.2–1)
BUN SERPL-MCNC: 15 MG/DL (ref 5–25)
CALCIUM SERPL-MCNC: 8.8 MG/DL (ref 8.3–10.1)
CHLORIDE SERPL-SCNC: 104 MMOL/L (ref 100–108)
CO2 SERPL-SCNC: 28 MMOL/L (ref 21–32)
CREAT SERPL-MCNC: 0.58 MG/DL (ref 0.6–1.3)
EOSINOPHIL # BLD MANUAL: 0.04 THOUSAND/UL (ref 0–0.4)
EOSINOPHIL NFR BLD MANUAL: 2 % (ref 0–6)
ERYTHROCYTE [DISTWIDTH] IN BLOOD BY AUTOMATED COUNT: 14.9 % (ref 11.6–15.1)
GFR SERPL CREATININE-BSD FRML MDRD: 95 ML/MIN/1.73SQ M
GLUCOSE SERPL-MCNC: 106 MG/DL (ref 65–140)
HCT VFR BLD AUTO: 31.3 % (ref 34.8–46.1)
HGB BLD-MCNC: 10.4 G/DL (ref 11.5–15.4)
LYMPHOCYTES # BLD AUTO: 0.1 THOUSAND/UL (ref 0.6–4.47)
LYMPHOCYTES # BLD AUTO: 5 % (ref 14–44)
MCH RBC QN AUTO: 32.6 PG (ref 26.8–34.3)
MCHC RBC AUTO-ENTMCNC: 33.2 G/DL (ref 31.4–37.4)
MCV RBC AUTO: 98 FL (ref 82–98)
MONOCYTES # BLD AUTO: 0.12 THOUSAND/UL (ref 0–1.22)
MONOCYTES NFR BLD: 6 % (ref 4–12)
MYELOCYTES NFR BLD MANUAL: 1 % (ref 0–1)
NEUTROPHILS # BLD MANUAL: 1.68 THOUSAND/UL (ref 1.85–7.62)
NEUTS BAND NFR BLD MANUAL: 2 % (ref 0–8)
NEUTS SEG NFR BLD AUTO: 80 % (ref 43–75)
NRBC BLD AUTO-RTO: 0 /100 WBCS
PLATELET # BLD AUTO: 273 THOUSANDS/UL (ref 149–390)
PLATELET BLD QL SMEAR: ADEQUATE
PMV BLD AUTO: 9.4 FL (ref 8.9–12.7)
POTASSIUM SERPL-SCNC: 4.2 MMOL/L (ref 3.5–5.3)
PROT SERPL-MCNC: 6.6 G/DL (ref 6.4–8.2)
RBC # BLD AUTO: 3.19 MILLION/UL (ref 3.81–5.12)
RBC MORPH BLD: NORMAL
SODIUM SERPL-SCNC: 139 MMOL/L (ref 136–145)
TOTAL CELLS COUNTED SPEC: 100
WBC # BLD AUTO: 2.05 THOUSAND/UL (ref 4.31–10.16)

## 2019-05-01 PROCEDURE — 85027 COMPLETE CBC AUTOMATED: CPT

## 2019-05-01 PROCEDURE — 36415 COLL VENOUS BLD VENIPUNCTURE: CPT

## 2019-05-01 PROCEDURE — 80053 COMPREHEN METABOLIC PANEL: CPT

## 2019-05-01 PROCEDURE — 85007 BL SMEAR W/DIFF WBC COUNT: CPT

## 2019-05-02 ENCOUNTER — TELEPHONE (OUTPATIENT)
Dept: HEMATOLOGY ONCOLOGY | Facility: CLINIC | Age: 69
End: 2019-05-02

## 2019-05-02 DIAGNOSIS — F41.9 ANXIETY: ICD-10-CM

## 2019-05-02 DIAGNOSIS — I10 HYPERTENSION, UNSPECIFIED TYPE: ICD-10-CM

## 2019-05-02 RX ORDER — LORAZEPAM 1 MG/1
TABLET ORAL
Qty: 60 TABLET | Refills: 1 | Status: SHIPPED | OUTPATIENT
Start: 2019-05-02 | End: 2019-06-17 | Stop reason: SDUPTHER

## 2019-05-02 RX ORDER — LISINOPRIL 2.5 MG/1
2.5 TABLET ORAL DAILY
Qty: 21 TABLET | Refills: 0 | Status: SHIPPED | OUTPATIENT
Start: 2019-05-02 | End: 2019-05-21 | Stop reason: SDUPTHER

## 2019-05-08 ENCOUNTER — APPOINTMENT (OUTPATIENT)
Dept: LAB | Facility: CLINIC | Age: 69
End: 2019-05-08
Payer: MEDICARE

## 2019-05-08 ENCOUNTER — TELEPHONE (OUTPATIENT)
Dept: HEMATOLOGY ONCOLOGY | Facility: CLINIC | Age: 69
End: 2019-05-08

## 2019-05-09 ENCOUNTER — HOSPITAL ENCOUNTER (OUTPATIENT)
Dept: INFUSION CENTER | Facility: CLINIC | Age: 69
Discharge: HOME | End: 2019-05-09
Payer: MEDICARE

## 2019-05-09 PROCEDURE — 96372 THER/PROPH/DIAG INJ SC/IM: CPT

## 2019-05-09 RX ADMIN — TBO-FILGRASTIM 300 MCG: 300 INJECTION, SOLUTION SUBCUTANEOUS at 09:28

## 2019-05-09 NOTE — PROGRESS NOTES
Patient tolerated Granix injection into left upper arm without issue   Patient verified upcoming appointment and declined AVS

## 2019-05-09 NOTE — PLAN OF CARE
Problem: Potential for Falls  Goal: Patient will remain free of falls  Description  INTERVENTIONS:  - Assess patient frequently for physical needs  -  Identify cognitive and physical deficits and behaviors that affect risk of falls    -  [place] fall precautions as indicated by assessment   - Educate patient/family on patient safety including physical limitations  - Instruct patient to call for assistance with activity based on assessment  - Modify environment to reduce risk of injury  - Consider OT/PT consult to assist with strengthening/mobility  Outcome: Progressing

## 2019-05-10 ENCOUNTER — HOSPITAL ENCOUNTER (OUTPATIENT)
Dept: INFUSION CENTER | Facility: CLINIC | Age: 69
Discharge: HOME | End: 2019-05-10
Payer: MEDICARE

## 2019-05-10 VITALS — TEMPERATURE: 98.3 F

## 2019-05-10 PROCEDURE — 96372 THER/PROPH/DIAG INJ SC/IM: CPT

## 2019-05-10 RX ADMIN — TBO-FILGRASTIM 300 MCG: 300 INJECTION, SOLUTION SUBCUTANEOUS at 09:21

## 2019-05-10 NOTE — PLAN OF CARE
Problem: Potential for Falls  Goal: Patient will remain free of falls  Description  INTERVENTIONS:  - Assess patient frequently for physical needs  -  Identify cognitive and physical deficits and behaviors that affect risk of falls    -  [place] fall precautions as indicated by assessment   - Educate patient/family on patient safety including physical limitations  - Instruct patient to call for assistance with activity based on assessment  - Modify environment to reduce risk of injury  - Consider OT/PT consult to assist with strengthening/mobility  Outcome: Progressing

## 2019-05-10 NOTE — PROGRESS NOTES
Patient afebrile, tolerated Granix without complications  Copies of labwork printed per patient request  Left unit in stable condition

## 2019-05-15 ENCOUNTER — APPOINTMENT (OUTPATIENT)
Dept: LAB | Facility: CLINIC | Age: 69
End: 2019-05-15
Payer: MEDICARE

## 2019-05-16 ENCOUNTER — TELEPHONE (OUTPATIENT)
Dept: GYNECOLOGIC ONCOLOGY | Facility: CLINIC | Age: 69
End: 2019-05-16

## 2019-05-16 DIAGNOSIS — C50.911 MALIGNANT NEOPLASM OF RIGHT FEMALE BREAST, UNSPECIFIED ESTROGEN RECEPTOR STATUS, UNSPECIFIED SITE OF BREAST (HCC): Chronic | ICD-10-CM

## 2019-05-16 RX ORDER — MORPHINE SULFATE 15 MG/1
TABLET ORAL
Qty: 75 TABLET | Refills: 0 | Status: SHIPPED | OUTPATIENT
Start: 2019-05-16 | End: 2019-06-17 | Stop reason: SDUPTHER

## 2019-05-17 DIAGNOSIS — C50.911 MALIGNANT NEOPLASM OF RIGHT FEMALE BREAST, UNSPECIFIED ESTROGEN RECEPTOR STATUS, UNSPECIFIED SITE OF BREAST (HCC): Chronic | ICD-10-CM

## 2019-05-17 DIAGNOSIS — E87.6 HYPOKALEMIA: ICD-10-CM

## 2019-05-17 RX ORDER — POTASSIUM CHLORIDE 1500 MG/1
TABLET, EXTENDED RELEASE ORAL
Qty: 30 TABLET | Refills: 0 | Status: SHIPPED | OUTPATIENT
Start: 2019-05-17 | End: 2019-08-13

## 2019-05-17 RX ORDER — GABAPENTIN 100 MG/1
CAPSULE ORAL
Qty: 60 CAPSULE | Refills: 0 | OUTPATIENT
Start: 2019-05-17

## 2019-05-20 DIAGNOSIS — C50.911 BREAST CANCER METASTASIZED TO BONE, RIGHT (HCC): ICD-10-CM

## 2019-05-20 DIAGNOSIS — C79.51 BREAST CANCER METASTASIZED TO BONE, RIGHT (HCC): ICD-10-CM

## 2019-05-20 DIAGNOSIS — C79.51 BONE METASTASIS (HCC): ICD-10-CM

## 2019-05-21 ENCOUNTER — OFFICE VISIT (OUTPATIENT)
Dept: HEMATOLOGY ONCOLOGY | Facility: CLINIC | Age: 69
End: 2019-05-21
Payer: MEDICARE

## 2019-05-21 VITALS
HEIGHT: 63 IN | SYSTOLIC BLOOD PRESSURE: 128 MMHG | BODY MASS INDEX: 21.26 KG/M2 | DIASTOLIC BLOOD PRESSURE: 72 MMHG | RESPIRATION RATE: 14 BRPM | WEIGHT: 120 LBS | TEMPERATURE: 98.9 F

## 2019-05-21 DIAGNOSIS — C79.51 BREAST CANCER METASTASIZED TO BONE, RIGHT (HCC): ICD-10-CM

## 2019-05-21 DIAGNOSIS — C50.911 BREAST CANCER METASTASIZED TO BONE, RIGHT (HCC): ICD-10-CM

## 2019-05-21 DIAGNOSIS — C79.51 BONE METASTASIS (HCC): ICD-10-CM

## 2019-05-21 DIAGNOSIS — I10 HYPERTENSION, UNSPECIFIED TYPE: ICD-10-CM

## 2019-05-21 PROCEDURE — 99214 OFFICE O/P EST MOD 30 MIN: CPT | Performed by: PHYSICIAN ASSISTANT

## 2019-05-21 RX ORDER — LAMOTRIGINE 25 MG/1
500 TABLET ORAL ONCE
Status: CANCELLED | OUTPATIENT
Start: 2019-05-22

## 2019-05-22 ENCOUNTER — HOSPITAL ENCOUNTER (OUTPATIENT)
Dept: INFUSION CENTER | Facility: CLINIC | Age: 69
Discharge: HOME | End: 2019-05-22
Payer: MEDICARE

## 2019-05-22 DIAGNOSIS — C79.51 BREAST CANCER METASTASIZED TO BONE, RIGHT (HCC): Primary | ICD-10-CM

## 2019-05-22 DIAGNOSIS — C50.911 BREAST CANCER METASTASIZED TO BONE, RIGHT (HCC): Primary | ICD-10-CM

## 2019-05-22 DIAGNOSIS — C79.51 BONE METASTASIS (HCC): ICD-10-CM

## 2019-05-22 PROCEDURE — 96401 CHEMO ANTI-NEOPL SQ/IM: CPT

## 2019-05-22 RX ORDER — LISINOPRIL 2.5 MG/1
TABLET ORAL
Qty: 30 TABLET | Refills: 0 | Status: SHIPPED | OUTPATIENT
Start: 2019-05-22 | End: 2019-06-17 | Stop reason: SDUPTHER

## 2019-05-22 RX ORDER — LAMOTRIGINE 25 MG/1
500 TABLET ORAL ONCE
Status: CANCELLED | OUTPATIENT
Start: 2019-06-19

## 2019-05-22 RX ORDER — LAMOTRIGINE 25 MG/1
500 TABLET ORAL ONCE
Status: COMPLETED | OUTPATIENT
Start: 2019-05-22 | End: 2019-05-22

## 2019-05-22 RX ADMIN — FULVESTRANT 250 MG: 50 INJECTION INTRAMUSCULAR at 13:21

## 2019-05-22 RX ADMIN — DENOSUMAB 120 MG: 120 INJECTION SUBCUTANEOUS at 13:20

## 2019-05-23 DIAGNOSIS — C79.51 BREAST CANCER METASTASIZED TO BONE, RIGHT (HCC): ICD-10-CM

## 2019-05-23 DIAGNOSIS — C50.911 BREAST CANCER METASTASIZED TO BONE, RIGHT (HCC): ICD-10-CM

## 2019-05-23 DIAGNOSIS — C79.51 BONE METASTASIS (HCC): ICD-10-CM

## 2019-05-27 DIAGNOSIS — C79.51 BREAST CANCER METASTASIZED TO BONE, RIGHT (HCC): ICD-10-CM

## 2019-05-27 DIAGNOSIS — C50.911 BREAST CANCER METASTASIZED TO BONE, RIGHT (HCC): ICD-10-CM

## 2019-05-27 DIAGNOSIS — C79.51 BONE METASTASIS (HCC): ICD-10-CM

## 2019-05-30 DIAGNOSIS — D50.8 OTHER IRON DEFICIENCY ANEMIA: ICD-10-CM

## 2019-05-30 RX ORDER — OMEPRAZOLE 40 MG/1
40 CAPSULE, DELAYED RELEASE ORAL DAILY
Qty: 30 CAPSULE | Refills: 3 | Status: CANCELLED | OUTPATIENT
Start: 2019-05-30

## 2019-05-31 ENCOUNTER — TELEPHONE (OUTPATIENT)
Dept: GASTROENTEROLOGY | Facility: MEDICAL CENTER | Age: 69
End: 2019-05-31

## 2019-05-31 DIAGNOSIS — D50.8 OTHER IRON DEFICIENCY ANEMIA: ICD-10-CM

## 2019-05-31 RX ORDER — OMEPRAZOLE 40 MG/1
40 CAPSULE, DELAYED RELEASE ORAL DAILY
Qty: 30 CAPSULE | Refills: 3 | Status: SHIPPED | OUTPATIENT
Start: 2019-05-31 | End: 2019-08-27 | Stop reason: SDUPTHER

## 2019-06-14 ENCOUNTER — TRANSCRIBE ORDERS (OUTPATIENT)
Dept: LAB | Facility: CLINIC | Age: 69
End: 2019-06-14

## 2019-06-14 ENCOUNTER — APPOINTMENT (OUTPATIENT)
Dept: LAB | Facility: CLINIC | Age: 69
End: 2019-06-14
Payer: MEDICARE

## 2019-06-14 DIAGNOSIS — C79.51 BREAST CANCER METASTASIZED TO BONE, RIGHT (HCC): ICD-10-CM

## 2019-06-14 DIAGNOSIS — C50.911 BREAST CANCER METASTASIZED TO BONE, RIGHT (HCC): ICD-10-CM

## 2019-06-17 ENCOUNTER — OFFICE VISIT (OUTPATIENT)
Dept: PALLIATIVE MEDICINE | Facility: CLINIC | Age: 69
End: 2019-06-17
Payer: MEDICARE

## 2019-06-17 VITALS
SYSTOLIC BLOOD PRESSURE: 118 MMHG | RESPIRATION RATE: 14 BRPM | TEMPERATURE: 98.4 F | HEIGHT: 63 IN | BODY MASS INDEX: 21.44 KG/M2 | WEIGHT: 121 LBS | OXYGEN SATURATION: 97 % | DIASTOLIC BLOOD PRESSURE: 70 MMHG | HEART RATE: 76 BPM

## 2019-06-17 DIAGNOSIS — F41.9 ANXIETY: ICD-10-CM

## 2019-06-17 DIAGNOSIS — C50.911 MALIGNANT NEOPLASM OF RIGHT FEMALE BREAST, UNSPECIFIED ESTROGEN RECEPTOR STATUS, UNSPECIFIED SITE OF BREAST (HCC): Chronic | ICD-10-CM

## 2019-06-17 DIAGNOSIS — I10 HYPERTENSION, UNSPECIFIED TYPE: ICD-10-CM

## 2019-06-17 DIAGNOSIS — G89.3 CANCER ASSOCIATED PAIN: Primary | ICD-10-CM

## 2019-06-17 DIAGNOSIS — C79.51 BONE METASTASIS (HCC): ICD-10-CM

## 2019-06-17 PROCEDURE — 99214 OFFICE O/P EST MOD 30 MIN: CPT | Performed by: NURSE PRACTITIONER

## 2019-06-17 RX ORDER — GABAPENTIN 100 MG/1
100 CAPSULE ORAL 2 TIMES DAILY
Qty: 60 CAPSULE | Refills: 1 | Status: SHIPPED | OUTPATIENT
Start: 2019-06-17 | End: 2019-08-13 | Stop reason: SDUPTHER

## 2019-06-17 RX ORDER — MORPHINE SULFATE 15 MG/1
TABLET ORAL
Qty: 75 TABLET | Refills: 0 | Status: SHIPPED | OUTPATIENT
Start: 2019-06-17 | End: 2019-07-30 | Stop reason: SDUPTHER

## 2019-06-17 RX ORDER — LISINOPRIL 2.5 MG/1
2.5 TABLET ORAL DAILY
Qty: 30 TABLET | Refills: 0 | Status: SHIPPED | OUTPATIENT
Start: 2019-06-17 | End: 2019-07-09 | Stop reason: ALTCHOICE

## 2019-06-17 RX ORDER — LORAZEPAM 1 MG/1
TABLET ORAL
Qty: 60 TABLET | Refills: 1 | Status: SHIPPED | OUTPATIENT
Start: 2019-06-17 | End: 2019-08-26 | Stop reason: SDUPTHER

## 2019-06-17 RX ORDER — LISINOPRIL 2.5 MG/1
TABLET ORAL
Qty: 30 TABLET | Refills: 0 | OUTPATIENT
Start: 2019-06-17

## 2019-06-19 ENCOUNTER — HOSPITAL ENCOUNTER (OUTPATIENT)
Dept: INFUSION CENTER | Facility: CLINIC | Age: 69
Discharge: HOME | End: 2019-06-19
Payer: MEDICARE

## 2019-06-19 VITALS — TEMPERATURE: 98.4 F

## 2019-06-19 DIAGNOSIS — C79.51 BREAST CANCER METASTASIZED TO BONE, RIGHT (HCC): ICD-10-CM

## 2019-06-19 DIAGNOSIS — C50.911 BREAST CANCER METASTASIZED TO BONE, RIGHT (HCC): ICD-10-CM

## 2019-06-19 DIAGNOSIS — C79.51 BONE METASTASIS (HCC): Primary | ICD-10-CM

## 2019-06-19 PROCEDURE — 96402 CHEMO HORMON ANTINEOPL SQ/IM: CPT

## 2019-06-19 RX ORDER — LAMOTRIGINE 25 MG/1
500 TABLET ORAL ONCE
Status: CANCELLED | OUTPATIENT
Start: 2019-07-17

## 2019-06-19 RX ORDER — LAMOTRIGINE 25 MG/1
500 TABLET ORAL ONCE
Status: COMPLETED | OUTPATIENT
Start: 2019-06-19 | End: 2019-06-19

## 2019-06-19 RX ADMIN — DENOSUMAB 120 MG: 120 INJECTION SUBCUTANEOUS at 11:25

## 2019-06-19 RX ADMIN — FULVESTRANT 500 MG: 50 INJECTION INTRAMUSCULAR at 11:30

## 2019-06-19 NOTE — PROGRESS NOTES
Pt to clinic for faslodex and xgeva injections, calcium checked with Kailee Lassiter RN at 8 8 ok to give within parameters, pt offers no complaints, aware of next appointment, avs printed

## 2019-06-19 NOTE — PLAN OF CARE
Problem: Potential for Falls  Goal: Patient will remain free of falls  Description  INTERVENTIONS:  - Assess patient frequently for physical needs  -  Identify cognitive and physical deficits and behaviors that affect risk of falls  -  [place] fall precautions as indicated by assessment   - Educate patient/family on patient safety including physical limitations  - Instruct patient to call for assistance with activity based on assessment  - Modify environment to reduce risk of injury  - Consider OT/PT consult to assist with strengthening/mobility  Outcome: Progressing     Problem: Knowledge Deficit  Goal: Patient/family/caregiver demonstrates understanding of disease process, treatment plan, medications, and discharge instructions  Description  Complete learning assessment and assess knowledge base    Interventions:  - Provide teaching at level of understanding  - Provide teaching via preferred learning methods  Outcome: Progressing

## 2019-06-25 ENCOUNTER — OFFICE VISIT (OUTPATIENT)
Dept: HEMATOLOGY ONCOLOGY | Facility: CLINIC | Age: 69
End: 2019-06-25
Payer: MEDICARE

## 2019-06-25 VITALS
OXYGEN SATURATION: 97 % | DIASTOLIC BLOOD PRESSURE: 70 MMHG | TEMPERATURE: 98.7 F | HEART RATE: 76 BPM | WEIGHT: 124.5 LBS | BODY MASS INDEX: 22.06 KG/M2 | SYSTOLIC BLOOD PRESSURE: 108 MMHG | HEIGHT: 63 IN | RESPIRATION RATE: 18 BRPM

## 2019-06-25 DIAGNOSIS — C79.51 BREAST CANCER METASTASIZED TO BONE, RIGHT (HCC): Chronic | ICD-10-CM

## 2019-06-25 DIAGNOSIS — C79.51 BONE METASTASIS (HCC): ICD-10-CM

## 2019-06-25 DIAGNOSIS — C50.911 BREAST CANCER METASTASIZED TO BONE, RIGHT (HCC): Chronic | ICD-10-CM

## 2019-06-25 DIAGNOSIS — Z17.0 MALIGNANT NEOPLASM OF UPPER-OUTER QUADRANT OF RIGHT BREAST IN FEMALE, ESTROGEN RECEPTOR POSITIVE (HCC): Primary | ICD-10-CM

## 2019-06-25 DIAGNOSIS — C50.411 MALIGNANT NEOPLASM OF UPPER-OUTER QUADRANT OF RIGHT BREAST IN FEMALE, ESTROGEN RECEPTOR POSITIVE (HCC): Primary | ICD-10-CM

## 2019-06-25 PROCEDURE — 99214 OFFICE O/P EST MOD 30 MIN: CPT | Performed by: INTERNAL MEDICINE

## 2019-06-27 ENCOUNTER — CONSULT (OUTPATIENT)
Dept: GYNECOLOGIC ONCOLOGY | Facility: CLINIC | Age: 69
End: 2019-06-27
Payer: MEDICARE

## 2019-06-27 VITALS
TEMPERATURE: 98.2 F | WEIGHT: 124 LBS | HEART RATE: 70 BPM | SYSTOLIC BLOOD PRESSURE: 108 MMHG | HEIGHT: 63 IN | DIASTOLIC BLOOD PRESSURE: 64 MMHG | BODY MASS INDEX: 21.97 KG/M2

## 2019-06-27 DIAGNOSIS — K29.01 OTHER ACUTE GASTRITIS WITH HEMORRHAGE: ICD-10-CM

## 2019-06-27 DIAGNOSIS — Z17.0 MALIGNANT NEOPLASM OF UPPER-OUTER QUADRANT OF RIGHT BREAST IN FEMALE, ESTROGEN RECEPTOR POSITIVE (HCC): ICD-10-CM

## 2019-06-27 DIAGNOSIS — C50.911 BREAST CANCER METASTASIZED TO BONE, RIGHT (HCC): Primary | Chronic | ICD-10-CM

## 2019-06-27 DIAGNOSIS — C79.51 BREAST CANCER METASTASIZED TO BONE, RIGHT (HCC): Primary | Chronic | ICD-10-CM

## 2019-06-27 DIAGNOSIS — C50.411 MALIGNANT NEOPLASM OF UPPER-OUTER QUADRANT OF RIGHT BREAST IN FEMALE, ESTROGEN RECEPTOR POSITIVE (HCC): ICD-10-CM

## 2019-06-27 PROCEDURE — 99212 OFFICE O/P EST SF 10 MIN: CPT | Performed by: PHYSICIAN ASSISTANT

## 2019-06-27 PROCEDURE — 1124F ACP DISCUSS-NO DSCNMKR DOCD: CPT | Performed by: PHYSICIAN ASSISTANT

## 2019-06-27 RX ORDER — SUCRALFATE 1 G/1
1 TABLET ORAL
Qty: 60 TABLET | Refills: 3 | Status: SHIPPED | OUTPATIENT
Start: 2019-06-27 | End: 2019-11-14 | Stop reason: DRUGHIGH

## 2019-07-08 ENCOUNTER — TELEPHONE (OUTPATIENT)
Dept: HEMATOLOGY ONCOLOGY | Facility: CLINIC | Age: 69
End: 2019-07-08

## 2019-07-08 NOTE — TELEPHONE ENCOUNTER
Called pt to check on effect of pain meds on headache and has improved  Pt not sure if it is sinuses or cold  Informed can take OTC cold medicine  Requested update if worsens

## 2019-07-08 NOTE — TELEPHONE ENCOUNTER
Patient called in stated that she needs to speak with Kaity Cox, she is getting bad headaches at night time and nausea  She still has the headache across her forehead, sinuses and nausea from last night  She wants to know what she should take  Please call and advise   [phone]

## 2019-07-08 NOTE — TELEPHONE ENCOUNTER
Spoke with pt who developed a headache over night that goes over sinuses  When got out of bed became nauseated  Did not vomit  Did not take morning pills including pain medication  Instructed that this is not r/t treatment  To take pain medication and if does not help contact PCP  Pt agreeable

## 2019-07-09 ENCOUNTER — TELEPHONE (OUTPATIENT)
Dept: GYNECOLOGIC ONCOLOGY | Facility: CLINIC | Age: 69
End: 2019-07-09

## 2019-07-09 DIAGNOSIS — I10 HYPERTENSION, UNSPECIFIED TYPE: ICD-10-CM

## 2019-07-09 DIAGNOSIS — C50.911 MALIGNANT NEOPLASM OF RIGHT FEMALE BREAST, UNSPECIFIED ESTROGEN RECEPTOR STATUS, UNSPECIFIED SITE OF BREAST (HCC): Chronic | ICD-10-CM

## 2019-07-09 RX ORDER — LISINOPRIL 2.5 MG/1
TABLET ORAL
Qty: 30 TABLET | Refills: 0 | OUTPATIENT
Start: 2019-07-09

## 2019-07-09 RX ORDER — GABAPENTIN 100 MG/1
CAPSULE ORAL
Qty: 60 CAPSULE | Refills: 1 | OUTPATIENT
Start: 2019-07-09

## 2019-07-09 NOTE — TELEPHONE ENCOUNTER
Patient notified of genetic testing results, LMOM  No pathogenic mutation identified in [facility] Breast panel  Copy mailed to patient and scanned into Epic

## 2019-07-12 ENCOUNTER — APPOINTMENT (OUTPATIENT)
Dept: LAB | Facility: CLINIC | Age: 69
End: 2019-07-12
Payer: MEDICARE

## 2019-07-12 DIAGNOSIS — C79.51 BREAST CANCER METASTASIZED TO BONE, RIGHT (HCC): ICD-10-CM

## 2019-07-12 DIAGNOSIS — C50.911 BREAST CANCER METASTASIZED TO BONE, RIGHT (HCC): ICD-10-CM

## 2019-07-12 DIAGNOSIS — C79.51 BONE METASTASIS (HCC): ICD-10-CM

## 2019-07-15 ENCOUNTER — CLINICAL SUPPORT (OUTPATIENT)
Dept: RADIATION ONCOLOGY | Facility: HOSPITAL | Age: 69
End: 2019-07-15
Attending: RADIOLOGY
Payer: MEDICARE

## 2019-07-15 VITALS
TEMPERATURE: 99.2 F | DIASTOLIC BLOOD PRESSURE: 70 MMHG | HEIGHT: 63 IN | WEIGHT: 116.2 LBS | BODY MASS INDEX: 20.59 KG/M2 | HEART RATE: 72 BPM | SYSTOLIC BLOOD PRESSURE: 146 MMHG | RESPIRATION RATE: 14 BRPM | OXYGEN SATURATION: 98 %

## 2019-07-15 DIAGNOSIS — C79.51 BREAST CANCER METASTASIZED TO BONE, RIGHT (HCC): Primary | Chronic | ICD-10-CM

## 2019-07-15 DIAGNOSIS — C50.911 BREAST CANCER METASTASIZED TO BONE, RIGHT (HCC): Primary | Chronic | ICD-10-CM

## 2019-07-15 PROCEDURE — 99211 OFF/OP EST MAY X REQ PHY/QHP: CPT | Performed by: RADIOLOGY

## 2019-07-15 NOTE — PROGRESS NOTES
Clemencia Hernandez 1950 is a 76 y o  female       Follow up visit   Returns for follow up post radiation to T11 through SI joints completed on 1/9/19  She was last seen in follow up on 4/8/19 6/17/19 CAITIE Torrez  Back pain has been stable with MSIR BID and gabapentin BID     6/25/19 Dr Nia Hunt, stopped the Ibrance, WBC too low  Continues   on Faslodex and Xgeva    6/27/19 Visit with Ralph King for genetic testing No pathogenic mutation identified in [facility] Breast panel       Breast cancer metastasized to bone, right [facility])    1996 Surgery     right modified radical mastectomy followed by adjuvant systemic therapy but no radiation  6/28/2004 Biopsy     Final Diagnosis  A  Lymph Node, right supraclavicular, core biopsy (14 slides, [id number], [facility]; collected on 6/28/2004):  -  Consistent with metastatic carcinoma, compatible with breast origin (see note)  2004 -  Radiation     salvage radiation therapy at an outside facility to right supraclavicular nodes at [facility]      12/23/2018 Initial Diagnosis     Breast cancer metastasized to bone, right ([facility] )      12/26/2018 - 1/9/2019 Radiation     3000 cGy in 10 fractions to  T11 through the bottom of the SI joints  Dr Markel Washburn        1/16/2019 -  Chemotherapy     fulvestrant (FASLODEX) IM injection           Chemotherapy         2/20/2019 -  Chemotherapy     Ibrance 125 mg PO day 1-21 every 28 days      4/4/2019 - 5/8/2019 Chemotherapy     Ibrance 100 mg PO daily day 1-21 every 28 days       5/22/2019 -  Chemotherapy     Faslodex, Khalida Shoe      6/27/2019 Genetic Testing      No pathogenic mutation identified in [facility] Breast panel         Clinical Trial: No  Imaging    Health Maintenance   Topic Date Due    Hepatitis C Screening  1950    Medicare Annual Wellness Visit (AWV)  1950    CRC Screening: Colonoscopy  1950    HEPATITIS B VACCINES (1 of 3 - Risk 3-dose series) 07/29/1969    DTaP,Tdap,and Td Vaccines (1 - Tdap) 07/29/1971    Fall Risk  07/29/2015    Urinary Incontinence Screening  07/29/2015    Pneumococcal Vaccine: 65+ Years (1 of 2 - PCV13) 07/29/2015    INFLUENZA VACCINE  07/01/2019    MAMMOGRAM  09/17/2019    Depression Screening PHQ  02/06/2020    BMI: Adult  06/27/2020    Pneumococcal Vaccine: Pediatrics (0 to 5 Years) and At-Risk Patients (6 to 59 Years)  Aged Out       Patient Active Problem List   Diagnosis    Elevated alkaline phosphatase level    Anemia    Compression fracture of thoracic spine, non-traumatic (HCC)    Anxiety disorder    Hypokalemia    Hypomagnesemia    Breast cancer metastasized to bone, right (HCC)    Severe protein-calorie malnutrition (HCC)    Bone metastasis (HCC)    Lung nodules    Absolute anemia    Heme positive stool    Leukopenia    Hypocalcemia    Diarrhea    Dehydration    Neutropenia (HCC)    Localized edema    Hypoalbuminemia    Lytic bone lesion of femur    Non-intractable vomiting with nausea    Compression fracture of thoracic vertebra (HCC)    Cancer associated pain    Hypertension    Malignant neoplasm of upper-outer quadrant of right breast in female, estrogen receptor positive ([facility] )     Past Medical History:   Diagnosis Date    Cancer [facility])     breast 1996    Cancer ([facility] )     bone      Compression fracture of thoracic vertebra (HCC)     Diabetes mellitus ([facility] )     History of therapeutic radiation     Tendonitis      Past Surgical History:   Procedure Laterality Date    CHOLECYSTECTOMY      MASTECTOMY      Right side reconstruction    NECK SURGERY      [place] ESOPHAGOGASTRODUODENOSCOPY TRANSORAL DIAGNOSTIC N/A 1/24/2019    Procedure: ESOPHAGOGASTRODUODENOSCOPY (EGD); Surgeon: Rodney Roque MD;  Location: [facility];   Service: Gastroenterology    TONSILLECTOMY       Family History   Problem Relation Age of Onset    Diabetes Mother     Cancer Mother     Lymphoma Father     KAROLINA disease Brother     Colon cancer Maternal Grandfather      Social History     Socioeconomic History    Marital status:       Spouse name: Not on file    Number of children: 2    Years of education: Not on file    Highest education level: Not on file   Occupational History    Occupation: Retired   Social Needs    Financial resource strain: Not on file    Food insecurity:     Worry: Not on file     Inability: Not on file   [employer] needs:     Medical: Not on file     Non-medical: Not on file   Tobacco Use    Smoking status: Never Smoker    Smokeless tobacco: Never Used   Substance and Sexual Activity    Alcohol use: No    Drug use: No    Sexual activity: Not on file   Lifestyle    Physical activity:     Days per week: Not on file     Minutes per session: Not on file    Stress: Not on file   Relationships    Social connections:     Talks on phone: Not on file     Gets together: Not on file     Attends Mosque service: Not on file     Active member of club or organization: Not on file     Attends meetings of clubs or organizations: Not on file     Relationship status: Not on file    Intimate partner violence:     Fear of current or ex partner: Not on file     Emotionally abused: Not on file     Physically abused: Not on file     Forced sexual activity: Not on file   Other Topics Concern    Not on file   Social History Narrative    Not on file       Current Outpatient Medications:     acetaminophen (TYLENOL) 325 mg tablet, Take 2 tablets (650 mg total) by mouth 4 (four) times a day (with meals and at bedtime), Disp: 80 tablet, Rfl: 1    calcium carbonate (OS-CLARENCE) 1250 (500 Ca) MG chewable tablet, Chew 1 tablet (1,250 mg total) daily, Disp: 30 tablet, Rfl: 0    Cholecalciferol (VITAMIN D3) 2000 units TABS, Take 2,000 Units by mouth daily, Disp: , Rfl:     ferrous sulfate 325 (65 Fe) mg tablet, Take 1 tablet (325 mg total) by mouth daily with breakfast, Disp: 30 tablet, Rfl: 1    gabapentin (NEURONTIN) 100 mg capsule, Take 1 capsule (100 mg total) by mouth 2 (two) times a day, Disp: 60 capsule, Rfl: 1    LORazepam (ATIVAN) 1 mg tablet, Take 2mg (2 tablets) qhs , Disp: 60 tablet, Rfl: 1    morphine (MSIR) 15 mg tablet, Use 1 tab BID scheduled to anticipate cancer pain  May have 1 tabs q4hrs PRN mod-severe pain , Disp: 75 tablet, Rfl: 0    MULTIPLE VITAMINS PO, Take by mouth, Disp: , Rfl:     Omega-3 Fatty Acids (FISH OIL PO), Take 2 g by mouth , Disp: , Rfl:     omeprazole (PriLOSEC) 40 MG capsule, Take 1 capsule (40 mg total) by mouth daily, Disp: 30 capsule, Rfl: 3    senna (SENOKOT) 8 6 mg, Take 1 tablet by mouth as needed for constipation, Disp: , Rfl:     sucralfate (CARAFATE) 1 g tablet, TAKE 1 TABLET (1 G TOTAL) BY MOUTH 2 (TWO) TIMES A DAY BEFORE MEALS, Disp: 60 tablet, Rfl: 3    KLOR-CON M20 20 MEQ tablet, TAKE 1 TABLET BY MOUTH EVERY DAY (Patient not taking: Reported on 6/17/2019), Disp: 30 tablet, Rfl: 0    palbociclib (IBRANCE) 100 MG capsule, Take 100 mg by mouth x 21 days then off for 7 days, Disp: , Rfl:   No Known Allergies    Review of Systems:  Review of Systems   Constitutional: Positive for unexpected weight change (wt decreased 4 lbs)  HENT: Negative  Eyes: Negative  Respiratory: Negative  Cardiovascular: Negative  Gastrointestinal: Negative  Endocrine: Negative  Genitourinary: Negative  Musculoskeletal: Positive for gait problem (uses a walker, to steady her  )  Skin: Negative  Allergic/Immunologic: Negative  Neurological: Positive for headaches (one episode only, aong with h/a  )  Hematological: Negative  Psychiatric/Behavioral: Negative  Vitals:    07/15/19 1258   BP: 146/70   Pulse: 72   Resp: 14   Temp: 99 2 °F (37 3 °C)   SpO2: 98%   Weight: 52 7 kg (116 lb 3 2 oz)   Height: 5' 2 5" (1 588 m)            Imaging:No results found      Teaching wear sunscreen

## 2019-07-15 NOTE — PROGRESS NOTES
Follow-up - Radiation Oncology   Cinthya Ames 1950 76 y o  female [id number]      History of Present Illness   Cancer Staging  No matching staging information was found for the patient  Cinthya Ames returns today for routine follow-up  She denies any worsening back pain since the time of her last visit  She continues to occasionally wear a back brace, particularly when she is more tired, but otherwise is comfortable without it  She is ambulating with the use of a walker  She continues to receive Faslodex and Xgeva under the care of Medical Oncology, but her Juris Nicks had to be discontinued due to neutropenia  Returns for follow up post radiation to T11 through SI joints completed on 1/9/19  She was last seen in follow up on 4/8/19 6/17/19 CAITIE Tanner  Back pain has been stable with MSIR BID and gabapentin BID     6/25/19 Dr Inder Oliveira, stopped the Ibrance, WBC too low  Continues   on Faslodex and Xgeva    6/27/19 Visit with [facility] for genetic testing No pathogenic mutation identified in [facility] Breast panel      Historical Information      Breast cancer metastasized to bone, right [facility])    1996 Surgery     right modified radical mastectomy followed by adjuvant systemic therapy but no radiation  6/28/2004 Biopsy     Final Diagnosis  A  Lymph Node, right supraclavicular, core biopsy (14 slides, [id number], [facility]; collected on 6/28/2004):  -  Consistent with metastatic carcinoma, compatible with breast origin (see note)  2004 -  Radiation     salvage radiation therapy at an outside facility to right supraclavicular nodes at [facility]      12/23/2018 Initial Diagnosis     Breast cancer metastasized to bone, right ([facility] [address] )      12/26/2018 - 1/9/2019 Radiation     3000 cGy in 10 fractions to  T11 through the bottom of the SI joints  Dr Tika Adams        1/16/2019 -  Chemotherapy     fulvestrant (FASLODEX) IM injection           Chemotherapy         2/20/2019 -  Chemotherapy Ibrance 125 mg PO day 1-21 every 28 days      4/4/2019 - 5/8/2019 Chemotherapy     Ibrance 100 mg PO daily day 1-21 every 28 days       5/22/2019 -  Chemotherapy     Faslodex, [facility]      6/27/2019 Genetic Testing      No pathogenic mutation identified in [facility] Breast panel         Past Medical History:   Diagnosis Date    Cancer [facility])     breast 1996    Cancer ([facility] [address]     bone      Compression fracture of thoracic vertebra ([facility] [address]     Diabetes mellitus ([facility] [address] [facility]     History of therapeutic radiation     Tendonitis      Past Surgical History:   Procedure Laterality Date    CHOLECYSTECTOMY      MASTECTOMY      Right side reconstruction    NECK SURGERY      IN ESOPHAGOGASTRODUODENOSCOPY TRANSORAL DIAGNOSTIC N/A 1/24/2019    Procedure: ESOPHAGOGASTRODUODENOSCOPY (EGD); Surgeon: Concepción Lara MD;  Location: [facility];   Service: Gastroenterology    TONSILLECTOMY         Social History   Social History     Substance and Sexual Activity   Alcohol Use No     Social History     Substance and Sexual Activity   Drug Use No     Social History     Tobacco Use   Smoking Status Never Smoker   Smokeless Tobacco Never Used         Meds/Allergies     Current Outpatient Medications:     acetaminophen (TYLENOL) 325 mg tablet, Take 2 tablets (650 mg total) by mouth 4 (four) times a day (with meals and at bedtime), Disp: 80 tablet, Rfl: 1    calcium carbonate (OS-CLARENCE) 1250 (500 Ca) MG chewable tablet, Chew 1 tablet (1,250 mg total) daily, Disp: 30 tablet, Rfl: 0    Cholecalciferol (VITAMIN D3) 2000 units TABS, Take 2,000 Units by mouth daily, Disp: , Rfl:     ferrous sulfate 325 (65 Fe) mg tablet, Take 1 tablet (325 mg total) by mouth daily with breakfast, Disp: 30 tablet, Rfl: 1    gabapentin (NEURONTIN) 100 mg capsule, Take 1 capsule (100 mg total) by mouth 2 (two) times a day, Disp: 60 capsule, Rfl: 1    LORazepam (ATIVAN) 1 mg tablet, Take 2mg (2 tablets) qhs , Disp: 60 tablet, Rfl: 1    morphine (MSIR) 15 mg tablet, Use 1 tab BID scheduled to anticipate cancer pain  May have 1 tabs q4hrs PRN mod-severe pain , Disp: 75 tablet, Rfl: 0    MULTIPLE VITAMINS PO, Take by mouth, Disp: , Rfl:     Omega-3 Fatty Acids (FISH OIL PO), Take 2 g by mouth , Disp: , Rfl:     omeprazole (PriLOSEC) 40 MG capsule, Take 1 capsule (40 mg total) by mouth daily, Disp: 30 capsule, Rfl: 3    senna (SENOKOT) 8 6 mg, Take 1 tablet by mouth as needed for constipation, Disp: , Rfl:     sucralfate (CARAFATE) 1 g tablet, TAKE 1 TABLET (1 G TOTAL) BY MOUTH 2 (TWO) TIMES A DAY BEFORE MEALS, Disp: 60 tablet, Rfl: 3    KLOR-CON M20 20 MEQ tablet, TAKE 1 TABLET BY MOUTH EVERY DAY (Patient not taking: Reported on 6/17/2019), Disp: 30 tablet, Rfl: 0    palbociclib (IBRANCE) 100 MG capsule, Take 100 mg by mouth x 21 days then off for 7 days, Disp: , Rfl:   No Known Allergies      Review of Systems   Review of Systems   Constitutional: Positive for unexpected weight change (wt decreased 4 lbs)  HENT: Negative  Eyes: Negative  Respiratory: Negative  Cardiovascular: Negative  Gastrointestinal: Negative  Endocrine: Negative  Genitourinary: Negative  Musculoskeletal: Positive for gait problem (uses a walker, to steady her  )  Skin: Negative  Allergic/Immunologic: Negative  Neurological: Positive for headaches (one episode only, aong with h/a  )  Hematological: Negative  Psychiatric/Behavioral: Negative  OBJECTIVE:   /70   Pulse 72   Temp 99 2 °F (37 3 °C)   Resp 14   Ht 5' 2 5" (1 588 m)   Wt 52 7 kg (116 lb 3 2 oz)   LMP 05/27/1996 (Exact Date)   SpO2 98%   BMI 20 91 kg/m²   Pain Assessment:  0  Karnofsky: 70 - Cares for self; unable to carry on normal activity or do normal work     Physical Exam   The patient presents today no apparent distress  Sclera anicteric  Normal respiratory effort  Normal speech  Normal affect  Ambulates with the use of a 4 point walker    No swelling of the lower extremities  RESULTS    Lab Results:   Recent Results (from the past 672 hour(s))   CBC and differential    Collection Time: 07/12/19 11:03 AM   Result Value Ref Range    WBC 3 22 (L) 4 31 - 10 16 Thousand/uL    RBC 3 82 3 81 - 5 12 Million/uL    Hemoglobin 11 5 11 5 - 15 4 g/dL    Hematocrit 36 8 34 8 - 46 1 %    MCV 96 82 - 98 fL    MCH 30 1 26 8 - 34 3 pg    MCHC 31 3 (L) 31 4 - 37 4 g/dL    RDW 12 2 11 6 - 15 1 %    MPV 9 5 8 9 - 12 7 fL    Platelets 866 799 - 770 Thousands/uL    nRBC 0 /100 WBCs    Neutrophils Relative 72 43 - 75 %    Immat GRANS % 0 0 - 2 %    Lymphocytes Relative 13 (L) 14 - 44 %    Monocytes Relative 12 4 - 12 %    Eosinophils Relative 2 0 - 6 %    Basophils Relative 1 0 - 1 %    Neutrophils Absolute 2 34 1 85 - 7 62 Thousands/µL    Immature Grans Absolute 0 01 0 00 - 0 20 Thousand/uL    Lymphocytes Absolute 0 41 (L) 0 60 - 4 47 Thousands/µL    Monocytes Absolute 0 37 0 17 - 1 22 Thousand/µL    Eosinophils Absolute 0 07 0 00 - 0 61 Thousand/µL    Basophils Absolute 0 02 0 00 - 0 10 Thousands/µL   Comprehensive metabolic panel    Collection Time: 07/12/19 11:03 AM   Result Value Ref Range    Sodium 140 136 - 145 mmol/L    Potassium 3 9 3 5 - 5 3 mmol/L    Chloride 104 100 - 108 mmol/L    CO2 28 21 - 32 mmol/L    ANION GAP 8 4 - 13 mmol/L    BUN 14 5 - 25 mg/dL    Creatinine 0 59 (L) 0 60 - 1 30 mg/dL    Glucose, Fasting 91 65 - 99 mg/dL    Calcium 8 0 (L) 8 3 - 10 1 mg/dL    AST 21 5 - 45 U/L    ALT 23 12 - 78 U/L    Alkaline Phosphatase 104 46 - 116 U/L    Total Protein 6 7 6 4 - 8 2 g/dL    Albumin 3 7 3 5 - 5 0 g/dL    Total Bilirubin 0 70 0 20 - 1 00 mg/dL    eGFR 94 ml/min/1 73sq m       Imaging Studies:No results found  Assessment/Plan:  Orders Placed This Encounter   Procedures    MRI thoracic spine wo contrast    MRI lumbar spine wo contrast        Elex Bis returns today for routine follow-up overall feeling well  She denies any significant back discomfort  She denies any numbness or weakness of the lower extremities  She continues to follow closely with Medical Oncology and will return to our department 3 months with repeat MRI of the thoracic and lumbar spine  Eleazar Polanco MD  9/84/3515,8:56 PM    Portions of the record may have been created with voice recognition software   Occasional wrong word or "sound a like" substitutions may have occurred due to the inherent limitations of voice recognition software   Read the chart carefully and recognize, using context, where substitutions have occurred

## 2019-07-17 ENCOUNTER — HOSPITAL ENCOUNTER (OUTPATIENT)
Dept: INFUSION CENTER | Facility: CLINIC | Age: 69
Discharge: HOME | End: 2019-07-17
Payer: MEDICARE

## 2019-07-17 VITALS
DIASTOLIC BLOOD PRESSURE: 70 MMHG | HEART RATE: 69 BPM | SYSTOLIC BLOOD PRESSURE: 132 MMHG | RESPIRATION RATE: 18 BRPM | TEMPERATURE: 98.4 F

## 2019-07-17 DIAGNOSIS — C79.51 BREAST CANCER METASTASIZED TO BONE, RIGHT (HCC): ICD-10-CM

## 2019-07-17 DIAGNOSIS — C50.911 BREAST CANCER METASTASIZED TO BONE, RIGHT (HCC): ICD-10-CM

## 2019-07-17 DIAGNOSIS — C79.51 BONE METASTASIS (HCC): Primary | ICD-10-CM

## 2019-07-17 PROCEDURE — 96401 CHEMO ANTI-NEOPL SQ/IM: CPT

## 2019-07-17 PROCEDURE — 96402 CHEMO HORMON ANTINEOPL SQ/IM: CPT

## 2019-07-17 RX ORDER — LAMOTRIGINE 25 MG/1
500 TABLET ORAL ONCE
Status: COMPLETED | OUTPATIENT
Start: 2019-07-17 | End: 2019-07-17

## 2019-07-17 RX ORDER — LAMOTRIGINE 25 MG/1
500 TABLET ORAL ONCE
Status: CANCELLED | OUTPATIENT
Start: 2019-08-14

## 2019-07-17 RX ADMIN — DENOSUMAB 120 MG: 120 INJECTION SUBCUTANEOUS at 12:03

## 2019-07-17 RX ADMIN — FULVESTRANT 500 MG: 50 INJECTION INTRAMUSCULAR at 12:17

## 2019-07-17 NOTE — PROGRESS NOTES
Pt received Xgeva SQ inj in JOJO & faslodex IM inj in B/L gluteus  Tolerated well, offers no complaints  Confirmed next appt

## 2019-07-30 DIAGNOSIS — C50.911 MALIGNANT NEOPLASM OF RIGHT FEMALE BREAST, UNSPECIFIED ESTROGEN RECEPTOR STATUS, UNSPECIFIED SITE OF BREAST (HCC): Chronic | ICD-10-CM

## 2019-07-30 RX ORDER — MORPHINE SULFATE 15 MG/1
TABLET ORAL
Qty: 75 TABLET | Refills: 0 | Status: SHIPPED | OUTPATIENT
Start: 2019-07-30 | End: 2019-08-13 | Stop reason: SDUPTHER

## 2019-08-09 ENCOUNTER — APPOINTMENT (OUTPATIENT)
Dept: LAB | Facility: CLINIC | Age: 69
End: 2019-08-09
Payer: MEDICARE

## 2019-08-09 DIAGNOSIS — C50.911 BREAST CANCER METASTASIZED TO BONE, RIGHT (HCC): ICD-10-CM

## 2019-08-09 DIAGNOSIS — C79.51 BREAST CANCER METASTASIZED TO BONE, RIGHT (HCC): ICD-10-CM

## 2019-08-12 NOTE — PROGRESS NOTES
Palliative and Supportive Care   Jose Teran 71 y o  female [id number]    Assessment/Plan:  1  Malignant neoplasm of upper-outer quadrant of right breast in female, estrogen receptor positive ([facility] [place] [address] )    2  Bone metastasis ([facility] [address] )    3  Cancer associated pain    4  Anxiety disorder, unspecified type    5  Non-traumatic compression fracture of T12 thoracic vertebra with routine healing, subsequent encounter    6  Ambulatory dysfunction      Requested Prescriptions     Signed Prescriptions Disp Refills    gabapentin (NEURONTIN) 100 mg capsule 60 capsule 5     Sig: Take 1 capsule (100 mg total) by mouth 2 (two) times a day    morphine (MSIR) 15 mg tablet 65 tablet 0     Sig: Use 1 tab BID scheduled to anticipate cancer pain  May have 1 tabs q4hrs PRN mod-severe pain  Will inquire with her med/onc team if she could engage in a program of PT for gait and balance training as she questions which would be the best assistive device for her - cane vs walker  Addendum - clearance obtained from med/onc  Orders Placed This Encounter   Procedures    Ambulatory referral to Physical Therapy     Representatives have queried the patient's controlled substance dispensing history in the Prescription Drug Monitoring Program in compliance with regulations before I have prescribed any controlled substances  The prescription history is consistent with prescribed therapy and our practice policies  25 minutes were spent face to face with Jose Teran with greater than 50% of the time spent in counseling or coordination of care including discussions of treatment instructions and follow up requirements   All of the patient's questions were answered during this discussion  Return in about 10 weeks (around 10/22/2019)  Subjective:   Chief Complaint  Follow up visit for:  symptom management  HPI     Jose Teran is a 71 y o  female with breast cancer  She was initially diagnosed in 1996    She developed metastatic disease to her right supraclavicular nodes in 2004  She developed osseous metastatic disease in 2018  Her medical oncologist is Dr Arturo Arroyo  Her current treatment is Faslodex plus Xgeva  There had been consideration for prophylactic fixation of femur for impending fracture with lytic bone lesions, however no plans for surgery at this time  He is seen in the department of Palliative & Supportive Care for concurrent symptom management  She has cancer related back pain and cancer treatment related brachial plexopathy  Her pain regime is MSIR 15mg BID, tylenol 325mg bid, and gabapentin 100mg BID  She has been more active and on the recent nice days she has felt well enough to work in her garden  She does occasionally use her TLSO brace for stability  She uses her walker when she leaves her house  She wonders if a cane would be better? She uses lorazepam 2mg to help her sleep  The following portions of the medical history were reviewed: past medical history, problem list, medication list, and social history  Current Outpatient Medications:     acetaminophen (TYLENOL) 325 mg tablet, Take 2 tablets (650 mg total) by mouth 4 (four) times a day (with meals and at bedtime), Disp: 80 tablet, Rfl: 1    calcium carbonate (OS-CLARENCE) 1250 (500 Ca) MG chewable tablet, Chew 1 tablet (1,250 mg total) daily, Disp: 30 tablet, Rfl: 0    Cholecalciferol (VITAMIN D3) 5000 units CAPS, Take 1 capsule by mouth daily, Disp: , Rfl:     ferrous sulfate 325 (65 Fe) mg tablet, Take 1 tablet (325 mg total) by mouth daily with breakfast, Disp: 30 tablet, Rfl: 1    gabapentin (NEURONTIN) 100 mg capsule, Take 1 capsule (100 mg total) by mouth 2 (two) times a day, Disp: 60 capsule, Rfl: 5    LORazepam (ATIVAN) 1 mg tablet, Take 2mg (2 tablets) qhs , Disp: 60 tablet, Rfl: 1    [START ON 9/26/2019] morphine (MSIR) 15 mg tablet, Use 1 tab BID scheduled to anticipate cancer pain  May have 1 tabs q4hrs PRN mod-severe pain  , Disp: 65 tablet, Rfl: 0    MULTIPLE VITAMINS PO, Take by mouth, Disp: , Rfl:     Omega-3 Fatty Acids (FISH OIL PO), Take 2 g by mouth , Disp: , Rfl:     omeprazole (PriLOSEC) 40 MG capsule, Take 1 capsule (40 mg total) by mouth daily, Disp: 30 capsule, Rfl: 3    senna (SENOKOT) 8 6 mg, Take 1 tablet by mouth as needed for constipation, Disp: , Rfl:     sucralfate (CARAFATE) 1 g tablet, TAKE 1 TABLET (1 G TOTAL) BY MOUTH 2 (TWO) TIMES A DAY BEFORE MEALS, Disp: 60 tablet, Rfl: 3  Review of Systems   Constitutional: Positive for appetite change (increasing)  Musculoskeletal: Positive for arthralgias, back pain and gait problem  All other systems negative    Objective:  Vital Signs  /70 (BP Location: Left arm, Patient Position: Sitting, Cuff Size: Standard)   Pulse 68   Temp 98 7 °F (37 1 °C) (Tympanic)   Resp 16   Ht 5' 2 5" (1 588 m)   Wt 54 kg (119 lb)   LMP 05/27/1996 (Exact Date)   SpO2 99%   BMI 21 42 kg/m²    Physical Exam    Constitutional: Thin  In no acute physical or emotional distress  Head: Normocephalic and atraumatic  Eyes: EOM are normal  No ocular discharge  No scleral icterus  Neck: No visible adenopathy or masses  Respiratory: Effort normal  No stridor  No respiratory distress  Gastrointestinal: No abdominal distension  Musculoskeletal: No edema  Ambulates with walker  Neurological: Alert, oriented and appropriately conversant  Skin: No diaphoresis, no rashes seen on exposed areas of skin  Psychiatric: Displays a normal mood and affect   Behavior, judgement and thought content appear normal

## 2019-08-13 ENCOUNTER — OFFICE VISIT (OUTPATIENT)
Dept: PALLIATIVE MEDICINE | Facility: CLINIC | Age: 69
End: 2019-08-13
Payer: MEDICARE

## 2019-08-13 VITALS
HEART RATE: 68 BPM | RESPIRATION RATE: 16 BRPM | SYSTOLIC BLOOD PRESSURE: 130 MMHG | TEMPERATURE: 98.7 F | HEIGHT: 63 IN | BODY MASS INDEX: 21.09 KG/M2 | DIASTOLIC BLOOD PRESSURE: 70 MMHG | OXYGEN SATURATION: 99 % | WEIGHT: 119 LBS

## 2019-08-13 DIAGNOSIS — F41.9 ANXIETY DISORDER, UNSPECIFIED TYPE: Chronic | ICD-10-CM

## 2019-08-13 DIAGNOSIS — R26.2 AMBULATORY DYSFUNCTION: ICD-10-CM

## 2019-08-13 DIAGNOSIS — M48.54XD NON-TRAUMATIC COMPRESSION FRACTURE OF T12 THORACIC VERTEBRA WITH ROUTINE HEALING, SUBSEQUENT ENCOUNTER: ICD-10-CM

## 2019-08-13 DIAGNOSIS — C79.51 BONE METASTASIS (HCC): ICD-10-CM

## 2019-08-13 DIAGNOSIS — Z17.0 MALIGNANT NEOPLASM OF UPPER-OUTER QUADRANT OF RIGHT BREAST IN FEMALE, ESTROGEN RECEPTOR POSITIVE (HCC): Primary | ICD-10-CM

## 2019-08-13 DIAGNOSIS — C50.411 MALIGNANT NEOPLASM OF UPPER-OUTER QUADRANT OF RIGHT BREAST IN FEMALE, ESTROGEN RECEPTOR POSITIVE (HCC): Primary | ICD-10-CM

## 2019-08-13 DIAGNOSIS — G89.3 CANCER ASSOCIATED PAIN: ICD-10-CM

## 2019-08-13 PROBLEM — R19.7 DIARRHEA: Status: RESOLVED | Noted: 2019-01-22 | Resolved: 2019-08-13

## 2019-08-13 PROBLEM — E88.09 HYPOALBUMINEMIA: Status: RESOLVED | Noted: 2019-01-22 | Resolved: 2019-08-13

## 2019-08-13 PROBLEM — E87.6 HYPOKALEMIA: Status: RESOLVED | Noted: 2018-12-23 | Resolved: 2019-08-13

## 2019-08-13 PROBLEM — E86.0 DEHYDRATION: Status: RESOLVED | Noted: 2019-01-22 | Resolved: 2019-08-13

## 2019-08-13 PROBLEM — D64.9 ABSOLUTE ANEMIA: Status: RESOLVED | Noted: 2019-01-15 | Resolved: 2019-08-13

## 2019-08-13 PROCEDURE — 99214 OFFICE O/P EST MOD 30 MIN: CPT | Performed by: FAMILY MEDICINE

## 2019-08-13 RX ORDER — MORPHINE SULFATE 15 MG/1
TABLET ORAL
Qty: 65 TABLET | Refills: 0 | Status: SHIPPED | OUTPATIENT
Start: 2019-08-28 | End: 2019-08-13 | Stop reason: SDUPTHER

## 2019-08-13 RX ORDER — MORPHINE SULFATE 15 MG/1
TABLET ORAL
Qty: 65 TABLET | Refills: 0 | Status: SHIPPED | OUTPATIENT
Start: 2019-09-26 | End: 2019-11-14 | Stop reason: SDUPTHER

## 2019-08-13 RX ORDER — GABAPENTIN 100 MG/1
100 CAPSULE ORAL 2 TIMES DAILY
Qty: 60 CAPSULE | Refills: 5 | Status: SHIPPED | OUTPATIENT
Start: 2019-08-13 | End: 2019-11-14 | Stop reason: DRUGHIGH

## 2019-08-13 RX ORDER — MAG HYDROX/ALUMINUM HYD/SIMETH 400-400-40
1 SUSPENSION, ORAL (FINAL DOSE FORM) ORAL DAILY
COMMUNITY
End: 2021-02-15 | Stop reason: HOSPADM

## 2019-08-13 NOTE — Clinical Note
Can this patient engage in PT for gait and balance training? She said you would not allow her to engage in PT before due to fracture risk

## 2019-08-13 NOTE — Clinical Note
Please let pt know that her medical oncology team approved physical therapy for gait and balance training  A referral was placed and someone should be calling her to schedule

## 2019-08-14 ENCOUNTER — HOSPITAL ENCOUNTER (OUTPATIENT)
Dept: INFUSION CENTER | Facility: CLINIC | Age: 69
Discharge: HOME | End: 2019-08-14
Payer: MEDICARE

## 2019-08-14 DIAGNOSIS — C79.51 BONE METASTASIS (HCC): Primary | ICD-10-CM

## 2019-08-14 DIAGNOSIS — C79.51 BREAST CANCER METASTASIZED TO BONE, RIGHT (HCC): ICD-10-CM

## 2019-08-14 DIAGNOSIS — C50.911 BREAST CANCER METASTASIZED TO BONE, RIGHT (HCC): ICD-10-CM

## 2019-08-14 PROCEDURE — 96402 CHEMO HORMON ANTINEOPL SQ/IM: CPT

## 2019-08-14 PROCEDURE — 96401 CHEMO ANTI-NEOPL SQ/IM: CPT

## 2019-08-14 RX ORDER — LAMOTRIGINE 25 MG/1
500 TABLET ORAL ONCE
Status: CANCELLED | OUTPATIENT
Start: 2019-09-11

## 2019-08-14 RX ORDER — LAMOTRIGINE 25 MG/1
500 TABLET ORAL ONCE
Status: COMPLETED | OUTPATIENT
Start: 2019-08-14 | End: 2019-08-14

## 2019-08-14 RX ADMIN — DENOSUMAB 120 MG: 120 INJECTION SUBCUTANEOUS at 10:51

## 2019-08-14 RX ADMIN — FULVESTRANT 500 MG: 50 INJECTION INTRAMUSCULAR at 10:55

## 2019-08-14 NOTE — PROGRESS NOTES
Patient here today for Xgeva and Faslodex injection, pts calcium with in treatment parameters, pt takes vit D at home,  patient tolerated injections well and without incident, patient refused AVS patient aware of future apt   Pt d/c in stable condition

## 2019-08-21 ENCOUNTER — TELEPHONE (OUTPATIENT)
Dept: HEMATOLOGY ONCOLOGY | Facility: CLINIC | Age: 69
End: 2019-08-21

## 2019-08-21 NOTE — TELEPHONE ENCOUNTER
Spoke to patient and informed her that her appt on 8/27/19 needed to be R/S  Her new appt is 9/24/19 at 1:00 pm with JACINTO Shepard at the [facility] location  Patient voiced understanding of appt change information

## 2019-08-22 DIAGNOSIS — C50.911 MALIGNANT NEOPLASM OF RIGHT FEMALE BREAST, UNSPECIFIED ESTROGEN RECEPTOR STATUS, UNSPECIFIED SITE OF BREAST (HCC): Chronic | ICD-10-CM

## 2019-08-22 RX ORDER — GABAPENTIN 100 MG/1
CAPSULE ORAL
Qty: 60 CAPSULE | Refills: 1 | OUTPATIENT
Start: 2019-08-22

## 2019-08-26 DIAGNOSIS — F41.9 ANXIETY: ICD-10-CM

## 2019-08-26 RX ORDER — LORAZEPAM 1 MG/1
TABLET ORAL
Qty: 60 TABLET | Refills: 1 | Status: SHIPPED | OUTPATIENT
Start: 2019-08-26 | End: 2019-11-14 | Stop reason: SDUPTHER

## 2019-08-27 DIAGNOSIS — D50.8 OTHER IRON DEFICIENCY ANEMIA: ICD-10-CM

## 2019-08-27 RX ORDER — OMEPRAZOLE 40 MG/1
CAPSULE, DELAYED RELEASE ORAL
Qty: 90 CAPSULE | Refills: 1 | Status: SHIPPED | OUTPATIENT
Start: 2019-08-27 | End: 2020-02-20 | Stop reason: SDUPTHER

## 2019-08-27 NOTE — TELEPHONE ENCOUNTER
I renewed this patient's PPI as requested, but please make sure she is scheduled for EGD; she was last seen in March and was recommended to have EGD in 6 months because of intestinal metaplasia noted on gastric biopsy on previous EGD    Thank you

## 2019-08-30 ENCOUNTER — TELEPHONE (OUTPATIENT)
Dept: GASTROENTEROLOGY | Facility: AMBULARY SURGERY CENTER | Age: 69
End: 2019-08-30

## 2019-09-06 ENCOUNTER — APPOINTMENT (OUTPATIENT)
Dept: LAB | Facility: CLINIC | Age: 69
End: 2019-09-06
Payer: MEDICARE

## 2019-09-06 DIAGNOSIS — C79.51 BONE METASTASIS (HCC): ICD-10-CM

## 2019-09-06 DIAGNOSIS — C79.51 BREAST CANCER METASTASIZED TO BONE, RIGHT (HCC): ICD-10-CM

## 2019-09-06 DIAGNOSIS — C50.911 BREAST CANCER METASTASIZED TO BONE, RIGHT (HCC): ICD-10-CM

## 2019-09-06 LAB
ALBUMIN SERPL BCP-MCNC: 3.4 G/DL (ref 3.5–5)
ALP SERPL-CCNC: 165 U/L (ref 46–116)
ALT SERPL W P-5'-P-CCNC: 35 U/L (ref 12–78)
ANION GAP SERPL CALCULATED.3IONS-SCNC: 6 MMOL/L (ref 4–13)
AST SERPL W P-5'-P-CCNC: 32 U/L (ref 5–45)
BILIRUB SERPL-MCNC: 0.7 MG/DL (ref 0.2–1)
BUN SERPL-MCNC: 9 MG/DL (ref 5–25)
CALCIUM SERPL-MCNC: 8.5 MG/DL (ref 8.3–10.1)
CHLORIDE SERPL-SCNC: 101 MMOL/L (ref 100–108)
CO2 SERPL-SCNC: 29 MMOL/L (ref 21–32)
CREAT SERPL-MCNC: 0.52 MG/DL (ref 0.6–1.3)
GFR SERPL CREATININE-BSD FRML MDRD: 98 ML/MIN/1.73SQ M
GLUCOSE P FAST SERPL-MCNC: 106 MG/DL (ref 65–99)
POTASSIUM SERPL-SCNC: 3.9 MMOL/L (ref 3.5–5.3)
PROT SERPL-MCNC: 7 G/DL (ref 6.4–8.2)
SODIUM SERPL-SCNC: 136 MMOL/L (ref 136–145)

## 2019-09-06 PROCEDURE — 80053 COMPREHEN METABOLIC PANEL: CPT

## 2019-09-06 PROCEDURE — 36415 COLL VENOUS BLD VENIPUNCTURE: CPT

## 2019-09-11 ENCOUNTER — HOSPITAL ENCOUNTER (OUTPATIENT)
Dept: INFUSION CENTER | Facility: CLINIC | Age: 69
Discharge: HOME | End: 2019-09-11
Payer: MEDICARE

## 2019-09-11 DIAGNOSIS — C79.51 BONE METASTASIS (HCC): Primary | ICD-10-CM

## 2019-09-11 DIAGNOSIS — C79.51 BREAST CANCER METASTASIZED TO BONE, RIGHT (HCC): ICD-10-CM

## 2019-09-11 DIAGNOSIS — C50.911 BREAST CANCER METASTASIZED TO BONE, RIGHT (HCC): ICD-10-CM

## 2019-09-11 PROCEDURE — 96401 CHEMO ANTI-NEOPL SQ/IM: CPT

## 2019-09-11 PROCEDURE — 96402 CHEMO HORMON ANTINEOPL SQ/IM: CPT

## 2019-09-11 RX ORDER — LAMOTRIGINE 25 MG/1
500 TABLET ORAL ONCE
Status: CANCELLED | OUTPATIENT
Start: 2019-10-09

## 2019-09-11 RX ORDER — LAMOTRIGINE 25 MG/1
500 TABLET ORAL ONCE
Status: COMPLETED | OUTPATIENT
Start: 2019-09-11 | End: 2019-09-11

## 2019-09-11 RX ADMIN — FULVESTRANT 500 MG: 50 INJECTION INTRAMUSCULAR at 09:55

## 2019-09-11 RX ADMIN — DENOSUMAB 120 MG: 120 INJECTION SUBCUTANEOUS at 09:50

## 2019-09-11 NOTE — PROGRESS NOTES
Faslodex per MD order: Injections given in Buttocks ( LUQ / RUQ ) without incident: No adverse reactions noted: Verified follow up appt with patient: Declined AVS

## 2019-09-11 NOTE — PROGRESS NOTES
Patient to Donald murry [facility] / Faslodex: Offers no complaints at present time: Lab work ( 09/06/19 ) reviewed: Calcium - 8 5: Within parameters to treat: Denies any infections / dental work: Confirms taking oral calcium / Vitamin D at home: Injection given in Left UA without incident: No adverse reactions noted

## 2019-09-24 ENCOUNTER — TELEPHONE (OUTPATIENT)
Dept: GASTROENTEROLOGY | Facility: AMBULARY SURGERY CENTER | Age: 69
End: 2019-09-24

## 2019-09-24 ENCOUNTER — OFFICE VISIT (OUTPATIENT)
Dept: HEMATOLOGY ONCOLOGY | Facility: CLINIC | Age: 69
End: 2019-09-24
Payer: MEDICARE

## 2019-09-24 VITALS
BODY MASS INDEX: 21.62 KG/M2 | HEIGHT: 63 IN | TEMPERATURE: 97.8 F | OXYGEN SATURATION: 98 % | RESPIRATION RATE: 14 BRPM | WEIGHT: 122 LBS | SYSTOLIC BLOOD PRESSURE: 144 MMHG | DIASTOLIC BLOOD PRESSURE: 78 MMHG | HEART RATE: 73 BPM

## 2019-09-24 DIAGNOSIS — C50.411 MALIGNANT NEOPLASM OF UPPER-OUTER QUADRANT OF RIGHT BREAST IN FEMALE, ESTROGEN RECEPTOR POSITIVE (HCC): Primary | ICD-10-CM

## 2019-09-24 DIAGNOSIS — Z12.39 SCREENING FOR BREAST CANCER: ICD-10-CM

## 2019-09-24 DIAGNOSIS — C79.51 BONE METASTASES (HCC): ICD-10-CM

## 2019-09-24 DIAGNOSIS — Z00.00 HEALTH CARE MAINTENANCE: ICD-10-CM

## 2019-09-24 DIAGNOSIS — H53.8 BLURRED VISION: ICD-10-CM

## 2019-09-24 DIAGNOSIS — Z17.0 MALIGNANT NEOPLASM OF UPPER-OUTER QUADRANT OF RIGHT BREAST IN FEMALE, ESTROGEN RECEPTOR POSITIVE (HCC): Primary | ICD-10-CM

## 2019-09-24 DIAGNOSIS — R05.9 COUGH: ICD-10-CM

## 2019-09-24 DIAGNOSIS — R51.9 FRONTAL HEADACHE: ICD-10-CM

## 2019-09-24 DIAGNOSIS — R26.2 AMBULATORY DYSFUNCTION: ICD-10-CM

## 2019-09-24 PROCEDURE — 99215 OFFICE O/P EST HI 40 MIN: CPT | Performed by: PHYSICIAN ASSISTANT

## 2019-09-24 NOTE — PROGRESS NOTES
Hematology/Oncology Outpatient Follow-up  Tonya Vitale 71 y o  female 1950 [id number]    Date:  9/24/2019      Assessment and Plan:    1  Malignant neoplasm of upper-outer quadrant of right breast in female, estrogen receptor positive ([address] )  6  Bone metastases ([address] ), 7  Blurred vision, 5  Cough, 8  Frontal headache  70-year-old female with history of metastatic hormone receptor positive, her 2-breast cancer  She is presently receiving Faslodex monthly  She is also receiving Xgeva monthly for bone strengthening  After injection of vessel dex in August 2019 she had bone pain particularly of her shoulders and right hip  The difference for this injection was that both injections were given at the exact same time  Likely this contributed to her worsening symptoms  These improved  She took Tylenol for the pain with improvement  Following injection in September 2019 she did not have near they seem discomfort as prior  She still continues to take Tylenol 3 times a day however she thinks that she may not need afternoon dose  Recommended that she try not taking the afternoon dose  She is due for repeat imaging  She is having occasional wheezing when lying down at bedtime  CT chest previously showed lung nodules  Will re-evaluate at this time  Lungs were clear on exam   She also has been having intermittent blurred vision and frontal headaches for a few months but this has been progressively worse over the past 1-2 months however not constant  Headache is dull and does get better with Tylenol  Blurred vision also is intermittent  Also suggested seen an eye doctor  MRI to be completed  Bone scan to re-evaluate bone lesions  She also has upcoming MRI of the thoracic and lumbar spine by Radiation Oncology  Imaging is requested in 1-2 weeks  Follow-up in 4 weeks  Reviewed that if she was noted to have progressive disease we would try another hormonal therapy, not chemotherapy      - Mammo screening bilateral w 3d & cad; Future  - CT chest w contrast; Future  - MRI brain w wo contrast; Future  - NM bone scan whole body; Future  - Mammo screening bilateral w 3d & cad; Future    3  Ambulatory dysfunction  She previously had requested physical therapy for strengthening  This is recommended  Referral given  - Ambulatory referral to Physical Therapy; Future    4  Health care maintenance  Patient has not been seen by her family doctor in almost 1 year  Recommend that she follows with a PCP on a regular basis  We are only monitoring her concerns regarding Medical Oncology  She was referred by her request to Dr Kassandra Beltran in [facility]  - Ambulatory referral to [facility]; Future         Breast cancer metastasized to bone, right [facility])    1996 Surgery     right modified radical mastectomy followed by adjuvant systemic therapy but no radiation  6/28/2004 Biopsy     Final Diagnosis  A  Lymph Node, right supraclavicular, core biopsy (14 slides, [id number], [facility]; collected on 6/28/2004):  -  Consistent with metastatic carcinoma, compatible with breast origin (see note)  2004 -  Radiation     salvage radiation therapy at an outside facility to right supraclavicular nodes at [facility]      12/23/2018 Initial Diagnosis     Breast cancer metastasized to bone, right ([address] )      12/26/2018 - 1/9/2019 Radiation     3000 cGy in 10 fractions to  T11 through the bottom of the SI joints  Dr Praveena Rocha        1/16/2019 -  Chemotherapy     fulvestrant (FASLODEX) IM injection           Chemotherapy         2/20/2019 -  Chemotherapy     Ibrance 125 mg PO day 1-21 every 28 days      4/4/2019 - 5/8/2019 Chemotherapy     Ibrance 100 mg PO daily day 1-21 every 28 days       5/22/2019 -  Chemotherapy     Faslodex, Sarah Fleeting      6/27/2019 Genetic Testing      No pathogenic mutation identified in [facility] Breast panel       Patient could not tolerate I brain secondary to severe neutropenia even with lowest dose, 75 mg  Interval history:  Pain in shoulder, elbow, left hip, headache, blurred vision     July tylenol morning and night  Aug 3 times a day     Sept - pains less, slight blurred vision, headache slight     1-2 month having wheezing at night when laying down, not every night though     ROS: Review of Systems   Constitutional: Negative for appetite change (remains good), chills, fatigue, fever and unexpected weight change  Eyes:        Intermittent blurred vision    Respiratory: Negative for cough and shortness of breath  Cardiovascular: Negative for chest pain, palpitations and leg swelling  Gastrointestinal: Negative for abdominal pain, constipation, diarrhea, nausea and vomiting  Genitourinary: Negative for difficulty urinating and dysuria  Musculoskeletal: Positive for arthralgias  Shoulder pain     Skin: Negative  Neurological: Positive for headaches (frontal, dull, improvement with Tylenol)  Negative for dizziness, weakness, light-headedness and numbness  Hematological: Negative  Psychiatric/Behavioral: Negative  Past Medical History:   Diagnosis Date    Cancer [facility])     breast 1996    [facility] )     bone      Compression fracture of thoracic vertebra (HCC)     Diabetes mellitus ([facility] )     History of therapeutic radiation     Tendonitis        Past Surgical History:   Procedure Laterality Date    CHOLECYSTECTOMY      MASTECTOMY      Right side reconstruction    NECK SURGERY      AL ESOPHAGOGASTRODUODENOSCOPY TRANSORAL DIAGNOSTIC N/A 1/24/2019    Procedure: ESOPHAGOGASTRODUODENOSCOPY (EGD); Surgeon: Saeed Lazar MD;  [facility]; Service: Gastroenterology    TONSILLECTOMY         Social History     Socioeconomic History    Marital status:       Spouse name: None    Number of children: 2    Years of education: None    Highest education level: None   Occupational History    Occupation: Retired   Social Needs    Financial resource strain: None    Food insecurity:     Worry: None     Inability: None    Transportation needs:     Medical: None     Non-medical: None   Tobacco Use    Smoking status: Never Smoker    Smokeless tobacco: Never Used   Substance and Sexual Activity    Alcohol use: No    Drug use: No    Sexual activity: None   Lifestyle    Physical activity:     Days per week: None     Minutes per session: None    Stress: None   Relationships    Social connections:     Talks on phone: None     Gets together: None     Attends Gnosticist service: None     Active member of club or organization: None     Attends meetings of clubs or organizations: None     Relationship status: None    Intimate partner violence:     Fear of current or ex partner: None     Emotionally abused: None     Physically abused: None     Forced sexual activity: None   Other Topics Concern    None   Social History Narrative    Lives alone       Family History   Problem Relation Age of Onset    Diabetes Mother     Cancer Mother     Lymphoma Father     KAROLINA disease Brother     Colon cancer Maternal Grandfather        No Known Allergies      Current Outpatient Medications:     acetaminophen (TYLENOL) 325 mg tablet, Take 2 tablets (650 mg total) by mouth 4 (four) times a day (with meals and at bedtime), Disp: 80 tablet, Rfl: 1    calcium carbonate (OS-CLARENCE) 1250 (500 Ca) MG chewable tablet, Chew 1 tablet (1,250 mg total) daily, Disp: 30 tablet, Rfl: 0    Cholecalciferol (VITAMIN D3) 5000 units CAPS, Take 1 capsule by mouth daily, Disp: , Rfl:     ferrous sulfate 325 (65 Fe) mg tablet, Take 1 tablet (325 mg total) by mouth daily with breakfast, Disp: 30 tablet, Rfl: 1    gabapentin (NEURONTIN) 100 mg capsule, Take 1 capsule (100 mg total) by mouth 2 (two) times a day, Disp: 60 capsule, Rfl: 5    LORazepam (ATIVAN) 1 mg tablet, Take 2mg (2 tablets) qhs , Disp: 60 tablet, Rfl: 1    [START ON 9/26/2019] morphine (MSIR) 15 mg tablet, Use 1 tab BID scheduled to anticipate cancer pain  May have 1 tabs q4hrs PRN mod-severe pain , Disp: 65 tablet, Rfl: 0    MULTIPLE VITAMINS PO, Take by mouth, Disp: , Rfl:     Omega-3 Fatty Acids (FISH OIL PO), Take 2 g by mouth , Disp: , Rfl:     omeprazole (PriLOSEC) 40 MG capsule, TAKE 1 CAPSULE BY MOUTH EVERY DAY, Disp: 90 capsule, Rfl: 1    senna (SENOKOT) 8 6 mg, Take 1 tablet by mouth as needed for constipation, Disp: , Rfl:     sucralfate (CARAFATE) 1 g tablet, TAKE 1 TABLET (1 G TOTAL) BY MOUTH 2 (TWO) TIMES A DAY BEFORE MEALS, Disp: 60 tablet, Rfl: 3      Physical Exam:  /78 (BP Location: Left arm, Patient Position: Sitting, Cuff Size: Adult)   Pulse 73   Temp 97 8 °F (36 6 °C) (Oral)   Resp 14   Ht 5' 2 5" (1 588 m)   Wt 55 3 kg (122 lb)   LMP 05/27/1996 (LMP Unknown)   SpO2 98%   BMI 21 96 kg/m²     Physical Exam   Constitutional: She is oriented to person, place, and time  She appears well-developed and well-nourished  No distress  HENT:   Head: Normocephalic and atraumatic  Eyes: Conjunctivae are normal  No scleral icterus  Neck: Normal range of motion  Neck supple  Cardiovascular: Normal rate, regular rhythm and normal heart sounds  No murmur heard  Pulmonary/Chest: Effort normal and breath sounds normal  No respiratory distress  She has no wheezes  She has no rales  Abdominal: Soft  There is no tenderness  Musculoskeletal: She exhibits no edema or tenderness  Ambulates with walker    Lymphadenopathy:     She has no cervical adenopathy  Neurological: She is alert and oriented to person, place, and time  Skin: Skin is warm and dry  Psychiatric: She has a normal mood and affect       Labs:  Lab Results   Component Value Date    WBC 3 22 (L) 07/12/2019    HGB 11 5 07/12/2019    HCT 36 8 07/12/2019    MCV 96 07/12/2019     07/12/2019     Lab Results   Component Value Date    K 3 9 09/06/2019     09/06/2019    CO2 29 09/06/2019    BUN 9 09/06/2019    CREATININE 0 52 (L) 09/06/2019    GLUF 106 (H) 09/06/2019    CALCIUM 8 5 09/06/2019    AST 32 09/06/2019    ALT 35 09/06/2019    ALKPHOS 165 (H) 09/06/2019    EGFR 98 09/06/2019       Patient voiced understanding and agreement in the above discussion  Aware to contact our office with questions/symptoms in the interim

## 2019-10-04 ENCOUNTER — APPOINTMENT (OUTPATIENT)
Dept: LAB | Facility: CLINIC | Age: 69
End: 2019-10-04
Payer: MEDICARE

## 2019-10-04 DIAGNOSIS — C79.51 BONE METASTASIS (HCC): ICD-10-CM

## 2019-10-04 DIAGNOSIS — C79.51 BREAST CANCER METASTASIZED TO BONE, RIGHT (HCC): ICD-10-CM

## 2019-10-04 DIAGNOSIS — C50.911 BREAST CANCER METASTASIZED TO BONE, RIGHT (HCC): ICD-10-CM

## 2019-10-04 LAB
ALBUMIN SERPL BCP-MCNC: 3.5 G/DL (ref 3.5–5)
ALP SERPL-CCNC: 155 U/L (ref 46–116)
ALT SERPL W P-5'-P-CCNC: 30 U/L (ref 12–78)
ANION GAP SERPL CALCULATED.3IONS-SCNC: 8 MMOL/L (ref 4–13)
AST SERPL W P-5'-P-CCNC: 28 U/L (ref 5–45)
BILIRUB SERPL-MCNC: 0.6 MG/DL (ref 0.2–1)
BUN SERPL-MCNC: 11 MG/DL (ref 5–25)
CALCIUM SERPL-MCNC: 7.9 MG/DL (ref 8.3–10.1)
CHLORIDE SERPL-SCNC: 102 MMOL/L (ref 100–108)
CO2 SERPL-SCNC: 28 MMOL/L (ref 21–32)
CREAT SERPL-MCNC: 0.6 MG/DL (ref 0.6–1.3)
GFR SERPL CREATININE-BSD FRML MDRD: 93 ML/MIN/1.73SQ M
GLUCOSE P FAST SERPL-MCNC: 109 MG/DL (ref 65–99)
POTASSIUM SERPL-SCNC: 4.2 MMOL/L (ref 3.5–5.3)
PROT SERPL-MCNC: 6.8 G/DL (ref 6.4–8.2)
SODIUM SERPL-SCNC: 138 MMOL/L (ref 136–145)

## 2019-10-04 PROCEDURE — 80053 COMPREHEN METABOLIC PANEL: CPT

## 2019-10-04 PROCEDURE — 36415 COLL VENOUS BLD VENIPUNCTURE: CPT

## 2019-10-08 ENCOUNTER — TELEPHONE (OUTPATIENT)
Dept: HEMATOLOGY ONCOLOGY | Facility: CLINIC | Age: 69
End: 2019-10-08

## 2019-10-08 NOTE — TELEPHONE ENCOUNTER
Called patient and reviewed her Calcium level is 7 9 and a bit low for Xgeva on 10/9  Reviewed patient is also due for Faslodex same day and if patient is ok with waiting will request infusion draw STAT CMP to review if calcium level went up from 10/4  Patient is agreeable  Reviewed with pharmacist/infusion via email

## 2019-10-09 ENCOUNTER — HOSPITAL ENCOUNTER (OUTPATIENT)
Dept: INFUSION CENTER | Facility: CLINIC | Age: 69
Discharge: HOME | End: 2019-10-09
Payer: MEDICARE

## 2019-10-09 VITALS — WEIGHT: 123 LBS | BODY MASS INDEX: 22.14 KG/M2

## 2019-10-09 DIAGNOSIS — C79.51 BREAST CANCER METASTASIZED TO BONE, RIGHT (HCC): ICD-10-CM

## 2019-10-09 DIAGNOSIS — C50.911 BREAST CANCER METASTASIZED TO BONE, RIGHT (HCC): ICD-10-CM

## 2019-10-09 DIAGNOSIS — C79.51 BONE METASTASIS (HCC): Primary | ICD-10-CM

## 2019-10-09 LAB
ALBUMIN SERPL BCP-MCNC: 3.5 G/DL (ref 3.5–5)
ALP SERPL-CCNC: 153 U/L (ref 46–116)
ALT SERPL W P-5'-P-CCNC: 29 U/L (ref 12–78)
ANION GAP SERPL CALCULATED.3IONS-SCNC: 6 MMOL/L (ref 4–13)
AST SERPL W P-5'-P-CCNC: 27 U/L (ref 5–45)
BILIRUB SERPL-MCNC: 0.6 MG/DL (ref 0.2–1)
BUN SERPL-MCNC: 15 MG/DL (ref 5–25)
CALCIUM SERPL-MCNC: 8.8 MG/DL (ref 8.3–10.1)
CHLORIDE SERPL-SCNC: 102 MMOL/L (ref 100–108)
CO2 SERPL-SCNC: 30 MMOL/L (ref 21–32)
CREAT SERPL-MCNC: 0.66 MG/DL (ref 0.6–1.3)
GFR SERPL CREATININE-BSD FRML MDRD: 90 ML/MIN/1.73SQ M
GLUCOSE SERPL-MCNC: 106 MG/DL (ref 65–140)
POTASSIUM SERPL-SCNC: 3.9 MMOL/L (ref 3.5–5.3)
PROT SERPL-MCNC: 7 G/DL (ref 6.4–8.2)
SODIUM SERPL-SCNC: 138 MMOL/L (ref 136–145)

## 2019-10-09 PROCEDURE — 96401 CHEMO ANTI-NEOPL SQ/IM: CPT

## 2019-10-09 PROCEDURE — 80053 COMPREHEN METABOLIC PANEL: CPT | Performed by: PHYSICIAN ASSISTANT

## 2019-10-09 PROCEDURE — 96402 CHEMO HORMON ANTINEOPL SQ/IM: CPT

## 2019-10-09 RX ORDER — LAMOTRIGINE 25 MG/1
500 TABLET ORAL ONCE
Status: CANCELLED | OUTPATIENT
Start: 2019-11-01

## 2019-10-09 RX ORDER — LAMOTRIGINE 25 MG/1
500 TABLET ORAL ONCE
Status: COMPLETED | OUTPATIENT
Start: 2019-10-09 | End: 2019-10-09

## 2019-10-09 RX ADMIN — FULVESTRANT 500 MG: 50 INJECTION INTRAMUSCULAR at 09:09

## 2019-10-09 RX ADMIN — DENOSUMAB 120 MG: 120 INJECTION SUBCUTANEOUS at 09:45

## 2019-10-09 NOTE — PLAN OF CARE
Problem: Potential for Falls  Goal: Patient will remain free of falls  Description  INTERVENTIONS:  - Assess patient frequently for physical needs  -  Identify cognitive and physical deficits and behaviors that affect risk of falls    -  [place] fall precautions as indicated by assessment   - Educate patient/family on patient safety including physical limitations  - Instruct patient to call for assistance with activity based on assessment  - Modify environment to reduce risk of injury  - Consider OT/PT consult to assist with strengthening/mobility  Outcome: Progressing

## 2019-10-09 NOTE — PROGRESS NOTES
Patient tolerated XGeva injection into left upper arm and Faslodex injections  into right and left upper outer quadrant of buttocks without issues today   Patient verified upcoming appointments and declined AVS

## 2019-10-09 NOTE — PROGRESS NOTES
Patient arrived for [facility] and Faslodex injections  Offers no complaints  STAT CMP drawn to evaluate calcium level  Calcium increased to 8 8 from 7 9 on OCT 4  Call in to Dr Agustin Avelar office to report level before giving [facility] shot per order on therapy plan

## 2019-10-09 NOTE — PROGRESS NOTES
Received message from [facility] in Dr Isaiah Fraire office  Per Dr Britney Kaye [facility] to give XGeva injection to patient today

## 2019-10-10 ENCOUNTER — HOSPITAL ENCOUNTER (OUTPATIENT)
Dept: CT IMAGING | Facility: HOSPITAL | Age: 69
Discharge: HOME | End: 2019-10-10
Payer: MEDICARE

## 2019-10-10 ENCOUNTER — HOSPITAL ENCOUNTER (OUTPATIENT)
Dept: NUCLEAR MEDICINE | Facility: HOSPITAL | Age: 69
Discharge: HOME | End: 2019-10-10
Payer: MEDICARE

## 2019-10-10 DIAGNOSIS — Z17.0 MALIGNANT NEOPLASM OF UPPER-OUTER QUADRANT OF RIGHT BREAST IN FEMALE, ESTROGEN RECEPTOR POSITIVE (HCC): ICD-10-CM

## 2019-10-10 DIAGNOSIS — R05.9 COUGH: ICD-10-CM

## 2019-10-10 DIAGNOSIS — C79.51 BONE METASTASES (HCC): ICD-10-CM

## 2019-10-10 DIAGNOSIS — C50.411 MALIGNANT NEOPLASM OF UPPER-OUTER QUADRANT OF RIGHT BREAST IN FEMALE, ESTROGEN RECEPTOR POSITIVE (HCC): ICD-10-CM

## 2019-10-10 PROCEDURE — 71260 CT THORAX DX C+: CPT

## 2019-10-10 PROCEDURE — A9503 TC99M MEDRONATE: HCPCS

## 2019-10-10 PROCEDURE — 78306 BONE IMAGING WHOLE BODY: CPT

## 2019-10-10 RX ADMIN — IOHEXOL 100 ML: 350 INJECTION, SOLUTION INTRAVENOUS at 08:20

## 2019-10-15 ENCOUNTER — HOSPITAL ENCOUNTER (OUTPATIENT)
Dept: MRI IMAGING | Facility: HOSPITAL | Age: 69
Discharge: HOME | End: 2019-10-15
Attending: RADIOLOGY
Payer: MEDICARE

## 2019-10-15 ENCOUNTER — TELEPHONE (OUTPATIENT)
Dept: HEMATOLOGY ONCOLOGY | Facility: CLINIC | Age: 69
End: 2019-10-15

## 2019-10-15 DIAGNOSIS — C50.911 BREAST CANCER METASTASIZED TO BONE, RIGHT (HCC): Chronic | ICD-10-CM

## 2019-10-15 DIAGNOSIS — C79.51 BREAST CANCER METASTASIZED TO BONE, RIGHT (HCC): Chronic | ICD-10-CM

## 2019-10-15 PROCEDURE — 72148 MRI LUMBAR SPINE W/O DYE: CPT

## 2019-10-15 PROCEDURE — 72146 MRI CHEST SPINE W/O DYE: CPT

## 2019-10-17 ENCOUNTER — HOSPITAL ENCOUNTER (OUTPATIENT)
Dept: MRI IMAGING | Facility: HOSPITAL | Age: 69
Discharge: HOME | End: 2019-10-17
Payer: MEDICARE

## 2019-10-17 DIAGNOSIS — H53.8 BLURRED VISION: ICD-10-CM

## 2019-10-17 DIAGNOSIS — C79.51 BONE METASTASES (HCC): ICD-10-CM

## 2019-10-17 DIAGNOSIS — R51.9 FRONTAL HEADACHE: ICD-10-CM

## 2019-10-17 DIAGNOSIS — C50.411 MALIGNANT NEOPLASM OF UPPER-OUTER QUADRANT OF RIGHT BREAST IN FEMALE, ESTROGEN RECEPTOR POSITIVE (HCC): ICD-10-CM

## 2019-10-17 DIAGNOSIS — Z17.0 MALIGNANT NEOPLASM OF UPPER-OUTER QUADRANT OF RIGHT BREAST IN FEMALE, ESTROGEN RECEPTOR POSITIVE (HCC): ICD-10-CM

## 2019-10-17 PROCEDURE — A9585 GADOBUTROL INJECTION: HCPCS | Performed by: PHYSICIAN ASSISTANT

## 2019-10-17 PROCEDURE — 70553 MRI BRAIN STEM W/O & W/DYE: CPT

## 2019-10-17 RX ADMIN — GADOBUTROL 5 ML: 604.72 INJECTION INTRAVENOUS at 10:40

## 2019-10-18 ENCOUNTER — CLINICAL SUPPORT (OUTPATIENT)
Dept: RADIATION ONCOLOGY | Facility: HOSPITAL | Age: 69
End: 2019-10-18
Attending: RADIOLOGY
Payer: MEDICARE

## 2019-10-18 VITALS
HEIGHT: 63 IN | OXYGEN SATURATION: 97 % | WEIGHT: 119.8 LBS | DIASTOLIC BLOOD PRESSURE: 76 MMHG | TEMPERATURE: 99.1 F | SYSTOLIC BLOOD PRESSURE: 140 MMHG | HEART RATE: 79 BPM | RESPIRATION RATE: 14 BRPM | BODY MASS INDEX: 21.23 KG/M2

## 2019-10-18 DIAGNOSIS — C50.911 BREAST CANCER METASTASIZED TO BONE, RIGHT (HCC): Primary | Chronic | ICD-10-CM

## 2019-10-18 DIAGNOSIS — C79.51 BREAST CANCER METASTASIZED TO BONE, RIGHT (HCC): Primary | Chronic | ICD-10-CM

## 2019-10-18 DIAGNOSIS — C79.51 BONE METASTASIS (HCC): ICD-10-CM

## 2019-10-18 PROCEDURE — 99211 OFF/OP EST MAY X REQ PHY/QHP: CPT | Performed by: RADIOLOGY

## 2019-10-18 NOTE — PROGRESS NOTES
Jenn May 1950 is a 71 y o  female       Follow up visit   Pt is a 71 y o female with a h/o metastatic breast ca s/p radiation to T11 through bottom of SI joints which completed 1/9/19  She was last seen by radiation oncology 7/15/19  She returns today for follow-up  9/24/19 Marya Tenorio- receiving Faslodex and Xgeva monthly  She has occasional wheezing when lying down at night, CT chest previously showed lung nodules  Will re-evaluate  Intermittent blurred vision and frontal headaches for a few months but worse over the past 1-2 months  MRI to be completed  If she was noted to have progressive disease we would try another hormonal therapy not chemo  Refer to PT for ambulatory dysfunction  10/10/19 CT chest- Interval development of septal thickening and nodules throughout the right lower lobe most compatible with lymphangitic spread of disease  Scattered left lung nodules have also overall slightly increased in size  Stable ill-defined soft tissue within the mediastinum and mildly narrowing the trachea likely on the basis of adenopathy and stable since prior CT      Nodular lesion within the spleen appears slightly more heterogeneous since the prior examination and is new since PET/CT from 2008  Findings raise the possibility for metastatic disease and attention on follow-up examinations is recommended      Diffuse osseous metastatic disease appears overall progressed since the prior CT as identified on recent nuclear medicine bone scan       10/10/19 Bone scan- Multiple foci of abnormal increased radiotracer uptake in the axial and appendicular skeleton compatible with widespread osseous metastasis  There has been some mild overall progression of the osseous metastasis compared to the prior scan      10/15/19 MRI thoracic and lumbar spine:IMPRESSION:     Mild interval progression of diffuse osseous metastatic disease throughout the thoracic spine, as described above      Stable pathologic compression fracture at T12 with severe loss of height and mild bony retropulsion  No definite new compression fractures are identified  Redemonstration of diffuse osseous metastatic disease throughout the lumbosacral spine  Overall appearance is stable since the prior examination      Stable severe pathologic compression fracture of the T12 vertebral body  Mild loss of height along the superior endplate of L4 is noted, stable  No definite new pathologic compression fractures are seen  10/17/19 MRI brain:Heterogeneous appearance to the calvarial and upper cervical spine marrow compatible with bone metastasis  No evidence of parenchymal metastasis      White matter changes suggestive of chronic microangiopathy  No acute intracranial pathology          10/22/19 Jenniffer Dawson  1/3/20 Mammogram         Breast cancer metastasized to bone, right ([address] )    1996 Surgery     right modified radical mastectomy followed by adjuvant systemic therapy but no radiation  6/28/2004 Biopsy     Final Diagnosis  A  Lymph Node, right supraclavicular, core biopsy (14 slides, [id number], [facility]; collected on 6/28/2004):  -  Consistent with metastatic carcinoma, compatible with breast origin (see note)  2004 -  Radiation     salvage radiation therapy at an outside facility to right supraclavicular nodes at [address]      12/23/2018 Initial Diagnosis     Breast cancer metastasized to bone, right ([address] )      12/26/2018 - 1/9/2019 Radiation     3000 cGy in 10 fractions to  T11 through the bottom of the SI joints  Dr Emi Felix        1/16/2019 -  Chemotherapy     fulvestrant (FASLODEX) IM injection           Chemotherapy         2/20/2019 - 4/4/2019 Chemotherapy     Ibrance 125 mg PO day 1-21 every 28 days      4/4/2019 - 5/8/2019 Chemotherapy     Ibrance 100 mg PO daily day 1-21 every 28 days       5/22/2019 -  Chemotherapy     Faslodex, Ceci Bar      6/27/2019 Genetic Testing      No pathogenic mutation identified in [place] Breast panel         Clinical Trial: no  Health Maintenance   Topic Date Due    Hepatitis C Screening  1950    Medicare Annual Wellness Visit (AWV)  1950    CRC Screening: Colonoscopy  1950    HEPATITIS B VACCINES (1 of 3 - Risk 3-dose series) 07/29/1969    DTaP,Tdap,and Td Vaccines (1 - Tdap) 07/29/1971    Fall Risk  07/29/2015    Urinary Incontinence Screening  07/29/2015    Pneumococcal Vaccine: 65+ Years (1 of 2 - PCV13) 07/29/2015    INFLUENZA VACCINE  07/01/2019    MAMMOGRAM  09/17/2019    Depression Screening PHQ  07/15/2020    BMI: Adult  10/17/2020    Pneumococcal Vaccine: Pediatrics (0 to 5 Years) and At-Risk Patients (6 to 59 Years)  Aged Out       Patient Active Problem List   Diagnosis    Elevated alkaline phosphatase level    Anemia    Compression fracture of thoracic spine, non-traumatic (HCC)    Anxiety disorder    Hypomagnesemia    Breast cancer metastasized to bone, right (HCC)    Severe protein-calorie malnutrition (HCC)    Bone metastasis (HCC)    Lung nodules    Heme positive stool    Leukopenia    Hypocalcemia    Neutropenia (HCC)    Localized edema    Lytic bone lesion of femur    Non-intractable vomiting with nausea    Cancer associated pain    Hypertension    Malignant neoplasm of upper-outer quadrant of right breast in female, estrogen receptor positive ([facility] )    Ambulatory dysfunction     Past Medical History:   Diagnosis Date    Cancer [facility])     breast 1996    Cancer ([facility] )     bone      Compression fracture of thoracic vertebra (HCC)     Diabetes mellitus ([facility] )     History of therapeutic radiation     Tendonitis      Past Surgical History:   Procedure Laterality Date    CHOLECYSTECTOMY      MASTECTOMY      Right side reconstruction    NECK SURGERY      FL ESOPHAGOGASTRODUODENOSCOPY TRANSORAL DIAGNOSTIC N/A 1/24/2019    Procedure: ESOPHAGOGASTRODUODENOSCOPY (EGD);   Surgeon: Shai Bowie MD; [facility]; Service: Gastroenterology    TONSILLECTOMY       Family History   Problem Relation Age of Onset    Diabetes Mother     Cancer Mother     Lymphoma Father     KAROLINA disease Brother     Colon cancer Maternal Grandfather      Social History     Socioeconomic History    Marital status:       Spouse name: Not on file    Number of children: 2    Years of education: Not on file    Highest education level: Not on file   Occupational History    Occupation: Retired   Social Needs    Financial resource strain: Not on file    Food insecurity:     Worry: Not on file     Inability: Not on file   [employer] needs:     Medical: Not on file     Non-medical: Not on file   Tobacco Use    Smoking status: Never Smoker    Smokeless tobacco: Never Used   Substance and Sexual Activity    Alcohol use: No    Drug use: Yes     Types: Morphine     Comment: for cancer pain    Sexual activity: Not on file   Lifestyle    Physical activity:     Days per week: Not on file     Minutes per session: Not on file    Stress: Not on file   Relationships    Social connections:     Talks on phone: Not on file     Gets together: Not on file     Attends Denominational service: Not on file     Active member of club or organization: Not on file     Attends meetings of clubs or organizations: Not on file     Relationship status: Not on file    Intimate partner violence:     Fear of current or ex partner: Not on file     Emotionally abused: Not on file     Physically abused: Not on file     Forced sexual activity: Not on file   Other Topics Concern    Not on file   Social History Narrative    Lives alone       Current Outpatient Medications:     acetaminophen (TYLENOL) 325 mg tablet, Take 2 tablets (650 mg total) by mouth 4 (four) times a day (with meals and at bedtime), Disp: 80 tablet, Rfl: 1    calcium carbonate (OS-CLARENCE) 1250 (500 Ca) MG chewable tablet, Chew 1 tablet (1,250 mg total) daily, Disp: 30 tablet, Rfl: 0    Cholecalciferol (VITAMIN D3) 5000 units CAPS, Take 1 capsule by mouth daily, Disp: , Rfl:     ferrous sulfate 325 (65 Fe) mg tablet, Take 1 tablet (325 mg total) by mouth daily with breakfast, Disp: 30 tablet, Rfl: 1    gabapentin (NEURONTIN) 100 mg capsule, Take 1 capsule (100 mg total) by mouth 2 (two) times a day, Disp: 60 capsule, Rfl: 5    LORazepam (ATIVAN) 1 mg tablet, Take 2mg (2 tablets) qhs , Disp: 60 tablet, Rfl: 1    morphine (MSIR) 15 mg tablet, Use 1 tab BID scheduled to anticipate cancer pain  May have 1 tabs q4hrs PRN mod-severe pain , Disp: 65 tablet, Rfl: 0    MULTIPLE VITAMINS PO, Take by mouth, Disp: , Rfl:     Omega-3 Fatty Acids (FISH OIL PO), Take 2 g by mouth , Disp: , Rfl:     omeprazole (PriLOSEC) 40 MG capsule, TAKE 1 CAPSULE BY MOUTH EVERY DAY, Disp: 90 capsule, Rfl: 1    senna (SENOKOT) 8 6 mg, Take 1 tablet by mouth as needed for constipation, Disp: , Rfl:     sucralfate (CARAFATE) 1 g tablet, TAKE 1 TABLET (1 G TOTAL) BY MOUTH 2 (TWO) TIMES A DAY BEFORE MEALS, Disp: 60 tablet, Rfl: 3  No current facility-administered medications for this visit  No Known Allergies    Review of Systems:  Review of Systems   Constitutional: Positive for activity change (a little less active  ), appetite change (rates it as fair  ) and fatigue (3/10)  HENT: Negative  Eyes: Positive for visual disturbance (blurred vision with h/a)  Respiratory: Positive for shortness of breath (with exhertion) and wheezing (laying in bed)  Cardiovascular: Negative  Gastrointestinal: Negative  Endocrine: Negative  Genitourinary: Negative  Musculoskeletal: Positive for arthralgias, back pain (t 12 area, wears her brace, this helps  ) and gait problem (feels weaker with ambulation  [facility]  will be going for a PT eval , to asst with ambulation)  Skin: Negative  Allergic/Immunologic: Negative      Neurological: Positive for numbness (tingling , numbness up rt arm, up from RT thumb, index, middle finger  ) and headaches (h/a intermittent last 2 mons, usually in the frontal area  rated 6/10, also associated with blurred vision  Analgesics only help a little  )  Restless legs   Hematological: Negative  Psychiatric/Behavioral: Positive for sleep disturbance (recently)  Vitals:    10/18/19 0851   BP: 140/76   Pulse: 79   Resp: 14   Temp: 99 1 °F (37 3 °C)   SpO2: 97%   Weight: 54 3 kg (119 lb 12 8 oz)   Height: 5' 2 5" (1 588 m)        Pain assessment:4           Imaging:Ct Chest W Contrast    Result Date: 10/15/2019  Narrative: CT CHEST WITH IV CONTRAST INDICATION:   C50 411: Malignant neoplasm of upper-outer quadrant of right female breast Z17 0: Estrogen receptor positive status (ER+) R05: Cough  COMPARISON:  12/23/2018 TECHNIQUE: CT examination of the chest was performed  Axial, sagittal, and coronal 2D reformatted images were created from the source data and submitted for interpretation  Radiation dose length product (DLP) for this visit:  169 mGy-cm   This examination, like all CT scans performed in the [facility], was performed utilizing techniques to minimize radiation dose exposure, including the use of iterative reconstruction and automated exposure control  IV Contrast:  100 mL of iohexol (OMNIPAQUE) FINDINGS: LUNGS:  There are posttreatment/postradiation treatment changes within the right upper lobe, stable  In the interval since the prior examination there has been development of septal thickening with multiple ill-defined nodules throughout the right lower lobe most compatible with lymphangitic spread of disease  There are multiple additional nodules also noted throughout the left lung as described below on series 3: -7 mm left upper lobe nodule on image 26 is increase in size previously measuring 4 mm  -4 mm left lower lobe nodule on image 63 appears new   -4 mm left lower lobe nodule on image 72 has increased in size (please note that this is more optimally visualized on the coronal MIP imaging series 602) -9 mm perifissural nodule appears slightly increased in size on image 43 PLEURA:  Unremarkable  HEART/GREAT VESSELS:  Unremarkable for patient's age  MEDIASTINUM AND ROSENDA:  There is ill-defined soft tissue again noted within the mediastinum which surrounds and narrows the trachea  Findings are similar to the prior CT and again suspicious for metastatic disease  There may also be ill-defined soft tissue in the subcarinal region, similar  There is no hilar adenopathy  CHEST WALL AND LOWER NECK:   There is left breast skin thickening with underlying soft tissue nodularity, overall similar to the prior examination  Findings may be on the basis of postsurgical change and/or residual disease  Right axillary clips are present  VISUALIZED STRUCTURES IN THE UPPER ABDOMEN:  There is a heterogeneous lesion within the spleen measuring 2 7 x 3 0 cm  This appears more heterogeneous in comparison to the prior examination  This was also not present on the original PET/CT from 2008  OSSEOUS STRUCTURES:  There is redemonstration of diffuse osseous metastatic disease which appears overall progressed since the prior examination with increase in the degree of heterogeneous areas of sclerosis noted  There is a pathologic compression fracture again noted involving the T12 vertebral body with severe loss of height, progressed  There is bony retropulsion into the spinal canal   No definite new pathologic compression fractures are seen  Impression: Interval development of septal thickening and nodules throughout the right lower lobe most compatible with lymphangitic spread of disease  Scattered left lung nodules have also overall slightly increased in size  Stable ill-defined soft tissue within the mediastinum and mildly narrowing the trachea likely on the basis of adenopathy and stable since prior CT   Nodular lesion within the spleen appears slightly more heterogeneous since the prior examination and is new since PET/CT from 2008  Findings raise the possibility for metastatic disease and attention on follow-up examinations is recommended  Diffuse osseous metastatic disease appears overall progressed since the prior CT as identified on recent nuclear medicine bone scan  Remainder of the findings, as described above  Workstation performed: [id number]     Mri Brain W Wo Contrast    Result Date: 10/18/2019  Narrative: MRI BRAIN WITH AND WITHOUT CONTRAST INDICATION: C50 411: Malignant neoplasm of upper-outer quadrant of right female breast Z17 0: Estrogen receptor positive status (ER+) C79 51: Secondary malignant neoplasm of bone H53 8: Other visual disturbances R51: Headache  Breast cancer  Bone metastases with headache  COMPARISON:  None  TECHNIQUE: Sagittal T1, axial T2, axial FLAIR, axial T1, axial [place], axial diffusion  Sagittal, axial T1 postcontrast   Axial bravo postcontrast with coronal reconstructions  IV Contrast:  5 mL of gadobutrol injection (MULTI-DOSE)  IMAGE QUALITY:   Diagnostic  FINDINGS: BRAIN PARENCHYMA:  There is no discrete mass, mass effect or midline shift  There is no intracranial hemorrhage  Normal posterior fossa  Diffusion imaging is unremarkable  Small scattered hyperintensities on T2/FLAIR imaging are noted in the periventricular and subcortical white matter demonstrating an appearance that is statistically most likely to represent moderate microangiopathic change  Postcontrast imaging of the brain demonstrates no abnormal enhancement  VENTRICLES:  Normal  SELLA AND PITUITARY GLAND:  Normal  ORBITS:  Normal  PARANASAL SINUSES:  Normal  VASCULATURE:  Evaluation of the major intracranial vasculature demonstrates appropriate flow voids  CALVARIUM AND SKULL BASE:  Heterogeneous appearance to the calvarial and upper cervical spine marrow compatible with bone metastasis   EXTRACRANIAL SOFT TISSUES:  Normal      Impression: Heterogeneous appearance to the calvarial and upper cervical spine marrow compatible with bone metastasis  No evidence of parenchymal metastasis  White matter changes suggestive of chronic microangiopathy  No acute intracranial pathology  Workstation performed: [id number]     Mri Thoracic Spine Wo Contrast    Result Date: 10/17/2019  Narrative: MRI THORACIC SPINE WITHOUT CONTRAST INDICATION: C50 911: Malignant neoplasm of unspecified site of right female breast C79 51: Secondary malignant neoplasm of bone  COMPARISON:  MRI from 4/5/2019 and nuclear medicine bone scan from 10/10/2019 TECHNIQUE:  Sagittal T1, sagittal T2, sagittal inversion recovery, axial T2,  axial 2D MERGE  IMAGE QUALITY: Diagnostic  FINDINGS: ALIGNMENT: There is exaggeration of the normal thoracic kyphosis  There is no significant spondylolisthesis  MARROW SIGNAL:  There is redemonstration of diffuse osseous metastatic disease characterized by infiltrative low T1 and high STIR signal throughout the visualized osseous structures  Metastatic disease extends from the vertebral bodies into the posterior elements at multiple levels  Infiltrative low T1 marrow signal appears progressed at the T4 and T5 vertebral bodies with further extension into the posterior elements  There also appears to be multilevel progression of disease within the posterior elements, which is most notable at T1 and T3 as well as further extension of disease in the posterior elements at T7-T8  There is involvement of multiple ribs  There is stable severe loss of height of the T12 vertebral body with bony retropulsion into the spinal canal   No definite new pathologic compression fractures are identified    THORACIC CORD: Normal signal within the thoracic cord  PARAVERTEBRAL SOFT TISSUES:  Normal  THORACIC DEGENERATIVE CHANGE: There is a central disc protrusion at T6-T7 result in mild cord flattening, stable  There is otherwise no large disc herniation identified    There is stable effacement of the fat planes within the right neural foramina at T12 which may be on the basis of disc material and/or a small amount of epidural soft tissue, stable  There is otherwise no jose epidural extension of disease in the remainder of the levels  Impression: Mild interval progression of diffuse osseous metastatic disease throughout the thoracic spine, as described above  Stable pathologic compression fracture at T12 with severe loss of height and mild bony retropulsion  No definite new compression fractures are identified  Workstation performed: [id number]     Mri Lumbar Spine Wo Contrast    Result Date: 10/17/2019  Narrative: MRI LUMBAR SPINE WITHOUT CONTRAST INDICATION: C50 911: Malignant neoplasm of unspecified site of right female breast C79 51: Secondary malignant neoplasm of bone  COMPARISON:  4/5/2019 TECHNIQUE:  Sagittal T1, sagittal T2, sagittal inversion recovery, axial T1 and axial T2, coronal T2   IMAGE QUALITY:  Diagnostic FINDINGS: VERTEBRAL BODIES:  There is redemonstration of diffuse osseous metastatic disease throughout the visualized osseous structures  This is characterized by diffuse low T1 and high STIR signal   The overall appearance of the lumbar spine is similar to the prior examination  There is redemonstration of a pathologic compression fracture involving the T12 vertebral body  There is mild loss of height along the superior endplate of L4, stable  There are no definite new compression deformities identified  Evaluation for epidural extension of disease is limited without intravenous contrast without jose epidural soft tissue identified  SACRUM:  There is diffusely low T1 marrow signal compatible with metastatic disease  DISTAL CORD AND CONUS:  Normal size and signal within the distal cord and conus  PARASPINAL SOFT TISSUES:  Paraspinal soft tissues are unremarkable   LOWER THORACIC DISC SPACES:  Normal disc height and signal   No disc herniation, canal stenosis or foraminal narrowing  LUMBAR DISC SPACES: There are multilevel degenerative changes of the lumbar spine, overall stable since the prior examination  The most notable level is at L3-L4 where there is a bulging annulus in conjunction with facet arthrosis and ligamentum flavum thickening resulting  in overall severe mass effect on the thecal sac  There is also moderate to severe mass effect on the thecal sac at L4-L5  Impression: Redemonstration of diffuse osseous metastatic disease throughout the lumbosacral spine  Overall appearance is stable since the prior examination  Stable severe pathologic compression fracture of the T12 vertebral body  Mild loss of height along the superior endplate of L4 is noted, stable  No definite new pathologic compression fractures are seen  Workstation performed: [id number]     Nm Bone Scan Whole Body    Result Date: 10/10/2019  Narrative: BONE SCAN  WHOLE BODY INDICATION: C79 51: Secondary malignant neoplasm of bone PREVIOUS FILM CORRELATION:    Bone scan exam 1/14/2019, CT chest 10/10/2019, MRI thoracic lumbar spine 4/5/2019 TECHNIQUE:   This study was performed following the intravenous administration of 25 6 mCi Tc-99m labeled MDP  Delayed, anterior and posterior whole body images were acquired, 2-3 hours after radiopharmaceutical administration  FINDINGS:  Multiple foci of abnormal increased radiotracer uptake in the axial and appendicular skeleton compatible with widespread osseous metastasis  Many of these correspond to sclerotic lesions on CT  Abnormal radiotracer uptake identified in the calvarium, manubrium, sternum, bilateral ribs, bilateral humeri, spine, pelvis, sacrum, bilateral femoral, right proximal tibia and left proximal fibula  There are scattered new foci of radiotracer uptake, for example, at the frontal calvarium near midline, right shoulder, right mid humeral shaft, and bilateral ribs  Otherwise the distribution is fairly similar   Renal activity is fairly symmetric  Impression: 1  Multiple foci of abnormal increased radiotracer uptake in the axial and appendicular skeleton compatible with widespread osseous metastasis  There has been some mild overall progression of the osseous metastasis compared to the prior scan   Workstation performed: [id number]

## 2019-10-18 NOTE — PROGRESS NOTES
Follow-up - Radiation Oncology   Rita Shepard 1950 71 y o  female [id number]      History of Present Illness   Cancer Staging  No matching staging information was found for the patient  Rita Shepard returns today for routine scheduled follow-up visit  Overall she feels well but over the past few months has noticed that she feels more achy  She also reports dull mild frontal headaches, intermittent  She denies any new significant focal aches or pains or neurologic symptoms  Her chronic back pain in the vicinity of her known T12 fracture is unchanged and typically relieved with the use of a back brace  She has tried to remain active around the house  Pt is a 71 y o female with a h/o metastatic breast ca s/p radiation to T11 through bottom of SI joints which completed 1/9/19  She was last seen by radiation oncology 7/15/19  She returns today for follow-up  9/24/19 Marya Tenorio- receiving Faslodex and Xgeva monthly  She has occasional wheezing when lying down at night, CT chest previously showed lung nodules  Will re-evaluate  Intermittent blurred vision and frontal headaches for a few months but worse over the past 1-2 months  MRI to be completed  If she was noted to have progressive disease we would try another hormonal therapy not chemo  Refer to PT for ambulatory dysfunction  10/10/19 CT chest- Interval development of septal thickening and nodules throughout the right lower lobe most compatible with lymphangitic spread of disease  Scattered left lung nodules have also overall slightly increased in size  Stable ill-defined soft tissue within the mediastinum and mildly narrowing the trachea likely on the basis of adenopathy and stable since prior CT      Nodular lesion within the spleen appears slightly more heterogeneous since the prior examination and is new since PET/CT from 2008    Findings raise the possibility for metastatic disease and attention on follow-up examinations is recommended      Diffuse osseous metastatic disease appears overall progressed since the prior CT as identified on recent nuclear medicine bone scan       10/10/19 Bone scan- Multiple foci of abnormal increased radiotracer uptake in the axial and appendicular skeleton compatible with widespread osseous metastasis  There has been some mild overall progression of the osseous metastasis compared to the prior scan  10/15/19 MRI thoracic and lumbar spine:IMPRESSION:     Mild interval progression of diffuse osseous metastatic disease throughout the thoracic spine, as described above      Stable pathologic compression fracture at T12 with severe loss of height and mild bony retropulsion  No definite new compression fractures are identified  Redemonstration of diffuse osseous metastatic disease throughout the lumbosacral spine  Overall appearance is stable since the prior examination      Stable severe pathologic compression fracture of the T12 vertebral body  Mild loss of height along the superior endplate of L4 is noted, stable  No definite new pathologic compression fractures are seen  10/17/19 MRI brain:Heterogeneous appearance to the calvarial and upper cervical spine marrow compatible with bone metastasis  No evidence of parenchymal metastasis      White matter changes suggestive of chronic microangiopathy  No acute intracranial pathology          10/22/19 Lucina Elizabeth  1/3/20 Mammogram        Historical Information      Breast cancer metastasized to bone, right ([facility] [address] )    1996 Surgery     right modified radical mastectomy followed by adjuvant systemic therapy but no radiation  6/28/2004 Biopsy     Final Diagnosis  A  Lymph Node, right supraclavicular, core biopsy (14 slides, [id number], [facility]; collected on 6/28/2004):  -  Consistent with metastatic carcinoma, compatible with breast origin (see note)            2004 -  Radiation     salvage radiation therapy at an outside facility to right supraclavicular nodes at [facility]      12/23/2018 Initial Diagnosis     Breast cancer metastasized to bone, right ([facility] [address] )      12/26/2018 - 1/9/2019 Radiation     3000 cGy in 10 fractions to  T11 through the bottom of the SI joints  Dr Peggy James        1/16/2019 -  Chemotherapy     fulvestrant (FASLODEX) IM injection   Chemotherapy         2/20/2019 - 4/4/2019 Chemotherapy     Ibrance 125 mg PO day 1-21 every 28 days      4/4/2019 - 5/8/2019 Chemotherapy     Ibrance 100 mg PO daily day 1-21 every 28 days       5/22/2019 -  Chemotherapy     Faslodex, [facility]      6/27/2019 Genetic Testing      No pathogenic mutation identified in [facility] Breast panel         Past Medical History:   Diagnosis Date    Cancer [facility])     breast 1996    Cancer ([facility] [address] )     bone      [facility]     Diabetes mellitus ([place] [address] )     History of therapeutic radiation     Tendonitis      Past Surgical History:   Procedure Laterality Date    CHOLECYSTECTOMY      MASTECTOMY      Right side reconstruction    NECK SURGERY      [place] ESOPHAGOGASTRODUODENOSCOPY TRANSORAL DIAGNOSTIC N/A 1/24/2019    Procedure: ESOPHAGOGASTRODUODENOSCOPY (EGD); Surgeon: Dean Eden MD;  [facility]: [facility];   Service: Gastroenterology    TONSILLECTOMY         Social History   Social History     Substance and Sexual Activity   Alcohol Use No     Social History     Substance and Sexual Activity   Drug Use Yes    Types: Morphine    Comment: for cancer pain     Social History     Tobacco Use   Smoking Status Never Smoker   Smokeless Tobacco Never Used         Meds/Allergies     Current Outpatient Medications:     acetaminophen (TYLENOL) 325 mg tablet, Take 2 tablets (650 mg total) by mouth 4 (four) times a day (with meals and at bedtime), Disp: 80 tablet, Rfl: 1    calcium carbonate (OS-CLARENCE) 1250 (500 Ca) MG chewable tablet, Chew 1 tablet (1,250 mg total) daily, Disp: 30 tablet, Rfl: 0    Cholecalciferol (VITAMIN D3) 5000 units CAPS, Take 1 capsule by mouth daily, Disp: , Rfl:     ferrous sulfate 325 (65 Fe) mg tablet, Take 1 tablet (325 mg total) by mouth daily with breakfast, Disp: 30 tablet, Rfl: 1    gabapentin (NEURONTIN) 100 mg capsule, Take 1 capsule (100 mg total) by mouth 2 (two) times a day, Disp: 60 capsule, Rfl: 5    LORazepam (ATIVAN) 1 mg tablet, Take 2mg (2 tablets) qhs , Disp: 60 tablet, Rfl: 1    morphine (MSIR) 15 mg tablet, Use 1 tab BID scheduled to anticipate cancer pain  May have 1 tabs q4hrs PRN mod-severe pain , Disp: 65 tablet, Rfl: 0    MULTIPLE VITAMINS PO, Take by mouth, Disp: , Rfl:     Omega-3 Fatty Acids (FISH OIL PO), Take 2 g by mouth , Disp: , Rfl:     omeprazole (PriLOSEC) 40 MG capsule, TAKE 1 CAPSULE BY MOUTH EVERY DAY, Disp: 90 capsule, Rfl: 1    senna (SENOKOT) 8 6 mg, Take 1 tablet by mouth as needed for constipation, Disp: , Rfl:     sucralfate (CARAFATE) 1 g tablet, TAKE 1 TABLET (1 G TOTAL) BY MOUTH 2 (TWO) TIMES A DAY BEFORE MEALS, Disp: 60 tablet, Rfl: 3  No current facility-administered medications for this visit  No Known Allergies      Review of Systems   Review of Systems   Constitutional: Positive for activity change (a little less active  ), appetite change (rates it as fair  ) and fatigue (3/10)  HENT: Negative  Eyes: Positive for visual disturbance (blurred vision with h/a)  Respiratory: Positive for shortness of breath (with exhertion) and wheezing (laying in bed)  Cardiovascular: Negative  Gastrointestinal: Negative  Endocrine: Negative  Genitourinary: Negative  Musculoskeletal: Positive for arthralgias, back pain (t 12 area, wears her brace, this helps  ) and gait problem (feels weaker with ambulation  [facility] nelson for aid  will be going for a PT eval , to asst with ambulation)  Skin: Negative  Allergic/Immunologic: Negative      Neurological: Positive for numbness (tingling , numbness up rt arm, up from RT thumb, index, middle finger, unchanged since prior supraclavicular radiation many years ago  ) and headaches (h/a intermittent last 2 mons, usually in the frontal area  rated 6/10, also associated with blurred vision  Analgesics only help a little  )  Restless legs   Hematological: Negative  Psychiatric/Behavioral: Positive for sleep disturbance (recently)  OBJECTIVE:   /76   Pulse 79   Temp 99 1 °F (37 3 °C)   Resp 14   Ht 5' 2 5" (1 588 m)   Wt 54 3 kg (119 lb 12 8 oz)   LMP 05/27/1996 (LMP Unknown)   SpO2 97%   BMI 21 56 kg/m²     Karnofsky: 79 - Cares for self; unable to carry on normal activity or do normal work     Physical Exam   The patient presents today no apparent distress  Sclera anicteric  Cranial nerves grossly intact  Lungs clear to auscultation bilaterally  Strength grossly within normal limits  Ambulating with the use of a 4 point walker  Normal speech normal affect responds appropriately to all questions          RESULTS    Lab Results:   Recent Results (from the past 672 hour(s))   Comprehensive metabolic panel    Collection Time: 10/04/19 10:43 AM   Result Value Ref Range    Sodium 138 136 - 145 mmol/L    Potassium 4 2 3 5 - 5 3 mmol/L    Chloride 102 100 - 108 mmol/L    CO2 28 21 - 32 mmol/L    ANION GAP 8 4 - 13 mmol/L    BUN 11 5 - 25 mg/dL    Creatinine 0 60 0 60 - 1 30 mg/dL    Glucose, Fasting 109 (H) 65 - 99 mg/dL    Calcium 7 9 (L) 8 3 - 10 1 mg/dL    AST 28 5 - 45 U/L    ALT 30 12 - 78 U/L    Alkaline Phosphatase 155 (H) 46 - 116 U/L    Total Protein 6 8 6 4 - 8 2 g/dL    Albumin 3 5 3 5 - 5 0 g/dL    Total Bilirubin 0 60 0 20 - 1 00 mg/dL    eGFR 93 ml/min/1 73sq m   Comprehensive metabolic panel    Collection Time: 10/09/19  8:42 AM   Result Value Ref Range    Sodium 138 136 - 145 mmol/L    Potassium 3 9 3 5 - 5 3 mmol/L    Chloride 102 100 - 108 mmol/L    CO2 30 21 - 32 mmol/L    ANION GAP 6 4 - 13 mmol/L    BUN 15 5 - 25 mg/dL    Creatinine 0 66 0 60 - 1 30 mg/dL    Glucose 106 65 - 140 mg/dL    Calcium 8 8 8 3 - 10 1 mg/dL    AST 27 5 - 45 U/L    ALT 29 12 - 78 U/L    Alkaline Phosphatase 153 (H) 46 - 116 U/L    Total Protein 7 0 6 4 - 8 2 g/dL    Albumin 3 5 3 5 - 5 0 g/dL    Total Bilirubin 0 60 0 20 - 1 00 mg/dL    eGFR 90 ml/min/1 73sq m       Imaging Studies:Ct Chest W Contrast    Result Date: 10/15/2019  Narrative: CT CHEST WITH IV CONTRAST INDICATION:   C50 411: Malignant neoplasm of upper-outer quadrant of right female breast Z17 0: Estrogen receptor positive status (ER+) R05: Cough  COMPARISON:  12/23/2018 TECHNIQUE: CT examination of the chest was performed  Axial, sagittal, and coronal 2D reformatted images were created from the source data and submitted for interpretation  Radiation dose length product (DLP) for this visit:  169 mGy-cm   This examination, like all CT scans performed in the [facility], was performed utilizing techniques to minimize radiation dose exposure, including the use of iterative reconstruction and automated exposure control  IV Contrast:  100 mL of iohexol (OMNIPAQUE) FINDINGS: LUNGS:  There are posttreatment/postradiation treatment changes within the right upper lobe, stable  In the interval since the prior examination there has been development of septal thickening with multiple ill-defined nodules throughout the right lower lobe most compatible with lymphangitic spread of disease  There are multiple additional nodules also noted throughout the left lung as described below on series 3: -7 mm left upper lobe nodule on image 26 is increase in size previously measuring 4 mm  -4 mm left lower lobe nodule on image 63 appears new  -4 mm left lower lobe nodule on image 72 has increased in size (please note that this is more optimally visualized on the coronal MIP imaging series 602) -9 mm perifissural nodule appears slightly increased in size on image 43 PLEURA:  Unremarkable  HEART/GREAT VESSELS:  Unremarkable for patient's age  MEDIASTINUM AND ROSENDA:  There is ill-defined soft tissue again noted within the mediastinum which surrounds and narrows the trachea  Findings are similar to the prior CT and again suspicious for metastatic disease  There may also be ill-defined soft tissue in the subcarinal region, similar  There is no hilar adenopathy  CHEST WALL AND LOWER NECK:   There is left breast skin thickening with underlying soft tissue nodularity, overall similar to the prior examination  Findings may be on the basis of postsurgical change and/or residual disease  Right axillary clips are present  VISUALIZED STRUCTURES IN THE UPPER ABDOMEN:  There is a heterogeneous lesion within the spleen measuring 2 7 x 3 0 cm  This appears more heterogeneous in comparison to the prior examination  This was also not present on the original PET/CT from 2008  OSSEOUS STRUCTURES:  There is redemonstration of diffuse osseous metastatic disease which appears overall progressed since the prior examination with increase in the degree of heterogeneous areas of sclerosis noted  There is a pathologic compression fracture again noted involving the T12 vertebral body with severe loss of height, progressed  There is bony retropulsion into the spinal canal   No definite new pathologic compression fractures are seen  Impression: Interval development of septal thickening and nodules throughout the right lower lobe most compatible with lymphangitic spread of disease  Scattered left lung nodules have also overall slightly increased in size  Stable ill-defined soft tissue within the mediastinum and mildly narrowing the trachea likely on the basis of adenopathy and stable since prior CT  Nodular lesion within the spleen appears slightly more heterogeneous since the prior examination and is new since PET/CT from 2008  Findings raise the possibility for metastatic disease and attention on follow-up examinations is recommended   Diffuse osseous metastatic disease appears overall progressed since the prior CT as identified on recent nuclear medicine bone scan  Remainder of the findings, as described above  Workstation performed: [id number]     Mri Brain W Wo Contrast    Result Date: 10/18/2019  Narrative: MRI BRAIN WITH AND WITHOUT CONTRAST INDICATION: C50 411: Malignant neoplasm of upper-outer quadrant of right female breast Z17 0: Estrogen receptor positive status (ER+) C79 51: Secondary malignant neoplasm of bone H53 8: Other visual disturbances R51: Headache  Breast cancer  Bone metastases with headache  COMPARISON:  None  TECHNIQUE: Sagittal T1, axial T2, axial FLAIR, axial T1, axial [place], axial diffusion  Sagittal, axial T1 postcontrast   Axial bravo postcontrast with coronal reconstructions  IV Contrast:  5 mL of gadobutrol injection (MULTI-DOSE)  IMAGE QUALITY:   Diagnostic  FINDINGS: BRAIN PARENCHYMA:  There is no discrete mass, mass effect or midline shift  There is no intracranial hemorrhage  Normal posterior fossa  Diffusion imaging is unremarkable  Small scattered hyperintensities on T2/FLAIR imaging are noted in the periventricular and subcortical white matter demonstrating an appearance that is statistically most likely to represent moderate microangiopathic change  Postcontrast imaging of the brain demonstrates no abnormal enhancement  VENTRICLES:  Normal  SELLA AND PITUITARY GLAND:  Normal  ORBITS:  Normal  PARANASAL SINUSES:  Normal  VASCULATURE:  Evaluation of the major intracranial vasculature demonstrates appropriate flow voids  CALVARIUM AND SKULL BASE:  Heterogeneous appearance to the calvarial and upper cervical spine marrow compatible with bone metastasis  EXTRACRANIAL SOFT TISSUES:  Normal      Impression: Heterogeneous appearance to the calvarial and upper cervical spine marrow compatible with bone metastasis  No evidence of parenchymal metastasis  White matter changes suggestive of chronic microangiopathy    No acute intracranial pathology  Workstation performed: [id number]     Mri Thoracic Spine Wo Contrast    Result Date: 10/17/2019  Narrative: MRI THORACIC SPINE WITHOUT CONTRAST INDICATION: C50 911: Malignant neoplasm of unspecified site of right female breast C79 51: Secondary malignant neoplasm of bone  COMPARISON:  MRI from 4/5/2019 and nuclear medicine bone scan from 10/10/2019 TECHNIQUE:  Sagittal T1, sagittal T2, sagittal inversion recovery, axial T2,  axial 2D MERGE  IMAGE QUALITY: Diagnostic  FINDINGS: ALIGNMENT: There is exaggeration of the normal thoracic kyphosis  There is no significant spondylolisthesis  MARROW SIGNAL:  There is redemonstration of diffuse osseous metastatic disease characterized by infiltrative low T1 and high STIR signal throughout the visualized osseous structures  Metastatic disease extends from the vertebral bodies into the posterior elements at multiple levels  Infiltrative low T1 marrow signal appears progressed at the T4 and T5 vertebral bodies with further extension into the posterior elements  There also appears to be multilevel progression of disease within the posterior elements, which is most notable at T1 and T3 as well as further extension of disease in the posterior elements at T7-T8  There is involvement of multiple ribs  There is stable severe loss of height of the T12 vertebral body with bony retropulsion into the spinal canal   No definite new pathologic compression fractures are identified    THORACIC CORD: Normal signal within the thoracic cord  PARAVERTEBRAL SOFT TISSUES:  Normal  THORACIC DEGENERATIVE CHANGE: There is a central disc protrusion at T6-T7 result in mild cord flattening, stable  There is otherwise no large disc herniation identified  There is stable effacement of the fat planes within the right neural foramina at T12 which may be on the basis of disc material and/or a small amount of epidural soft tissue, stable    There is otherwise no jose epidural extension of disease in the remainder of the levels  Impression: Mild interval progression of diffuse osseous metastatic disease throughout the thoracic spine, as described above  Stable pathologic compression fracture at T12 with severe loss of height and mild bony retropulsion  No definite new compression fractures are identified  Workstation performed: [id number]     Mri Lumbar Spine Wo Contrast    Result Date: 10/17/2019  Narrative: MRI LUMBAR SPINE WITHOUT CONTRAST INDICATION: C50 911: Malignant neoplasm of unspecified site of right female breast C79 51: Secondary malignant neoplasm of bone  COMPARISON:  4/5/2019 TECHNIQUE:  Sagittal T1, sagittal T2, sagittal inversion recovery, axial T1 and axial T2, coronal T2   IMAGE QUALITY:  Diagnostic FINDINGS: VERTEBRAL BODIES:  There is redemonstration of diffuse osseous metastatic disease throughout the visualized osseous structures  This is characterized by diffuse low T1 and high STIR signal   The overall appearance of the lumbar spine is similar to the prior examination  There is redemonstration of a pathologic compression fracture involving the T12 vertebral body  There is mild loss of height along the superior endplate of L4, stable  There are no definite new compression deformities identified  Evaluation for epidural extension of disease is limited without intravenous contrast without jose epidural soft tissue identified  SACRUM:  There is diffusely low T1 marrow signal compatible with metastatic disease  DISTAL CORD AND CONUS:  Normal size and signal within the distal cord and conus  PARASPINAL SOFT TISSUES:  Paraspinal soft tissues are unremarkable  LOWER THORACIC DISC SPACES:  Normal disc height and signal   No disc herniation, canal stenosis or foraminal narrowing  LUMBAR DISC SPACES: There are multilevel degenerative changes of the lumbar spine, overall stable since the prior examination    The most notable level is at L3-L4 where there is a bulging annulus in conjunction with facet arthrosis and ligamentum flavum thickening resulting  in overall severe mass effect on the thecal sac  There is also moderate to severe mass effect on the thecal sac at L4-L5  Impression: Redemonstration of diffuse osseous metastatic disease throughout the lumbosacral spine  Overall appearance is stable since the prior examination  Stable severe pathologic compression fracture of the T12 vertebral body  Mild loss of height along the superior endplate of L4 is noted, stable  No definite new pathologic compression fractures are seen  Workstation performed: [id number]     Nm Bone Scan Whole Body    Result Date: 10/10/2019  Narrative: BONE SCAN  WHOLE BODY INDICATION: C79 51: Secondary malignant neoplasm of bone PREVIOUS FILM CORRELATION:    Bone scan exam 1/14/2019, CT chest 10/10/2019, MRI thoracic lumbar spine 4/5/2019 TECHNIQUE:   This study was performed following the intravenous administration of 25 6 mCi Tc-99m labeled MDP  Delayed, anterior and posterior whole body images were acquired, 2-3 hours after radiopharmaceutical administration  FINDINGS:  Multiple foci of abnormal increased radiotracer uptake in the axial and appendicular skeleton compatible with widespread osseous metastasis  Many of these correspond to sclerotic lesions on CT  Abnormal radiotracer uptake identified in the calvarium, manubrium, sternum, bilateral ribs, bilateral humeri, spine, pelvis, sacrum, bilateral femoral, right proximal tibia and left proximal fibula  There are scattered new foci of radiotracer uptake, for example, at the frontal calvarium near midline, right shoulder, right mid humeral shaft, and bilateral ribs  Otherwise the distribution is fairly similar  Renal activity is fairly symmetric  Impression: 1  Multiple foci of abnormal increased radiotracer uptake in the axial and appendicular skeleton compatible with widespread osseous metastasis    There has been some mild overall progression of the osseous metastasis compared to the prior scan  Workstation performed: [id number]           Assessment/Plan:  Orders Placed This Encounter   Procedures    MRI thoracic spine wo contrast    MRI lumbar spine wo contrast        Naz Haque returns today for routine follow-up, with extensive recent imaging including MRI of the brain, bone scan, MRI of thoracic and lumbar spine, and CT of the chest, all showing gradual interval progression of disease  Thankfully she has no parenchymal brain metastases  While there has been progression of disease in the spine, there is no significant epidural extension or impending cord compression  There does not appear to be any indication for additional focal palliative radiation at this time  She will be following up shortly with Medical Oncology and will discuss further a possible change to her systemic regimen  She will return to our department in 4 months with repeat MRIs of the thoracic and lumbar spine, and was informed to contact our department sooner should she develop any new progressive focal pain or neurologic deficit  Samual Gilford, MD  10/18/2019,9:58 AM    Portions of the record may have been created with voice recognition software   Occasional wrong word or "sound a like" substitutions may have occurred due to the inherent limitations of voice recognition software   Read the chart carefully and recognize, using context, where substitutions have occurred

## 2019-10-22 ENCOUNTER — OFFICE VISIT (OUTPATIENT)
Dept: HEMATOLOGY ONCOLOGY | Facility: CLINIC | Age: 69
End: 2019-10-22
Payer: MEDICARE

## 2019-10-22 VITALS
BODY MASS INDEX: 21.48 KG/M2 | DIASTOLIC BLOOD PRESSURE: 68 MMHG | SYSTOLIC BLOOD PRESSURE: 122 MMHG | RESPIRATION RATE: 18 BRPM | TEMPERATURE: 98.3 F | HEART RATE: 76 BPM | OXYGEN SATURATION: 97 % | HEIGHT: 63 IN | WEIGHT: 121.2 LBS

## 2019-10-22 DIAGNOSIS — C78.00 MALIGNANT NEOPLASM METASTATIC TO LUNG, UNSPECIFIED LATERALITY (HCC): ICD-10-CM

## 2019-10-22 DIAGNOSIS — C79.51 BONE METASTASES (HCC): ICD-10-CM

## 2019-10-22 DIAGNOSIS — Z17.0 MALIGNANT NEOPLASM OF UPPER-OUTER QUADRANT OF RIGHT BREAST IN FEMALE, ESTROGEN RECEPTOR POSITIVE (HCC): Primary | ICD-10-CM

## 2019-10-22 DIAGNOSIS — C50.411 MALIGNANT NEOPLASM OF UPPER-OUTER QUADRANT OF RIGHT BREAST IN FEMALE, ESTROGEN RECEPTOR POSITIVE (HCC): Primary | ICD-10-CM

## 2019-10-22 PROCEDURE — 99214 OFFICE O/P EST MOD 30 MIN: CPT | Performed by: PHYSICIAN ASSISTANT

## 2019-10-22 NOTE — LETTER
October 23, 2019     Shila Alvares   [address]  [place] [address]    Patient: Lisa Lin   YOB: 1950   Date of Visit: 10/22/2019       Dear Dr Soraya Mart: Thank you for referring Danya Hutchinson to me for evaluation  Below are my notes for this consultation  If you have questions, please do not hesitate to call me  I look forward to following your patient along with you  Sincerely,        Ryanne Ramirez PA-C        CC: No Recipients  Ryanne Ramirez PA-C  10/23/2019  5:02 PM  Sign at close encounter  Hematology/Oncology Outpatient Follow-up  Lisa Lin 71 y o  female 1950 [id number]    Date:  10/23/2019      Assessment and Plan:  1  Malignant neoplasm of upper-outer quadrant of right breast in female, estrogen receptor positive ([facility] ), 2  Bone metastases ([facility] ), 3  Malignant neoplasm metastatic to lung, unspecified laterality [facility])  80-year-old female presents for follow-up regarding metastatic breast cancer  She was diagnosed with metastatic disease in December 2018  She was started on therapy with Faslodex and Ibrance  She was not able to continue on I brands secondary to severe neutropenia  Therefore she has been maintained on Faslodex and Xgeva  Unfortunately repeat scans show disease progression  She has disease progression in her lungs as well as bones  I had thoracic surgeon review the films  He is unable to biopsy the lung lesion  We will proceed with CT of the abdomen pelvis for any other targeted wall lesion  If this is not possible then we may proceed with liquid biopsy  Previous sample is from 2004  Patient is agreeable to this plan  We briefly reviewed other therapies that she may be a candidate for included targeted therapy as well as chemotherapy  Patient hand this news well and was appreciative of all the information  This time she will continue on Xgeva only        - CT abdomen pelvis w contrast; Future      HPI:       Breast cancer metastasized to bone, right [facility])    1996 Surgery     right modified radical mastectomy followed by adjuvant systemic therapy but no radiation  6/28/2004 Biopsy     Final Diagnosis  A  Lymph Node, right supraclavicular, core biopsy (14 slides, [id number], [facility]; collected on 6/28/2004):  -  Consistent with metastatic carcinoma, compatible with breast origin (see note)  2004 -  Radiation     salvage radiation therapy at an outside facility to right supraclavicular nodes at [address]      12/23/2018 Initial Diagnosis     Breast cancer metastasized to bone, right ([address] )      12/26/2018 - 1/9/2019 Radiation     3000 cGy in 10 fractions to  T11 through the bottom of the SI joints  Dr Jimbo Lin        1/16/2019 -  Chemotherapy     fulvestrant (FASLODEX) IM injection   Chemotherapy         2/20/2019 - 4/4/2019 Chemotherapy     Ibrance 125 mg PO day 1-21 every 28 days      4/4/2019 - 5/8/2019 Chemotherapy     Ibrance 100 mg PO daily day 1-21 every 28 days       5/22/2019 -  Chemotherapy     Faslodex, Migdalia Boyer      6/27/2019 Genetic Testing      No pathogenic mutation identified in [facility] Breast panel         Interval history:    ROS: Review of Systems   Constitutional: Negative for activity change, appetite change, chills, fatigue, fever and unexpected weight change  Respiratory: Positive for wheezing (slight when laying down, infrequent )  Negative for cough and shortness of breath  Cardiovascular: Negative for chest pain, palpitations and leg swelling  Gastrointestinal: Negative for abdominal pain, blood in stool, constipation, diarrhea, nausea and vomiting  Genitourinary: Negative for difficulty urinating and dysuria  Musculoskeletal: Positive for arthralgias (shoulders, taking tylenol PRN)  Skin: Negative  Neurological: Negative for dizziness, weakness, light-headedness, numbness and headaches  Hematological: Negative  Psychiatric/Behavioral: Negative          Past Medical History:   Diagnosis Date    Cancer [facility])     breast 1996    [facility]     bone      Compression fracture of thoracic vertebra (HCC)     Diabetes mellitus ([facility] )     History of therapeutic radiation     Tendonitis        Past Surgical History:   Procedure Laterality Date    CHOLECYSTECTOMY      MASTECTOMY      Right side reconstruction    NECK SURGERY      RI ESOPHAGOGASTRODUODENOSCOPY TRANSORAL DIAGNOSTIC N/A 1/24/2019    Procedure: ESOPHAGOGASTRODUODENOSCOPY (EGD); Surgeon: Oscar Gregory MD;  [facility]; Service: Gastroenterology    TONSILLECTOMY         Social History     Socioeconomic History    Marital status:       Spouse name: None    Number of children: 2    Years of education: None    Highest education level: None   Occupational History    Occupation: Retired   Social Needs    Financial resource strain: None    Food insecurity:     Worry: None     Inability: None    Transportation needs:     Medical: None     Non-medical: None   Tobacco Use    Smoking status: Never Smoker    Smokeless tobacco: Never Used   Substance and Sexual Activity    Alcohol use: No    Drug use: Yes     Types: Morphine     Comment: for cancer pain    Sexual activity: None   Lifestyle    Physical activity:     Days per week: None     Minutes per session: None    Stress: None   Relationships    Social connections:     Talks on phone: None     Gets together: None     Attends [employer] service: None     Active member of club or organization: None     Attends meetings of clubs or organizations: None     Relationship status: None    Intimate partner violence:     Fear of current or ex partner: None     Emotionally abused: None     Physically abused: None     Forced sexual activity: None   Other Topics Concern    None   Social History Narrative    Lives alone       Family History   Problem Relation Age of Onset    Diabetes Mother     Cancer Mother     Lymphoma Father     KAROLINA disease Brother     Colon cancer Maternal Grandfather        No Known Allergies      Current Outpatient Medications:     acetaminophen (TYLENOL) 325 mg tablet, Take 2 tablets (650 mg total) by mouth 4 (four) times a day (with meals and at bedtime), Disp: 80 tablet, Rfl: 1    calcium carbonate (OS-CLARENCE) 1250 (500 Ca) MG chewable tablet, Chew 1 tablet (1,250 mg total) daily, Disp: 30 tablet, Rfl: 0    Cholecalciferol (VITAMIN D3) 5000 units CAPS, Take 1 capsule by mouth daily, Disp: , Rfl:     ferrous sulfate 325 (65 Fe) mg tablet, Take 1 tablet (325 mg total) by mouth daily with breakfast, Disp: 30 tablet, Rfl: 1    gabapentin (NEURONTIN) 100 mg capsule, Take 1 capsule (100 mg total) by mouth 2 (two) times a day, Disp: 60 capsule, Rfl: 5    LORazepam (ATIVAN) 1 mg tablet, Take 2mg (2 tablets) qhs , Disp: 60 tablet, Rfl: 1    morphine (MSIR) 15 mg tablet, Use 1 tab BID scheduled to anticipate cancer pain  May have 1 tabs q4hrs PRN mod-severe pain , Disp: 65 tablet, Rfl: 0    MULTIPLE VITAMINS PO, Take by mouth, Disp: , Rfl:     Omega-3 Fatty Acids (FISH OIL PO), Take 2 g by mouth , Disp: , Rfl:     omeprazole (PriLOSEC) 40 MG capsule, TAKE 1 CAPSULE BY MOUTH EVERY DAY, Disp: 90 capsule, Rfl: 1    senna (SENOKOT) 8 6 mg, Take 1 tablet by mouth as needed for constipation, Disp: , Rfl:     sucralfate (CARAFATE) 1 g tablet, TAKE 1 TABLET (1 G TOTAL) BY MOUTH 2 (TWO) TIMES A DAY BEFORE MEALS, Disp: 60 tablet, Rfl: 3      Physical Exam:  /68 (BP Location: Left arm, Patient Position: Sitting, Cuff Size: Adult)   Pulse 76   Temp 98 3 °F (36 8 °C)   Resp 18   Ht 5' 2 5" (1 588 m)   Wt 55 kg (121 lb 3 2 oz)   LMP 05/27/1996 (LMP Unknown)   SpO2 97%   BMI 21 81 kg/m²      Physical Exam   Constitutional: She is oriented to person, place, and time  She appears well-developed and well-nourished  No distress  HENT:   Head: Normocephalic and atraumatic  Eyes: Conjunctivae are normal  No scleral icterus     Neck: Normal range of motion  Neck supple  Cardiovascular: Normal rate, regular rhythm and normal heart sounds  No murmur heard  Pulmonary/Chest: Effort normal and breath sounds normal  No respiratory distress  Abdominal: Soft  There is no tenderness  Musculoskeletal: She exhibits no edema or tenderness  Ambulating with walker    Lymphadenopathy:     She has no cervical adenopathy  Neurological: She is alert and oriented to person, place, and time  No cranial nerve deficit  Skin: Skin is warm and dry  Psychiatric: She has a normal mood and affect  Labs:  Lab Results   Component Value Date    WBC 3 22 (L) 07/12/2019    HGB 11 5 07/12/2019    HCT 36 8 07/12/2019    MCV 96 07/12/2019     07/12/2019     Lab Results   Component Value Date    K 3 9 10/09/2019     10/09/2019    CO2 30 10/09/2019    BUN 15 10/09/2019    CREATININE 0 66 10/09/2019    GLUF 109 (H) 10/04/2019    CALCIUM 8 8 10/09/2019    AST 27 10/09/2019    ALT 29 10/09/2019    ALKPHOS 153 (H) 10/09/2019    EGFR 90 10/09/2019     Patient voiced understanding and agreement in the above discussion  Aware to contact our office with questions/symptoms in the interim  This note has been generated by voice recognition software system  Therefore, there may be spelling, grammar, and or syntax errors  Please contact if questions arise

## 2019-10-23 NOTE — PROGRESS NOTES
Hematology/Oncology Outpatient Follow-up  Janie Murray 71 y o  female 1950 [id number]    Date:  10/23/2019      Assessment and Plan:  1  Malignant neoplasm of upper-outer quadrant of right breast in female, estrogen receptor positive ([address] ), 2  Bone metastases ([address] ), 3  Malignant neoplasm metastatic to lung, unspecified laterality [facility])  49-year-old female presents for follow-up regarding metastatic breast cancer  She was diagnosed with metastatic disease in December 2018  She was started on therapy with Faslodex and Ibrance  She was not able to continue on I brands secondary to severe neutropenia  Therefore she has been maintained on Faslodex and Xgeva  Unfortunately repeat scans show disease progression  She has disease progression in her lungs as well as bones  I had thoracic surgeon review the films  He is unable to biopsy the lung lesion  We will proceed with CT of the abdomen pelvis for any other targeted wall lesion  If this is not possible then we may proceed with liquid biopsy  Previous sample is from 2004  Patient is agreeable to this plan  We briefly reviewed other therapies that she may be a candidate for included targeted therapy as well as chemotherapy  Patient hand this news well and was appreciative of all the information  This time she will continue on Xgeva only  - CT abdomen pelvis w contrast; Future      HPI:       Breast cancer metastasized to bone, right ([address] )    1996 Surgery     right modified radical mastectomy followed by adjuvant systemic therapy but no radiation  6/28/2004 Biopsy     Final Diagnosis  A  Lymph Node, right supraclavicular, core biopsy (14 slides, [id number], [facility]; collected on 6/28/2004):  -  Consistent with metastatic carcinoma, compatible with breast origin (see note)            2004 -  Radiation     salvage radiation therapy at an outside facility to right supraclavicular nodes at [facility]      12/23/2018 Initial Diagnosis Breast cancer [facility], [facility] )      12/26/2018 - 1/9/2019 Radiation     3000 cGy in 10 fractions to  T11 through the bottom of the SI joints  Dr Natalia Faith        1/16/2019 -  Chemotherapy     fulvestrant (FASLODEX) IM injection   Chemotherapy         2/20/2019 - 4/4/2019 Chemotherapy     Ibrance 125 mg PO day 1-21 every 28 days      4/4/2019 - 5/8/2019 Chemotherapy     Ibrance 100 mg PO daily day 1-21 every 28 days       5/22/2019 -  Chemotherapy     Faslodex, Maria Elena Yara      6/27/2019 Genetic Testing      No pathogenic mutation identified in [facility] Breast panel         Interval history:    ROS: Review of Systems   Constitutional: Negative for activity change, appetite change, chills, fatigue, fever and unexpected weight change  Respiratory: Positive for wheezing (slight when laying down, infrequent )  Negative for cough and shortness of breath  Cardiovascular: Negative for chest pain, palpitations and leg swelling  Gastrointestinal: Negative for abdominal pain, blood in stool, constipation, diarrhea, nausea and vomiting  Genitourinary: Negative for difficulty urinating and dysuria  Musculoskeletal: Positive for arthralgias (shoulders, taking tylenol PRN)  Skin: Negative  Neurological: Negative for dizziness, weakness, light-headedness, numbness and headaches  Hematological: Negative  Psychiatric/Behavioral: Negative  Past Medical History:   Diagnosis Date    Cancer [facility])     breast 1996    Cancer ([place] [address] )     bone      Compression fracture of thoracic vertebra (HCC)     Diabetes mellitus ([place] [address] )     History of therapeutic radiation     Tendonitis        Past Surgical History:   Procedure Laterality Date    CHOLECYSTECTOMY      MASTECTOMY      Right side reconstruction    NECK SURGERY      MT ESOPHAGOGASTRODUODENOSCOPY TRANSORAL DIAGNOSTIC N/A 1/24/2019    Procedure: ESOPHAGOGASTRODUODENOSCOPY (EGD); Surgeon: Shai Bowie MD;  [facility];   Service: Gastroenterology    TONSILLECTOMY         Social History     Socioeconomic History    Marital status:       Spouse name: None    Number of children: 2    Years of education: None    Highest education level: None   Occupational History    Occupation: Retired   Social Needs    Financial resource strain: None    Food insecurity:     Worry: None     Inability: None    Transportation needs:     Medical: None     Non-medical: None   Tobacco Use    Smoking status: Never Smoker    Smokeless tobacco: Never Used   Substance and Sexual Activity    Alcohol use: No    Drug use: Yes     Types: Morphine     Comment: for cancer pain    Sexual activity: None   Lifestyle    Physical activity:     Days per week: None     Minutes per session: None    Stress: None   Relationships    Social connections:     Talks on phone: None     Gets together: None     Attends Shinto service: None     Active member of club or organization: None     Attends meetings of clubs or organizations: None     Relationship status: None    Intimate partner violence:     Fear of current or ex partner: None     Emotionally abused: None     Physically abused: None     Forced sexual activity: None   Other Topics Concern    None   Social History Narrative    Lives alone       Family History   Problem Relation Age of Onset    Diabetes Mother     Cancer Mother     Lymphoma Father     KAROLINA disease Brother     Colon cancer Maternal Grandfather        No Known Allergies      Current Outpatient Medications:     acetaminophen (TYLENOL) 325 mg tablet, Take 2 tablets (650 mg total) by mouth 4 (four) times a day (with meals and at bedtime), Disp: 80 tablet, Rfl: 1    calcium carbonate (OS-CLARENCE) 1250 (500 Ca) MG chewable tablet, Chew 1 tablet (1,250 mg total) daily, Disp: 30 tablet, Rfl: 0    Cholecalciferol (VITAMIN D3) 5000 units CAPS, Take 1 capsule by mouth daily, Disp: , Rfl:     ferrous sulfate 325 (65 Fe) mg tablet, Take 1 tablet (325 mg total) by mouth daily with breakfast, Disp: 30 tablet, Rfl: 1    gabapentin (NEURONTIN) 100 mg capsule, Take 1 capsule (100 mg total) by mouth 2 (two) times a day, Disp: 60 capsule, Rfl: 5    LORazepam (ATIVAN) 1 mg tablet, Take 2mg (2 tablets) qhs , Disp: 60 tablet, Rfl: 1    morphine (MSIR) 15 mg tablet, Use 1 tab BID scheduled to anticipate cancer pain  May have 1 tabs q4hrs PRN mod-severe pain , Disp: 65 tablet, Rfl: 0    MULTIPLE VITAMINS PO, Take by mouth, Disp: , Rfl:     Omega-3 Fatty Acids (FISH OIL PO), Take 2 g by mouth , Disp: , Rfl:     omeprazole (PriLOSEC) 40 MG capsule, TAKE 1 CAPSULE BY MOUTH EVERY DAY, Disp: 90 capsule, Rfl: 1    senna (SENOKOT) 8 6 mg, Take 1 tablet by mouth as needed for constipation, Disp: , Rfl:     sucralfate (CARAFATE) 1 g tablet, TAKE 1 TABLET (1 G TOTAL) BY MOUTH 2 (TWO) TIMES A DAY BEFORE MEALS, Disp: 60 tablet, Rfl: 3      Physical Exam:  /68 (BP Location: Left arm, Patient Position: Sitting, Cuff Size: Adult)   Pulse 76   Temp 98 3 °F (36 8 °C)   Resp 18   Ht 5' 2 5" (1 588 m)   Wt 55 kg (121 lb 3 2 oz)   LMP 05/27/1996 (LMP Unknown)   SpO2 97%   BMI 21 81 kg/m²     Physical Exam   Constitutional: She is oriented to person, place, and time  She appears well-developed and well-nourished  No distress  HENT:   Head: Normocephalic and atraumatic  Eyes: Conjunctivae are normal  No scleral icterus  Neck: Normal range of motion  Neck supple  Cardiovascular: Normal rate, regular rhythm and normal heart sounds  No murmur heard  Pulmonary/Chest: Effort normal and breath sounds normal  No respiratory distress  Abdominal: Soft  There is no tenderness  Musculoskeletal: She exhibits no edema or tenderness  Ambulating with walker    Lymphadenopathy:     She has no cervical adenopathy  Neurological: She is alert and oriented to person, place, and time  No cranial nerve deficit  Skin: Skin is warm and dry     Psychiatric: She has a normal mood and affect  Labs:  Lab Results   Component Value Date    WBC 3 22 (L) 07/12/2019    HGB 11 5 07/12/2019    HCT 36 8 07/12/2019    MCV 96 07/12/2019     07/12/2019     Lab Results   Component Value Date    K 3 9 10/09/2019     10/09/2019    CO2 30 10/09/2019    BUN 15 10/09/2019    CREATININE 0 66 10/09/2019    GLUF 109 (H) 10/04/2019    CALCIUM 8 8 10/09/2019    AST 27 10/09/2019    ALT 29 10/09/2019    ALKPHOS 153 (H) 10/09/2019    EGFR 90 10/09/2019     Patient voiced understanding and agreement in the above discussion  Aware to contact our office with questions/symptoms in the interim  This note has been generated by voice recognition software system  Therefore, there may be spelling, grammar, and or syntax errors  Please contact if questions arise

## 2019-10-25 ENCOUNTER — TELEPHONE (OUTPATIENT)
Dept: HEMATOLOGY ONCOLOGY | Facility: CLINIC | Age: 69
End: 2019-10-25

## 2019-10-25 NOTE — TELEPHONE ENCOUNTER
Patient called in to let Sahil CASEY know that she is getting her CT ofpelvis abd done at [facility] on Monday

## 2019-10-28 ENCOUNTER — TELEPHONE (OUTPATIENT)
Dept: HEMATOLOGY ONCOLOGY | Facility: CLINIC | Age: 69
End: 2019-10-28

## 2019-10-28 NOTE — TELEPHONE ENCOUNTER
Patient called to get an update in regards to a CT that is supposed to be scheduled, per discussion with Deanna Mancilla on 10/22/19  Patient has not received a call so far in regards to her treatment process  Please call back and advise  Thank you

## 2019-10-28 NOTE — TELEPHONE ENCOUNTER
Patient was scheduled for a CT today but cancelled it  Is she supposed to have the imagining done? Scan was rescheduled to 11/01 at 12:30pm  Patient will have her sister  the barium for the test  Email sent to [facility] for transportation  Patient aware of appointment date, time and barium instructions

## 2019-11-01 ENCOUNTER — HOSPITAL ENCOUNTER (OUTPATIENT)
Dept: RADIOLOGY | Age: 69
Discharge: HOME | End: 2019-11-01
Payer: MEDICARE

## 2019-11-01 DIAGNOSIS — C79.51 BONE METASTASES (HCC): ICD-10-CM

## 2019-11-01 DIAGNOSIS — C50.411 MALIGNANT NEOPLASM OF UPPER-OUTER QUADRANT OF RIGHT BREAST IN FEMALE, ESTROGEN RECEPTOR POSITIVE (HCC): ICD-10-CM

## 2019-11-01 DIAGNOSIS — C78.00 MALIGNANT NEOPLASM METASTATIC TO LUNG, UNSPECIFIED LATERALITY (HCC): ICD-10-CM

## 2019-11-01 DIAGNOSIS — Z17.0 MALIGNANT NEOPLASM OF UPPER-OUTER QUADRANT OF RIGHT BREAST IN FEMALE, ESTROGEN RECEPTOR POSITIVE (HCC): ICD-10-CM

## 2019-11-01 PROCEDURE — 74177 CT ABD & PELVIS W/CONTRAST: CPT

## 2019-11-01 RX ADMIN — IOHEXOL 100 ML: 350 INJECTION, SOLUTION INTRAVENOUS at 11:55

## 2019-11-04 ENCOUNTER — TRANSCRIBE ORDERS (OUTPATIENT)
Dept: LAB | Facility: CLINIC | Age: 69
End: 2019-11-04

## 2019-11-04 ENCOUNTER — APPOINTMENT (OUTPATIENT)
Dept: LAB | Facility: CLINIC | Age: 69
End: 2019-11-04
Payer: MEDICARE

## 2019-11-04 DIAGNOSIS — C79.51 BREAST CANCER METASTASIZED TO BONE, RIGHT (HCC): ICD-10-CM

## 2019-11-04 DIAGNOSIS — C50.911 BREAST CANCER METASTASIZED TO BONE, RIGHT (HCC): ICD-10-CM

## 2019-11-04 DIAGNOSIS — C79.51 BONE METASTASIS (HCC): ICD-10-CM

## 2019-11-04 LAB
ALBUMIN SERPL BCP-MCNC: 3.4 G/DL (ref 3.5–5)
ALP SERPL-CCNC: 175 U/L (ref 46–116)
ALT SERPL W P-5'-P-CCNC: 25 U/L (ref 12–78)
ANION GAP SERPL CALCULATED.3IONS-SCNC: 6 MMOL/L (ref 4–13)
AST SERPL W P-5'-P-CCNC: 31 U/L (ref 5–45)
BILIRUB SERPL-MCNC: 0.5 MG/DL (ref 0.2–1)
BUN SERPL-MCNC: 14 MG/DL (ref 5–25)
CALCIUM SERPL-MCNC: 8.3 MG/DL (ref 8.3–10.1)
CHLORIDE SERPL-SCNC: 103 MMOL/L (ref 100–108)
CO2 SERPL-SCNC: 29 MMOL/L (ref 21–32)
CREAT SERPL-MCNC: 0.69 MG/DL (ref 0.6–1.3)
GFR SERPL CREATININE-BSD FRML MDRD: 89 ML/MIN/1.73SQ M
GLUCOSE P FAST SERPL-MCNC: 94 MG/DL (ref 65–99)
POTASSIUM SERPL-SCNC: 5.3 MMOL/L (ref 3.5–5.3)
PROT SERPL-MCNC: 6.7 G/DL (ref 6.4–8.2)
SODIUM SERPL-SCNC: 138 MMOL/L (ref 136–145)

## 2019-11-04 PROCEDURE — 80053 COMPREHEN METABOLIC PANEL: CPT

## 2019-11-04 PROCEDURE — 36415 COLL VENOUS BLD VENIPUNCTURE: CPT

## 2019-11-06 ENCOUNTER — HOSPITAL ENCOUNTER (OUTPATIENT)
Dept: INFUSION CENTER | Facility: CLINIC | Age: 69
Discharge: HOME | End: 2019-11-06
Payer: MEDICARE

## 2019-11-06 DIAGNOSIS — C79.51 BREAST CANCER METASTASIZED TO BONE, RIGHT (HCC): ICD-10-CM

## 2019-11-06 DIAGNOSIS — C79.51 BONE METASTASIS (HCC): Primary | ICD-10-CM

## 2019-11-06 DIAGNOSIS — C50.911 BREAST CANCER METASTASIZED TO BONE, RIGHT (HCC): ICD-10-CM

## 2019-11-06 PROCEDURE — 96401 CHEMO ANTI-NEOPL SQ/IM: CPT

## 2019-11-06 RX ADMIN — DENOSUMAB 120 MG: 120 INJECTION SUBCUTANEOUS at 09:28

## 2019-11-06 NOTE — PROGRESS NOTES
Pt resting comfortably with no complaints  Shot administered as ordered  Pt tolerated well  Declines AVS, appt sheet given to patientt

## 2019-11-08 ENCOUNTER — TELEPHONE (OUTPATIENT)
Dept: HEMATOLOGY ONCOLOGY | Facility: HOSPITAL | Age: 69
End: 2019-11-08

## 2019-11-08 DIAGNOSIS — C78.00 MALIGNANT NEOPLASM METASTATIC TO LUNG, UNSPECIFIED LATERALITY (HCC): ICD-10-CM

## 2019-11-08 DIAGNOSIS — Z17.0 MALIGNANT NEOPLASM OF UPPER-OUTER QUADRANT OF RIGHT BREAST IN FEMALE, ESTROGEN RECEPTOR POSITIVE (HCC): Primary | ICD-10-CM

## 2019-11-08 DIAGNOSIS — C79.51 BONE METASTASES (HCC): ICD-10-CM

## 2019-11-08 DIAGNOSIS — C50.411 MALIGNANT NEOPLASM OF UPPER-OUTER QUADRANT OF RIGHT BREAST IN FEMALE, ESTROGEN RECEPTOR POSITIVE (HCC): Primary | ICD-10-CM

## 2019-11-08 NOTE — TELEPHONE ENCOUNTER
Spoke with patient to review imaging studies  She did not have other disease in the abdomen to target for biopsy  Reviewed that only bony lesions were shown  No liver mass or at  There was note of renal cysts  Patient will undergo IR bone biopsy  If IR can perform see that can be completed but it does not appear that this is the target double area  Please schedule IR bone biopsy ASAP but not on 11/12/19

## 2019-11-08 NOTE — TELEPHONE ENCOUNTER
Pt scheduled for 12  2 19 at [facility]  Phone consult [phone]     Pt is aware

## 2019-11-13 NOTE — PROGRESS NOTES
Palliative and Supportive Care   Katheryne Gosselin 71 y o  female [id number]    Assessment/Plan:  1  Malignant neoplasm of upper-outer quadrant of right breast in female, estrogen receptor positive ([facility] [address] )    2  B[facility] metastasis ([facility] )    3  Cancer associated pain    4  Ambulatory dysfunction    5  Other acute gastritis with hemorrhage    6  Anxiety    7  Radiation-induced brachial plexopathy    8  Malignant neoplasm of right female breast, unspecified estrogen receptor status, unspecified site of breast ([facility] )      Requested Prescriptions     Signed Prescriptions Disp Refills    gabapentin (NEURONTIN) 300 mg capsule 60 capsule 5     Sig: Take 1 capsule (300 mg total) by mouth 2 (two) times a day    LORazepam (ATIVAN) 1 mg tablet 60 tablet 2     Sig: Take 2mg (2 tablets) qhs   ferrous sulfate 325 (65 Fe) mg tablet 30 tablet 1     Sig: Take 1 tablet (325 mg total) by mouth every other day    morphine (MSIR) 15 mg tablet 65 tablet 0     Sig: Use 1 tab BID scheduled to anticipate cancer pain  May have 1 tabs q4hrs PRN mod-severe pain  Medications Discontinued During This Encounter   Medication Reason    gabapentin (NEURONTIN) 100 mg capsule Dose adjustment - increase to 100mg in AM and 300mg in PM for 3-5 days  Then increase to 300mg twice a day  Sucralfate (CARAFATE) 1g tablet Try taking it 1x per day and if stomach is not bother her, will try not to take it  She may not need now that radiation is over   ferrous sulfate 325 (65 Fe) mg tablet Dose adjustment  Try taking every other day  Advised to take with vit C  Advised to take a separate time than MVI with Fe  Representatives have queried the patient's controlled substance dispensing history in the Prescription Drug Monitoring Program in compliance with regulations before I have prescribed any controlled substances  The prescription history is consistent with prescribed therapy and our practice policies        30 minutes were spent face to face with Kamari Freeman with greater than 50% of the time spent in counseling or coordination of care including discussions of treatment instructions and follow up requirements   All of the patient's questions were answered during this discussion  Return in about 2 months (around 1/14/2020)  Subjective:   Chief Complaint  Follow up visit for:  symptom management  HPI     Kamari Freeman is a 71 y o  female with breast cancer  She was initially diagnosed in 1996  She developed metastatic disease to her right supraclavicular nodes in 2004  She developed osseous metastatic disease in 2018  Her medical oncologist is Dr Harry Chatterjee  Her current treatment is Xgeva only while a bone biopsy is being obtained  There had been consideration for prophylactic fixation of femur for impending fracture with lytic bone lesions, however no plans for surgery at this time       He is seen in the department of Palliative & Supportive Care for concurrent symptom management  She has cancer related back pain and cancer treatment related brachial plexopathy  Her pain regime is MSIR 15mg BID, tylenol 325mg bid, and gabapentin 100mg BID  She has been more active and on the recent nice days she has felt well enough to work in her garden  She does occasionally use her TLSO brace for stability  She uses her walker when she leaves her house  At our last visit she was referred to PT but did not follow through  Over the past 2 weeks she notes inc brachial plexus spasms 6/10 pulling sensation in the axilla    She uses lorazepam 2mg to help her sleep  She is very anxious about her bone biopsy  The following portions of the medical history were reviewed: past medical history, problem list, medication list, and social history      Current Outpatient Medications:     acetaminophen (TYLENOL) 325 mg tablet, Take 2 tablets (650 mg total) by mouth 4 (four) times a day (with meals and at bedtime), Disp: 80 tablet, Rfl: 1    calcium carbonate (OS-CLARENCE) 1250 (500 Ca) MG chewable tablet, Chew 1 tablet (1,250 mg total) daily, Disp: 30 tablet, Rfl: 0    Cholecalciferol (VITAMIN D3) 5000 units CAPS, Take 1 capsule by mouth daily, Disp: , Rfl:     ferrous sulfate 325 (65 Fe) mg tablet, Take 1 tablet (325 mg total) by mouth every other day, Disp: 30 tablet, Rfl: 1    LORazepam (ATIVAN) 1 mg tablet, Take 2mg (2 tablets) qhs , Disp: 60 tablet, Rfl: 2    [START ON 12/12/2019] morphine (MSIR) 15 mg tablet, Use 1 tab BID scheduled to anticipate cancer pain  May have 1 tabs q4hrs PRN mod-severe pain , Disp: 65 tablet, Rfl: 0    MULTIPLE VITAMINS PO, Take by mouth, Disp: , Rfl:     Omega-3 Fatty Acids (FISH OIL PO), Take 2 g by mouth , Disp: , Rfl:     omeprazole (PriLOSEC) 40 MG capsule, TAKE 1 CAPSULE BY MOUTH EVERY DAY, Disp: 90 capsule, Rfl: 1    senna (SENOKOT) 8 6 mg, Take 1 tablet by mouth as needed for constipation, Disp: , Rfl:     gabapentin (NEURONTIN) 300 mg capsule, Take 1 capsule (300 mg total) by mouth 2 (two) times a day, Disp: 60 capsule, Rfl: 5  Review of Systems   Constitutional: Positive for activity change and fatigue  All other systems negative    Objective:  Vital Signs  /72 (BP Location: Left arm, Patient Position: Sitting, Cuff Size: Standard)   Pulse 70   Temp 97 6 °F (36 4 °C) (Tympanic)   Resp 18   Ht 5' 2 5" (1 588 m)   Wt 53 5 kg (118 lb)   LMP 05/27/1996 (LMP Unknown)   SpO2 98%   BMI 21 24 kg/m²    Physical Exam    Constitutional: Appears well-developed and well-nourished  In no acute physical or emotional distress  Head: Normocephalic and atraumatic  Eyes: EOM are normal  No ocular discharge  No scleral icterus  Neck: No visible adenopathy or masses  Respiratory: Effort normal  No stridor  No respiratory distress  Gastrointestinal: No abdominal distension  Musculoskeletal: No edema  Careful gait  Neurological: Alert, oriented and appropriately conversant     Skin: No diaphoresis, no rashes seen on exposed areas of skin  Psychiatric: Displays a normal mood and affect   Behavior, judgement and thought content appear normal

## 2019-11-14 ENCOUNTER — TRANSCRIBE ORDERS (OUTPATIENT)
Dept: LAB | Facility: CLINIC | Age: 69
End: 2019-11-14

## 2019-11-14 ENCOUNTER — APPOINTMENT (OUTPATIENT)
Dept: LAB | Facility: CLINIC | Age: 69
End: 2019-11-14
Payer: MEDICARE

## 2019-11-14 ENCOUNTER — OFFICE VISIT (OUTPATIENT)
Dept: PALLIATIVE MEDICINE | Facility: CLINIC | Age: 69
End: 2019-11-14
Payer: MEDICARE

## 2019-11-14 VITALS
DIASTOLIC BLOOD PRESSURE: 72 MMHG | WEIGHT: 118 LBS | HEART RATE: 70 BPM | SYSTOLIC BLOOD PRESSURE: 122 MMHG | OXYGEN SATURATION: 98 % | RESPIRATION RATE: 18 BRPM | BODY MASS INDEX: 20.91 KG/M2 | TEMPERATURE: 97.6 F | HEIGHT: 63 IN

## 2019-11-14 DIAGNOSIS — C50.411 MALIGNANT NEOPLASM OF UPPER-OUTER QUADRANT OF RIGHT BREAST IN FEMALE, ESTROGEN RECEPTOR POSITIVE (HCC): Primary | ICD-10-CM

## 2019-11-14 DIAGNOSIS — C50.911 MALIGNANT NEOPLASM OF RIGHT FEMALE BREAST, UNSPECIFIED ESTROGEN RECEPTOR STATUS, UNSPECIFIED SITE OF BREAST (HCC): Chronic | ICD-10-CM

## 2019-11-14 DIAGNOSIS — C79.51 BONE METASTASIS (HCC): ICD-10-CM

## 2019-11-14 DIAGNOSIS — F41.9 ANXIETY: ICD-10-CM

## 2019-11-14 DIAGNOSIS — G89.3 CANCER ASSOCIATED PAIN: ICD-10-CM

## 2019-11-14 DIAGNOSIS — E13.69 OTHER SPECIFIED DIABETES MELLITUS WITH OTHER SPECIFIED COMPLICATION, UNSPECIFIED WHETHER LONG TERM INSULIN USE (HCC): ICD-10-CM

## 2019-11-14 DIAGNOSIS — E78.2 MIXED HYPERLIPIDEMIA: ICD-10-CM

## 2019-11-14 DIAGNOSIS — Z17.0 MALIGNANT NEOPLASM OF UPPER-OUTER QUADRANT OF RIGHT BREAST IN FEMALE, ESTROGEN RECEPTOR POSITIVE (HCC): Primary | ICD-10-CM

## 2019-11-14 DIAGNOSIS — G54.0 RADIATION-INDUCED BRACHIAL PLEXOPATHY: ICD-10-CM

## 2019-11-14 DIAGNOSIS — R26.2 AMBULATORY DYSFUNCTION: ICD-10-CM

## 2019-11-14 DIAGNOSIS — K29.01 OTHER ACUTE GASTRITIS WITH HEMORRHAGE: ICD-10-CM

## 2019-11-14 DIAGNOSIS — E78.2 MIXED HYPERLIPIDEMIA: Primary | ICD-10-CM

## 2019-11-14 LAB
ALBUMIN SERPL BCP-MCNC: 3.5 G/DL (ref 3.5–5)
ALP SERPL-CCNC: 175 U/L (ref 46–116)
ALT SERPL W P-5'-P-CCNC: 29 U/L (ref 12–78)
ANION GAP SERPL CALCULATED.3IONS-SCNC: 9 MMOL/L (ref 4–13)
AST SERPL W P-5'-P-CCNC: 36 U/L (ref 5–45)
BILIRUB SERPL-MCNC: 0.9 MG/DL (ref 0.2–1)
BUN SERPL-MCNC: 13 MG/DL (ref 5–25)
CALCIUM SERPL-MCNC: 8.3 MG/DL (ref 8.3–10.1)
CHLORIDE SERPL-SCNC: 103 MMOL/L (ref 100–108)
CHOLEST SERPL-MCNC: 156 MG/DL (ref 50–200)
CO2 SERPL-SCNC: 28 MMOL/L (ref 21–32)
CREAT SERPL-MCNC: 0.57 MG/DL (ref 0.6–1.3)
EST. AVERAGE GLUCOSE BLD GHB EST-MCNC: 137 MG/DL
GFR SERPL CREATININE-BSD FRML MDRD: 95 ML/MIN/1.73SQ M
GLUCOSE P FAST SERPL-MCNC: 94 MG/DL (ref 65–99)
HBA1C MFR BLD: 6.4 % (ref 4.2–6.3)
HDLC SERPL-MCNC: 51 MG/DL
LDLC SERPL CALC-MCNC: 88 MG/DL (ref 0–100)
NONHDLC SERPL-MCNC: 105 MG/DL
POTASSIUM SERPL-SCNC: 4.2 MMOL/L (ref 3.5–5.3)
PROT SERPL-MCNC: 6.9 G/DL (ref 6.4–8.2)
SODIUM SERPL-SCNC: 140 MMOL/L (ref 136–145)
TRIGL SERPL-MCNC: 83 MG/DL

## 2019-11-14 PROCEDURE — 80053 COMPREHEN METABOLIC PANEL: CPT

## 2019-11-14 PROCEDURE — 83036 HEMOGLOBIN GLYCOSYLATED A1C: CPT

## 2019-11-14 PROCEDURE — 36415 COLL VENOUS BLD VENIPUNCTURE: CPT

## 2019-11-14 PROCEDURE — 80061 LIPID PANEL: CPT

## 2019-11-14 PROCEDURE — 99214 OFFICE O/P EST MOD 30 MIN: CPT | Performed by: FAMILY MEDICINE

## 2019-11-14 RX ORDER — MORPHINE SULFATE 15 MG/1
TABLET ORAL
Qty: 65 TABLET | Refills: 0 | Status: SHIPPED | OUTPATIENT
Start: 2019-12-12 | End: 2020-01-13 | Stop reason: SDUPTHER

## 2019-11-14 RX ORDER — GABAPENTIN 300 MG/1
300 CAPSULE ORAL 2 TIMES DAILY
Qty: 60 CAPSULE | Refills: 5 | Status: SHIPPED | OUTPATIENT
Start: 2019-11-14 | End: 2020-05-22 | Stop reason: SDUPTHER

## 2019-11-14 RX ORDER — LORAZEPAM 1 MG/1
TABLET ORAL
Qty: 60 TABLET | Refills: 2 | Status: SHIPPED | OUTPATIENT
Start: 2019-11-14 | End: 2020-03-13 | Stop reason: SDUPTHER

## 2019-11-14 RX ORDER — FERROUS SULFATE 325(65) MG
325 TABLET ORAL EVERY OTHER DAY
Qty: 30 TABLET | Refills: 1
Start: 2019-11-14 | End: 2021-02-15 | Stop reason: HOSPADM

## 2019-11-14 RX ORDER — MORPHINE SULFATE 15 MG/1
TABLET ORAL
Qty: 65 TABLET | Refills: 0 | Status: SHIPPED | OUTPATIENT
Start: 2019-11-14 | End: 2019-11-14 | Stop reason: SDUPTHER

## 2019-11-14 NOTE — PATIENT INSTRUCTIONS
Gabapentin dose change  Take 100mg in the AM and 300mg in the PM for 3 to 5 days  Then increase to 300mg twice a day    Sucralfate: Take it 1x per day  If your stomach does not bother you, try not taking it at all  You may not need it now that radiation is over    Ferrous Sulfate (iron) dose adjustment:  Your blood counts are normal   You probably don't need iron supplementation  You can take an multivitamin with iron  If you want to continue the ferrous sulfate, you can take it over other day but at a separate time than your multivitamin  Iron is best absorbed with vitamin C, consider taking it with orange juice

## 2019-11-25 RX ORDER — SODIUM CHLORIDE 9 MG/ML
75 INJECTION, SOLUTION INTRAVENOUS CONTINUOUS
Status: CANCELLED | OUTPATIENT
Start: 2019-12-02

## 2019-11-26 ENCOUNTER — OFFICE VISIT (OUTPATIENT)
Dept: HEMATOLOGY ONCOLOGY | Facility: CLINIC | Age: 69
End: 2019-11-26
Payer: MEDICARE

## 2019-11-26 VITALS
BODY MASS INDEX: 21.26 KG/M2 | HEIGHT: 63 IN | WEIGHT: 120 LBS | HEART RATE: 72 BPM | RESPIRATION RATE: 16 BRPM | DIASTOLIC BLOOD PRESSURE: 64 MMHG | SYSTOLIC BLOOD PRESSURE: 122 MMHG | TEMPERATURE: 97.9 F | OXYGEN SATURATION: 99 %

## 2019-11-26 DIAGNOSIS — Z17.0 MALIGNANT NEOPLASM OF UPPER-OUTER QUADRANT OF RIGHT BREAST IN FEMALE, ESTROGEN RECEPTOR POSITIVE (HCC): Primary | ICD-10-CM

## 2019-11-26 DIAGNOSIS — C50.411 MALIGNANT NEOPLASM OF UPPER-OUTER QUADRANT OF RIGHT BREAST IN FEMALE, ESTROGEN RECEPTOR POSITIVE (HCC): Primary | ICD-10-CM

## 2019-11-26 DIAGNOSIS — C79.51 BONE METASTASIS (HCC): ICD-10-CM

## 2019-11-26 PROCEDURE — 99214 OFFICE O/P EST MOD 30 MIN: CPT | Performed by: INTERNAL MEDICINE

## 2019-11-27 NOTE — PROGRESS NOTES
HPI:    Patient is here with her sister-in-law  In 1996 and patient was diagnosed to have right breast cancer and she had right breast mastectomy followed by adjuvant chemotherapy  In 2004 she was found to have hormone receptor positive  recurrent disease in the right supraclavicular lymph node  She had surgery and Femara for 10 years until 2015   Evaluation in 2015 was negative for any evidence of metastatic disease  In December 2018 she was found to have extensive bony metastatic disease presumably from previously known hormone receptor-positive right breast cancer     She was hospitalized with acute compression fracture of T12  There was no biopsy  He was started on radiation and that has been completed  She also received radiation to left femur   She was started on Faslodex and to that  Sami Mazariegos was added  She was also started on  Xgeva with calcium and vitamin-D  Neal Bowman She had profound neutropenia on Ibrance even on a 75 mg dose  Sami Mazariegos was discontinued and counts recovered  Now she has disease progression in the bones  Faslodex has been stopped  She is being continued on Xgeva with calcium and vitamin-D  She will be going for bone biopsy and specimen will be sent to [facility]  Especially interested in BRCA, PDL1, MSI and PIK 3  Systemic therapy to be selected and started     She has much less bone and joint pains  She follows with palliative care team   Appetite is improving  She has generalized weakness and tiredness                                                                                                         Current Outpatient Medications:     acetaminophen (TYLENOL) 325 mg tablet, Take 2 tablets (650 mg total) by mouth 4 (four) times a day (with meals and at bedtime), Disp: 80 tablet, Rfl: 1    calcium carbonate (OS-CLARENCE) 1250 (500 Ca) MG chewable tablet, Chew 1 tablet (1,250 mg total) daily, Disp: 30 tablet, Rfl: 0    Cholecalciferol (VITAMIN D3) 5000 units CAPS, Take 1 capsule by mouth daily, Disp: , Rfl:     ferrous sulfate 325 (65 Fe) mg tablet, Take 1 tablet (325 mg total) by mouth every other day, Disp: 30 tablet, Rfl: 1    gabapentin (NEURONTIN) 300 mg capsule, Take 1 capsule (300 mg total) by mouth 2 (two) times a day, Disp: 60 capsule, Rfl: 5    LORazepam (ATIVAN) 1 mg tablet, Take 2mg (2 tablets) qhs , Disp: 60 tablet, Rfl: 2    [START ON 12/12/2019] morphine (MSIR) 15 mg tablet, Use 1 tab BID scheduled to anticipate cancer pain  May have 1 tabs q4hrs PRN mod-severe pain , Disp: 65 tablet, Rfl: 0    MULTIPLE VITAMINS PO, Take by mouth, Disp: , Rfl:     Omega-3 Fatty Acids (FISH OIL PO), Take 2 g by mouth , Disp: , Rfl:     omeprazole (PriLOSEC) 40 MG capsule, TAKE 1 CAPSULE BY MOUTH EVERY DAY, Disp: 90 capsule, Rfl: 1    senna (SENOKOT) 8 6 mg, Take 1 tablet by mouth as needed for constipation, Disp: , Rfl:     No Known Allergies       Breast cancer metastasized to bone, right ([facility] [address] )    1996 Surgery     right modified radical mastectomy followed by adjuvant systemic therapy but no radiation  6/28/2004 Biopsy     Final Diagnosis  A  Lymph Node, right supraclavicular, core biopsy (14 slides, [id number], [facility]; collected on 6/28/2004):  -  Consistent with metastatic carcinoma, compatible with breast origin (see note)  2004 -  Radiation     salvage radiation therapy at an outside facility to right supraclavicular nodes at [facility]      12/23/2018 Initial Diagnosis     Breast cancer metastasized to bone, right ([facility] [address] )      12/26/2018 - 1/9/2019 Radiation     3000 cGy in 10 fractions to  T11 through the bottom of the SI joints  Dr Sadie Anthony        1/16/2019 -  Chemotherapy     fulvestrant (FASLODEX) IM injection           Chemotherapy         2/20/2019 - 4/4/2019 Chemotherapy     Ibrance 125 mg PO day 1-21 every 28 days      4/4/2019 - 5/8/2019 Chemotherapy     Ibrance 100 mg PO daily day 1-21 every 28 days       5/22/2019 -  Chemotherapy     Faslodex, Jairon 6/27/2019 Genetic Testing      No pathogenic mutation identified in [facility] Breast panel         ROS:  11/26/19 Reviewed 13 systems:  Presently no other neurological, cardiac, pulmonary, GI and  symptoms  No other  symptoms like headaches, seizures, dizziness, diplopia, dysphagia, hoarseness, chest pain, palpitations, shortness of breath, cough, hemoptysis, abdominal pain, nausea, vomiting, change in bowel habits, melena, hematuria, fever, chills, bleeding,  skin rash, weight loss,  numbness,  claudication   No frequent infections  Not unusually sensitive to heat or cold  No swelling of the ankles  No swollen glands  Patient is anxious  Other symptoms are in HPI        /64 (BP Location: Left arm, Patient Position: Sitting, Cuff Size: Adult)   Pulse 72   Temp 97 9 °F (36 6 °C)   Resp 16   Ht 5' 2 5" (1 588 m)   Wt 54 4 kg (120 lb)   LMP 05/27/1996 (LMP Unknown)   SpO2 99%   BMI 21 60 kg/m²     Physical Exam:  Alert, oriented, not in distress, no icterus, no oral thrush, no palpable neck mass, clear lung fields, regular heart rate, abdomen  soft and non tender, no palpable abdominal mass, no ascites, no edema of ankles, no calf tenderness, no focal neurological deficit but has generalized weakness, no skin rash, no palpable lymphadenopathy in the neck and axillary areas, good arterial pulses, no clubbing  Patient is anxious  Performance status 2  Patient walks very slow      IMAGING:      LABS:  Results for orders placed or performed in visit on 11/14/19   Comprehensive metabolic panel   Result Value Ref Range    Sodium 140 136 - 145 mmol/L    Potassium 4 2 3 5 - 5 3 mmol/L    Chloride 103 100 - 108 mmol/L    CO2 28 21 - 32 mmol/L    ANION GAP 9 4 - 13 mmol/L    BUN 13 5 - 25 mg/dL    Creatinine 0 57 (L) 0 60 - 1 30 mg/dL    Glucose, Fasting 94 65 - 99 mg/dL    Calcium 8 3 8 3 - 10 1 mg/dL    AST 36 5 - 45 U/L    ALT 29 12 - 78 U/L    Alkaline Phosphatase 175 (H) 46 - 116 U/L    Total Protein 6 9 6 4 - 8 2 g/dL    Albumin 3 5 3 5 - 5 0 g/dL    Total Bilirubin 0 90 0 20 - 1 00 mg/dL    eGFR 95 ml/min/1 73sq m   Hemoglobin A1C   Result Value Ref Range    Hemoglobin A1C 6 4 (H) 4 2 - 6 3 %     mg/dl   Lipid panel   Result Value Ref Range    Cholesterol 156 50 - 200 mg/dL    Triglycerides 83 <=150 mg/dL    HDL, Direct 51 >=40 mg/dL    LDL Calculated 88 0 - 100 mg/dL    Non-HDL-Chol (CHOL-HDL) 105 mg/dl     Labs, Imaging, & Other studies:   All pertinent labs and imaging studies were personally reviewed    Lab Results   Component Value Date    K 4 2 11/14/2019     11/14/2019    CO2 28 11/14/2019    BUN 13 11/14/2019    CREATININE 0 57 (L) 11/14/2019    GLUF 94 11/14/2019    CALCIUM 8 3 11/14/2019    AST 36 11/14/2019    ALT 29 11/14/2019    ALKPHOS 175 (H) 11/14/2019    EGFR 95 11/14/2019     Lab Results   Component Value Date    WBC 3 44 (L) 11/04/2019    HGB 11 3 (L) 11/04/2019    HCT 36 4 11/04/2019    MCV 90 11/04/2019     11/04/2019       Reviewed and discussed with patient  Assessment and plan:  Patient is here with her sister-in-law  In 1996 and patient was diagnosed to have right breast cancer and she had right breast mastectomy followed by adjuvant chemotherapy  In 2004 she was found to have hormone receptor positive  recurrent disease in the right supraclavicular lymph node  She had surgery and Femara for 10 years until 2015   Evaluation in 2015 was negative for any evidence of metastatic disease  In December 2018 she was found to have extensive bony metastatic disease presumably from previously known hormone receptor-positive right breast cancer     She was hospitalized with acute compression fracture of T12  There was no biopsy  He was started on radiation and that has been completed  She also received radiation to left femur   She was started on Faslodex and to that  Deryl Never was added  She was also started on  Xgeva with calcium and vitamin-D  Dortha Plough   She had profound neutropenia on Ibrance even on a 75 mg dose  Jacqualyn Fallow was discontinued and counts recovered  Now she has disease progression in the bones  Faslodex has been stopped  She is being continued on Xgeva with calcium and vitamin-D  She will be going for bone biopsy and specimen will be sent to [facility]  Especially interested in BRCA, PDL1, MSI and PIK 3  Systemic therapy to be selected and started  She has much less bone and joint pains  She follows with palliative care team   Appetite is improving  She has generalized weakness and tiredness  Erasmo Lucas Physical examination and test results are as recorded and discussed in detail  Detailed above genomic/molecular and target markers  She is being continued on Xgeva with calcium and vitamin-D in the meantime         Also discussed the importance of self-breast examination, eating healthy foods, nutritional supplements rich in protein, staying active, wearing a back brace and health screening tests later   Patient is capable of self-care with some assistance   1  Breast cancer metastasized to bone, right [facility])          Patient voiced understanding and agreement in the discussion  Counseling / Coordination of Care   Greater than 50% of total time was spent with the patient and / or family counseling and / or coordination of care

## 2019-11-27 NOTE — H&P (VIEW-ONLY)
HPI:    Patient is here with her sister-in-law  In 1996 and patient was diagnosed to have right breast cancer and she had right breast mastectomy followed by adjuvant chemotherapy  In 2004 she was found to have hormone receptor positive  recurrent disease in the right supraclavicular lymph node  She had surgery and Femara for 10 years until 2015   Evaluation in 2015 was negative for any evidence of metastatic disease  In December 2018 she was found to have extensive bony metastatic disease presumably from previously known hormone receptor-positive right breast cancer     She was hospitalized with acute compression fracture of T12  There was no biopsy  He was started on radiation and that has been completed  She also received radiation to left femur   She was started on Faslodex and to that  Sami Mazariegos was added  She was also started on  Xgeva with calcium and vitamin-D  Neal Bowman She had profound neutropenia on Ibrance even on a 75 mg dose  Sami Mazariegos was discontinued and counts recovered  Now she has disease progression in the bones  Faslodex has been stopped  She is being continued on Xgeva with calcium and vitamin-D  She will be going for bone biopsy and specimen will be sent to [facility]  Especially interested in BRCA, PDL1, MSI and PIK 3  Systemic therapy to be selected and started     She has much less bone and joint pains  She follows with palliative care team   Appetite is improving  She has generalized weakness and tiredness                                                                                                         Current Outpatient Medications:     acetaminophen (TYLENOL) 325 mg tablet, Take 2 tablets (650 mg total) by mouth 4 (four) times a day (with meals and at bedtime), Disp: 80 tablet, Rfl: 1    calcium carbonate (OS-CLARENCE) 1250 (500 Ca) MG chewable tablet, Chew 1 tablet (1,250 mg total) daily, Disp: 30 tablet, Rfl: 0    Cholecalciferol (VITAMIN D3) 5000 units CAPS, Take 1 capsule by mouth daily, Disp: , Rfl:     ferrous sulfate 325 (65 Fe) mg tablet, Take 1 tablet (325 mg total) by mouth every other day, Disp: 30 tablet, Rfl: 1    gabapentin (NEURONTIN) 300 mg capsule, Take 1 capsule (300 mg total) by mouth 2 (two) times a day, Disp: 60 capsule, Rfl: 5    LORazepam (ATIVAN) 1 mg tablet, Take 2mg (2 tablets) qhs , Disp: 60 tablet, Rfl: 2    [START ON 12/12/2019] morphine (MSIR) 15 mg tablet, Use 1 tab BID scheduled to anticipate cancer pain  May have 1 tabs q4hrs PRN mod-severe pain , Disp: 65 tablet, Rfl: 0    MULTIPLE VITAMINS PO, Take by mouth, Disp: , Rfl:     Omega-3 Fatty Acids (FISH OIL PO), Take 2 g by mouth , Disp: , Rfl:     omeprazole (PriLOSEC) 40 MG capsule, TAKE 1 CAPSULE BY MOUTH EVERY DAY, Disp: 90 capsule, Rfl: 1    senna (SENOKOT) 8 6 mg, Take 1 tablet by mouth as needed for constipation, Disp: , Rfl:     No Known Allergies       Breast cancer metastasized to bone, right ([facility] [address] )    1996 Surgery     right modified radical mastectomy followed by adjuvant systemic therapy but no radiation  6/28/2004 Biopsy     Final Diagnosis  A  Lymph Node, right supraclavicular, core biopsy (14 slides, [id number], [facility]; collected on 6/28/2004):  -  Consistent with metastatic carcinoma, compatible with breast origin (see note)  2004 -  Radiation     salvage radiation therapy at an outside facility to right supraclavicular nodes at [facility]      12/23/2018 Initial Diagnosis     Breast cancer metastasized to bone, right ([facility] [address] )      12/26/2018 - 1/9/2019 Radiation     3000 cGy in 10 fractions to  T11 through the bottom of the SI joints  Dr Carlos Alberto Chilel        1/16/2019 -  Chemotherapy     fulvestrant (FASLODEX) IM injection           Chemotherapy         2/20/2019 - 4/4/2019 Chemotherapy     Ibrance 125 mg PO day 1-21 every 28 days      4/4/2019 - 5/8/2019 Chemotherapy     Ibrance 100 mg PO daily day 1-21 every 28 days       5/22/2019 -  Chemotherapy     Faslodex, Trace Ko 6/27/2019 Genetic Testing      No pathogenic mutation identified in [facility] Breast panel         ROS:  11/26/19 Reviewed 13 systems:  Presently no other neurological, cardiac, pulmonary, GI and  symptoms  No other  symptoms like headaches, seizures, dizziness, diplopia, dysphagia, hoarseness, chest pain, palpitations, shortness of breath, cough, hemoptysis, abdominal pain, nausea, vomiting, change in bowel habits, melena, hematuria, fever, chills, bleeding,  skin rash, weight loss,  numbness,  claudication   No frequent infections  Not unusually sensitive to heat or cold  No swelling of the ankles  No swollen glands  Patient is anxious  Other symptoms are in HPI        /64 (BP Location: Left arm, Patient Position: Sitting, Cuff Size: Adult)   Pulse 72   Temp 97 9 °F (36 6 °C)   Resp 16   Ht 5' 2 5" (1 588 m)   Wt 54 4 kg (120 lb)   LMP 05/27/1996 (LMP Unknown)   SpO2 99%   BMI 21 60 kg/m²     Physical Exam:  Alert, oriented, not in distress, no icterus, no oral thrush, no palpable neck mass, clear lung fields, regular heart rate, abdomen  soft and non tender, no palpable abdominal mass, no ascites, no edema of ankles, no calf tenderness, no focal neurological deficit but has generalized weakness, no skin rash, no palpable lymphadenopathy in the neck and axillary areas, good arterial pulses, no clubbing  Patient is anxious  Performance status 2  Patient walks very slow      IMAGING:      LABS:  Results for orders placed or performed in visit on 11/14/19   Comprehensive metabolic panel   Result Value Ref Range    Sodium 140 136 - 145 mmol/L    Potassium 4 2 3 5 - 5 3 mmol/L    Chloride 103 100 - 108 mmol/L    CO2 28 21 - 32 mmol/L    ANION GAP 9 4 - 13 mmol/L    BUN 13 5 - 25 mg/dL    Creatinine 0 57 (L) 0 60 - 1 30 mg/dL    Glucose, Fasting 94 65 - 99 mg/dL    Calcium 8 3 8 3 - 10 1 mg/dL    AST 36 5 - 45 U/L    ALT 29 12 - 78 U/L    Alkaline Phosphatase 175 (H) 46 - 116 U/L    Total Protein 6 9 6 4 - 8 2 g/dL    Albumin 3 5 3 5 - 5 0 g/dL    Total Bilirubin 0 90 0 20 - 1 00 mg/dL    eGFR 95 ml/min/1 73sq m   Hemoglobin A1C   Result Value Ref Range    Hemoglobin A1C 6 4 (H) 4 2 - 6 3 %     mg/dl   Lipid panel   Result Value Ref Range    Cholesterol 156 50 - 200 mg/dL    Triglycerides 83 <=150 mg/dL    HDL, Direct 51 >=40 mg/dL    LDL Calculated 88 0 - 100 mg/dL    Non-HDL-Chol (CHOL-HDL) 105 mg/dl     Labs, Imaging, & Other studies:   All pertinent labs and imaging studies were personally reviewed    Lab Results   Component Value Date    K 4 2 11/14/2019     11/14/2019    CO2 28 11/14/2019    BUN 13 11/14/2019    CREATININE 0 57 (L) 11/14/2019    GLUF 94 11/14/2019    CALCIUM 8 3 11/14/2019    AST 36 11/14/2019    ALT 29 11/14/2019    ALKPHOS 175 (H) 11/14/2019    EGFR 95 11/14/2019     Lab Results   Component Value Date    WBC 3 44 (L) 11/04/2019    HGB 11 3 (L) 11/04/2019    HCT 36 4 11/04/2019    MCV 90 11/04/2019     11/04/2019       Reviewed and discussed with patient  Assessment and plan:  Patient is here with her sister-in-law  In 1996 and patient was diagnosed to have right breast cancer and she had right breast mastectomy followed by adjuvant chemotherapy  In 2004 she was found to have hormone receptor positive  recurrent disease in the right supraclavicular lymph node  She had surgery and Femara for 10 years until 2015   Evaluation in 2015 was negative for any evidence of metastatic disease  In December 2018 she was found to have extensive bony metastatic disease presumably from previously known hormone receptor-positive right breast cancer     She was hospitalized with acute compression fracture of T12  There was no biopsy  He was started on radiation and that has been completed  She also received radiation to left femur   She was started on Faslodex and to that  Stacie Mann was added  She was also started on  Xgeva with calcium and vitamin-D  Bautista Jordan   She had profound neutropenia on Ibrance even on a 75 mg dose  Karn Sonido was discontinued and counts recovered  Now she has disease progression in the bones  Faslodex has been stopped  She is being continued on Xgeva with calcium and vitamin-D  She will be going for bone biopsy and specimen will be sent to [facility]  Especially interested in BRCA, PDL1, MSI and PIK 3  Systemic therapy to be selected and started  She has much less bone and joint pains  She follows with palliative care team   Appetite is improving  She has generalized weakness and tiredness  Steph Ball Physical examination and test results are as recorded and discussed in detail  Detailed above genomic/molecular and target markers  She is being continued on Xgeva with calcium and vitamin-D in the meantime         Also discussed the importance of self-breast examination, eating healthy foods, nutritional supplements rich in protein, staying active, wearing a back brace and health screening tests later   Patient is capable of self-care with some assistance   1  Breast cancer metastasized to bone, right [facility])          Patient voiced understanding and agreement in the discussion  Counseling / Coordination of Care   Greater than 50% of total time was spent with the patient and / or family counseling and / or coordination of care

## 2019-12-02 ENCOUNTER — HOSPITAL ENCOUNTER (OUTPATIENT)
Dept: RADIOLOGY | Facility: HOSPITAL | Age: 69
Discharge: HOME | End: 2019-12-02

## 2019-12-03 ENCOUNTER — APPOINTMENT (OUTPATIENT)
Dept: LAB | Facility: CLINIC | Age: 69
End: 2019-12-03
Payer: MEDICARE

## 2019-12-03 DIAGNOSIS — C50.911 BREAST CANCER METASTASIZED TO BONE, RIGHT (HCC): ICD-10-CM

## 2019-12-03 DIAGNOSIS — K29.01 OTHER ACUTE GASTRITIS WITH HEMORRHAGE: ICD-10-CM

## 2019-12-03 DIAGNOSIS — C79.51 BONE METASTASIS (HCC): ICD-10-CM

## 2019-12-03 DIAGNOSIS — C79.51 BREAST CANCER METASTASIZED TO BONE, RIGHT (HCC): ICD-10-CM

## 2019-12-03 LAB
ALBUMIN SERPL BCP-MCNC: 3.5 G/DL (ref 3.5–5)
ALP SERPL-CCNC: 201 U/L (ref 46–116)
ALT SERPL W P-5'-P-CCNC: 28 U/L (ref 12–78)
ANION GAP SERPL CALCULATED.3IONS-SCNC: 9 MMOL/L (ref 4–13)
AST SERPL W P-5'-P-CCNC: 23 U/L (ref 5–45)
BILIRUB SERPL-MCNC: 0.73 MG/DL (ref 0.2–1)
BUN SERPL-MCNC: 12 MG/DL (ref 5–25)
CALCIUM SERPL-MCNC: 8.2 MG/DL (ref 8.3–10.1)
CHLORIDE SERPL-SCNC: 102 MMOL/L (ref 100–108)
CO2 SERPL-SCNC: 26 MMOL/L (ref 21–32)
CREAT SERPL-MCNC: 0.6 MG/DL (ref 0.6–1.3)
GFR SERPL CREATININE-BSD FRML MDRD: 93 ML/MIN/1.73SQ M
GLUCOSE P FAST SERPL-MCNC: 117 MG/DL (ref 65–99)
POTASSIUM SERPL-SCNC: 4 MMOL/L (ref 3.5–5.3)
PROT SERPL-MCNC: 7.1 G/DL (ref 6.4–8.2)
SODIUM SERPL-SCNC: 137 MMOL/L (ref 136–145)

## 2019-12-03 PROCEDURE — 80053 COMPREHEN METABOLIC PANEL: CPT

## 2019-12-03 PROCEDURE — 36415 COLL VENOUS BLD VENIPUNCTURE: CPT

## 2019-12-04 ENCOUNTER — HOSPITAL ENCOUNTER (OUTPATIENT)
Dept: INFUSION CENTER | Facility: CLINIC | Age: 69
Discharge: HOME | End: 2019-12-04
Payer: MEDICARE

## 2019-12-04 VITALS — BODY MASS INDEX: 21.19 KG/M2 | WEIGHT: 117.73 LBS

## 2019-12-04 DIAGNOSIS — C79.51 BREAST CANCER METASTASIZED TO BONE, RIGHT (HCC): ICD-10-CM

## 2019-12-04 DIAGNOSIS — C79.51 BONE METASTASIS (HCC): Primary | ICD-10-CM

## 2019-12-04 DIAGNOSIS — C50.911 BREAST CANCER METASTASIZED TO BONE, RIGHT (HCC): ICD-10-CM

## 2019-12-04 PROCEDURE — 96401 CHEMO ANTI-NEOPL SQ/IM: CPT

## 2019-12-04 RX ORDER — SUCRALFATE 1 G/1
1 TABLET ORAL
Qty: 60 TABLET | Refills: 3 | Status: SHIPPED | OUTPATIENT
Start: 2019-12-04 | End: 2020-01-13

## 2019-12-04 RX ADMIN — DENOSUMAB 120 MG: 120 INJECTION SUBCUTANEOUS at 09:13

## 2019-12-04 NOTE — PROGRESS NOTES
To [facility]fusion [facility] for monthly xgeva injection  She confirms daily calcium and vitamin D PO  Creat Clearance 64 using GOG method of calculation  SQ injection administered in left upper arm without incident  Pt making next month's xgeva appointment on her way out of clinic today

## 2019-12-04 NOTE — PATIENT INSTRUCTIONS
December 2019 Sunday Monday Tuesday Wednesday Thursday Friday Saturday   1     2     3    LAB WALK IN  11:30 AM   (5 min )   AN MOB LAB PSC CHAIR 1   [facility] [address] MOB 4    INF THERAPY PLAN   9:00 AM   (60 min )   AN INF BED 6   [address] 5     6     7          Cycle 1, Treatment 13      8     9     10     11     12     13     14                15     16    IR IMG GUID BX BONE SED   8:00 AM   (75 min )   BE CT 2   [facility] CAT Scan 17     18     19     20     21                22     23     24     25     26     27     28                29     30     31    FOLLOW UP PG  10:15 AM   (30 min )   [facility] [place]                                     Treatment Details       12/4/2019 - Cycle 1, Treatment 13      Supportive Care: [facility] PROVIDER COMMUNICATION, denosumab (Marizol Blanco)

## 2019-12-15 ENCOUNTER — TELEPHONE (OUTPATIENT)
Dept: INPATIENT UNIT | Facility: HOSPITAL | Age: 69
End: 2019-12-15

## 2019-12-16 ENCOUNTER — HOSPITAL ENCOUNTER (OUTPATIENT)
Dept: RADIOLOGY | Facility: HOSPITAL | Age: 69
Discharge: HOME | End: 2019-12-16
Attending: RADIOLOGY | Admitting: RADIOLOGY
Payer: MEDICARE

## 2019-12-16 ENCOUNTER — TELEPHONE (OUTPATIENT)
Dept: HEMATOLOGY ONCOLOGY | Facility: CLINIC | Age: 69
End: 2019-12-16

## 2019-12-16 VITALS
DIASTOLIC BLOOD PRESSURE: 65 MMHG | SYSTOLIC BLOOD PRESSURE: 134 MMHG | RESPIRATION RATE: 18 BRPM | HEART RATE: 75 BPM | OXYGEN SATURATION: 99 %

## 2019-12-16 DIAGNOSIS — C78.00 MALIGNANT NEOPLASM METASTATIC TO LUNG, UNSPECIFIED LATERALITY (HCC): ICD-10-CM

## 2019-12-16 DIAGNOSIS — C79.51 BONE METASTASES (HCC): ICD-10-CM

## 2019-12-16 DIAGNOSIS — C50.411 MALIGNANT NEOPLASM OF UPPER-OUTER QUADRANT OF RIGHT BREAST IN FEMALE, ESTROGEN RECEPTOR POSITIVE (HCC): ICD-10-CM

## 2019-12-16 DIAGNOSIS — Z17.0 MALIGNANT NEOPLASM OF UPPER-OUTER QUADRANT OF RIGHT BREAST IN FEMALE, ESTROGEN RECEPTOR POSITIVE (HCC): ICD-10-CM

## 2019-12-16 LAB
INR PPP: 1.24 (ref 0.84–1.19)
PROTHROMBIN TIME: 15.2 SECONDS (ref 11.6–14.5)

## 2019-12-16 PROCEDURE — 85610 PROTHROMBIN TIME: CPT | Performed by: RADIOLOGY

## 2019-12-16 PROCEDURE — 99152 MOD SED SAME PHYS/QHP 5/>YRS: CPT | Performed by: RADIOLOGY

## 2019-12-16 PROCEDURE — 88377 M/PHMTRC ALYS ISHQUANT/SEMIQ: CPT

## 2019-12-16 PROCEDURE — 99152 MOD SED SAME PHYS/QHP 5/>YRS: CPT

## 2019-12-16 PROCEDURE — 88342 IMHCHEM/IMCYTCHM 1ST ANTB: CPT

## 2019-12-16 PROCEDURE — 99153 MOD SED SAME PHYS/QHP EA: CPT

## 2019-12-16 PROCEDURE — 88342 IMHCHEM/IMCYTCHM 1ST ANTB: CPT | Performed by: PATHOLOGY

## 2019-12-16 PROCEDURE — 88341 IMHCHEM/IMCYTCHM EA ADD ANTB: CPT | Performed by: PATHOLOGY

## 2019-12-16 PROCEDURE — 88333 PATH CONSLTJ SURG CYTO XM 1: CPT | Performed by: PATHOLOGY

## 2019-12-16 PROCEDURE — 20225 BONE BIOPSY TROCAR/NDL DEEP: CPT

## 2019-12-16 PROCEDURE — 88305 TISSUE EXAM BY PATHOLOGIST: CPT | Performed by: PATHOLOGY

## 2019-12-16 PROCEDURE — 77012 CT SCAN FOR NEEDLE BIOPSY: CPT

## 2019-12-16 PROCEDURE — 20220 BONE BIOPSY TROCAR/NDL SUPFC: CPT | Performed by: RADIOLOGY

## 2019-12-16 PROCEDURE — 77012 CT SCAN FOR NEEDLE BIOPSY: CPT | Performed by: RADIOLOGY

## 2019-12-16 PROCEDURE — 88360 TUMOR IMMUNOHISTOCHEM/MANUAL: CPT

## 2019-12-16 PROCEDURE — 88341 IMHCHEM/IMCYTCHM EA ADD ANTB: CPT

## 2019-12-16 RX ORDER — SODIUM CHLORIDE 9 MG/ML
75 INJECTION, SOLUTION INTRAVENOUS CONTINUOUS
Status: DISCONTINUED | OUTPATIENT
Start: 2019-12-16 | End: 2019-12-16 | Stop reason: HOSPADM

## 2019-12-16 RX ORDER — FENTANYL CITRATE 50 UG/ML
INJECTION, SOLUTION INTRAMUSCULAR; INTRAVENOUS CODE/TRAUMA/SEDATION MEDICATION
Status: COMPLETED | OUTPATIENT
Start: 2019-12-16 | End: 2019-12-16

## 2019-12-16 RX ORDER — MIDAZOLAM HYDROCHLORIDE 2 MG/2ML
INJECTION, SOLUTION INTRAMUSCULAR; INTRAVENOUS CODE/TRAUMA/SEDATION MEDICATION
Status: COMPLETED | OUTPATIENT
Start: 2019-12-16 | End: 2019-12-16

## 2019-12-16 RX ORDER — LIDOCAINE WITH 8.4% SOD BICARB 0.9%(10ML)
SYRINGE (ML) INJECTION CODE/TRAUMA/SEDATION MEDICATION
Status: COMPLETED | OUTPATIENT
Start: 2019-12-16 | End: 2019-12-16

## 2019-12-16 RX ADMIN — LIDOCAINE HYDROCHLORIDE 5 ML: 10 INJECTION, SOLUTION INFILTRATION; PERINEURAL at 08:27

## 2019-12-16 RX ADMIN — FENTANYL CITRATE 50 MCG: 50 INJECTION, SOLUTION INTRAMUSCULAR; INTRAVENOUS at 08:16

## 2019-12-16 RX ADMIN — FENTANYL CITRATE 25 MCG: 50 INJECTION, SOLUTION INTRAMUSCULAR; INTRAVENOUS at 08:34

## 2019-12-16 RX ADMIN — SODIUM CHLORIDE 75 ML/HR: 0.9 INJECTION, SOLUTION INTRAVENOUS at 06:45

## 2019-12-16 RX ADMIN — MIDAZOLAM 0.5 MG: 1 INJECTION INTRAMUSCULAR; INTRAVENOUS at 08:34

## 2019-12-16 RX ADMIN — MIDAZOLAM 1 MG: 1 INJECTION INTRAMUSCULAR; INTRAVENOUS at 08:16

## 2019-12-16 RX ADMIN — FENTANYL CITRATE 25 MCG: 50 INJECTION, SOLUTION INTRAMUSCULAR; INTRAVENOUS at 08:27

## 2019-12-16 RX ADMIN — MIDAZOLAM 0.5 MG: 1 INJECTION INTRAMUSCULAR; INTRAVENOUS at 08:27

## 2019-12-16 NOTE — DISCHARGE INSTRUCTIONS
POST BIOPSY    Care after your procedure:    1  Limit your activities for 24 hours after your biopsy  2  No driving day of biopsy  3  Return to your normal diet  Small sips of flat soda will help with mild nausea  4  Remove band-aid or dressing 24 hours after procedure  Contact Interventional Radiology at [phone] Tanisha PATIENTS: Contact Interventional Radiology at [phone]) Lito Wyatt PATIENTS: Contact Interventional Radiology at [phone]) if:    1  Difficulty breathing, nausea or vomiting  2  Chills or fever above 101 degrees F      3  Pain at biopsy site not relieved by medication  4  Develop any redness, swelling, heat, unusual drainage, heavy bruising or bleeding from biopsy site

## 2019-12-16 NOTE — TELEPHONE ENCOUNTER
Spoke with pathology regarding decalcified and non-decalcified specimen  Please complete Caris form

## 2019-12-16 NOTE — INTERVAL H&P NOTE
Update:    69F with known metastatic breast cancer  Biopsy for molecular characterization guide therapy  Discussed risks including bleeding, bone damage  We will select the optimal location for biopsy on the table  I discussed that bone biopsies may have a lower yield than soft tissue biopsies  Patient wishes to proceed  /71   Pulse 77   Resp 20   LMP 05/27/1996 (LMP Unknown)   SpO2 100%     MP2 ASA3    Patient re-evaluated   Accept as history and physical     Primitivo Salamanca MD/December 16, 2019/8:14 AM

## 2019-12-16 NOTE — SEDATION DOCUMENTATION
CT guided right iliac crest bone lesion biopsy completed by Dr Serene Gould  Patient tolerated well, denies pain post-procedure  1 hour bedrest  Adhesive bandage CDI upon departure from CT  Report given to MARYBETH Westfall

## 2019-12-16 NOTE — BRIEF OP NOTE (RAD/CATH)
CT NEEDLE BIOPSY BONE Procedure Note    PATIENT NAME: Naz Haque  : 1950  MRN: [id number]    Pre-op Diagnosis:   1  Malignant neoplasm of upper-outer quadrant of right breast in female, estrogen receptor positive ([facility] [address] )    2  Bone metastases ([facility] [address] )    3  Malignant neoplasm metastatic to lung, unspecified laterality (HCC)      Post-op Diagnosis:   1  Malignant neoplasm of upper-outer quadrant of right breast in female, estrogen receptor positive ([facility] [address] )    2  Bone metastases ([facility] [address] )    3   Malignant neoplasm metastatic to lung, unspecified laterality [facility])        Surgeon:   Selin Cade MD  Assistants:     No qualified resident was available, Resident is only observing    Estimated Blood Loss: minimal  Findings: right iliac bone lesion targeted    Specimens: core 11g x2, touch prep x3    Complications:  None immediate    Anesthesia: Conscious sedation    Selin Cade MD     Date: 2019  Time: 9:00 AM

## 2019-12-17 NOTE — TELEPHONE ENCOUNTER
Allie information sent to [facility] with mike instructing to follow  Chart will be updated once completed

## 2019-12-24 ENCOUNTER — TELEPHONE (OUTPATIENT)
Dept: HEMATOLOGY ONCOLOGY | Facility: CLINIC | Age: 69
End: 2019-12-24

## 2019-12-24 DIAGNOSIS — J40 BRONCHITIS: Primary | ICD-10-CM

## 2019-12-24 RX ORDER — LEVOFLOXACIN 500 MG/1
500 TABLET, FILM COATED ORAL EVERY 24 HOURS
Qty: 7 TABLET | Refills: 0 | Status: SHIPPED | OUTPATIENT
Start: 2019-12-24 | End: 2019-12-31

## 2019-12-24 RX ORDER — LEVOFLOXACIN 500 MG/1
500 TABLET, FILM COATED ORAL EVERY 24 HOURS
COMMUNITY
End: 2019-12-24 | Stop reason: SDUPTHER

## 2019-12-24 NOTE — TELEPHONE ENCOUNTER
Wanted to let us know that had a temp on sunday night 12 22 19 of 101 3  Since then her temp is around 99  She also has congestion in her chest      She wanted to know if there was anything that we can prescribe for her congestion      I confirmed her pharmacy-   [id number], [id number]  [address] [address] [place],   [phone]

## 2019-12-24 NOTE — TELEPHONE ENCOUNTER
As per Dr Candance Ly she can take Levaquin 500 mg once daily for 7 days, (script sent to Dr Candance Ly) I explained to patient once it is signed later today it will be sent over to her pharmacy, she voiced understanding and was very appreciative  I also stated per Dr Candance Ly that she can go to the ED if she starts feeling any worse

## 2019-12-27 ENCOUNTER — APPOINTMENT (OUTPATIENT)
Dept: LAB | Facility: CLINIC | Age: 69
End: 2019-12-27
Payer: MEDICARE

## 2019-12-27 ENCOUNTER — TRANSCRIBE ORDERS (OUTPATIENT)
Dept: LAB | Facility: CLINIC | Age: 69
End: 2019-12-27

## 2019-12-27 DIAGNOSIS — C79.51 BONE METASTASIS (HCC): ICD-10-CM

## 2019-12-27 DIAGNOSIS — C50.911 BREAST CANCER METASTASIZED TO BONE, RIGHT (HCC): ICD-10-CM

## 2019-12-27 DIAGNOSIS — C79.51 BREAST CANCER METASTASIZED TO BONE, RIGHT (HCC): ICD-10-CM

## 2019-12-27 LAB
ALBUMIN SERPL BCP-MCNC: 3.1 G/DL (ref 3.5–5)
ALP SERPL-CCNC: 205 U/L (ref 46–116)
ALT SERPL W P-5'-P-CCNC: 25 U/L (ref 12–78)
ANION GAP SERPL CALCULATED.3IONS-SCNC: 10 MMOL/L (ref 4–13)
AST SERPL W P-5'-P-CCNC: 30 U/L (ref 5–45)
BILIRUB SERPL-MCNC: 0.53 MG/DL (ref 0.2–1)
BUN SERPL-MCNC: 10 MG/DL (ref 5–25)
CALCIUM SERPL-MCNC: 8.2 MG/DL (ref 8.3–10.1)
CHLORIDE SERPL-SCNC: 102 MMOL/L (ref 100–108)
CO2 SERPL-SCNC: 26 MMOL/L (ref 21–32)
CREAT SERPL-MCNC: 0.66 MG/DL (ref 0.6–1.3)
GFR SERPL CREATININE-BSD FRML MDRD: 90 ML/MIN/1.73SQ M
GLUCOSE P FAST SERPL-MCNC: 95 MG/DL (ref 65–99)
POTASSIUM SERPL-SCNC: 4 MMOL/L (ref 3.5–5.3)
PROT SERPL-MCNC: 7 G/DL (ref 6.4–8.2)
SODIUM SERPL-SCNC: 138 MMOL/L (ref 136–145)

## 2019-12-27 PROCEDURE — 80053 COMPREHEN METABOLIC PANEL: CPT

## 2019-12-27 PROCEDURE — 36415 COLL VENOUS BLD VENIPUNCTURE: CPT

## 2019-12-30 ENCOUNTER — TELEPHONE (OUTPATIENT)
Dept: HEMATOLOGY ONCOLOGY | Facility: CLINIC | Age: 69
End: 2019-12-30

## 2019-12-30 NOTE — TELEPHONE ENCOUNTER
Lavonis results are not in yet  Please cancel 12/31 appt and move to 1/7/20 at 10:30  The patient in 10:30 slot on 1/7/20 is seeing me tomorrow  He does not need another appt next week

## 2019-12-31 ENCOUNTER — HOSPITAL ENCOUNTER (OUTPATIENT)
Dept: INFUSION CENTER | Facility: CLINIC | Age: 69
Discharge: HOME | End: 2019-12-31
Payer: MEDICARE

## 2019-12-31 DIAGNOSIS — C79.51 BREAST CANCER METASTASIZED TO BONE, RIGHT (HCC): ICD-10-CM

## 2019-12-31 DIAGNOSIS — C79.51 BONE METASTASIS (HCC): Primary | ICD-10-CM

## 2019-12-31 DIAGNOSIS — C50.911 BREAST CANCER METASTASIZED TO BONE, RIGHT (HCC): ICD-10-CM

## 2019-12-31 PROCEDURE — 96401 CHEMO ANTI-NEOPL SQ/IM: CPT

## 2019-12-31 RX ADMIN — DENOSUMAB 120 MG: 120 INJECTION SUBCUTANEOUS at 09:26

## 2019-12-31 NOTE — PROGRESS NOTES
Pt resting with no complaints  Ca 8 2; creatine clearance > 30  Shot given in [place] without issue  Pt given AVS  Next appt reviewed c/ pt

## 2020-01-03 ENCOUNTER — HOSPITAL ENCOUNTER (OUTPATIENT)
Dept: MAMMOGRAPHY | Facility: HOSPITAL | Age: 70
Discharge: HOME | End: 2020-01-03
Payer: MEDICARE

## 2020-01-03 VITALS — HEIGHT: 63 IN | BODY MASS INDEX: 20.73 KG/M2 | WEIGHT: 117 LBS

## 2020-01-03 DIAGNOSIS — Z12.39 SCREENING FOR BREAST CANCER: ICD-10-CM

## 2020-01-03 DIAGNOSIS — Z17.0 MALIGNANT NEOPLASM OF UPPER-OUTER QUADRANT OF RIGHT BREAST IN FEMALE, ESTROGEN RECEPTOR POSITIVE (HCC): ICD-10-CM

## 2020-01-03 DIAGNOSIS — C50.411 MALIGNANT NEOPLASM OF UPPER-OUTER QUADRANT OF RIGHT BREAST IN FEMALE, ESTROGEN RECEPTOR POSITIVE (HCC): ICD-10-CM

## 2020-01-03 PROCEDURE — 77063 BREAST TOMOSYNTHESIS BI: CPT

## 2020-01-03 PROCEDURE — 77067 SCR MAMMO BI INCL CAD: CPT

## 2020-01-07 ENCOUNTER — APPOINTMENT (OUTPATIENT)
Dept: LAB | Facility: CLINIC | Age: 70
End: 2020-01-07
Payer: MEDICARE

## 2020-01-07 ENCOUNTER — TRANSCRIBE ORDERS (OUTPATIENT)
Dept: LAB | Facility: CLINIC | Age: 70
End: 2020-01-07

## 2020-01-07 ENCOUNTER — OFFICE VISIT (OUTPATIENT)
Dept: HEMATOLOGY ONCOLOGY | Facility: CLINIC | Age: 70
End: 2020-01-07
Payer: MEDICARE

## 2020-01-07 VITALS
DIASTOLIC BLOOD PRESSURE: 70 MMHG | RESPIRATION RATE: 18 BRPM | TEMPERATURE: 97.7 F | OXYGEN SATURATION: 100 % | HEIGHT: 62 IN | SYSTOLIC BLOOD PRESSURE: 110 MMHG | HEART RATE: 74 BPM | WEIGHT: 115 LBS | BODY MASS INDEX: 21.16 KG/M2

## 2020-01-07 DIAGNOSIS — C50.911 BREAST CANCER METASTASIZED TO BONE, RIGHT (HCC): Primary | Chronic | ICD-10-CM

## 2020-01-07 DIAGNOSIS — C79.51 BONE METASTASIS (HCC): ICD-10-CM

## 2020-01-07 DIAGNOSIS — Z17.0 MALIGNANT NEOPLASM OF UPPER-OUTER QUADRANT OF RIGHT BREAST IN FEMALE, ESTROGEN RECEPTOR POSITIVE (HCC): ICD-10-CM

## 2020-01-07 DIAGNOSIS — C50.911 MALIGNANT NEOPLASM OF RIGHT BREAST IN FEMALE, ESTROGEN RECEPTOR POSITIVE, UNSPECIFIED SITE OF BREAST (HCC): Chronic | ICD-10-CM

## 2020-01-07 DIAGNOSIS — C50.911 MALIGNANT NEOPLASM OF RIGHT FEMALE BREAST, UNSPECIFIED ESTROGEN RECEPTOR STATUS, UNSPECIFIED SITE OF BREAST (HCC): Primary | ICD-10-CM

## 2020-01-07 DIAGNOSIS — C78.00 MALIGNANT NEOPLASM METASTATIC TO LUNG, UNSPECIFIED LATERALITY (HCC): ICD-10-CM

## 2020-01-07 DIAGNOSIS — Z17.0 MALIGNANT NEOPLASM OF RIGHT BREAST IN FEMALE, ESTROGEN RECEPTOR POSITIVE, UNSPECIFIED SITE OF BREAST (HCC): Chronic | ICD-10-CM

## 2020-01-07 DIAGNOSIS — C79.51 SECONDARY MALIGNANT NEOPLASM OF BONE AND BONE MARROW (HCC): ICD-10-CM

## 2020-01-07 DIAGNOSIS — C79.52 SECONDARY MALIGNANT NEOPLASM OF BONE AND BONE MARROW (HCC): ICD-10-CM

## 2020-01-07 DIAGNOSIS — C79.51 BREAST CANCER METASTASIZED TO BONE, RIGHT (HCC): Chronic | ICD-10-CM

## 2020-01-07 DIAGNOSIS — C50.411 MALIGNANT NEOPLASM OF UPPER-OUTER QUADRANT OF RIGHT BREAST IN FEMALE, ESTROGEN RECEPTOR POSITIVE (HCC): ICD-10-CM

## 2020-01-07 DIAGNOSIS — C79.51 BREAST CANCER METASTASIZED TO BONE, RIGHT (HCC): Primary | Chronic | ICD-10-CM

## 2020-01-07 DIAGNOSIS — C50.911 BREAST CANCER METASTASIZED TO BONE, RIGHT (HCC): Chronic | ICD-10-CM

## 2020-01-07 PROCEDURE — 99214 OFFICE O/P EST MOD 30 MIN: CPT | Performed by: PHYSICIAN ASSISTANT

## 2020-01-07 PROCEDURE — 36415 COLL VENOUS BLD VENIPUNCTURE: CPT

## 2020-01-07 NOTE — PROGRESS NOTES
Hematology/Oncology Outpatient Follow-up  Betty Smith 71 y o  female 1950 [id number]    Date:  1/7/2020      Assessment and Plan:    1  Breast cancer metastasized to bone, right ([facility] [address] ), 2  Bone metastasis ([facility] [address] ), 3  Malignant neoplasm of right breast in female, estrogen receptor positive, unspecified site of breast ([facility] [address] ), 4  Malignant neoplasm of upper-outer quadrant of right breast in female, estrogen receptor positive ([facility] [address] ), 5  Malignant neoplasm metastatic to lung, unspecified laterality [facility])    59-year-old female presents for follow-up regarding history of metastatic breast cancer, hormone receptor positive, presents for follow-up  She was noted to have worsening metastatic disease while on Faslodex  Therefore a repeat biopsy was scheduled  This was delayed secondary to weather  Biopsy was not performed  There has been delay in specimen going to the molecular testing facility  We contacted them and the results should be resulted within the next 1-2 weeks  In the meantime patient has now been without therapy  We will arrange for liquid biopsy to be performed today  We will then however result within the next 1 week  Reviewed with patient that we will still wait for the result from the other company however we  Need to proceed with that additional treatment  We are mostly waiting for results regarding PIK3CA  Mutation in regarding oral targeted therapy  If not patient will be started on chemotherapy  Discussed that with patient  She is obviously frustrated but very understanding of the situation  We will call her as soon as we have the results of that we can then bring her in to consent for appropriate therapy  Continue on Xgeva 120 mg SQ, calcium, vitamin D  Follow up following biopsy result  HPI:       Breast cancer metastasized to bone, right [facility])    1996 Surgery     right modified radical mastectomy followed by adjuvant systemic therapy but no radiation        6/28/2004 Biopsy     Final Diagnosis  A  Lymph Node, right supraclavicular, core biopsy (14 slides, [id number], [facility]; collected on 6/28/2004):  -  Consistent with metastatic carcinoma, compatible with breast origin (see note)  2004 -  Radiation     salvage radiation therapy at an outside facility to right supraclavicular nodes at [place]      12/23/2018 Initial Diagnosis     Breast cancer metastasized to bone, right ([address] )      12/26/2018 - 1/9/2019 Radiation     3000 cGy in 10 fractions to  T11 through the bottom of the SI joints  Dr Roseanna Wall        1/16/2019 -  Chemotherapy     fulvestrant (FASLODEX) IM injection   Chemotherapy         2/20/2019 - 4/4/2019 Chemotherapy     Ibrance 125 mg PO day 1-21 every 28 days      4/4/2019 - 5/8/2019 Chemotherapy     Ibrance 100 mg PO daily day 1-21 every 28 days       5/22/2019 -  Chemotherapy     Faslodex, Kelly Doron      6/27/2019 Genetic Testing      No pathogenic mutation identified in [facility] Breast panel       70-year-old female presents for follow-up regarding metastatic breast cancer  In 1996 patient was diagnosed with hormone receptor positive breast cancer  She was then diagnosed with metastatic disease in December 2018  She required radiation therapy  She also was started on Faslodex and Ibrance  She was not able to continue on Ibrance secondary to severe neutropenia even at lowest dose reduction  She was then maintained on Faslodex and Xgeva  Tumor marker is not reliable in her case  Repeat imaging in October 2019 showed disease progression  Thoracic surgery was unable to biopsy anything in the lung  This was reviewed with thoracic surgery  Therefore a CT abdomen pelvis was performed to see if there is any other targeted will area for biopsy  The only lesion was a bone lesion  Patient was scheduled for a bone biopsy in early December however this was canceled secondary to whether by the patient    This then was rescheduled  Patient's specimen was sent for molecular testing  And this is still pending  Interval history:   She had recent upper respiratory infection for which recent antibiotics  This has cleared  She is feeling well  ROS: Review of Systems   Constitutional: Positive for fatigue (slight)  Negative for appetite change, chills, fever and unexpected weight change  HENT: Negative for mouth sores, nosebleeds and trouble swallowing  Respiratory: Positive for shortness of breath (with extended exertion)  Negative for cough  Cardiovascular: Negative for chest pain, palpitations and leg swelling  Gastrointestinal: Negative for abdominal pain, constipation, diarrhea, nausea and vomiting  Genitourinary: Negative for difficulty urinating and dysuria  Musculoskeletal: Positive for arthralgias (right shoulder pain)  Skin: Negative  Neurological: Negative for dizziness, weakness, light-headedness, numbness and headaches  Hematological: Negative  Psychiatric/Behavioral: Negative  Past Medical History:   Diagnosis Date    BRCA1 negative     BRCA2 negative     Breast cancer ([place] [address] ) 05/05/1996    right     Cancer [facility])     breast 1996    Cancer ([place] [address] )     bone      Compression fracture of thoracic vertebra (HCC)     Diabetes mellitus ([place] [address] )     History of chemotherapy 1996    History of therapeutic radiation     Tendonitis        Past Surgical History:   Procedure Laterality Date    CHOLECYSTECTOMY      CT NEEDLE BIOPSY BONE  12/16/2019    MASTECTOMY Right 05/05/1996    Right side reconstruction    NECK SURGERY      [place] ESOPHAGOGASTRODUODENOSCOPY TRANSORAL DIAGNOSTIC N/A 1/24/2019    Procedure: ESOPHAGOGASTRODUODENOSCOPY (EGD); Surgeon: Shai Bowie MD;  [facility]; Service: Gastroenterology    TONSILLECTOMY         Social History     Socioeconomic History    Marital status:       Spouse name: None    Number of children: 2    Years of education: None    Highest education level: None   Occupational History    Occupation: Retired   Social Needs    Financial resource strain: None    Food insecurity:     Worry: None     Inability: None    Transportation needs:     Medical: None     Non-medical: None   Tobacco Use    Smoking status: Never Smoker    Smokeless tobacco: Never Used   Substance and Sexual Activity    Alcohol use: No    Drug use: Yes     Types: Morphine     Comment: for cancer pain    Sexual activity: None   Lifestyle    Physical activity:     Days per week: None     Minutes per session: None    Stress: None   Relationships    Social connections:     Talks on phone: None     Gets together: None     Attends Hindu service: None     Active member of club or organization: None     Attends meetings of clubs or organizations: None     Relationship status: None    Intimate partner violence:     Fear of current or ex partner: None     Emotionally abused: None     Physically abused: None     Forced sexual activity: None   Other Topics Concern    None   Social History Narrative    Lives alone       Family History   Problem Relation Age of Onset    Diabetes Mother     Cancer Mother     Lymphoma Father     KAROLINA disease Brother     Colon cancer Maternal Grandfather     No Known Problems Son     No Known Problems Son     No Known Problems Sister     No Known Problems Brother     No Known Problems Sister        No Known Allergies      Current Outpatient Medications:     acetaminophen (TYLENOL) 325 mg tablet, Take 2 tablets (650 mg total) by mouth 4 (four) times a day (with meals and at bedtime) (Patient taking differently: Take 650 mg by mouth 3 (three) times a day with meals ), Disp: 80 tablet, Rfl: 1    calcium carbonate (OS-CLARENCE) 1250 (500 Ca) MG chewable tablet, Chew 1 tablet (1,250 mg total) daily, Disp: 30 tablet, Rfl: 0    Cholecalciferol (VITAMIN D3) 5000 units CAPS, Take 1 capsule by mouth daily, Disp: , Rfl:     ferrous sulfate 325 (65 Fe) mg tablet, Take 1 tablet (325 mg total) by mouth every other day, Disp: 30 tablet, Rfl: 1    gabapentin (NEURONTIN) 300 mg capsule, Take 1 capsule (300 mg total) by mouth 2 (two) times a day, Disp: 60 capsule, Rfl: 5    LORazepam (ATIVAN) 1 mg tablet, Take 2mg (2 tablets) qhs , Disp: 60 tablet, Rfl: 2    morphine (MSIR) 15 mg tablet, Use 1 tab BID scheduled to anticipate cancer pain  May have 1 tabs q4hrs PRN mod-severe pain , Disp: 65 tablet, Rfl: 0    MULTIPLE VITAMINS PO, Take by mouth, Disp: , Rfl:     Omega-3 Fatty Acids (FISH OIL PO), Take 2 g by mouth , Disp: , Rfl:     omeprazole (PriLOSEC) 40 MG capsule, TAKE 1 CAPSULE BY MOUTH EVERY DAY, Disp: 90 capsule, Rfl: 1    senna (SENOKOT) 8 6 mg, Take 1 tablet by mouth as needed for constipation, Disp: , Rfl:     sucralfate (CARAFATE) 1 g tablet, TAKE 1 TABLET (1 G TOTAL) BY MOUTH 2 (TWO) TIMES A DAY BEFORE MEALS (Patient not taking: Reported on 1/7/2020), Disp: 60 tablet, Rfl: 3      Physical Exam:  /70 (BP Location: Left arm, Patient Position: Sitting, Cuff Size: Adult)   Pulse 74   Temp 97 7 °F (36 5 °C)   Resp 18   Ht 5' 2" (1 575 m)   Wt 52 2 kg (115 lb)   LMP 05/27/1996 (LMP Unknown)   SpO2 100%   BMI 21 03 kg/m²     Physical Exam   Constitutional: She is oriented to person, place, and time  She appears well-developed and well-nourished  No distress  HENT:   Head: Normocephalic and atraumatic  Eyes: Conjunctivae are normal  No scleral icterus  Neck: Normal range of motion  Neck supple  Cardiovascular: Normal rate, regular rhythm and normal heart sounds  No murmur heard  Pulmonary/Chest: Effort normal and breath sounds normal  No respiratory distress  Abdominal: Soft  There is no tenderness  Musculoskeletal: Normal range of motion  She exhibits no edema or tenderness  Ambulating with walker    Lymphadenopathy:     She has no cervical adenopathy     Neurological: She is alert and oriented to person, place, and time  No cranial nerve deficit  Skin: Skin is warm and dry  Psychiatric: She has a normal mood and affect  Labs:  Lab Results   Component Value Date    WBC 3 44 (L) 11/04/2019    HGB 11 3 (L) 11/04/2019    HCT 36 4 11/04/2019    MCV 90 11/04/2019     11/04/2019     Lab Results   Component Value Date    K 4 0 12/27/2019     12/27/2019    CO2 26 12/27/2019    BUN 10 12/27/2019    CREATININE 0 66 12/27/2019    GLUF 95 12/27/2019    CALCIUM 8 2 (L) 12/27/2019    AST 30 12/27/2019    ALT 25 12/27/2019    ALKPHOS 205 (H) 12/27/2019    EGFR 90 12/27/2019     Patient voiced understanding and agreement in the above discussion  Aware to contact our office with questions/symptoms in the interim  This note has been generated by voice recognition software system  Therefore, there may be spelling, grammar, and or syntax errors  Please contact if questions arise

## 2020-01-09 ENCOUNTER — TELEPHONE (OUTPATIENT)
Dept: HEMATOLOGY ONCOLOGY | Facility: CLINIC | Age: 70
End: 2020-01-09

## 2020-01-09 LAB — MISCELLANEOUS LAB TEST RESULT: NORMAL

## 2020-01-09 NOTE — TELEPHONE ENCOUNTER
Spoke to Nelly Ennis, she stated that she just wanted to know more about the specimen that was sent out for [facility]  I stated that we were in close communication with the [facility] team regarding her results and as soon as a report is available we will be able to provide her with more information  She was very appreciative of the call back  I instructed patient if she has any further questions she can call our office

## 2020-01-09 NOTE — TELEPHONE ENCOUNTER
req that you call her back with the results of the ct needle biopsy  She is aware that Terri May is not in but is ok with someone calling her back to discuss

## 2020-01-10 NOTE — PROGRESS NOTES
Palliative and Supportive Care   Caty Cornejo 71 y o  female [id number]    Assessment/Plan:  1  Malignant neoplasm of upper-outer quadrant of right breast in female, estrogen receptor positive ([facility] [address] )    2  Cancer associated pain    3  Anxiety disorder, unspecified type    4  Radiation-induced brachial plexopathy    5  Copy of advanced directive obtained      Requested Prescriptions     Signed Prescriptions Disp Refills    morphine (MSIR) 15 mg tablet 65 tablet 0     Sig: Use 1 tab BID scheduled to anticipate cancer pain  May have 1 tabs q4hrs PRN mod-severe pain  Medications Discontinued During This Encounter   Medication Reason    sucralfate (CARAFATE) 1 g tablet     morphine (MSIR) 15 mg tablet Reorder    morphine (MSIR) 15 mg tablet Reorder     She continues to rely on lorazepam for sleep  I believe she should have refills but if not she can request a refill from the pharmacy  If her neuropathic pain under her arm remains an issues, we could try nortriptyline at bedtime which may also help her with sleep  Representatives have queried the patient's controlled substance dispensing history in the Prescription Drug Monitoring Program in compliance with regulations before I have prescribed any controlled substances  The prescription history is consistent with prescribed therapy and our practice policies  20 minutes were spent face to face with Caty Cornejo with greater than 50% of the time spent in counseling or coordination of care including discussions of treatment instructions and follow up requirements   All of the patient's questions were answered during this discussion  Return in about 2 months (around 3/13/2020) for symptom assessment and management      Subjective:   Chief Complaint  Follow up visit for:  symptom management  HPI     Caty Cornejo is a 71 y o  female with breast cancer  Donna Josue was initially diagnosed in 56   She developed metastatic disease to her right supraclavicular nodes in 2004   She developed osseous metastatic disease in 2018  [facility] medical oncologist is Dr Romana Herter  [facility] current treatment is Xgeva only while a bone biopsy is being obtained  There had been consideration for prophylactic fixation of femur for impending fracture with lytic bone lesions, however no plans for surgery at this time       He is seen in the department of Palliative & Supportive Care for concurrent symptom management  Christine Beltran has cancer related back pain and cancer treatment related brachial plexopathy   Her pain regime is MSIR 15mg BID, tylenol 325mg bid, and gabapentin 300mg BID   She has MS IR 15mg for PRN use  She noted that the increase in gabapentin has helped her pain  She still gets the pulling sensation every now and then but it is less        She uses lorazepam 2mg to help her sleep   She notes that when she has forgotten to take it she doesn't sleep at all  She is very anxious about her pending bone biopsy results  She wonders if we got a copy of her advanced directive in her chart - yes in media tab, scanned 10/2020  The following portions of the medical history were reviewed: past medical history, problem list, medication list, and social history      Current Outpatient Medications:     acetaminophen (TYLENOL) 325 mg tablet, Take 2 tablets (650 mg total) by mouth 4 (four) times a day (with meals and at bedtime) (Patient taking differently: Take 650 mg by mouth 3 (three) times a day with meals ), Disp: 80 tablet, Rfl: 1    calcium carbonate (OS-CLARENCE) 1250 (500 Ca) MG chewable tablet, Chew 1 tablet (1,250 mg total) daily, Disp: 30 tablet, Rfl: 0    Cholecalciferol (VITAMIN D3) 5000 units CAPS, Take 1 capsule by mouth daily, Disp: , Rfl:     ferrous sulfate 325 (65 Fe) mg tablet, Take 1 tablet (325 mg total) by mouth every other day, Disp: 30 tablet, Rfl: 1    gabapentin (NEURONTIN) 300 mg capsule, Take 1 capsule (300 mg total) by mouth 2 (two) times a day, Disp: 60 capsule, Rfl: 5    LORazepam (ATIVAN) 1 mg tablet, Take 2mg (2 tablets) qhs , Disp: 60 tablet, Rfl: 2    [START ON 2/11/2020] morphine (MSIR) 15 mg tablet, Use 1 tab BID scheduled to anticipate cancer pain  May have 1 tabs q4hrs PRN mod-severe pain , Disp: 65 tablet, Rfl: 0    MULTIPLE VITAMINS PO, Take by mouth, Disp: , Rfl:     Omega-3 Fatty Acids (FISH OIL PO), Take 2 g by mouth , Disp: , Rfl:     omeprazole (PriLOSEC) 40 MG capsule, TAKE 1 CAPSULE BY MOUTH EVERY DAY, Disp: 90 capsule, Rfl: 1    senna (SENOKOT) 8 6 mg, Take 1 tablet by mouth as needed for constipation, Disp: , Rfl:   Review of Systems   Musculoskeletal: Positive for arthralgias  Psychiatric/Behavioral: Positive for sleep disturbance  The patient is nervous/anxious  All other systems negative    Objective:  Vital Signs  /52 (BP Location: Left arm, Patient Position: Sitting, Cuff Size: Standard)   Pulse 76   Temp 98 3 °F (36 8 °C) (Tympanic)   Resp 18   Ht 5' 2" (1 575 m)   Wt 51 9 kg (114 lb 8 oz)   LMP 05/27/1996 (LMP Unknown)   SpO2 99%   BMI 20 94 kg/m²    Physical Exam    Constitutional: Petit frame  In no acute physical or emotional distress  Head: Normocephalic and atraumatic  Eyes: EOM are normal  No ocular discharge  No scleral icterus  Neck: No visible adenopathy or masses  Respiratory: Effort normal  No stridor  No respiratory distress  Gastrointestinal: No abdominal distension  Musculoskeletal: No edema  Ambulates with a walker but just for "precaution"  Neurological: Alert, oriented and appropriately conversant  Skin: No diaphoresis, no rashes seen on exposed areas of skin  Psychiatric: Displays a normal mood and affect   Behavior, judgement and thought content appear normal

## 2020-01-13 ENCOUNTER — OFFICE VISIT (OUTPATIENT)
Dept: PALLIATIVE MEDICINE | Facility: CLINIC | Age: 70
End: 2020-01-13
Payer: MEDICARE

## 2020-01-13 VITALS
RESPIRATION RATE: 18 BRPM | SYSTOLIC BLOOD PRESSURE: 102 MMHG | HEART RATE: 76 BPM | OXYGEN SATURATION: 99 % | BODY MASS INDEX: 21.07 KG/M2 | TEMPERATURE: 98.3 F | WEIGHT: 114.5 LBS | HEIGHT: 62 IN | DIASTOLIC BLOOD PRESSURE: 52 MMHG

## 2020-01-13 DIAGNOSIS — Z78.9 COPY OF ADVANCED DIRECTIVE OBTAINED: ICD-10-CM

## 2020-01-13 DIAGNOSIS — G89.3 CANCER ASSOCIATED PAIN: ICD-10-CM

## 2020-01-13 DIAGNOSIS — G54.0 RADIATION-INDUCED BRACHIAL PLEXOPATHY: ICD-10-CM

## 2020-01-13 DIAGNOSIS — Z17.0 MALIGNANT NEOPLASM OF UPPER-OUTER QUADRANT OF RIGHT BREAST IN FEMALE, ESTROGEN RECEPTOR POSITIVE (HCC): Primary | ICD-10-CM

## 2020-01-13 DIAGNOSIS — F41.9 ANXIETY DISORDER, UNSPECIFIED TYPE: ICD-10-CM

## 2020-01-13 DIAGNOSIS — C50.411 MALIGNANT NEOPLASM OF UPPER-OUTER QUADRANT OF RIGHT BREAST IN FEMALE, ESTROGEN RECEPTOR POSITIVE (HCC): Primary | ICD-10-CM

## 2020-01-13 PROCEDURE — 99214 OFFICE O/P EST MOD 30 MIN: CPT | Performed by: FAMILY MEDICINE

## 2020-01-13 RX ORDER — MORPHINE SULFATE 15 MG/1
TABLET ORAL
Qty: 65 TABLET | Refills: 0 | Status: SHIPPED | OUTPATIENT
Start: 2020-02-11 | End: 2020-03-13 | Stop reason: SDUPTHER

## 2020-01-13 RX ORDER — MORPHINE SULFATE 15 MG/1
TABLET ORAL
Qty: 65 TABLET | Refills: 0 | Status: SHIPPED | OUTPATIENT
Start: 2020-01-13 | End: 2020-01-13 | Stop reason: SDUPTHER

## 2020-01-16 ENCOUNTER — TELEPHONE (OUTPATIENT)
Dept: HEMATOLOGY ONCOLOGY | Facility: CLINIC | Age: 70
End: 2020-01-16

## 2020-01-21 ENCOUNTER — OFFICE VISIT (OUTPATIENT)
Dept: HEMATOLOGY ONCOLOGY | Facility: CLINIC | Age: 70
End: 2020-01-21
Payer: MEDICARE

## 2020-01-21 VITALS
HEIGHT: 62 IN | RESPIRATION RATE: 17 BRPM | TEMPERATURE: 97.7 F | BODY MASS INDEX: 21.35 KG/M2 | WEIGHT: 116 LBS | SYSTOLIC BLOOD PRESSURE: 110 MMHG | OXYGEN SATURATION: 97 % | DIASTOLIC BLOOD PRESSURE: 60 MMHG | HEART RATE: 75 BPM

## 2020-01-21 DIAGNOSIS — Z17.0 MALIGNANT NEOPLASM OF UPPER-OUTER QUADRANT OF RIGHT BREAST IN FEMALE, ESTROGEN RECEPTOR POSITIVE (HCC): Primary | ICD-10-CM

## 2020-01-21 DIAGNOSIS — C50.411 MALIGNANT NEOPLASM OF UPPER-OUTER QUADRANT OF RIGHT BREAST IN FEMALE, ESTROGEN RECEPTOR POSITIVE (HCC): Primary | ICD-10-CM

## 2020-01-21 PROCEDURE — 99214 OFFICE O/P EST MOD 30 MIN: CPT | Performed by: PHYSICIAN ASSISTANT

## 2020-01-21 NOTE — TELEPHONE ENCOUNTER
Patient starting on [address] per JACINTO Amezcua 300mg PO daily for metastatic breast cancer  Email sent to oral chemo team for new start

## 2020-01-22 ENCOUNTER — TELEPHONE (OUTPATIENT)
Dept: HEMATOLOGY ONCOLOGY | Facility: MEDICAL CENTER | Age: 70
End: 2020-01-22

## 2020-01-22 NOTE — PROGRESS NOTES
Hematology/Oncology Outpatient Follow-up  Jonathan Kitchen 71 y o  female 1950 [id number]    Date:  1/22/2020      Assessment and Plan:  1  Malignant neoplasm of upper-outer quadrant of right breast in female, estrogen receptor positive [facility])  68-year-old female presents for follow-up regarding history of metastatic estrogen receptor, progesterone receptor positive breast cancer with bone, lung metastases  Patient was started on Ibrance and Faslodex  On fortunately patient had toxicity to [facility] and was unable to tolerate any dose  This had to be discontinued  She remained on Faslodex lung  Scans in October 2019 showed disease progression  Unfortunately bone biopsy was the only lesion accessible for repeat biopsy to send for molecular testing  This was delayed until December 2019 due to scheduling as well as weather  Unfortunately this is still not back  Therefore liquid tumor testing was performed  This showed patient is positive for PIK3CA  Therefore patient will proceed with treatment with Faslodex and Piqray  Will start Piqray at standard dose, 300 mg  Reviewed side effects include but are not limited to hyperglycemia, fatigue, hair thinning, nausea, vomiting, diarrhea, headache, kidney insufficiency, rash, cytopenias, mucositis, etc     Dr Bijal Graham also reviewed the above with patient  She is also to continue on Xgeva 120 mg subcu every 28 days  Due to her imaging being in now from October, repeat imaging is requested for a new baseline  In regards to monitoring glucose is most common seen side effect with Piqray, she previously did have diabetes but with weight loss when her breast cancer returned, she no longer required metformin  Her weight has remained stable  She does have a glucometer at home    She will call us to let us know what size strips she needs we will call in strips and needles that she can check her blood glucose at home     - CBC and differential; Standing  - Comprehensive metabolic panel; Standing  - CBC and differential  - Comprehensive metabolic panel  - CT chest abdomen pelvis w contrast; Future      HPI:       Breast cancer metastasized to bone, right ([facility] )    1996 Surgery     right modified radical mastectomy followed by adjuvant systemic therapy but no radiation  6/28/2004 Biopsy     Final Diagnosis  A  Lymph Node, right supraclavicular, core biopsy (14 slides, [id number], [facility]; collected on 6/28/2004):  -  Consistent with metastatic carcinoma, compatible with breast origin (see note)  2004 -  Radiation     salvage radiation therapy at an outside facility to right supraclavicular nodes at [facility]      12/23/2018 Initial Diagnosis     Breast cancer metastasized to bone, right ([facility] [address] )      12/26/2018 - 1/9/2019 Radiation     3000 cGy in 10 fractions to  T11 through the bottom of the SI joints  Dr Corine Landry        1/16/2019 -  Chemotherapy     fulvestrant (FASLODEX) IM injection   Chemotherapy         2/20/2019 - 4/4/2019 Chemotherapy     Ibrance 125 mg PO day 1-21 every 28 days      4/4/2019 - 5/8/2019 Chemotherapy     Ibrance 100 mg PO daily day 1-21 every 28 days       5/22/2019 -  Chemotherapy     Faslodex, Stuart Bud      6/27/2019 Genetic Testing      No pathogenic mutation identified in [facility] Breast panel        Malignant neoplasm of upper-outer quadrant of right breast in female, estrogen receptor positive ([facility] [address] )    6/25/2019 Initial Diagnosis     Malignant neoplasm of upper-outer quadrant of right breast in female, estrogen receptor positive [facility])         22-year-old female presents for follow-up regarding metastatic breast cancer  In 1996 patient was diagnosed with hormone receptor positive breast cancer      She was then diagnosed with metastatic disease in December 2018  She required radiation therapy  She also was started on Faslodex and Ibrance    She was not able to continue on Ibrance secondary to severe neutropenia even at lowest dose reduction      She was then maintained on Faslodex and Xgeva      Tumor marker is not reliable in her case      Repeat imaging in October 2019 showed disease progression  Thoracic surgery was unable to biopsy anything in the lung  This was reviewed with thoracic surgery  Therefore a CT abdomen pelvis was performed to see if there is any other targeted will area for biopsy  The only lesion was a bone lesion  Patient was scheduled for a bone biopsy in early December however this was canceled secondary to whether by the patient  This then was rescheduled      Patient's specimen was sent for molecular testing  And this is still pending  Due to this, she had liquid biopsy  This showed: positive PIK3CA genetic mutation  Interval history:    ROS: Review of Systems   Constitutional: Negative for appetite change, chills, fatigue, fever and unexpected weight change  HENT: Negative for mouth sores and nosebleeds  Respiratory: Negative for cough and shortness of breath  Cardiovascular: Negative for chest pain, palpitations and leg swelling  Gastrointestinal: Negative for abdominal pain, constipation, diarrhea, nausea and vomiting  Genitourinary: Negative for difficulty urinating, dysuria and hematuria  Musculoskeletal: Negative for arthralgias  Skin:        Itching near left ear    Neurological: Negative for dizziness, weakness, light-headedness, numbness and headaches  Hematological: Negative  Psychiatric/Behavioral: Negative          Past Medical History:   Diagnosis Date    BRCA1 negative     BRCA2 negative     [facility] cancer ([facility] ) 05/05/1996    right     Cancer [facility])     breast 1996    Cancer ([facility] )     bone      Compression fracture of thoracic vertebra (HCC)     Diabetes mellitus ([facility] [address] )     History of chemotherapy 1996    History of therapeutic radiation     Tendonitis        Past Surgical History:   Procedure Laterality Date    CHOLECYSTECTOMY  CT NEEDLE BIOPSY BONE  12/16/2019    MASTECTOMY Right 05/05/1996    Right side reconstruction    NECK SURGERY      IA ESOPHAGOGASTRODUODENOSCOPY TRANSORAL DIAGNOSTIC N/A 1/24/2019    Procedure: ESOPHAGOGASTRODUODENOSCOPY (EGD); Surgeon: Ashutosh Garcia MD;  [facility]; Service: Gastroenterology    TONSILLECTOMY         Social History     Socioeconomic History    Marital status:       Spouse name: None    Number of children: 2    Years of education: None    Highest education level: None   Occupational History    Occupation: Retired   Social Needs    Financial resource strain: None    Food insecurity:     Worry: None     Inability: None    Transportation needs:     Medical: None     Non-medical: None   Tobacco Use    Smoking status: Never Smoker    Smokeless tobacco: Never Used   Substance and Sexual Activity    Alcohol use: No    Drug use: Yes     Types: Morphine     Comment: for cancer pain    Sexual activity: None   Lifestyle    Physical activity:     Days per week: None     Minutes per session: None    Stress: None   Relationships    Social connections:     Talks on phone: None     Gets together: None     Attends Voodoo service: None     Active member of club or organization: None     Attends meetings of clubs or organizations: None     Relationship status: None    Intimate partner violence:     Fear of current or ex partner: None     Emotionally abused: None     Physically abused: None     Forced sexual activity: None   Other Topics Concern    None   Social History Narrative    Lives alone       Family History   Problem Relation Age of Onset    Diabetes Mother     Cancer Mother     Lymphoma Father     KAROLINA disease Brother     Colon cancer Maternal Grandfather     No Known Problems Son     No Known Problems Son     No Known Problems Sister     No Known Problems Brother     No Known Problems Sister        No Known Allergies      Current Outpatient Medications:     acetaminophen (TYLENOL) 325 mg tablet, Take 2 tablets (650 mg total) by mouth 4 (four) times a day (with meals and at bedtime) (Patient taking differently: Take 650 mg by mouth 3 (three) times a day with meals ), Disp: 80 tablet, Rfl: 1    calcium carbonate (OS-CLARENCE) 1250 (500 Ca) MG chewable tablet, Chew 1 tablet (1,250 mg total) daily, Disp: 30 tablet, Rfl: 0    Cholecalciferol (VITAMIN D3) 5000 units CAPS, Take 1 capsule by mouth daily, Disp: , Rfl:     ferrous sulfate 325 (65 Fe) mg tablet, Take 1 tablet (325 mg total) by mouth every other day, Disp: 30 tablet, Rfl: 1    gabapentin (NEURONTIN) 300 mg capsule, Take 1 capsule (300 mg total) by mouth 2 (two) times a day, Disp: 60 capsule, Rfl: 5    LORazepam (ATIVAN) 1 mg tablet, Take 2mg (2 tablets) qhs , Disp: 60 tablet, Rfl: 2    [START ON 2/11/2020] morphine (MSIR) 15 mg tablet, Use 1 tab BID scheduled to anticipate cancer pain  May have 1 tabs q4hrs PRN mod-severe pain , Disp: 65 tablet, Rfl: 0    MULTIPLE VITAMINS PO, Take by mouth, Disp: , Rfl:     Omega-3 Fatty Acids (FISH OIL PO), Take 2 g by mouth , Disp: , Rfl:     omeprazole (PriLOSEC) 40 MG capsule, TAKE 1 CAPSULE BY MOUTH EVERY DAY, Disp: 90 capsule, Rfl: 1    senna (SENOKOT) 8 6 mg, Take 1 tablet by mouth as needed for constipation, Disp: , Rfl:       Physical Exam:  /60 (BP Location: Left arm, Patient Position: Sitting, Cuff Size: Adult)   Pulse 75   Temp 97 7 °F (36 5 °C)   Resp 17   Ht 5' 2" (1 575 m)   Wt 52 6 kg (116 lb)   LMP 05/27/1996 (LMP Unknown)   SpO2 97%   BMI 21 22 kg/m²     Physical Exam   Constitutional: She is oriented to person, place, and time  She appears well-developed and well-nourished  No distress  HENT:   Head: Normocephalic and atraumatic  Eyes: Conjunctivae are normal  No scleral icterus  Neck: Normal range of motion  Neck supple  Cardiovascular: Normal rate, regular rhythm and normal heart sounds     No murmur heard   Pulmonary/Chest: Effort normal and breath sounds normal  No respiratory distress  Abdominal: Soft  There is no tenderness  Musculoskeletal: Normal range of motion  She exhibits no edema or tenderness  Lymphadenopathy:     She has no cervical adenopathy  Neurological: She is alert and oriented to person, place, and time  No cranial nerve deficit  Skin: Skin is warm and dry  Psychiatric: She has a normal mood and affect  Labs:  Lab Results   Component Value Date    WBC 3 44 (L) 11/04/2019    HGB 11 3 (L) 11/04/2019    HCT 36 4 11/04/2019    MCV 90 11/04/2019     11/04/2019     Lab Results   Component Value Date    K 4 0 12/27/2019     12/27/2019    CO2 26 12/27/2019    BUN 10 12/27/2019    CREATININE 0 66 12/27/2019    GLUF 95 12/27/2019    CALCIUM 8 2 (L) 12/27/2019    AST 30 12/27/2019    ALT 25 12/27/2019    ALKPHOS 205 (H) 12/27/2019    EGFR 90 12/27/2019     Patient voiced understanding and agreement in the above discussion  Aware to contact our office with questions/symptoms in the interim  This note has been generated by voice recognition software system  Therefore, there may be spelling, grammar, and or syntax errors  Please contact if questions arise

## 2020-01-22 NOTE — TELEPHONE ENCOUNTER
Please review and let me know if there is any information you would like me to provide to the patient

## 2020-01-22 NOTE — TELEPHONE ENCOUNTER
Patientt called in to advise Jennifer Colmenares that the name of her Glucometer is called Accu'Chek compact plus and stated that she needs 1 by 17 test drums   If you need any thing else a good call back number is [phone]

## 2020-01-23 ENCOUNTER — DOCUMENTATION (OUTPATIENT)
Dept: HEMATOLOGY ONCOLOGY | Facility: CLINIC | Age: 70
End: 2020-01-23

## 2020-01-23 NOTE — PROGRESS NOTES
1/22/2020  Received notification from clinical pt will be starting piqray 300 mg q d  Pt has [employer]  ID # [id number]  BIN [id number]  PCN # [id number]  GRP # [id number]    Chart notes are not entered  1/23/2020  Chart notes are entered  Submitted for auth through cover my meds  Received approval letter from ibeth [place] is valid from 10/25/2019 through 1/22/2021  Notified clinical,homestar, and finance

## 2020-01-23 NOTE — PROGRESS NOTES
1-23-20  Received new oral start email  Copay received from pharmacy  Call placed to patient to discuss funding options and obtain additional information  No response left message  1-24-20  Enrolled patient with PAF    Funds are pending due to income documents needed    Patient will bring them in Wednesday 1-29  Once obtained I will forward to [facility]    1-29-20  Uploaded all required documents to [facility] Portal    Waiting for portal up update to complete funding process      2-3-20  Patient was approved thru the [facility]  ID#  [id number]  BIN#  [id number]  PCN#  [id number]  GRP#  [id number]  AMT # $16,000  EFF 1-31-20    Email to team, epic noted, patient informed

## 2020-01-24 ENCOUNTER — DOCUMENTATION (OUTPATIENT)
Dept: HEMATOLOGY ONCOLOGY | Facility: CLINIC | Age: 70
End: 2020-01-24

## 2020-01-29 ENCOUNTER — HOSPITAL ENCOUNTER (OUTPATIENT)
Dept: INFUSION CENTER | Facility: CLINIC | Age: 70
Discharge: HOME | End: 2020-01-29
Payer: MEDICARE

## 2020-01-29 ENCOUNTER — APPOINTMENT (OUTPATIENT)
Dept: LAB | Facility: CLINIC | Age: 70
End: 2020-01-29
Payer: MEDICARE

## 2020-01-29 DIAGNOSIS — C50.411 MALIGNANT NEOPLASM OF UPPER-OUTER QUADRANT OF RIGHT BREAST IN FEMALE, ESTROGEN RECEPTOR POSITIVE (HCC): ICD-10-CM

## 2020-01-29 DIAGNOSIS — C79.51 BONE METASTASIS (HCC): Primary | ICD-10-CM

## 2020-01-29 DIAGNOSIS — Z17.0 MALIGNANT NEOPLASM OF UPPER-OUTER QUADRANT OF RIGHT BREAST IN FEMALE, ESTROGEN RECEPTOR POSITIVE (HCC): ICD-10-CM

## 2020-01-29 DIAGNOSIS — C79.51 BREAST CANCER METASTASIZED TO BONE, RIGHT (HCC): ICD-10-CM

## 2020-01-29 DIAGNOSIS — C50.911 BREAST CANCER METASTASIZED TO BONE, RIGHT (HCC): ICD-10-CM

## 2020-01-29 LAB
ALBUMIN SERPL BCP-MCNC: 3.1 G/DL (ref 3.5–5)
ALP SERPL-CCNC: 235 U/L (ref 46–116)
ALT SERPL W P-5'-P-CCNC: 23 U/L (ref 12–78)
ANION GAP SERPL CALCULATED.3IONS-SCNC: 8 MMOL/L (ref 4–13)
AST SERPL W P-5'-P-CCNC: 31 U/L (ref 5–45)
BASOPHILS # BLD AUTO: 0.03 THOUSANDS/ΜL (ref 0–0.1)
BASOPHILS NFR BLD AUTO: 1 % (ref 0–1)
BILIRUB SERPL-MCNC: 0.51 MG/DL (ref 0.2–1)
BUN SERPL-MCNC: 12 MG/DL (ref 5–25)
CALCIUM SERPL-MCNC: 7.8 MG/DL (ref 8.3–10.1)
CHLORIDE SERPL-SCNC: 101 MMOL/L (ref 100–108)
CO2 SERPL-SCNC: 28 MMOL/L (ref 21–32)
CREAT SERPL-MCNC: 0.64 MG/DL (ref 0.6–1.3)
EOSINOPHIL # BLD AUTO: 0.06 THOUSAND/ΜL (ref 0–0.61)
EOSINOPHIL NFR BLD AUTO: 2 % (ref 0–6)
ERYTHROCYTE [DISTWIDTH] IN BLOOD BY AUTOMATED COUNT: 14.4 % (ref 11.6–15.1)
GFR SERPL CREATININE-BSD FRML MDRD: 91 ML/MIN/1.73SQ M
GLUCOSE P FAST SERPL-MCNC: 97 MG/DL (ref 65–99)
HCT VFR BLD AUTO: 35 % (ref 34.8–46.1)
HGB BLD-MCNC: 10.8 G/DL (ref 11.5–15.4)
IMM GRANULOCYTES # BLD AUTO: 0.03 THOUSAND/UL (ref 0–0.2)
IMM GRANULOCYTES NFR BLD AUTO: 1 % (ref 0–2)
LYMPHOCYTES # BLD AUTO: 0.34 THOUSANDS/ΜL (ref 0.6–4.47)
LYMPHOCYTES NFR BLD AUTO: 14 % (ref 14–44)
MCH RBC QN AUTO: 27.8 PG (ref 26.8–34.3)
MCHC RBC AUTO-ENTMCNC: 30.9 G/DL (ref 31.4–37.4)
MCV RBC AUTO: 90 FL (ref 82–98)
MONOCYTES # BLD AUTO: 0.36 THOUSAND/ΜL (ref 0.17–1.22)
MONOCYTES NFR BLD AUTO: 15 % (ref 4–12)
NEUTROPHILS # BLD AUTO: 1.67 THOUSANDS/ΜL (ref 1.85–7.62)
NEUTS SEG NFR BLD AUTO: 67 % (ref 43–75)
NRBC BLD AUTO-RTO: 0 /100 WBCS
PLATELET # BLD AUTO: 270 THOUSANDS/UL (ref 149–390)
PMV BLD AUTO: 9.4 FL (ref 8.9–12.7)
POTASSIUM SERPL-SCNC: 3.9 MMOL/L (ref 3.5–5.3)
PROT SERPL-MCNC: 6.6 G/DL (ref 6.4–8.2)
RBC # BLD AUTO: 3.89 MILLION/UL (ref 3.81–5.12)
SODIUM SERPL-SCNC: 137 MMOL/L (ref 136–145)
WBC # BLD AUTO: 2.49 THOUSAND/UL (ref 4.31–10.16)

## 2020-01-29 PROCEDURE — 96402 CHEMO HORMON ANTINEOPL SQ/IM: CPT

## 2020-01-29 PROCEDURE — 80053 COMPREHEN METABOLIC PANEL: CPT | Performed by: PHYSICIAN ASSISTANT

## 2020-01-29 PROCEDURE — 85025 COMPLETE CBC W/AUTO DIFF WBC: CPT | Performed by: PHYSICIAN ASSISTANT

## 2020-01-29 PROCEDURE — 36415 COLL VENOUS BLD VENIPUNCTURE: CPT | Performed by: PHYSICIAN ASSISTANT

## 2020-01-29 RX ORDER — LAMOTRIGINE 25 MG/1
500 TABLET ORAL ONCE
Status: CANCELLED | OUTPATIENT
Start: 2020-01-29

## 2020-01-29 RX ORDER — LAMOTRIGINE 25 MG/1
500 TABLET ORAL ONCE
Status: CANCELLED | OUTPATIENT
Start: 2020-02-26

## 2020-01-29 RX ORDER — LAMOTRIGINE 25 MG/1
500 TABLET ORAL ONCE
Status: COMPLETED | OUTPATIENT
Start: 2020-01-29 | End: 2020-01-29

## 2020-01-29 RX ADMIN — FULVESTRANT 500 MG: 50 INJECTION INTRAMUSCULAR at 10:37

## 2020-01-29 NOTE — PROGRESS NOTES
Labs resulted and call placed to dr Cardenas's [facility]  No Xgeva today   Discussed pts Calcium level

## 2020-01-29 NOTE — PROGRESS NOTES
Pt to receive Faslodex only today   Pt is here with me, offers no c/o -- she feels " slightly tired today '    Labs reviewed with pt   She verbalized understanding of the hold of Xgeva today      pt is taking po Calcium supplements and also her Vit D     She is without any pain presently, she does take oral pain meds and states they've helped today

## 2020-01-29 NOTE — PROGRESS NOTES
Pt tolerated faslodex injections IM x2 separate sites 250 mg IM x1 RLQ glut and 250 mg IM x1 LLQ glut for total 500 mg ordered ddose     pt has AVS  dcd stable and ambulatory will make next appts

## 2020-01-29 NOTE — PROGRESS NOTES
Pt arrived early for appt - she did have labs done this am prior to [facility] - she is aware nurse awaiting results

## 2020-02-05 LAB — SCAN RESULT: NORMAL

## 2020-02-18 ENCOUNTER — HOSPITAL ENCOUNTER (OUTPATIENT)
Dept: MRI IMAGING | Facility: HOSPITAL | Age: 70
Discharge: HOME | End: 2020-02-18
Attending: RADIOLOGY
Payer: MEDICARE

## 2020-02-18 ENCOUNTER — TELEPHONE (OUTPATIENT)
Dept: HEMATOLOGY ONCOLOGY | Facility: CLINIC | Age: 70
End: 2020-02-18

## 2020-02-18 DIAGNOSIS — C50.911 BREAST CANCER METASTASIZED TO BONE, RIGHT (HCC): ICD-10-CM

## 2020-02-18 DIAGNOSIS — C79.51 BREAST CANCER METASTASIZED TO BONE, RIGHT (HCC): Chronic | ICD-10-CM

## 2020-02-18 DIAGNOSIS — C50.911 BREAST CANCER METASTASIZED TO BONE, RIGHT (HCC): Chronic | ICD-10-CM

## 2020-02-18 DIAGNOSIS — C79.51 BREAST CANCER METASTASIZED TO BONE, RIGHT (HCC): ICD-10-CM

## 2020-02-18 DIAGNOSIS — R73.9 HYPERGLYCEMIA: Primary | ICD-10-CM

## 2020-02-18 PROCEDURE — 72148 MRI LUMBAR SPINE W/O DYE: CPT

## 2020-02-18 PROCEDURE — 72146 MRI CHEST SPINE W/O DYE: CPT

## 2020-02-18 NOTE — TELEPHONE ENCOUNTER
Left message for patient apologizing that I never received any phone call about her glucose monitoring machine  Please have her speak to me directly when she calls back  Glucose monitoring strips were sent to the pharmacy

## 2020-02-19 ENCOUNTER — TELEPHONE (OUTPATIENT)
Dept: GASTROENTEROLOGY | Facility: CLINIC | Age: 70
End: 2020-02-19

## 2020-02-19 DIAGNOSIS — D50.8 OTHER IRON DEFICIENCY ANEMIA: ICD-10-CM

## 2020-02-19 DIAGNOSIS — K31.A0 INTESTINAL METAPLASIA OF GASTRIC MUCOSA: ICD-10-CM

## 2020-02-19 DIAGNOSIS — K29.00 ACUTE SUPERFICIAL GASTRITIS WITHOUT HEMORRHAGE: Primary | ICD-10-CM

## 2020-02-19 NOTE — TELEPHONE ENCOUNTER
Patient called in to ask if she should still be taking her omeprazole medication  She has been on it for 6 months and wanted to know if she still needs to take it  I informed the patient that it was prescribed for her anemia  Please call patient at [phone]

## 2020-02-20 DIAGNOSIS — E11.9 TYPE 2 DIABETES MELLITUS WITHOUT COMPLICATION, WITHOUT LONG-TERM CURRENT USE OF INSULIN (HCC): ICD-10-CM

## 2020-02-20 DIAGNOSIS — R73.9 HYPERGLYCEMIA: Primary | ICD-10-CM

## 2020-02-20 RX ORDER — OMEPRAZOLE 40 MG/1
40 CAPSULE, DELAYED RELEASE ORAL DAILY
Qty: 90 CAPSULE | Refills: 1 | Status: SHIPPED | OUTPATIENT
Start: 2020-02-20 | End: 2020-09-21 | Stop reason: SDUPTHER

## 2020-02-20 NOTE — TELEPHONE ENCOUNTER
Patients GI provider:  Dr Omer Slaughter    Number to return call: [phone]    Reason for call: Pt returning mary jane RUEDA call    Scheduled procedure/appointment date if applicable: Apt/procedure

## 2020-02-20 NOTE — TELEPHONE ENCOUNTER
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

## 2020-02-21 ENCOUNTER — RADIATION ONCOLOGY FOLLOW-UP (OUTPATIENT)
Dept: RADIATION ONCOLOGY | Facility: HOSPITAL | Age: 70
End: 2020-02-21
Attending: RADIOLOGY
Payer: MEDICARE

## 2020-02-21 VITALS
BODY MASS INDEX: 20.56 KG/M2 | TEMPERATURE: 97.9 F | DIASTOLIC BLOOD PRESSURE: 60 MMHG | SYSTOLIC BLOOD PRESSURE: 120 MMHG | HEART RATE: 78 BPM | RESPIRATION RATE: 14 BRPM | WEIGHT: 112.4 LBS

## 2020-02-21 DIAGNOSIS — C50.911 BREAST CANCER METASTASIZED TO BONE, RIGHT (HCC): Primary | ICD-10-CM

## 2020-02-21 DIAGNOSIS — C79.51 BREAST CANCER METASTASIZED TO BONE, RIGHT (HCC): Primary | ICD-10-CM

## 2020-02-21 DIAGNOSIS — C50.911 BREAST CANCER METASTASIZED TO BONE, RIGHT (HCC): Primary | Chronic | ICD-10-CM

## 2020-02-21 DIAGNOSIS — C79.51 BREAST CANCER METASTASIZED TO BONE, RIGHT (HCC): Primary | Chronic | ICD-10-CM

## 2020-02-21 PROCEDURE — 99211 OFF/OP EST MAY X REQ PHY/QHP: CPT | Performed by: RADIOLOGY

## 2020-02-21 NOTE — PROGRESS NOTES
Jordana Hoang 1950 is a 71 y o  female       Follow up visit   Returns for follow up post radiation to T11 through the bottom of the SI joints completed on 1/9/19  She was last seen in follow up on 10/18/19    11/1/19 CT abdomen and pelvis  IMPRESSION:   1  Diffuse osseous metastatic disease again noted without appreciable change  There is stable moderately severe compression fracture of the T12 vertebral body with 5 mm osseous retropulsion into the ventral spinal canal (progressed since the study of 12/21/2018 but stable since study of 10/10/2019)  2  Stable indeterminate splenic lesion as described  3  Interim development of tiny right pleural effusion  4  Additional findings as described  11/26/19 Dr Bay Frank  She will be going for bone biopsy and specimen will be sent to [facility]  Especially interested in BRCA, PDL1, MSI and PIK 3  Systemic therapy to be selected and started       12/16/19 Bone, right iliac crest, biopsy:  -  Metastatic carcinoma, compatible with breast primary  -  Immunohistochemical stains performed with appropriate controls show the tumor to be positive for keratin AE 1/3, ADRIEN-3 and ER (patchy)  1/3/20 Bilateral screening mammogram  IMPRESSION:No mammographic evidence of malignancy    1/21/20 Marya Tenorio PA-C  liquid tumor testing was performed  This showed patient is positive for PIK3CA       Therefore patient will proceed with treatment with Faslodex and Piqray  Will start [address] at standard dose, 300 mg     1/29/20 Faslodex    2/18/20 CT lumbar spine:IMPRESSION:     1   Redemonstration of diffuse infiltrative osseous metastatic disease, mildly progressed from MRI 10/15/2019 as described    No apparent epidural extension of metastatic disease      2   Grossly stable severe pathologic compression fracture of T12 vertebral body and L4 mid superior endplate moderate compression fracture deformity      3   Stable degenerative changes with severe canal stenosis at level L3-4, and bilateral lateral recess narrowing with moderate canal stenosis at level L4-5    2/18/20 CT thoracic spine: Redemonstration of extensive osseous metastatic disease  There is mild progression of disease in the T9 and T11 vertebral bodies compared to MRI 10/15/2019  Also progression of disease in the L1 and L2 vertebral bodies      2   Grossly stable severe pathology compression fracture of T12 with bony retropulsion without significant canal stenosis      3   Small right pleural effusion increased from 10/15/2019     2/25/20 CT Chest, abdomen and pelvis  2/26/20 Xgeva  3/6/20 Dr Britney Kaye  3/16/20 Dr Poe [facility]      Oncology History    Returns for follow up post radiation to T11 through the bottom of the SI joints completed on 1/9/19  She was last seen in follow up on 10/18/19    11/1/19 CT abdomen and pelvis  IMPRESSION:   1  Diffuse osseous metastatic disease again noted without appreciable change  There is stable moderately severe compression fracture of the T12 vertebral body with 5 mm osseous retropulsion into the ventral spinal canal (progressed since the study of 12/21/2018 but stable since study of 10/10/2019)  2  Stable indeterminate splenic lesion as described  3  Interim development of tiny right pleural effusion  4  Additional findings as described  11/26/19 Dr Britney Kaye  She will be going for bone biopsy and specimen will be sent to [facility]  Especially interested in BRCA, PDL1, MSI and PIK 3  Systemic therapy to be selected and started       12/16/19 Bone, right iliac crest, biopsy:  -  Metastatic carcinoma, compatible with breast primary  -  Immunohistochemical stains performed with appropriate controls show the tumor to be positive for keratin AE 1/3, ADRIEN-3 and ER (patchy)  1/3/20 Bilateral screening mammogram  IMPRESSION:No mammographic evidence of malignancy    1/21/20 Marya Tenorio PA-C  liquid tumor testing was performed  This showed patient is positive for PIK3CA     Therefore patient will proceed with treatment with Faslodex and Piqray  Will start [address] at standard dose, 300 mg     1/29/20 Faslodex    2/18/20 CT lumbar spine:IMPRESSION:     1   Redemonstration of diffuse infiltrative osseous metastatic disease, mildly progressed from MRI 10/15/2019 as described  No apparent epidural extension of metastatic disease      2   Grossly stable severe pathologic compression fracture of T12 vertebral body and L4 mid superior endplate moderate compression fracture deformity      3   Stable degenerative changes with severe canal stenosis at level L3-4, and bilateral lateral recess narrowing with moderate canal stenosis at level L4-5    2/18/20 CT thoracic spine: Redemonstration of extensive osseous metastatic disease  There is mild progression of disease in the T9 and T11 vertebral bodies compared to MRI 10/15/2019  Also progression of disease in the L1 and L2 vertebral bodies      2   Grossly stable severe pathology compression fracture of T12 with bony retropulsion without significant canal stenosis      3   Small right pleural effusion increased from 10/15/2019     2/25/20 CT Chest, abdomen and pelvis  2/26/20 Xgeva  3/6/20 Dr Appiah Santos  3/16/20 Dr Mary Myles        Breast cancer metastasized to bone, right [facility])    1996 Surgery     right modified radical mastectomy followed by adjuvant systemic therapy but no radiation  6/28/2004 Biopsy     Final Diagnosis  A  Lymph Node, right supraclavicular, core biopsy (14 slides, [id number], [facility]; collected on 6/28/2004):  -  Consistent with metastatic carcinoma, compatible with breast origin (see note)  2004 -  Radiation     salvage radiation therapy at an outside facility to right supraclavicular nodes at [place]      12/23/2018 Initial Diagnosis     Breast cancer metastasized to bone, right ([facility] [address] )      12/26/2018 - 1/9/2019 Radiation     3000 cGy in 10 fractions to  T11 through the bottom of the SI joints     Andolino        1/16/2019 -  Chemotherapy     fulvestrant (FASLODEX) IM injection   Chemotherapy         2/20/2019 - 4/4/2019 Chemotherapy     Ibrance 125 mg PO day 1-21 every 28 days      4/4/2019 - 5/8/2019 Chemotherapy     Ibrance 100 mg PO daily day 1-21 every 28 days       5/22/2019 -  Chemotherapy     Faslodex, Jo Ann Kind      6/27/2019 Genetic Testing      No pathogenic mutation identified in [facility] Breast panel      12/16/2019 Biopsy     Bone, right iliac crest, biopsy:  -  Metastatic carcinoma, compatible with breast primary  -  Immunohistochemical stains performed with appropriate controls show the tumor to be positive for keratin AE 1/3, ADRIEN-3 and ER (patchy)          Malignant neoplasm of upper-outer quadrant of right breast in female, estrogen receptor positive ([facility] [address] )    6/25/2019 Initial Diagnosis     Malignant neoplasm of upper-outer quadrant of right breast in female, estrogen receptor positive ([facility] )         Clinical Trial: no      Health Maintenance   Topic Date Due    Hepatitis C Screening  1950    Medicare Annual Wellness Visit (AWV)  1950    CRC Screening: Colonoscopy  1950    DTaP,Tdap,and Td Vaccines (1 - Tdap) 07/29/1961    Fall Risk  07/29/2015    Pneumococcal Vaccine: 65+ Years (1 of 2 - PCV13) 07/29/2015    Influenza Vaccine  07/01/2019    Depression Screening PHQ  10/18/2020    MAMMOGRAM  01/03/2021    BMI: Adult  01/21/2021    Pneumococcal Vaccine: Pediatrics (0 to 5 Years) and At-Risk Patients (6 to 59 Years)  Aged Out    HIB Vaccine  Aged Out    Hepatitis B Vaccine  Aged Out    IPV Vaccine  Aged Out    Hepatitis A Vaccine  Aged Out    Meningococcal ACWY Vaccine  Aged Out    HPV Vaccine  Aged Out       Patient Active Problem List   Diagnosis    Elevated alkaline phosphatase level    Anemia    Compression fracture of thoracic spine, non-traumatic ([facility] )    Anxiety disorder    Hypomagnesemia    Breast cancer metastasized to bone, right ([facility] [facility] )    Severe protein-calorie malnutrition (HCC)    Bone metastasis (HCC)    Lung nodules    Heme positive stool    Leukopenia    Hypocalcemia    Neutropenia (HCC)    Localized edema    Lytic bone lesion of femur    Non-intractable vomiting with nausea    Cancer associated pain    Hypertension    Malignant neoplasm of upper-outer quadrant of right breast in female, estrogen receptor positive ([facility]    Ambulatory dysfunction    Radiation-induced brachial plexopathy    Copy of advanced directive obtained     Past Medical History:   Diagnosis Date    BRCA1 negative     BRCA2 negative     Breast cancer ([place] [address] ) 05/05/1996    right     Cancer ([place] [address] )     breast 1996    Cancer ([place] [address] )     bone      Compression fracture of thoracic vertebra (HCC)     Diabetes mellitus [facility]  )     History of chemotherapy 1996    History of therapeutic radiation     Tendonitis      Past Surgical History:   Procedure Laterality Date    CHOLECYSTECTOMY      CT NEEDLE BIOPSY BONE  12/16/2019    MASTECTOMY Right 05/05/1996    Right side reconstruction    NECK SURGERY      NE ESOPHAGOGASTRODUODENOSCOPY TRANSORAL DIAGNOSTIC N/A 1/24/2019    Procedure: ESOPHAGOGASTRODUODENOSCOPY (EGD); Surgeon: Erin Hale MD;  [facility]; Service: Gastroenterology    TONSILLECTOMY       Family History   Problem Relation Age of Onset    Diabetes Mother     Cancer Mother     Lymphoma Father     KAROLINA disease Brother     Colon cancer Maternal Grandfather     No Known Problems Son     No Known Problems Son     No Known Problems Sister     No Known Problems Brother     No Known Problems Sister      Social History     Socioeconomic History    Marital status:       Spouse name: Not on file    Number of children: 2    Years of education: Not on file    Highest education level: Not on file   Occupational History    Occupation: Retired   Social Needs    Financial resource strain: Not on file    Food insecurity:     Worry: Not on file     Inability: Not on file    Transportation needs:     Medical: Not on file     Non-medical: Not on file   Tobacco Use    Smoking status: Never Smoker    Smokeless tobacco: Never Used   Substance and Sexual Activity    Alcohol use: No    Drug use: Yes     Types: Morphine     Comment: for cancer pain    Sexual activity: Not on file   Lifestyle    Physical activity:     Days per week: Not on file     Minutes per session: Not on file    Stress: Not on file   Relationships    Social connections:     Talks on phone: Not on file     Gets together: Not on file     Attends [employer] service: Not on file     Active member of club or organization: Not on file     Attends meetings of clubs or organizations: Not on file     Relationship status: Not on file    Intimate partner violence:     Fear of current or ex partner: Not on file     Emotionally abused: Not on file     Physically abused: Not on file     Forced sexual activity: Not on file   Other Topics Concern    Not on file   Social History Narrative    Lives alone       Current Outpatient Medications:     acetaminophen (TYLENOL) 325 mg tablet, Take 2 tablets (650 mg total) by mouth 4 (four) times a day (with meals and at bedtime) (Patient taking differently: Take 650 mg by mouth 3 (three) times a day with meals ), Disp: 80 tablet, Rfl: 1    Alpelisib, 300 MG Daily Dose, (PIQRAY, 300 MG DAILY DOSE,) 2 x 150 MG TBPK, Take 300 mg by mouth daily, Disp: 30 each, Rfl: 11    calcium carbonate (OS-CLARENCE) 1250 (500 Ca) MG chewable tablet, Chew 1 tablet (1,250 mg total) daily, Disp: 30 tablet, Rfl: 0    Cholecalciferol (VITAMIN D3) 5000 units CAPS, Take 1 capsule by mouth daily, Disp: , Rfl:     ferrous sulfate 325 (65 Fe) mg tablet, Take 1 tablet (325 mg total) by mouth every other day, Disp: 30 tablet, Rfl: 1    gabapentin (NEURONTIN) 300 mg capsule, Take 1 capsule (300 mg total) by mouth 2 (two) times a day, Disp: 60 capsule, Rfl: 5    LORazepam (ATIVAN) 1 mg tablet, Take 2mg (2 tablets) qhs , Disp: 60 tablet, Rfl: 2    morphine (MSIR) 15 mg tablet, Use 1 tab BID scheduled to anticipate cancer pain  May have 1 tabs q4hrs PRN mod-severe pain , Disp: 65 tablet, Rfl: 0    MULTIPLE VITAMINS PO, Take by mouth, Disp: , Rfl:     Omega-3 Fatty Acids (FISH OIL PO), Take 2 g by mouth , Disp: , Rfl:     omeprazole (PriLOSEC) 40 MG capsule, Take 1 capsule (40 mg total) by mouth daily, Disp: 90 capsule, Rfl: 1    senna (SENOKOT) 8 6 mg, Take 1 tablet by mouth as needed for constipation, Disp: , Rfl:   No Known Allergies    Review of Systems:  Review of Systems   Constitutional: Positive for activity change, appetite change ("not good") and fatigue  Diaphoresis: slight increase in fatigue since Piqray  HENT: Positive for voice change (hoarseness)  Taste is different  Sore on tongue  Eyes: Negative  Respiratory: Positive for cough  Cardiovascular: Negative  Gastrointestinal: Positive for diarrhea (approx one time per week  )  Endocrine: Negative  Genitourinary: Negative  Musculoskeletal: Positive for back pain (using brace  ) and gait problem  Feels back pain has improved since RT  Skin: Negative  Allergic/Immunologic: Negative  Neurological: Positive for dizziness, light-headedness and numbness (right arm)  Negative for headaches  Hematological: Negative  Psychiatric/Behavioral: Negative  Vitals:    02/21/20 1022   BP: 120/60   Pulse: 78   Resp: 14   Temp: 97 9 °F (36 6 °C)   Weight: 51 kg (112 lb 6 4 oz)        Pain Score: 0-No pain      Imaging:Mri Thoracic Spine Wo Contrast    Result Date: 2/19/2020  Narrative: MRI THORACIC SPINE WITHOUT CONTRAST INDICATION: Metastatic breast cancer  COMPARISON:  Thoracic spine MRI 10/15/2019 TECHNIQUE:  Sagittal T1, sagittal T2, sagittal inversion recovery, axial T2,  axial 2D MERGE  IMAGE QUALITY: Diagnostic   FINDINGS: ALIGNMENT AND MARROW SIGNAL: There is preserved normal thoracic kyphosis  Redemonstration of extensive T1 hypointense infiltrative metastatic disease involving anterior and posterior elements of the thoracic and visualized cervical and lumbar vertebrae  There is mild progression of disease within T9 and T11 vertebral body  There is also progression of disease within L2 vertebral body and mild progression in the right aspect of L1 vertebral body  Severe compression fracture deformity of T12 vertebral body with retropulsion without significant canal stenosis is grossly stable  Again seen infiltrative metastatic disease involving sternum and multiple proximal ribs  THORACIC CORD: Normal signal within the thoracic cord  PARAVERTEBRAL SOFT TISSUES:  Unremarkable THORACIC DEGENERATIVE CHANGE: Retropulsion from T12 pathologic fracture indenting ventral thecal side without significant canal stenosis is unchanged  Small central disc protrusion at level T6-7  No apparent epidural extension of metastatic disease  Impression: 1  Redemonstration of extensive osseous metastatic disease  There is mild progression of disease in the T9 and T11 vertebral bodies compared to MRI 10/15/2019  Also progression of disease in the L1 and L2 vertebral bodies  2   Grossly stable severe pathology compression fracture of T12 with bony retropulsion without significant canal stenosis  3   Small right pleural effusion increased from 10/15/2019  Workstation performed: [id number]     Mri Lumbar Spine Wo Contrast    Result Date: 2/19/2020  Narrative: MRI LUMBAR SPINE WITHOUT CONTRAST INDICATION: Metastatic breast cancer COMPARISON:  Lumbar spine MRI 10/15/2019 TECHNIQUE:  Sagittal T1, sagittal T2, sagittal inversion recovery, axial T1 and axial T2, coronal T2   IMAGE QUALITY:  Diagnostic FINDINGS: VERTEBRAL BODIES:  There are 5 lumbar type vertebral bodies  There is preserved normal lumbar lordosis   Redemonstration of extensive T1 hypointense marrow infiltrative metastatic disease involving anterior posterior elements of lumbar spine, visualized thoracic spine and sacroiliac bones  There is progression of disease within L2 vertebral body  There mild disease progression within the right aspect of L1 vertebral body  Also mild disease progression in the L3 vertebral body and right superior lateral aspect of L4 vertebral body  Grossly stable disease of L5 vertebral body  Stable severe pathologic compression fracture of T12 vertebral body with retropulsion without significant canal stenosis  L4 mid superior endplate moderate compression fracture deformity grossly stable  SACRUM:  Stable infiltrative metastatic disease DISTAL CORD AND CONUS:  Normal size and signal within the distal cord and conus  PARASPINAL SOFT TISSUES:  Paraspinal soft tissues are unremarkable  Bilateral renal cysts  LOWER THORACIC DISC SPACES:  Mild bony retropulsion at the level T12-L1 is unchanged  No significant canal stenosis LUMBAR DISC SPACES: L1-L2:  No significant disc bulge  No canal or foraminal stenosis L2-L3:  No significant disc bulge  Moderate bilateral facet arthropathy  No canal or significant foraminal stenosis L3-L4:  Disc bulge, bilateral facet arthropathy and ligamentum flavum thickening resulting in severe canal stenosis  Mild right foraminal narrowing L4-L5:  Disc bulge and bilateral facet arthropathy resulting in bilateral lateral recesses narrowing and moderate canal stenosis  There is mild right foraminal narrowing L5-S1:  No canal or significant foraminal stenosis     Impression: 1  Redemonstration of diffuse infiltrative osseous metastatic disease, mildly progressed from MRI 10/15/2019 as described  No apparent epidural extension of metastatic disease  2   Grossly stable severe pathologic compression fracture of T12 vertebral body and L4 mid superior endplate moderate compression fracture deformity   3   Stable degenerative changes with severe canal stenosis at level L3-4, and bilateral lateral recess narrowing with moderate canal stenosis at level L4-5   Workstation performed: [id number]       Teaching

## 2020-02-21 NOTE — PROGRESS NOTES
Follow-up - Radiation Oncology   Osvaldo Bowman 1950 71 y o  female [id number]      History of Present Illness   Cancer Staging  No matching staging information was found for the patient  Osvaldo Bowman returns today for routine scheduled follow-up visit a little over 1 year status post palliative radiation therapy to the thoracic and lumbar spine  Overall she feels well  She denies any worsening back pain or weakness of the lower extremities  She was recently found to have systemic progression of disease and started a new targeted therapy, Piqray, in addition to her faslodex  Returns for follow up post radiation to T11 through the bottom of the SI joints completed on 1/9/19  She was last seen in follow up on 10/18/19    11/1/19 CT abdomen and pelvis  IMPRESSION:   1  Diffuse osseous metastatic disease again noted without appreciable change  There is stable moderately severe compression fracture of the T12 vertebral body with 5 mm osseous retropulsion into the ventral spinal canal (progressed since the study of 12/21/2018 but stable since study of 10/10/2019)  2  Stable indeterminate splenic lesion as described  3  Interim development of tiny right pleural effusion  4  Additional findings as described  11/26/19 Dr Kory Calderon  She will be going for bone biopsy and specimen will be sent to [facility]  Especially interested in BRCA, PDL1, MSI and PIK 3  Systemic therapy to be selected and started       12/16/19 Bone, right iliac crest, biopsy:  -  Metastatic carcinoma, compatible with breast primary  -  Immunohistochemical stains performed with appropriate controls show the tumor to be positive for keratin AE 1/3, ADRIEN-3 and ER (patchy)  1/3/20 Bilateral screening mammogram  IMPRESSION:No mammographic evidence of malignancy    1/21/20 Marya Tenorio PA-C  liquid tumor testing was performed    This showed patient is positive for PIK3CA       Therefore patient will proceed with treatment with Faslodex and Piqray  Will start [address] at standard dose, 300 mg     1/29/20 Faslodex    2/18/20 CT lumbar spine:IMPRESSION:     1   Redemonstration of diffuse infiltrative osseous metastatic disease, mildly progressed from MRI 10/15/2019 as described  No apparent epidural extension of metastatic disease      2   Grossly stable severe pathologic compression fracture of T12 vertebral body and L4 mid superior endplate moderate compression fracture deformity      3   Stable degenerative changes with severe canal stenosis at level L3-4, and bilateral lateral recess narrowing with moderate canal stenosis at level L4-5    2/18/20 CT thoracic spine: Redemonstration of extensive osseous metastatic disease  There is mild progression of disease in the T9 and T11 vertebral bodies compared to MRI 10/15/2019  Also progression of disease in the L1 and L2 vertebral bodies      2   Grossly stable severe pathology compression fracture of T12 with bony retropulsion without significant canal stenosis      3   Small right pleural effusion increased from 10/15/2019     2/25/20 CT Chest, abdomen and pelvis  2/26/20 Xgeva  3/6/20 Dr Rosalia Baird  3/16/20 Dr Farhad Lynne cancer metastasized to bone, right [facility])    1996 Surgery     right modified radical mastectomy followed by adjuvant systemic therapy but no radiation  6/28/2004 Biopsy     Final Diagnosis  A  Lymph Node, right supraclavicular, core biopsy (14 slides, [id number], [facility]; collected on 6/28/2004):  -  Consistent with metastatic carcinoma, compatible with breast origin (see note)  2004 -  Radiation     salvage radiation therapy at an outside facility to right supraclavicular nodes at [facility]      12/23/2018 Initial Diagnosis     Breast cancer metastasized to bone, right ([facility] [address] )      12/26/2018 - 1/9/2019 Radiation     3000 cGy in 10 fractions to  T11 through the bottom of the SI joints    Dr Keila Eric        1/16/2019 - Chemotherapy     fulvestrant (FASLODEX) IM injection   Chemotherapy         2/20/2019 - 4/4/2019 Chemotherapy     Ibrance 125 mg PO day 1-21 every 28 days      4/4/2019 - 5/8/2019 Chemotherapy     Ibrance 100 mg PO daily day 1-21 every 28 days       5/22/2019 -  Chemotherapy     Faslodex, [facility]      6/27/2019 Genetic Testing      No pathogenic mutation identified in [facility] Breast panel      12/16/2019 Biopsy     Bone, right iliac crest, biopsy:  -  Metastatic carcinoma, compatible with breast primary  -  Immunohistochemical stains performed with appropriate controls show the tumor to be positive for keratin AE 1/3, ADRIEN-3 and ER (patchy)  Malignant neoplasm of upper-outer quadrant of right breast in female, estrogen receptor positive ([facility] [address] )    6/25/2019 Initial Diagnosis     Malignant neoplasm of upper-outer quadrant of right breast in female, estrogen receptor positive ([facility] [address] )         Past Medical History:   Diagnosis Date    BRCA1 negative     BRCA2 negative     Breast cancer ([facility] [address] ) 05/05/1996    right     Cancer [facility])     breast 1996    Cancer ([facility] [address] )     bone      Compression fracture of thoracic vertebra ([facility] [address] )     Diabetes mellitus ([facility] [address] )     History of chemotherapy 1996    History of therapeutic radiation     Tendonitis      Past Surgical History:   Procedure Laterality Date    CHOLECYSTECTOMY      CT NEEDLE BIOPSY BONE  12/16/2019    MASTECTOMY Right 05/05/1996    Right side reconstruction    NECK SURGERY      [place] ESOPHAGOGASTRODUODENOSCOPY TRANSORAL DIAGNOSTIC N/A 1/24/2019    Procedure: ESOPHAGOGASTRODUODENOSCOPY (EGD); Surgeon: Farzad Ingram MD;  [facility];   Service: Gastroenterology    TONSILLECTOMY         Social History   Social History     Substance and Sexual Activity   Alcohol Use No     Social History     Substance and Sexual Activity   Drug Use Yes    Types: Morphine    Comment: for cancer pain     Social History     Tobacco Use   Smoking Status Never Smoker Smokeless Tobacco Never Used         Meds/Allergies     Current Outpatient Medications:     acetaminophen (TYLENOL) 325 mg tablet, Take 2 tablets (650 mg total) by mouth 4 (four) times a day (with meals and at bedtime) (Patient taking differently: Take 650 mg by mouth 3 (three) times a day with meals ), Disp: 80 tablet, Rfl: 1    Alpelisib, 300 MG Daily Dose, (PIQRAY, 300 MG DAILY DOSE,) 2 x 150 MG TBPK, Take 300 mg by mouth daily, Disp: 30 each, Rfl: 11    calcium carbonate (OS-CLARENCE) 1250 (500 Ca) MG chewable tablet, Chew 1 tablet (1,250 mg total) daily, Disp: 30 tablet, Rfl: 0    Cholecalciferol (VITAMIN D3) 5000 units CAPS, Take 1 capsule by mouth daily, Disp: , Rfl:     ferrous sulfate 325 (65 Fe) mg tablet, Take 1 tablet (325 mg total) by mouth every other day, Disp: 30 tablet, Rfl: 1    gabapentin (NEURONTIN) 300 mg capsule, Take 1 capsule (300 mg total) by mouth 2 (two) times a day, Disp: 60 capsule, Rfl: 5    LORazepam (ATIVAN) 1 mg tablet, Take 2mg (2 tablets) qhs , Disp: 60 tablet, Rfl: 2    morphine (MSIR) 15 mg tablet, Use 1 tab BID scheduled to anticipate cancer pain  May have 1 tabs q4hrs PRN mod-severe pain , Disp: 65 tablet, Rfl: 0    MULTIPLE VITAMINS PO, Take by mouth, Disp: , Rfl:     Omega-3 Fatty Acids (FISH OIL PO), Take 2 g by mouth , Disp: , Rfl:     omeprazole (PriLOSEC) 40 MG capsule, Take 1 capsule (40 mg total) by mouth daily, Disp: 90 capsule, Rfl: 1    senna (SENOKOT) 8 6 mg, Take 1 tablet by mouth as needed for constipation, Disp: , Rfl:   No Known Allergies      Review of Systems   Review of Systems   Constitutional: Positive for activity change, appetite change ("not good") and fatigue  Diaphoresis: slight increase in fatigue since Piqray  HENT: Positive for voice change (hoarseness)  Taste is different  Sore on tongue  Eyes: Negative  Respiratory: Positive for cough  Cardiovascular: Negative      Gastrointestinal: Positive for diarrhea (approx one time per week  )  Endocrine: Negative  Genitourinary: Negative  Musculoskeletal: Positive for back pain (using brace  ) and gait problem  Feels back pain has improved since RT  Skin: Negative  Allergic/Immunologic: Negative  Neurological: Positive for dizziness, light-headedness and numbness (right arm)  Negative for headaches  Hematological: Negative  Psychiatric/Behavioral: Negative  OBJECTIVE:   /60   Pulse 78   Temp 97 9 °F (36 6 °C)   Resp 14   Wt 51 kg (112 lb 6 4 oz)   LMP 05/27/1996 (LMP Unknown)   BMI 20 56 kg/m²   Pain Assessment:  0  Karnofsky: 70    Physical Exam   The patient presents today no apparent distress  Sclera anicteric  Normal respiratory effort  Normal speech  Normal affect  Ambulating well with the use of a walker          RESULTS    Lab Results:   Recent Results (from the past 672 hour(s))   CBC and differential    Collection Time: 01/29/20  9:18 AM   Result Value Ref Range    WBC 2 49 (L) 4 31 - 10 16 Thousand/uL    RBC 3 89 3 81 - 5 12 Million/uL    Hemoglobin 10 8 (L) 11 5 - 15 4 g/dL    Hematocrit 35 0 34 8 - 46 1 %    MCV 90 82 - 98 fL    MCH 27 8 26 8 - 34 3 pg    MCHC 30 9 (L) 31 4 - 37 4 g/dL    RDW 14 4 11 6 - 15 1 %    MPV 9 4 8 9 - 12 7 fL    Platelets 030 381 - 153 Thousands/uL    nRBC 0 /100 WBCs    Neutrophils Relative 67 43 - 75 %    Immat GRANS % 1 0 - 2 %    Lymphocytes Relative 14 14 - 44 %    Monocytes Relative 15 (H) 4 - 12 %    Eosinophils Relative 2 0 - 6 %    Basophils Relative 1 0 - 1 %    Neutrophils Absolute 1 67 (L) 1 85 - 7 62 Thousands/µL    Immature Grans Absolute 0 03 0 00 - 0 20 Thousand/uL    Lymphocytes Absolute 0 34 (L) 0 60 - 4 47 Thousands/µL    Monocytes Absolute 0 36 0 17 - 1 22 Thousand/µL    Eosinophils Absolute 0 06 0 00 - 0 61 Thousand/µL    Basophils Absolute 0 03 0 00 - 0 10 Thousands/µL   Comprehensive metabolic panel    Collection Time: 01/29/20  9:18 AM   Result Value Ref Range    Sodium 137 136 - 145 mmol/L    Potassium 3 9 3 5 - 5 3 mmol/L    Chloride 101 100 - 108 mmol/L    CO2 28 21 - 32 mmol/L    ANION GAP 8 4 - 13 mmol/L    BUN 12 5 - 25 mg/dL    Creatinine 0 64 0 60 - 1 30 mg/dL    Glucose, Fasting 97 65 - 99 mg/dL    Calcium 7 8 (L) 8 3 - 10 1 mg/dL    AST 31 5 - 45 U/L    ALT 23 12 - 78 U/L    Alkaline Phosphatase 235 (H) 46 - 116 U/L    Total Protein 6 6 6 4 - 8 2 g/dL    Albumin 3 1 (L) 3 5 - 5 0 g/dL    Total Bilirubin 0 51 0 20 - 1 00 mg/dL    eGFR 91 ml/min/1 73sq m       Imaging Studies:Mri Thoracic Spine Wo Contrast    Result Date: 2/19/2020  Narrative: MRI THORACIC SPINE WITHOUT CONTRAST INDICATION: Metastatic breast cancer  COMPARISON:  Thoracic spine MRI 10/15/2019 TECHNIQUE:  Sagittal T1, sagittal T2, sagittal inversion recovery, axial T2,  axial 2D MERGE  IMAGE QUALITY: Diagnostic  FINDINGS: ALIGNMENT AND MARROW SIGNAL: There is preserved normal thoracic kyphosis  Redemonstration of extensive T1 hypointense infiltrative metastatic disease involving anterior and posterior elements of the thoracic and visualized cervical and lumbar vertebrae  There is mild progression of disease within T9 and T11 vertebral body  There is also progression of disease within L2 vertebral body and mild progression in the right aspect of L1 vertebral body  Severe compression fracture deformity of T12 vertebral body with retropulsion without significant canal stenosis is grossly stable  Again seen infiltrative metastatic disease involving sternum and multiple proximal ribs  THORACIC CORD: Normal signal within the thoracic cord  PARAVERTEBRAL SOFT TISSUES:  Unremarkable THORACIC DEGENERATIVE CHANGE: Retropulsion from T12 pathologic fracture indenting ventral thecal side without significant canal stenosis is unchanged  Small central disc protrusion at level T6-7  No apparent epidural extension of metastatic disease  Impression: 1  Redemonstration of extensive osseous metastatic disease    There is mild progression of disease in the T9 and T11 vertebral bodies compared to MRI 10/15/2019  Also progression of disease in the L1 and L2 vertebral bodies  2   Grossly stable severe pathology compression fracture of T12 with bony retropulsion without significant canal stenosis  3   Small right pleural effusion increased from 10/15/2019  Workstation performed: [id number]     Mri Lumbar Spine Wo Contrast    Result Date: 2/19/2020  Narrative: MRI LUMBAR SPINE WITHOUT CONTRAST INDICATION: Metastatic breast cancer COMPARISON:  Lumbar spine MRI 10/15/2019 TECHNIQUE:  Sagittal T1, sagittal T2, sagittal inversion recovery, axial T1 and axial T2, coronal T2   IMAGE QUALITY:  Diagnostic FINDINGS: VERTEBRAL BODIES:  There are 5 lumbar type vertebral bodies  There is preserved normal lumbar lordosis  Redemonstration of extensive T1 hypointense marrow infiltrative metastatic disease involving anterior posterior elements of lumbar spine, visualized thoracic spine and sacroiliac bones  There is progression of disease within L2 vertebral body  There mild disease progression within the right aspect of L1 vertebral body  Also mild disease progression in the L3 vertebral body and right superior lateral aspect of L4 vertebral body  Grossly stable disease of L5 vertebral body  Stable severe pathologic compression fracture of T12 vertebral body with retropulsion without significant canal stenosis  L4 mid superior endplate moderate compression fracture deformity grossly stable  SACRUM:  Stable infiltrative metastatic disease DISTAL CORD AND CONUS:  Normal size and signal within the distal cord and conus  PARASPINAL SOFT TISSUES:  Paraspinal soft tissues are unremarkable  Bilateral renal cysts  LOWER THORACIC DISC SPACES:  Mild bony retropulsion at the level T12-L1 is unchanged  No significant canal stenosis LUMBAR DISC SPACES: L1-L2:  No significant disc bulge  No canal or foraminal stenosis L2-L3:  No significant disc bulge    Moderate bilateral facet arthropathy  No canal or significant foraminal stenosis L3-L4:  Disc bulge, bilateral facet arthropathy and ligamentum flavum thickening resulting in severe canal stenosis  Mild right foraminal narrowing L4-L5:  Disc bulge and bilateral facet arthropathy resulting in bilateral lateral recesses narrowing and moderate canal stenosis  There is mild right foraminal narrowing L5-S1:  No canal or significant foraminal stenosis     Impression: 1  Redemonstration of diffuse infiltrative osseous metastatic disease, mildly progressed from MRI 10/15/2019 as described  No apparent epidural extension of metastatic disease  2   Grossly stable severe pathologic compression fracture of T12 vertebral body and L4 mid superior endplate moderate compression fracture deformity  3   Stable degenerative changes with severe canal stenosis at level L3-4, and bilateral lateral recess narrowing with moderate canal stenosis at level L4-5  Workstation performed: [id number]           Assessment/Plan:  Orders Placed This Encounter   Procedures    MRI lumbar spine wo contrast    MRI thoracic spine wo contrast        Betty Smith returns today for routine follow-up  She denies any significant back pain, although her MRIs do show multifocal progression in various vertebral bodies both within and outside of her prior radiation field  Thankfully all disease remains confined to the bone with no new pathologic fracture or epidural spread  She also has systemic progression  Given that she is asymptomatic and there is no impending spinal cord compromise, we are not recommending any additional radiation at this time  She recently started a new targeted therapy and the hope would be that this will stabilize her disease  We have ordered repeat MRIs of the spine for 3-4 months but she was instructed to contact us sooner should she develop recurrent back pain        Rachelle Samaniego MD  9/10/0570,65:76 AM    Portions of the record may have been created with voice recognition software   Occasional wrong word or "sound a like" substitutions may have occurred due to the inherent limitations of voice recognition software   Read the chart carefully and recognize, using context, where substitutions have occurred

## 2020-02-24 ENCOUNTER — TELEPHONE (OUTPATIENT)
Dept: HEMATOLOGY ONCOLOGY | Facility: CLINIC | Age: 70
End: 2020-02-24

## 2020-02-24 DIAGNOSIS — K12.1 MOUTH ULCERS: Primary | ICD-10-CM

## 2020-02-24 NOTE — TELEPHONE ENCOUNTER
Telephone Triage-Symptom Call      Whit Mcelroy  1950  [phone]    Caller:Self  Chief Complaint:Oral Mucositis  Current Treatment patient:  Yes  Name of treatment: Roseanna Moreland  Date of last treatment:Daily  Recent illness or Surgery:      Description of symptoms per ONS Guidelines review (charting by exception): Patient called to report that on Friday 2/21/20 she started with a dime size ulceration on the left side of her tongue  Area red with a white film in the center of the ulceration  Patient is also complaining of right lower gum soreness  Patient started using [employer] Peroxyl(alcohol free) and [facility] mouth sore relief  Reported she is getting some relief from the above products  Patient did lose her left upper bridge and wanted to know when she could get dental work  She is ok with waiting if she can not have work done due to treatment  Patient is also requesting to delay her CT scan  She does not think that she can drink the barium with her mouth sore  Patient advised to do baking soda and salt rinses QID,  Use a soft tooth brush, Avoid acidic food and good oral hygiene  Patient is open to using magic mouthwash  Please call patient [phone]               Guidelines followed: Yes    Disposition: Home Care Instructions    NEXT STEPS REQUIRED: Please call patient to address CT and Dental question  Order magic mouthwash if ok with Dr Trini Frank      Patient verbalizes understanding and is in agreement with plan: YES    Verified that guidelines were completed and documented: YES

## 2020-02-24 NOTE — TELEPHONE ENCOUNTER
Spoke with patient and reviewed she did  her lancets, however she did not start taking her blood sugars yet  She reviewed when she spoke with Mai Munoz that they discussed taking blood sugars on Tuesday and Thursday  She will start tomorrow and call with fasting BS to Medical assistant and request they review it with Lucia Landry  Additionally, I reviewed with patient we will start with Magic Mouthwash although there is potential she may have start of thrush which would require change of medication  [facility]  Patient will call back if symptoms worsen  Reviewed per JACINTO Munoz patient is okay to get dental work done is needed as long as sugars are stable  Reviewed per JACINTO Munoz patient should get CT scan completed tomorrow 2/25 as scheduled (ok if she cannot drink the barium, they can still give IV dye)  Patient also notes she developed hoarseness to her voice since starting the [facility] but no pain or soreness  Patient experiences 1-2 bouts of diarrhea but this has since resolved  Patient was appreciative and will call tomorrow with fasting BS

## 2020-02-25 ENCOUNTER — TELEPHONE (OUTPATIENT)
Dept: HEMATOLOGY ONCOLOGY | Facility: CLINIC | Age: 70
End: 2020-02-25

## 2020-02-25 ENCOUNTER — HOSPITAL ENCOUNTER (OUTPATIENT)
Dept: CT IMAGING | Facility: HOSPITAL | Age: 70
Discharge: HOME | End: 2020-02-25
Payer: MEDICARE

## 2020-02-25 DIAGNOSIS — C50.411 MALIGNANT NEOPLASM OF UPPER-OUTER QUADRANT OF RIGHT BREAST IN FEMALE, ESTROGEN RECEPTOR POSITIVE (HCC): ICD-10-CM

## 2020-02-25 DIAGNOSIS — Z17.0 MALIGNANT NEOPLASM OF UPPER-OUTER QUADRANT OF RIGHT BREAST IN FEMALE, ESTROGEN RECEPTOR POSITIVE (HCC): ICD-10-CM

## 2020-02-25 PROCEDURE — 71260 CT THORAX DX C+: CPT

## 2020-02-25 PROCEDURE — 74177 CT ABD & PELVIS W/CONTRAST: CPT

## 2020-02-25 RX ADMIN — IOHEXOL 85 ML: 350 INJECTION, SOLUTION INTRAVENOUS at 12:52

## 2020-02-25 NOTE — TELEPHONE ENCOUNTER
I would like her to start taking metformin 500 mg PO daily  She is OK to proceed with CT scan today

## 2020-02-25 NOTE — PROGRESS NOTES
Per JACINTO Irby we will Cristofer Barcenas on 2/26 due to need for dental work  OK to proceed with Faslodex

## 2020-02-25 NOTE — TELEPHONE ENCOUNTER
Fasting glucose of 290 is a grade 3 toxicity  She needs to hold medication for 3 days  Continue to check sugars fasting in the AM daily and prior to eating dinner daily  Please call back Friday with log of blood sugars  Does she have 1000 mg tablets or 500 mg tablets? If 1000, is there a line to cut in half?

## 2020-02-25 NOTE — TELEPHONE ENCOUNTER
Called patient and reviewed she is ok to proceed with CT scan (patient appreciative as she has already taken her barium 1st dose)  Patient also verbalized understanding of taking Metformin 500 mg PO daily, starting today

## 2020-02-25 NOTE — TELEPHONE ENCOUNTER
Reviewed with patient she should Daralyn Carolin for next 3 days (including today which she will since she has been taking it in the evening)  Also reviewed she should check a fasting blood sugar in the AM and then a blood sugar prior to eating dinner and call us Friday with the record of the results  Reviewed with patient her Metformin is 1000 mg tablets and they are scored for breaking in half  Please advise how you would like patient to take it  Additionally patient wants to know given elevated glucose level this AM of 290 if she still can go for her CT scan this AM     Please review and let me know what information to provide to patient

## 2020-02-26 ENCOUNTER — APPOINTMENT (OUTPATIENT)
Dept: LAB | Facility: CLINIC | Age: 70
End: 2020-02-26
Payer: MEDICARE

## 2020-02-26 ENCOUNTER — HOSPITAL ENCOUNTER (OUTPATIENT)
Dept: INFUSION CENTER | Facility: CLINIC | Age: 70
Discharge: HOME | End: 2020-02-26
Payer: MEDICARE

## 2020-02-26 DIAGNOSIS — C50.911 BREAST CANCER METASTASIZED TO BONE, RIGHT (HCC): ICD-10-CM

## 2020-02-26 DIAGNOSIS — Z17.0 MALIGNANT NEOPLASM OF UPPER-OUTER QUADRANT OF RIGHT BREAST IN FEMALE, ESTROGEN RECEPTOR POSITIVE (HCC): ICD-10-CM

## 2020-02-26 DIAGNOSIS — C79.51 BREAST CANCER METASTASIZED TO BONE, RIGHT (HCC): ICD-10-CM

## 2020-02-26 DIAGNOSIS — C79.51 BONE METASTASIS (HCC): Primary | ICD-10-CM

## 2020-02-26 DIAGNOSIS — C50.411 MALIGNANT NEOPLASM OF UPPER-OUTER QUADRANT OF RIGHT BREAST IN FEMALE, ESTROGEN RECEPTOR POSITIVE (HCC): ICD-10-CM

## 2020-02-26 PROCEDURE — 96402 CHEMO HORMON ANTINEOPL SQ/IM: CPT

## 2020-02-26 RX ORDER — LAMOTRIGINE 25 MG/1
500 TABLET ORAL ONCE
Status: CANCELLED | OUTPATIENT
Start: 2020-03-25

## 2020-02-26 RX ORDER — LAMOTRIGINE 25 MG/1
500 TABLET ORAL ONCE
Status: COMPLETED | OUTPATIENT
Start: 2020-02-26 | End: 2020-02-26

## 2020-02-26 RX ADMIN — FULVESTRANT 500 MG: 50 INJECTION INTRAMUSCULAR at 09:41

## 2020-02-26 NOTE — PROGRESS NOTES
Pt  offers no complaints  Faslodex admin  IM in right and left buttock without incident  AVS given  Pt  to make next appt  out front

## 2020-02-28 ENCOUNTER — TELEPHONE (OUTPATIENT)
Dept: HEMATOLOGY ONCOLOGY | Facility: CLINIC | Age: 70
End: 2020-02-28

## 2020-02-28 DIAGNOSIS — Z17.0 MALIGNANT NEOPLASM OF UPPER-OUTER QUADRANT OF RIGHT BREAST IN FEMALE, ESTROGEN RECEPTOR POSITIVE (HCC): Primary | ICD-10-CM

## 2020-02-28 DIAGNOSIS — E11.9 TYPE 2 DIABETES MELLITUS WITHOUT COMPLICATION, WITHOUT LONG-TERM CURRENT USE OF INSULIN (HCC): ICD-10-CM

## 2020-02-28 DIAGNOSIS — R73.9 HYPERGLYCEMIA: Primary | ICD-10-CM

## 2020-02-28 DIAGNOSIS — C50.411 MALIGNANT NEOPLASM OF UPPER-OUTER QUADRANT OF RIGHT BREAST IN FEMALE, ESTROGEN RECEPTOR POSITIVE (HCC): Primary | ICD-10-CM

## 2020-02-28 DIAGNOSIS — R73.9 HYPERGLYCEMIA: ICD-10-CM

## 2020-02-28 RX ORDER — METFORMIN HYDROCHLORIDE EXTENDED-RELEASE TABLETS 500 MG/1
500 TABLET, FILM COATED, EXTENDED RELEASE ORAL
Qty: 30 TABLET | Refills: 2 | Status: SHIPPED | OUTPATIENT
Start: 2020-02-28 | End: 2020-02-28

## 2020-02-28 NOTE — TELEPHONE ENCOUNTER
Call transferred from [facility], [facility] starting  Recording AM/PM sugars  Tuesday 290AM, 277PM,  Wed 133AM/185PM, Thurs 111AM/118PM, Fri 113 AM    Overall she states she is doing better  Also she received a call from [address] [place] [address] regarding Edythe Rios (should she reorder) per patient Dr Santi Jimenez had it on hold  So she needs clarification if she needs to go back on it     Please discuss with  Ronald and call her back at [phone]

## 2020-02-28 NOTE — TELEPHONE ENCOUNTER
Reviewed with JACINTO Patel and she would like patient to continue monitoring morning AM blood sugars (fasting) and then again before dinner (PM) logging them  She would also like patient to continue on Metformin 500mg PO daily  Patient is okay to resume dose lowered PIQRAY 250 mg daily (decreased from 300 mg daily) , [facility] RX pended to [facility]  Called patient and reviewed above information  Patient explained back all information and confirmed understanding  Patient also requested RX for Metformin be sent to her [facility] pharmacy and stated I will also send over to Mai Stapleton for signing

## 2020-03-03 ENCOUNTER — TELEPHONE (OUTPATIENT)
Dept: HEMATOLOGY ONCOLOGY | Facility: CLINIC | Age: 70
End: 2020-03-03

## 2020-03-03 ENCOUNTER — TELEPHONE (OUTPATIENT)
Dept: INFUSION CENTER | Facility: HOSPITAL | Age: 70
End: 2020-03-03

## 2020-03-03 NOTE — TELEPHONE ENCOUNTER
Patient received piqray, ok to start at reduced dose of 250 mg, patient will continue to monitor BS and record

## 2020-03-03 NOTE — TELEPHONE ENCOUNTER
Patient called to advise that she received her Piqray medication today and the mail and would like to know if she should start now or wait until her 3/6 follow up with Dr Grecia Atwood  Best call back [phone]

## 2020-03-06 ENCOUNTER — OFFICE VISIT (OUTPATIENT)
Dept: HEMATOLOGY ONCOLOGY | Facility: CLINIC | Age: 70
End: 2020-03-06
Payer: MEDICARE

## 2020-03-06 ENCOUNTER — TELEPHONE (OUTPATIENT)
Dept: HEMATOLOGY ONCOLOGY | Facility: CLINIC | Age: 70
End: 2020-03-06

## 2020-03-06 VITALS
WEIGHT: 116 LBS | DIASTOLIC BLOOD PRESSURE: 68 MMHG | HEIGHT: 62 IN | TEMPERATURE: 97.9 F | BODY MASS INDEX: 21.35 KG/M2 | SYSTOLIC BLOOD PRESSURE: 122 MMHG | HEART RATE: 74 BPM | RESPIRATION RATE: 16 BRPM

## 2020-03-06 DIAGNOSIS — Z17.0 MALIGNANT NEOPLASM OF UPPER-OUTER QUADRANT OF RIGHT BREAST IN FEMALE, ESTROGEN RECEPTOR POSITIVE (HCC): Primary | ICD-10-CM

## 2020-03-06 DIAGNOSIS — E09.9 DRUG OR CHEMICAL INDUCED DIABETES MELLITUS WITHOUT COMPLICATION, WITHOUT LONG-TERM CURRENT USE OF INSULIN (HCC): Chronic | ICD-10-CM

## 2020-03-06 DIAGNOSIS — G54.0 RADIATION-INDUCED BRACHIAL PLEXOPATHY: ICD-10-CM

## 2020-03-06 DIAGNOSIS — M89.9 LYTIC BONE LESION OF FEMUR: ICD-10-CM

## 2020-03-06 DIAGNOSIS — M48.54XD NON-TRAUMATIC COMPRESSION FRACTURE OF T12 THORACIC VERTEBRA WITH ROUTINE HEALING, SUBSEQUENT ENCOUNTER: ICD-10-CM

## 2020-03-06 DIAGNOSIS — C79.51 BONE METASTASIS (HCC): ICD-10-CM

## 2020-03-06 DIAGNOSIS — C50.411 MALIGNANT NEOPLASM OF UPPER-OUTER QUADRANT OF RIGHT BREAST IN FEMALE, ESTROGEN RECEPTOR POSITIVE (HCC): Primary | ICD-10-CM

## 2020-03-06 PROCEDURE — 99214 OFFICE O/P EST MOD 30 MIN: CPT | Performed by: INTERNAL MEDICINE

## 2020-03-06 NOTE — PROGRESS NOTES
HPI:  Follow-up visit for hormone receptor positive right breast cancer to bones and maybe lungs? and presently patient is on Faslodex and Piqray  Eve De Jesus was started only recently and dose is being decreased to 200 mg daily because of rising blood sugar and patient checks her blood sugar at home  She was recently started on metformin 500 mg daily and that is being increased to 500 mg twice a day  Prior to Faslodex and Piqray she was on Faslodex   She could not tolerate Ibrance because of significant hematological toxicities  After early response to Faslodex disease progressed in October 2019 on imaging studies    Unfortunately bone biopsy was the only lesion accessible for repeat biopsy to send for molecular testing      Therefore liquid tumor testing was performed and 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     Starting dose of Piqray was 300 mg daily that was recently decreased to 250 mg daily and now he is being decreased to 200 mg daily  She was receiving Xgeva 120 mg subcu every 28 days and that was temporarily on hold because of dental work that has been completed and Jason Sharpe will be resumed next month  In 1996 and patient was diagnosed to have right breast cancer and she had right breast mastectomy followed by adjuvant chemotherapy  In 2004 she was found to have hormone receptor positive  recurrent disease in the right supraclavicular lymph node  She had surgery and Femara for 10 years until 2015   Evaluation in 2015 was negative for any evidence of metastatic disease  In December 2018 she was found to have extensive bony metastatic disease presumably from previously known hormone receptor-positive right breast cancer      She was hospitalized with acute compression fracture of T12   There was no biopsy   He was started on radiation and that has been completed  She also received radiation to left femur   She was started on Faslodex and to that  [facility] was added    She was also started on Xgeva with calcium and vitamin-D  Unk Jez She had profound neutropenia on Ibrance even on a 75 mg dose  Lizzie Galley was discontinued and counts recovered     Disease progressed in the bones in October 2019  Faslodex was stopped  She was continued on Xgeva with calcium and vitamin-D  Unk Jez Faslodex plus Piqray started in the last week of January 2020   She has much less bone and joint pains  She follows with palliative care team   Appetite is improving  She has generalized weakness and tiredness     She has a walker            Current Outpatient Medications:     acetaminophen (TYLENOL) 325 mg tablet, Take 2 tablets (650 mg total) by mouth 4 (four) times a day (with meals and at bedtime) (Patient taking differently: Take 650 mg by mouth 3 (three) times a day with meals ), Disp: 80 tablet, Rfl: 1    al mag oxide-diphenhydramine-lidocaine viscous (MAGIC MOUTHWASH) 1:1:1 suspension, Swish and swallow 10 mL every 4 (four) hours as needed for mouth pain or discomfort, Disp: 90 mL, Rfl: 3    Alpelisib, 250 MG Daily Dose, (Piqray, 250 MG Daily Dose,) 200 & 50 MG TBPK, Take 250 mg by mouth daily, Disp: 28 each, Rfl: 1    calcium carbonate (OS-CLARENCE) 1250 (500 Ca) MG chewable tablet, Chew 1 tablet (1,250 mg total) daily, Disp: 30 tablet, Rfl: 0    Cholecalciferol (VITAMIN D3) 5000 units CAPS, Take 1 capsule by mouth daily, Disp: , Rfl:     ferrous sulfate 325 (65 Fe) mg tablet, Take 1 tablet (325 mg total) by mouth every other day, Disp: 30 tablet, Rfl: 1    gabapentin (NEURONTIN) 300 mg capsule, Take 1 capsule (300 mg total) by mouth 2 (two) times a day, Disp: 60 capsule, Rfl: 5    LORazepam (ATIVAN) 1 mg tablet, Take 2mg (2 tablets) qhs , Disp: 60 tablet, Rfl: 2    metFORMIN (GLUCOPHAGE) 500 mg tablet, Take 1 tablet (500 mg total) by mouth daily, Disp: 30 tablet, Rfl: 0    morphine (MSIR) 15 mg tablet, Use 1 tab BID scheduled to anticipate cancer pain    May have 1 tabs q4hrs PRN mod-severe pain , Disp: 65 tablet, Rfl: 0    MULTIPLE VITAMINS PO, Take by mouth, Disp: , Rfl:     Omega-3 Fatty Acids (FISH OIL PO), Take 2 g by mouth , Disp: , Rfl:     omeprazole (PriLOSEC) 40 MG capsule, Take 1 capsule (40 mg total) by mouth daily, Disp: 90 capsule, Rfl: 1    senna (SENOKOT) 8 6 mg, Take 1 tablet by mouth as needed for constipation, Disp: , Rfl:     No Known Allergies       Breast cancer metastasized to bone, right ([facility] [address] )    1996 Surgery     right modified radical mastectomy followed by adjuvant systemic therapy but no radiation  6/28/2004 Biopsy     Final Diagnosis  A  Lymph Node, right supraclavicular, core biopsy (14 slides, [id number], [facility]; collected on 6/28/2004):  -  Consistent with metastatic carcinoma, compatible with breast origin (see note)  2004 -  Radiation     salvage radiation therapy at an outside facility to right supraclavicular nodes at [facility]      12/23/2018 Initial Diagnosis     Breast cancer metastasized to bone, right ([facility] [address] )      12/26/2018 - 1/9/2019 Radiation     3000 cGy in 10 fractions to  T11 through the bottom of the SI joints  Dr Edison Hoover        1/16/2019 -  Chemotherapy     fulvestrant (FASLODEX) IM injection   Chemotherapy         2/20/2019 - 4/4/2019 Chemotherapy     Ibrance 125 mg PO day 1-21 every 28 days      4/4/2019 - 5/8/2019 Chemotherapy     Ibrance 100 mg PO daily day 1-21 every 28 days       5/22/2019 -  Chemotherapy     Faslodex, [facility]      6/27/2019 Genetic Testing      No pathogenic mutation identified in [facility] Breast panel      12/16/2019 Biopsy     Bone, right iliac crest, biopsy:  -  Metastatic carcinoma, compatible with breast primary  -  Immunohistochemical stains performed with appropriate controls show the tumor to be positive for keratin AE 1/3, ADRIEN-3 and ER (patchy)          Malignant neoplasm of upper-outer quadrant of right breast in female, estrogen receptor positive ([facility] [address] )    6/25/2019 Initial Diagnosis     Malignant neoplasm of upper-outer quadrant of right breast in female, estrogen receptor positive ([facility] [address] )         ROS:  03/06/20 Reviewed 13 systems:  Presently no other neurological, cardiac, pulmonary, GI and  symptoms other than mentioned above  No other symptoms like fever, chills, bleeding, skin rash,   numbness,  claudication   No frequent infections  Not unusually sensitive to heat or cold  No swelling of the ankles  No swollen glands  Patient is anxious  Other symptoms are in HPI        /68   Pulse 74   Temp 97 9 °F (36 6 °C)   Resp 16   Ht 5' 2" (1 575 m)   Wt 52 6 kg (116 lb)   LMP 05/27/1996 (LMP Unknown)   BMI 21 22 kg/m²     Physical Exam: Physician assistant student was with me in the room during examination  Alert, oriented, not in acute distress, no icterus, no oral thrush, no palpable neck mass, clear lung fields, regular heart rate, abdomen  soft and non tender, no palpable abdominal mass, no ascites, no edema of ankles, no calf tenderness, generalized weakness, no skin rash, no palpable lymphadenopathy in the neck and axillary areas, good arterial pulses, no clubbing  Patient is anxious  Performance status 3  Walker  Right mastectomy scar  No lymphedema       IMAGING:  IMPRESSION:     New consolidation in the right middle lobe suggestive of atelectasis or pneumonia  Suggest 3 month follow-up to ensure resolution  Slightly improved suspected lymphangitic tumor spread in the right lower lobe  Stable small right pleural effusion      Unchanged ill-defined soft tissue density surrounding the proximal trachea, possibly adenopathy    No new adenopathy      Previously seen splenic lesion not well visualized on today's exam      No significant change in widespread osseous metastatic disease      Workstation performed: [id number]      Imaging     CT chest abdomen pelvis w contrast (Order: [id number]) - 2/25/2020     IMPRESSION:     1   Redemonstration of diffuse infiltrative osseous metastatic disease, mildly progressed from MRI 10/15/2019 as described  No apparent epidural extension of metastatic disease      2   Grossly stable severe pathologic compression fracture of T12 vertebral body and L4 mid superior endplate moderate compression fracture deformity      3   Stable degenerative changes with severe canal stenosis at level L3-4, and bilateral lateral recess narrowing with moderate canal stenosis at level L4-5         Workstation performed: [id number]      Imaging     MRI lumbar spine wo contrast (Order: [id number]) - 2/18/2020   IMPRESSION:     1   Redemonstration of extensive osseous metastatic disease  There is mild progression of disease in the T9 and T11 vertebral bodies compared to MRI 10/15/2019    Also progression of disease in the L1 and L2 vertebral bodies      2   Grossly stable severe pathology compression fracture of T12 with bony retropulsion without significant canal stenosis      3   Small right pleural effusion increased from 10/15/2019            Workstation performed: [id number]      Imaging     MRI thoracic spine wo contrast (Order: [id number]) - 2/18/2020    LABS:  Results for orders placed or performed in visit on 02/14/20   CBC and differential   Result Value Ref Range    WBC 3 08 (L) 4 31 - 10 16 Thousand/uL    RBC 4 04 3 81 - 5 12 Million/uL    Hemoglobin 11 2 (L) 11 5 - 15 4 g/dL    Hematocrit 35 7 34 8 - 46 1 %    MCV 88 82 - 98 fL    MCH 27 7 26 8 - 34 3 pg    MCHC 31 4 31 4 - 37 4 g/dL    RDW 15 3 (H) 11 6 - 15 1 %    MPV 9 2 8 9 - 12 7 fL    Platelets 549 839 - 153 Thousands/uL    nRBC 0 /100 WBCs    Neutrophils Relative 78 (H) 43 - 75 %    Immat GRANS % 0 0 - 2 %    Lymphocytes Relative 8 (L) 14 - 44 %    Monocytes Relative 11 4 - 12 %    Eosinophils Relative 3 0 - 6 %    Basophils Relative 0 0 - 1 %    Neutrophils Absolute 2 38 1 85 - 7 62 Thousands/µL    Immature Grans Absolute 0 01 0 00 - 0 20 Thousand/uL    Lymphocytes Absolute 0 25 (L) 0 60 - 4 47 Thousands/µL    Monocytes Absolute 0 33 0 17 - 1 22 Thousand/µL    Eosinophils Absolute 0 10 0 00 - 0 61 Thousand/µL    Basophils Absolute 0 01 0 00 - 0 10 Thousands/µL   Comprehensive metabolic panel   Result Value Ref Range    Sodium 141 136 - 145 mmol/L    Potassium 3 7 3 5 - 5 3 mmol/L    Chloride 104 100 - 108 mmol/L    CO2 28 21 - 32 mmol/L    ANION GAP 9 4 - 13 mmol/L    BUN 8 5 - 25 mg/dL    Creatinine 0 79 0 60 - 1 30 mg/dL    Glucose, Fasting 157 (H) 65 - 99 mg/dL    Calcium 8 6 8 3 - 10 1 mg/dL    AST 17 5 - 45 U/L    ALT 26 12 - 78 U/L    Alkaline Phosphatase 133 (H) 46 - 116 U/L    Total Protein 6 0 (L) 6 4 - 8 2 g/dL    Albumin 3 2 (L) 3 5 - 5 0 g/dL    Total Bilirubin 0 82 0 20 - 1 00 mg/dL    eGFR 77 ml/min/1 73sq m     Labs, Imaging, & Other studies:   All pertinent labs and imaging studies were personally reviewed    Lab Results   Component Value Date    K 3 7 02/26/2020     02/26/2020    CO2 28 02/26/2020    BUN 8 02/26/2020    CREATININE 0 79 02/26/2020    GLUF 157 (H) 02/26/2020    CALCIUM 8 6 02/26/2020    AST 17 02/26/2020    ALT 26 02/26/2020    ALKPHOS 133 (H) 02/26/2020    EGFR 77 02/26/2020     Lab Results   Component Value Date    WBC 3 08 (L) 02/26/2020    HGB 11 2 (L) 02/26/2020    HCT 35 7 02/26/2020    MCV 88 02/26/2020     02/26/2020       Reviewed and discussed with patient  Assessment and plan: Follow-up visit for hormone receptor positive right breast cancer to bones and maybe lungs? and presently patient is on Faslodex and Piqray  Epworth Conquest was started only recently and dose is being decreased to 200 mg daily because of rising blood sugar and patient checks her blood sugar at home  She was recently started on metformin 500 mg daily and that is being increased to 500 mg twice a day  Prior to Faslodex and Piqray she was on Faslodex   She could not tolerate Ibrance because of significant hematological toxicities    After early response to Faslodex disease progressed in October 2019 on imaging studies    Unfortunately bone biopsy was the only lesion accessible for repeat biopsy to send for molecular testing      Therefore liquid tumor testing was performed and 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     Starting dose of Piqray was 300 mg daily that was recently decreased to 250 mg daily and now he is being decreased to 200 mg daily  She was receiving Xgeva 120 mg subcu every 28 days and that was temporarily on hold because of dental work that has been completed and Navarro Burger will be resumed next month  In 1996 and patient was diagnosed to have right breast cancer and she had right breast mastectomy followed by adjuvant chemotherapy  In 2004 she was found to have hormone receptor positive  recurrent disease in the right supraclavicular lymph node  She had surgery and Femara for 10 years until 2015   Evaluation in 2015 was negative for any evidence of metastatic disease  In December 2018 she was found to have extensive bony metastatic disease presumably from previously known hormone receptor-positive right breast cancer      She was hospitalized with acute compression fracture of T12   There was no biopsy   He was started on radiation and that has been completed  She also received radiation to left femur   She was started on Faslodex and to that  Winston Merlene was added  She was also started on  Xgeva with calcium and vitamin-D  Jagdeep Baize She had profound neutropenia on Ibrance even on a 75 mg dose  Winston Merlene was discontinued and counts recovered     Disease progressed in the bones in October 2019  Faslodex was stopped  She was continued on Xgeva with calcium and vitamin-D  Paxton Baize Faslodex plus Piqray started in the last week of January 2020   She has much less bone and joint pains  She follows with palliative care team   Appetite is improving  She has generalized weakness and tiredness     She has a walker        Physical examination and test results are as recorded and discussed in detail especially findings on CT scans and MRI scans  Clinically no symptoms and signs of pneumonia and changes in the lungs could be secondary to malignancy  Patient has instructions to call the office with increased cough or shortness of breath, fevers and chills and other medical problems including back pain, numbness in the legs and increased weakness and  sphincters control  Lowering  of Piqray dose to 200 mg as above  Plans as above  Goal is response and prolongation of survival and treatment of symptoms     All discussed in detail  Questions answered  Discussed the importance of self-breast examination, eating healthy foods, activities as tolerated and health screening test   Patient needs assistance in her care  1  Malignant neoplasm of upper-outer quadrant of right breast in female, estrogen receptor positive ([facility] [address] )      2  Bone metastasis ([facility] [address] )      3  Non-traumatic compression fracture of T12 thoracic vertebra with routine healing, subsequent encounter      4  Lytic bone lesion of femur      5  Radiation-induced brachial plexopathy      6  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

## 2020-03-06 NOTE — PATIENT INSTRUCTIONS
Please call me or Ashley Solders on 03/09/ 20 to update your condition  Changing  Piqray dose to  200 mg    Increasing metformin to 500 mg twice a day

## 2020-03-06 NOTE — TELEPHONE ENCOUNTER
Left a message for patient letting them know that Dr Kory Calderon is holding there Wen Puente until their April infusion appt  She will still be getting the Faslodex in March appt

## 2020-03-09 ENCOUNTER — TELEPHONE (OUTPATIENT)
Dept: HEMATOLOGY ONCOLOGY | Facility: MEDICAL CENTER | Age: 70
End: 2020-03-09

## 2020-03-09 ENCOUNTER — TELEPHONE (OUTPATIENT)
Dept: HEMATOLOGY ONCOLOGY | Facility: CLINIC | Age: 70
End: 2020-03-09

## 2020-03-09 NOTE — TELEPHONE ENCOUNTER
patient called in stated that Dr Rosalia Baird requested that she call in the her sugar level, a good call back number [phone]

## 2020-03-09 NOTE — TELEPHONE ENCOUNTER
Patient seen 3/6/2020 and instructed by Dr Mulu Myers to call in today with her sugar levels  stating Dr Mulu Myers  made adjustments to her Piqray 250mg reducing to 200mg and to call with  Weekend results  Patient stated on Saturday 3/7/2020 it was  177 before breakfast and 154 before dinner  On Ryan 3/8/2020 it was 202 before breakfast and 175 before dinner  Monday 3/9/2020 it was 136 before breakfast and before dinner 160  Now this morning 3/10/2020 it is 196 before breakfast   Patient also wanted to mention she was very tired yesterday and today but no side effects for Piqray

## 2020-03-10 NOTE — TELEPHONE ENCOUNTER
Called patient and left a voicemail with the instructions  Encouraged call back of patient had additional questions/concerns

## 2020-03-10 NOTE — TELEPHONE ENCOUNTER
Continue the same for now, Piqray 200 mg and metformin 500 mg BID  These values are OK  Continue to check blood sugars  Call with any worsening symptoms

## 2020-03-13 ENCOUNTER — TELEPHONE (OUTPATIENT)
Dept: HEMATOLOGY ONCOLOGY | Facility: CLINIC | Age: 70
End: 2020-03-13

## 2020-03-13 DIAGNOSIS — R73.9 HYPERGLYCEMIA: ICD-10-CM

## 2020-03-13 DIAGNOSIS — G54.0 RADIATION-INDUCED BRACHIAL PLEXOPATHY: ICD-10-CM

## 2020-03-13 DIAGNOSIS — F41.9 ANXIETY: ICD-10-CM

## 2020-03-13 DIAGNOSIS — E11.9 TYPE 2 DIABETES MELLITUS WITHOUT COMPLICATION, WITHOUT LONG-TERM CURRENT USE OF INSULIN (HCC): ICD-10-CM

## 2020-03-13 DIAGNOSIS — G89.3 CANCER ASSOCIATED PAIN: ICD-10-CM

## 2020-03-13 NOTE — TELEPHONE ENCOUNTER
Patient calling to report FBS levels over the past week ranging from 132-205 via glucometer  Will pass on to PA'toan CABRAL to discuss

## 2020-03-16 NOTE — TELEPHONE ENCOUNTER
Tried to return tc but get busy signal   Per Dr Giovana Sharp pt based on the reported bs results pt is to continue taking the metformin 500 mg bid and the piqray 200 mg  If blood sugars go above 250 call our office  Will try to call pt again

## 2020-03-16 NOTE — TELEPHONE ENCOUNTER
Returned tc spoke with pt to update as noted below  She stated she understands and will call if blood sugar goes above 250  No other concerns

## 2020-03-17 ENCOUNTER — TELEPHONE (OUTPATIENT)
Dept: HEMATOLOGY ONCOLOGY | Facility: CLINIC | Age: 70
End: 2020-03-17

## 2020-03-17 RX ORDER — MORPHINE SULFATE 15 MG/1
TABLET ORAL
Qty: 65 TABLET | Refills: 0 | Status: SHIPPED | OUTPATIENT
Start: 2020-03-17 | End: 2020-04-07 | Stop reason: SDUPTHER

## 2020-03-17 RX ORDER — LORAZEPAM 1 MG/1
TABLET ORAL
Qty: 60 TABLET | Refills: 2 | Status: SHIPPED | OUTPATIENT
Start: 2020-03-17 | End: 2020-06-11

## 2020-03-17 NOTE — TELEPHONE ENCOUNTER
Patient called stating that her dental work has been reschedule and she would like to know if her Arlyss Pizza will be resumed due to it being on hold because of the dental work  Best call back [phone]

## 2020-03-17 NOTE — TELEPHONE ENCOUNTER
Patient returned phone call and left message today about refill of her Lorazepam and her Morphine IR 15mg  The orders are pending still   Thank you, Maurilio Brownlee

## 2020-03-17 NOTE — TELEPHONE ENCOUNTER
Pj Johnston should be kept on hold and will discuss with PA at appt on 4/3/2020  Patient verbalizes understanding

## 2020-03-24 ENCOUNTER — TELEPHONE (OUTPATIENT)
Dept: OTHER | Facility: OTHER | Age: 70
End: 2020-03-24

## 2020-03-25 ENCOUNTER — TELEPHONE (OUTPATIENT)
Dept: HEMATOLOGY ONCOLOGY | Facility: CLINIC | Age: 70
End: 2020-03-25

## 2020-03-25 ENCOUNTER — HOSPITAL ENCOUNTER (OUTPATIENT)
Dept: INFUSION CENTER | Facility: CLINIC | Age: 70
Discharge: HOME | End: 2020-03-25
Payer: MEDICARE

## 2020-03-25 ENCOUNTER — APPOINTMENT (OUTPATIENT)
Dept: LAB | Facility: CLINIC | Age: 70
End: 2020-03-25
Payer: MEDICARE

## 2020-03-25 VITALS
HEART RATE: 70 BPM | DIASTOLIC BLOOD PRESSURE: 66 MMHG | SYSTOLIC BLOOD PRESSURE: 138 MMHG | TEMPERATURE: 97.7 F | RESPIRATION RATE: 16 BRPM

## 2020-03-25 DIAGNOSIS — C50.411 MALIGNANT NEOPLASM OF UPPER-OUTER QUADRANT OF RIGHT BREAST IN FEMALE, ESTROGEN RECEPTOR POSITIVE (HCC): Primary | ICD-10-CM

## 2020-03-25 DIAGNOSIS — Z17.0 MALIGNANT NEOPLASM OF UPPER-OUTER QUADRANT OF RIGHT BREAST IN FEMALE, ESTROGEN RECEPTOR POSITIVE (HCC): ICD-10-CM

## 2020-03-25 DIAGNOSIS — D70.9 AGRANULOCYTOSIS (HCC): ICD-10-CM

## 2020-03-25 DIAGNOSIS — C50.911 BREAST CANCER METASTASIZED TO BONE, RIGHT (HCC): Primary | ICD-10-CM

## 2020-03-25 DIAGNOSIS — Z17.0 MALIGNANT NEOPLASM OF UPPER-OUTER QUADRANT OF RIGHT BREAST IN FEMALE, ESTROGEN RECEPTOR POSITIVE (HCC): Primary | ICD-10-CM

## 2020-03-25 DIAGNOSIS — E87.6 HYPOKALEMIA: ICD-10-CM

## 2020-03-25 DIAGNOSIS — C50.411 MALIGNANT NEOPLASM OF UPPER-OUTER QUADRANT OF RIGHT BREAST IN FEMALE, ESTROGEN RECEPTOR POSITIVE (HCC): ICD-10-CM

## 2020-03-25 DIAGNOSIS — C79.51 BREAST CANCER METASTASIZED TO BONE, RIGHT (HCC): Primary | ICD-10-CM

## 2020-03-25 PROCEDURE — 96372 THER/PROPH/DIAG INJ SC/IM: CPT

## 2020-03-25 PROCEDURE — 96402 CHEMO HORMON ANTINEOPL SQ/IM: CPT

## 2020-03-25 RX ORDER — POTASSIUM CHLORIDE 750 MG/1
CAPSULE, EXTENDED RELEASE ORAL
Qty: 60 CAPSULE | Refills: 1 | Status: SHIPPED | OUTPATIENT
Start: 2020-03-25 | End: 2020-06-02

## 2020-03-25 RX ORDER — LAMOTRIGINE 25 MG/1
500 TABLET ORAL ONCE
Status: COMPLETED | OUTPATIENT
Start: 2020-03-25 | End: 2020-03-25

## 2020-03-25 RX ORDER — LAMOTRIGINE 25 MG/1
500 TABLET ORAL ONCE
Status: CANCELLED | OUTPATIENT
Start: 2020-04-22

## 2020-03-25 RX ADMIN — TBO-FILGRASTIM 300 MCG: 300 INJECTION, SOLUTION SUBCUTANEOUS at 12:44

## 2020-03-25 RX ADMIN — FULVESTRANT 500 MG: 50 INJECTION INTRAMUSCULAR at 12:36

## 2020-03-25 NOTE — PROGRESS NOTES
Berle Cowden from [facility] infusion call and patient is concerned about ANC  Patient's ANC is 650 and reviewed with Dr Damaris Landaly  Reviewed if patient would like Granix 300 mcg 1 time dose per Dr Sherry Marx  Patient is agreeable  Order placed

## 2020-03-25 NOTE — TELEPHONE ENCOUNTER
Pt called stating that she is feeling better today  That she called yesterday after hours due to HA and nausea  She stated Dr Rene Roper spoke with her and she wanted to speak with someone on Dr Cardenas's team   Explained that I had updated Mitch CASEY and she will call her to discuss issues at end day  Pt did get granix at infusion today

## 2020-04-01 ENCOUNTER — APPOINTMENT (OUTPATIENT)
Dept: LAB | Facility: CLINIC | Age: 70
End: 2020-04-01
Payer: MEDICARE

## 2020-04-01 DIAGNOSIS — C50.911 BREAST CANCER METASTASIZED TO BONE, RIGHT (HCC): ICD-10-CM

## 2020-04-01 DIAGNOSIS — D70.9 AGRANULOCYTOSIS (HCC): ICD-10-CM

## 2020-04-01 DIAGNOSIS — C79.51 BONE METASTASIS (HCC): ICD-10-CM

## 2020-04-01 DIAGNOSIS — C79.51 BREAST CANCER METASTASIZED TO BONE, RIGHT (HCC): ICD-10-CM

## 2020-04-01 LAB
ALBUMIN SERPL BCP-MCNC: 3.1 G/DL (ref 3.5–5)
ALP SERPL-CCNC: 120 U/L (ref 46–116)
ALT SERPL W P-5'-P-CCNC: 26 U/L (ref 12–78)
ANION GAP SERPL CALCULATED.3IONS-SCNC: 7 MMOL/L (ref 4–13)
AST SERPL W P-5'-P-CCNC: 31 U/L (ref 5–45)
BASOPHILS # BLD MANUAL: 0.04 THOUSAND/UL (ref 0–0.1)
BASOPHILS NFR MAR MANUAL: 1 % (ref 0–1)
BILIRUB SERPL-MCNC: 0.62 MG/DL (ref 0.2–1)
BUN SERPL-MCNC: 11 MG/DL (ref 5–25)
CALCIUM SERPL-MCNC: 8.1 MG/DL (ref 8.3–10.1)
CHLORIDE SERPL-SCNC: 104 MMOL/L (ref 100–108)
CO2 SERPL-SCNC: 28 MMOL/L (ref 21–32)
CREAT SERPL-MCNC: 0.62 MG/DL (ref 0.6–1.3)
EOSINOPHIL # BLD MANUAL: 0.17 THOUSAND/UL (ref 0–0.4)
EOSINOPHIL NFR BLD MANUAL: 4 % (ref 0–6)
ERYTHROCYTE [DISTWIDTH] IN BLOOD BY AUTOMATED COUNT: 16 % (ref 11.6–15.1)
GFR SERPL CREATININE-BSD FRML MDRD: 92 ML/MIN/1.73SQ M
GLUCOSE P FAST SERPL-MCNC: 87 MG/DL (ref 65–99)
HCT VFR BLD AUTO: 35.7 % (ref 34.8–46.1)
HGB BLD-MCNC: 11.1 G/DL (ref 11.5–15.4)
HYPERCHROMIA BLD QL SMEAR: PRESENT
LYMPHOCYTES # BLD AUTO: 0.91 THOUSAND/UL (ref 0.6–4.47)
LYMPHOCYTES # BLD AUTO: 21 % (ref 14–44)
MCH RBC QN AUTO: 28.5 PG (ref 26.8–34.3)
MCHC RBC AUTO-ENTMCNC: 31.1 G/DL (ref 31.4–37.4)
MCV RBC AUTO: 92 FL (ref 82–98)
METAMYELOCYTES NFR BLD MANUAL: 1 % (ref 0–1)
MONOCYTES # BLD AUTO: 0.44 THOUSAND/UL (ref 0–1.22)
MONOCYTES NFR BLD: 10 % (ref 4–12)
MYELOCYTES NFR BLD MANUAL: 9 % (ref 0–1)
NEUTROPHILS # BLD MANUAL: 2.35 THOUSAND/UL (ref 1.85–7.62)
NEUTS BAND NFR BLD MANUAL: 3 % (ref 0–8)
NEUTS SEG NFR BLD AUTO: 51 % (ref 43–75)
NRBC BLD AUTO-RTO: 0 /100 WBCS
PLATELET # BLD AUTO: 205 THOUSANDS/UL (ref 149–390)
PLATELET BLD QL SMEAR: ADEQUATE
PMV BLD AUTO: 9.6 FL (ref 8.9–12.7)
POTASSIUM SERPL-SCNC: 4.1 MMOL/L (ref 3.5–5.3)
PROT SERPL-MCNC: 5.7 G/DL (ref 6.4–8.2)
RBC # BLD AUTO: 3.89 MILLION/UL (ref 3.81–5.12)
RBC MORPH BLD: PRESENT
SODIUM SERPL-SCNC: 139 MMOL/L (ref 136–145)
TOTAL CELLS COUNTED SPEC: 100
WBC # BLD AUTO: 4.35 THOUSAND/UL (ref 4.31–10.16)

## 2020-04-01 PROCEDURE — 80053 COMPREHEN METABOLIC PANEL: CPT

## 2020-04-01 PROCEDURE — 36415 COLL VENOUS BLD VENIPUNCTURE: CPT

## 2020-04-01 PROCEDURE — 85027 COMPLETE CBC AUTOMATED: CPT

## 2020-04-01 PROCEDURE — 85007 BL SMEAR W/DIFF WBC COUNT: CPT

## 2020-04-02 ENCOUNTER — TELEPHONE (OUTPATIENT)
Dept: HEMATOLOGY ONCOLOGY | Facility: MEDICAL CENTER | Age: 70
End: 2020-04-02

## 2020-04-02 DIAGNOSIS — R73.9 HYPERGLYCEMIA: ICD-10-CM

## 2020-04-02 DIAGNOSIS — Z17.0 MALIGNANT NEOPLASM OF UPPER-OUTER QUADRANT OF RIGHT BREAST IN FEMALE, ESTROGEN RECEPTOR POSITIVE (HCC): Primary | ICD-10-CM

## 2020-04-02 DIAGNOSIS — C50.411 MALIGNANT NEOPLASM OF UPPER-OUTER QUADRANT OF RIGHT BREAST IN FEMALE, ESTROGEN RECEPTOR POSITIVE (HCC): Primary | ICD-10-CM

## 2020-04-04 DIAGNOSIS — R73.9 HYPERGLYCEMIA: ICD-10-CM

## 2020-04-04 DIAGNOSIS — E11.9 TYPE 2 DIABETES MELLITUS WITHOUT COMPLICATION, WITHOUT LONG-TERM CURRENT USE OF INSULIN (HCC): ICD-10-CM

## 2020-04-07 ENCOUNTER — TELEMEDICINE (OUTPATIENT)
Dept: PALLIATIVE MEDICINE | Facility: CLINIC | Age: 70
End: 2020-04-07
Payer: MEDICARE

## 2020-04-07 ENCOUNTER — SOCIAL WORK (OUTPATIENT)
Dept: PALLIATIVE MEDICINE | Facility: CLINIC | Age: 70
End: 2020-04-07
Payer: MEDICARE

## 2020-04-07 DIAGNOSIS — G89.3 CANCER ASSOCIATED PAIN: ICD-10-CM

## 2020-04-07 DIAGNOSIS — C79.51 BREAST CANCER METASTASIZED TO BONE, RIGHT (HCC): Primary | Chronic | ICD-10-CM

## 2020-04-07 DIAGNOSIS — C50.911 BREAST CANCER METASTASIZED TO BONE, RIGHT (HCC): Primary | Chronic | ICD-10-CM

## 2020-04-07 DIAGNOSIS — M48.54XD NON-TRAUMATIC COMPRESSION FRACTURE OF T12 THORACIC VERTEBRA WITH ROUTINE HEALING, SUBSEQUENT ENCOUNTER: ICD-10-CM

## 2020-04-07 DIAGNOSIS — Z71.89 COUNSELING AND COORDINATION OF CARE: Primary | ICD-10-CM

## 2020-04-07 DIAGNOSIS — G54.0 RADIATION-INDUCED BRACHIAL PLEXOPATHY: ICD-10-CM

## 2020-04-07 DIAGNOSIS — M89.9 LYTIC BONE LESION OF FEMUR: ICD-10-CM

## 2020-04-07 PROCEDURE — 99443 PR PHYS/QHP TELEPHONE EVALUATION 21-30 MIN: CPT | Performed by: NURSE PRACTITIONER

## 2020-04-07 PROCEDURE — NC001 PR NO CHARGE

## 2020-04-07 RX ORDER — MORPHINE SULFATE 15 MG/1
TABLET ORAL
Qty: 65 TABLET | Refills: 0 | Status: SHIPPED | OUTPATIENT
Start: 2020-04-07 | End: 2020-05-22 | Stop reason: SDUPTHER

## 2020-04-08 ENCOUNTER — TELEPHONE (OUTPATIENT)
Dept: SURGICAL ONCOLOGY | Facility: CLINIC | Age: 70
End: 2020-04-08

## 2020-04-09 ENCOUNTER — TELEPHONE (OUTPATIENT)
Dept: HEMATOLOGY ONCOLOGY | Facility: CLINIC | Age: 70
End: 2020-04-09

## 2020-04-09 ENCOUNTER — APPOINTMENT (OUTPATIENT)
Dept: LAB | Facility: CLINIC | Age: 70
End: 2020-04-09
Payer: MEDICARE

## 2020-04-09 ENCOUNTER — TRANSCRIBE ORDERS (OUTPATIENT)
Dept: LAB | Facility: CLINIC | Age: 70
End: 2020-04-09

## 2020-04-09 DIAGNOSIS — Z17.0 MALIGNANT NEOPLASM OF UPPER-OUTER QUADRANT OF RIGHT BREAST IN FEMALE, ESTROGEN RECEPTOR POSITIVE (HCC): ICD-10-CM

## 2020-04-09 DIAGNOSIS — C50.411 MALIGNANT NEOPLASM OF UPPER-OUTER QUADRANT OF RIGHT BREAST IN FEMALE, ESTROGEN RECEPTOR POSITIVE (HCC): ICD-10-CM

## 2020-04-09 DIAGNOSIS — R73.9 HYPERGLYCEMIA: ICD-10-CM

## 2020-04-09 LAB
ALBUMIN SERPL BCP-MCNC: 3.2 G/DL (ref 3.5–5)
ALP SERPL-CCNC: 115 U/L (ref 46–116)
ALT SERPL W P-5'-P-CCNC: 26 U/L (ref 12–78)
ANION GAP SERPL CALCULATED.3IONS-SCNC: 8 MMOL/L (ref 4–13)
AST SERPL W P-5'-P-CCNC: 19 U/L (ref 5–45)
BASOPHILS # BLD AUTO: 0.02 THOUSANDS/ΜL (ref 0–0.1)
BASOPHILS NFR BLD AUTO: 1 % (ref 0–1)
BILIRUB SERPL-MCNC: 0.69 MG/DL (ref 0.2–1)
BUN SERPL-MCNC: 12 MG/DL (ref 5–25)
CALCIUM SERPL-MCNC: 8.1 MG/DL (ref 8.3–10.1)
CHLORIDE SERPL-SCNC: 102 MMOL/L (ref 100–108)
CO2 SERPL-SCNC: 28 MMOL/L (ref 21–32)
CREAT SERPL-MCNC: 0.79 MG/DL (ref 0.6–1.3)
EOSINOPHIL # BLD AUTO: 0.05 THOUSAND/ΜL (ref 0–0.61)
EOSINOPHIL NFR BLD AUTO: 3 % (ref 0–6)
ERYTHROCYTE [DISTWIDTH] IN BLOOD BY AUTOMATED COUNT: 15.9 % (ref 11.6–15.1)
GFR SERPL CREATININE-BSD FRML MDRD: 77 ML/MIN/1.73SQ M
GLUCOSE P FAST SERPL-MCNC: 130 MG/DL (ref 65–99)
HCT VFR BLD AUTO: 37 % (ref 34.8–46.1)
HGB BLD-MCNC: 11.4 G/DL (ref 11.5–15.4)
IMM GRANULOCYTES # BLD AUTO: 0.02 THOUSAND/UL (ref 0–0.2)
IMM GRANULOCYTES NFR BLD AUTO: 1 % (ref 0–2)
LYMPHOCYTES # BLD AUTO: 0.49 THOUSANDS/ΜL (ref 0.6–4.47)
LYMPHOCYTES NFR BLD AUTO: 26 % (ref 14–44)
MCH RBC QN AUTO: 28.1 PG (ref 26.8–34.3)
MCHC RBC AUTO-ENTMCNC: 30.8 G/DL (ref 31.4–37.4)
MCV RBC AUTO: 91 FL (ref 82–98)
MONOCYTES # BLD AUTO: 0.15 THOUSAND/ΜL (ref 0.17–1.22)
MONOCYTES NFR BLD AUTO: 8 % (ref 4–12)
NEUTROPHILS # BLD AUTO: 1.18 THOUSANDS/ΜL (ref 1.85–7.62)
NEUTS SEG NFR BLD AUTO: 61 % (ref 43–75)
NRBC BLD AUTO-RTO: 0 /100 WBCS
PLATELET # BLD AUTO: 250 THOUSANDS/UL (ref 149–390)
PMV BLD AUTO: 8.6 FL (ref 8.9–12.7)
POTASSIUM SERPL-SCNC: 3.7 MMOL/L (ref 3.5–5.3)
PROT SERPL-MCNC: 6.2 G/DL (ref 6.4–8.2)
RBC # BLD AUTO: 4.06 MILLION/UL (ref 3.81–5.12)
SODIUM SERPL-SCNC: 138 MMOL/L (ref 136–145)
WBC # BLD AUTO: 1.91 THOUSAND/UL (ref 4.31–10.16)

## 2020-04-09 PROCEDURE — 85025 COMPLETE CBC W/AUTO DIFF WBC: CPT

## 2020-04-09 PROCEDURE — 80053 COMPREHEN METABOLIC PANEL: CPT

## 2020-04-09 PROCEDURE — 36415 COLL VENOUS BLD VENIPUNCTURE: CPT

## 2020-04-10 ENCOUNTER — TELEMEDICINE (OUTPATIENT)
Dept: HEMATOLOGY ONCOLOGY | Facility: CLINIC | Age: 70
End: 2020-04-10
Payer: MEDICARE

## 2020-04-10 ENCOUNTER — HOSPITAL ENCOUNTER (OUTPATIENT)
Dept: ULTRASOUND IMAGING | Facility: HOSPITAL | Age: 70
Discharge: HOME | End: 2020-04-10
Payer: MEDICARE

## 2020-04-10 ENCOUNTER — TELEPHONE (OUTPATIENT)
Dept: HEMATOLOGY ONCOLOGY | Facility: CLINIC | Age: 70
End: 2020-04-10

## 2020-04-10 ENCOUNTER — TELEPHONE (OUTPATIENT)
Dept: NUTRITION | Facility: CLINIC | Age: 70
End: 2020-04-10

## 2020-04-10 DIAGNOSIS — C79.51 BONE METASTASIS (HCC): ICD-10-CM

## 2020-04-10 DIAGNOSIS — Z17.0 MALIGNANT NEOPLASM OF UPPER-OUTER QUADRANT OF RIGHT BREAST IN FEMALE, ESTROGEN RECEPTOR POSITIVE (HCC): ICD-10-CM

## 2020-04-10 DIAGNOSIS — M79.89 SWELLING OF RIGHT HAND: ICD-10-CM

## 2020-04-10 DIAGNOSIS — M79.89 SWELLING OF RIGHT HAND: Primary | ICD-10-CM

## 2020-04-10 DIAGNOSIS — C50.411 MALIGNANT NEOPLASM OF UPPER-OUTER QUADRANT OF RIGHT BREAST IN FEMALE, ESTROGEN RECEPTOR POSITIVE (HCC): ICD-10-CM

## 2020-04-10 DIAGNOSIS — D72.819 LEUKOPENIA, UNSPECIFIED TYPE: ICD-10-CM

## 2020-04-10 PROCEDURE — 93971 EXTREMITY STUDY: CPT

## 2020-04-10 PROCEDURE — 99442 PR PHYS/QHP TELEPHONE EVALUATION 11-20 MIN: CPT | Performed by: PHYSICIAN ASSISTANT

## 2020-04-11 PROCEDURE — 93971 EXTREMITY STUDY: CPT | Performed by: SURGERY

## 2020-04-16 ENCOUNTER — TELEPHONE (OUTPATIENT)
Dept: HEMATOLOGY ONCOLOGY | Facility: CLINIC | Age: 70
End: 2020-04-16

## 2020-04-16 ENCOUNTER — APPOINTMENT (OUTPATIENT)
Dept: LAB | Facility: CLINIC | Age: 70
End: 2020-04-16
Payer: MEDICARE

## 2020-04-16 DIAGNOSIS — D72.819 LEUKOPENIA, UNSPECIFIED TYPE: ICD-10-CM

## 2020-04-16 LAB
BASOPHILS # BLD AUTO: 0.02 THOUSANDS/ΜL (ref 0–0.1)
BASOPHILS NFR BLD AUTO: 1 % (ref 0–1)
EOSINOPHIL # BLD AUTO: 0.04 THOUSAND/ΜL (ref 0–0.61)
EOSINOPHIL NFR BLD AUTO: 2 % (ref 0–6)
ERYTHROCYTE [DISTWIDTH] IN BLOOD BY AUTOMATED COUNT: 16 % (ref 11.6–15.1)
HCT VFR BLD AUTO: 37.9 % (ref 34.8–46.1)
HGB BLD-MCNC: 12 G/DL (ref 11.5–15.4)
IMM GRANULOCYTES # BLD AUTO: 0.01 THOUSAND/UL (ref 0–0.2)
IMM GRANULOCYTES NFR BLD AUTO: 1 % (ref 0–2)
LYMPHOCYTES # BLD AUTO: 0.45 THOUSANDS/ΜL (ref 0.6–4.47)
LYMPHOCYTES NFR BLD AUTO: 25 % (ref 14–44)
MCH RBC QN AUTO: 29.3 PG (ref 26.8–34.3)
MCHC RBC AUTO-ENTMCNC: 31.7 G/DL (ref 31.4–37.4)
MCV RBC AUTO: 93 FL (ref 82–98)
MONOCYTES # BLD AUTO: 0.2 THOUSAND/ΜL (ref 0.17–1.22)
MONOCYTES NFR BLD AUTO: 11 % (ref 4–12)
NEUTROPHILS # BLD AUTO: 1.05 THOUSANDS/ΜL (ref 1.85–7.62)
NEUTS SEG NFR BLD AUTO: 60 % (ref 43–75)
NRBC BLD AUTO-RTO: 0 /100 WBCS
PLATELET # BLD AUTO: 244 THOUSANDS/UL (ref 149–390)
PMV BLD AUTO: 9.2 FL (ref 8.9–12.7)
RBC # BLD AUTO: 4.09 MILLION/UL (ref 3.81–5.12)
WBC # BLD AUTO: 1.77 THOUSAND/UL (ref 4.31–10.16)

## 2020-04-16 PROCEDURE — 85025 COMPLETE CBC W/AUTO DIFF WBC: CPT

## 2020-04-17 ENCOUNTER — TELEMEDICINE (OUTPATIENT)
Dept: HEMATOLOGY ONCOLOGY | Facility: CLINIC | Age: 70
End: 2020-04-17
Payer: MEDICARE

## 2020-04-17 DIAGNOSIS — C79.51 BONE METASTASIS (HCC): ICD-10-CM

## 2020-04-17 DIAGNOSIS — C50.911 BREAST CANCER METASTASIZED TO BONE, RIGHT (HCC): Primary | Chronic | ICD-10-CM

## 2020-04-17 DIAGNOSIS — C79.51 BREAST CANCER METASTASIZED TO BONE, RIGHT (HCC): Primary | Chronic | ICD-10-CM

## 2020-04-17 DIAGNOSIS — Z17.0 MALIGNANT NEOPLASM OF UPPER-OUTER QUADRANT OF RIGHT BREAST IN FEMALE, ESTROGEN RECEPTOR POSITIVE (HCC): ICD-10-CM

## 2020-04-17 DIAGNOSIS — E87.6 HYPOKALEMIA: ICD-10-CM

## 2020-04-17 DIAGNOSIS — C50.411 MALIGNANT NEOPLASM OF UPPER-OUTER QUADRANT OF RIGHT BREAST IN FEMALE, ESTROGEN RECEPTOR POSITIVE (HCC): ICD-10-CM

## 2020-04-17 PROBLEM — D70.2 DRUG-INDUCED LEUKOPENIA (HCC): Status: ACTIVE | Noted: 2020-04-17

## 2020-04-17 PROCEDURE — 99442 PR PHYS/QHP TELEPHONE EVALUATION 11-20 MIN: CPT | Performed by: PHYSICIAN ASSISTANT

## 2020-04-17 RX ORDER — POTASSIUM CHLORIDE 750 MG/1
CAPSULE, EXTENDED RELEASE ORAL
Qty: 60 CAPSULE | Refills: 1 | OUTPATIENT
Start: 2020-04-17

## 2020-04-20 ENCOUNTER — TELEPHONE (OUTPATIENT)
Dept: HEMATOLOGY ONCOLOGY | Facility: CLINIC | Age: 70
End: 2020-04-20

## 2020-04-20 ENCOUNTER — TELEPHONE (OUTPATIENT)
Dept: INFUSION CENTER | Facility: CLINIC | Age: 70
End: 2020-04-20

## 2020-04-21 ENCOUNTER — NUTRITION (OUTPATIENT)
Dept: NUTRITION | Facility: CLINIC | Age: 70
End: 2020-04-21

## 2020-04-21 DIAGNOSIS — Z71.3 NUTRITIONAL COUNSELING: Primary | ICD-10-CM

## 2020-04-22 ENCOUNTER — HOSPITAL ENCOUNTER (OUTPATIENT)
Dept: INFUSION CENTER | Facility: CLINIC | Age: 70
Discharge: HOME | End: 2020-04-22
Payer: MEDICARE

## 2020-04-22 VITALS — TEMPERATURE: 98.3 F

## 2020-04-22 DIAGNOSIS — D70.2 DRUG-INDUCED LEUKOPENIA (HCC): ICD-10-CM

## 2020-04-22 DIAGNOSIS — C79.51 BREAST CANCER METASTASIZED TO BONE, RIGHT (HCC): ICD-10-CM

## 2020-04-22 DIAGNOSIS — C79.51 BONE METASTASIS (HCC): Primary | ICD-10-CM

## 2020-04-22 DIAGNOSIS — D70.9 AGRANULOCYTOSIS (HCC): ICD-10-CM

## 2020-04-22 DIAGNOSIS — Z17.0 MALIGNANT NEOPLASM OF UPPER-OUTER QUADRANT OF RIGHT BREAST IN FEMALE, ESTROGEN RECEPTOR POSITIVE (HCC): ICD-10-CM

## 2020-04-22 DIAGNOSIS — C50.911 BREAST CANCER METASTASIZED TO BONE, RIGHT (HCC): ICD-10-CM

## 2020-04-22 DIAGNOSIS — C50.411 MALIGNANT NEOPLASM OF UPPER-OUTER QUADRANT OF RIGHT BREAST IN FEMALE, ESTROGEN RECEPTOR POSITIVE (HCC): ICD-10-CM

## 2020-04-22 PROCEDURE — 96402 CHEMO HORMON ANTINEOPL SQ/IM: CPT

## 2020-04-22 RX ORDER — LAMOTRIGINE 25 MG/1
500 TABLET ORAL ONCE
Status: COMPLETED | OUTPATIENT
Start: 2020-04-22 | End: 2020-04-22

## 2020-04-22 RX ORDER — LAMOTRIGINE 25 MG/1
500 TABLET ORAL ONCE
Status: CANCELLED | OUTPATIENT
Start: 2020-05-20

## 2020-04-22 RX ADMIN — FULVESTRANT 500 MG: 50 INJECTION, SOLUTION INTRAMUSCULAR at 09:12

## 2020-04-22 RX ADMIN — TBO-FILGRASTIM 300 MCG: 300 INJECTION, SOLUTION SUBCUTANEOUS at 09:06

## 2020-04-29 ENCOUNTER — APPOINTMENT (OUTPATIENT)
Dept: LAB | Facility: CLINIC | Age: 70
End: 2020-04-29
Payer: MEDICARE

## 2020-04-29 ENCOUNTER — HOSPITAL ENCOUNTER (OUTPATIENT)
Dept: INFUSION CENTER | Facility: CLINIC | Age: 70
Discharge: HOME | End: 2020-04-29
Payer: MEDICARE

## 2020-04-29 ENCOUNTER — TELEPHONE (OUTPATIENT)
Dept: HEMATOLOGY ONCOLOGY | Facility: CLINIC | Age: 70
End: 2020-04-29

## 2020-04-29 VITALS — TEMPERATURE: 98.2 F

## 2020-04-29 DIAGNOSIS — D70.2 DRUG-INDUCED LEUKOPENIA (HCC): ICD-10-CM

## 2020-04-29 DIAGNOSIS — D70.9 AGRANULOCYTOSIS (HCC): ICD-10-CM

## 2020-04-29 DIAGNOSIS — C79.51 BONE METASTASIS (HCC): ICD-10-CM

## 2020-04-29 DIAGNOSIS — C79.51 BREAST CANCER METASTASIZED TO BONE, RIGHT (HCC): ICD-10-CM

## 2020-04-29 DIAGNOSIS — D70.9 AGRANULOCYTOSIS (HCC): Primary | ICD-10-CM

## 2020-04-29 DIAGNOSIS — C50.911 BREAST CANCER METASTASIZED TO BONE, RIGHT (HCC): ICD-10-CM

## 2020-04-29 LAB
ALBUMIN SERPL BCP-MCNC: 3 G/DL (ref 3.5–5)
ALP SERPL-CCNC: 104 U/L (ref 46–116)
ALT SERPL W P-5'-P-CCNC: 26 U/L (ref 12–78)
ANION GAP SERPL CALCULATED.3IONS-SCNC: 6 MMOL/L (ref 4–13)
AST SERPL W P-5'-P-CCNC: 20 U/L (ref 5–45)
BILIRUB SERPL-MCNC: 0.75 MG/DL (ref 0.2–1)
BUN SERPL-MCNC: 12 MG/DL (ref 5–25)
CALCIUM SERPL-MCNC: 8.1 MG/DL (ref 8.3–10.1)
CHLORIDE SERPL-SCNC: 102 MMOL/L (ref 100–108)
CO2 SERPL-SCNC: 29 MMOL/L (ref 21–32)
CREAT SERPL-MCNC: 0.79 MG/DL (ref 0.6–1.3)
GFR SERPL CREATININE-BSD FRML MDRD: 77 ML/MIN/1.73SQ M
GLUCOSE P FAST SERPL-MCNC: 140 MG/DL (ref 65–99)
POTASSIUM SERPL-SCNC: 4.6 MMOL/L (ref 3.5–5.3)
PROT SERPL-MCNC: 5.5 G/DL (ref 6.4–8.2)
SODIUM SERPL-SCNC: 137 MMOL/L (ref 136–145)

## 2020-04-29 PROCEDURE — 96372 THER/PROPH/DIAG INJ SC/IM: CPT

## 2020-04-29 PROCEDURE — 80053 COMPREHEN METABOLIC PANEL: CPT

## 2020-04-29 RX ADMIN — TBO-FILGRASTIM 300 MCG: 300 INJECTION, SOLUTION SUBCUTANEOUS at 09:38

## 2020-05-01 ENCOUNTER — OFFICE VISIT (OUTPATIENT)
Dept: HEMATOLOGY ONCOLOGY | Facility: CLINIC | Age: 70
End: 2020-05-01
Payer: MEDICARE

## 2020-05-01 VITALS
WEIGHT: 109.2 LBS | SYSTOLIC BLOOD PRESSURE: 124 MMHG | OXYGEN SATURATION: 97 % | HEIGHT: 63 IN | HEART RATE: 81 BPM | BODY MASS INDEX: 19.35 KG/M2 | RESPIRATION RATE: 18 BRPM | DIASTOLIC BLOOD PRESSURE: 82 MMHG | TEMPERATURE: 97.8 F

## 2020-05-01 DIAGNOSIS — I89.0 LYMPHEDEMA OF RIGHT UPPER EXTREMITY: Primary | ICD-10-CM

## 2020-05-01 PROCEDURE — 99214 OFFICE O/P EST MOD 30 MIN: CPT | Performed by: PHYSICIAN ASSISTANT

## 2020-05-06 ENCOUNTER — TELEPHONE (OUTPATIENT)
Dept: HEMATOLOGY ONCOLOGY | Facility: CLINIC | Age: 70
End: 2020-05-06

## 2020-05-06 ENCOUNTER — HOSPITAL ENCOUNTER (OUTPATIENT)
Dept: INFUSION CENTER | Facility: CLINIC | Age: 70
End: 2020-05-06

## 2020-05-07 ENCOUNTER — EVALUATION (OUTPATIENT)
Dept: OCCUPATIONAL THERAPY | Age: 70
End: 2020-05-07
Payer: MEDICARE

## 2020-05-07 ENCOUNTER — TELEPHONE (OUTPATIENT)
Dept: HEMATOLOGY ONCOLOGY | Facility: CLINIC | Age: 70
End: 2020-05-07

## 2020-05-07 DIAGNOSIS — I89.0 LYMPHEDEMA OF RIGHT UPPER EXTREMITY: ICD-10-CM

## 2020-05-07 PROCEDURE — 97166 OT EVAL MOD COMPLEX 45 MIN: CPT

## 2020-05-07 PROCEDURE — 97110 THERAPEUTIC EXERCISES: CPT

## 2020-05-07 PROCEDURE — 97140 MANUAL THERAPY 1/> REGIONS: CPT

## 2020-05-11 ENCOUNTER — OFFICE VISIT (OUTPATIENT)
Dept: HEMATOLOGY ONCOLOGY | Facility: CLINIC | Age: 70
End: 2020-05-11
Payer: MEDICARE

## 2020-05-11 VITALS
WEIGHT: 109 LBS | HEART RATE: 79 BPM | OXYGEN SATURATION: 99 % | SYSTOLIC BLOOD PRESSURE: 122 MMHG | BODY MASS INDEX: 19.31 KG/M2 | RESPIRATION RATE: 18 BRPM | DIASTOLIC BLOOD PRESSURE: 70 MMHG | TEMPERATURE: 98.2 F | HEIGHT: 63 IN

## 2020-05-11 DIAGNOSIS — C79.51 BONE METASTASIS (HCC): ICD-10-CM

## 2020-05-11 DIAGNOSIS — C79.51 BREAST CANCER METASTASIZED TO BONE, RIGHT (HCC): Primary | Chronic | ICD-10-CM

## 2020-05-11 DIAGNOSIS — C50.911 BREAST CANCER METASTASIZED TO BONE, RIGHT (HCC): Primary | Chronic | ICD-10-CM

## 2020-05-11 DIAGNOSIS — C50.411 MALIGNANT NEOPLASM OF UPPER-OUTER QUADRANT OF RIGHT BREAST IN FEMALE, ESTROGEN RECEPTOR POSITIVE (HCC): ICD-10-CM

## 2020-05-11 DIAGNOSIS — Z17.0 MALIGNANT NEOPLASM OF UPPER-OUTER QUADRANT OF RIGHT BREAST IN FEMALE, ESTROGEN RECEPTOR POSITIVE (HCC): ICD-10-CM

## 2020-05-11 PROCEDURE — 99214 OFFICE O/P EST MOD 30 MIN: CPT | Performed by: PHYSICIAN ASSISTANT

## 2020-05-12 ENCOUNTER — OFFICE VISIT (OUTPATIENT)
Dept: OCCUPATIONAL THERAPY | Age: 70
End: 2020-05-12
Payer: MEDICARE

## 2020-05-12 DIAGNOSIS — I89.0 LYMPHEDEMA OF RIGHT UPPER EXTREMITY: Primary | ICD-10-CM

## 2020-05-12 PROCEDURE — 97110 THERAPEUTIC EXERCISES: CPT

## 2020-05-12 PROCEDURE — 97140 MANUAL THERAPY 1/> REGIONS: CPT

## 2020-05-13 ENCOUNTER — APPOINTMENT (OUTPATIENT)
Dept: LAB | Facility: CLINIC | Age: 70
End: 2020-05-13
Payer: MEDICARE

## 2020-05-13 ENCOUNTER — HOSPITAL ENCOUNTER (OUTPATIENT)
Dept: INFUSION CENTER | Facility: CLINIC | Age: 70
Discharge: HOME | End: 2020-05-13
Payer: MEDICARE

## 2020-05-13 VITALS — TEMPERATURE: 98 F

## 2020-05-13 DIAGNOSIS — D70.2 DRUG-INDUCED LEUKOPENIA (HCC): ICD-10-CM

## 2020-05-13 DIAGNOSIS — D70.9 AGRANULOCYTOSIS (HCC): Primary | ICD-10-CM

## 2020-05-13 PROCEDURE — 96372 THER/PROPH/DIAG INJ SC/IM: CPT

## 2020-05-13 RX ADMIN — TBO-FILGRASTIM 300 MCG: 300 INJECTION, SOLUTION SUBCUTANEOUS at 10:52

## 2020-05-14 ENCOUNTER — APPOINTMENT (OUTPATIENT)
Dept: OCCUPATIONAL THERAPY | Age: 70
End: 2020-05-14
Payer: MEDICARE

## 2020-05-14 DIAGNOSIS — I89.0 LYMPHEDEMA: Primary | ICD-10-CM

## 2020-05-19 ENCOUNTER — OFFICE VISIT (OUTPATIENT)
Dept: OCCUPATIONAL THERAPY | Age: 70
End: 2020-05-19
Payer: MEDICARE

## 2020-05-19 DIAGNOSIS — I89.0 LYMPHEDEMA OF RIGHT UPPER EXTREMITY: Primary | ICD-10-CM

## 2020-05-19 PROCEDURE — 97110 THERAPEUTIC EXERCISES: CPT

## 2020-05-19 PROCEDURE — 97140 MANUAL THERAPY 1/> REGIONS: CPT

## 2020-05-21 ENCOUNTER — OFFICE VISIT (OUTPATIENT)
Dept: OCCUPATIONAL THERAPY | Age: 70
End: 2020-05-21
Payer: MEDICARE

## 2020-05-21 DIAGNOSIS — I89.0 LYMPHEDEMA OF RIGHT UPPER EXTREMITY: Primary | ICD-10-CM

## 2020-05-21 PROCEDURE — 97140 MANUAL THERAPY 1/> REGIONS: CPT

## 2020-05-21 PROCEDURE — 97110 THERAPEUTIC EXERCISES: CPT

## 2020-05-22 DIAGNOSIS — C50.411 MALIGNANT NEOPLASM OF UPPER-OUTER QUADRANT OF RIGHT BREAST IN FEMALE, ESTROGEN RECEPTOR POSITIVE (HCC): ICD-10-CM

## 2020-05-22 DIAGNOSIS — Z17.0 MALIGNANT NEOPLASM OF UPPER-OUTER QUADRANT OF RIGHT BREAST IN FEMALE, ESTROGEN RECEPTOR POSITIVE (HCC): ICD-10-CM

## 2020-05-22 DIAGNOSIS — G89.3 CANCER ASSOCIATED PAIN: ICD-10-CM

## 2020-05-22 DIAGNOSIS — G54.0 RADIATION-INDUCED BRACHIAL PLEXOPATHY: ICD-10-CM

## 2020-05-22 RX ORDER — GABAPENTIN 300 MG/1
300 CAPSULE ORAL 2 TIMES DAILY
Qty: 60 CAPSULE | Refills: 5 | Status: SHIPPED | OUTPATIENT
Start: 2020-05-22 | End: 2020-05-26

## 2020-05-22 RX ORDER — MORPHINE SULFATE 15 MG/1
TABLET ORAL
Qty: 65 TABLET | Refills: 0 | Status: SHIPPED | OUTPATIENT
Start: 2020-05-22 | End: 2020-06-27 | Stop reason: SDUPTHER

## 2020-05-25 DIAGNOSIS — G89.3 CANCER ASSOCIATED PAIN: ICD-10-CM

## 2020-05-25 DIAGNOSIS — G89.3 CANCER-RELATED PAIN: ICD-10-CM

## 2020-05-25 DIAGNOSIS — G54.0 RADIATION-INDUCED BRACHIAL PLEXOPATHY: ICD-10-CM

## 2020-05-26 ENCOUNTER — OFFICE VISIT (OUTPATIENT)
Dept: OCCUPATIONAL THERAPY | Age: 70
End: 2020-05-26
Payer: MEDICARE

## 2020-05-26 DIAGNOSIS — I89.0 LYMPHEDEMA OF RIGHT UPPER EXTREMITY: Primary | ICD-10-CM

## 2020-05-26 PROBLEM — G89.3 CANCER-RELATED PAIN: Chronic | Status: ACTIVE | Noted: 2019-02-25

## 2020-05-26 PROCEDURE — 97110 THERAPEUTIC EXERCISES: CPT

## 2020-05-26 PROCEDURE — 97140 MANUAL THERAPY 1/> REGIONS: CPT

## 2020-05-26 RX ORDER — LAMOTRIGINE 25 MG/1
500 TABLET ORAL ONCE
Status: CANCELLED | OUTPATIENT
Start: 2020-05-27

## 2020-05-26 RX ORDER — GABAPENTIN 300 MG/1
CAPSULE ORAL
Qty: 180 CAPSULE | Refills: 3 | Status: SHIPPED | OUTPATIENT
Start: 2020-05-26

## 2020-05-27 ENCOUNTER — APPOINTMENT (OUTPATIENT)
Dept: LAB | Facility: CLINIC | Age: 70
End: 2020-05-27
Payer: MEDICARE

## 2020-05-27 ENCOUNTER — HOSPITAL ENCOUNTER (OUTPATIENT)
Dept: INFUSION CENTER | Facility: CLINIC | Age: 70
Discharge: HOME | End: 2020-05-27
Payer: MEDICARE

## 2020-05-27 VITALS — TEMPERATURE: 97.9 F

## 2020-05-27 DIAGNOSIS — D70.9 AGRANULOCYTOSIS (HCC): Primary | ICD-10-CM

## 2020-05-27 DIAGNOSIS — Z17.0 MALIGNANT NEOPLASM OF UPPER-OUTER QUADRANT OF RIGHT BREAST IN FEMALE, ESTROGEN RECEPTOR POSITIVE (HCC): ICD-10-CM

## 2020-05-27 DIAGNOSIS — C50.411 MALIGNANT NEOPLASM OF UPPER-OUTER QUADRANT OF RIGHT BREAST IN FEMALE, ESTROGEN RECEPTOR POSITIVE (HCC): ICD-10-CM

## 2020-05-27 DIAGNOSIS — C50.911 BREAST CANCER METASTASIZED TO BONE, RIGHT (HCC): ICD-10-CM

## 2020-05-27 DIAGNOSIS — D70.2 DRUG-INDUCED LEUKOPENIA (HCC): ICD-10-CM

## 2020-05-27 DIAGNOSIS — C79.51 BREAST CANCER METASTASIZED TO BONE, RIGHT (HCC): ICD-10-CM

## 2020-05-27 LAB
ALBUMIN SERPL BCP-MCNC: 3.3 G/DL (ref 3.5–5)
ALP SERPL-CCNC: 98 U/L (ref 46–116)
ALT SERPL W P-5'-P-CCNC: 28 U/L (ref 12–78)
ANION GAP SERPL CALCULATED.3IONS-SCNC: 7 MMOL/L (ref 4–13)
AST SERPL W P-5'-P-CCNC: 26 U/L (ref 5–45)
BASOPHILS # BLD AUTO: 0.03 THOUSANDS/ΜL (ref 0–0.1)
BASOPHILS NFR BLD AUTO: 1 % (ref 0–1)
BILIRUB SERPL-MCNC: 1.01 MG/DL (ref 0.2–1)
BUN SERPL-MCNC: 11 MG/DL (ref 5–25)
CALCIUM SERPL-MCNC: 8.1 MG/DL (ref 8.3–10.1)
CHLORIDE SERPL-SCNC: 103 MMOL/L (ref 100–108)
CO2 SERPL-SCNC: 30 MMOL/L (ref 21–32)
CREAT SERPL-MCNC: 0.68 MG/DL (ref 0.6–1.3)
EOSINOPHIL # BLD AUTO: 0.07 THOUSAND/ΜL (ref 0–0.61)
EOSINOPHIL NFR BLD AUTO: 2 % (ref 0–6)
ERYTHROCYTE [DISTWIDTH] IN BLOOD BY AUTOMATED COUNT: 14.8 % (ref 11.6–15.1)
GFR SERPL CREATININE-BSD FRML MDRD: 90 ML/MIN/1.73SQ M
GLUCOSE P FAST SERPL-MCNC: 99 MG/DL (ref 65–99)
HCT VFR BLD AUTO: 37.3 % (ref 34.8–46.1)
HGB BLD-MCNC: 11.8 G/DL (ref 11.5–15.4)
IMM GRANULOCYTES # BLD AUTO: 0.02 THOUSAND/UL (ref 0–0.2)
IMM GRANULOCYTES NFR BLD AUTO: 1 % (ref 0–2)
LYMPHOCYTES # BLD AUTO: 0.36 THOUSANDS/ΜL (ref 0.6–4.47)
LYMPHOCYTES NFR BLD AUTO: 8 % (ref 14–44)
MCH RBC QN AUTO: 29.6 PG (ref 26.8–34.3)
MCHC RBC AUTO-ENTMCNC: 31.6 G/DL (ref 31.4–37.4)
MCV RBC AUTO: 94 FL (ref 82–98)
MONOCYTES # BLD AUTO: 0.32 THOUSAND/ΜL (ref 0.17–1.22)
MONOCYTES NFR BLD AUTO: 7 % (ref 4–12)
NEUTROPHILS # BLD AUTO: 3.63 THOUSANDS/ΜL (ref 1.85–7.62)
NEUTS SEG NFR BLD AUTO: 81 % (ref 43–75)
NRBC BLD AUTO-RTO: 0 /100 WBCS
PLATELET # BLD AUTO: 203 THOUSANDS/UL (ref 149–390)
PMV BLD AUTO: 8.9 FL (ref 8.9–12.7)
POTASSIUM SERPL-SCNC: 3.6 MMOL/L (ref 3.5–5.3)
PROT SERPL-MCNC: 5.9 G/DL (ref 6.4–8.2)
RBC # BLD AUTO: 3.99 MILLION/UL (ref 3.81–5.12)
SODIUM SERPL-SCNC: 140 MMOL/L (ref 136–145)
WBC # BLD AUTO: 4.43 THOUSAND/UL (ref 4.31–10.16)

## 2020-05-27 PROCEDURE — 96372 THER/PROPH/DIAG INJ SC/IM: CPT

## 2020-05-27 PROCEDURE — 85025 COMPLETE CBC W/AUTO DIFF WBC: CPT

## 2020-05-27 PROCEDURE — 80053 COMPREHEN METABOLIC PANEL: CPT

## 2020-05-27 PROCEDURE — 36415 COLL VENOUS BLD VENIPUNCTURE: CPT

## 2020-05-27 PROCEDURE — 96402 CHEMO HORMON ANTINEOPL SQ/IM: CPT

## 2020-05-27 RX ORDER — LAMOTRIGINE 25 MG/1
500 TABLET ORAL ONCE
Status: CANCELLED | OUTPATIENT
Start: 2020-06-24

## 2020-05-27 RX ORDER — LAMOTRIGINE 25 MG/1
500 TABLET ORAL ONCE
Status: COMPLETED | OUTPATIENT
Start: 2020-05-27 | End: 2020-05-27

## 2020-05-27 RX ADMIN — TBO-FILGRASTIM 300 MCG: 300 INJECTION, SOLUTION SUBCUTANEOUS at 10:45

## 2020-05-27 RX ADMIN — FULVESTRANT 500 MG: 50 INJECTION, SOLUTION INTRAMUSCULAR at 10:43

## 2020-05-28 ENCOUNTER — OFFICE VISIT (OUTPATIENT)
Dept: OCCUPATIONAL THERAPY | Age: 70
End: 2020-05-28
Payer: MEDICARE

## 2020-05-28 DIAGNOSIS — I89.0 LYMPHEDEMA OF RIGHT UPPER EXTREMITY: Primary | ICD-10-CM

## 2020-05-28 PROCEDURE — 97110 THERAPEUTIC EXERCISES: CPT

## 2020-05-28 PROCEDURE — 97140 MANUAL THERAPY 1/> REGIONS: CPT

## 2020-06-01 ENCOUNTER — TELEPHONE (OUTPATIENT)
Dept: PALLIATIVE MEDICINE | Facility: CLINIC | Age: 70
End: 2020-06-01

## 2020-06-02 ENCOUNTER — TELEPHONE (OUTPATIENT)
Dept: PALLIATIVE MEDICINE | Facility: CLINIC | Age: 70
End: 2020-06-02

## 2020-06-02 ENCOUNTER — APPOINTMENT (OUTPATIENT)
Dept: OCCUPATIONAL THERAPY | Age: 70
End: 2020-06-02
Payer: MEDICARE

## 2020-06-02 ENCOUNTER — OFFICE VISIT (OUTPATIENT)
Dept: PALLIATIVE MEDICINE | Facility: CLINIC | Age: 70
End: 2020-06-02
Payer: MEDICARE

## 2020-06-02 VITALS
WEIGHT: 105 LBS | DIASTOLIC BLOOD PRESSURE: 70 MMHG | TEMPERATURE: 97.5 F | HEIGHT: 63 IN | HEART RATE: 89 BPM | BODY MASS INDEX: 18.61 KG/M2 | OXYGEN SATURATION: 91 % | SYSTOLIC BLOOD PRESSURE: 120 MMHG | RESPIRATION RATE: 16 BRPM

## 2020-06-02 DIAGNOSIS — Z79.891 LONG TERM (CURRENT) USE OF OPIATE ANALGESIC: ICD-10-CM

## 2020-06-02 DIAGNOSIS — M89.8X9 BONE PAIN DUE TO G-CSF: Chronic | ICD-10-CM

## 2020-06-02 DIAGNOSIS — E09.9 DRUG OR CHEMICAL INDUCED DIABETES MELLITUS WITHOUT COMPLICATION, WITHOUT LONG-TERM CURRENT USE OF INSULIN (HCC): Chronic | ICD-10-CM

## 2020-06-02 DIAGNOSIS — G89.3 CANCER-RELATED PAIN: Primary | Chronic | ICD-10-CM

## 2020-06-02 DIAGNOSIS — R64 MALIGNANT CACHEXIA (HCC): Chronic | ICD-10-CM

## 2020-06-02 PROCEDURE — 99214 OFFICE O/P EST MOD 30 MIN: CPT | Performed by: FAMILY MEDICINE

## 2020-06-02 RX ORDER — LORATADINE 10 MG/1
10 TABLET ORAL DAILY
Qty: 30 TABLET | Refills: 11 | Status: SHIPPED | OUTPATIENT
Start: 2020-06-02 | End: 2020-07-06 | Stop reason: SDUPTHER

## 2020-06-02 RX ORDER — ZINC GLUCONATE 50 MG
50 TABLET ORAL 2 TIMES DAILY
Qty: 60 TABLET | Refills: 1 | Status: SHIPPED | OUTPATIENT
Start: 2020-06-02 | End: 2020-07-06

## 2020-06-02 RX ORDER — BLOOD GLUCOSE CONTROL HIGH,LOW
EACH MISCELLANEOUS DAILY
Qty: 30 EACH | Refills: 11 | Status: SHIPPED | OUTPATIENT
Start: 2020-06-02

## 2020-06-02 RX ORDER — VITAMIN B COMPLEX
1 CAPSULE ORAL 2 TIMES DAILY
Qty: 60 CAPSULE | Refills: 11 | Status: SHIPPED | OUTPATIENT
Start: 2020-06-02 | End: 2021-02-15 | Stop reason: HOSPADM

## 2020-06-04 ENCOUNTER — TELEPHONE (OUTPATIENT)
Dept: HEMATOLOGY ONCOLOGY | Facility: CLINIC | Age: 70
End: 2020-06-04

## 2020-06-04 ENCOUNTER — APPOINTMENT (OUTPATIENT)
Dept: OCCUPATIONAL THERAPY | Age: 70
End: 2020-06-04
Payer: MEDICARE

## 2020-06-08 ENCOUNTER — TELEPHONE (OUTPATIENT)
Dept: HEMATOLOGY ONCOLOGY | Facility: CLINIC | Age: 70
End: 2020-06-08

## 2020-06-09 ENCOUNTER — TELEPHONE (OUTPATIENT)
Dept: HEMATOLOGY ONCOLOGY | Facility: MEDICAL CENTER | Age: 70
End: 2020-06-09

## 2020-06-09 ENCOUNTER — OFFICE VISIT (OUTPATIENT)
Dept: OCCUPATIONAL THERAPY | Age: 70
End: 2020-06-09
Payer: MEDICARE

## 2020-06-09 DIAGNOSIS — I89.0 LYMPHEDEMA OF RIGHT UPPER EXTREMITY: Primary | ICD-10-CM

## 2020-06-09 DIAGNOSIS — C79.51 BREAST CANCER METASTASIZED TO BONE, RIGHT (HCC): Primary | ICD-10-CM

## 2020-06-09 DIAGNOSIS — C50.911 BREAST CANCER METASTASIZED TO BONE, RIGHT (HCC): Primary | ICD-10-CM

## 2020-06-09 PROCEDURE — 97168 OT RE-EVAL EST PLAN CARE: CPT

## 2020-06-09 PROCEDURE — 97140 MANUAL THERAPY 1/> REGIONS: CPT

## 2020-06-10 ENCOUNTER — HOSPITAL ENCOUNTER (OUTPATIENT)
Dept: INFUSION CENTER | Facility: CLINIC | Age: 70
Discharge: HOME | End: 2020-06-10
Payer: MEDICARE

## 2020-06-10 ENCOUNTER — APPOINTMENT (OUTPATIENT)
Dept: LAB | Facility: CLINIC | Age: 70
End: 2020-06-10
Payer: MEDICARE

## 2020-06-10 VITALS — TEMPERATURE: 98.6 F

## 2020-06-10 DIAGNOSIS — D70.2 DRUG-INDUCED LEUKOPENIA (HCC): ICD-10-CM

## 2020-06-10 DIAGNOSIS — Z79.891 LONG TERM (CURRENT) USE OF OPIATE ANALGESIC: ICD-10-CM

## 2020-06-10 DIAGNOSIS — F41.9 ANXIETY: ICD-10-CM

## 2020-06-10 DIAGNOSIS — D70.9 AGRANULOCYTOSIS (HCC): Primary | ICD-10-CM

## 2020-06-10 DIAGNOSIS — R64 MALIGNANT CACHEXIA (HCC): Chronic | ICD-10-CM

## 2020-06-10 LAB
ALBUMIN SERPL BCP-MCNC: 3.3 G/DL (ref 3.5–5)
ALP SERPL-CCNC: 92 U/L (ref 46–116)
ALT SERPL W P-5'-P-CCNC: 28 U/L (ref 12–78)
ANION GAP SERPL CALCULATED.3IONS-SCNC: 6 MMOL/L (ref 4–13)
AST SERPL W P-5'-P-CCNC: 24 U/L (ref 5–45)
BASOPHILS # BLD AUTO: 0.01 THOUSANDS/ΜL (ref 0–0.1)
BASOPHILS NFR BLD AUTO: 0 % (ref 0–1)
BILIRUB SERPL-MCNC: 0.6 MG/DL (ref 0.2–1)
BUN SERPL-MCNC: 15 MG/DL (ref 5–25)
CALCIUM SERPL-MCNC: 8.3 MG/DL (ref 8.3–10.1)
CHLORIDE SERPL-SCNC: 101 MMOL/L (ref 100–108)
CO2 SERPL-SCNC: 30 MMOL/L (ref 21–32)
CREAT SERPL-MCNC: 0.79 MG/DL (ref 0.6–1.3)
EOSINOPHIL # BLD AUTO: 0.1 THOUSAND/ΜL (ref 0–0.61)
EOSINOPHIL NFR BLD AUTO: 4 % (ref 0–6)
ERYTHROCYTE [DISTWIDTH] IN BLOOD BY AUTOMATED COUNT: 14.6 % (ref 11.6–15.1)
GFR SERPL CREATININE-BSD FRML MDRD: 77 ML/MIN/1.73SQ M
GLUCOSE P FAST SERPL-MCNC: 149 MG/DL (ref 65–99)
HCT VFR BLD AUTO: 36.6 % (ref 34.8–46.1)
HGB BLD-MCNC: 11.8 G/DL (ref 11.5–15.4)
IMM GRANULOCYTES # BLD AUTO: 0.02 THOUSAND/UL (ref 0–0.2)
IMM GRANULOCYTES NFR BLD AUTO: 1 % (ref 0–2)
LYMPHOCYTES # BLD AUTO: 0.45 THOUSANDS/ΜL (ref 0.6–4.47)
LYMPHOCYTES NFR BLD AUTO: 18 % (ref 14–44)
MCH RBC QN AUTO: 30.2 PG (ref 26.8–34.3)
MCHC RBC AUTO-ENTMCNC: 32.2 G/DL (ref 31.4–37.4)
MCV RBC AUTO: 94 FL (ref 82–98)
MONOCYTES # BLD AUTO: 0.19 THOUSAND/ΜL (ref 0.17–1.22)
MONOCYTES NFR BLD AUTO: 8 % (ref 4–12)
NEUTROPHILS # BLD AUTO: 1.78 THOUSANDS/ΜL (ref 1.85–7.62)
NEUTS SEG NFR BLD AUTO: 69 % (ref 43–75)
NRBC BLD AUTO-RTO: 0 /100 WBCS
PLATELET # BLD AUTO: 281 THOUSANDS/UL (ref 149–390)
PMV BLD AUTO: 8.2 FL (ref 8.9–12.7)
POTASSIUM SERPL-SCNC: 3.7 MMOL/L (ref 3.5–5.3)
PROT SERPL-MCNC: 6.1 G/DL (ref 6.4–8.2)
RBC # BLD AUTO: 3.91 MILLION/UL (ref 3.81–5.12)
SODIUM SERPL-SCNC: 137 MMOL/L (ref 136–145)
WBC # BLD AUTO: 2.55 THOUSAND/UL (ref 4.31–10.16)

## 2020-06-10 PROCEDURE — 96372 THER/PROPH/DIAG INJ SC/IM: CPT

## 2020-06-10 PROCEDURE — 82306 VITAMIN D 25 HYDROXY: CPT

## 2020-06-10 PROCEDURE — 80053 COMPREHEN METABOLIC PANEL: CPT | Performed by: DENTIST

## 2020-06-10 PROCEDURE — 85025 COMPLETE CBC W/AUTO DIFF WBC: CPT | Performed by: DENTIST

## 2020-06-10 PROCEDURE — 36415 COLL VENOUS BLD VENIPUNCTURE: CPT | Performed by: DENTIST

## 2020-06-10 RX ADMIN — TBO-FILGRASTIM 300 MCG: 300 INJECTION, SOLUTION SUBCUTANEOUS at 10:03

## 2020-06-11 ENCOUNTER — OFFICE VISIT (OUTPATIENT)
Dept: OCCUPATIONAL THERAPY | Age: 70
End: 2020-06-11
Payer: MEDICARE

## 2020-06-11 DIAGNOSIS — I89.0 LYMPHEDEMA OF RIGHT UPPER EXTREMITY: Primary | ICD-10-CM

## 2020-06-11 PROCEDURE — 97140 MANUAL THERAPY 1/> REGIONS: CPT

## 2020-06-11 PROCEDURE — 97110 THERAPEUTIC EXERCISES: CPT

## 2020-06-11 RX ORDER — LORAZEPAM 1 MG/1
TABLET ORAL
Qty: 60 TABLET | Refills: 0 | Status: SHIPPED | OUTPATIENT
Start: 2020-06-11 | End: 2020-07-06 | Stop reason: SDUPTHER

## 2020-06-12 ENCOUNTER — TELEPHONE (OUTPATIENT)
Dept: LAB | Facility: HOSPITAL | Age: 70
End: 2020-06-12

## 2020-06-16 ENCOUNTER — OFFICE VISIT (OUTPATIENT)
Dept: OCCUPATIONAL THERAPY | Age: 70
End: 2020-06-16
Payer: MEDICARE

## 2020-06-16 ENCOUNTER — TELEPHONE (OUTPATIENT)
Dept: HEMATOLOGY ONCOLOGY | Facility: CLINIC | Age: 70
End: 2020-06-16

## 2020-06-16 DIAGNOSIS — I89.0 LYMPHEDEMA OF RIGHT UPPER EXTREMITY: Primary | ICD-10-CM

## 2020-06-16 LAB
25(OH)D2 SERPL-MCNC: <1 NG/ML
25(OH)D3 SERPL-MCNC: 60 NG/ML
25(OH)D3+25(OH)D2 SERPL-MCNC: 60 NG/ML

## 2020-06-16 PROCEDURE — 97140 MANUAL THERAPY 1/> REGIONS: CPT

## 2020-06-18 ENCOUNTER — OFFICE VISIT (OUTPATIENT)
Dept: OCCUPATIONAL THERAPY | Age: 70
End: 2020-06-18
Payer: MEDICARE

## 2020-06-18 DIAGNOSIS — I89.0 LYMPHEDEMA OF RIGHT UPPER EXTREMITY: Primary | ICD-10-CM

## 2020-06-18 PROCEDURE — 97110 THERAPEUTIC EXERCISES: CPT

## 2020-06-18 PROCEDURE — 97140 MANUAL THERAPY 1/> REGIONS: CPT

## 2020-06-19 ENCOUNTER — OFFICE VISIT (OUTPATIENT)
Dept: HEMATOLOGY ONCOLOGY | Facility: CLINIC | Age: 70
End: 2020-06-19
Payer: MEDICARE

## 2020-06-19 VITALS
OXYGEN SATURATION: 96 % | RESPIRATION RATE: 18 BRPM | TEMPERATURE: 97.9 F | DIASTOLIC BLOOD PRESSURE: 68 MMHG | SYSTOLIC BLOOD PRESSURE: 124 MMHG | BODY MASS INDEX: 18.07 KG/M2 | WEIGHT: 102 LBS | HEART RATE: 61 BPM | HEIGHT: 63 IN

## 2020-06-19 DIAGNOSIS — C50.911 BREAST CANCER METASTASIZED TO BONE, RIGHT (HCC): Primary | Chronic | ICD-10-CM

## 2020-06-19 DIAGNOSIS — C50.411 MALIGNANT NEOPLASM OF UPPER-OUTER QUADRANT OF RIGHT BREAST IN FEMALE, ESTROGEN RECEPTOR POSITIVE (HCC): ICD-10-CM

## 2020-06-19 DIAGNOSIS — Z17.0 MALIGNANT NEOPLASM OF UPPER-OUTER QUADRANT OF RIGHT BREAST IN FEMALE, ESTROGEN RECEPTOR POSITIVE (HCC): ICD-10-CM

## 2020-06-19 DIAGNOSIS — C79.51 BREAST CANCER METASTASIZED TO BONE, RIGHT (HCC): Primary | Chronic | ICD-10-CM

## 2020-06-19 PROCEDURE — 99214 OFFICE O/P EST MOD 30 MIN: CPT | Performed by: PHYSICIAN ASSISTANT

## 2020-06-22 ENCOUNTER — HOSPITAL ENCOUNTER (OUTPATIENT)
Dept: MRI IMAGING | Facility: HOSPITAL | Age: 70
Discharge: HOME | End: 2020-06-22
Attending: RADIOLOGY
Payer: MEDICARE

## 2020-06-22 DIAGNOSIS — C79.51 BREAST CANCER METASTASIZED TO BONE, RIGHT (HCC): ICD-10-CM

## 2020-06-22 DIAGNOSIS — C50.911 BREAST CANCER METASTASIZED TO BONE, RIGHT (HCC): ICD-10-CM

## 2020-06-22 PROCEDURE — 72146 MRI CHEST SPINE W/O DYE: CPT

## 2020-06-22 PROCEDURE — 72148 MRI LUMBAR SPINE W/O DYE: CPT

## 2020-06-23 ENCOUNTER — OFFICE VISIT (OUTPATIENT)
Dept: OCCUPATIONAL THERAPY | Age: 70
End: 2020-06-23
Payer: MEDICARE

## 2020-06-23 DIAGNOSIS — I89.0 LYMPHEDEMA OF RIGHT UPPER EXTREMITY: Primary | ICD-10-CM

## 2020-06-23 PROCEDURE — 97140 MANUAL THERAPY 1/> REGIONS: CPT

## 2020-06-23 PROCEDURE — 97110 THERAPEUTIC EXERCISES: CPT

## 2020-06-25 ENCOUNTER — APPOINTMENT (OUTPATIENT)
Dept: OCCUPATIONAL THERAPY | Age: 70
End: 2020-06-25
Payer: MEDICARE

## 2020-06-25 RX ORDER — LAMOTRIGINE 25 MG/1
500 TABLET ORAL ONCE
Status: CANCELLED | OUTPATIENT
Start: 2020-06-26

## 2020-06-26 ENCOUNTER — RADIATION ONCOLOGY FOLLOW-UP (OUTPATIENT)
Dept: RADIATION ONCOLOGY | Facility: HOSPITAL | Age: 70
End: 2020-06-26
Attending: RADIOLOGY
Payer: MEDICARE

## 2020-06-26 ENCOUNTER — APPOINTMENT (OUTPATIENT)
Dept: LAB | Facility: CLINIC | Age: 70
End: 2020-06-26
Payer: MEDICARE

## 2020-06-26 ENCOUNTER — HOSPITAL ENCOUNTER (OUTPATIENT)
Dept: INFUSION CENTER | Facility: CLINIC | Age: 70
Discharge: HOME | End: 2020-06-26
Payer: MEDICARE

## 2020-06-26 VITALS
SYSTOLIC BLOOD PRESSURE: 128 MMHG | BODY MASS INDEX: 18.35 KG/M2 | HEART RATE: 79 BPM | DIASTOLIC BLOOD PRESSURE: 64 MMHG | WEIGHT: 103.6 LBS | RESPIRATION RATE: 16 BRPM | TEMPERATURE: 98 F | OXYGEN SATURATION: 99 %

## 2020-06-26 VITALS — TEMPERATURE: 97 F

## 2020-06-26 DIAGNOSIS — C50.911 BREAST CANCER METASTASIZED TO BONE, RIGHT (HCC): Chronic | ICD-10-CM

## 2020-06-26 DIAGNOSIS — C79.51 BONE METASTASIS (HCC): Primary | ICD-10-CM

## 2020-06-26 DIAGNOSIS — D70.2 DRUG-INDUCED LEUKOPENIA (HCC): ICD-10-CM

## 2020-06-26 DIAGNOSIS — C50.911 BREAST CANCER METASTASIZED TO BONE, RIGHT (HCC): ICD-10-CM

## 2020-06-26 DIAGNOSIS — C79.51 BREAST CANCER METASTASIZED TO BONE, RIGHT (HCC): ICD-10-CM

## 2020-06-26 DIAGNOSIS — D70.9 AGRANULOCYTOSIS (HCC): Primary | ICD-10-CM

## 2020-06-26 DIAGNOSIS — C79.51 BREAST CANCER METASTASIZED TO BONE, RIGHT (HCC): Chronic | ICD-10-CM

## 2020-06-26 DIAGNOSIS — Z17.0 MALIGNANT NEOPLASM OF UPPER-OUTER QUADRANT OF RIGHT BREAST IN FEMALE, ESTROGEN RECEPTOR POSITIVE (HCC): ICD-10-CM

## 2020-06-26 DIAGNOSIS — C50.411 MALIGNANT NEOPLASM OF UPPER-OUTER QUADRANT OF RIGHT BREAST IN FEMALE, ESTROGEN RECEPTOR POSITIVE (HCC): ICD-10-CM

## 2020-06-26 PROCEDURE — 99211 OFF/OP EST MAY X REQ PHY/QHP: CPT | Performed by: RADIOLOGY

## 2020-06-26 PROCEDURE — 96372 THER/PROPH/DIAG INJ SC/IM: CPT

## 2020-06-26 PROCEDURE — 96402 CHEMO HORMON ANTINEOPL SQ/IM: CPT

## 2020-06-26 RX ORDER — LAMOTRIGINE 25 MG/1
500 TABLET ORAL ONCE
Status: COMPLETED | OUTPATIENT
Start: 2020-06-26 | End: 2020-06-26

## 2020-06-26 RX ORDER — LAMOTRIGINE 25 MG/1
500 TABLET ORAL ONCE
Status: CANCELLED | OUTPATIENT
Start: 2020-07-24

## 2020-06-26 RX ADMIN — TBO-FILGRASTIM 300 MCG: 300 INJECTION, SOLUTION SUBCUTANEOUS at 12:46

## 2020-06-26 RX ADMIN — FULVESTRANT 500 MG: 50 INJECTION, SOLUTION INTRAMUSCULAR at 12:49

## 2020-06-26 NOTE — PROGRESS NOTES
Pt here for Granix injection and Faslodex injection    Pt offers no complaints  Granix given in L arm without any issues  Faslodex given in BL gluteal sites without any issues  Pt aware of next appointments   Declines AVS

## 2020-06-27 DIAGNOSIS — G89.3 CANCER ASSOCIATED PAIN: ICD-10-CM

## 2020-06-27 DIAGNOSIS — G54.0 RADIATION-INDUCED BRACHIAL PLEXOPATHY: ICD-10-CM

## 2020-06-30 ENCOUNTER — TELEPHONE (OUTPATIENT)
Dept: HEMATOLOGY ONCOLOGY | Facility: CLINIC | Age: 70
End: 2020-06-30

## 2020-06-30 RX ORDER — MORPHINE SULFATE 15 MG/1
TABLET ORAL
Qty: 65 TABLET | Refills: 0 | Status: SHIPPED | OUTPATIENT
Start: 2020-06-30 | End: 2020-07-06 | Stop reason: SDUPTHER

## 2020-06-30 RX ORDER — LAMOTRIGINE 25 MG/1
500 TABLET ORAL ONCE
Status: CANCELLED | OUTPATIENT
Start: 2020-07-29

## 2020-07-01 NOTE — PATIENT INSTRUCTIONS
PRESCRIPTION REFILL REMINDER:  All medication refills should be requested prior to [facility] on Friday  Any refill requests after noon on Friday would be addressed the following Monday  Please protect yourself from the novel Coronavirus (COVID-19)! Even though we STILL do not have a vaccine or any antiviral drugs for this infection, the following strategies can help you stay healthy:    = Wash your hands with soap and water, or hand  with at least 60% alcohol, often  = Avoid touching your face!   = Avoid close contact with others, even if they seem healthy  Avoid gatherings of more than 10 people, and don't travel unnecessarily  = Stay home, except to get medical care or other essentials  If you can eat and drink and breathe and sleep, please consider calling your doctor's office instead of visiting in person, even if you are ill   = Cover your cough with a tissue, or your elbow  = Clean frequently touched surfaces and objects daily (e g , tables, countertops, light switches, doorknobs, and cabinet handles)  Regular household detergent and water are sufficient  Numbers of cases of Coronavirus are spiking in many [place] States  This is not a more dangerous virus, but a sign that more people in a community are spreading the virus  Please check the local disease reports near you if you consider travelling this summer  We do NOT advise travel to any community or State with a rising viral caseload  Check out [employer] for data that are updated daily        Zeis Excelsa cy

## 2020-07-06 ENCOUNTER — OFFICE VISIT (OUTPATIENT)
Dept: PALLIATIVE MEDICINE | Facility: CLINIC | Age: 70
End: 2020-07-06
Payer: MEDICARE

## 2020-07-06 VITALS
OXYGEN SATURATION: 97 % | SYSTOLIC BLOOD PRESSURE: 119 MMHG | DIASTOLIC BLOOD PRESSURE: 65 MMHG | HEART RATE: 86 BPM | WEIGHT: 100 LBS | TEMPERATURE: 98.5 F | BODY MASS INDEX: 17.71 KG/M2 | RESPIRATION RATE: 18 BRPM

## 2020-07-06 DIAGNOSIS — R64 MALIGNANT CACHEXIA (HCC): Chronic | ICD-10-CM

## 2020-07-06 DIAGNOSIS — G89.3 CANCER ASSOCIATED PAIN: ICD-10-CM

## 2020-07-06 DIAGNOSIS — F41.9 ANXIETY: ICD-10-CM

## 2020-07-06 DIAGNOSIS — C50.911 MALIGNANT NEOPLASM OF RIGHT FEMALE BREAST, UNSPECIFIED ESTROGEN RECEPTOR STATUS, UNSPECIFIED SITE OF BREAST (HCC): Chronic | ICD-10-CM

## 2020-07-06 DIAGNOSIS — G54.0 RADIATION-INDUCED BRACHIAL PLEXOPATHY: ICD-10-CM

## 2020-07-06 DIAGNOSIS — G89.3 CANCER-RELATED PAIN: Chronic | ICD-10-CM

## 2020-07-06 DIAGNOSIS — M89.8X9 BONE PAIN DUE TO G-CSF: Chronic | ICD-10-CM

## 2020-07-06 PROCEDURE — 99214 OFFICE O/P EST MOD 30 MIN: CPT | Performed by: FAMILY MEDICINE

## 2020-07-06 RX ORDER — MORPHINE SULFATE 15 MG/1
15 TABLET ORAL EVERY 4 HOURS PRN
Qty: 90 TABLET | Refills: 0 | Status: SHIPPED | OUTPATIENT
Start: 2020-08-31 | End: 2020-09-21 | Stop reason: SDUPTHER

## 2020-07-06 RX ORDER — MORPHINE SULFATE 15 MG/1
15 TABLET ORAL EVERY 4 HOURS PRN
Qty: 90 TABLET | Refills: 0 | Status: SHIPPED | OUTPATIENT
Start: 2020-08-03 | End: 2020-07-06 | Stop reason: SDUPTHER

## 2020-07-06 RX ORDER — LORAZEPAM 1 MG/1
1-2 TABLET ORAL
Qty: 60 TABLET | Refills: 2 | Status: SHIPPED | OUTPATIENT
Start: 2020-07-06 | End: 2020-10-06 | Stop reason: SDUPTHER

## 2020-07-06 RX ORDER — ACETAMINOPHEN 325 MG/1
650 TABLET ORAL
Qty: 90 TABLET | Refills: 1
Start: 2020-07-06

## 2020-07-06 RX ORDER — LORATADINE 10 MG/1
10 TABLET ORAL DAILY
Qty: 30 TABLET | Refills: 11 | Status: SHIPPED | OUTPATIENT
Start: 2020-07-06 | End: 2020-12-01

## 2020-07-06 RX ORDER — MORPHINE SULFATE 15 MG/1
15 TABLET ORAL EVERY 4 HOURS PRN
Qty: 90 TABLET | Refills: 0 | Status: SHIPPED | OUTPATIENT
Start: 2020-08-03 | End: 2020-09-21 | Stop reason: SDUPTHER

## 2020-07-06 RX ORDER — ZINC GLUCONATE 50 MG
50 TABLET ORAL 2 TIMES DAILY
Qty: 60 TABLET | Refills: 0 | Status: SHIPPED | OUTPATIENT
Start: 2020-07-06 | End: 2020-09-21 | Stop reason: SINTOL

## 2020-07-06 RX ORDER — MORPHINE SULFATE 15 MG/1
7.5-15 TABLET ORAL EVERY 4 HOURS PRN
Qty: 90 TABLET | Refills: 0 | Status: SHIPPED | OUTPATIENT
Start: 2020-07-06 | End: 2020-09-21 | Stop reason: SDUPTHER

## 2020-07-06 NOTE — PROGRESS NOTES
Outpatient Follow-Up - Palliative and Supportive Care   Gloria Mcdonald 71 y o  female [id number]    Assessment & Plan  1  Cancer-related pain    2  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    3  Malignant cachexia ([facility] [address] )    4  Malignant neoplasm of right female breast, unspecified estrogen receptor status, unspecified site of breast ([facility] )    5  Cancer associated pain    6  Radiation-induced brachial plexopathy    7  Anxiety      Medications adjusted this encounter:  Requested Prescriptions     Signed Prescriptions Disp Refills    loratadine (CLARITIN) 10 mg tablet 30 tablet 11     Sig: Take 1 tablet (10 mg total) by mouth daily For allergies, or bone pain from Granix  Three days before and after Granix   zinc gluconate 50 mg tablet 60 tablet 0     Sig: Take 1 tablet (50 mg total) by mouth 2 (two) times a day Hold for nausea or facial flushing   acetaminophen (TYLENOL) 325 mg tablet 90 tablet 1     Sig: Take 2 tablets (650 mg total) by mouth 3 (three) times a day with meals    morphine (MSIR) 15 mg tablet 90 tablet 0     Sig: Take 0 5-1 tablets (7 5-15 mg total) by mouth every 4 (four) hours as needed (cancer pain) 1/2 tab for moderate pain, 1full tab for severe painMax Daily Amount: 90 mg    LORazepam (ATIVAN) 1 mg tablet 60 tablet 2     Sig: Take 1-2 tablets (1-2 mg total) by mouth daily at bedtime TAKE 2MG (2 TABLETS) AT BEDTIME      morphine (MSIR) 15 mg tablet 90 tablet 0     Sig: Take 1 tablet (15 mg total) by mouth every 4 (four) hours as needed (cancer pain) 1/2 tab for moderate pain, 1full tab for severe painMax Daily Amount: 90 mg    morphine (MSIR) 15 mg tablet 90 tablet 0     Sig: Take 1 tablet (15 mg total) by mouth every 4 (four) hours as needed (cancer pain) 1/2 tab for moderate pain, 1full tab for severe painMax Daily Amount: 90 mg     Medications Discontinued During This Encounter   Medication Reason    zinc gluconate 50 mg tablet     al mag oxide-diphenhydramine-lidocaine viscous (MAGIC MOUTHWASH) 1:1:1 suspension     loratadine (CLARITIN) 10 mg tablet Reorder    acetaminophen (TYLENOL) 325 mg tablet Reorder    morphine (MSIR) 15 mg tablet Reorder    LORazepam (ATIVAN) 1 mg tablet Reorder    morphine (MSIR) 15 mg tablet Reorder   - F/up in 3mos; Rxs given for 30days x 3     Ms Storm Hernandez was seen today for symptoms and planning cares related to above illnesses  I have reviewed the patient's controlled substance dispensing history in the Prescription Drug Monitoring Program in compliance with the [facility] regulations before prescribing any controlled substances  She is invited to continue to follow with us  If there are questions or concerns, please contact us through our clinic/answering service 24 hours a day, seven days a week  Torrey Carreno MD  [facility] Palliative and Supportive [facility]  [phone]      Visit Information    Accompanied By: No one    Source of History: Self    History Limitations: None    Contacts: Follow up visit for:  Symptoms, support    History of Present Illness      This lady presents in f/up of her breast cancer  She follows with Dr Nasir Harris and Ms Sourav Frye  Since last visit, she has felt quite well  Still had sideFX from GCSF, and was advised by Ms Sourav Frye to use loratadine more consistently  She otherwise has been doing well, with improved response from lymphedema therapy, and important developments in family life, including upcoming [employer] of grandbaby  She still has no help for her taste changes; Zn was not immensely helpful  Historically, GCSF has led to bone pains and headache, and Piqray asso with reduced appetite, dysgeusia, wt loss, and paradoxically -- hyperglycemia  She has severe lymphedema in R arm, since 2020  Xgeva (denosumab) on hold recently as she is hoping to have dental work done  Past medical, surgical, social, and family histories are reviewed and pertinent updates are made      Review of Systems   Constitution: Positive for decreased appetite and weight loss  Negative for weight gain  HENT: Negative for hoarse voice and nosebleeds  Eyes: Negative for vision loss in left eye and vision loss in right eye  Cardiovascular: Negative for chest pain and dyspnea on exertion  Respiratory: Negative for cough and shortness of breath  Endocrine: Negative for polydipsia, polyphagia and polyuria  Skin: Negative for flushing and itching  Musculoskeletal: Negative for falls  Gastrointestinal: Positive for nausea  Negative for anorexia, dysphagia, excessive appetite, jaundice and vomiting  Genitourinary: Negative for frequency  Neurological: Negative for dizziness  Psychiatric/Behavioral: Negative for depression and memory loss  The patient is not nervous/anxious  Vital Signs    /65 (BP Location: Left arm, Patient Position: Sitting, Cuff Size: Standard)   Pulse 86   Temp 98 5 °F (36 9 °C) (Tympanic)   Resp 18   Wt 45 4 kg (100 lb)   LMP 05/27/1996 (LMP Unknown)   SpO2 97%   BMI 17 71 kg/m²     Physical Exam and Objective Data  Physical Exam   Constitutional: She is oriented to person, place, and time  No distress  frail   HENT:   Head: Normocephalic and atraumatic  Right Ear: External ear normal    Left Ear: External ear normal    Mouth/Throat: Oropharyngeal exudate (mucous membranes tacky) present  Eyes: Pupils are equal, round, and reactive to light  Conjunctivae and EOM are normal  Right eye exhibits no discharge  Left eye exhibits no discharge  Neck: No tracheal deviation present  Cardiovascular: Normal rate and regular rhythm  Pulmonary/Chest: Effort normal  No stridor  No respiratory distress  Abdominal: Soft  She exhibits no distension  Scaphoid   Musculoskeletal: She exhibits no edema  Very slender   Neurological: She is alert and oriented to person, place, and time  No cranial nerve deficit  Skin: Skin is warm and dry  No rash noted  She is not diaphoretic  No erythema  There is pallor  Psychiatric: She has a normal mood and affect  Her behavior is normal  Judgment and thought content normal          Radiology and Laboratory:  I personally reviewed and interpreted the following results - none pertinent    25+ minutes was spent face to face with Yareli Morse with greater than 50% of the time spent in counseling or coordination of care including: chart review; symptom pursuit  All of the patient's questions were answered during this discussion

## 2020-07-09 ENCOUNTER — OFFICE VISIT (OUTPATIENT)
Dept: OCCUPATIONAL THERAPY | Age: 70
End: 2020-07-09
Payer: MEDICARE

## 2020-07-09 DIAGNOSIS — I89.0 LYMPHEDEMA OF RIGHT UPPER EXTREMITY: Primary | ICD-10-CM

## 2020-07-09 PROCEDURE — 97535 SELF CARE MNGMENT TRAINING: CPT

## 2020-07-09 NOTE — PROGRESS NOTES
Daily Note     Today's date: 2020  Patient name: Lebron Ye  : 1950  MRN: [id number]  Referring provider: JACINTO Frazier*  Dx:   Encounter Diagnosis     ICD-10-CM    1  Lymphedema of right upper extremity I89 0                   Subjective: "I got my pump, I brought it so you can help me set it up "  Pt denied pain  Pt reports relief with pump on  Pt educated on pump setup for home  Pt able to jaspal/doff and turn on  Pt demo -   5 cm from each measurement  Objective: See treatment diary below      Assessment: Tolerated treatment well  Patient would benefit from continued OT to ensure pt is following pump and compression wrap protocol and can maintain home maintance program on her own  Plan: Continue per plan of care  Precautions: universal, metastasis bone cancer, active tx to cancer, all lymph nodes removed at R axilla 24 years ago, radiation to brachial plexus lymph node in         Manuals         MLD to chest and RUE 20'  25 25 30'        Compression pump to RUE and chest  40' 30          Massage of arm with tactile ball    15' 15'                     Neuro Re-Ed                                       Selfcare              Pump education                                                   Ther Ex          RUE ROM     pulleys 3 sets        Arm bike 10' 10'                                                  SELF CARe             compresison wrap education 10'                         Ther Activity                                       Gait Training                                       Modalities

## 2020-07-13 NOTE — PROGRESS NOTES
Spoke with patient and she is still trying to get into her dentist and would like to continue to hold Frost Legvernell for now  She will talk to her dentist this week and then let us know so Attila Finch can decide when patient is to resume

## 2020-07-15 ENCOUNTER — TRANSCRIBE ORDERS (OUTPATIENT)
Dept: LAB | Facility: CLINIC | Age: 70
End: 2020-07-15

## 2020-07-15 ENCOUNTER — HOSPITAL ENCOUNTER (OUTPATIENT)
Dept: INFUSION CENTER | Facility: CLINIC | Age: 70
Discharge: HOME | End: 2020-07-15
Payer: MEDICARE

## 2020-07-15 ENCOUNTER — TELEPHONE (OUTPATIENT)
Dept: HEMATOLOGY ONCOLOGY | Facility: CLINIC | Age: 70
End: 2020-07-15

## 2020-07-15 ENCOUNTER — APPOINTMENT (OUTPATIENT)
Dept: LAB | Facility: CLINIC | Age: 70
End: 2020-07-15
Payer: MEDICARE

## 2020-07-15 VITALS — TEMPERATURE: 98.2 F

## 2020-07-15 DIAGNOSIS — C50.911 BREAST CANCER METASTASIZED TO BONE, RIGHT (HCC): Chronic | ICD-10-CM

## 2020-07-15 DIAGNOSIS — C50.911 BREAST CANCER METASTASIZED TO BONE, RIGHT (HCC): Primary | Chronic | ICD-10-CM

## 2020-07-15 DIAGNOSIS — C79.51 BREAST CANCER METASTASIZED TO BONE, RIGHT (HCC): Chronic | ICD-10-CM

## 2020-07-15 DIAGNOSIS — D70.2 DRUG-INDUCED LEUKOPENIA (HCC): ICD-10-CM

## 2020-07-15 DIAGNOSIS — D70.9 AGRANULOCYTOSIS (HCC): Primary | ICD-10-CM

## 2020-07-15 DIAGNOSIS — C79.51 BREAST CANCER METASTASIZED TO BONE, RIGHT (HCC): Primary | Chronic | ICD-10-CM

## 2020-07-15 LAB
ALBUMIN SERPL BCP-MCNC: 3.3 G/DL (ref 3.5–5)
ALP SERPL-CCNC: 83 U/L (ref 46–116)
ALT SERPL W P-5'-P-CCNC: 40 U/L (ref 12–78)
ANION GAP SERPL CALCULATED.3IONS-SCNC: 7 MMOL/L (ref 4–13)
AST SERPL W P-5'-P-CCNC: 38 U/L (ref 5–45)
BASOPHILS # BLD AUTO: 0.02 THOUSANDS/ΜL (ref 0–0.1)
BASOPHILS NFR BLD AUTO: 1 % (ref 0–1)
BILIRUB SERPL-MCNC: 0.83 MG/DL (ref 0.2–1)
BUN SERPL-MCNC: 16 MG/DL (ref 5–25)
CALCIUM SERPL-MCNC: 8.2 MG/DL (ref 8.3–10.1)
CHLORIDE SERPL-SCNC: 103 MMOL/L (ref 100–108)
CO2 SERPL-SCNC: 28 MMOL/L (ref 21–32)
CREAT SERPL-MCNC: 0.81 MG/DL (ref 0.6–1.3)
EOSINOPHIL # BLD AUTO: 0.11 THOUSAND/ΜL (ref 0–0.61)
EOSINOPHIL NFR BLD AUTO: 5 % (ref 0–6)
ERYTHROCYTE [DISTWIDTH] IN BLOOD BY AUTOMATED COUNT: 14.8 % (ref 11.6–15.1)
GFR SERPL CREATININE-BSD FRML MDRD: 74 ML/MIN/1.73SQ M
GLUCOSE P FAST SERPL-MCNC: 136 MG/DL (ref 65–99)
HCT VFR BLD AUTO: 34.5 % (ref 34.8–46.1)
HGB BLD-MCNC: 11.1 G/DL (ref 11.5–15.4)
IMM GRANULOCYTES # BLD AUTO: 0.01 THOUSAND/UL (ref 0–0.2)
IMM GRANULOCYTES NFR BLD AUTO: 0 % (ref 0–2)
LYMPHOCYTES # BLD AUTO: 0.4 THOUSANDS/ΜL (ref 0.6–4.47)
LYMPHOCYTES NFR BLD AUTO: 17 % (ref 14–44)
MCH RBC QN AUTO: 31 PG (ref 26.8–34.3)
MCHC RBC AUTO-ENTMCNC: 32.2 G/DL (ref 31.4–37.4)
MCV RBC AUTO: 96 FL (ref 82–98)
MONOCYTES # BLD AUTO: 0.16 THOUSAND/ΜL (ref 0.17–1.22)
MONOCYTES NFR BLD AUTO: 7 % (ref 4–12)
NEUTROPHILS # BLD AUTO: 1.62 THOUSANDS/ΜL (ref 1.85–7.62)
NEUTS SEG NFR BLD AUTO: 70 % (ref 43–75)
NRBC BLD AUTO-RTO: 0 /100 WBCS
PLATELET # BLD AUTO: 197 THOUSANDS/UL (ref 149–390)
PMV BLD AUTO: 9 FL (ref 8.9–12.7)
POTASSIUM SERPL-SCNC: 3.3 MMOL/L (ref 3.5–5.3)
PROT SERPL-MCNC: 5.9 G/DL (ref 6.4–8.2)
RBC # BLD AUTO: 3.58 MILLION/UL (ref 3.81–5.12)
SODIUM SERPL-SCNC: 138 MMOL/L (ref 136–145)
WBC # BLD AUTO: 2.32 THOUSAND/UL (ref 4.31–10.16)

## 2020-07-15 PROCEDURE — 96372 THER/PROPH/DIAG INJ SC/IM: CPT

## 2020-07-15 PROCEDURE — 85025 COMPLETE CBC W/AUTO DIFF WBC: CPT

## 2020-07-15 PROCEDURE — 36415 COLL VENOUS BLD VENIPUNCTURE: CPT

## 2020-07-15 PROCEDURE — 80053 COMPREHEN METABOLIC PANEL: CPT

## 2020-07-15 RX ADMIN — TBO-FILGRASTIM 300 MCG: 300 INJECTION, SOLUTION SUBCUTANEOUS at 10:28

## 2020-07-15 NOTE — TELEPHONE ENCOUNTER
Low potassium 3 3  I spoke with patient  She has potassium pill at home that she has not been taking and she is not on diuretic    She will take 1 potassium tablet 10 mEq daily for 7 days

## 2020-07-20 ENCOUNTER — TELEPHONE (OUTPATIENT)
Dept: HEMATOLOGY ONCOLOGY | Facility: CLINIC | Age: 70
End: 2020-07-20

## 2020-07-20 NOTE — TELEPHONE ENCOUNTER
Patient may have whichever procedure the dentist thinks is most appropriate and best for her  She is not on chemo, only oral hormonal med with side effect of neutropenia in his case  She should have a CBC 2 days prior to scheduled procedure (whichever one is chosen) to make sure WBC is good  If not, we could give an additional dose of granix if needed

## 2020-07-20 NOTE — TELEPHONE ENCOUNTER
Patient's bridge fell out  She went to her dentist   Her Dentist Dr Lashell Chairez [phone] wanted to know due to her being under treatment should she have the tooth extracted to have the partial made or should they make the partial over the existing root  Xray was taken of existing root and no infection is noted  Please review with JACINTO Dorantes  Patient's call back # [phone] (H)

## 2020-07-20 NOTE — TELEPHONE ENCOUNTER
Reviewed information provided by Jennie Sanchez, [facility] with the patient  She is uncertain which way she will go but will review information with the dentist   If she decides she needs the bloodwork for tooth extraction she will call us back and request we place a CBC with diff  Patient was appreciative

## 2020-07-23 ENCOUNTER — OFFICE VISIT (OUTPATIENT)
Dept: OCCUPATIONAL THERAPY | Age: 70
End: 2020-07-23
Payer: MEDICARE

## 2020-07-23 DIAGNOSIS — I89.0 LYMPHEDEMA OF RIGHT UPPER EXTREMITY: Primary | ICD-10-CM

## 2020-07-23 PROCEDURE — 97110 THERAPEUTIC EXERCISES: CPT

## 2020-07-23 PROCEDURE — 97140 MANUAL THERAPY 1/> REGIONS: CPT

## 2020-07-23 NOTE — PROGRESS NOTES
OT Discharge     Today's date: 2020  Patient name: Juan Carlos Machado  : 1950  MRN: [id number]  Referring provider: JACINTO Freitas*  Dx:   Encounter Diagnosis     ICD-10-CM    1  Lymphedema of right upper extremity I89 0                   Assessment  Assessment details: 2020- Pt has made G progress in her lymphedema treatment  Pt has demo G reduction in edema in RUE  Pt currently demo mild edema at her elbow area  Pt demo and reports less tightness feeling, dec in pitting edema, and ease of moving her RUE  Pt has compression pump and solaris ready wrap for her RUE for compression care which she has demo G knowledge, use, and independence  Pt demo G skin integrity and reduction in edema  Pt is now d/c from skilled OT tx as she has all garments needed and has demo independence and carryover with use      2020- Pt has demo minimal decrease in edema to RUE  Pt still presents with stage 2 moderate/severe lymphedema with pitting  Pt demo tight hard skin at her forearm which pt c/o pain and feelings of her arm will burst  Therapist and pt made new plan of ordering a velcro sleeve wrap rather than compression sleeve as pt noted with occ swelling at end of tubi  at wrist and upper arm  Pt would also benefit from compression pump for home maintenance program and to dec hardness of R arm  Therapist awaiting for items to be delivered to ensure pt can jaspal/doff and use correctly  Pt is a 72 y/o female, who has a reoccurrence of cancer; pt has bone METS throughout her body  Pt had h/o lymph node removal and breast CA 24 years ago; pt had all lymph nodes removed under R axilla  Pt radiation to brachial plexus 2* another cancer occurrence in   Pt reported on 2020 she noticed her R elbow enlarged with feelings of tightness  Pt had DVT which was negative, and then dx with lymphedema and referred to lymphedema therapy   Pt currently demo stage II secondary lymphedema from breast CA, lymph node removal, and radiation  Pt demo pitting edema to elbow area  Pt would benefit from skilled OT tx for education of lymphedema, MLD, self MLD, RUE exercises, and providing with most approp compression garments to reduce and prevent further edema  Impairments: lacks appropriate home exercise program  Other impairment: Mild edema/tightness of RUE  Barriers to therapy: Cancer tx  Goals  STGs:  1  Pt will require S for self MLD and HEP to RUE to reduce edema, and inc lymphatic flow and venous return ( Met)  2  Pt will demo RUE edema reduction by 2% (MET)  3  Pt will tolerate most approp compression sleeve wear for at least 4 hours per day  ( Met)  LTGs:  1  Pt will be independent with Self MLD and HEP (MET)  2  Pt will demo and verbalize understanding of lymphedema dx, signs/symptoms  ( Met)  3  Pt will tolerate compression garment wear with G skin integrity for 8+hours per day (part Met)  4  Pt will demo RUE edema reduction by 4%  (Met)    Plan  Planned therapy interventions: manual therapy, massage, self care, functional ROM exercises, home exercise program and therapeutic activities  Frequency: 2x week  Duration in weeks: 4  Plan of Care beginning date: 2020  Plan of Care expiration date: 2020  Treatment plan discussed with: patient        Subjective Evaluation    History of Present Illness  Date of onset: 2020  Mechanism of injury: Pt is a 72 y/o female, who has a reoccurrence of cancer; pt has bone METS throughout her body  Pt had h/o lymph node removal and breast CA 24 years ago; pt had all lymph nodes removed under R axilla  Pt radiation to brachial plexus 2* another cancer occurrence in   Pt reported on 2020 she noticed her R elbow enlarged with feelings of tightness  Pt had DVT which was negative, and then dx with lymphedema and referred to lymphedema therapy             Not a recurrent problem   Quality of life: fair    Pain  Current pain ratin  At best pain ratin  At worst pain ratin  Location: R forearm  Quality: tight and burning  Relieving factors: support and rest  Aggravating factors: lifting    Treatments  Current treatment: occupational therapy  Patient Goals  Patient goals for therapy: decreased edema          Objective     Observations     Right Elbow   Positive for edema  Additional Observation Details  R shoulder flex/abd AROM noted at 0-90* which pt reports as her usual norm  and Pt noted with mild edema in RUE, with mild tightness feeling    General Comments:       Ankle/Foot Comments                  RUE                 LUE                        RUE                RUE   MCPS     18 3                  18                               18 3                     17 5  Wrist      18 6                  18                                15                          15  4cm        15 5                  14 5                              15 4                     15 2  8cm        18 3                  15 5                              17 2                      17 3  12cm       21 2                  17 5                              20 3                      20  16cm       24                      19 7                              23 5                     21 6  20cm        25 7                  21                                 25 5                     23  24cm        27 4                  21 9                              26                        23 5  Elbow       27 6                   22 7                              25 6                     23 5  +4cm        27 5                    20 6                             26 5                    24 5  +8cm        26 5                   22                                 27                       26  +12cm      26 8                  23 6                                27 2                  25 5  +16cm       29                     24                                   28                    27 9  +20cm       28 25 5                Precautions: universal, active cancer      Manuals 7/23            RUE massage Used green tactile ball to dec skin tighness; 10'                                                   Neuro Re-Ed                                                                                                        Ther Ex             RUE ROM/strengthening Arm bike 10'   3 sets 10 pulleys ; active stretching- elbow flex/ext, shoulder flex/ext, wrist flex/ext, digit flex/ext 10x                                                                                                       Ther Activity                                       Gait Training                                       Modalities

## 2020-07-24 ENCOUNTER — TELEPHONE (OUTPATIENT)
Dept: HEMATOLOGY ONCOLOGY | Facility: CLINIC | Age: 70
End: 2020-07-24

## 2020-07-24 NOTE — TELEPHONE ENCOUNTER
Patient returned a call pertaining to COVID-19 Screening questions, Screening questions have been asked,  patient has answered no for all questions, please see questions below:           1  Are you currently experiencing any symptoms of fever, cough, shortness of breath, chills, repeated shaking with chills, muscle pain, headache, sore throat, or new loss of taste/smell? ? Yes - offer a video visit with the provider    ? No - continue to the next question     2  Have you been tested for COVID-19?  ? Yes (positive) - offer a video visit with the provider   ? Yes (negative) - continue to the next question  ? Yes (awaiting results - no symptoms) - continue to the next question   ? Yes (awaiting results - has symptoms) - offer a video visit with the provider   ? No - continue to the next question    3  Have you been in contact with someone that is confirmed COVID-19?  ? Yes - offer a video visit with the provider  ?  No - continue to the next question

## 2020-07-27 ENCOUNTER — OFFICE VISIT (OUTPATIENT)
Dept: HEMATOLOGY ONCOLOGY | Facility: CLINIC | Age: 70
End: 2020-07-27
Payer: MEDICARE

## 2020-07-27 ENCOUNTER — TELEPHONE (OUTPATIENT)
Dept: HEMATOLOGY ONCOLOGY | Facility: CLINIC | Age: 70
End: 2020-07-27

## 2020-07-27 VITALS
BODY MASS INDEX: 17.65 KG/M2 | SYSTOLIC BLOOD PRESSURE: 122 MMHG | RESPIRATION RATE: 16 BRPM | WEIGHT: 99.6 LBS | DIASTOLIC BLOOD PRESSURE: 80 MMHG | OXYGEN SATURATION: 96 % | HEART RATE: 80 BPM | TEMPERATURE: 97.7 F | HEIGHT: 63 IN

## 2020-07-27 DIAGNOSIS — Z17.0 MALIGNANT NEOPLASM OF UPPER-OUTER QUADRANT OF RIGHT BREAST IN FEMALE, ESTROGEN RECEPTOR POSITIVE (HCC): Primary | ICD-10-CM

## 2020-07-27 DIAGNOSIS — C79.51 BONE METASTASIS (HCC): ICD-10-CM

## 2020-07-27 DIAGNOSIS — C50.411 MALIGNANT NEOPLASM OF UPPER-OUTER QUADRANT OF RIGHT BREAST IN FEMALE, ESTROGEN RECEPTOR POSITIVE (HCC): Primary | ICD-10-CM

## 2020-07-27 PROCEDURE — 99214 OFFICE O/P EST MOD 30 MIN: CPT | Performed by: INTERNAL MEDICINE

## 2020-07-27 NOTE — PROGRESS NOTES
HPI:  Patient has stage IV hormone receptor positive right breast cancer to bones and maybe lungs and she has been on Faslodex and low dose Piqray 150 milligram daily and effectiveness of that dose is not clear but she could not tolerate higher dose and maybe will try 200 milligram dose again  Discussed with patient  To be discussed again  She is not on Xgeva because of problem with her teeth  Her blood sugar increased on Piqray and she is on metformin and she checks her blood sugar  Prior to Faslodex and Piqray she was on Faslodex   She could not tolerate Ibrance because of significant hematological toxicities  After early response to Faslodex disease progressed in October 2019 on imaging studies    Patient has generalized weakness and tiredness and she has lost weight  She ambulates with a walker  She has lymphedema of right arm and has lymphedema pump at home and she wears sleeve  Bone pains are controllable  She follows with palliative care team   [id number] and patient was diagnosed to have right breast cancer and she had right breast mastectomy followed by adjuvant chemotherapy  In 2004 she was found to have hormone receptor positive  recurrent disease in the right supraclavicular lymph node   She had surgery and Femara for 10 years until 2015   Evaluation in 2015 was negative for any evidence of metastatic disease   In December 2018 she was found to have extensive bony metastatic disease presumably from previously known hormone receptor-positive right breast cancer      She was hospitalized with acute compression fracture of T12   There was no biopsy   He was started on radiation and that has been completed   She also received radiation to left femur    She was started on Faslodex and to that  Ibrance was added   She was also started on  Xgeva with calcium and vitamin-D  Rajat Chata had profound neutropenia on Ibrance even on a 75 mg dose   Ibrance was discontinued and counts recovered      Disease progressed in the bones in October 2019   Faslodex was stopped  Ann Marie Allan was continued on Xgeva with calcium and vitamin-D  Marty Merle Faslodex plus Piqray started in the last week of January 2020   Cathleen Fleming is on hold because of problem with her teeth                 Current Outpatient Medications:     acetaminophen (TYLENOL) 325 mg tablet, Take 2 tablets (650 mg total) by mouth 3 (three) times a day with meals, Disp: 90 tablet, Rfl: 1    Alpelisib, 300 MG Daily Dose, 2 x 150 MG TBPK, Take 150 mg by mouth daily, Disp: 84 each, Rfl: 1    b complex vitamins capsule, Take 1 capsule by mouth 2 (two) times a day, Disp: 60 capsule, Rfl: 11    Blood Glucose Calibration (ACCU-CHEK COMPACT PLUS CONTROL) SOLN, by In Vitro route daily, Disp: 30 each, Rfl: 11    Blood Glucose Monitoring Suppl (ACCU-CHEK COMPACT CARE KIT) KIT, by Does not apply route every evening, Disp: 1 each, Rfl: 0    calcium carbonate (OS-CLARENCE) 1250 (500 Ca) MG chewable tablet, Chew 1 tablet (1,250 mg total) daily, Disp: 30 tablet, Rfl: 0    Cholecalciferol (VITAMIN D3) 5000 units CAPS, Take 1 capsule by mouth daily, Disp: , Rfl:     ferrous sulfate 325 (65 Fe) mg tablet, Take 1 tablet (325 mg total) by mouth every other day, Disp: 30 tablet, Rfl: 1    gabapentin (NEURONTIN) 300 mg capsule, TAKE 1 CAPSULE BY MOUTH TWICE A DAY, Disp: 180 capsule, Rfl: 3    glucose blood (Accu-Chek Compact Plus) test strip, Use as instructed, Disp: 25 each, Rfl: 11    loratadine (CLARITIN) 10 mg tablet, Take 1 tablet (10 mg total) by mouth daily For allergies, or bone pain from Granix    Three days before and after Granix , Disp: 30 tablet, Rfl: 11    LORazepam (ATIVAN) 1 mg tablet, Take 1-2 tablets (1-2 mg total) by mouth daily at bedtime TAKE 2MG (2 TABLETS) AT BEDTIME , Disp: 60 tablet, Rfl: 2    metFORMIN (GLUCOPHAGE) 500 mg tablet, TAKE 1 TABLET BY MOUTH EVERY DAY, Disp: 30 tablet, Rfl: 0    morphine (MSIR) 15 mg tablet, Take 0 5-1 tablets (7 5-15 mg total) by mouth every 4 (four) hours as needed (cancer pain) 1/2 tab for moderate pain, 1full tab for severe painMax Daily Amount: 90 mg, Disp: 90 tablet, Rfl: 0    [START ON 8/31/2020] morphine (MSIR) 15 mg tablet, Take 1 tablet (15 mg total) by mouth every 4 (four) hours as needed (cancer pain) 1/2 tab for moderate pain, 1full tab for severe painMax Daily Amount: 90 mg, Disp: 90 tablet, Rfl: 0    [START ON 8/3/2020] morphine (MSIR) 15 mg tablet, Take 1 tablet (15 mg total) by mouth every 4 (four) hours as needed (cancer pain) 1/2 tab for moderate pain, 1full tab for severe painMax Daily Amount: 90 mg, Disp: 90 tablet, Rfl: 0    Omega-3 Fatty Acids (FISH OIL PO), Take 2 g by mouth , Disp: , Rfl:     omeprazole (PriLOSEC) 40 MG capsule, Take 1 capsule (40 mg total) by mouth daily, Disp: 90 capsule, Rfl: 1    senna (SENOKOT) 8 6 mg, Take 1 tablet by mouth as needed for constipation, Disp: , Rfl:     zinc gluconate 50 mg tablet, Take 1 tablet (50 mg total) by mouth 2 (two) times a day Hold for nausea or facial flushing  (Patient not taking: Reported on 7/27/2020), Disp: 60 tablet, Rfl: 0    No Known Allergies       Breast cancer metastasized to bone, right ([facility] [address] )    1996 Surgery     right modified radical mastectomy followed by adjuvant systemic therapy but no radiation  6/28/2004 Biopsy     Final Diagnosis  A  Lymph Node, right supraclavicular, core biopsy (14 slides, [id number], [facility]; collected on 6/28/2004):  -  Consistent with metastatic carcinoma, compatible with breast origin (see note)  2004 -  Radiation     salvage radiation therapy at an outside facility to right supraclavicular nodes at [facility]      12/23/2018 Initial Diagnosis     Breast cancer metastasized to bone, right ([address] )      12/26/2018 - 1/9/2019 Radiation     3000 cGy in 10 fractions to  T11 through the bottom of the SI joints  Dr Angie Kaye        1/16/2019 -  Chemotherapy     fulvestrant (FASLODEX) IM injection          2/20/2019 - 4/4/2019 Chemotherapy     Ibrance 125 mg PO day 1-21 every 28 days      4/4/2019 - 5/8/2019 Chemotherapy     Ibrance 100 mg PO daily day 1-21 every 28 days       5/22/2019 -  Chemotherapy     FaslodexDomenico      6/27/2019 Genetic Testing      No pathogenic mutation identified in [facility] Breast panel      12/16/2019 Biopsy     Bone, right iliac crest, biopsy:  -  Metastatic carcinoma, compatible with breast primary  -  Immunohistochemical stains performed with appropriate controls show the tumor to be positive for keratin AE 1/3, ADRIEN-3 and ER (patchy)  4/2/2020 -  Chemotherapy     Piqray 150 mg daily        Malignant neoplasm of upper-outer quadrant of right breast in female, estrogen receptor positive ([facility] [address] )    6/25/2019 Initial Diagnosis     Malignant neoplasm of upper-outer quadrant of right breast in female, estrogen receptor [facility]         ROS:  07/27/20 Reviewed 13 systems: See symptoms in HPI:  Weakness  Tiredness  Weight loss  Gait problem  Bone and Joint pains  Lymphedema  Presently no headaches, seizures, dizziness, diplopia, dysphagia, hoarseness, chest pain, palpitations, shortness of breath, cough, hemoptysis, abdominal pain, nausea, vomiting, change in bowel habits, melena, hematuria, fever, chills, bleeding,  skin rash,    numbness,  claudication   No frequent infections  Not unusually sensitive to heat or cold  No swelling of the ankles  No swollen glands  Patient is anxious         /80 (BP Location: Left arm, Patient Position: Sitting, Cuff Size: Adult)   Pulse 80   Temp 97 7 °F (36 5 °C)   Resp 16   Ht 5' 3" (1 6 m)   Wt 45 2 kg (99 lb 9 6 oz)   LMP 05/27/1996 (LMP Unknown)   SpO2 96%   BMI 17 64 kg/m²     Physical Exam:  Alert, oriented, not in distress, no icterus, no oral thrush, no palpable neck mass, clear lung fields, regular heart rate, abdomen  soft and non tender, no palpable abdominal mass, no ascites, no edema of ankles, no calf tenderness, no focal neurological deficit, has generalized weakness, no skin rash, no palpable lymphadenopathy in the neck and axillary areas, good arterial pulses  Patient is anxious  Performance status 3  She ambulates with a walker  Lymphedema right arm       IMAGING:      LABS:  Results for orders placed or performed in visit on 07/15/20   CBC and differential   Result Value Ref Range    WBC 2 32 (L) 4 31 - 10 16 Thousand/uL    RBC 3 58 (L) 3 81 - 5 12 Million/uL    Hemoglobin 11 1 (L) 11 5 - 15 4 g/dL    Hematocrit 34 5 (L) 34 8 - 46 1 %    MCV 96 82 - 98 fL    MCH 31 0 26 8 - 34 3 pg    MCHC 32 2 31 4 - 37 4 g/dL    RDW 14 8 11 6 - 15 1 %    MPV 9 0 8 9 - 12 7 fL    Platelets 529 819 - 855 Thousands/uL    nRBC 0 /100 WBCs    Neutrophils Relative 70 43 - 75 %    Immat GRANS % 0 0 - 2 %    Lymphocytes Relative 17 14 - 44 %    Monocytes Relative 7 4 - 12 %    Eosinophils Relative 5 0 - 6 %    Basophils Relative 1 0 - 1 %    Neutrophils Absolute 1 62 (L) 1 85 - 7 62 Thousands/µL    Immature Grans Absolute 0 01 0 00 - 0 20 Thousand/uL    Lymphocytes Absolute 0 40 (L) 0 60 - 4 47 Thousands/µL    Monocytes Absolute 0 16 (L) 0 17 - 1 22 Thousand/µL    Eosinophils Absolute 0 11 0 00 - 0 61 Thousand/µL    Basophils Absolute 0 02 0 00 - 0 10 Thousands/µL   Comprehensive metabolic panel   Result Value Ref Range    Sodium 138 136 - 145 mmol/L    Potassium 3 3 (L) 3 5 - 5 3 mmol/L    Chloride 103 100 - 108 mmol/L    CO2 28 21 - 32 mmol/L    ANION GAP 7 4 - 13 mmol/L    BUN 16 5 - 25 mg/dL    Creatinine 0 81 0 60 - 1 30 mg/dL    Glucose, Fasting 136 (H) 65 - 99 mg/dL    Calcium 8 2 (L) 8 3 - 10 1 mg/dL    AST 38 5 - 45 U/L    ALT 40 12 - 78 U/L    Alkaline Phosphatase 83 46 - 116 U/L    Total Protein 5 9 (L) 6 4 - 8 2 g/dL    Albumin 3 3 (L) 3 5 - 5 0 g/dL    Total Bilirubin 0 83 0 20 - 1 00 mg/dL    eGFR 74 ml/min/1 73sq m     *Note: Due to a large number of results and/or encounters for the requested time period, some results have not been displayed  A complete set of results can be found in Results Review  Labs, Imaging, & Other studies:   All pertinent labs and imaging studies were personally reviewed    Lab Results   Component Value Date    K 3 3 (L) 07/15/2020     07/15/2020    CO2 28 07/15/2020    BUN 16 07/15/2020    CREATININE 0 81 07/15/2020    GLUF 136 (H) 07/15/2020    CALCIUM 8 2 (L) 07/15/2020    AST 38 07/15/2020    ALT 40 07/15/2020    ALKPHOS 83 07/15/2020    EGFR 74 07/15/2020     Lab Results   Component Value Date    WBC 2 32 (L) 07/15/2020    HGB 11 1 (L) 07/15/2020    HCT 34 5 (L) 07/15/2020    MCV 96 07/15/2020     07/15/2020       Reviewed and discussed with patient  Assessment and plan:  Patient has stage IV hormone receptor positive right breast cancer to bones and maybe lungs and she has been on Faslodex and low dose Piqray 150 milligram daily and effectiveness of that dose is not clear but she could not tolerate higher dose and maybe will try 200 milligram dose again  Discussed with patient  To be discussed again  She is not on Xgeva because of problem with her teeth  Her blood sugar increased on Piqray and she is on metformin and she checks her blood sugar  Prior to Faslodex and Piqray she was on Faslodex   She could not tolerate Ibrance because of significant hematological toxicities  After early response to Faslodex disease progressed in October 2019 on imaging studies    Patient has generalized weakness and tiredness and she has lost weight  She ambulates with a walker  She has lymphedema of right arm and has lymphedema pump at home and she wears sleeve  Bone pains are controllable  She follows with palliative care team   [id number] and patient was diagnosed to have right breast cancer and she had right breast mastectomy followed by adjuvant chemotherapy     In 2004 she was found to have hormone receptor positive  recurrent disease in the right supraclavicular lymph node   She had surgery and Femara for 10 years until 2015   Evaluation in 2015 was negative for any evidence of metastatic disease   In December 2018 she was found to have extensive bony metastatic disease presumably from previously known hormone receptor-positive right breast cancer      She was hospitalized with acute compression fracture of T12   There was no biopsy   He was started on radiation and that has been completed   She also received radiation to left femur    She was started on Faslodex and to that  Ibrance was added   She was also started on  Xgeva with calcium and vitamin-D  Raul Carlos had profound neutropenia on Ibrance even on a 75 mg dose  Waldo Spark was discontinued and counts recovered      Disease progressed in the bones in October 2019   Faslodex was stopped  Zorimiguel a Steiner was continued on Xgeva with calcium and vitamin-D  Oli Ingles Faslodex plus Piqray started in the last week of January 2020   Eva Dempsey is on hold because of problem with her teeth     Physical examination and test results are as recorded and discussed  No change in therapy at present but thinking of increasing dose of Piqray to 200 milligram daily  To discuss with my physician assistant because she has been following this patient  Patient also receives Granix for neutropenia  Goal is response and prolongation of survival and treatment of symptoms     All discussed in detail  Questions answered  Discussed the importance of self-breast examination, eating healthy foods, activities as tolerated and health screening test   Patient needs assistance in her care  Also discussed prevention against coronavirus  1  Malignant neoplasm of upper-outer quadrant of right breast in female, estrogen receptor positive (HCC)    - CBC and differential; Standing  - Comprehensive metabolic panel; Standing  - CBC and differential  - Comprehensive metabolic panel    2   Bone metastasis (HCC)    - CBC and differential; Standing  - Comprehensive metabolic panel; Standing  - CBC and differential  - Comprehensive metabolic panel          3  Non-traumatic compression fracture of T12 thoracic vertebra with routine healing, subsequent encounter        4  Lytic bone lesion of femur        5  Radiation-induced brachial plexopathy        6  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

## 2020-07-27 NOTE — TELEPHONE ENCOUNTER
Patient was set up for [employer] for her infusion appointments @ [facility] and follow up appointments

## 2020-07-27 NOTE — TELEPHONE ENCOUNTER
Called patient to review follow up regarding dental procedure  She stated the dental office is going to create a "partial piece" after removing the crowns/remainer of the roots  She stated we will continue to hold Xgeva  She reviewed dental office is supposed to be sending over a clearance form for Dr Filiberto Berry to fill out but I reviewed we have not received one  She is going to call dental office again to review/have them fax a new one to the [facility]

## 2020-07-29 ENCOUNTER — HOSPITAL ENCOUNTER (OUTPATIENT)
Dept: INFUSION CENTER | Facility: CLINIC | Age: 70
Discharge: HOME | End: 2020-07-29
Payer: MEDICARE

## 2020-07-29 ENCOUNTER — APPOINTMENT (OUTPATIENT)
Dept: LAB | Facility: CLINIC | Age: 70
End: 2020-07-29
Payer: MEDICARE

## 2020-07-29 VITALS — TEMPERATURE: 98.1 F

## 2020-07-29 DIAGNOSIS — Z17.0 MALIGNANT NEOPLASM OF UPPER-OUTER QUADRANT OF RIGHT BREAST IN FEMALE, ESTROGEN RECEPTOR POSITIVE (HCC): ICD-10-CM

## 2020-07-29 DIAGNOSIS — C50.411 MALIGNANT NEOPLASM OF UPPER-OUTER QUADRANT OF RIGHT BREAST IN FEMALE, ESTROGEN RECEPTOR POSITIVE (HCC): ICD-10-CM

## 2020-07-29 DIAGNOSIS — D70.2 DRUG-INDUCED LEUKOPENIA (HCC): ICD-10-CM

## 2020-07-29 DIAGNOSIS — D70.9 AGRANULOCYTOSIS (HCC): Primary | ICD-10-CM

## 2020-07-29 DIAGNOSIS — C79.51 BREAST CANCER METASTASIZED TO BONE, RIGHT (HCC): ICD-10-CM

## 2020-07-29 DIAGNOSIS — C50.911 BREAST CANCER METASTASIZED TO BONE, RIGHT (HCC): ICD-10-CM

## 2020-07-29 LAB
ALBUMIN SERPL BCP-MCNC: 3.1 G/DL (ref 3.5–5)
ALP SERPL-CCNC: 86 U/L (ref 46–116)
ALT SERPL W P-5'-P-CCNC: 36 U/L (ref 12–78)
ANION GAP SERPL CALCULATED.3IONS-SCNC: 8 MMOL/L (ref 4–13)
AST SERPL W P-5'-P-CCNC: 33 U/L (ref 5–45)
BASOPHILS # BLD AUTO: 0.03 THOUSANDS/ΜL (ref 0–0.1)
BASOPHILS NFR BLD AUTO: 1 % (ref 0–1)
BILIRUB SERPL-MCNC: 0.67 MG/DL (ref 0.2–1)
BUN SERPL-MCNC: 15 MG/DL (ref 5–25)
CALCIUM SERPL-MCNC: 8.2 MG/DL (ref 8.3–10.1)
CHLORIDE SERPL-SCNC: 99 MMOL/L (ref 100–108)
CO2 SERPL-SCNC: 27 MMOL/L (ref 21–32)
CREAT SERPL-MCNC: 0.67 MG/DL (ref 0.6–1.3)
EOSINOPHIL # BLD AUTO: 0.08 THOUSAND/ΜL (ref 0–0.61)
EOSINOPHIL NFR BLD AUTO: 2 % (ref 0–6)
ERYTHROCYTE [DISTWIDTH] IN BLOOD BY AUTOMATED COUNT: 14.3 % (ref 11.6–15.1)
GFR SERPL CREATININE-BSD FRML MDRD: 89 ML/MIN/1.73SQ M
GLUCOSE P FAST SERPL-MCNC: 145 MG/DL (ref 65–99)
HCT VFR BLD AUTO: 35.7 % (ref 34.8–46.1)
HGB BLD-MCNC: 11.4 G/DL (ref 11.5–15.4)
IMM GRANULOCYTES # BLD AUTO: 0.04 THOUSAND/UL (ref 0–0.2)
IMM GRANULOCYTES NFR BLD AUTO: 1 % (ref 0–2)
LYMPHOCYTES # BLD AUTO: 0.53 THOUSANDS/ΜL (ref 0.6–4.47)
LYMPHOCYTES NFR BLD AUTO: 14 % (ref 14–44)
MCH RBC QN AUTO: 30.7 PG (ref 26.8–34.3)
MCHC RBC AUTO-ENTMCNC: 31.9 G/DL (ref 31.4–37.4)
MCV RBC AUTO: 96 FL (ref 82–98)
MONOCYTES # BLD AUTO: 0.25 THOUSAND/ΜL (ref 0.17–1.22)
MONOCYTES NFR BLD AUTO: 7 % (ref 4–12)
NEUTROPHILS # BLD AUTO: 2.88 THOUSANDS/ΜL (ref 1.85–7.62)
NEUTS SEG NFR BLD AUTO: 75 % (ref 43–75)
NRBC BLD AUTO-RTO: 0 /100 WBCS
PLATELET # BLD AUTO: 213 THOUSANDS/UL (ref 149–390)
PMV BLD AUTO: 8.8 FL (ref 8.9–12.7)
POTASSIUM SERPL-SCNC: 3.7 MMOL/L (ref 3.5–5.3)
PROT SERPL-MCNC: 6 G/DL (ref 6.4–8.2)
RBC # BLD AUTO: 3.71 MILLION/UL (ref 3.81–5.12)
SODIUM SERPL-SCNC: 134 MMOL/L (ref 136–145)
WBC # BLD AUTO: 3.81 THOUSAND/UL (ref 4.31–10.16)

## 2020-07-29 PROCEDURE — 36415 COLL VENOUS BLD VENIPUNCTURE: CPT | Performed by: INTERNAL MEDICINE

## 2020-07-29 PROCEDURE — 96372 THER/PROPH/DIAG INJ SC/IM: CPT

## 2020-07-29 PROCEDURE — 85025 COMPLETE CBC W/AUTO DIFF WBC: CPT | Performed by: INTERNAL MEDICINE

## 2020-07-29 PROCEDURE — 96402 CHEMO HORMON ANTINEOPL SQ/IM: CPT

## 2020-07-29 PROCEDURE — 80053 COMPREHEN METABOLIC PANEL: CPT | Performed by: INTERNAL MEDICINE

## 2020-07-29 RX ORDER — LAMOTRIGINE 25 MG/1
500 TABLET ORAL ONCE
Status: COMPLETED | OUTPATIENT
Start: 2020-07-29 | End: 2020-07-29

## 2020-07-29 RX ORDER — LAMOTRIGINE 25 MG/1
500 TABLET ORAL ONCE
Status: CANCELLED | OUTPATIENT
Start: 2020-08-26

## 2020-07-29 RX ADMIN — TBO-FILGRASTIM 300 MCG: 300 INJECTION, SOLUTION SUBCUTANEOUS at 10:20

## 2020-07-29 RX ADMIN — FULVESTRANT 500 MG: 50 INJECTION, SOLUTION INTRAMUSCULAR at 10:20

## 2020-07-29 NOTE — PROGRESS NOTES
Patient to [facility] for Granix / Faslodex: Offers no complaints at present time: Lab work ( 07/29/20 ) reviewed: No parameters written: Injections given in Left UA / Buttocks ( LUQ / RUQ ) without incident: No adverse reactions noted: No further appt's scheduled:  Will make upon discharge: AVS offered and declined

## 2020-08-10 ENCOUNTER — TELEPHONE (OUTPATIENT)
Dept: HEMATOLOGY ONCOLOGY | Facility: CLINIC | Age: 70
End: 2020-08-10

## 2020-08-10 NOTE — TELEPHONE ENCOUNTER
Spoke with patient  Patient experiences severe frontal headache immediately after granix injection with blurred vision 24 hours later  Headache and blurred vison last for 3 days   Patient takes tylenol every 8 hours during this period along with claritin  Will pass on to Dr Cardenas's RNs to discuss with MD

## 2020-08-10 NOTE — TELEPHONE ENCOUNTER
Spoke with patient  Advised to take claritin every day  She is agreeable  Continue to take Tylenol  May take Aleve PRN  Denies stomach problems  She will take with food  She states she had oral surgeon office fax paperwork to Gabriela after patient speaking with Sabra Amin  Will follow up with her about this

## 2020-08-10 NOTE — TELEPHONE ENCOUNTER
Received fax from [facility] Oral Surgery       Email faxed to [facility]

## 2020-08-10 NOTE — TELEPHONE ENCOUNTER
Patient states that after her Granix shot she experiences severe headache with blurred vision and bone pain  vision for about 3 days after Granix shot  Claritin 3 days before and 3 days after  Patient would like to know if she can take Aleve for pain  Patient is also suppose to go for dental surgery    Please call patient back at  [phone]

## 2020-08-12 ENCOUNTER — TELEPHONE (OUTPATIENT)
Dept: HEMATOLOGY ONCOLOGY | Facility: CLINIC | Age: 70
End: 2020-08-12

## 2020-08-12 ENCOUNTER — APPOINTMENT (OUTPATIENT)
Dept: LAB | Facility: CLINIC | Age: 70
End: 2020-08-12
Payer: MEDICARE

## 2020-08-12 ENCOUNTER — HOSPITAL ENCOUNTER (OUTPATIENT)
Dept: INFUSION CENTER | Facility: CLINIC | Age: 70
Discharge: HOME | End: 2020-08-12
Payer: MEDICARE

## 2020-08-12 VITALS — TEMPERATURE: 97.9 F

## 2020-08-12 DIAGNOSIS — D70.9 AGRANULOCYTOSIS (HCC): Primary | ICD-10-CM

## 2020-08-12 DIAGNOSIS — D70.2 DRUG-INDUCED LEUKOPENIA (HCC): ICD-10-CM

## 2020-08-12 PROCEDURE — 96372 THER/PROPH/DIAG INJ SC/IM: CPT

## 2020-08-12 RX ADMIN — TBO-FILGRASTIM 300 MCG: 300 INJECTION, SOLUTION SUBCUTANEOUS at 10:16

## 2020-08-12 NOTE — TELEPHONE ENCOUNTER
Patient returned call from Saul Gonzalez regarding oral surgery clearance    Please return patient's call at [phone]

## 2020-08-12 NOTE — TELEPHONE ENCOUNTER
Spoke with Kaur Given at Dr Sabina Hernadez at [address] [facility] [phone]  She will be faxing the clearance for oral surgery clearance to be filled out by Dr Marcelo Mancera to [facility] @ [phone]  Will alert Gabriela LINDER of incoming fax

## 2020-08-12 NOTE — PROGRESS NOTES
Patient here for granix and is doing well  Reports fatigue but no different from her normal   Also reports pain right arm due to lymphedema rated at a 4-5 but is on MSIR as needed  Labs drawn prior to appt in outpatient lab, CBC and CMP  Patient tolerated granix to JOJO without incident, bandaid applied and CDI  She will RTO 8/26/20 for same and faslodex  Copy of CBC given to patient per request   CMP not yet resulted

## 2020-08-12 NOTE — PATIENT INSTRUCTIONS
August 2020 Sunday Monday Tuesday Wednesday Thursday Friday Saturday                                 1                2     3     4     5     6     7     8                9     10     11     12    LAB WALK IN   8:35 AM   (5 min )   AN MOB LAB PSC CHAIR 1   [facility] [address] MOB    INF THERAPY PLAN  10:30 AM   (30 min )   AN INF QUICK [address] 13     14     15          Cycle 1, Treatment 9      16     17     18     19     20     21     22                23     24     25     26    INF THERAPY PLAN  10:30 AM   (30 min )   AN INF [address] [facility] [address] [facility] 27     28     29          Cycle 1, Treatment 10      30     31                                              Treatment Details       8/12/2020 - Cycle 1, Treatment 9      Supportive Care: APPT 17, ONCBCN NURSE [id number], tbo-filgrastim (GRANIX)    8/26/2020 - Cycle 1, Treatment 10      Supportive Care: APPT 17        September 2020 Sunday Monday Tuesday Wednesday Thursday Friday Saturday             1     2     3    FOLLOW UP PG  10:45 AM   (30 min )   Srinath Ornelas PA-C   [facility] [place] 4     5                6     7     8     9    INF THERAPY PLAN  11:00 AM   (30 min )   AN INF QUICK CHAIR 01   [address] 10     11     12          Cycle 1, Treatment 11      13     14     15     16     17     18     19                20     21     22     23    INF THERAPY PLAN  10:30 AM   (30 min )   AN INF QUICK CHAIR [address] [facility] [address] 24     25     26          Cycle 1, Treatment 12      27     28     29     30                                    Treatment Details       9/9/2020 - Cycle 1, Treatment 11      Supportive Care: APPT 17    9/23/2020 - Cycle 1, Treatment 12      Supportive Care: APPT 17

## 2020-08-18 ENCOUNTER — TELEPHONE (OUTPATIENT)
Dept: HEMATOLOGY ONCOLOGY | Facility: MEDICAL CENTER | Age: 70
End: 2020-08-18

## 2020-08-18 NOTE — TELEPHONE ENCOUNTER
Sunday Szymanski from [employer] called in to follow up on a fax that was send 2x I advised Sunday Szymanski to fax it over to us so I can ensure that its gets to [facility] office today I will wait for Fax and refax it over to Lucian

## 2020-08-19 ENCOUNTER — TELEPHONE (OUTPATIENT)
Dept: HEMATOLOGY ONCOLOGY | Facility: CLINIC | Age: 70
End: 2020-08-19

## 2020-08-19 NOTE — TELEPHONE ENCOUNTER
Call from Juliana Whitaker at [facility] Oral Surgery  Clearance letter form Dr Belinda Penaloza has not been received   Spoke with Dr Shara Lovell MA, she will have Dr Selena Hastingsist complete and fax form  Juliana Whitaker aware  Juliana Whitaker will call tomorrow if form not received  Left message for patient to inform

## 2020-08-20 ENCOUNTER — TELEPHONE (OUTPATIENT)
Dept: HEMATOLOGY ONCOLOGY | Facility: CLINIC | Age: 70
End: 2020-08-20

## 2020-08-20 NOTE — TELEPHONE ENCOUNTER
Form completed and faxed over to [facility], confirmation provided that [facility] from [employer] received forms

## 2020-08-20 NOTE — TELEPHONE ENCOUNTER
Karla Montiel from [employer] called to check status of forms faxed on 8-  Nicol Caraballo stated she faxed forms on 8-19-20  Karla Montiel states she did not receive it   I requested forms be refaxed to [phone]

## 2020-08-23 DIAGNOSIS — E11.9 TYPE 2 DIABETES MELLITUS WITHOUT COMPLICATION, WITHOUT LONG-TERM CURRENT USE OF INSULIN (HCC): ICD-10-CM

## 2020-08-23 DIAGNOSIS — R73.9 HYPERGLYCEMIA: ICD-10-CM

## 2020-08-26 ENCOUNTER — APPOINTMENT (OUTPATIENT)
Dept: LAB | Facility: CLINIC | Age: 70
End: 2020-08-26
Payer: MEDICARE

## 2020-08-26 ENCOUNTER — HOSPITAL ENCOUNTER (OUTPATIENT)
Dept: INFUSION CENTER | Facility: CLINIC | Age: 70
Discharge: HOME | End: 2020-08-26
Payer: MEDICARE

## 2020-08-26 ENCOUNTER — TRANSCRIBE ORDERS (OUTPATIENT)
Dept: LAB | Facility: CLINIC | Age: 70
End: 2020-08-26

## 2020-08-26 VITALS — TEMPERATURE: 97.8 F

## 2020-08-26 DIAGNOSIS — Z17.0 MALIGNANT NEOPLASM OF UPPER-OUTER QUADRANT OF RIGHT BREAST IN FEMALE, ESTROGEN RECEPTOR POSITIVE (HCC): ICD-10-CM

## 2020-08-26 DIAGNOSIS — C79.51 BREAST CANCER METASTASIZED TO BONE, RIGHT (HCC): Primary | ICD-10-CM

## 2020-08-26 DIAGNOSIS — C50.911 BREAST CANCER METASTASIZED TO BONE, RIGHT (HCC): Primary | ICD-10-CM

## 2020-08-26 DIAGNOSIS — D70.9 AGRANULOCYTOSIS (HCC): ICD-10-CM

## 2020-08-26 DIAGNOSIS — C50.411 MALIGNANT NEOPLASM OF UPPER-OUTER QUADRANT OF RIGHT BREAST IN FEMALE, ESTROGEN RECEPTOR POSITIVE (HCC): ICD-10-CM

## 2020-08-26 DIAGNOSIS — D70.2 DRUG-INDUCED LEUKOPENIA (HCC): ICD-10-CM

## 2020-08-26 PROCEDURE — 96402 CHEMO HORMON ANTINEOPL SQ/IM: CPT

## 2020-08-26 PROCEDURE — 96372 THER/PROPH/DIAG INJ SC/IM: CPT

## 2020-08-26 RX ORDER — LAMOTRIGINE 25 MG/1
500 TABLET ORAL ONCE
Status: CANCELLED | OUTPATIENT
Start: 2020-09-23

## 2020-08-26 RX ORDER — LAMOTRIGINE 25 MG/1
500 TABLET ORAL ONCE
Status: COMPLETED | OUTPATIENT
Start: 2020-08-26 | End: 2020-08-26

## 2020-08-26 RX ADMIN — FULVESTRANT 500 MG: 50 INJECTION, SOLUTION INTRAMUSCULAR at 09:20

## 2020-08-26 RX ADMIN — TBO-FILGRASTIM 300 MCG: 300 INJECTION, SOLUTION SUBCUTANEOUS at 09:17

## 2020-08-26 NOTE — PROGRESS NOTES
Pt to clinic for granix and faslodex injections  granix given in JOJO and faslodex given in each buttocks   Pt tolerated injections without complications, printed labs per pt request  Pt aware of next appointment, declined avs

## 2020-08-26 NOTE — PLAN OF CARE
Problem: Potential for Falls  Goal: Patient will remain free of falls  Description: INTERVENTIONS:  - Assess patient frequently for physical needs  -  Identify cognitive and physical deficits and behaviors that affect risk of falls    -  [place] fall precautions as indicated by assessment   - Educate patient/family on patient safety including physical limitations  - Instruct patient to call for assistance with activity based on assessment  - Modify environment to reduce risk of injury  - Consider OT/PT consult to assist with strengthening/mobility  Outcome: Progressing

## 2020-08-27 ENCOUNTER — TELEPHONE (OUTPATIENT)
Dept: HEMATOLOGY ONCOLOGY | Facility: CLINIC | Age: 70
End: 2020-08-27

## 2020-08-27 NOTE — TELEPHONE ENCOUNTER
Patient called stating that she has very tired and is having a hard time staying awake and would like to know if it make be related to her lab levels or the use of Claritin medication  Best call back  [phone]

## 2020-09-03 ENCOUNTER — OFFICE VISIT (OUTPATIENT)
Dept: HEMATOLOGY ONCOLOGY | Facility: CLINIC | Age: 70
End: 2020-09-03
Payer: MEDICARE

## 2020-09-03 VITALS
HEART RATE: 84 BPM | DIASTOLIC BLOOD PRESSURE: 60 MMHG | WEIGHT: 101 LBS | SYSTOLIC BLOOD PRESSURE: 110 MMHG | RESPIRATION RATE: 18 BRPM | BODY MASS INDEX: 17.89 KG/M2 | HEIGHT: 63 IN | TEMPERATURE: 97.6 F

## 2020-09-03 DIAGNOSIS — C78.00 MALIGNANT NEOPLASM METASTATIC TO LUNG, UNSPECIFIED LATERALITY (HCC): Primary | ICD-10-CM

## 2020-09-03 DIAGNOSIS — C50.911 MALIGNANT NEOPLASM OF RIGHT BREAST IN FEMALE, ESTROGEN RECEPTOR POSITIVE, UNSPECIFIED SITE OF BREAST (HCC): ICD-10-CM

## 2020-09-03 DIAGNOSIS — C50.411 MALIGNANT NEOPLASM OF UPPER-OUTER QUADRANT OF RIGHT BREAST IN FEMALE, ESTROGEN RECEPTOR POSITIVE (HCC): ICD-10-CM

## 2020-09-03 DIAGNOSIS — C79.51 BONE METASTASES (HCC): ICD-10-CM

## 2020-09-03 DIAGNOSIS — Z17.0 MALIGNANT NEOPLASM OF UPPER-OUTER QUADRANT OF RIGHT BREAST IN FEMALE, ESTROGEN RECEPTOR POSITIVE (HCC): ICD-10-CM

## 2020-09-03 DIAGNOSIS — Z17.0 MALIGNANT NEOPLASM OF RIGHT BREAST IN FEMALE, ESTROGEN RECEPTOR POSITIVE, UNSPECIFIED SITE OF BREAST (HCC): ICD-10-CM

## 2020-09-03 PROCEDURE — 99214 OFFICE O/P EST MOD 30 MIN: CPT | Performed by: PHYSICIAN ASSISTANT

## 2020-09-03 NOTE — PROGRESS NOTES
Hematology/Oncology Outpatient Follow-up  Ivette Roper 79 y o  female 1950 [id number]    Date:  9/3/2020      Assessment and Plan:  1  Malignant neoplasm of upper-outer quadrant of right breast in female, estrogen receptor positive ([facility] ), 2  Malignant neoplasm of right breast in female, estrogen receptor positive, unspecified site of breast ([facility] ), 3  Malignant neoplasm metastatic to lung, unspecified laterality ([facility] ), 4  Bone metastases [facility])  79-year-old female presents for follow-up regarding history of metastatic breast cancer, PIK3CA positive  She is on Piqray 150 mg p o  Daily since April 2020 as well as Faslodex  Derik Ramirez has been on hold due to anticipated dental work that still has not occurred  Patient is on reduced dose of Piqray secondary to hyperglycemia and neutropenia  She is requiring Granix every 2 weeks due to neutropenia  She continues to have poor taste in appetite with Piqray  She asked if we could placed on hold for approximately 2 weeks  We will place on hold for 2 weeks at this time  She will have a CBC next Wednesday  If [address] is adequate Granix will be canceled as she is off of [address]  We will see if her appetite improves and if she can gain weight during this time  Repeat CT chest is also requested to determine disease status  She will continue with Faslodex at this time  She will call us in 2 weeks with an update on her condition  Follow-up in October as already scheduled  - CT chest w contrast; Future    HPI:  Oncology History   Breast cancer metastasized to bone, right [facility])   1996 Surgery    right modified radical mastectomy followed by adjuvant systemic therapy but no radiation  6/28/2004 Biopsy    Final Diagnosis  A  Lymph Node, right supraclavicular, core biopsy (14 slides, [id number], [facility]; collected on 6/28/2004):  -  Consistent with metastatic carcinoma, compatible with breast origin (see note)           2004 -  Radiation    salvage radiation therapy at an outside facility to right supraclavicular nodes at [facility]     12/23/2018 Initial Diagnosis    Breast cancer metastasized to bone, right ([facility] )     12/26/2018 - 1/9/2019 Radiation    3000 cGy in 10 fractions to  T11 through the bottom of the SI joints  Dr Shereen Goins       1/16/2019 -  Chemotherapy    fulvestrant (FASLODEX) IM injection   2/20/2019 - 4/4/2019 Chemotherapy    Ibrance 125 mg PO day 1-21 every 28 days     4/4/2019 - 5/8/2019 Chemotherapy    Ibrance 100 mg PO daily day 1-21 every 28 days      5/22/2019 -  Chemotherapy    Faslodex, Lucilla Fleischer     6/27/2019 Genetic Testing     No pathogenic mutation identified in [employer] Breast panel     12/16/2019 Biopsy    Bone, right iliac crest, biopsy:  -  Metastatic carcinoma, compatible with breast primary  -  Immunohistochemical stains performed with appropriate controls show the tumor to be positive for keratin AE 1/3, ADRIEN-3 and ER (patchy)       4/2/2020 -  Chemotherapy    Piqray 150 mg daily     Malignant neoplasm of upper-outer quadrant of right breast in female, estrogen receptor positive ([facility] [address] )   6/25/2019 Initial Diagnosis    Malignant neoplasm of upper-outer quadrant of right breast in female, estrogen receptor positive [facility])         70-year-old female presents for follow-up regarding metastatic breast cancer  Esther Gabriel 57 patient was diagnosed with hormone receptor positive breast cancer      She was then diagnosed with metastatic disease in December 2018   She required radiation therapy   She also was started on Faslodex and Ibrance   She was not able to continue on Ibrance secondary to severe neutropenia even at lowest dose reduction      She was then maintained on Faslodex and Xgeva      Tumor marker is not reliable in her case      Repeat imaging in October 2019 showed disease progression   Thoracic surgery was unable to biopsy anything in the lung   This was reviewed with thoracic surgery   Therefore a CT abdomen pelvis was performed to see if there is any other targeted will area for biopsy   The only lesion was a bone lesion   Patient was scheduled for a bone biopsy in early December however this was canceled secondary to whether by the patient   This then was rescheduled      Patient's specimen was sent for molecular testing  And this is still pending      Due to this, she had liquid biopsy      This showed: positive PIK3CA genetic mutation      Patient experienced hyperglycemia   Dose of Piqray reduced      Patient then experienced grade 3 neutropenia on 3/25/20 blood work, dose reduced again      She started on 150 mg Piqray on 4/2/20       She started with RUE arm swelling around 4/10/20  Doppler was negative  This continued to worsened and felt to be due to lymphedema when seen in the office on 5/1/20  She now is lymphedema pump at home  This is working well for management  Interval history:  She had been taking Claritin every day  She became very sleepy when doing this  She stop taking Claritin every day and feels that her daytime drowsiness has improved  MRI of the thoracic and lumbar spine in June 2020 showed stable to improved disease  She has repeat imaging in September with Radiation Oncology  ROS: Review of Systems   Constitutional: Positive for appetite change (poor ) and fatigue  Negative for chills, fever and unexpected weight change  HENT: Negative for mouth sores and nosebleeds  Respiratory: Negative for cough and shortness of breath  Cardiovascular: Negative for chest pain, palpitations and leg swelling  Gastrointestinal: Positive for nausea (occassional, resolves with zofran)  Negative for abdominal pain, constipation, diarrhea and vomiting  Genitourinary: Negative for difficulty urinating, dysuria and hematuria  Musculoskeletal: Negative for arthralgias  Skin: Negative  Neurological: Positive for dizziness, weakness and headaches (sometimes after getting granix)   Negative for light-headedness and numbness  Hematological: Negative  Psychiatric/Behavioral: Negative  Past Medical History:   Diagnosis Date    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 3/25/2020    BRCA1 negative     BRCA2 negative     Breast cancer ([facility] ) 05/05/1996    right     Cancer [facility])     breast 1996    Cancer ([facility] )     bone      Compression fracture of thoracic vertebra ([facility] )     Diabetes mellitus ([place] [address] )     History of chemotherapy 1996    History of therapeutic radiation     Tendonitis        Past Surgical History:   Procedure Laterality Date    CHOLECYSTECTOMY      CT NEEDLE BIOPSY BONE  12/16/2019    MASTECTOMY Right 05/05/1996    Right side reconstruction    NECK SURGERY      ND ESOPHAGOGASTRODUODENOSCOPY TRANSORAL DIAGNOSTIC N/A 1/24/2019    Procedure: ESOPHAGOGASTRODUODENOSCOPY (EGD); Surgeon: David Miles MD;  [facility]; Service: Gastroenterology    TONSILLECTOMY         Social History     Socioeconomic History    Marital status:       Spouse name: None    Number of children: 2    Years of education: None    Highest education level: None   Occupational History    Occupation: Retired   Social Needs    Financial resource strain: None    Food insecurity     Worry: None     Inability: None    Transportation needs     Medical: None     Non-medical: None   Tobacco Use    Smoking status: Never Smoker    Smokeless tobacco: Never Used   Substance and Sexual Activity    Alcohol use: No    Drug use: Yes     Types: Morphine     Comment: for cancer pain    Sexual activity: None   Lifestyle    Physical activity     Days per week: None     Minutes per session: None    Stress: None   Relationships    Social connections     Talks on phone: None     Gets together: None     Attends Islam service: None     Active member of club or organization: None     Attends meetings of clubs or organizations: None     Relationship status: None    Intimate partner violence     Fear of current or ex partner: None     Emotionally abused: None     Physically abused: None     Forced sexual activity: None   Other Topics Concern    None   Social History Narrative    Lives alone       Family History   Problem Relation Age of Onset    Diabetes Mother     Cancer Mother     Lymphoma Father     KAROLINA disease Brother     Colon cancer Maternal Grandfather     No Known Problems Son     No Known Problems Son     No Known Problems Sister     No Known Problems Brother     No Known Problems Sister        No Known Allergies      Current Outpatient Medications:     acetaminophen (TYLENOL) 325 mg tablet, Take 2 tablets (650 mg total) by mouth 3 (three) times a day with meals, Disp: 90 tablet, Rfl: 1    Alpelisib, 300 MG Daily Dose, 2 x 150 MG TBPK, Take 150 mg by mouth daily, Disp: 84 each, Rfl: 1    b complex vitamins capsule, Take 1 capsule by mouth 2 (two) times a day, Disp: 60 capsule, Rfl: 11    Blood Glucose Calibration (ACCU-CHEK COMPACT PLUS CONTROL) SOLN, by In Vitro route daily, Disp: 30 each, Rfl: 11    Blood Glucose Monitoring Suppl (ACCU-CHEK COMPACT CARE KIT) KIT, by Does not apply route every evening, Disp: 1 each, Rfl: 0    calcium carbonate (OS-CLARENCE) 1250 (500 Ca) MG chewable tablet, Chew 1 tablet (1,250 mg total) daily, Disp: 30 tablet, Rfl: 0    Cholecalciferol (VITAMIN D3) 5000 units CAPS, Take 1 capsule by mouth daily, Disp: , Rfl:     ferrous sulfate 325 (65 Fe) mg tablet, Take 1 tablet (325 mg total) by mouth every other day, Disp: 30 tablet, Rfl: 1    gabapentin (NEURONTIN) 300 mg capsule, TAKE 1 CAPSULE BY MOUTH TWICE A DAY, Disp: 180 capsule, Rfl: 3    glucose blood (Accu-Chek Compact Plus) test strip, Use as instructed, Disp: 25 each, Rfl: 11    loratadine (CLARITIN) 10 mg tablet, Take 1 tablet (10 mg total) by mouth daily For allergies, or bone pain from Granix    Three days before and after Granix , Disp: 30 tablet, Rfl: 11    LORazepam (ATIVAN) 1 mg tablet, Take 1-2 tablets (1-2 mg total) by mouth daily at bedtime TAKE 2MG (2 TABLETS) AT BEDTIME , Disp: 60 tablet, Rfl: 2    metFORMIN (GLUCOPHAGE) 500 mg tablet, TAKE 1 TABLET BY MOUTH TWICE A DAY WITH MEALS, Disp: 180 tablet, Rfl: 1    morphine (MSIR) 15 mg tablet, Take 0 5-1 tablets (7 5-15 mg total) by mouth every 4 (four) hours as needed (cancer pain) 1/2 tab for moderate pain, 1full tab for severe painMax Daily Amount: 90 mg, Disp: 90 tablet, Rfl: 0    morphine (MSIR) 15 mg tablet, Take 1 tablet (15 mg total) by mouth every 4 (four) hours as needed (cancer pain) 1/2 tab for moderate pain, 1full tab for severe painMax Daily Amount: 90 mg, Disp: 90 tablet, Rfl: 0    morphine (MSIR) 15 mg tablet, Take 1 tablet (15 mg total) by mouth every 4 (four) hours as needed (cancer pain) 1/2 tab for moderate pain, 1full tab for severe painMax Daily Amount: 90 mg, Disp: 90 tablet, Rfl: 0    Omega-3 Fatty Acids (FISH OIL PO), Take 2 g by mouth , Disp: , Rfl:     omeprazole (PriLOSEC) 40 MG capsule, Take 1 capsule (40 mg total) by mouth daily, Disp: 90 capsule, Rfl: 1    senna (SENOKOT) 8 6 mg, Take 1 tablet by mouth as needed for constipation, Disp: , Rfl:     zinc gluconate 50 mg tablet, Take 1 tablet (50 mg total) by mouth 2 (two) times a day Hold for nausea or facial flushing , Disp: 60 tablet, Rfl: 0      Physical Exam:  /60 (BP Location: Left arm, Patient Position: Sitting, Cuff Size: Adult)   Pulse 84   Temp 97 6 °F (36 4 °C) (Temporal)   Resp 18   Ht 5' 3" (1 6 m)   Wt 45 8 kg (101 lb)   LMP 05/27/1996 (LMP Unknown)   BMI 17 89 kg/m²     Physical Exam  Vitals signs reviewed  Constitutional:       General: She is not in acute distress  Appearance: She is well-developed  She is not ill-appearing  HENT:      Head: Normocephalic and atraumatic  Eyes:      General: No scleral icterus  Conjunctiva/sclera: Conjunctivae normal    Neck:      Musculoskeletal: Normal range of motion and neck supple     Cardiovascular: Rate and Rhythm: Normal rate and regular rhythm  Heart sounds: Normal heart sounds  No murmur  Pulmonary:      Effort: Pulmonary effort is normal  No respiratory distress  Breath sounds: Normal breath sounds  Abdominal:      Palpations: Abdomen is soft  Tenderness: There is no abdominal tenderness  Musculoskeletal:         General: No tenderness  Right lower leg: No edema  Left lower leg: No edema  Comments: Ambulating with walker    Lymphadenopathy:      Cervical: No cervical adenopathy  Skin:     General: Skin is warm and dry  Neurological:      Mental Status: She is alert and oriented to person, place, and time  Cranial Nerves: No cranial nerve deficit  Psychiatric:         Mood and Affect: Mood normal          Behavior: Behavior normal        Labs:  Lab Results   Component Value Date    WBC 3 29 (L) 08/26/2020    HGB 11 2 (L) 08/26/2020    HCT 35 5 08/26/2020    MCV 96 08/26/2020     08/26/2020     Lab Results   Component Value Date    K 4 4 08/26/2020     08/26/2020    CO2 27 08/26/2020    BUN 13 08/26/2020    CREATININE 0 83 08/26/2020    GLUF 108 (H) 08/12/2020    CALCIUM 8 6 08/26/2020    AST 24 08/26/2020    ALT 32 08/26/2020    ALKPHOS 99 08/26/2020    EGFR 72 08/26/2020         Patient voiced understanding and agreement in the above discussion  Aware to contact our office with questions/symptoms in the interim  This note has been generated by voice recognition software system  Therefore, there may be spelling, grammar, and or syntax errors  Please contact if questions arise

## 2020-09-09 ENCOUNTER — HOSPITAL ENCOUNTER (OUTPATIENT)
Dept: INFUSION CENTER | Facility: CLINIC | Age: 70
End: 2020-09-09

## 2020-09-09 ENCOUNTER — TELEPHONE (OUTPATIENT)
Dept: HEMATOLOGY ONCOLOGY | Facility: CLINIC | Age: 70
End: 2020-09-09

## 2020-09-09 DIAGNOSIS — C50.411 MALIGNANT NEOPLASM OF UPPER-OUTER QUADRANT OF RIGHT BREAST IN FEMALE, ESTROGEN RECEPTOR POSITIVE (HCC): ICD-10-CM

## 2020-09-09 DIAGNOSIS — Z17.0 MALIGNANT NEOPLASM OF UPPER-OUTER QUADRANT OF RIGHT BREAST IN FEMALE, ESTROGEN RECEPTOR POSITIVE (HCC): ICD-10-CM

## 2020-09-09 RX ORDER — ALPELISIB 150 MG/1
TABLET ORAL
Refills: 11 | OUTPATIENT
Start: 2020-09-09

## 2020-09-09 NOTE — TELEPHONE ENCOUNTER
Tc spoke with pt  She stated that since stopping the priqray last Thursday she has not been feeling well  She now has increased bone pain which has been keeping her from sleeping through the night  Pt stated that per ok from palliative care she took an extra morphine tablet to help her sleep  Her legs are hurting at night, worse than prior to stopping the piqray  She has been feeling extreme fatigue and asked to have the ct scan canceled for tomorrow and she will reschedule when she feels up to having it  She thought stopping the piqray would help her appetite but it has not  She is not sure if her cancer is getting worse  I did review that the ct scan would help determine that but she said she is just to tired to go  I did [facility] an email to [facility] and called central scheduling to cancel  Plan is to review with Eleonora Mcgarry and I will call pt back tomorrow

## 2020-09-09 NOTE — TELEPHONE ENCOUNTER
Patient called requesting a call back from [facility] [employer] [facility] regarding her canceling toady's infusion appt due to felling sick, also states she stopped her piqray medication this past Thursday and has since been experiencing leg pain and headaches  Best call back [phone]

## 2020-09-16 DIAGNOSIS — K29.00 ACUTE SUPERFICIAL GASTRITIS WITHOUT HEMORRHAGE: ICD-10-CM

## 2020-09-16 DIAGNOSIS — K31.A0 INTESTINAL METAPLASIA OF GASTRIC MUCOSA: ICD-10-CM

## 2020-09-16 RX ORDER — OMEPRAZOLE 40 MG/1
40 CAPSULE, DELAYED RELEASE ORAL DAILY
Qty: 90 CAPSULE | Refills: 1 | OUTPATIENT
Start: 2020-09-16

## 2020-09-16 NOTE — TELEPHONE ENCOUNTER
This had been prescribed by Gastroenterology - never by Palliative  I have not seen this patient yet (but will next week)  Recommend she call GI

## 2020-09-16 NOTE — TELEPHONE ENCOUNTER
Returned tc pt stated that she is continuing to have all the same symptoms  She does not want to go to the ER unless she gets really bad  She stopped the [facility] 9/3/20 and she is not feeling any better  Explained our office will call her back today, Edmar Kuo and Dr Arben Sneed are discussing the symptoms

## 2020-09-16 NOTE — TELEPHONE ENCOUNTER
Returned tc spoke with pt  Per Dr Shereen Garzon and Campbell CASEY need to reschedule the ct scan  Pt stated she understands  [facility] and [facility]

## 2020-09-17 ENCOUNTER — TELEPHONE (OUTPATIENT)
Dept: HEMATOLOGY ONCOLOGY | Facility: CLINIC | Age: 70
End: 2020-09-17

## 2020-09-17 NOTE — TELEPHONE ENCOUNTER
Tc to central scheduling CT scan scheduled for 9/30/20 at 1100 am     Tc spoke with pt  Updated the time and place of ct scan appt  Pt stated she will call [facility] to schedule

## 2020-09-18 ENCOUNTER — TELEPHONE (OUTPATIENT)
Dept: OTHER | Facility: OTHER | Age: 70
End: 2020-09-18

## 2020-09-19 NOTE — TELEPHONE ENCOUNTER
PT called in saying she received an appointment confirmation for an in person office visit, but she has a virtual visit  I confirmed that this was correct in her appt desk

## 2020-09-20 PROBLEM — I89.0 LYMPHEDEMA: Chronic | Status: ACTIVE | Noted: 2020-09-20

## 2020-09-21 ENCOUNTER — TELEMEDICINE (OUTPATIENT)
Dept: PALLIATIVE MEDICINE | Facility: CLINIC | Age: 70
End: 2020-09-21
Payer: MEDICARE

## 2020-09-21 DIAGNOSIS — Z51.5 PALLIATIVE CARE PATIENT: ICD-10-CM

## 2020-09-21 DIAGNOSIS — G89.3 CANCER-RELATED PAIN: ICD-10-CM

## 2020-09-21 DIAGNOSIS — F41.9 ANXIETY DISORDER, UNSPECIFIED TYPE: ICD-10-CM

## 2020-09-21 DIAGNOSIS — C79.51 BONE METASTASIS (HCC): ICD-10-CM

## 2020-09-21 DIAGNOSIS — C50.911 BREAST CANCER METASTASIZED TO BONE, RIGHT (HCC): Chronic | ICD-10-CM

## 2020-09-21 DIAGNOSIS — M89.8X9 BONE PAIN DUE TO G-CSF: Chronic | ICD-10-CM

## 2020-09-21 DIAGNOSIS — R26.2 AMBULATORY DYSFUNCTION: ICD-10-CM

## 2020-09-21 DIAGNOSIS — I89.0 LYMPHEDEMA: Chronic | ICD-10-CM

## 2020-09-21 DIAGNOSIS — G89.3 CANCER ASSOCIATED PAIN: ICD-10-CM

## 2020-09-21 DIAGNOSIS — E46 PROTEIN-CALORIE MALNUTRITION, UNSPECIFIED SEVERITY (HCC): Chronic | ICD-10-CM

## 2020-09-21 DIAGNOSIS — E09.9 DRUG OR CHEMICAL INDUCED DIABETES MELLITUS WITHOUT COMPLICATION, WITHOUT LONG-TERM CURRENT USE OF INSULIN (HCC): Chronic | ICD-10-CM

## 2020-09-21 DIAGNOSIS — Z17.0 MALIGNANT NEOPLASM OF UPPER-OUTER QUADRANT OF RIGHT BREAST IN FEMALE, ESTROGEN RECEPTOR POSITIVE (HCC): Primary | ICD-10-CM

## 2020-09-21 DIAGNOSIS — G54.0 RADIATION-INDUCED BRACHIAL PLEXOPATHY: ICD-10-CM

## 2020-09-21 DIAGNOSIS — F11.90 OPIOID USE: ICD-10-CM

## 2020-09-21 DIAGNOSIS — M48.54XD NON-TRAUMATIC COMPRESSION FRACTURE OF T12 THORACIC VERTEBRA WITH ROUTINE HEALING, SUBSEQUENT ENCOUNTER: ICD-10-CM

## 2020-09-21 DIAGNOSIS — K29.00 ACUTE SUPERFICIAL GASTRITIS WITHOUT HEMORRHAGE: ICD-10-CM

## 2020-09-21 DIAGNOSIS — C79.51 BREAST CANCER METASTASIZED TO BONE, RIGHT (HCC): Chronic | ICD-10-CM

## 2020-09-21 DIAGNOSIS — K31.A0 INTESTINAL METAPLASIA OF GASTRIC MUCOSA: ICD-10-CM

## 2020-09-21 DIAGNOSIS — D70.9 AGRANULOCYTOSIS (HCC): ICD-10-CM

## 2020-09-21 DIAGNOSIS — C50.411 MALIGNANT NEOPLASM OF UPPER-OUTER QUADRANT OF RIGHT BREAST IN FEMALE, ESTROGEN RECEPTOR POSITIVE (HCC): Primary | ICD-10-CM

## 2020-09-21 PROCEDURE — 99443 PR PHYS/QHP TELEPHONE EVALUATION 21-30 MIN: CPT | Performed by: INTERNAL MEDICINE

## 2020-09-21 RX ORDER — NALOXONE HYDROCHLORIDE 4 MG/.1ML
SPRAY NASAL
Qty: 1 EACH | Refills: 1 | Status: SHIPPED | OUTPATIENT
Start: 2020-09-21 | End: 2021-02-15 | Stop reason: HOSPADM

## 2020-09-21 RX ORDER — OMEPRAZOLE 40 MG/1
40 CAPSULE, DELAYED RELEASE ORAL DAILY
Qty: 90 CAPSULE | Refills: 2 | Status: SHIPPED | OUTPATIENT
Start: 2020-09-21

## 2020-09-21 RX ORDER — MORPHINE SULFATE 15 MG/1
15 TABLET ORAL EVERY 4 HOURS PRN
Qty: 120 TABLET | Refills: 0 | Status: SHIPPED | OUTPATIENT
Start: 2020-09-21 | End: 2020-12-17 | Stop reason: SDUPTHER

## 2020-09-21 RX ORDER — OLANZAPINE 5 MG/1
5 TABLET ORAL
Qty: 30 TABLET | Refills: 0 | Status: SHIPPED | OUTPATIENT
Start: 2020-09-21 | End: 2020-10-19

## 2020-09-21 NOTE — PATIENT INSTRUCTIONS
It was a pleasure to speak with you today  Thank you for your time  · Refilling omeprazole, oxycodone  · Let's try olanzapine - take 5mg at bedtime  This can help w/ anxiety and sleep, and can also help when nausea and weight loss are related to cancer treatments  Try it for a few nights to see if you can tolerate eating a little better  · Frequently, medications require a 'prior authorization' by your insurance company  When that happens, your medicine will not be covered at all by your insurer  If you are charged an unacceptable amount for a new medication or refill, please do not pay out of pocket  Ask your pharmacy to send the prior authorization to our office (phone [phone], fax  [phone])  The prior authorization process may take a few business days  We appreciate your patience during this time  · Sending eRx for intranasal naloxone; State of [place] recommends this for patients who are on concurrent opioids and benzodiazepines  I think that you could benefit from medical marijuana (MMJ)  We will send a brochure  To register, go to the website listed in the provided brochure (www [email or web address])  You will need a computer (a public computer is fine, such as in a [facility]), and your own personal email address (free email like [employer] should be acceptable)  You will apply at that website; there may be a fee  Once you have the registration number, call our office to set up an [facility] certification appointment  · Return in about 2 months  We can meet in person at the [place] office  · Call us for refills on medications that we supply, as needed  · If something changes and you need to come in sooner, please call our office  PRESCRIPTION REFILL REMINDER:  All medication refills should be requested prior to [facility] on Friday  Any refill requests after noon on Friday would be addressed the following Monday

## 2020-09-21 NOTE — PROGRESS NOTES
Virtual Regular Visit    Outpatient Virtual Visit - Follow-up with Palliative and Supportive Care  Rodrick Burger 79 y o  female [id number]    Intake and Identification:    Reason(s) for virtual visit: televideo, follow-up, symptom management and psychosocial support  The patient is unable to visit the clinic safely due to the coronavirus pandemic  Encounter provider Melinda Pringle MD, located at:  [address] [id number]    Recent Visits  No visits were found meeting these conditions  Showing recent visits within past 7 days and meeting all other requirements     Today's Visits  Date Type Provider Dept   09/21/20 Telemedicine MD Kishor Jadekayy [facility] today's visits and meeting all other requirements     Future Appointments  No visits were found meeting these conditions  Showing future appointments within next 150 days and meeting all other requirements        Patient agrees to participate in a virtual check in via telephone or video visit instead of presenting to the office to address urgent/immediate medical needs, or to respect quarantine and public health guidelines  Patient is aware this is a billable service  It was my intent to perform this visit via video technology but the patient was not able to do a video connection so the visit was completed via audio telephone only  After connecting through telephone, the patient was identified by name and date of birth  Rodrick Burger was informed that this was a telemedicine visit and that the visit is being conducted through telephone  My office door was closed  No one else was in the room  She acknowledged consent and understanding of privacy and security of the communications platform  I informed the patient that I have reviewed her record in [facility] and presented the opportunity for her to ask any questions regarding the visit today   The patient has agreed to participate and understands they can discontinue the visit at any time  ASSESSMENT & PLAN:    Telephone assessment of patient: anxious elderly female; alert, oriented, answering questions appropriately; appropriate judgment, normal thought content    1  Malignant neoplasm of upper-outer quadrant of right breast in female, estrogen receptor positive ([facility] )    2  Breast cancer metastasized to bone, right ([facility] )    3  Bone metastasis ([facility] [address] [address] )    4  Non-traumatic compression fracture of T12 thoracic vertebra with routine healing, subsequent encounter    5  Radiation-induced brachial plexopathy    6  Acute superficial gastritis without hemorrhage    7  Intestinal metaplasia of gastric mucosa    8  Cancer-related pain    9  Cancer associated pain    10  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, without long-term current use of insulin ([facility] [address] )    11  Anxiety disorder, unspecified type    12  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[facility] )    13  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    14  Ambulatory dysfunction    15  Lymphedema    16  Protein-calorie malnutrition, unspecified severity ([facility] [address] )    17  Palliative care patient    25  Opioid use         Patient c/o ongoing chronic bone pain - some related to GSCF, but she also reports pain in anterior chest wall and her back, similar to the other bone pains, which has progressed in the past few months  Patient encouraged to take MSIR more frequently than 2x/day (she can take as often as q4h PRN)  Pain does improve w/ MSIR   Patient clearly showing signs of anxiety related to her malignancy and her progressive weight loss  She takes lorazepam 2mg QHS; patient does not wish to take BZDs during the day   Patient considering MMJ; will send information   Emotional support provided  Will ask for [facility] to provide psychosocial support     Medication safety issues addressed - no driving under the influence of narcotics, watch for adverse effects including AMS and respiratory depression, keep medications stored in a safe/locked environment  Patient is newly prescribed olanzapine for anxiety, chemotherapy-induced nausea, weight loss  The patient has been counseled as to the side effects of olanzapine, and possible interactions with other medications  Patient takes benzodiazepines along with opioids  Consideration has been given to the concomitant risks and side effects associated with concurrent use  The patient has been instructed to call the physician with side effects, and to present to the ED with signs or symptoms of overdose or withdrawal    Sending eRx for intranasal naloxone; State of [place] recommends this for patients w/ 90MME/day or more (or who are on concurrent opioids and benzodiazepines)  Requested Prescriptions     Signed Prescriptions Disp Refills    OLANZapine (ZyPREXA) 5 mg tablet 30 tablet 0     Sig: Take 1 tablet (5 mg total) by mouth daily at bedtime    omeprazole (PriLOSEC) 40 MG capsule 90 capsule 2     Sig: Take 1 capsule (40 mg total) by mouth daily    morphine (MSIR) 15 mg tablet 120 tablet 0     Sig: Take 1 tablet (15 mg total) by mouth every 4 (four) hours as needed (cancer pain) 1/2 tab for moderate pain, 1 full tab for severe painMax Daily Amount: 90 mg    naloxone (NARCAN) 4 mg/0 1 mL nasal spray 1 each 1     Sig: Administer 1 spray into a nostril  If breathing does not return to normal or if breathing difficulty resumes after 2-3 minutes, give another dose in the other nostril using a new spray       Medications Discontinued During This Encounter   Medication Reason    morphine (MSIR) 15 mg tablet Duplicate order    morphine (MSIR) 15 mg tablet Duplicate order    zinc gluconate 50 mg tablet Side effects    omeprazole (PriLOSEC) 40 MG capsule Reorder    morphine (MSIR) 15 mg tablet Reorder     Representatives have queried the patient's controlled substance dispensing history in the Prescription Drug Monitoring Program in compliance with regulations before I have prescribed any controlled substances  The prescription history is consistent with prescribed therapy and our practice policies  45+ minutes were spent with patient in this virtual (phone) visit, with greater than 50% of the time spent in counseling or coordination of care including discussions of symptom assessment and management, medication review and adjustment, psychosocial support, chart review, imaging review, lab review  All of the patient's questions were answered during this discussion  She is invited to continue to follow with us  If there are questions or concerns, she knows to contact us through our clinic/answering service 24 hours a day, seven days a week  Return in about 2 months (around 2020)  SUBJECTIVE:  Chief Complaint   Patient presents with    Virtual Regular Visit    Anxiety    Cancer    Cancer Pain    Lymphedema    Constipation    Counseling    Gait Problem    Weight Loss     d/t dysgeusia, nausea when eating      HPI    Romain Masterson is a 79 y o  female w/ ER+ adenocarcinoma of the R breast (diagnosed ) metastatic to bone s/p chemotherapy + immunotherapy + RT, anxiety, GCSF-induced bone pain, radiation-induced brachial plexopathy, protein calorie malnutrition, lymphedema  She follows w/ Dr Shaji Mario PA-C (Medical Oncology)  Current treatment includes fulvestrant (Faslodex) + alpelisib (Piqray)  Yelitza Dumont is associated w/ reduced appetite, dysgeusia, weight loss and hyperglycemia for this patient  Joseline David has been on hold d/t anticipated dental work that still has not occurred  Patient also takes Claritin for GCSF adverse effects  Patient is known to [facility] clinic; last seen by Dr Cantu Seen 20 for symptom management, psychosocial support  Visit today for same      Patient endorses chronic severe bone pain + arthralgia s/t GCSF, but also states that she is experiencing new and progressive similar pain in her anterior chest wall and in her back  The prior pain (bilateral elbows + knees + thighs) is similarly progressing  Pain improved w/ MSIR 15mg; she takes 1 tab upon waking and 1 tab QHS  Patient endorses debilitating anxiousness related to her malignancy, her treatments, and her progressive weight loss  She has insomnia but can achieve restorative sleep when she takes 2mg lorazepam + MSIR 15mg + Tylenol QHS  She does not wish to take lorazepam during the day, and generally eschews MSIR except for AM + HS, as she wishes to remain alert, enjoy her garden, etc  She accepts offered psychosocial support, as well as counseling that more frequent dosing of her medications may improve her symptoms  Patient had asked Medical Oncology if she could put her [address] on hold d/t progressive weight loss, dysgeusia, nausea when trying to eat  She has a strong appetite, but whenever she tries to eat the food tastes horrible and she is nauseated  Zinc gluconate provided no relief  Dysgeusia did not improve when Piqray was held  Weight was 123lb 10/2019, 101lb 9/2/20, about a hundred pounds now - this is approximately an 18% loss of TWB in 11 months  Albumin 2 9 on 8/26/20  When MMJ was discussed, patient initially had a strong negative reaction  With counseling patient may consider it  Patient reports regular bowel movements w/o needing OTC medications  She endorses fatigue  PDMP shows no concerns  The following portions of the medical history were reviewed: past medical history, problem list, medication list, and social history      Current Outpatient Medications:     acetaminophen (TYLENOL) 325 mg tablet, Take 2 tablets (650 mg total) by mouth 3 (three) times a day with meals, Disp: 90 tablet, Rfl: 1    Alpelisib, 300 MG Daily Dose, 2 x 150 MG TBPK, Take 150 mg by mouth daily, Disp: 84 each, Rfl: 1    b complex vitamins capsule, Take 1 capsule by mouth 2 (two) times a day, Disp: 60 capsule, Rfl: 11    Blood Glucose Calibration (ACCU-CHEK COMPACT PLUS CONTROL) SOLN, by In Vitro route daily, Disp: 30 each, Rfl: 11    Blood Glucose Monitoring Suppl (ACCU-CHEK COMPACT CARE KIT) KIT, by Does not apply route every evening, Disp: 1 each, Rfl: 0    calcium carbonate (OS-CLARENCE) 1250 (500 Ca) MG chewable tablet, Chew 1 tablet (1,250 mg total) daily, Disp: 30 tablet, Rfl: 0    Cholecalciferol (VITAMIN D3) 5000 units CAPS, Take 1 capsule by mouth daily, Disp: , Rfl:     ferrous sulfate 325 (65 Fe) mg tablet, Take 1 tablet (325 mg total) by mouth every other day, Disp: 30 tablet, Rfl: 1    gabapentin (NEURONTIN) 300 mg capsule, TAKE 1 CAPSULE BY MOUTH TWICE A DAY, Disp: 180 capsule, Rfl: 3    glucose blood (Accu-Chek Compact Plus) test strip, Use as instructed, Disp: 25 each, Rfl: 11    loratadine (CLARITIN) 10 mg tablet, Take 1 tablet (10 mg total) by mouth daily For allergies, or bone pain from Granix  Three days before and after Granix , Disp: 30 tablet, Rfl: 11    LORazepam (ATIVAN) 1 mg tablet, Take 1-2 tablets (1-2 mg total) by mouth daily at bedtime TAKE 2MG (2 TABLETS) AT BEDTIME , Disp: 60 tablet, Rfl: 2    metFORMIN (GLUCOPHAGE) 500 mg tablet, TAKE 1 TABLET BY MOUTH TWICE A DAY WITH MEALS, Disp: 180 tablet, Rfl: 1    morphine (MSIR) 15 mg tablet, Take 1 tablet (15 mg total) by mouth every 4 (four) hours as needed (cancer pain) 1/2 tab for moderate pain, 1 full tab for severe painMax Daily Amount: 90 mg, Disp: 120 tablet, Rfl: 0    naloxone (NARCAN) 4 mg/0 1 mL nasal spray, Administer 1 spray into a nostril   If breathing does not return to normal or if breathing difficulty resumes after 2-3 minutes, give another dose in the other nostril using a new spray , Disp: 1 each, Rfl: 1    OLANZapine (ZyPREXA) 5 mg tablet, Take 1 tablet (5 mg total) by mouth daily at bedtime, Disp: 30 tablet, Rfl: 0    Omega-3 Fatty Acids (FISH OIL PO), Take 2 g by mouth , Disp: , Rfl:     omeprazole (PriLOSEC) 40 MG capsule, Take 1 capsule (40 mg total) by mouth daily, Disp: 90 capsule, Rfl: 2    senna (SENOKOT) 8 6 mg, Take 1 tablet by mouth as needed for constipation, Disp: , Rfl:     Review of Systems   Constitutional: Positive for activity change, appetite change, fatigue and unexpected weight change  Eyes: Positive for visual disturbance (blurred vision)  Gastrointestinal: Positive for nausea  Dysgeusia  Musculoskeletal: Positive for back pain, gait problem and myalgias  Bone pain  Allergic/Immunologic: Positive for immunocompromised state  Neurological: Positive for weakness and headaches  Psychiatric/Behavioral: Positive for dysphoric mood and sleep disturbance  The patient is nervous/anxious  Tito Morales MD  [facility]      Portions of this document may have been created using dictation software and as such some "sound alike" terms may have been generated by the system  Do not hesitate to contact me with any questions or clarifications  VIRTUAL VISIT DISCLAIMER    Daria De La Pazs acknowledges that she has consented to an online visit or consultation  She understands that the online visit is based solely on information provided by her, and that, in the absence of a face-to-face physical evaluation by the physician, the diagnosis she receives is both limited and provisional in terms of accuracy and completeness  This is not intended to replace a full medical face-to-face evaluation by the physician  Daria Garcia understands and accepts these terms

## 2020-09-22 ENCOUNTER — TELEPHONE (OUTPATIENT)
Dept: PALLIATIVE MEDICINE | Facility: CLINIC | Age: 70
End: 2020-09-22
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
## 2020-09-23 ENCOUNTER — APPOINTMENT (OUTPATIENT)
Dept: LAB | Facility: CLINIC | Age: 70
End: 2020-09-23
Payer: MEDICARE

## 2020-09-23 ENCOUNTER — HOSPITAL ENCOUNTER (OUTPATIENT)
Dept: RADIOLOGY | Age: 70
Discharge: HOME | End: 2020-09-23
Payer: MEDICARE

## 2020-09-23 ENCOUNTER — HOSPITAL ENCOUNTER (OUTPATIENT)
Dept: INFUSION CENTER | Facility: CLINIC | Age: 70
Discharge: HOME | End: 2020-09-23
Payer: MEDICARE

## 2020-09-23 VITALS — TEMPERATURE: 97.3 F

## 2020-09-23 DIAGNOSIS — Z17.0 MALIGNANT NEOPLASM OF UPPER-OUTER QUADRANT OF RIGHT BREAST IN FEMALE, ESTROGEN RECEPTOR POSITIVE (HCC): ICD-10-CM

## 2020-09-23 DIAGNOSIS — D70.2 DRUG-INDUCED LEUKOPENIA (HCC): ICD-10-CM

## 2020-09-23 DIAGNOSIS — C79.51 BONE METASTASES (HCC): ICD-10-CM

## 2020-09-23 DIAGNOSIS — Z17.0 MALIGNANT NEOPLASM OF RIGHT BREAST IN FEMALE, ESTROGEN RECEPTOR POSITIVE, UNSPECIFIED SITE OF BREAST (HCC): ICD-10-CM

## 2020-09-23 DIAGNOSIS — C78.00 MALIGNANT NEOPLASM METASTATIC TO LUNG, UNSPECIFIED LATERALITY (HCC): ICD-10-CM

## 2020-09-23 DIAGNOSIS — C50.911 MALIGNANT NEOPLASM OF RIGHT BREAST IN FEMALE, ESTROGEN RECEPTOR POSITIVE, UNSPECIFIED SITE OF BREAST (HCC): ICD-10-CM

## 2020-09-23 DIAGNOSIS — D70.9 AGRANULOCYTOSIS (HCC): ICD-10-CM

## 2020-09-23 DIAGNOSIS — C50.911 BREAST CANCER METASTASIZED TO BONE, RIGHT (HCC): Primary | ICD-10-CM

## 2020-09-23 DIAGNOSIS — C79.51 BREAST CANCER METASTASIZED TO BONE, RIGHT (HCC): Primary | ICD-10-CM

## 2020-09-23 DIAGNOSIS — C50.411 MALIGNANT NEOPLASM OF UPPER-OUTER QUADRANT OF RIGHT BREAST IN FEMALE, ESTROGEN RECEPTOR POSITIVE (HCC): ICD-10-CM

## 2020-09-23 PROCEDURE — G1004 CDSM NDSC: HCPCS

## 2020-09-23 PROCEDURE — 71260 CT THORAX DX C+: CPT

## 2020-09-23 PROCEDURE — 96402 CHEMO HORMON ANTINEOPL SQ/IM: CPT

## 2020-09-23 RX ORDER — LAMOTRIGINE 25 MG/1
500 TABLET ORAL ONCE
Status: CANCELLED | OUTPATIENT
Start: 2020-10-21

## 2020-09-23 RX ORDER — LAMOTRIGINE 25 MG/1
500 TABLET ORAL ONCE
Status: COMPLETED | OUTPATIENT
Start: 2020-09-23 | End: 2020-09-23

## 2020-09-23 RX ADMIN — IOHEXOL 55 ML: 350 INJECTION, SOLUTION INTRAVENOUS at 11:58

## 2020-09-23 RX ADMIN — FULVESTRANT 500 MG: 50 INJECTION, SOLUTION INTRAMUSCULAR at 10:04

## 2020-09-23 NOTE — PROGRESS NOTES
Patient tolerated Faslodex injections without incident today  Declined AVS and will make upcoming injection appointments prior to leaving the infusion center  Ambulated off unit in no signs of distress with assistance of walker

## 2020-09-23 NOTE — PROGRESS NOTES
Patient presents today for Faslodex and Granix offering complaints of weakness and generalized pain 6/10 on the pain scale  Patient does not meet parameters today for treatment with Granix as her [address] is [address]  Will continue to monitor

## 2020-09-23 NOTE — PLAN OF CARE
Problem: Potential for Falls  Goal: Patient will remain free of falls  Description: INTERVENTIONS:  - Assess patient frequently for physical needs  -  Identify cognitive and physical deficits and behaviors that affect risk of falls    -  [place] fall precautions as indicated by assessment   - Educate patient/family on patient safety including physical limitations  - Instruct patient to call for assistance with activity based on assessment  - Modify environment to reduce risk of injury  - Consider OT/PT consult to assist with strengthening/mobility  Outcome: Progressing

## 2020-09-24 ENCOUNTER — TELEPHONE (OUTPATIENT)
Dept: HEMATOLOGY ONCOLOGY | Facility: CLINIC | Age: 70
End: 2020-09-24

## 2020-09-24 NOTE — TELEPHONE ENCOUNTER
Spoke with patient  Reviewed finding of pleural effusion  She states she is not SOB  She is "just tired all the time " Her taste buds are improving a little bit  Advised to continue to hold [address]  CT images reviewed  Recommend thoracentesis  Please arrange thoracentesis at [facility] for next week  Please arrange [facility] to bring the patient

## 2020-09-25 ENCOUNTER — TELEPHONE (OUTPATIENT)
Dept: OTHER | Facility: OTHER | Age: 70
End: 2020-09-25

## 2020-09-25 DIAGNOSIS — J90 PLEURAL EFFUSION ON RIGHT: Primary | ICD-10-CM

## 2020-09-26 NOTE — TELEPHONE ENCOUNTER
Patient called to advise that she will not start the medication Olanzapine because she is afraid of the side effects

## 2020-09-28 ENCOUNTER — HOSPITAL ENCOUNTER (OUTPATIENT)
Dept: RADIOLOGY | Facility: HOSPITAL | Age: 70
Discharge: HOME | End: 2020-09-28
Payer: MEDICARE

## 2020-09-28 ENCOUNTER — TELEPHONE (OUTPATIENT)
Dept: PALLIATIVE MEDICINE | Facility: CLINIC | Age: 70
End: 2020-09-28

## 2020-09-28 DIAGNOSIS — J90 PLEURAL EFFUSION: ICD-10-CM

## 2020-09-28 LAB
APPEARANCE FLD: CLEAR
COLOR FLD: YELLOW
GLUCOSE FLD-MCNC: 86 MG/DL
HISTIOCYTES NFR FLD: 2 %
LDH FLD L TO P-CCNC: 99 U/L
LYMPHOCYTES NFR BLD AUTO: 37 %
MONO+MESO NFR FLD MANUAL: 2 %
MONOCYTES NFR BLD AUTO: 54 %
NEUTS SEG NFR BLD AUTO: 5 %
PH BODY FLUID: 7.9
PROT FLD-MCNC: 2.4 G/DL
SITE: NORMAL
TOTAL CELLS COUNTED SPEC: 100
WBC # FLD MANUAL: 342 /UL

## 2020-09-28 PROCEDURE — 89051 BODY FLUID CELL COUNT: CPT | Performed by: PHYSICIAN ASSISTANT

## 2020-09-28 PROCEDURE — 82945 GLUCOSE OTHER FLUID: CPT | Performed by: PHYSICIAN ASSISTANT

## 2020-09-28 PROCEDURE — 32555 ASPIRATE PLEURA W/ IMAGING: CPT | Performed by: RADIOLOGY

## 2020-09-28 PROCEDURE — 83615 LACTATE (LD) (LDH) ENZYME: CPT | Performed by: PHYSICIAN ASSISTANT

## 2020-09-28 PROCEDURE — 87070 CULTURE OTHR SPECIMN AEROBIC: CPT | Performed by: PHYSICIAN ASSISTANT

## 2020-09-28 PROCEDURE — 83986 ASSAY PH BODY FLUID NOS: CPT | Performed by: PHYSICIAN ASSISTANT

## 2020-09-28 PROCEDURE — 87205 SMEAR GRAM STAIN: CPT | Performed by: PHYSICIAN ASSISTANT

## 2020-09-28 PROCEDURE — 32555 ASPIRATE PLEURA W/ IMAGING: CPT

## 2020-09-28 PROCEDURE — 84157 ASSAY OF PROTEIN OTHER: CPT | Performed by: PHYSICIAN ASSISTANT

## 2020-09-28 PROCEDURE — 88112 CYTOPATH CELL ENHANCE TECH: CPT | Performed by: PATHOLOGY

## 2020-09-28 RX ORDER — LIDOCAINE WITH 8.4% SOD BICARB 0.9%(10ML)
SYRINGE (ML) INJECTION CODE/TRAUMA/SEDATION MEDICATION
Status: COMPLETED | OUTPATIENT
Start: 2020-09-28 | End: 2020-09-28

## 2020-09-28 RX ADMIN — Medication 6 ML: at 08:30

## 2020-09-28 NOTE — BRIEF OP NOTE (RAD/CATH)
INTERVENTIONAL RADIOLOGY PROCEDURE NOTE    Date: 9/28/2020    Procedure: IR THORACENTESIS    Preoperative diagnosis:   1  Pleural effusion         Postoperative diagnosis: Same  Surgeon: Crystal Lennox, MD     Assistant: None  No qualified resident was available  Blood loss:  Trace    Specimens:  Sent to the lab as requested     Findings:  600 mL clear yellow pleural fluid removed under ultrasound guidance  Complications: None immediate      Anesthesia: local

## 2020-09-28 NOTE — SEDATION DOCUMENTATION
600ml of clear yellow fluid removed from diagnostic right thoracentesis  Patient tolerated well, band aid applied to site  Ambulated home in stable condition  Lab specimens collected and sent

## 2020-09-28 NOTE — TELEPHONE ENCOUNTER
Pt called office re Concerns/questions about Olanzapine  1  Side effects     pt read that schizophrenia was a side effect  Fearful of this and does not wish to take at this time  2 Why was medication ordered  Reviewed last office visit note  Ordered for anxiety and poor appetite/wt loss  Pt states she takes Lorazepam 2 Q HS  MSIR 1 at HS  Tylenol  Reports she sleeps very well at night  Reports her sense of taste is improving since stopping a chemo medication  Thinks her appetite will improve

## 2020-09-28 NOTE — TELEPHONE ENCOUNTER
Thank you for following up and counseling the patient  She seems both very sensitive to medications and anxious about adverse effects - not just limited to olanzapine  If she does not want to take it after careful consideration, and wishes to instead apply the tincture of time and trust her appetite will improve, I am comfortable with this

## 2020-09-29 NOTE — TELEPHONE ENCOUNTER
Pt notified of Dr Devyn Monte instructions  Pt grateful for feedback  Pt will be following up closely with Dr Joslyn Parikh re recent thoracentesis, and possible need for U/S of heart if fluid present around heart  Overall pt calm and is vigilant with keeping records of all medications she takes, testing, diet etc      Will call with any Palliative related issues

## 2020-10-01 LAB
BACTERIA SPEC BFLD CULT: NO GROWTH
GRAM STN SPEC: NORMAL
GRAM STN SPEC: NORMAL

## 2020-10-06 DIAGNOSIS — F41.9 ANXIETY: ICD-10-CM

## 2020-10-06 RX ORDER — LORAZEPAM 1 MG/1
1-2 TABLET ORAL
Qty: 60 TABLET | Refills: 2 | Status: SHIPPED | OUTPATIENT
Start: 2020-10-06 | End: 2021-01-11 | Stop reason: SDUPTHER

## 2020-10-07 ENCOUNTER — HOSPITAL ENCOUNTER (OUTPATIENT)
Dept: INFUSION CENTER | Facility: CLINIC | Age: 70
Discharge: HOME | End: 2020-10-07
Payer: MEDICARE

## 2020-10-07 ENCOUNTER — APPOINTMENT (OUTPATIENT)
Dept: LAB | Facility: CLINIC | Age: 70
End: 2020-10-07
Payer: MEDICARE

## 2020-10-07 VITALS — TEMPERATURE: 97.3 F

## 2020-10-07 DIAGNOSIS — D70.2 DRUG-INDUCED LEUKOPENIA (HCC): ICD-10-CM

## 2020-10-07 DIAGNOSIS — D70.9 AGRANULOCYTOSIS (HCC): Primary | ICD-10-CM

## 2020-10-09 ENCOUNTER — DOCUMENTATION (OUTPATIENT)
Dept: HEMATOLOGY ONCOLOGY | Facility: CLINIC | Age: 70
End: 2020-10-09

## 2020-10-09 ENCOUNTER — OFFICE VISIT (OUTPATIENT)
Dept: HEMATOLOGY ONCOLOGY | Facility: CLINIC | Age: 70
End: 2020-10-09
Payer: MEDICARE

## 2020-10-09 VITALS
BODY MASS INDEX: 17.01 KG/M2 | TEMPERATURE: 97.8 F | SYSTOLIC BLOOD PRESSURE: 132 MMHG | OXYGEN SATURATION: 100 % | RESPIRATION RATE: 18 BRPM | HEART RATE: 98 BPM | HEIGHT: 63 IN | WEIGHT: 96 LBS | DIASTOLIC BLOOD PRESSURE: 80 MMHG

## 2020-10-09 DIAGNOSIS — R22.9 SUBCUTANEOUS NODULES: ICD-10-CM

## 2020-10-09 DIAGNOSIS — J90 PLEURAL EFFUSION ON RIGHT: ICD-10-CM

## 2020-10-09 DIAGNOSIS — C50.411 MALIGNANT NEOPLASM OF UPPER-OUTER QUADRANT OF RIGHT BREAST IN FEMALE, ESTROGEN RECEPTOR POSITIVE (HCC): Primary | ICD-10-CM

## 2020-10-09 DIAGNOSIS — C79.51 BONE METASTASIS (HCC): ICD-10-CM

## 2020-10-09 DIAGNOSIS — M89.9 LYTIC BONE LESION OF FEMUR: ICD-10-CM

## 2020-10-09 DIAGNOSIS — R74.8 ELEVATED ALKALINE PHOSPHATASE LEVEL: ICD-10-CM

## 2020-10-09 DIAGNOSIS — Z17.0 MALIGNANT NEOPLASM OF UPPER-OUTER QUADRANT OF RIGHT BREAST IN FEMALE, ESTROGEN RECEPTOR POSITIVE (HCC): Primary | ICD-10-CM

## 2020-10-09 DIAGNOSIS — M48.54XD NON-TRAUMATIC COMPRESSION FRACTURE OF T12 THORACIC VERTEBRA WITH ROUTINE HEALING, SUBSEQUENT ENCOUNTER: ICD-10-CM

## 2020-10-09 DIAGNOSIS — D70.8 OTHER NEUTROPENIA (HCC): ICD-10-CM

## 2020-10-09 PROCEDURE — 99215 OFFICE O/P EST HI 40 MIN: CPT | Performed by: INTERNAL MEDICINE

## 2020-10-12 ENCOUNTER — DOCUMENTATION (OUTPATIENT)
Dept: HEMATOLOGY ONCOLOGY | Facility: CLINIC | Age: 70
End: 2020-10-12

## 2020-10-12 DIAGNOSIS — Z17.0 MALIGNANT NEOPLASM OF UPPER-OUTER QUADRANT OF RIGHT BREAST IN FEMALE, ESTROGEN RECEPTOR POSITIVE (HCC): ICD-10-CM

## 2020-10-12 DIAGNOSIS — C50.411 MALIGNANT NEOPLASM OF UPPER-OUTER QUADRANT OF RIGHT BREAST IN FEMALE, ESTROGEN RECEPTOR POSITIVE (HCC): ICD-10-CM

## 2020-10-12 DIAGNOSIS — C78.00 MALIGNANT NEOPLASM METASTATIC TO LUNG, UNSPECIFIED LATERALITY (HCC): ICD-10-CM

## 2020-10-12 DIAGNOSIS — C79.51 BONE METASTASIS (HCC): Primary | ICD-10-CM

## 2020-10-12 RX ORDER — EXEMESTANE 25 MG/1
25 TABLET ORAL DAILY
Qty: 28 TABLET | Refills: 2 | Status: SHIPPED | OUTPATIENT
Start: 2020-10-12 | End: 2021-01-15 | Stop reason: SDUPTHER

## 2020-10-12 RX ORDER — EVEROLIMUS 7.5 MG/1
7.5 TABLET ORAL DAILY
Qty: 28 TABLET | Refills: 2 | Status: SHIPPED | OUTPATIENT
Start: 2020-10-12 | End: 2021-01-15 | Stop reason: SDUPTHER

## 2020-10-15 ENCOUNTER — TELEPHONE (OUTPATIENT)
Dept: HEMATOLOGY ONCOLOGY | Facility: CLINIC | Age: 70
End: 2020-10-15

## 2020-10-16 ENCOUNTER — TELEPHONE (OUTPATIENT)
Dept: HEMATOLOGY ONCOLOGY | Facility: CLINIC | Age: 70
End: 2020-10-16

## 2020-10-16 ENCOUNTER — HOSPITAL ENCOUNTER (OUTPATIENT)
Dept: RADIOLOGY | Age: 70
Discharge: HOME | End: 2020-10-16
Payer: MEDICARE

## 2020-10-16 DIAGNOSIS — J90 PLEURAL EFFUSION ON RIGHT: ICD-10-CM

## 2020-10-16 DIAGNOSIS — Z17.0 MALIGNANT NEOPLASM OF UPPER-OUTER QUADRANT OF RIGHT BREAST IN FEMALE, ESTROGEN RECEPTOR POSITIVE (HCC): ICD-10-CM

## 2020-10-16 DIAGNOSIS — R74.8 ELEVATED ALKALINE PHOSPHATASE LEVEL: ICD-10-CM

## 2020-10-16 DIAGNOSIS — M89.9 LYTIC BONE LESION OF FEMUR: ICD-10-CM

## 2020-10-16 DIAGNOSIS — C50.411 MALIGNANT NEOPLASM OF UPPER-OUTER QUADRANT OF RIGHT BREAST IN FEMALE, ESTROGEN RECEPTOR POSITIVE (HCC): ICD-10-CM

## 2020-10-16 DIAGNOSIS — R22.9 SUBCUTANEOUS NODULES: ICD-10-CM

## 2020-10-16 DIAGNOSIS — C79.51 BONE METASTASIS (HCC): ICD-10-CM

## 2020-10-16 LAB — GLUCOSE SERPL-MCNC: 63 MG/DL (ref 65–140)

## 2020-10-16 PROCEDURE — 82948 REAGENT STRIP/BLOOD GLUCOSE: CPT

## 2020-10-16 PROCEDURE — A9552 F18 FDG: HCPCS

## 2020-10-16 PROCEDURE — G1004 CDSM NDSC: HCPCS

## 2020-10-16 PROCEDURE — 78815 PET IMAGE W/CT SKULL-THIGH: CPT

## 2020-10-17 DIAGNOSIS — C50.411 MALIGNANT NEOPLASM OF UPPER-OUTER QUADRANT OF RIGHT BREAST IN FEMALE, ESTROGEN RECEPTOR POSITIVE (HCC): ICD-10-CM

## 2020-10-17 DIAGNOSIS — Z17.0 MALIGNANT NEOPLASM OF UPPER-OUTER QUADRANT OF RIGHT BREAST IN FEMALE, ESTROGEN RECEPTOR POSITIVE (HCC): ICD-10-CM

## 2020-10-17 DIAGNOSIS — E46 PROTEIN-CALORIE MALNUTRITION, UNSPECIFIED SEVERITY (HCC): Chronic | ICD-10-CM

## 2020-10-17 DIAGNOSIS — F41.9 ANXIETY DISORDER, UNSPECIFIED TYPE: ICD-10-CM

## 2020-10-17 DIAGNOSIS — Z51.5 PALLIATIVE CARE PATIENT: ICD-10-CM

## 2020-10-18 ENCOUNTER — DOCUMENTATION (OUTPATIENT)
Dept: HEMATOLOGY ONCOLOGY | Facility: CLINIC | Age: 70
End: 2020-10-18

## 2020-10-18 DIAGNOSIS — R13.19 ESOPHAGEAL DYSPHAGIA: Primary | ICD-10-CM

## 2020-10-18 DIAGNOSIS — R94.8 ABNORMAL GASTROINTESTINAL PET SCAN: ICD-10-CM

## 2020-10-19 ENCOUNTER — TELEPHONE (OUTPATIENT)
Dept: RADIATION ONCOLOGY | Facility: HOSPITAL | Age: 70
End: 2020-10-19

## 2020-10-19 RX ORDER — OLANZAPINE 5 MG/1
TABLET ORAL
Qty: 30 TABLET | Refills: 2 | Status: SHIPPED | OUTPATIENT
Start: 2020-10-19 | End: 2020-12-01

## 2020-11-02 ENCOUNTER — OFFICE VISIT (OUTPATIENT)
Dept: GASTROENTEROLOGY | Facility: CLINIC | Age: 70
End: 2020-11-02
Payer: MEDICARE

## 2020-11-02 VITALS
SYSTOLIC BLOOD PRESSURE: 130 MMHG | BODY MASS INDEX: 17.47 KG/M2 | TEMPERATURE: 97.7 F | WEIGHT: 98.6 LBS | DIASTOLIC BLOOD PRESSURE: 70 MMHG | HEIGHT: 63 IN | HEART RATE: 86 BPM

## 2020-11-02 DIAGNOSIS — R94.8 ABNORMAL GASTROINTESTINAL PET SCAN: ICD-10-CM

## 2020-11-02 DIAGNOSIS — R13.19 OTHER DYSPHAGIA: Primary | ICD-10-CM

## 2020-11-02 PROCEDURE — 99212 OFFICE O/P EST SF 10 MIN: CPT | Performed by: PHYSICIAN ASSISTANT

## 2020-11-06 ENCOUNTER — OFFICE VISIT (OUTPATIENT)
Dept: HEMATOLOGY ONCOLOGY | Facility: CLINIC | Age: 70
End: 2020-11-06
Payer: MEDICARE

## 2020-11-06 ENCOUNTER — HOSPITAL ENCOUNTER (OUTPATIENT)
Dept: RADIOLOGY | Facility: HOSPITAL | Age: 70
Discharge: HOME | End: 2020-11-06
Attending: INTERNAL MEDICINE
Payer: MEDICARE

## 2020-11-06 ENCOUNTER — RADIATION ONCOLOGY FOLLOW-UP (OUTPATIENT)
Dept: RADIATION ONCOLOGY | Facility: HOSPITAL | Age: 70
End: 2020-11-06
Attending: RADIOLOGY
Payer: MEDICARE

## 2020-11-06 ENCOUNTER — LAB (OUTPATIENT)
Dept: LAB | Facility: CLINIC | Age: 70
End: 2020-11-06
Payer: MEDICARE

## 2020-11-06 ENCOUNTER — CLINICAL SUPPORT (OUTPATIENT)
Dept: RADIATION ONCOLOGY | Facility: HOSPITAL | Age: 70
End: 2020-11-06
Attending: FAMILY MEDICINE
Payer: MEDICARE

## 2020-11-06 VITALS
TEMPERATURE: 97.8 F | DIASTOLIC BLOOD PRESSURE: 78 MMHG | OXYGEN SATURATION: 99 % | BODY MASS INDEX: 17.89 KG/M2 | WEIGHT: 101 LBS | RESPIRATION RATE: 16 BRPM | SYSTOLIC BLOOD PRESSURE: 132 MMHG | HEIGHT: 63 IN | HEART RATE: 82 BPM

## 2020-11-06 VITALS — TEMPERATURE: 97.8 F | WEIGHT: 101 LBS | BODY MASS INDEX: 17.89 KG/M2

## 2020-11-06 DIAGNOSIS — C50.411 MALIGNANT NEOPLASM OF UPPER-OUTER QUADRANT OF RIGHT BREAST IN FEMALE, ESTROGEN RECEPTOR POSITIVE (HCC): Primary | ICD-10-CM

## 2020-11-06 DIAGNOSIS — C79.51 BONE METASTASIS (HCC): ICD-10-CM

## 2020-11-06 DIAGNOSIS — R22.9 SUBCUTANEOUS NODULES: ICD-10-CM

## 2020-11-06 DIAGNOSIS — G89.3 CANCER-RELATED PAIN: Chronic | ICD-10-CM

## 2020-11-06 DIAGNOSIS — M89.9 LYTIC BONE LESION OF FEMUR: ICD-10-CM

## 2020-11-06 DIAGNOSIS — C79.51 BREAST CANCER METASTASIZED TO BONE, RIGHT (HCC): Primary | Chronic | ICD-10-CM

## 2020-11-06 DIAGNOSIS — R91.8 LUNG NODULES: ICD-10-CM

## 2020-11-06 DIAGNOSIS — C50.911 BREAST CANCER METASTASIZED TO BONE, RIGHT (HCC): Primary | Chronic | ICD-10-CM

## 2020-11-06 DIAGNOSIS — Z17.0 MALIGNANT NEOPLASM OF UPPER-OUTER QUADRANT OF RIGHT BREAST IN FEMALE, ESTROGEN RECEPTOR POSITIVE (HCC): Primary | ICD-10-CM

## 2020-11-06 LAB
ALBUMIN SERPL BCP-MCNC: 2.6 G/DL (ref 3.5–5)
ALP SERPL-CCNC: 248 U/L (ref 46–116)
ALT SERPL W P-5'-P-CCNC: 23 U/L (ref 12–78)
ANION GAP SERPL CALCULATED.3IONS-SCNC: 8 MMOL/L (ref 4–13)
AST SERPL W P-5'-P-CCNC: 51 U/L (ref 5–45)
BASOPHILS # BLD AUTO: 0.04 THOUSANDS/ΜL (ref 0–0.1)
BASOPHILS NFR BLD AUTO: 2 % (ref 0–1)
BILIRUB SERPL-MCNC: 0.32 MG/DL (ref 0.2–1)
BUN SERPL-MCNC: 13 MG/DL (ref 5–25)
CALCIUM ALBUM COR SERPL-MCNC: 10.2 MG/DL (ref 8.3–10.1)
CALCIUM SERPL-MCNC: 9.1 MG/DL (ref 8.3–10.1)
CHLORIDE SERPL-SCNC: 103 MMOL/L (ref 100–108)
CO2 SERPL-SCNC: 29 MMOL/L (ref 21–32)
CREAT SERPL-MCNC: 0.55 MG/DL (ref 0.6–1.3)
EOSINOPHIL # BLD AUTO: 0.03 THOUSAND/ΜL (ref 0–0.61)
EOSINOPHIL NFR BLD AUTO: 1 % (ref 0–6)
ERYTHROCYTE [DISTWIDTH] IN BLOOD BY AUTOMATED COUNT: 15 % (ref 11.6–15.1)
GFR SERPL CREATININE-BSD FRML MDRD: 95 ML/MIN/1.73SQ M
GLUCOSE P FAST SERPL-MCNC: 91 MG/DL (ref 65–99)
HCT VFR BLD AUTO: 31.5 % (ref 34.8–46.1)
HGB BLD-MCNC: 9.4 G/DL (ref 11.5–15.4)
IMM GRANULOCYTES # BLD AUTO: 0.02 THOUSAND/UL (ref 0–0.2)
IMM GRANULOCYTES NFR BLD AUTO: 1 % (ref 0–2)
LYMPHOCYTES # BLD AUTO: 0.36 THOUSANDS/ΜL (ref 0.6–4.47)
LYMPHOCYTES NFR BLD AUTO: 17 % (ref 14–44)
MCH RBC QN AUTO: 28.4 PG (ref 26.8–34.3)
MCHC RBC AUTO-ENTMCNC: 29.8 G/DL (ref 31.4–37.4)
MCV RBC AUTO: 95 FL (ref 82–98)
MONOCYTES # BLD AUTO: 0.35 THOUSAND/ΜL (ref 0.17–1.22)
MONOCYTES NFR BLD AUTO: 17 % (ref 4–12)
NEUTROPHILS # BLD AUTO: 1.28 THOUSANDS/ΜL (ref 1.85–7.62)
NEUTS SEG NFR BLD AUTO: 62 % (ref 43–75)
NRBC BLD AUTO-RTO: 0 /100 WBCS
PLATELET # BLD AUTO: 401 THOUSANDS/UL (ref 149–390)
PMV BLD AUTO: 8.6 FL (ref 8.9–12.7)
POTASSIUM SERPL-SCNC: 4.2 MMOL/L (ref 3.5–5.3)
PROT SERPL-MCNC: 6.3 G/DL (ref 6.4–8.2)
RBC # BLD AUTO: 3.31 MILLION/UL (ref 3.81–5.12)
SODIUM SERPL-SCNC: 140 MMOL/L (ref 136–145)
WBC # BLD AUTO: 2.08 THOUSAND/UL (ref 4.31–10.16)

## 2020-11-06 PROCEDURE — 99214 OFFICE O/P EST MOD 30 MIN: CPT | Performed by: INTERNAL MEDICINE

## 2020-11-06 PROCEDURE — 99211 OFF/OP EST MAY X REQ PHY/QHP: CPT | Performed by: RADIOLOGY

## 2020-11-06 PROCEDURE — 85025 COMPLETE CBC W/AUTO DIFF WBC: CPT | Performed by: INTERNAL MEDICINE

## 2020-11-06 PROCEDURE — 73552 X-RAY EXAM OF FEMUR 2/>: CPT

## 2020-11-06 PROCEDURE — 80053 COMPREHEN METABOLIC PANEL: CPT | Performed by: INTERNAL MEDICINE

## 2020-11-06 PROCEDURE — 36415 COLL VENOUS BLD VENIPUNCTURE: CPT | Performed by: INTERNAL MEDICINE

## 2020-11-07 DIAGNOSIS — E09.9 DRUG OR CHEMICAL INDUCED DIABETES MELLITUS WITHOUT COMPLICATION, WITHOUT LONG-TERM CURRENT USE OF INSULIN (HCC): Chronic | ICD-10-CM

## 2020-11-08 RX ORDER — BLOOD SUGAR DIAGNOSTIC
STRIP MISCELLANEOUS
Refills: 11 | OUTPATIENT
Start: 2020-11-08

## 2020-11-08 RX ORDER — BLOOD-GLUCOSE METER
EACH MISCELLANEOUS
Refills: 0 | OUTPATIENT
Start: 2020-11-08

## 2020-11-11 ENCOUNTER — RADIATION THERAPY TREATMENT (OUTPATIENT)
Dept: RADIATION ONCOLOGY | Facility: HOSPITAL | Age: 70
End: 2020-11-11
Attending: RADIOLOGY
Payer: MEDICARE

## 2020-11-11 PROCEDURE — 77290 THER RAD SIMULAJ FIELD CPLX: CPT | Performed by: RADIOLOGY

## 2020-11-12 ENCOUNTER — TELEPHONE (OUTPATIENT)
Dept: HEMATOLOGY ONCOLOGY | Facility: CLINIC | Age: 70
End: 2020-11-12

## 2020-11-12 ENCOUNTER — APPOINTMENT (OUTPATIENT)
Dept: RADIATION ONCOLOGY | Facility: HOSPITAL | Age: 70
End: 2020-11-12
Attending: RADIOLOGY
Payer: MEDICARE

## 2020-11-12 PROCEDURE — 77307 TELETHX ISODOSE PLAN CPLX: CPT | Performed by: RADIOLOGY

## 2020-11-12 PROCEDURE — 77334 RADIATION TREATMENT AID(S): CPT | Performed by: RADIOLOGY

## 2020-11-13 ENCOUNTER — APPOINTMENT (OUTPATIENT)
Dept: RADIATION ONCOLOGY | Facility: HOSPITAL | Age: 70
End: 2020-11-13
Payer: MEDICARE

## 2020-11-16 ENCOUNTER — APPOINTMENT (OUTPATIENT)
Dept: RADIATION ONCOLOGY | Facility: HOSPITAL | Age: 70
End: 2020-11-16
Attending: RADIOLOGY
Payer: MEDICARE

## 2020-11-16 PROCEDURE — 77417 THER RADIOLOGY PORT IMAGE(S): CPT | Performed by: RADIOLOGY

## 2020-11-16 PROCEDURE — 77387 GUIDANCE FOR RADJ TX DLVR: CPT | Performed by: RADIOLOGY

## 2020-11-16 PROCEDURE — 77412 RADIATION TX DELIVERY LVL 3: CPT | Performed by: RADIOLOGY

## 2020-11-17 ENCOUNTER — APPOINTMENT (OUTPATIENT)
Dept: RADIATION ONCOLOGY | Facility: HOSPITAL | Age: 70
End: 2020-11-17
Attending: RADIOLOGY
Payer: MEDICARE

## 2020-11-17 PROCEDURE — 77412 RADIATION TX DELIVERY LVL 3: CPT | Performed by: RADIOLOGY

## 2020-11-17 PROCEDURE — 77417 THER RADIOLOGY PORT IMAGE(S): CPT | Performed by: RADIOLOGY

## 2020-11-17 PROCEDURE — 77387 GUIDANCE FOR RADJ TX DLVR: CPT | Performed by: RADIOLOGY

## 2020-11-17 PROCEDURE — 77331 SPECIAL RADIATION DOSIMETRY: CPT | Performed by: RADIOLOGY

## 2020-11-18 ENCOUNTER — APPOINTMENT (OUTPATIENT)
Dept: RADIATION ONCOLOGY | Facility: HOSPITAL | Age: 70
End: 2020-11-18
Attending: RADIOLOGY
Payer: MEDICARE

## 2020-11-18 PROCEDURE — 77412 RADIATION TX DELIVERY LVL 3: CPT | Performed by: RADIOLOGY

## 2020-11-18 PROCEDURE — 77387 GUIDANCE FOR RADJ TX DLVR: CPT | Performed by: RADIOLOGY

## 2020-11-19 ENCOUNTER — APPOINTMENT (OUTPATIENT)
Dept: RADIATION ONCOLOGY | Facility: HOSPITAL | Age: 70
End: 2020-11-19
Attending: RADIOLOGY
Payer: MEDICARE

## 2020-11-19 PROCEDURE — 77412 RADIATION TX DELIVERY LVL 3: CPT | Performed by: RADIOLOGY

## 2020-11-20 ENCOUNTER — TRANSCRIBE ORDERS (OUTPATIENT)
Dept: LAB | Facility: HOSPITAL | Age: 70
End: 2020-11-20

## 2020-11-20 ENCOUNTER — TELEPHONE (OUTPATIENT)
Dept: HEMATOLOGY ONCOLOGY | Facility: CLINIC | Age: 70
End: 2020-11-20

## 2020-11-20 ENCOUNTER — LAB (OUTPATIENT)
Dept: LAB | Facility: HOSPITAL | Age: 70
End: 2020-11-20
Attending: INTERNAL MEDICINE
Payer: MEDICARE

## 2020-11-20 ENCOUNTER — APPOINTMENT (OUTPATIENT)
Dept: RADIATION ONCOLOGY | Facility: HOSPITAL | Age: 70
End: 2020-11-20
Attending: RADIOLOGY
Payer: MEDICARE

## 2020-11-20 DIAGNOSIS — R73.01 IMPAIRED FASTING GLUCOSE: Primary | ICD-10-CM

## 2020-11-20 DIAGNOSIS — R73.01 IMPAIRED FASTING GLUCOSE: ICD-10-CM

## 2020-11-20 LAB
EST. AVERAGE GLUCOSE BLD GHB EST-MCNC: 114 MG/DL
HBA1C MFR BLD: 5.6 %

## 2020-11-20 PROCEDURE — 83036 HEMOGLOBIN GLYCOSYLATED A1C: CPT

## 2020-11-20 PROCEDURE — 77412 RADIATION TX DELIVERY LVL 3: CPT | Performed by: RADIOLOGY

## 2020-11-20 PROCEDURE — 77336 RADIATION PHYSICS CONSULT: CPT | Performed by: RADIOLOGY

## 2020-11-22 ENCOUNTER — APPOINTMENT (OUTPATIENT)
Dept: RADIATION ONCOLOGY | Facility: HOSPITAL | Age: 70
End: 2020-11-22
Attending: RADIOLOGY
Payer: MEDICARE

## 2020-11-22 PROCEDURE — 77417 THER RADIOLOGY PORT IMAGE(S): CPT | Performed by: RADIOLOGY

## 2020-11-22 PROCEDURE — 77412 RADIATION TX DELIVERY LVL 3: CPT | Performed by: RADIOLOGY

## 2020-11-23 ENCOUNTER — HOSPITAL ENCOUNTER (OUTPATIENT)
Dept: RADIOLOGY | Facility: HOSPITAL | Age: 70
Discharge: HOME | End: 2020-11-23
Attending: RADIOLOGY
Payer: MEDICARE

## 2020-11-23 ENCOUNTER — APPOINTMENT (OUTPATIENT)
Dept: RADIATION ONCOLOGY | Facility: HOSPITAL | Age: 70
End: 2020-11-23
Attending: RADIOLOGY
Payer: MEDICARE

## 2020-11-23 DIAGNOSIS — C79.51 BONE METASTASIS (HCC): ICD-10-CM

## 2020-11-23 PROCEDURE — 77412 RADIATION TX DELIVERY LVL 3: CPT | Performed by: RADIOLOGY

## 2020-11-23 PROCEDURE — 72148 MRI LUMBAR SPINE W/O DYE: CPT

## 2020-11-23 PROCEDURE — 72146 MRI CHEST SPINE W/O DYE: CPT

## 2020-11-24 ENCOUNTER — APPOINTMENT (OUTPATIENT)
Dept: RADIATION ONCOLOGY | Facility: HOSPITAL | Age: 70
End: 2020-11-24
Attending: RADIOLOGY
Payer: MEDICARE

## 2020-11-24 ENCOUNTER — DOCUMENTATION (OUTPATIENT)
Dept: HEMATOLOGY ONCOLOGY | Facility: CLINIC | Age: 70
End: 2020-11-24

## 2020-11-24 PROCEDURE — 77412 RADIATION TX DELIVERY LVL 3: CPT | Performed by: RADIOLOGY

## 2020-11-25 ENCOUNTER — APPOINTMENT (OUTPATIENT)
Dept: RADIATION ONCOLOGY | Facility: HOSPITAL | Age: 70
End: 2020-11-25
Attending: RADIOLOGY
Payer: MEDICARE

## 2020-11-25 PROCEDURE — 77412 RADIATION TX DELIVERY LVL 3: CPT | Performed by: RADIOLOGY

## 2020-11-30 ENCOUNTER — ANESTHESIA EVENT (OUTPATIENT)
Dept: GASTROENTEROLOGY | Facility: HOSPITAL | Age: 70
End: 2020-11-30

## 2020-11-30 ENCOUNTER — APPOINTMENT (OUTPATIENT)
Dept: RADIATION ONCOLOGY | Facility: HOSPITAL | Age: 70
End: 2020-11-30
Attending: RADIOLOGY
Payer: MEDICARE

## 2020-11-30 PROCEDURE — 77336 RADIATION PHYSICS CONSULT: CPT | Performed by: RADIOLOGY

## 2020-11-30 PROCEDURE — 77412 RADIATION TX DELIVERY LVL 3: CPT | Performed by: RADIOLOGY

## 2020-12-01 ENCOUNTER — HOSPITAL ENCOUNTER (OUTPATIENT)
Dept: GASTROENTEROLOGY | Facility: HOSPITAL | Age: 70
Setting detail: OUTPATIENT SURGERY
Discharge: HOME | End: 2020-12-01
Attending: INTERNAL MEDICINE | Admitting: INTERNAL MEDICINE
Payer: MEDICARE

## 2020-12-01 ENCOUNTER — ANESTHESIA (OUTPATIENT)
Dept: GASTROENTEROLOGY | Facility: HOSPITAL | Age: 70
End: 2020-12-01

## 2020-12-01 VITALS — HEART RATE: 69 BPM

## 2020-12-01 VITALS
OXYGEN SATURATION: 97 % | WEIGHT: 103 LBS | HEART RATE: 77 BPM | TEMPERATURE: 97.9 F | BODY MASS INDEX: 18.25 KG/M2 | SYSTOLIC BLOOD PRESSURE: 135 MMHG | RESPIRATION RATE: 16 BRPM | DIASTOLIC BLOOD PRESSURE: 63 MMHG | HEIGHT: 63 IN

## 2020-12-01 DIAGNOSIS — R13.19 OTHER DYSPHAGIA: ICD-10-CM

## 2020-12-01 DIAGNOSIS — R94.8 ABNORMAL GASTROINTESTINAL PET SCAN: ICD-10-CM

## 2020-12-01 LAB — GLUCOSE SERPL-MCNC: 83 MG/DL (ref 65–140)

## 2020-12-01 PROCEDURE — 88342 IMHCHEM/IMCYTCHM 1ST ANTB: CPT | Performed by: PATHOLOGY

## 2020-12-01 PROCEDURE — 88341 IMHCHEM/IMCYTCHM EA ADD ANTB: CPT | Performed by: PATHOLOGY

## 2020-12-01 PROCEDURE — 82948 REAGENT STRIP/BLOOD GLUCOSE: CPT

## 2020-12-01 PROCEDURE — 88305 TISSUE EXAM BY PATHOLOGIST: CPT | Performed by: PATHOLOGY

## 2020-12-01 PROCEDURE — 43251 EGD REMOVE LESION SNARE: CPT | Performed by: INTERNAL MEDICINE

## 2020-12-01 RX ORDER — GLYCOPYRROLATE 0.2 MG/ML
INJECTION INTRAMUSCULAR; INTRAVENOUS AS NEEDED
Status: DISCONTINUED | OUTPATIENT
Start: 2020-12-01 | End: 2020-12-01

## 2020-12-01 RX ORDER — PROPOFOL 10 MG/ML
INJECTION, EMULSION INTRAVENOUS CONTINUOUS PRN
Status: DISCONTINUED | OUTPATIENT
Start: 2020-12-01 | End: 2020-12-01

## 2020-12-01 RX ORDER — SODIUM CHLORIDE 9 MG/ML
INJECTION, SOLUTION INTRAVENOUS CONTINUOUS PRN
Status: DISCONTINUED | OUTPATIENT
Start: 2020-12-01 | End: 2020-12-01

## 2020-12-01 RX ORDER — FENTANYL CITRATE 50 UG/ML
INJECTION, SOLUTION INTRAMUSCULAR; INTRAVENOUS AS NEEDED
Status: DISCONTINUED | OUTPATIENT
Start: 2020-12-01 | End: 2020-12-01

## 2020-12-01 RX ORDER — PROPOFOL 10 MG/ML
INJECTION, EMULSION INTRAVENOUS AS NEEDED
Status: DISCONTINUED | OUTPATIENT
Start: 2020-12-01 | End: 2020-12-01

## 2020-12-01 RX ADMIN — SODIUM CHLORIDE: 9 INJECTION, SOLUTION INTRAVENOUS at 08:03

## 2020-12-01 RX ADMIN — LIDOCAINE HYDROCHLORIDE 50 MG: 20 INJECTION, SOLUTION INTRAVENOUS at 08:09

## 2020-12-01 RX ADMIN — PROPOFOL 80 MG: 10 INJECTION, EMULSION INTRAVENOUS at 08:09

## 2020-12-01 RX ADMIN — FENTANYL CITRATE 25 MCG: 50 INJECTION INTRAMUSCULAR; INTRAVENOUS at 08:29

## 2020-12-01 RX ADMIN — FENTANYL CITRATE 25 MCG: 50 INJECTION INTRAMUSCULAR; INTRAVENOUS at 08:09

## 2020-12-01 RX ADMIN — PROPOFOL 100 MCG/KG/MIN: 10 INJECTION, EMULSION INTRAVENOUS at 08:10

## 2020-12-01 RX ADMIN — FENTANYL CITRATE 25 MCG: 50 INJECTION INTRAMUSCULAR; INTRAVENOUS at 08:10

## 2020-12-01 RX ADMIN — GLYCOPYRROLATE 0.1 MG: 0.2 INJECTION, SOLUTION INTRAMUSCULAR; INTRAVENOUS at 08:07

## 2020-12-04 ENCOUNTER — APPOINTMENT (OUTPATIENT)
Dept: LAB | Facility: CLINIC | Age: 70
End: 2020-12-04
Payer: MEDICARE

## 2020-12-04 ENCOUNTER — HOSPITAL ENCOUNTER (OUTPATIENT)
Dept: INFUSION CENTER | Facility: CLINIC | Age: 70
Discharge: HOME | End: 2020-12-04
Payer: MEDICARE

## 2020-12-04 ENCOUNTER — OFFICE VISIT (OUTPATIENT)
Dept: HEMATOLOGY ONCOLOGY | Facility: CLINIC | Age: 70
End: 2020-12-04
Payer: MEDICARE

## 2020-12-04 ENCOUNTER — TELEPHONE (OUTPATIENT)
Dept: HEMATOLOGY ONCOLOGY | Facility: CLINIC | Age: 70
End: 2020-12-04

## 2020-12-04 VITALS
OXYGEN SATURATION: 99 % | HEIGHT: 63 IN | WEIGHT: 106 LBS | SYSTOLIC BLOOD PRESSURE: 136 MMHG | DIASTOLIC BLOOD PRESSURE: 68 MMHG | RESPIRATION RATE: 18 BRPM | BODY MASS INDEX: 18.78 KG/M2 | TEMPERATURE: 97.4 F | HEART RATE: 76 BPM

## 2020-12-04 VITALS — TEMPERATURE: 97.4 F

## 2020-12-04 DIAGNOSIS — Z17.0 MALIGNANT NEOPLASM OF UPPER-OUTER QUADRANT OF RIGHT BREAST IN FEMALE, ESTROGEN RECEPTOR POSITIVE (HCC): Primary | ICD-10-CM

## 2020-12-04 DIAGNOSIS — D70.2 DRUG-INDUCED LEUKOPENIA (HCC): ICD-10-CM

## 2020-12-04 DIAGNOSIS — C50.411 MALIGNANT NEOPLASM OF UPPER-OUTER QUADRANT OF RIGHT BREAST IN FEMALE, ESTROGEN RECEPTOR POSITIVE (HCC): Primary | ICD-10-CM

## 2020-12-04 DIAGNOSIS — C79.51 BONE METASTASIS (HCC): ICD-10-CM

## 2020-12-04 DIAGNOSIS — D70.2 DRUG-INDUCED LEUKOPENIA (HCC): Primary | ICD-10-CM

## 2020-12-04 DIAGNOSIS — D70.9 AGRANULOCYTOSIS (HCC): Primary | ICD-10-CM

## 2020-12-04 DIAGNOSIS — M89.9 LYTIC BONE LESION OF FEMUR: ICD-10-CM

## 2020-12-04 DIAGNOSIS — D70.9 AGRANULOCYTOSIS (HCC): ICD-10-CM

## 2020-12-04 PROCEDURE — 96372 THER/PROPH/DIAG INJ SC/IM: CPT

## 2020-12-04 PROCEDURE — 99214 OFFICE O/P EST MOD 30 MIN: CPT | Performed by: INTERNAL MEDICINE

## 2020-12-04 RX ADMIN — TBO-FILGRASTIM 300 MCG: 300 INJECTION, SOLUTION SUBCUTANEOUS at 14:30

## 2020-12-17 ENCOUNTER — TELEMEDICINE (OUTPATIENT)
Dept: PALLIATIVE MEDICINE | Facility: CLINIC | Age: 70
End: 2020-12-17
Payer: MEDICARE

## 2020-12-17 ENCOUNTER — TELEPHONE (OUTPATIENT)
Dept: PALLIATIVE MEDICINE | Facility: CLINIC | Age: 70
End: 2020-12-17

## 2020-12-17 DIAGNOSIS — G89.3 CANCER-RELATED PAIN: Chronic | ICD-10-CM

## 2020-12-17 DIAGNOSIS — C50.911 BREAST CANCER METASTASIZED TO BONE, RIGHT (HCC): Chronic | ICD-10-CM

## 2020-12-17 DIAGNOSIS — I89.0 LYMPHEDEMA: Chronic | ICD-10-CM

## 2020-12-17 DIAGNOSIS — E46 PROTEIN-CALORIE MALNUTRITION, UNSPECIFIED SEVERITY (HCC): Chronic | ICD-10-CM

## 2020-12-17 DIAGNOSIS — C79.51 BONE METASTASIS (HCC): ICD-10-CM

## 2020-12-17 DIAGNOSIS — Z51.5 PALLIATIVE CARE PATIENT: ICD-10-CM

## 2020-12-17 DIAGNOSIS — Z17.0 MALIGNANT NEOPLASM OF UPPER-OUTER QUADRANT OF RIGHT BREAST IN FEMALE, ESTROGEN RECEPTOR POSITIVE (HCC): Primary | ICD-10-CM

## 2020-12-17 DIAGNOSIS — M89.8X9 BONE PAIN DUE TO G-CSF: Chronic | ICD-10-CM

## 2020-12-17 DIAGNOSIS — G89.3 CANCER ASSOCIATED PAIN: ICD-10-CM

## 2020-12-17 DIAGNOSIS — D70.9 AGRANULOCYTOSIS (HCC): ICD-10-CM

## 2020-12-17 DIAGNOSIS — R26.2 AMBULATORY DYSFUNCTION: ICD-10-CM

## 2020-12-17 DIAGNOSIS — E09.9 DRUG OR CHEMICAL INDUCED DIABETES MELLITUS WITHOUT COMPLICATION, WITHOUT LONG-TERM CURRENT USE OF INSULIN (HCC): Chronic | ICD-10-CM

## 2020-12-17 DIAGNOSIS — C50.411 MALIGNANT NEOPLASM OF UPPER-OUTER QUADRANT OF RIGHT BREAST IN FEMALE, ESTROGEN RECEPTOR POSITIVE (HCC): Primary | ICD-10-CM

## 2020-12-17 DIAGNOSIS — M48.54XD NON-TRAUMATIC COMPRESSION FRACTURE OF T12 THORACIC VERTEBRA WITH ROUTINE HEALING, SUBSEQUENT ENCOUNTER: ICD-10-CM

## 2020-12-17 DIAGNOSIS — C79.51 BREAST CANCER METASTASIZED TO BONE, RIGHT (HCC): Chronic | ICD-10-CM

## 2020-12-17 DIAGNOSIS — G54.0 BRACHIAL PLEXOPATHY: ICD-10-CM

## 2020-12-17 DIAGNOSIS — F41.9 ANXIETY DISORDER, UNSPECIFIED TYPE: ICD-10-CM

## 2020-12-17 PROCEDURE — 99215 OFFICE O/P EST HI 40 MIN: CPT | Performed by: INTERNAL MEDICINE

## 2020-12-17 RX ORDER — MORPHINE SULFATE 15 MG/1
15 TABLET ORAL EVERY 4 HOURS PRN
Qty: 120 TABLET | Refills: 0 | Status: SHIPPED | OUTPATIENT
Start: 2020-12-17 | End: 2021-02-15 | Stop reason: HOSPADM

## 2020-12-21 ENCOUNTER — APPOINTMENT (OUTPATIENT)
Dept: LAB | Facility: CLINIC | Age: 70
End: 2020-12-21
Payer: MEDICARE

## 2021-01-07 ENCOUNTER — TELEMEDICINE (OUTPATIENT)
Dept: RADIATION ONCOLOGY | Facility: CLINIC | Age: 71
End: 2021-01-07
Attending: RADIOLOGY

## 2021-01-07 DIAGNOSIS — C79.51 BONE METASTASIS (HCC): ICD-10-CM

## 2021-01-07 DIAGNOSIS — C50.911 BREAST CANCER METASTASIZED TO BONE, RIGHT (HCC): Primary | Chronic | ICD-10-CM

## 2021-01-07 DIAGNOSIS — C79.51 BREAST CANCER METASTASIZED TO BONE, RIGHT (HCC): Primary | Chronic | ICD-10-CM

## 2021-01-07 NOTE — PROGRESS NOTES
Virtual Brief Visit    Assessment/Plan: Ms Jimena Lezama returns today for routine follow-up approximately 1 month status post completion of palliative radiation therapy to the left femur  There has been no significant change 1 way or the other in her chronic left thigh pain  She has extensive osseous metastases and while we hoped to reduce the discomfort in her left thigh, the treatment was done primarily to prevent pathologic fracture  She continues to follow closely with palliative care and her pain is generally manageable and she is satisfied with the current regimen  She continues to receive systemic therapy with Afinitor and exemestane, but it appears that her performance status has been declining over the past few months and she is now struggling with fluid retention including a large right pleural effusion which has required thoracentesis in addition to pericardial effusion, increased swelling of the bilateral lower extremities and intermittent periorbital edema  Her overall energy and activity levels have also continued to decline  She will be following up with Medical Oncology in person within the next few days and may benefit from a diuretic  She seems intent on continuing with cancer directed therapy although transition to hospice may be appropriate sooner rather than later  We will schedule her for a four-month follow-up in our office  She has known extensive vertebral body disease, which we know is slowly progressing both within and outside of her prior radiation fields  She prefers to hold off on scheduling any surveillance MRIs of the spine and at this point would prefer imaging more on an as-needed basis  Problem List Items Addressed This Visit        Musculoskeletal and Integument    Breast cancer metastasized to bone, right ([address] ) - Primary (Chronic)    Bone metastasis ([facility] [address] )                Reason for visit is No chief complaint on file         Encounter provider Christal Koyanagi, MD    Provider located at [address] [id number]    Recent Visits  No visits were found meeting these conditions  Showing recent visits within past 7 days and meeting all other requirements     Today's Visits  Date Type Provider Dept   01/07/21 Telemedicine Janak Bailey MD [facility]   Showing today's visits and meeting all other requirements     Future Appointments  No visits were found meeting these conditions  Showing future appointments within next 150 days and meeting all other requirements        After connecting through telephone, the patient was identified by name and date of birth  Aissatou Brink was informed that this is a telemedicine visit and that the visit is being conducted through telephone  My office door was closed  No one else was in the room  She acknowledged consent and understanding of privacy and security of the platform  The patient has agreed to participate and understands she can discontinue the visit at any time  Patient is aware this is a billable service  Subjective    Aissatou Brink returns today for routine scheduled follow-up visit approximately 1 month status post completion of palliative radiation therapy to the left femur  She remains on palliative systemic therapy under the care of Medical Oncology  Her course over the past month or 2 has been complicated by increased fluid retention including large volume right-sided pleural effusion requiring thoracentesis  She also reportedly had mild pericardial effusion and she reports that over the past month she has been experiencing increased swelling of the bilateral lower extremities as well as periods of periorbital edema and exacerbation of her longstanding upper extremity edema which has been present since her mastectomy    She continues to have widespread chronic pain attributable to her osseous metastases but her pain is currently manageable on her current regimen  She continues to follow closely with palliative care  She is scheduled to meet with Medical Oncology in person next week  HPI   Oncology History Overview Note   60-year-old female with widely metastatic breast cancer having previously received palliative thoracolumbar radiation in January 2019  She has now completed a course of palliative radiation therapy to the left femur on 11/30/20  She tolerated treatment well, returns today for one month follow-up  12/1/20 EGD for dysphagia  1  Moderate stenosis passing EGD scope into the upper esophagus with vascular appearing blebs in the upper esophagus  This could represent adenopathy pressing on the esophagus vs  Dilated vessels  2  Inflammatory appearing polyp  Base biopsied with cold biopsy forceps and polypoid area was resected using cold snare polypectomy  3  Normal duodenum  4  EUS not performed due to difficulty passing scope past upper esophageal sphincter  Biopsies of polyps are negative for dysplasia or carcinoma      12/4/20 Dr Harry Chatterjee follow-up  Tolerating Aromasin and afinitor since Nov 2020  No change in therapy at this time  She return in one month for follow-up  1/11/21 Dr Harry Chatterjee follow-up  3/11/21 Palliative care follow-up     Breast cancer metastasized to bone, right [facility])   1996 Surgery    right modified radical mastectomy followed by adjuvant systemic therapy but no radiation  6/28/2004 Biopsy    Final Diagnosis  A  Lymph Node, right supraclavicular, core biopsy (14 slides, [id number], [facility]; collected on 6/28/2004):  -  Consistent with metastatic carcinoma, compatible with breast origin (see note)           2004 -  Radiation    salvage radiation therapy at an outside facility to right supraclavicular nodes at [facility]     12/23/2018 Initial Diagnosis    Breast cancer metastasized to bone, right ([facility] [address] )     12/26/2018 - 1/9/2019 Radiation    3000 cGy in 10 fractions to  T11 through the bottom of the SI joints  Dr Yohan King       1/16/2019 -  Chemotherapy    fulvestrant (FASLODEX) IM injection   2/20/2019 - 4/4/2019 Chemotherapy    Ibrance 125 mg PO day 1-21 every 28 days     4/4/2019 - 5/8/2019 Chemotherapy    Ibrance 100 mg PO daily day 1-21 every 28 days      5/22/2019 -  Chemotherapy    Faslodex, Amanda Wingon     6/27/2019 Genetic Testing     No pathogenic mutation identified in [facility] Breast panel     12/16/2019 Biopsy    Bone, right iliac crest, biopsy:  -  Metastatic carcinoma, compatible with breast primary  -  Immunohistochemical stains performed with appropriate controls show the tumor to be positive for keratin AE 1/3, ADRIEN-3 and ER (patchy)       4/2/2020 - 9/3/2020 Chemotherapy    Piqray 150 mg daily     11/6/2020 -  Chemotherapy    Everolimus (Afinitor) 7 5 mg PO daily   (Targeted therapy)     11/6/2020 -  Hormone Therapy    Exemestane (Aromasin) 25mg PO daily     11/16/2020 - 11/30/2020 Radiation    Course: C2    Plan ID Energy Fractions Dose per Fraction (cGy) Dose Correction (cGy) Total Dose Delivered (cGy) Elapsed Days   Lt Femur 10X/6X 10 / 10 300 0 3,000 14    Dr Yohan King      Malignant neoplasm of upper-outer quadrant of right breast in female, estrogen receptor positive ([address] )   6/25/2019 Initial Diagnosis    Malignant neoplasm of upper-outer quadrant of right breast in female, estrogen receptor positive ([address] )             Past Medical History:   Diagnosis Date    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    BRCA1 negative     BRCA2 negative     Breast cancer ([address] ) 05/05/1996    right     Cancer ([address] )     breast 1996    Cancer ([address] )     bone      Compression fracture of thoracic vertebra ([address] )     Diabetes mellitus ([address] )     History of chemotherapy 1996    History of therapeutic radiation     Tendonitis        Past Surgical History:   Procedure Laterality Date    CHOLECYSTECTOMY      COLONOSCOPY      CT NEEDLE BIOPSY BONE  12/16/2019    IR THORACENTESIS  9/28/2020    MASTECTOMY Right 05/05/1996    Right side reconstruction    NECK SURGERY      RI ESOPHAGOGASTRODUODENOSCOPY TRANSORAL DIAGNOSTIC N/A 1/24/2019    Procedure: ESOPHAGOGASTRODUODENOSCOPY (EGD); Surgeon: Penelope Ham MD;  Location: [facility]; Service: Gastroenterology    TONSILLECTOMY      UPPER GASTROINTESTINAL ENDOSCOPY         Current Outpatient Medications   Medication Sig Dispense Refill    acetaminophen (TYLENOL) 325 mg tablet Take 2 tablets (650 mg total) by mouth 3 (three) times a day with meals 90 tablet 1    b complex vitamins capsule Take 1 capsule by mouth 2 (two) times a day 60 capsule 11    Blood Glucose Calibration (ACCU-CHEK COMPACT PLUS CONTROL) SOLN by In Vitro route daily 30 each 11    Blood Glucose Monitoring Suppl (ACCU-CHEK COMPACT CARE KIT) KIT by Does not apply route every evening 1 each 0    calcium carbonate (OS-CLARENCE) 1250 (500 Ca) MG chewable tablet Chew 1 tablet (1,250 mg total) daily 30 tablet 0    Cholecalciferol (VITAMIN D3) 5000 units CAPS Take 1 capsule by mouth daily      everolimus (Afinitor) 7 5 MG tablet Take 1 tablet (7 5 mg total) by mouth daily 28 tablet 2    exemestane (AROMASIN) 25 MG tablet Take 1 tablet (25 mg total) by mouth daily 28 tablet 2    ferrous sulfate 325 (65 Fe) mg tablet Take 1 tablet (325 mg total) by mouth every other day 30 tablet 1    gabapentin (NEURONTIN) 300 mg capsule TAKE 1 CAPSULE BY MOUTH TWICE A  capsule 3    glucose blood (Accu-Chek Compact Plus) test strip Use as instructed 25 each 11    KRILL OIL PO Take by mouth      LORazepam (ATIVAN) 1 mg tablet Take 1-2 tablets (1-2 mg total) by mouth daily at bedtime TAKE 2MG (2 TABLETS) AT BEDTIME   60 tablet 2    metFORMIN (GLUCOPHAGE) 500 mg tablet TAKE 1 TABLET BY MOUTH TWICE A DAY WITH MEALS 180 tablet 1    morphine (MSIR) 15 mg tablet Take 1 tablet (15 mg total) by mouth every 4 (four) hours as needed (cancer pain) 1/2 tab for moderate pain, 1 full tab for severe painMax Daily Amount: 90 mg 120 tablet 0    naloxone (NARCAN) 4 mg/0 1 mL nasal spray Administer 1 spray into a nostril  If breathing does not return to normal or if breathing difficulty resumes after 2-3 minutes, give another dose in the other nostril using a new spray  (Patient not taking: Reported on 11/6/2020) 1 each 1    omeprazole (PriLOSEC) 40 MG capsule Take 1 capsule (40 mg total) by mouth daily 90 capsule 2    senna (SENOKOT) 8 6 mg Take 1 tablet by mouth as needed for constipation       No current facility-administered medications for this visit  No Known Allergies        VIRTUAL VISIT DISCLAIMER    Katheryne Gosselin acknowledges that she has consented to an online visit or consultation  She understands that the online visit is based solely on information provided by her, and that, in the absence of a face-to-face physical evaluation by the physician, the diagnosis she receives is both limited and provisional in terms of accuracy and completeness  This is not intended to replace a full medical face-to-face evaluation by the physician  Katheryne Gosselin understands and accepts these terms

## 2021-01-08 ENCOUNTER — DOCUMENTATION (OUTPATIENT)
Dept: HEMATOLOGY ONCOLOGY | Facility: MEDICAL CENTER | Age: 71
End: 2021-01-08

## 2021-01-08 ENCOUNTER — TELEPHONE (OUTPATIENT)
Dept: HEMATOLOGY ONCOLOGY | Facility: CLINIC | Age: 71
End: 2021-01-08

## 2021-01-08 ENCOUNTER — LAB (OUTPATIENT)
Dept: LAB | Facility: CLINIC | Age: 71
End: 2021-01-08
Payer: MEDICARE

## 2021-01-08 ENCOUNTER — TRANSCRIBE ORDERS (OUTPATIENT)
Dept: LAB | Facility: CLINIC | Age: 71
End: 2021-01-08

## 2021-01-08 NOTE — PROGRESS NOTES
Left message asking that patient call the of office to confirm appointment with Dr Kristina Gonzalez on 01/11 and to screen for COVID

## 2021-01-10 DIAGNOSIS — F41.9 ANXIETY: ICD-10-CM

## 2021-01-11 ENCOUNTER — OFFICE VISIT (OUTPATIENT)
Dept: HEMATOLOGY ONCOLOGY | Facility: CLINIC | Age: 71
End: 2021-01-11
Payer: MEDICARE

## 2021-01-11 ENCOUNTER — HOSPITAL ENCOUNTER (OUTPATIENT)
Dept: NON INVASIVE DIAGNOSTICS | Facility: HOSPITAL | Age: 71
Discharge: HOME | End: 2021-01-11
Attending: INTERNAL MEDICINE
Payer: MEDICARE

## 2021-01-11 VITALS
DIASTOLIC BLOOD PRESSURE: 72 MMHG | WEIGHT: 106.7 LBS | HEIGHT: 63 IN | OXYGEN SATURATION: 99 % | SYSTOLIC BLOOD PRESSURE: 106 MMHG | HEART RATE: 96 BPM | RESPIRATION RATE: 18 BRPM | BODY MASS INDEX: 18.91 KG/M2 | TEMPERATURE: 98.4 F

## 2021-01-11 DIAGNOSIS — Z17.0 MALIGNANT NEOPLASM OF UPPER-OUTER QUADRANT OF RIGHT BREAST IN FEMALE, ESTROGEN RECEPTOR POSITIVE (HCC): Primary | ICD-10-CM

## 2021-01-11 DIAGNOSIS — C79.51 BONE METASTASIS (HCC): ICD-10-CM

## 2021-01-11 DIAGNOSIS — G89.3 CANCER-RELATED PAIN: Chronic | ICD-10-CM

## 2021-01-11 DIAGNOSIS — M89.9 LYTIC BONE LESION OF FEMUR: ICD-10-CM

## 2021-01-11 DIAGNOSIS — R60.0 EDEMA OF BOTH LEGS: ICD-10-CM

## 2021-01-11 DIAGNOSIS — J90 PLEURAL EFFUSION ON RIGHT: ICD-10-CM

## 2021-01-11 DIAGNOSIS — G54.0 BRACHIAL PLEXOPATHY: ICD-10-CM

## 2021-01-11 DIAGNOSIS — R91.8 LUNG NODULES: ICD-10-CM

## 2021-01-11 DIAGNOSIS — F41.9 ANXIETY: ICD-10-CM

## 2021-01-11 DIAGNOSIS — D70.9 NEUTROPENIA, UNSPECIFIED TYPE (HCC): ICD-10-CM

## 2021-01-11 DIAGNOSIS — C50.411 MALIGNANT NEOPLASM OF UPPER-OUTER QUADRANT OF RIGHT BREAST IN FEMALE, ESTROGEN RECEPTOR POSITIVE (HCC): Primary | ICD-10-CM

## 2021-01-11 DIAGNOSIS — R22.9 SUBCUTANEOUS NODULES: ICD-10-CM

## 2021-01-11 PROCEDURE — 99214 OFFICE O/P EST MOD 30 MIN: CPT | Performed by: INTERNAL MEDICINE

## 2021-01-11 PROCEDURE — 93970 EXTREMITY STUDY: CPT | Performed by: SURGERY

## 2021-01-11 PROCEDURE — 93970 EXTREMITY STUDY: CPT

## 2021-01-11 NOTE — PATIENT INSTRUCTIONS
Venous Doppler study today for edema legs  No other change  She has potassium tablets at home and she will take 1 a day for 5 days  Follow-up in 4 weeks

## 2021-01-11 NOTE — TELEPHONE ENCOUNTER
Pt is calling to follow up about getting her Lorazepam 1mg medication refilled- Pt stated that [facility] is giving her an emergency two pills to get her through the night

## 2021-01-11 NOTE — PROGRESS NOTES
HPI:  Follow-up visit for stage IV breast cancer and presently patient is on Afinitor and Aromasin  Has generalized weakness and tiredness  Has bone pains  Has swollen lower legs  Has arthritic symptoms    No new subcutaneous nodules   Lymphedema right arm      She has been getting Granix for neutropenia and to prevent febrile neutropenia when ANC is less than 1000   Exertional dyspnea and she had right thoracentesis that showed atypical cells   Has dysphagia and had EGD on 12/01/2020  She ambulates with a walker    Patient has recurrent/metastatic disease from breast cancer since 2004  Patient has hormone receptor positive stage IV breast cancer and in November 2020 she was started on a combination of Aromasin plus Afinitor  She has been tolerating these 2 drugs without any major symptoms  She has been monitoring her blood sugar at home and that has been below 200  She did not experience sores in her mouth  Prior to these 2 drugs she was on Faslodex and Piqray and before that Faslodex plus Ibrance and prior to that letrozole  She has not noticed any new subcutaneous nodules           MD 5148 and patient was diagnosed to have right breast cancer and she had right breast mastectomy followed by adjuvant chemotherapy     In 2004 she was found to have hormone receptor positive  recurrent disease in the right supraclavicular lymph node   She had surgery and Femara for 10 years until 2015   Evaluation in 2015 was negative for any evidence of metastatic disease   In December 2018 she was found to have extensive bony metastatic disease presumably from previously known hormone receptor-positive right breast cancer      She was hospitalized with acute compression fracture of T12   There was no biopsy   He was started on radiation and that has been completed   She also received radiation to left femur    She was started on Faslodex and to that  Ibrance was added   She was also started on  Xgeva with calcium and vitamin-D  Norma Gave had profound neutropenia on Ibrance even on a 75 mg dose   Ibrance was discontinued and counts recovered      Disease progressed in the bones in October 2019   Faslodex was stopped   She was continued on Sunflower-barre with calcium and vitamin-D     Faslodex plus Angel Sanchezyosef started in the last week of January 2020  Seth Ratliff is on hold because of problem with her teeth    Patient states BRCA test was negative  On [facility] report and PDL1 was 0 and MMR was proficient                                                       Current Outpatient Medications:     acetaminophen (TYLENOL) 325 mg tablet, Take 2 tablets (650 mg total) by mouth 3 (three) times a day with meals, Disp: 90 tablet, Rfl: 1    b complex vitamins capsule, Take 1 capsule by mouth 2 (two) times a day, Disp: 60 capsule, Rfl: 11    Blood Glucose Calibration (ACCU-CHEK COMPACT PLUS CONTROL) SOLN, by In Vitro route daily, Disp: 30 each, Rfl: 11    Blood Glucose Monitoring Suppl (ACCU-CHEK COMPACT CARE KIT) KIT, by Does not apply route every evening, Disp: 1 each, Rfl: 0    calcium carbonate (OS-CLARENCE) 1250 (500 Ca) MG chewable tablet, Chew 1 tablet (1,250 mg total) daily, Disp: 30 tablet, Rfl: 0    Cholecalciferol (VITAMIN D3) 5000 units CAPS, Take 1 capsule by mouth daily, Disp: , Rfl:     everolimus (Afinitor) 7 5 MG tablet, Take 1 tablet (7 5 mg total) by mouth daily, Disp: 28 tablet, Rfl: 2    exemestane (AROMASIN) 25 MG tablet, Take 1 tablet (25 mg total) by mouth daily, Disp: 28 tablet, Rfl: 2    ferrous sulfate 325 (65 Fe) mg tablet, Take 1 tablet (325 mg total) by mouth every other day, Disp: 30 tablet, Rfl: 1    gabapentin (NEURONTIN) 300 mg capsule, TAKE 1 CAPSULE BY MOUTH TWICE A DAY, Disp: 180 capsule, Rfl: 3    glucose blood (Accu-Chek Compact Plus) test strip, Use as instructed, Disp: 25 each, Rfl: 11    KRILL OIL PO, Take by mouth, Disp: , Rfl:     LORazepam (ATIVAN) 1 mg tablet, Take 1-2 tablets (1-2 mg total) by mouth daily at bedtime TAKE 2MG (2 TABLETS) AT BEDTIME , Disp: 60 tablet, Rfl: 2    metFORMIN (GLUCOPHAGE) 500 mg tablet, TAKE 1 TABLET BY MOUTH TWICE A DAY WITH MEALS, Disp: 180 tablet, Rfl: 1    morphine (MSIR) 15 mg tablet, Take 1 tablet (15 mg total) by mouth every 4 (four) hours as needed (cancer pain) 1/2 tab for moderate pain, 1 full tab for severe painMax Daily Amount: 90 mg, Disp: 120 tablet, Rfl: 0    omeprazole (PriLOSEC) 40 MG capsule, Take 1 capsule (40 mg total) by mouth daily, Disp: 90 capsule, Rfl: 2    senna (SENOKOT) 8 6 mg, Take 1 tablet by mouth as needed for constipation, Disp: , Rfl:     naloxone (NARCAN) 4 mg/0 1 mL nasal spray, Administer 1 spray into a nostril  If breathing does not return to normal or if breathing difficulty resumes after 2-3 minutes, give another dose in the other nostril using a new spray  (Patient not taking: Reported on 11/6/2020), Disp: 1 each, Rfl: 1    No Known Allergies    Oncology History Overview Note   79-year-old female with widely metastatic breast cancer having previously received palliative thoracolumbar radiation in January 2019  She has now completed a course of palliative radiation therapy to the left femur on 11/30/20  She tolerated treatment well, returns today for one month follow-up  12/1/20 EGD for dysphagia  1  Moderate stenosis passing EGD scope into the upper esophagus with vascular appearing blebs in the upper esophagus  This could represent adenopathy pressing on the esophagus vs  Dilated vessels  2  Inflammatory appearing polyp  Base biopsied with cold biopsy forceps and polypoid area was resected using cold snare polypectomy  3  Normal duodenum  4  EUS not performed due to difficulty passing scope past upper esophageal sphincter  Biopsies of polyps are negative for dysplasia or carcinoma      12/4/20 Dr Janene Frank follow-up  Tolerating Aromasin and afinitor since Nov 2020  No change in therapy at this time   She return in one month for follow-up  1/11/21 Dr Janene Frank follow-up  3/11/21 Palliative care follow-up     Breast cancer metastasized to bone, right [facility])   1996 Surgery    right modified radical mastectomy followed by adjuvant systemic therapy but no radiation  6/28/2004 Biopsy    Final Diagnosis  A  Lymph Node, right supraclavicular, core biopsy (14 slides, [id number], [facility]; collected on 6/28/2004):  -  Consistent with metastatic carcinoma, compatible with breast origin (see note)  2004 -  Radiation    salvage radiation therapy at an outside facility to right supraclavicular nodes at [facility]     12/23/2018 Initial Diagnosis    Breast cancer metastasized to bone, right ([address] )     12/26/2018 - 1/9/2019 Radiation    3000 cGy in 10 fractions to  T11 through the bottom of the SI joints  Dr Ellen Huitron       1/16/2019 -  Chemotherapy    fulvestrant (FASLODEX) IM injection   2/20/2019 - 4/4/2019 Chemotherapy    Ibrance 125 mg PO day 1-21 every 28 days     4/4/2019 - 5/8/2019 Chemotherapy    Ibrance 100 mg PO daily day 1-21 every 28 days      5/22/2019 -  Chemotherapy    Faslodex, Ardena Caller     6/27/2019 Genetic Testing     No pathogenic mutation identified in [facility] Breast panel     12/16/2019 Biopsy    Bone, right iliac crest, biopsy:  -  Metastatic carcinoma, compatible with breast primary  -  Immunohistochemical stains performed with appropriate controls show the tumor to be positive for keratin AE 1/3, ADRIEN-3 and ER (patchy)       4/2/2020 - 9/3/2020 Chemotherapy    Piqray 150 mg daily     11/6/2020 -  Chemotherapy    Everolimus (Afinitor) 7 5 mg PO daily   (Targeted therapy)     11/6/2020 -  Hormone Therapy    Exemestane (Aromasin) 25mg PO daily     11/16/2020 - 11/30/2020 Radiation    Course: C2    Plan ID Energy Fractions Dose per Fraction (cGy) Dose Correction (cGy) Total Dose Delivered (cGy) Elapsed Days   Lt Femur 10X/6X 10 / 10 300 0 3,000 14    Dr Ellen Huitron      Malignant neoplasm of upper-outer quadrant of right breast in female, estrogen receptor positive ([facility] )   6/25/2019 Initial Diagnosis    Malignant neoplasm of upper-outer quadrant of right breast in female, estrogen receptor positive ([facility] )         ROS:  01/11/21 Reviewed 13 systems: See symptoms in HPI  Presently no other neurological, cardiac, pulmonary, GI and  symptoms other than mentioned in HPI  Other symptoms are in HPI  No  fever, chills, bleeding,  skin rash, weight loss  No frequent infections  Not unusually sensitive to heat or cold  No swollen glands  Patient is anxious  /72 (BP Location: Left arm, Patient Position: Sitting, Cuff Size: Adult)   Pulse 96   Temp 98 4 °F (36 9 °C)   Resp 18   Ht 5' 3" (1 6 m)   Wt 48 4 kg (106 lb 11 2 oz)   LMP 05/27/1996 (LMP Unknown)   SpO2 99%   BMI 18 90 kg/m²     Physical Exam:  Alert, oriented, not in distress, no icterus, no oral thrush, no new multiple subcutaneous nodules in the neck area, decreased breath sounds at right lung base,  regular heart rate, abdomen  soft and non tender, no palpable abdominal mass, no ascites, 1+ edema of ankles and lower legs, no calf tenderness,  no skin rash, no palpable lymphadenopathy,  no clubbing  Patient is anxious  Performance status 3  She ambulates with a walker  1+ lymphedema right arm       IMAGING:      LABS:  Results for orders placed or performed in visit on 01/01/21   CBC and differential   Result Value Ref Range    WBC 2 31 (L) 4 31 - 10 16 Thousand/uL    RBC 3 63 (L) 3 81 - 5 12 Million/uL    Hemoglobin 9 9 (L) 11 5 - 15 4 g/dL    Hematocrit 31 8 (L) 34 8 - 46 1 %    MCV 88 82 - 98 fL    MCH 27 3 26 8 - 34 3 pg    MCHC 31 1 (L) 31 4 - 37 4 g/dL    RDW 16 6 (H) 11 6 - 15 1 %    MPV 8 7 (L) 8 9 - 12 7 fL    Platelets 360 992 - 164 Thousands/uL    nRBC 0 /100 WBCs    Neutrophils Relative 74 43 - 75 %    Immat GRANS % 0 0 - 2 %    Lymphocytes Relative 16 14 - 44 %    Monocytes Relative 9 4 - 12 %    Eosinophils Relative 0 0 - 6 % Basophils Relative 1 0 - 1 %    Neutrophils Absolute 1 71 (L) 1 85 - 7 62 Thousands/µL    Immature Grans Absolute 0 01 0 00 - 0 20 Thousand/uL    Lymphocytes Absolute 0 36 (L) 0 60 - 4 47 Thousands/µL    Monocytes Absolute 0 21 0 17 - 1 22 Thousand/µL    Eosinophils Absolute 0 00 0 00 - 0 61 Thousand/µL    Basophils Absolute 0 02 0 00 - 0 10 Thousands/µL   Comprehensive metabolic panel   Result Value Ref Range    Sodium 141 136 - 145 mmol/L    Potassium 3 4 (L) 3 5 - 5 3 mmol/L    Chloride 106 100 - 108 mmol/L    CO2 26 21 - 32 mmol/L    ANION GAP 9 4 - 13 mmol/L    BUN 12 5 - 25 mg/dL    Creatinine 0 91 0 60 - 1 30 mg/dL    Glucose, Fasting 88 65 - 99 mg/dL    Calcium 7 8 (L) 8 3 - 10 1 mg/dL    Corrected Calcium 8 9 8 3 - 10 1 mg/dL    AST 79 (H) 5 - 45 U/L    ALT 63 12 - 78 U/L    Alkaline Phosphatase 172 (H) 46 - 116 U/L    Total Protein 5 6 (L) 6 4 - 8 2 g/dL    Albumin 2 6 (L) 3 5 - 5 0 g/dL    Total Bilirubin 0 47 0 20 - 1 00 mg/dL    eGFR 64 ml/min/1 73sq m     *Note: Due to a large number of results and/or encounters for the requested time period, some results have not been displayed  A complete set of results can be found in Results Review  Labs, Imaging, & Other studies:   All pertinent labs and imaging studies were personally reviewed    Lab Results   Component Value Date    K 3 4 (L) 01/08/2021     01/08/2021    CO2 26 01/08/2021    BUN 12 01/08/2021    CREATININE 0 91 01/08/2021    GLUF 88 01/08/2021    CALCIUM 7 8 (L) 01/08/2021    CORRECTEDCA 8 9 01/08/2021    AST 79 (H) 01/08/2021    ALT 63 01/08/2021    ALKPHOS 172 (H) 01/08/2021    EGFR 64 01/08/2021     Lab Results   Component Value Date    WBC 2 31 (L) 01/08/2021    HGB 9 9 (L) 01/08/2021    HCT 31 8 (L) 01/08/2021    MCV 88 01/08/2021     01/08/2021       Reviewed and discussed with patient  Assessment and plan: Follow-up visit for stage IV breast cancer and presently patient is on Afinitor and Aromasin    Has generalized weakness and tiredness  Has bone pains  Has swollen lower legs  Has arthritic symptoms    No new subcutaneous nodules   Lymphedema right arm      She has been getting Granix for neutropenia and to prevent febrile neutropenia when ANC is less than 1000   Exertional dyspnea and she had right thoracentesis that showed atypical cells   Has dysphagia and had EGD on 12/01/2020  She ambulates with a walker    Patient has recurrent/metastatic disease from breast cancer since 2004  Patient has hormone receptor positive stage IV breast cancer and in November 2020 she was started on a combination of Aromasin plus Afinitor  She has been tolerating these 2 drugs without any major symptoms  She has been monitoring her blood sugar at home and that has been below 200  She did not experience sores in her mouth  Prior to these 2 drugs she was on Faslodex and Piqray and before that Faslodex plus Ibrance and prior to that letrozole  She has not noticed any new subcutaneous nodules           LC 7277 and patient was diagnosed to have right breast cancer and she had right breast mastectomy followed by adjuvant chemotherapy  In 2004 she was found to have hormone receptor positive  recurrent disease in the right supraclavicular lymph node   She had surgery and Femara for 10 years until 2015   Evaluation in 2015 was negative for any evidence of metastatic disease   In December 2018 she was found to have extensive bony metastatic disease presumably from previously known hormone receptor-positive right breast cancer      She was hospitalized with acute compression fracture of T12   There was no biopsy   He was started on radiation and that has been completed   She also received radiation to left femur    She was started on Faslodex and to that  Ibrance was added   She was also started on  Xgeva with calcium and vitamin-D  Marea Mor had profound neutropenia on Ibrance even on a 75 mg dose   Ibrance was discontinued and counts recovered     Suzzanna Osgood progressed in the bones in October 2019   Faslodex was stopped   She was continued on Gates-barre with calcium and vitamin-D     Faslodex plus Leighann Glassing started in the last week of January 2020  Shelia Krishna is on hold because of problem with her teeth    Patient states BRCA test was negative  On [facility] report and PDL1 was 0 and MMR was proficient     Physical examination and test results are as recorded and discussed  She is being continued on Aromasin and Afinitor and Granix as needed  She will go for venous Doppler study today  She will take 1 potassium tablet daily for 5 days for potassium level of 3 4  She may need diuretic  She may need anticoagulation if she has DVT  Radiation oncologist did not recommend radiation to femurs at this time  She already had EGD and is swallowing better       She has follow-up office appointment  Donna Salas will continue to follow with her primary physician and other consultants  Rm Dey is response and prolongation of survival and treatment of symptoms  Tamia Toro discussed in detail   Questions answered   Discussed the importance of self-breast examination, eating healthy foods, activities as tolerated and health screening test   Patient needs assistance in her care   Also discussed prevention against coronavirus  1  Malignant neoplasm of upper-outer quadrant of right breast in female, estrogen receptor positive ([address] )      2  Edema of both legs    - VAS lower limb venous duplex study, complete bilateral; Future    3  Bone metastasis ([address] )      4  Lytic bone lesion of femur      5  Cancer-related pain-palliative care      6  Subcutaneous nodules      7  Neutropenia, unspecified type ([address] )      8  Lung nodules      9  Brachial plexopathy      10  Pleural effusion on right              Addendum:  Venous Doppler study was negative for DVT                          Venous Doppler study today for edema legs  No other change    She has potassium tablets at home and she will take 1 a day for 5 days  Follow-up in 4 weeks  Patient voiced understanding and agreement in the discussion  Counseling / Coordination of Care   Greater than 50% of total time was spent with the patient and / or family counseling and / or coordination of care

## 2021-01-12 ENCOUNTER — TELEPHONE (OUTPATIENT)
Dept: OTHER | Facility: OTHER | Age: 71
End: 2021-01-12

## 2021-01-12 ENCOUNTER — DOCUMENTATION (OUTPATIENT)
Dept: HEMATOLOGY ONCOLOGY | Facility: CLINIC | Age: 71
End: 2021-01-12

## 2021-01-12 DIAGNOSIS — R60.0 EDEMA OF BOTH LEGS: Primary | ICD-10-CM

## 2021-01-12 RX ORDER — FUROSEMIDE 20 MG/1
20 TABLET ORAL DAILY
Qty: 30 TABLET | Refills: 1 | Status: SHIPPED | OUTPATIENT
Start: 2021-01-12 | End: 2021-02-01

## 2021-01-12 RX ORDER — LORAZEPAM 1 MG/1
1-2 TABLET ORAL
Qty: 60 TABLET | Refills: 2 | Status: SHIPPED | OUTPATIENT
Start: 2021-01-12 | End: 2021-02-15 | Stop reason: HOSPADM

## 2021-01-12 RX ORDER — LORAZEPAM 1 MG/1
1-2 TABLET ORAL
Qty: 60 TABLET | Refills: 2 | OUTPATIENT
Start: 2021-01-12

## 2021-01-12 NOTE — TELEPHONE ENCOUNTER
Patient called in to advise that the script for the Potassium pill was not accepted by the pharmacy  [facility] is requesting a new script

## 2021-01-12 NOTE — PROGRESS NOTES
I spoke with patient about bilateral leg edema  No DVT  Lasix 20 mg daily  Potassium 10 mEq daily  She has potassium tablet at home with refill  Emailed Lasix prescription  Could edema be secondary to Afinitor? Possible

## 2021-01-13 DIAGNOSIS — E87.6 HYPOKALEMIA: Primary | ICD-10-CM

## 2021-01-13 RX ORDER — POTASSIUM CHLORIDE 750 MG/1
10 CAPSULE, EXTENDED RELEASE ORAL 2 TIMES DAILY
COMMUNITY
End: 2021-01-13

## 2021-01-13 RX ORDER — POTASSIUM CHLORIDE 750 MG/1
10 CAPSULE, EXTENDED RELEASE ORAL DAILY
COMMUNITY
End: 2021-02-15 | Stop reason: HOSPADM

## 2021-01-13 RX ORDER — POTASSIUM CHLORIDE 750 MG/1
10 CAPSULE, EXTENDED RELEASE ORAL DAILY
Qty: 30 CAPSULE | Refills: 1 | Status: SHIPPED | OUTPATIENT
Start: 2021-01-13 | End: 2021-02-15 | Stop reason: HOSPADM

## 2021-01-13 NOTE — TELEPHONE ENCOUNTER
Called patient and LVM informing her that her script was sent over to the pharmacy on file  Patient was instructed to call our office if she has any other questions

## 2021-01-14 DIAGNOSIS — C78.00 MALIGNANT NEOPLASM METASTATIC TO LUNG, UNSPECIFIED LATERALITY (HCC): ICD-10-CM

## 2021-01-14 DIAGNOSIS — Z17.0 MALIGNANT NEOPLASM OF UPPER-OUTER QUADRANT OF RIGHT BREAST IN FEMALE, ESTROGEN RECEPTOR POSITIVE (HCC): ICD-10-CM

## 2021-01-14 DIAGNOSIS — C79.51 BONE METASTASIS (HCC): ICD-10-CM

## 2021-01-14 DIAGNOSIS — C50.411 MALIGNANT NEOPLASM OF UPPER-OUTER QUADRANT OF RIGHT BREAST IN FEMALE, ESTROGEN RECEPTOR POSITIVE (HCC): ICD-10-CM

## 2021-01-15 DIAGNOSIS — C79.51 BONE METASTASIS (HCC): ICD-10-CM

## 2021-01-15 DIAGNOSIS — Z17.0 MALIGNANT NEOPLASM OF UPPER-OUTER QUADRANT OF RIGHT BREAST IN FEMALE, ESTROGEN RECEPTOR POSITIVE (HCC): ICD-10-CM

## 2021-01-15 DIAGNOSIS — C50.411 MALIGNANT NEOPLASM OF UPPER-OUTER QUADRANT OF RIGHT BREAST IN FEMALE, ESTROGEN RECEPTOR POSITIVE (HCC): ICD-10-CM

## 2021-01-15 DIAGNOSIS — C78.00 MALIGNANT NEOPLASM METASTATIC TO LUNG, UNSPECIFIED LATERALITY (HCC): ICD-10-CM

## 2021-01-15 RX ORDER — EXEMESTANE 25 MG/1
25 TABLET ORAL DAILY
Refills: 11 | OUTPATIENT
Start: 2021-01-15

## 2021-01-15 RX ORDER — EVEROLIMUS 7.5 MG/1
7.5 TABLET ORAL DAILY
Qty: 28 TABLET | Refills: 2 | Status: SHIPPED | OUTPATIENT
Start: 2021-01-15 | End: 2021-02-15 | Stop reason: HOSPADM

## 2021-01-15 RX ORDER — EVEROLIMUS 7.5 MG/1
7.5 TABLET ORAL DAILY
Refills: 11 | OUTPATIENT
Start: 2021-01-15

## 2021-01-15 RX ORDER — EXEMESTANE 25 MG/1
25 TABLET ORAL DAILY
Qty: 28 TABLET | Refills: 2 | Status: SHIPPED | OUTPATIENT
Start: 2021-01-15 | End: 2021-02-15 | Stop reason: HOSPADM

## 2021-01-19 ENCOUNTER — TELEPHONE (OUTPATIENT)
Dept: HEMATOLOGY ONCOLOGY | Facility: CLINIC | Age: 71
End: 2021-01-19

## 2021-01-19 NOTE — TELEPHONE ENCOUNTER
Patient is calling reporting that her swelling in bilateral legs is moderate  Patient continues to take her lasix  Patient reports 1 episode of liquid/mushy diarrhea yesterday  She  took 2 Imodium and diarrhea resolved  Denies fevers  Patient has concerns that her new treatment causing her symptoms  Patient is on Affinitor and Aromasin  Patient is questioning if Metformin dosage needs to be decreased? 1/15 Glucose 95 in PM  1/16 Glucose 138 in PM  1/17 Glucose 122 in PM    All Blood sugars are taken around dinner time  She did not take her glucose yesterday  Please review with Dr Bijal Graham

## 2021-01-19 NOTE — TELEPHONE ENCOUNTER
Spoke to Ceci Baez, advised her Dr Santi Jimenez would like her to take 40mg of lasix daily and 20meq of potassium which comes to 2 pills  Advised her to continue taking her metformin and her sugars are ok per Dr Santi Jimenez  Patient verbalized understanding

## 2021-01-21 ENCOUNTER — TELEPHONE (OUTPATIENT)
Dept: OTHER | Facility: OTHER | Age: 71
End: 2021-01-21

## 2021-01-21 ENCOUNTER — TELEPHONE (OUTPATIENT)
Dept: HEMATOLOGY ONCOLOGY | Facility: CLINIC | Age: 71
End: 2021-01-21

## 2021-01-21 NOTE — TELEPHONE ENCOUNTER
Call from patient, patient will be going for lab work on Friday and will call for results  Patient questioning cause of lower extremity edema  Per Dr Keri Mcclure:           I spoke with patient about bilateral leg edema  No DVT  Lasix 20 mg daily  Potassium 10 mEq daily  She has potassium tablet at home with refill  Emailed Lasix prescription  Could edema be secondary to Afinitor? Possible        Electronically signed by Sindy Green MD at 1/12/2021  4:41 PM        Patient verbalizes understanding

## 2021-01-22 ENCOUNTER — APPOINTMENT (OUTPATIENT)
Dept: LAB | Facility: CLINIC | Age: 71
End: 2021-01-22
Payer: MEDICARE

## 2021-01-22 NOTE — TELEPHONE ENCOUNTER
The Pt called with severe fatigue and not feeling well  I asked her to call 911 to get her to the ER for evaluation  The pt agreed with the recommendation

## 2021-01-22 NOTE — TELEPHONE ENCOUNTER
Spoke to Skylar Cavazos, she stated she is not feeling well  I asked her what symptoms were exactly, she stated she stated she had a headache that was 10/10  Has gotten better today and is 2/10  She is unable to eat, only ate a half a banana  Discussed with Dr Linh Centeno - Patient to stop taking afinitor 7 5mg and to report back to us on Monday how she is feeling  If she feels any worse, she is to go to the ER  Patient repeated the plan to me twice and verbalized understanding  She stated she will also get labs completed

## 2021-01-22 NOTE — TELEPHONE ENCOUNTER
Sudha Zaragoza wanted to know if someone could be sent to her home to do her blood work tomorrow because she is "not feeling well"   She also mentioned that her "water pill is starting to get worse"

## 2021-01-22 NOTE — TELEPHONE ENCOUNTER
Per Dr Angela Zarate note: The Pt called with severe fatigue and not feeling well  I asked her to call 911 to get her to the ER for evaluation  The pt agreed with the recommendation  Please provide information to Dr Julissa Harkins

## 2021-01-29 ENCOUNTER — TELEPHONE (OUTPATIENT)
Dept: HEMATOLOGY ONCOLOGY | Facility: CLINIC | Age: 71
End: 2021-01-29

## 2021-01-29 DIAGNOSIS — R60.0 EDEMA OF BOTH LEGS: ICD-10-CM

## 2021-01-29 RX ORDER — FUROSEMIDE 20 MG/1
20 TABLET ORAL DAILY
Qty: 30 TABLET | Refills: 1 | Status: CANCELLED | OUTPATIENT
Start: 2021-01-29

## 2021-01-29 NOTE — TELEPHONE ENCOUNTER
Patient called to see if she should continue on Lasix and Potassium? She reports that her her bilateral lower leg edema has improved slightly  Patient continues with mild-moderate non pitting edema  Patient states her legs do not feel as heavy  Patient has 3 pills left of Lasix and 7 pills of Potassium left  Patient had been instructed to take  40mg of lasix daily and 20meq of potassium which comes to 2 pills  If she is to continue she will need both prescriptions renewed  Will forward to Dr Danny Cheng RN  Patient's call back # [phone] (H)

## 2021-01-30 DIAGNOSIS — R60.0 EDEMA OF BOTH LEGS: ICD-10-CM

## 2021-02-01 RX ORDER — FUROSEMIDE 20 MG/1
TABLET ORAL
Qty: 30 TABLET | Refills: 1 | Status: SHIPPED | OUTPATIENT
Start: 2021-02-01 | End: 2021-02-05

## 2021-02-03 ENCOUNTER — TELEPHONE (OUTPATIENT)
Dept: HEMATOLOGY ONCOLOGY | Facility: CLINIC | Age: 71
End: 2021-02-03

## 2021-02-03 DIAGNOSIS — C50.411 MALIGNANT NEOPLASM OF UPPER-OUTER QUADRANT OF RIGHT BREAST IN FEMALE, ESTROGEN RECEPTOR POSITIVE (HCC): Primary | ICD-10-CM

## 2021-02-03 DIAGNOSIS — Z17.0 MALIGNANT NEOPLASM OF UPPER-OUTER QUADRANT OF RIGHT BREAST IN FEMALE, ESTROGEN RECEPTOR POSITIVE (HCC): Primary | ICD-10-CM

## 2021-02-03 NOTE — TELEPHONE ENCOUNTER
Call placed to Cuauhtemoc Zavala at [facility] who stated that it was too soon to fill patient's prescriptions  However on 1/19/21 patient's dosages had been increased to "Dr Britney Kaye would like her to take 40mg of lasix daily and 20meq of potassium which comes to 2 pills  "    Please advise if patient is to continue on the same dosages  If so new prescriptions for the above dosages will need to be sent to her pharmacy  Prescriptions in Epic are for Lasix 20mg and Potasium 10 meq  (old dose)    Will forward to Dr Haley Gaviria RN

## 2021-02-03 NOTE — TELEPHONE ENCOUNTER
Patient advised that lasix 20 mg 1 to 2 tabs as directed and potassium 10meq  1 to 2 tabs daily as directed  currently she should be taking 40 lasix and 20 potassium  also 2 refills  Patient verbalized understanding of above instructions  Patient is questioning if Dr Linh Centeno wants her to continue on the Exemestane? Her Afinitor is on hold right now  If he wants her to continue on Exemestane she will need a prescription sent to   [address] [place] [address], [address] [place] [address]   Phone:  [phone]     Patient sees Dr Linh Centeno 2/11/21  Will forward to Dr Sheldon Lee RN      Patient's call back # [phone] (H)

## 2021-02-03 NOTE — TELEPHONE ENCOUNTER
CBCD and CMP labs entered into Epic for patient's upcoming appt  She will call [facility] to take her for her labs on 2/9/21  Confirmed with her her appointment on 2/11/21 at 10:40 am at [facility] with Dr Dora Tobin  Patient verbalized understanding of above

## 2021-02-03 NOTE — TELEPHONE ENCOUNTER
[1:59 PM] Anuja Figueroa      yes she is to continue on the aromasin  there are 2 refills from 1/15/21 rx  Tks  [facility] called and left message on their voicemail  Patient advised of above  Patient verbalized understanding of above

## 2021-02-03 NOTE — TELEPHONE ENCOUNTER
Telephone call spoke with Tesfaye Meraz pharmacist gave v o  Lasix 20 mg 1 to 2 tabs as daily directed and Potassium 10 Meq 1 to 2 tabs daily as directed with 2 refills  Pt is currently taking 40 mg and 20 meq

## 2021-02-05 DIAGNOSIS — R60.0 EDEMA OF BOTH LEGS: ICD-10-CM

## 2021-02-05 RX ORDER — FUROSEMIDE 20 MG/1
TABLET ORAL
Qty: 30 TABLET | Refills: 1 | Status: SHIPPED | OUTPATIENT
Start: 2021-02-05 | End: 2021-02-15 | Stop reason: HOSPADM

## 2021-02-08 ENCOUNTER — DOCUMENTATION (OUTPATIENT)
Dept: HEMATOLOGY ONCOLOGY | Facility: CLINIC | Age: 71
End: 2021-02-08

## 2021-02-08 ENCOUNTER — TELEPHONE (OUTPATIENT)
Dept: HEMATOLOGY ONCOLOGY | Facility: CLINIC | Age: 71
End: 2021-02-08

## 2021-02-08 NOTE — TELEPHONE ENCOUNTER
Patient calling to update Dr Breen Means that she is not going to be able to make it out to have her lab work drawn prior to her appointment  Patient is worried about going out in the cold and ice  She is worried that she may slip and fall  She does not feel safe going out  Patient is scheduled for labs tomorrow and follow up with Dr Jamshid Plasencia 2/11/21    Can Dr Jamshid Plasencia still see patient without lab work? Is it possible for patient to do a virtual visit? Should her appointments be rescheduled all together?       She is asking to check with Dr Jamshid Plasencia  Will send to MA to further discuss with MD    Call  Back number [phone]

## 2021-02-08 NOTE — PROGRESS NOTES
2-2-21  Received notice from [employer] stating patient's funding was low afinitor/aromasin  Completed [employer] Anna for afinitor and Kato for Linda Clement to providers office for signature    2-5-21  Alternate funding open   Enrolled patient with [facility]  HWID#  [id number]  CARD#  [id number]  PCN# [id number]  GRP#  [id number]  BIN#  [id number]  AMT [id number]  EFF 2-5-21  THRU 2-4-22    Epic noted, email to team/pharmacy

## 2021-02-08 NOTE — TELEPHONE ENCOUNTER
Per Dr Julissa Harkins, it is ok for her to have a virtual appt, telephone appt ok  Spoke to patient and informed her that her labs from 1/22/21 would be ok for her appt, she does not need new labs, and her DOPPLER was completed on 1/11/21 which he will go over at her visit  Patient stated she has an [address] but that it is not working correctly, patient stated it would be better to have a telephone call  Patients appt was change to virtual (telephone visit) appt

## 2021-02-10 ENCOUNTER — NURSE TRIAGE (OUTPATIENT)
Dept: HEMATOLOGY ONCOLOGY | Facility: CLINIC | Age: 71
End: 2021-02-10

## 2021-02-10 ENCOUNTER — HOSPITAL ENCOUNTER (INPATIENT)
Facility: HOSPITAL | Age: 71
LOS: 5 days | Discharge: HOSPICE | DRG: 193 | End: 2021-02-15
Attending: EMERGENCY MEDICINE | Admitting: INTERNAL MEDICINE
Payer: MEDICARE

## 2021-02-10 ENCOUNTER — APPOINTMENT (EMERGENCY)
Dept: RADIOLOGY | Facility: HOSPITAL | Age: 71
DRG: 193 | End: 2021-02-10
Payer: MEDICARE

## 2021-02-10 DIAGNOSIS — E87.6 HYPOKALEMIA: ICD-10-CM

## 2021-02-10 DIAGNOSIS — E87.1 HYPONATREMIA: ICD-10-CM

## 2021-02-10 DIAGNOSIS — Z17.0 MALIGNANT NEOPLASM OF UPPER-OUTER QUADRANT OF RIGHT BREAST IN FEMALE, ESTROGEN RECEPTOR POSITIVE (HCC): ICD-10-CM

## 2021-02-10 DIAGNOSIS — R68.83 CHILLS (WITHOUT FEVER): Primary | ICD-10-CM

## 2021-02-10 DIAGNOSIS — J90 PLEURAL EFFUSION ON RIGHT: ICD-10-CM

## 2021-02-10 DIAGNOSIS — R53.1 ASTHENIA: ICD-10-CM

## 2021-02-10 DIAGNOSIS — C50.911 BREAST CANCER METASTASIZED TO BONE, RIGHT (HCC): Chronic | ICD-10-CM

## 2021-02-10 DIAGNOSIS — C79.51 BONE METASTASIS (HCC): ICD-10-CM

## 2021-02-10 DIAGNOSIS — R26.2 AMBULATORY DYSFUNCTION: ICD-10-CM

## 2021-02-10 DIAGNOSIS — D70.9 NEUTROPENIA, UNSPECIFIED TYPE (HCC): ICD-10-CM

## 2021-02-10 DIAGNOSIS — Z51.5 PALLIATIVE CARE PATIENT: ICD-10-CM

## 2021-02-10 DIAGNOSIS — D53.0 ANEMIA DUE TO PROTEIN DEFICIENCY: Chronic | ICD-10-CM

## 2021-02-10 DIAGNOSIS — R09.02 HYPOXIA: ICD-10-CM

## 2021-02-10 DIAGNOSIS — J18.9 PNEUMONIA: Primary | ICD-10-CM

## 2021-02-10 DIAGNOSIS — C78.00 MALIGNANT NEOPLASM METASTATIC TO LUNG, UNSPECIFIED LATERALITY (HCC): ICD-10-CM

## 2021-02-10 DIAGNOSIS — E83.42 HYPOMAGNESEMIA: ICD-10-CM

## 2021-02-10 DIAGNOSIS — C50.411 MALIGNANT NEOPLASM OF UPPER-OUTER QUADRANT OF RIGHT BREAST IN FEMALE, ESTROGEN RECEPTOR POSITIVE (HCC): ICD-10-CM

## 2021-02-10 DIAGNOSIS — E43 SEVERE PROTEIN-CALORIE MALNUTRITION (HCC): Chronic | ICD-10-CM

## 2021-02-10 DIAGNOSIS — R79.89 ELEVATED TSH: ICD-10-CM

## 2021-02-10 DIAGNOSIS — C79.51 BREAST CANCER METASTASIZED TO BONE, RIGHT (HCC): Chronic | ICD-10-CM

## 2021-02-10 PROBLEM — N17.9 AKI (ACUTE KIDNEY INJURY) (HCC): Status: ACTIVE | Noted: 2021-02-10

## 2021-02-10 PROBLEM — J96.00 ACUTE RESPIRATORY FAILURE (HCC): Status: ACTIVE | Noted: 2021-02-10

## 2021-02-10 PROBLEM — A41.9 SEVERE SEPSIS (HCC): Status: ACTIVE | Noted: 2021-02-10

## 2021-02-10 PROBLEM — R65.20 SEVERE SEPSIS (HCC): Status: ACTIVE | Noted: 2021-02-10

## 2021-02-10 PROBLEM — R06.00 DYSPNEA: Status: ACTIVE | Noted: 2021-02-10

## 2021-02-10 LAB
ALBUMIN SERPL BCP-MCNC: 2.5 G/DL (ref 3.5–5)
ALP SERPL-CCNC: 246 U/L (ref 46–116)
ALT SERPL W P-5'-P-CCNC: 75 U/L (ref 12–78)
ANION GAP SERPL CALCULATED.3IONS-SCNC: 12 MMOL/L (ref 4–13)
ANISOCYTOSIS BLD QL SMEAR: PRESENT
APTT PPP: 23 SECONDS (ref 23–37)
AST SERPL W P-5'-P-CCNC: 96 U/L (ref 5–45)
ATRIAL RATE: 97 BPM
BACTERIA UR QL AUTO: NORMAL /HPF
BASOPHILS # BLD MANUAL: 0.1 THOUSAND/UL (ref 0–0.1)
BASOPHILS NFR MAR MANUAL: 1 % (ref 0–1)
BILIRUB SERPL-MCNC: 0.57 MG/DL (ref 0.2–1)
BILIRUB UR QL STRIP: NEGATIVE
BUN SERPL-MCNC: 22 MG/DL (ref 5–25)
CALCIUM ALBUM COR SERPL-MCNC: 9 MG/DL (ref 8.3–10.1)
CALCIUM SERPL-MCNC: 7.8 MG/DL (ref 8.3–10.1)
CHLORIDE SERPL-SCNC: 94 MMOL/L (ref 100–108)
CLARITY UR: CLEAR
CO2 SERPL-SCNC: 27 MMOL/L (ref 21–32)
COLOR UR: YELLOW
CREAT SERPL-MCNC: 1.37 MG/DL (ref 0.6–1.3)
EOSINOPHIL # BLD MANUAL: 0 THOUSAND/UL (ref 0–0.4)
EOSINOPHIL NFR BLD MANUAL: 0 % (ref 0–6)
ERYTHROCYTE [DISTWIDTH] IN BLOOD BY AUTOMATED COUNT: 16.1 % (ref 11.6–15.1)
FLUAV RNA RESP QL NAA+PROBE: NEGATIVE
FLUBV RNA RESP QL NAA+PROBE: NEGATIVE
GFR SERPL CREATININE-BSD FRML MDRD: 39 ML/MIN/1.73SQ M
GLUCOSE SERPL-MCNC: 124 MG/DL (ref 65–140)
GLUCOSE SERPL-MCNC: 127 MG/DL (ref 65–140)
GLUCOSE UR STRIP-MCNC: NEGATIVE MG/DL
HCT VFR BLD AUTO: 29.8 % (ref 34.8–46.1)
HGB BLD-MCNC: 9.4 G/DL (ref 11.5–15.4)
HGB UR QL STRIP.AUTO: NEGATIVE
HYPERCHROMIA BLD QL SMEAR: PRESENT
INR PPP: 1.15 (ref 0.84–1.19)
KETONES UR STRIP-MCNC: NEGATIVE MG/DL
LACTATE SERPL-SCNC: 2.6 MMOL/L (ref 0.5–2)
LACTATE SERPL-SCNC: 2.6 MMOL/L (ref 0.5–2)
LACTATE SERPL-SCNC: 4.9 MMOL/L (ref 0.5–2)
LACTATE SERPL-SCNC: 6 MMOL/L (ref 0.5–2)
LEUKOCYTE ESTERASE UR QL STRIP: NEGATIVE
LYMPHOCYTES # BLD AUTO: 0.59 THOUSAND/UL (ref 0.6–4.47)
LYMPHOCYTES # BLD AUTO: 6 % (ref 14–44)
MAGNESIUM SERPL-MCNC: 1.2 MG/DL (ref 1.6–2.6)
MCH RBC QN AUTO: 28.1 PG (ref 26.8–34.3)
MCHC RBC AUTO-ENTMCNC: 31.5 G/DL (ref 31.4–37.4)
MCV RBC AUTO: 89 FL (ref 82–98)
METAMYELOCYTES NFR BLD MANUAL: 1 % (ref 0–1)
MONOCYTES # BLD AUTO: 0.2 THOUSAND/UL (ref 0–1.22)
MONOCYTES NFR BLD: 2 % (ref 4–12)
NEUTROPHILS # BLD MANUAL: 8.87 THOUSAND/UL (ref 1.85–7.62)
NEUTS BAND NFR BLD MANUAL: 16 % (ref 0–8)
NEUTS SEG NFR BLD AUTO: 74 % (ref 43–75)
NITRITE UR QL STRIP: NEGATIVE
NON-SQ EPI CELLS URNS QL MICRO: NORMAL /HPF
NRBC BLD AUTO-RTO: 0 /100 WBCS
NT-PROBNP SERPL-MCNC: 679 PG/ML
P AXIS: 85 DEGREES
PH UR STRIP.AUTO: 6 [PH] (ref 4.5–8)
PLATELET # BLD AUTO: 330 THOUSANDS/UL (ref 149–390)
PLATELET BLD QL SMEAR: ADEQUATE
PMV BLD AUTO: 8.9 FL (ref 8.9–12.7)
POTASSIUM SERPL-SCNC: 3 MMOL/L (ref 3.5–5.3)
PR INTERVAL: 142 MS
PROT SERPL-MCNC: 6.1 G/DL (ref 6.4–8.2)
PROT UR STRIP-MCNC: ABNORMAL MG/DL
PROTHROMBIN TIME: 14.8 SECONDS (ref 11.6–14.5)
QRS AXIS: 16 DEGREES
QRSD INTERVAL: 60 MS
QT INTERVAL: 318 MS
QTC INTERVAL: 403 MS
RBC # BLD AUTO: 3.35 MILLION/UL (ref 3.81–5.12)
RBC #/AREA URNS AUTO: NORMAL /HPF
RSV RNA RESP QL NAA+PROBE: NEGATIVE
SARS-COV-2 RNA RESP QL NAA+PROBE: NEGATIVE
SODIUM SERPL-SCNC: 133 MMOL/L (ref 136–145)
SP GR UR STRIP.AUTO: >=1.03 (ref 1–1.03)
T WAVE AXIS: -87 DEGREES
TOTAL CELLS COUNTED SPEC: 100
TROPONIN I SERPL-MCNC: <0.02 NG/ML
TSH SERPL DL<=0.05 MIU/L-ACNC: 76.05 UIU/ML (ref 0.36–3.74)
UROBILINOGEN UR QL STRIP.AUTO: 0.2 E.U./DL
VENTRICULAR RATE: 97 BPM
WBC # BLD AUTO: 9.86 THOUSAND/UL (ref 4.31–10.16)
WBC #/AREA URNS AUTO: NORMAL /HPF

## 2021-02-10 PROCEDURE — 85027 COMPLETE CBC AUTOMATED: CPT | Performed by: PHYSICIAN ASSISTANT

## 2021-02-10 PROCEDURE — 84439 ASSAY OF FREE THYROXINE: CPT | Performed by: NURSE PRACTITIONER

## 2021-02-10 PROCEDURE — 85730 THROMBOPLASTIN TIME PARTIAL: CPT | Performed by: PHYSICIAN ASSISTANT

## 2021-02-10 PROCEDURE — 93005 ELECTROCARDIOGRAM TRACING: CPT

## 2021-02-10 PROCEDURE — 36415 COLL VENOUS BLD VENIPUNCTURE: CPT | Performed by: PHYSICIAN ASSISTANT

## 2021-02-10 PROCEDURE — 96365 THER/PROPH/DIAG IV INF INIT: CPT

## 2021-02-10 PROCEDURE — 0241U HB NFCT DS VIR RESP RNA 4 TRGT: CPT | Performed by: PHYSICIAN ASSISTANT

## 2021-02-10 PROCEDURE — 83735 ASSAY OF MAGNESIUM: CPT | Performed by: PHYSICIAN ASSISTANT

## 2021-02-10 PROCEDURE — 80053 COMPREHEN METABOLIC PANEL: CPT | Performed by: PHYSICIAN ASSISTANT

## 2021-02-10 PROCEDURE — 96375 TX/PRO/DX INJ NEW DRUG ADDON: CPT

## 2021-02-10 PROCEDURE — 84443 ASSAY THYROID STIM HORMONE: CPT | Performed by: PHYSICIAN ASSISTANT

## 2021-02-10 PROCEDURE — 85007 BL SMEAR W/DIFF WBC COUNT: CPT | Performed by: PHYSICIAN ASSISTANT

## 2021-02-10 PROCEDURE — 82948 REAGENT STRIP/BLOOD GLUCOSE: CPT

## 2021-02-10 PROCEDURE — 83605 ASSAY OF LACTIC ACID: CPT | Performed by: NURSE PRACTITIONER

## 2021-02-10 PROCEDURE — 83605 ASSAY OF LACTIC ACID: CPT | Performed by: EMERGENCY MEDICINE

## 2021-02-10 PROCEDURE — 84484 ASSAY OF TROPONIN QUANT: CPT | Performed by: PHYSICIAN ASSISTANT

## 2021-02-10 PROCEDURE — 81001 URINALYSIS AUTO W/SCOPE: CPT

## 2021-02-10 PROCEDURE — 83605 ASSAY OF LACTIC ACID: CPT | Performed by: PHYSICIAN ASSISTANT

## 2021-02-10 PROCEDURE — 87040 BLOOD CULTURE FOR BACTERIA: CPT | Performed by: PHYSICIAN ASSISTANT

## 2021-02-10 PROCEDURE — 96361 HYDRATE IV INFUSION ADD-ON: CPT

## 2021-02-10 PROCEDURE — 84145 PROCALCITONIN (PCT): CPT | Performed by: NURSE PRACTITIONER

## 2021-02-10 PROCEDURE — 71045 X-RAY EXAM CHEST 1 VIEW: CPT

## 2021-02-10 PROCEDURE — 93010 ELECTROCARDIOGRAM REPORT: CPT | Performed by: INTERNAL MEDICINE

## 2021-02-10 PROCEDURE — 99285 EMERGENCY DEPT VISIT HI MDM: CPT | Performed by: PHYSICIAN ASSISTANT

## 2021-02-10 PROCEDURE — 99223 1ST HOSP IP/OBS HIGH 75: CPT | Performed by: NURSE PRACTITIONER

## 2021-02-10 PROCEDURE — 83880 ASSAY OF NATRIURETIC PEPTIDE: CPT | Performed by: PHYSICIAN ASSISTANT

## 2021-02-10 PROCEDURE — 99285 EMERGENCY DEPT VISIT HI MDM: CPT

## 2021-02-10 PROCEDURE — 85610 PROTHROMBIN TIME: CPT | Performed by: PHYSICIAN ASSISTANT

## 2021-02-10 RX ORDER — HEPARIN SODIUM 5000 [USP'U]/ML
5000 INJECTION, SOLUTION INTRAVENOUS; SUBCUTANEOUS EVERY 8 HOURS SCHEDULED
Status: DISCONTINUED | OUTPATIENT
Start: 2021-02-10 | End: 2021-02-15 | Stop reason: HOSPADM

## 2021-02-10 RX ORDER — PANTOPRAZOLE SODIUM 40 MG/1
40 TABLET, DELAYED RELEASE ORAL
Status: DISCONTINUED | OUTPATIENT
Start: 2021-02-11 | End: 2021-02-15 | Stop reason: HOSPADM

## 2021-02-10 RX ORDER — VANCOMYCIN HYDROCHLORIDE 500 MG/100ML
500 INJECTION, SOLUTION INTRAVENOUS EVERY 24 HOURS
Status: DISCONTINUED | OUTPATIENT
Start: 2021-02-10 | End: 2021-02-11

## 2021-02-10 RX ORDER — MORPHINE SULFATE 15 MG/1
7.5 TABLET ORAL EVERY 4 HOURS PRN
Status: DISCONTINUED | OUTPATIENT
Start: 2021-02-10 | End: 2021-02-12

## 2021-02-10 RX ORDER — DOCUSATE SODIUM 100 MG/1
100 CAPSULE, LIQUID FILLED ORAL 2 TIMES DAILY
Status: DISCONTINUED | OUTPATIENT
Start: 2021-02-10 | End: 2021-02-15 | Stop reason: HOSPADM

## 2021-02-10 RX ORDER — POTASSIUM CHLORIDE 20 MEQ/1
40 TABLET, EXTENDED RELEASE ORAL ONCE
Status: COMPLETED | OUTPATIENT
Start: 2021-02-10 | End: 2021-02-10

## 2021-02-10 RX ORDER — MELATONIN
1000 DAILY
Status: DISCONTINUED | OUTPATIENT
Start: 2021-02-11 | End: 2021-02-14

## 2021-02-10 RX ORDER — VANCOMYCIN HYDROCHLORIDE 500 MG/100ML
15 INJECTION, SOLUTION INTRAVENOUS EVERY 24 HOURS
Status: DISCONTINUED | OUTPATIENT
Start: 2021-02-10 | End: 2021-02-10

## 2021-02-10 RX ORDER — ACETAMINOPHEN 325 MG/1
650 TABLET ORAL EVERY 6 HOURS PRN
Status: DISCONTINUED | OUTPATIENT
Start: 2021-02-10 | End: 2021-02-12

## 2021-02-10 RX ORDER — LORAZEPAM 1 MG/1
2 TABLET ORAL
Status: DISCONTINUED | OUTPATIENT
Start: 2021-02-10 | End: 2021-02-15 | Stop reason: HOSPADM

## 2021-02-10 RX ORDER — MORPHINE SULFATE 15 MG/1
15 TABLET ORAL EVERY 4 HOURS PRN
Status: DISCONTINUED | OUTPATIENT
Start: 2021-02-10 | End: 2021-02-12

## 2021-02-10 RX ORDER — MAGNESIUM SULFATE 1 G/100ML
1 INJECTION INTRAVENOUS ONCE
Status: COMPLETED | OUTPATIENT
Start: 2021-02-10 | End: 2021-02-10

## 2021-02-10 RX ORDER — CALCIUM CARBONATE 500(1250)
1 TABLET ORAL
Status: DISCONTINUED | OUTPATIENT
Start: 2021-02-11 | End: 2021-02-14

## 2021-02-10 RX ORDER — FERROUS SULFATE 325(65) MG
325 TABLET ORAL EVERY OTHER DAY
Status: DISCONTINUED | OUTPATIENT
Start: 2021-02-11 | End: 2021-02-14

## 2021-02-10 RX ORDER — GABAPENTIN 300 MG/1
300 CAPSULE ORAL 2 TIMES DAILY
Status: DISCONTINUED | OUTPATIENT
Start: 2021-02-10 | End: 2021-02-15 | Stop reason: HOSPADM

## 2021-02-10 RX ADMIN — CEFEPIME HYDROCHLORIDE 2000 MG: 2 INJECTION, POWDER, FOR SOLUTION INTRAVENOUS at 18:36

## 2021-02-10 RX ADMIN — SODIUM CHLORIDE 1000 ML: 0.9 INJECTION, SOLUTION INTRAVENOUS at 16:27

## 2021-02-10 RX ADMIN — HEPARIN SODIUM 5000 UNITS: 5000 INJECTION INTRAVENOUS; SUBCUTANEOUS at 22:32

## 2021-02-10 RX ADMIN — VANCOMYCIN HYDROCHLORIDE 500 MG: 500 INJECTION, SOLUTION INTRAVENOUS at 22:01

## 2021-02-10 RX ADMIN — POTASSIUM CHLORIDE 40 MEQ: 1500 TABLET, EXTENDED RELEASE ORAL at 18:38

## 2021-02-10 RX ADMIN — SODIUM CHLORIDE 200 ML: 0.9 INJECTION, SOLUTION INTRAVENOUS at 18:35

## 2021-02-10 RX ADMIN — MAGNESIUM SULFATE HEPTAHYDRATE 1 G: 1 INJECTION, SOLUTION INTRAVENOUS at 19:27

## 2021-02-10 NOTE — TELEPHONE ENCOUNTER
Called patient to R/S her appt  I informed her due to the current circumstances, she should call our office when she returns home to R/S her appt  Patient voiced understanding

## 2021-02-10 NOTE — ED PROVIDER NOTES
History  Chief Complaint   Patient presents with    Weakness - Generalized     has multiple complaints including soft bowel movenent, chest discomfort, dizziness and "not feeling well" for days now  hx of breast ca with mets to bone  Patient presents emergency room with complaints of generalized weakness  She states that she has normally week  She is not on oxygen at home  Her initial pulse ox on presentation was 85%  She was placed on 2 L and now her oxygenation is 96%      History provided by:  Patient  Fatigue  Severity:  Moderate  Timing:  Constant  Progression:  Worsening  Chronicity:  New  Context: dehydration    Context: not alcohol use, not allergies, not change in medication, not decreased sleep, not drug use, not increased activity, not pinched nerve, not recent infection, not stress and not urinary tract infection    Relieved by:  Nothing  Worsened by: Activity and standing  Ineffective treatments:  Rest  Associated symptoms: lethargy    Associated symptoms: no abdominal pain, no anorexia, no aphasia, no arthralgias, no ataxia, no chest pain, no cough, no diarrhea, no difficulty walking, no dizziness, no drooling, no dysphagia, no dysuria, no numbness in extremities, no falls, no fever, no foul-smelling urine, no frequency, no headaches, no hematochezia, no loss of consciousness, no melena, no myalgias, no nausea, no near-syncope, no seizures, no sensory-motor deficit, no shortness of breath, no stroke symptoms, no syncope, no urgency, no vision change and no vomiting    Risk factors: no anemia, no congestive heart failure, no coronary artery disease, no excessive menstruation, no family hx of stroke, no heart disease, no neurologic disease, no new medications and no recent stressors        Prior to Admission Medications   Prescriptions Last Dose Informant Patient Reported? Taking?    Blood Glucose Calibration (ACCU-CHEK COMPACT PLUS CONTROL) SOLN  Self No Yes   Sig: by In Vitro route daily Blood Glucose Monitoring Suppl (ACCU-CHEK COMPACT CARE KIT) KIT  Self No Yes   Sig: by Does not apply route every evening   Cholecalciferol (VITAMIN D3) 5000 units CAPS  Self Yes Yes   Sig: Take 1 capsule by mouth daily   KRILL OIL PO  Self Yes Yes   Sig: Take by mouth   LORazepam (ATIVAN) 1 mg tablet   No Yes   Sig: Take 1-2 tablets (1-2 mg total) by mouth daily at bedtime TAKE 2MG (2 TABLETS) AT BEDTIME  acetaminophen (TYLENOL) 325 mg tablet  Self No Yes   Sig: Take 2 tablets (650 mg total) by mouth 3 (three) times a day with meals   b complex vitamins capsule  Self No Yes   Sig: Take 1 capsule by mouth 2 (two) times a day   calcium carbonate (OS-CLARENCE) 1250 (500 Ca) MG chewable tablet  Self No Yes   Sig: Chew 1 tablet (1,250 mg total) daily   everolimus (Afinitor) 7 5 MG tablet   No Yes   Sig: Take 1 tablet (7 5 mg total) by mouth daily   exemestane (AROMASIN) 25 MG tablet   No Yes   Sig: Take 1 tablet (25 mg total) by mouth daily   ferrous sulfate 325 (65 Fe) mg tablet  Self No Yes   Sig: Take 1 tablet (325 mg total) by mouth every other day   furosemide (LASIX) 20 mg tablet   No Yes   Sig: TAKE 1 TABLET BY MOUTH EVERY DAY   gabapentin (NEURONTIN) 300 mg capsule  Self No Yes   Sig: TAKE 1 CAPSULE BY MOUTH TWICE A DAY   glucose blood (Accu-Chek Compact Plus) test strip  Self No Yes   Sig: Use as instructed   metFORMIN (GLUCOPHAGE) 500 mg tablet  Self No Yes   Sig: TAKE 1 TABLET BY MOUTH TWICE A DAY WITH MEALS   morphine (MSIR) 15 mg tablet  Self No Yes   Sig: Take 1 tablet (15 mg total) by mouth every 4 (four) hours as needed (cancer pain) 1/2 tab for moderate pain, 1 full tab for severe painMax Daily Amount: 90 mg   naloxone (NARCAN) 4 mg/0 1 mL nasal spray  Self No Yes   Sig: Administer 1 spray into a nostril  If breathing does not return to normal or if breathing difficulty resumes after 2-3 minutes, give another dose in the other nostril using a new spray     omeprazole (PriLOSEC) 40 MG capsule  Self No Yes Sig: Take 1 capsule (40 mg total) by mouth daily   potassium chloride (MICRO-K) 10 MEQ CR capsule   Yes Yes   Sig: Take 10 mEq by mouth daily Take 1 tablet daily   potassium chloride (MICRO-K) 10 MEQ CR capsule   No Yes   Sig: Take 1 capsule (10 mEq total) by mouth daily   senna (SENOKOT) 8 6 mg  Self Yes Yes   Sig: Take 1 tablet by mouth as needed for constipation      Facility-Administered Medications: None       Past Medical History:   Diagnosis Date    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[facility] [address] ) 3/25/2020    BRCA1 negative     BRCA2 negative     [facility] 05/05/1996    right     Cancer ([facility] )     breast 1996    Cancer ([place] [address] )     bone      Compression fracture of thoracic vertebra ([place] [address] )     Diabetes mellitus ([place] [address] )     History of chemotherapy 1996    History of therapeutic radiation     Tendonitis        Past Surgical History:   Procedure Laterality Date    CHOLECYSTECTOMY      COLONOSCOPY      CT NEEDLE BIOPSY BONE  12/16/2019    IR THORACENTESIS  9/28/2020    MASTECTOMY Right 05/05/1996    Right side reconstruction    NECK SURGERY      NE ESOPHAGOGASTRODUODENOSCOPY TRANSORAL DIAGNOSTIC N/A 1/24/2019    Procedure: ESOPHAGOGASTRODUODENOSCOPY (EGD); Surgeon: Cristofer Otero MD;  [facility]; Service: Gastroenterology    TONSILLECTOMY      UPPER GASTROINTESTINAL ENDOSCOPY         Family History   Problem Relation Age of Onset    Diabetes Mother     Cancer Mother     Lymphoma Father     KAROLINA disease Brother     Colon cancer Maternal Grandfather     No Known Problems Son     No Known Problems Son     No Known Problems Sister     No Known Problems Brother     No Known Problems Sister      I have reviewed and agree with the history as documented  E-Cigarette/Vaping    E-Cigarette Use Never User      E-Cigarette/Vaping Substances     Social History     Tobacco Use    Smoking status: Never Smoker    Smokeless tobacco: Never Used   Substance Use Topics    Alcohol use:  No Alcohol/week: 0 0 standard drinks     Frequency: Never     Binge frequency: Never    Drug use: Yes     Types: Morphine     Comment: for cancer pain       Review of Systems   Constitutional: Positive for activity change, appetite change and fatigue  Negative for chills, diaphoresis and fever  HENT: Negative for congestion, drooling, mouth sores, postnasal drip, rhinorrhea and sore throat  Eyes: Negative for pain, discharge, redness and itching  Respiratory: Negative for cough and shortness of breath  Cardiovascular: Negative for chest pain, palpitations, syncope and near-syncope  Gastrointestinal: Negative for abdominal pain, anorexia, diarrhea, dysphagia, hematochezia, melena, nausea and vomiting  Genitourinary: Negative for dysuria, frequency, hematuria and urgency  Musculoskeletal: Negative for arthralgias, falls and myalgias  Skin: Negative for color change, pallor and rash  Neurological: Negative for dizziness, seizures, loss of consciousness and headaches  Psychiatric/Behavioral: Negative for confusion  All other systems reviewed and are negative  Physical Exam  Physical Exam  Vitals signs and nursing note reviewed  Constitutional:       General: She is not in acute distress  Appearance: Normal appearance  She is normal weight  She is not ill-appearing, toxic-appearing or diaphoretic  HENT:      Head: Normocephalic  Right Ear: External ear normal       Left Ear: External ear normal    Eyes:      General:         Right eye: No discharge  Conjunctiva/sclera: Conjunctivae normal    Neck:      Musculoskeletal: Neck supple  No muscular tenderness  Cardiovascular:      Rate and Rhythm: Normal rate and regular rhythm  Heart sounds: No murmur  Pulmonary:      Effort: Pulmonary effort is normal       Breath sounds: Normal breath sounds  Abdominal:      General: Abdomen is flat  There is no distension  Tenderness: There is no abdominal tenderness   There is no guarding or rebound  Musculoskeletal:         General: No swelling  Right lower leg: No edema  Left lower leg: No edema  Lymphadenopathy:      Cervical: No cervical adenopathy  Skin:     General: Skin is warm  Capillary Refill: Capillary refill takes less than 2 seconds  Findings: No rash  Neurological:      General: No focal deficit present  Mental Status: She is alert and oriented to person, place, and time  Psychiatric:         Mood and Affect: Mood normal          Behavior: Behavior normal          Thought Content:  Thought content normal          Judgment: Judgment normal          Vital Signs  ED Triage Vitals   Temperature Pulse Respirations Blood Pressure SpO2   02/10/21 1522 02/10/21 1522 02/10/21 1518 02/10/21 1522 02/10/21 1522   99 8 °F (37 7 °C) 105 12 101/56 (!) 88 %      Temp Source Heart Rate Source Patient Position - Orthostatic VS BP Location FiO2 (%)   02/10/21 1522 02/10/21 1522 02/10/21 1518 02/10/21 1518 --   Oral Monitor Lying Left arm       Pain Score       02/10/21 1518       5           Vitals:    02/14/21 1536 02/14/21 2224 02/15/21 0749 02/15/21 1500   BP: 122/57 133/70 126/68 119/75   Pulse: 75 76 70 83   Patient Position - Orthostatic VS: Lying Lying Lying Lying         Visual Acuity      ED Medications  Medications   sodium chloride 0 9 % bolus 1,000 mL (0 mL Intravenous Stopped 2/10/21 1752)   cefepime (MAXIPIME) 2 g/50 mL dextrose IVPB (0 mg Intravenous Stopped 2/10/21 1926)   sodium chloride 0 9 % bolus 200 mL (0 mL Intravenous Stopped 2/10/21 1930)   potassium chloride (K-DUR,KLOR-CON) CR tablet 40 mEq (40 mEq Oral Given 2/10/21 1838)   magnesium sulfate IVPB (premix) SOLN 1 g (0 g Intravenous Stopped 2/10/21 2045)   furosemide (LASIX) injection 40 mg (40 mg Intravenous Given 2/11/21 0939)   barium sulfate 98 % oral suspension 155 mL (100 mL Oral Given 2/11/21 1433)   barium sulfate 60 % cream 2 5 g (1 g Oral Given 2/11/21 1432)   vancomycin (VANCOCIN) IVPB (premix in dextrose) 750 mg 150 mL (750 mg Intravenous New Bag 2/11/21 1949)   vancomycin (VANCOCIN) IVPB (premix in dextrose) 500 mg 100 mL (500 mg Intravenous New Bag 2/12/21 1130)   vancomycin (VANCOCIN) IVPB (premix in dextrose) 500 mg 100 mL (500 mg Intravenous New Bag 2/13/21 1345)       Diagnostic Studies  Results Reviewed     Procedure Component Value Units Date/Time    Blood culture #1 [[id number]] Collected: 02/10/21 1626    Lab Status: Final result Specimen: Blood from Arm, Left Updated: 02/15/21 2002     Blood Culture No Growth After 5 Days  Blood culture #2 [[id number]] Collected: 02/10/21 1626    Lab Status: Final result Specimen: Blood from Hand, Left Updated: 02/15/21 2002     Blood Culture No Growth After 5 Days  Procalcitonin with AM Reflex [[id number]]  (Abnormal) Collected: 02/10/21 2200    Lab Status: Final result Specimen: Blood from Arm, Left Updated: 02/11/21 0623     Procalcitonin 5 26 ng/ml     T4, free [[id number]]  (Abnormal) Collected: 02/10/21 2200    Lab Status: Final result Specimen: Blood from Arm, Left Updated: 02/11/21 0540     Free T4 0 27 ng/dL     Lactic acid 2 Hours [[id number]]  (Abnormal) Collected: 02/11/21 0034    Lab Status: Final result Specimen: Blood Updated: 02/11/21 0109     LACTIC ACID 2 2 mmol/L     Narrative:      Result may be elevated if tourniquet was used during collection  Lactic acid, plasma [[id number]]  (Abnormal) Collected: 02/10/21 2217    Lab Status: Final result Specimen: Blood from Arm, Left Updated: 02/10/21 2309     LACTIC ACID 2 6 mmol/L     Narrative:      Result may be elevated if tourniquet was used during collection      Fingerstick Glucose (POCT) [[id number]]  (Normal) Collected: 02/10/21 2228    Lab Status: Final result Updated: 02/10/21 2253     POC Glucose 127 mg/dl     Lactic acid, plasma [[id number]]  (Abnormal) Collected: 02/10/21 2032    Lab Status: Final result Specimen: Blood from Arm, Left Updated: 02/10/21 2129 LACTIC ACID 2 6 mmol/L     Narrative:      Result may be elevated if tourniquet was used during collection  Lactic acid 2 Hours [[id number]]  (Abnormal) Collected: 02/10/21 1820    Lab Status: Final result Specimen: Blood from Arm, Left Updated: 02/10/21 2022     LACTIC ACID 4 9 mmol/L     Narrative:      Result may be elevated if tourniquet was used during collection  CBC and differential [[id number]]  (Abnormal) Collected: 02/10/21 1820    Lab Status: Final result Specimen: Blood from Arm, Left Updated: 02/10/21 2016     WBC 9 86 Thousand/uL      RBC 3 35 Million/uL      Hemoglobin 9 4 g/dL      Hematocrit 29 8 %      MCV 89 fL      MCH 28 1 pg      MCHC 31 5 g/dL      RDW 16 1 %      MPV 8 9 fL      Platelets 297 Thousands/uL      nRBC 0 /100 WBCs     Narrative: This is an appended report  These results have been appended to a previously verified report      Manual Differential(PHLEBS Do Not Order) [[id number]]  (Abnormal) Collected: 02/10/21 1820    Lab Status: Final result Specimen: Blood from Arm, Left Updated: 02/10/21 2016     Segmented % 74 %      Bands % 16 %      Lymphocytes % 6 %      Monocytes % 2 %      Eosinophils, % 0 %      Basophils % 1 %      Metamyelocytes% 1 %      Absolute Neutrophils 8 87 Thousand/uL      Lymphocytes Absolute 0 59 Thousand/uL      Monocytes Absolute 0 20 Thousand/uL      Eosinophils Absolute 0 00 Thousand/uL      Basophils Absolute 0 10 Thousand/uL      Total Counted 100     Anisocytosis Present     Hypochromia Present     Platelet Estimate Adequate    Urine Microscopic [[id number]]  (Normal) Collected: 02/10/21 1844    Lab Status: Final result Specimen: Urine, Clean Catch Updated: 02/10/21 1921     RBC, UA 0-1 /hpf      WBC, UA 0-1 /hpf      Epithelial Cells Occasional /hpf      Bacteria, UA Occasional /hpf     Urine Macroscopic, POC [[id number]]  (Abnormal) Collected: 02/10/21 1844    Lab Status: Final result Specimen: Urine Updated: 02/10/21 1846     Color, UA Yellow Clarity, UA Clear     pH, UA 6 0     Leukocytes, UA Negative     Nitrite, UA Negative     Protein, UA Trace mg/dl      Glucose, UA Negative mg/dl      Ketones, UA Negative mg/dl      Urobilinogen, UA 0 2 E U /dl      Bilirubin, UA Negative     Blood, UA Negative     Specific Gravity, UA >=1 030    Narrative:      CLINITEK RESULT    COVID19, Influenza A/B, RSV PCR, [facility] [[id number]]  (Normal) Collected: 02/10/21 1627    Lab Status: Final result Specimen: Nares from Nasopharyngeal Swab Updated: 02/10/21 1730     SARS-CoV-2 Negative     INFLUENZA A PCR Negative     INFLUENZA B PCR Negative     RSV PCR Negative    Narrative: This test has been authorized by FDA under an EUA (Emergency Use Assay) for use by authorized laboratories  Clinical caution and judgement should be used with the interpretation of these results with consideration of the clinical impression and other laboratory testing  Testing reported as "Positive" or "Negative" has been proven to be accurate according to standard laboratory validation requirements  All testing is performed with control materials showing appropriate reactivity at standard intervals  Lactic acid [[id number]]  (Abnormal) Collected: 02/10/21 1626    Lab Status: Final result Specimen: Blood from Arm, Left Updated: 02/10/21 1729     LACTIC ACID 6 0 mmol/L     Narrative:      Result may be elevated if tourniquet was used during collection      Comprehensive metabolic panel [[id number]]  (Abnormal) Collected: 02/10/21 1626    Lab Status: Final result Specimen: Blood from Arm, Left Updated: 02/10/21 1713     Sodium 133 mmol/L      Potassium 3 0 mmol/L      Chloride 94 mmol/L      CO2 27 mmol/L      ANION GAP 12 mmol/L      BUN 22 mg/dL      Creatinine 1 37 mg/dL      Glucose 124 mg/dL      Calcium 7 8 mg/dL      Corrected Calcium 9 0 mg/dL      AST 96 U/L      ALT 75 U/L      Alkaline Phosphatase 246 U/L      Total Protein 6 1 g/dL      Albumin 2 5 g/dL      Total Bilirubin 0 57 mg/dL      eGFR 39 ml/min/1 73sq m     Narrative:      [place] guidelines for Chronic Kidney Disease (CKD):     Stage 1 with normal or high GFR (GFR > 90 mL/min/1 73 square meters)    Stage 2 Mild CKD (GFR = 60-89 mL/min/1 73 square meters)    Stage 3A Moderate CKD (GFR = 45-59 mL/min/1 73 square meters)    Stage 3B Moderate CKD (GFR = 30-44 mL/min/1 73 square meters)    Stage 4 Severe CKD (GFR = 15-29 mL/min/1 73 square meters)    Stage 5 End Stage CKD (GFR <15 mL/min/1 73 square meters)  Note: GFR calculation is accurate only with a steady state creatinine    TSH [[id number]]  (Abnormal) Collected: 02/10/21 1626    Lab Status: Final result Specimen: Blood from Arm, Left Updated: 02/10/21 1713     TSH 3RD GENERATON 76 050 uIU/mL     Narrative:      Patients undergoing fluorescein dye angiography may retain small amounts of fluorescein in the body for 48-72 hours post procedure  Samples containing fluorescein can produce falsely depressed TSH values  If the patient had this procedure,a specimen should be resubmitted post fluorescein clearance        Magnesium [[id number]]  (Abnormal) Collected: 02/10/21 1626    Lab Status: Final result Specimen: Blood from Arm, Left Updated: 02/10/21 1713     Magnesium 1 2 mg/dL     NT-BNP PRO [[id number]]  (Abnormal) Collected: 02/10/21 1626    Lab Status: Final result Specimen: Blood from Arm, Left Updated: 02/10/21 1713     NT-proBNP 679 pg/mL     Protime-INR [[id number]]  (Abnormal) Collected: 02/10/21 1626    Lab Status: Final result Specimen: Blood from Arm, Left Updated: 02/10/21 1706     Protime 14 8 seconds      INR 1 15    APTT [[id number]]  (Normal) Collected: 02/10/21 1626    Lab Status: Final result Specimen: Blood from Arm, Left Updated: 02/10/21 1706     PTT 23 seconds     Narrative:      Verified by repeat    Troponin I [[id number]]  (Normal) Collected: 02/10/21 1626    Lab Status: Final result Specimen: Blood from Arm, Left Updated: 02/10/21 1704     Troponin I <0 02 ng/mL                  FL barium swallow video w speech   Final Result by SYSTEMMARK, DOCUMENTATION (02/11 0415)      XR chest 1 view portable   ED Interpretation by Jeff García PA-C (02/10 2753)   Moderate persistent right effusion with increased ground-glass opacity in the right base which could be due to atelectasis, scar, and/or lymphangitis ache spread of tumor  Redemonstration of blastic bone metastasis      Final Result by Janet Fuentes MD (02/10 1610)      Persistent moderate right effusion with increased groundglass opacity in the right base which could be due to atelectasis, scar, and/or lymphangitic spread of tumor  Redemonstration of blastic bone metastases  Workstation performed: [id number]                    Procedures  ECG 12 Lead Documentation Only    Date/Time: 2/10/2021 7:47 PM  Performed by: Jeff García PA-C  Authorized by: Jeff García PA-C     Indications / Diagnosis:  Dyspnea  ECG reviewed by me, the ED Provider: yes    Patient location:  ED  Previous ECG:     Previous ECG:  Compared to current    Comparison ECG info:  1/18/19    Similarity:  Changes noted    Comparison to cardiac monitor: Yes    Interpretation:     Interpretation: abnormal    Rate:     ECG rate:  97    ECG rate assessment: normal    Rhythm:     Rhythm: sinus rhythm    Ectopy:     Ectopy: PAC    QRS:     QRS axis:  Normal    QRS intervals:  Normal  Conduction:     Conduction: normal    ST segments:     ST segments:  Non-specific  T waves:     T waves: inverted      Inverted:  II, III, aVF, V2, V3, V4, V5 and V6  Comments:      New T-wave inversion in the inferior lateral leads  Will add troponin the patient's blood work  Independently interpreted by me             ED Course  ED Course as of Feb 24 1216   Wed Feb 10, 2021   1752 I am concerned for pneumonia in the right lung as well as the chronic pleural effusion        I am unable to scan patient for a pulmonary embolism as her creatine is 1 37 with a GFR of 39  Patient had recent normal US dopplers of her legs 1/11/21 1759 Lactate of 6 0  CBC pending, sepsis alert called Cefepine ordered with Vancomycin                            Initial Sepsis Screening     Row Name 02/10/21 1758 02/10/21 1756             Is the patient's history suggestive of a new or worsening infection? (!) Yes (Proceed)  -JG  (!) Yes (Proceed)  -JG       Suspected source of infection  suspect infection, source unknown  -JG  suspect infection, source unknown  -JG       Are two or more of the following signs & symptoms of infection both present and new to the patient?  --  (!) Yes (Proceed)  -JG       Indicate SIRS criteria  Tachypnea > 20 resp per min  -JG  Tachycardia > 90 bpm  -JG       If the answer is yes to both questions, suspicion of sepsis is present  --  --       If severe sepsis is present AND tissue hypoperfusion perists in the hour after fluid resuscitation or lactate > 4, the patient meets criteria for SEPTIC SHOCK  --  --       Are any of the following organ dysfunction criteria present within 6 hours of suspected infection and SIRS criteria that are NOT considered to be chronic conditions?   (!) Yes  -JG  --       Organ dysfunction  Lactate >/equal 4 0 mmol/L  -JG  --       Date of presentation of severe sepsis  --  --       Time of presentation of severe sepsis  --  --       Tissue hypoperfusion persists in the hour after crystalloid fluid administration, evidenced, by either:  --  --       Was hypotension present within one hour of the conclusion of crystalloid fluid administration?  --  --       Date of presentation of septic shock  --  --       Time of presentation of septic shock  --  --         User Key  (r) = Recorded By, (t) = Taken By, (c) = Cosigned By    [address] Name Provider Type    [address] PALevyC Physician Assistant              Luciano Roberson' Criteria for PE      Most Recent Value Demetri Reina' Criteria for PE   Clinical signs and symptoms of DVT  0 Filed at: 02/10/2021 1543   PE is primary diagnosis or equally likely  3 Filed at: 02/10/2021 1543   HR >100  1 5 Filed at: 02/10/2021 1543   Immobilization at least 3 days or Surgery in the previous 4 weeks  1 5 Filed at: 02/10/2021 1543   Previous, objectively diagnosed PE or DVT  0 Filed at: 02/10/2021 1543   Hemoptysis  0 Filed at: 02/10/2021 1543   Malignancy with treatment within 6 months or palliative  1 Filed at: 02/10/2021 1543   Wells' Criteria Total  7 Filed at: 02/10/2021 1543                MDM  Number of Diagnoses or Management Options  Asthenia: new and requires workup  Elevated TSH: new and requires workup  Hypokalemia: new and requires workup  Hypomagnesemia: new and requires workup  Hyponatremia: new and requires workup  Hypoxia: new and requires workup  Pneumonia: new and requires workup     Amount and/or Complexity of Data Reviewed  Clinical lab tests: ordered and reviewed  Tests in the radiology section of CPT®: ordered and reviewed  Tests in the medicine section of CPT®: ordered and reviewed    Risk of Complications, Morbidity, and/or Mortality  Presenting problems: high  Diagnostic procedures: high  Management options: high  General comments: Patient presents to the emergency room complaining of generalized weakness and shortness of breath  She was seen and evaluated  She presented with a pulse ox of 88% on room air  On 2 L of oxygen she had a saturation of 97%  It improved her symptoms  She was seen and examined  Laboratory studies were taken and were reviewed  She does have a history of metastatic breast cancer with extension to her bone  There was concerned about a pulmonary embolism but her GFR is 39  The patient was worked up with a chest x-ray that demonstrated a right lower lobe pneumonia with a recurrent small right pleural effusion    Her laboratory studies showed hypokalemia and hypomagnesemia that was replaced orally and intravenously  The patient had a lactic acid of 6  The patient was given intravenous cefepime and vancomycin due to her history of being immunocompromised  The patient was admitted to the [facility] service for further evaluation and management    Her 2nd lactic acid is 4 9    Patient Progress  Patient progress: stable      Disposition  Final diagnoses:   Pneumonia   Asthenia   Hyponatremia   Hypokalemia   Hypomagnesemia   Elevated TSH   Hypoxia     Time reflects when diagnosis was documented in both MDM as applicable and the Disposition within this note     Time User Action Codes Description Comment    2/10/2021  5:55 PM Ki Alli Add [J18 9] Pneumonia     2/10/2021  5:55 PM Welford Hugger [R53 1] Asthenia     2/10/2021  5:55 PM Welford Hugger [E87 1] Hyponatremia     2/10/2021  5:55 PM Welford Hugger [E87 6] Hypokalemia     2/10/2021  5:56 PM Welford Hugger [E83 42] Hypomagnesemia     2/10/2021  5:56 PM Ki Alli Add [R79 89] Elevated TSH     2/10/2021  9:38 PM Rachel Macho Add [J90] Pleural effusion on right     2/10/2021  9:38 PM Rachel Macho Add [C50 411,  Z17 0] Malignant neoplasm of upper-outer quadrant of right breast in female, estrogen receptor positive ([facility] [address] )     2/10/2021  9:38 PM Rachel Macho Remove [C50 411,  Z17 0] Malignant neoplasm of upper-outer quadrant of right breast in female, estrogen receptor positive ([facility] [address] )     2/10/2021  9:38 PM Rachel Macho Add [C50 911,  C79 51] Breast cancer metastasized to bone, right ([facility] [address] )     2/10/2021  9:38 PM Rachel Macho Modify [C50 911,  C79 51] Breast cancer metastasized to bone, right ([facility] [address] )     2/10/2021  9:43 PM Rachel Macho Add [E43] Severe protein-calorie malnutrition ([facility] [address] )     2/11/2021 10:33 PM Song Rodriguez Add [D53 0] Anemia due to protein deficiency     2/11/2021 10:33 PM Song Rodriguez Modify [D53 0] Anemia due to protein deficiency     2/11/2021 10:33 PM Kiel Rodriguez [C50 411,  Z17 0] Malignant neoplasm of upper-outer quadrant of right breast in female, estrogen receptor positive ([facility] [address] )     2/11/2021 10:33 PM Rosalia Lucio Add [R26 2] Ambulatory dysfunction     2/11/2021 10:33 PM Rosalia Lucio Add [C79 51] Bone metastasis ([facility] [address] )     2/14/2021 12:42 PM Yumiko Ruth Add [Z51 5] Palliative care patient     2/24/2021 12:14 PM Mary Troncoso Add [R09 02] Hypoxia       ED Disposition     ED Disposition Condition Date/Time Comment    Admit Stable Wed Feb 10, 2021  7:39 PM Case was discussed with Dr Meet Villar the patient's admission status was agreed to be Admission Status: inpatient status to the service of Dr Garrett Lopez MD Documentation      Most Recent Value   Transported by (Company and Unit #)  [employer]      RN Documentation      Most Recent Value   Transported by [employer] and Unit #)  [employer]      Follow-up Information    None         Discharge Medication List as of 2/15/2021  3:04 PM      START taking these medications    Details   oxyCODONE (ROXICODONE) 5 mg/5 mL solution Take 5 mL (5 mg total) by mouth every 4 (four) hours as needed for moderate pain or severe painMax Daily Amount: 30 mg, Starting Sun 2/14/2021, Normal         CONTINUE these medications which have CHANGED    Details   LORazepam (ATIVAN) 2 mg tablet Take 1 tablet (2 mg total) by mouth daily at bedtime, Starting Sun 2/14/2021, Normal         CONTINUE these medications which have NOT CHANGED    Details   acetaminophen (TYLENOL) 325 mg tablet Take 2 tablets (650 mg total) by mouth 3 (three) times a day with meals, Starting Mon 7/6/2020, No Print      Blood Glucose Calibration (ACCU-CHEK COMPACT PLUS CONTROL) SOLN by In Vitro route daily, Starting Tue 6/2/2020, Normal      Blood Glucose Monitoring Suppl (ACCU-CHEK COMPACT CARE KIT) KIT by Does not apply route every evening, Starting Tue 6/2/2020, Normal      gabapentin (NEURONTIN) 300 mg capsule TAKE 1 CAPSULE BY MOUTH TWICE A DAY, Normal      omeprazole (PriLOSEC) 40 MG capsule Take 1 capsule (40 mg total) by mouth daily, Starting Mon 9/21/2020, Normal      senna (SENOKOT) 8 6 mg Take 1 tablet by mouth as needed for constipation, Historical Med         STOP taking these medications       b complex vitamins capsule Comments:   Reason for Stopping:         calcium carbonate (OS-CLARENCE) 1250 (500 Ca) MG chewable tablet Comments:   Reason for Stopping:         Cholecalciferol (VITAMIN D3) 5000 units CAPS Comments:   Reason for Stopping:         everolimus (Afinitor) 7 5 MG tablet Comments:   Reason for Stopping:         exemestane (AROMASIN) 25 MG tablet Comments:   Reason for Stopping:         ferrous sulfate 325 (65 Fe) mg tablet Comments:   Reason for Stopping:         furosemide (LASIX) 20 mg tablet Comments:   Reason for Stopping:         glucose blood (Accu-Chek Compact Plus) test strip Comments:   Reason for Stopping:         KRILL OIL PO Comments:   Reason for Stopping:         metFORMIN (GLUCOPHAGE) 500 mg tablet Comments:   Reason for Stopping:         morphine (MSIR) 15 mg tablet Comments:   Reason for Stopping:         naloxone (NARCAN) 4 mg/0 1 mL nasal spray Comments:   Reason for Stopping:         potassium chloride (MICRO-K) 10 MEQ CR capsule Comments:   Reason for Stopping:         potassium chloride (MICRO-K) 10 MEQ CR capsule Comments:   Reason for Stopping:             Outpatient Discharge Orders   Activity as tolerated       PDMP Review       Value Time User    PDMP Reviewed  Yes 2/20/2021 10:06 AM César Hernandez [facility] Provider  Electronically Signed by           Sanna Avilez PA-C  02/10/21 [id number] Finesse Thapa PA-C  02/24/21 Christian David PA-C  02/24/21 [id number]

## 2021-02-10 NOTE — TELEPHONE ENCOUNTER
Patient called to report that she can't make her virtual appointment tomorrow she stated that she is too sick to talk on the phone  Patient reports that she is having chills  Denies fever, temp 97 3F  She is sleeping for long periods of time for a few days (possibibly Monday she is unsure)  + bowel incontinence last night and today for mushy stool  Patient is pushing PO fluids  Patient reports feeling very thirsty  Decreased appetite and solid food intake since Monday  Urine is medium yellow  Reports swelling in her leg has decreased  Edema is mild  Patient took Lorazepam, MSIR and Tylenol and was able to sleep last night  Glucose level 129 at 6 PM on 2/9/21  I requested that patient take her Glucose level while on the phone with me  Glucose level 86  Patient is on Metformin prescribed by Dr Romana Herter  Patient has not yet taken her Metformin today  I did advise her to hold it with her BS 86 until further directions can be given from Dr Romana Herter  Patient is on Aromasin  Afinitor on hold since 1/22/21  Reason for Disposition   Weakness from poor fluid intake    Protocols used: WEAKNESS (GENERALIZED) AND FATIGUE-ADULT-OH    Will forward to Dr Martínez Mijares RN to see if Dr Romana Herter would like patient to go to ER for evaluation?

## 2021-02-10 NOTE — TELEPHONE ENCOUNTER
Reviewed with Dr Subramanian Centers  Per Dr Moris Rowe, patient should continue to hold metformin and go to the ER for evaluation

## 2021-02-11 ENCOUNTER — APPOINTMENT (INPATIENT)
Dept: RADIOLOGY | Facility: HOSPITAL | Age: 71
DRG: 193 | End: 2021-02-11
Attending: INTERNAL MEDICINE
Payer: MEDICARE

## 2021-02-11 LAB
ALBUMIN SERPL BCP-MCNC: 2 G/DL (ref 3.5–5)
ALP SERPL-CCNC: 197 U/L (ref 46–116)
ALT SERPL W P-5'-P-CCNC: 59 U/L (ref 12–78)
ANION GAP SERPL CALCULATED.3IONS-SCNC: 8 MMOL/L (ref 4–13)
AST SERPL W P-5'-P-CCNC: 70 U/L (ref 5–45)
BASOPHILS # BLD AUTO: 0.03 THOUSANDS/ΜL (ref 0–0.1)
BASOPHILS NFR BLD AUTO: 0 % (ref 0–1)
BILIRUB SERPL-MCNC: 0.61 MG/DL (ref 0.2–1)
BUN SERPL-MCNC: 20 MG/DL (ref 5–25)
CALCIUM ALBUM COR SERPL-MCNC: 9.2 MG/DL (ref 8.3–10.1)
CALCIUM SERPL-MCNC: 7.6 MG/DL (ref 8.3–10.1)
CHLORIDE SERPL-SCNC: 98 MMOL/L (ref 100–108)
CO2 SERPL-SCNC: 28 MMOL/L (ref 21–32)
CREAT SERPL-MCNC: 1.28 MG/DL (ref 0.6–1.3)
EOSINOPHIL # BLD AUTO: 0 THOUSAND/ΜL (ref 0–0.61)
EOSINOPHIL NFR BLD AUTO: 0 % (ref 0–6)
ERYTHROCYTE [DISTWIDTH] IN BLOOD BY AUTOMATED COUNT: 16.1 % (ref 11.6–15.1)
GFR SERPL CREATININE-BSD FRML MDRD: 42 ML/MIN/1.73SQ M
GLUCOSE SERPL-MCNC: 114 MG/DL (ref 65–140)
GLUCOSE SERPL-MCNC: 92 MG/DL (ref 65–140)
GLUCOSE SERPL-MCNC: 96 MG/DL (ref 65–140)
GLUCOSE SERPL-MCNC: 97 MG/DL (ref 65–140)
GLUCOSE SERPL-MCNC: 97 MG/DL (ref 65–140)
HCT VFR BLD AUTO: 27.7 % (ref 34.8–46.1)
HGB BLD-MCNC: 8.8 G/DL (ref 11.5–15.4)
IMM GRANULOCYTES # BLD AUTO: 0.09 THOUSAND/UL (ref 0–0.2)
IMM GRANULOCYTES NFR BLD AUTO: 1 % (ref 0–2)
LACTATE SERPL-SCNC: 1.8 MMOL/L (ref 0.5–2)
LACTATE SERPL-SCNC: 2.2 MMOL/L (ref 0.5–2)
LYMPHOCYTES # BLD AUTO: 0.49 THOUSANDS/ΜL (ref 0.6–4.47)
LYMPHOCYTES NFR BLD AUTO: 4 % (ref 14–44)
MCH RBC QN AUTO: 27.7 PG (ref 26.8–34.3)
MCHC RBC AUTO-ENTMCNC: 31.8 G/DL (ref 31.4–37.4)
MCV RBC AUTO: 87 FL (ref 82–98)
MONOCYTES # BLD AUTO: 0.47 THOUSAND/ΜL (ref 0.17–1.22)
MONOCYTES NFR BLD AUTO: 4 % (ref 4–12)
NEUTROPHILS # BLD AUTO: 11.06 THOUSANDS/ΜL (ref 1.85–7.62)
NEUTS SEG NFR BLD AUTO: 91 % (ref 43–75)
NRBC BLD AUTO-RTO: 0 /100 WBCS
PLATELET # BLD AUTO: 317 THOUSANDS/UL (ref 149–390)
PMV BLD AUTO: 8.9 FL (ref 8.9–12.7)
POTASSIUM SERPL-SCNC: 3.6 MMOL/L (ref 3.5–5.3)
PROCALCITONIN SERPL-MCNC: 5.26 NG/ML
PROT SERPL-MCNC: 5.4 G/DL (ref 6.4–8.2)
RBC # BLD AUTO: 3.18 MILLION/UL (ref 3.81–5.12)
SODIUM SERPL-SCNC: 134 MMOL/L (ref 136–145)
T4 FREE SERPL-MCNC: 0.27 NG/DL (ref 0.76–1.46)
VANCOMYCIN SERPL-MCNC: 3.7 UG/ML
WBC # BLD AUTO: 12.14 THOUSAND/UL (ref 4.31–10.16)

## 2021-02-11 PROCEDURE — 85025 COMPLETE CBC W/AUTO DIFF WBC: CPT | Performed by: NURSE PRACTITIONER

## 2021-02-11 PROCEDURE — 80053 COMPREHEN METABOLIC PANEL: CPT | Performed by: NURSE PRACTITIONER

## 2021-02-11 PROCEDURE — 94760 N-INVAS EAR/PLS OXIMETRY 1: CPT

## 2021-02-11 PROCEDURE — 94762 N-INVAS EAR/PLS OXIMTRY CONT: CPT

## 2021-02-11 PROCEDURE — 83605 ASSAY OF LACTIC ACID: CPT | Performed by: EMERGENCY MEDICINE

## 2021-02-11 PROCEDURE — 83605 ASSAY OF LACTIC ACID: CPT | Performed by: INTERNAL MEDICINE

## 2021-02-11 PROCEDURE — 99233 SBSQ HOSP IP/OBS HIGH 50: CPT | Performed by: INTERNAL MEDICINE

## 2021-02-11 PROCEDURE — 92611 MOTION FLUOROSCOPY/SWALLOW: CPT

## 2021-02-11 PROCEDURE — 82948 REAGENT STRIP/BLOOD GLUCOSE: CPT

## 2021-02-11 PROCEDURE — 80202 ASSAY OF VANCOMYCIN: CPT | Performed by: NURSE PRACTITIONER

## 2021-02-11 PROCEDURE — 94668 MNPJ CHEST WALL SBSQ: CPT

## 2021-02-11 PROCEDURE — 92610 EVALUATE SWALLOWING FUNCTION: CPT

## 2021-02-11 PROCEDURE — 74230 X-RAY XM SWLNG FUNCJ C+: CPT

## 2021-02-11 RX ORDER — LEVOTHYROXINE SODIUM 0.03 MG/1
25 TABLET ORAL
Status: DISCONTINUED | OUTPATIENT
Start: 2021-02-11 | End: 2021-02-15 | Stop reason: HOSPADM

## 2021-02-11 RX ORDER — VANCOMYCIN HYDROCHLORIDE 500 MG/100ML
500 INJECTION, SOLUTION INTRAVENOUS DAILY PRN
Status: DISCONTINUED | OUTPATIENT
Start: 2021-02-11 | End: 2021-02-11

## 2021-02-11 RX ORDER — FUROSEMIDE 10 MG/ML
40 INJECTION INTRAMUSCULAR; INTRAVENOUS ONCE
Status: COMPLETED | OUTPATIENT
Start: 2021-02-11 | End: 2021-02-11

## 2021-02-11 RX ADMIN — LORAZEPAM 2 MG: 1 TABLET ORAL at 00:03

## 2021-02-11 RX ADMIN — MORPHINE SULFATE 2 MG: 2 INJECTION, SOLUTION INTRAMUSCULAR; INTRAVENOUS at 21:31

## 2021-02-11 RX ADMIN — CEFEPIME HYDROCHLORIDE 2000 MG: 2 INJECTION, POWDER, FOR SOLUTION INTRAVENOUS at 17:56

## 2021-02-11 RX ADMIN — MORPHINE SULFATE 2 MG: 2 INJECTION, SOLUTION INTRAMUSCULAR; INTRAVENOUS at 13:13

## 2021-02-11 RX ADMIN — GABAPENTIN 300 MG: 300 CAPSULE ORAL at 00:11

## 2021-02-11 RX ADMIN — FUROSEMIDE 40 MG: 10 INJECTION, SOLUTION INTRAVENOUS at 09:39

## 2021-02-11 RX ADMIN — ACETAMINOPHEN 650 MG: 325 TABLET, FILM COATED ORAL at 07:58

## 2021-02-11 RX ADMIN — HEPARIN SODIUM 5000 UNITS: 5000 INJECTION INTRAVENOUS; SUBCUTANEOUS at 13:12

## 2021-02-11 RX ADMIN — HEPARIN SODIUM 5000 UNITS: 5000 INJECTION INTRAVENOUS; SUBCUTANEOUS at 21:31

## 2021-02-11 RX ADMIN — LORAZEPAM 2 MG: 1 TABLET ORAL at 21:32

## 2021-02-11 RX ADMIN — PANTOPRAZOLE SODIUM 40 MG: 40 TABLET, DELAYED RELEASE ORAL at 05:11

## 2021-02-11 RX ADMIN — HEPARIN SODIUM 5000 UNITS: 5000 INJECTION INTRAVENOUS; SUBCUTANEOUS at 05:11

## 2021-02-11 RX ADMIN — VANCOMYCIN HYDROCHLORIDE 750 MG: 750 INJECTION, SOLUTION INTRAVENOUS at 19:49

## 2021-02-11 NOTE — ASSESSMENT & PLAN NOTE
· Stage IV breast cancer with metastasis to bone  · Patient known to Dr Trini Frank as an outpatient  · Patient currently on Afinitor and Aromasin, last treatment mid January 2021  · Continue outpatient follow up  · Continue home pain regimen of morphine 7 5 mg q 4 hours for moderate pain and 15 mg q 4 hours for severe pain

## 2021-02-11 NOTE — ASSESSMENT & PLAN NOTE
Malnutrition Findings:           BMI Findings: Body mass index is 15 11 kg/m²  · Nutrition consult  · Appreciate Nutrition recommendations

## 2021-02-11 NOTE — PROGRESS NOTES
Vancomycin Assessment    Naz Haque is a 79 y o  female who is currently receiving vancomycin 500mg IV q24 hours for Pneumonia, bacteremia     Relevant clinical data and objective history reviewed:  Creatinine   Date Value Ref Range Status   02/10/2021 1 37 (H) 0 60 - 1 30 mg/dL Final     Comment:     Standardized to IDMS reference method   01/22/2021 1 02 0 60 - 1 30 mg/dL Final     Comment:     Standardized to IDMS reference method   01/08/2021 0 91 0 60 - 1 30 mg/dL Final     Comment:     Standardized to IDMS reference method     /54 (BP Location: Right arm)   Pulse 58   Temp 99 8 °F (37 7 °C) (Oral)   Resp 16   Wt 38 7 kg (85 lb 5 1 oz)   LMP 05/27/1996 (LMP Unknown)   SpO2 97%   BMI 15 11 kg/m²   I/O last 3 completed shifts: In: 1000 [IV Piggyback:1000]  Out: -   Lab Results   Component Value Date/Time    BUN 22 02/10/2021 04:26 PM    WBC 9 86 02/10/2021 06:20 PM    HGB 9 4 (L) 02/10/2021 06:20 PM    HCT 29 8 (L) 02/10/2021 06:20 PM    MCV 89 02/10/2021 06:20 PM     02/10/2021 06:20 PM     Temp Readings from Last 3 Encounters:   02/10/21 99 8 °F (37 7 °C) (Oral)   01/11/21 98 4 °F (36 9 °C)   12/04/20 (!) 97 4 °F (36 3 °C) (Temporal)     Vancomycin Days of Therapy: 1    Assessment/Plan  The patient is currently on vancomycin utilizing scheduled dosing based on actual body weight  Baseline risks associated with therapy include: pre-existing renal impairment and advanced age  The patient is currently receiving 500mg IV q24 hours and is clinically appropriate and dose will be continued  Pharmacy will also follow closely for s/sx of nephrotoxicity, infusion reactions, and appropriateness of therapy  BMP and CBC will be ordered per protocol  Plan for trough as patient approaches steady state, prior to the 4th  dose at approximately 2030 on 2/13/2021  Due to infection severity, will target a trough of 15-20 (appropriate for most indications)     Pharmacy will continue to follow the patients culture results and clinical progress daily      Nabil Nayak, Pharmacist

## 2021-02-11 NOTE — ASSESSMENT & PLAN NOTE
· Upon arrival, oxygen saturation of 88% on room air  · Suspect multifactorial in the setting of possible RLL pneumonia and right pleural effusion  · Patient currently requiring 2L, oxygen saturation of 97%  · Respiratory protocol  · Wean supplemental oxygen

## 2021-02-11 NOTE — ASSESSMENT & PLAN NOTE
Creatinine upon admission: 1 37  Baseline: 0 6-0 8  Secondary to dehydration  Treatment:  IV hydration  Monitor BMP

## 2021-02-11 NOTE — SPEECH THERAPY NOTE
Speech-Language Pathology Bedside Swallow Evaluation        Patient Name: Naz Haque    DXCZB'O Date: 2/11/2021     Problem List  Principal Problem:    Dyspnea  Active Problems:    Elevated alkaline phosphatase level    Anemia    Diabetes (HCC)    Hypokalemia    Hypomagnesemia    Breast cancer metastasized to bone, right (HCC)    Unspecified protein-calorie malnutrition (HCC)    Pleural effusion on right    CHANTE (acute kidney injury) ([facility] [address] )    Elevated lactic acid level    Acute respiratory failure [facility])         Past Medical History  Past Medical History:   Diagnosis Date    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    BRCA1 negative     BRCA2 negative     Breast cancer ([facility] [address] ) 05/05/1996    right     Cancer ([facility] [address] )     breast 1996    Cancer ([facility] [address] )     bone      Compression fracture of thoracic vertebra (HCC)     Diabetes mellitus ([facility] [address] )     History of chemotherapy 1996    History of therapeutic radiation     Tendonitis        Past Surgical History  Past Surgical History:   Procedure Laterality Date    CHOLECYSTECTOMY      COLONOSCOPY      CT NEEDLE BIOPSY BONE  12/16/2019    IR THORACENTESIS  9/28/2020    MASTECTOMY Right 05/05/1996    Right side reconstruction    NECK SURGERY      [place] ESOPHAGOGASTRODUODENOSCOPY TRANSORAL DIAGNOSTIC N/A 1/24/2019    Procedure: ESOPHAGOGASTRODUODENOSCOPY (EGD); Surgeon: Deepika Allan MD;  [facility]: [facility]; Service: Gastroenterology    TONSILLECTOMY      UPPER GASTROINTESTINAL ENDOSCOPY         Summary    Pt presents with at least moderate-significant pharyngeal dysphagia due to intermittent reduced hyolaryneal excursion, throat clearing w/ thins and puree  Pt w/ significant discomfort and quick to fatigue w/ po  Concern for ongoing risk of malnutrition, dehydration, and risk for aspiration  Recommend VBS to further assess swallow function        Recommendations:   Diet: pending VBS results   Meds: non-oral if possible   Frequent Oral care: 2x/day  Aspiration precautions and compensatory swallowing strategies: pending VBS results  Other Recommendations/ considerations: Recommend VBS to further assess oral and pharyngeal stages of swallowing  Current Medical Status  Pt is a 79 y o  female who presented to [facility]  with dyspnea  Pt presents w/ past medical history of stage IV breast cancer with metastasis to the bone and anemia who presents with occasional dyspnea, generalized weakness and chills  Patient reports noted fevers at home  Patient reports she was previously on p o  Chemotherapy with last treatment in January 2020  Patient reports for the last week that she has been feeling unwell  She states that she called her oncologist, Dr Mulu Myers, who recommended that she come to the ER for further evaluation  Upon evaluation in the ER, patient was noted to have oxygen saturation of 85-80% on room air  Patient was placed on supplemental oxygen  Chest x-ray displayed moderate right pleural effusion and possible right lower lobe pneumonia versus increase in tumor size  Patient will be admitted for CHANTE, lactic acidosis, IV antibiotics and oncology consult  ST was consulted due to increased dysphagia  Per RN, she is reporting choking, coughing, generalized difficulty swallowing w/ concern for aspiration as well as odynophagia  Upon arrival pt reports increased dysphagia over past 3-4 months  She stated it began after radiation and EGD at the end of last year  She characterizes this as globus sensation, regurgitation, coughing, and choking  She admits to losing about 70 lbs over the past few years, and admits to poor intake in the last few weeks  She reports she eats less than 25% of her meals  PET CT on 10/16/2020 revealed widespread metastasis including concern for tumor between proximal esophagus and trachea, as well as scattered subcentimeter mildly hypermetabolic cervical nodes concerning for developing metastases   EGD was completed on 11/02/2020 which revealed- Moderate stenosis passing EGD scope into the upper esophagus with vascular appearing blebs in the upper esophagus  This could represent adenopathy pressing on the esophagus vs  Dilated vessels  Past medical history:   Please see H&P for details    Special Studies:  PET CT on 10/16/2020:  1  Widespread hypermetabolic osseous metastases  2   Intense FDG activity between the proximal esophagus and trachea concerning for tumor, either a metastasis or possibly primary esophageal lesion  Clinical correlation recommended  3   Scattered subcentimeter mildly hypermetabolic cervical nodes concerning for developing metastases  4   Persistent moderate right pleural effusion  XR chest 1 view portable 02/10/2021: Persistent moderate right effusion with increased groundglass opacity in the right base which could be due to atelectasis, scar, and/or lymphangitic spread of tumor  Redemonstration of blastic bone metastases  Social/Education/Vocational Hx:  Pt lives at home    Swallow Information   Current Risks for Dysphagia & Aspiration: hx cancer, reported worsening dysphagia   Current Symptoms/Concerns: coughing with po and choking incident  Current Diet: NPO   Baseline Diet: regular diet and thin liquids- reports eating softer foods      Baseline Assessment   Behavior/Cognition: alert  Speech/Language Status: able to participate in conversation and able to follow commands  Patient Positioning: upright side of bed     Swallow Mechanism Exam   Facial: symmetrical  Labial: WFL  Lingual: WFL  Velum: symmetrical  Mandible: adequate ROM  Dentition: adequate- a couple missing teeth  Vocal quality:clear/adequate   Volitional Cough: weak   Respiratory: 15 L midflow    Consistencies Assessed and Performance   Consistencies Administered: thin liquids and puree Pt only consumed 2-3 tsps applesauce, and 2-3 single sips water  Pt politely refused additional po due to nausea        Oral Stage: Adequate bolus retrieval of water from cup, and applesauce from tsp  Pt able to take successive sips of water  Good oral control and timely transfer of all consistencies  Pharyngeal Stage: Swallow initiations were timely  Pt required multiple swallows to clear all consistencies  Intermittent reduced hyolaryngeal excursion  Swallows were audible  Intermittent throat clearing observed  Noticeable discomfort when swallowing characterized by facial grimaces  Limited consistencies trialed, pt reported it was "too hard," and that she felt pressure in her throat when swallowing         Esophageal Concerns: none reported      Results Reviewed with: patient, RN and MD   Dysphagia Goals: pt will participate in VBS

## 2021-02-11 NOTE — RESPIRATORY THERAPY NOTE
RT Protocol Note  Magalys Rowell 79 y o  female MRN: [id number]  Unit/Bed#: S -01 Encounter: [id number]    Assessment    Principal Problem:    Dyspnea  Active Problems:    Elevated alkaline phosphatase level    Anemia    Diabetes (HCC)    Hypokalemia    Hypomagnesemia    Breast cancer metastasized to bone, right (HCC)    Unspecified protein-calorie malnutrition (HCC)    Pleural effusion on right    CHANTE (acute kidney injury) ([facility] [address] )    Elevated lactic acid level    Acute respiratory failure (HCC)      Home Pulmonary Medications:  NA       Past Medical History:   Diagnosis Date    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     History of chemotherapy 1996    History of therapeutic radiation     Tendonitis      Social History     Socioeconomic History    Marital status:       Spouse name: None    Number of children: 2    Years of education: None    Highest education level: None   Occupational History    Occupation: Retired   Social Needs    Financial resource strain: None    Food insecurity     Worry: None     Inability: None    Transportation needs     Medical: None     Non-medical: None   Tobacco Use    Smoking status: Never Smoker    Smokeless tobacco: Never Used   Substance and Sexual Activity    Alcohol use: No     Alcohol/week: 0 0 standard drinks     Frequency: Never     Binge frequency: Never    Drug use: Yes     Types: Morphine     Comment: for cancer pain    Sexual activity: None   Lifestyle    Physical activity     Days per week: None     Minutes per session: None    Stress: None   Relationships    Social connections     Talks on phone: None     Gets together: None     Attends Zoroastrian service: None     Active member of club or organization: None     Attends meetings of clubs or organizations: None     Relationship status: None    Intimate partner violence     Fear of current or ex partner: None     Emotionally abused: None     Physically abused: None     Forced sexual activity: None   Other Topics Concern    None   Social History Narrative    Lives alone  Family supportive  Subjective         Objective    Physical Exam:   Assessment Type: (P) Assess only  General Appearance: (P) Alert, Awake  Respiratory Pattern: (P) Normal  Chest Assessment: (P) Chest expansion symmetrical  Bilateral Breath Sounds: (P) Diminished  Cough: (P) None  O2 Device: (P) 2L NC    Vitals:  Blood pressure 107/54, pulse (!) (P) 51, temperature 98 6 °F (37 °C), temperature source Oral, resp  rate (P) 16, weight 38 7 kg (85 lb 5 1 oz), last menstrual period 05/27/1996, SpO2 (P) 98 %, not currently breastfeeding  Imaging and other studies: I have personally reviewed pertinent reports  O2 Device: (P) 2L NC     Plan         Resp Comments: (P) Pt is assessed per protocol: BS are diminished & tubular, spo2 98 on 2L NC & RR 16    Pt has no pulmonary history and denies any respiratory distress at this time  CXR review shows a RLL Pleural EFF vs lower lobe Pneumonia; no nebs are ordered at this time  Pt will continue to be monitored

## 2021-02-11 NOTE — CONSULTS
Oncology Consult Note      Name: Betty Smith  : 1950  Age: 79 y o  Sex: female  MRN: [id number]  Unit/Bed#: S -01  Encounter: [id number]      Assessment   1  Shortness of breath  The patient presented complaining of shortness of breath, weakness and chills  Testing for COVID-19/Influenza/RSV was negative  The chest x-ray shows increased ground-glass opacity in the right lung base and a moderate right pleural effusion  The patient was initiated on IV antibiotic therapy with cefepime for a suspected pneumonia  2  Acute respiratory failure with hypoxemia  The patient presented with shortness of breath on had an oxygen saturation of 88% on room air which improved to 97% on 2 liter/minute of oxygen    3  Stage IV breast cancer  The patient has metastatic breast cancer with diffuse bony metastases seen on a PET-CT scan done on 10/16/2020  The patient is currently on Afinitor on Aromasin  She is being followed by Dr Karmen Christiansen  4  Anemia  This is likely due to the patient's underlying malignancy with extensive bone metastases        Patient Active Problem List   Diagnosis    Elevated alkaline phosphatase level    Anemia    Compression fracture of thoracic spine, non-traumatic (HCC)    Anxiety disorder    Diabetes ([address] )    Hypokalemia    Hypomagnesemia    Breast cancer metastasized to bone, right (HCC)    Unspecified protein-calorie malnutrition (HCC)    Bone metastasis (HCC)    Lung nodules    Heme positive stool    Leukopenia    Hypocalcemia    Neutropenia (HCC)    Localized edema    Lytic bone lesion of femur    Non-intractable vomiting with nausea    Cancer-related pain    Hypertension    Malignant neoplasm of upper-outer quadrant of right breast in female, estrogen receptor positive ([address] )    Ambulatory dysfunction    Brachial plexopathy    Copy of advanced directive obtained    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[address] )    Drug-induced leukopenia (HCC)    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    Mal[facility]xia [facility]    Lymphedema    Palliative care patient    Pleural effusion on right    Subcutaneous nodules    Dyspnea    CHANTE (acute kidney injury) (HCC)    Elevated lactic acid level    Acute respiratory failure (HCC)        Plan:  The patient will be continued on IV antibiotic therapy for treatment of her suspected right lower lobe pneumonia  A palliative medicine consultation will be obtained  Reason for consultation:  Patient with metastatic breast cancer presenting with shortness of breath  History of Presenting Illness: The patient is a 24-year-old female with a history of metastatic breast cancer to the bone and chronic anemia  She was initially diagnosed with right breast cancer in 1996 and had a right mastectomy followed by adjuvant chemotherapy  The patient had recurrent disease after biopsies revealed 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  She had excision of the lesion and was placed on letrozole (Femara) for 10 years until 2015  The patient subsequently developed metastatic disease with bony metastases in December, 2018  She received Faslodex and Ibrance was later added  The patient was treated with denosumab Diantha Credit), calcium and vitamin-D  The patient is currently on Afinitor and Aromasin  The patient presented to the ED complaining of increasing shortness of breath  The chest x-ray showed a persistent right lower lobe ground-glass opacities and a right pleural effusion  The patient was noted to be in acute respiratory failure with hypoxemia with an oxygen saturation on room air of 88%  Prior to her presentation to the ED she complained of fever, chills and night sweats  She was initiated on IV antibiotic therapy for presumed pneumonia  She improved significantly since admission  She has less shortness of breath  Denies cough or sputum production  She has generalized body aches and pains and usually ambulates with a cane    She has been losing weight steadily losing more than 20 pounds in the past 6 months  Past Medical History:   Diagnosis Date    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[facility] ) 3/25/2020    BRCA1 negative     BRCA2 negative     Breast cancer ([facility] ) 05/05/1996    right     Cancer [facility])     breast 1996    Cancer ([facility] )     bone      Compression fracture of thoracic vertebra ([facility] )     Diabetes mellitus ([facility] )     History of chemotherapy 1996    History of therapeutic radiation     Tendonitis        Past Surgical History:   Procedure Laterality Date    CHOLECYSTECTOMY      COLONOSCOPY      CT NEEDLE BIOPSY BONE  12/16/2019    IR THORACENTESIS  9/28/2020    MASTECTOMY Right 05/05/1996    Right side reconstruction    NECK SURGERY      [facility] ESOPHAGOGASTRODUODENOSCOPY TRANSORAL DIAGNOSTIC N/A 1/24/2019    Procedure: ESOPHAGOGASTRODUODENOSCOPY (EGD); Surgeon: Dean Eden MD;  [facility]; Service: Gastroenterology    TONSILLECTOMY      UPPER GASTROINTESTINAL ENDOSCOPY         Social History     Socioeconomic History    Marital status:       Spouse name: None    Number of children: 2    Years of education: None    Highest education level: None   Occupational History    Occupation: Retired   Social Needs    Financial resource strain: None    Food insecurity     Worry: None     Inability: None    Transportation needs     Medical: None     Non-medical: None   Tobacco Use    Smoking status: Never Smoker    Smokeless tobacco: Never Used   Substance and Sexual Activity    Alcohol use: No     Alcohol/week: 0 0 standard drinks     Frequency: Never     Binge frequency: Never    Drug use: Yes     Types: Morphine     Comment: for cancer pain    Sexual activity: None   Lifestyle    Physical activity     Days per week: None     Minutes per session: None    Stress: None   Relationships    Social connections     Talks on phone: None     Gets together: None     Attends Bahai service: None     Active member of club or organization: None     Attends meetings of clubs or organizations: None     Relationship status: None    Intimate partner violence     Fear of current or ex partner: None     Emotionally abused: None     Physically abused: None     Forced sexual activity: None   Other Topics Concern    None   Social History Narrative    Lives alone  Family supportive         Family History   Problem Relation Age of Onset    Diabetes Mother     Cancer Mother     Lymphoma Father     KAROLINA disease Brother     Colon cancer Maternal Grandfather     No Known Problems Son     No Known Problems Son     No Known Problems Sister     No Known Problems Brother     No Known Problems Sister          Current Facility-Administered Medications:     acetaminophen (TYLENOL) tablet 650 mg, 650 mg, Oral, Q6H PRN, CAITIE Sanchez, 650 mg at 02/11/21 0758    barium sulfate 60 % cream 2 5 g, 2 5 g, Oral, Once in imaging, Sherrill Saleem MD    barium sulfate 98 % oral suspension 50 mL, 50 mL, Oral, Once in imaging, Sherrill Saleem MD    calcium carbonate (OYSTER SHELL,OSCAL) 500 mg tablet 1 tablet, 1 tablet, Oral, Daily With Breakfast, CAITIE Sanchez    cefepime (MAXIPIME) 2 g/50 mL dextrose IVPB, 2,000 mg, Intravenous, Q24H, CAITIE Oconnor, Last Rate: 100 mL/hr at 02/11/21 1756, 2,000 mg at 02/11/21 1756    cholecalciferol (VITAMIN D3) tablet 1,000 Units, 1,000 Units, Oral, Daily, CAITIE Sanchez    docusate sodium (COLACE) capsule 100 mg, 100 mg, Oral, BID, CAITIE Sanchez, Stopped at 02/10/21 2329    ferrous sulfate tablet 325 mg, 325 mg, Oral, Every Other Day, CAITIE Sanchez    gabapentin (NEURONTIN) capsule 300 mg, 300 mg, Oral, BID, CAITIE Oconnor, 300 mg at 02/11/21 0011    heparin (porcine) subcutaneous injection 5,000 Units, 5,000 Units, Subcutaneous, Q8H [facility], CAITIE Oconnor, 5,000 Units at 02/11/21 1312    insulin lispro (HumaLOG) 100 units/mL subcutaneous injection 1-5 Units, 1-5 Units, Subcutaneous, TID AC **AND** Fingerstick Glucose (POCT), , , TID AC, CAITIE Oconnor    insulin lispro (HumaLOG) 100 units/mL subcutaneous injection 1-5 Units, 1-5 Units, Subcutaneous, HS, CAITIE Kelly    levothyroxine tablet 25 mcg, 25 mcg, Oral, Early Morning, Mark Becerra MD, Stopped at 02/11/21 0939    LORazepam (ATIVAN) tablet 2 mg, 2 mg, Oral, HS, CAITIE Oconnor, 2 mg at 02/11/21 0003    morphine (MSIR) IR tablet 15 mg, 15 mg, Oral, Q4H PRN, CAITIE Kelly    morphine (MSIR) IR tablet 7 5 mg, 7 5 mg, Oral, Q4H PRN, CAITIE Kelly    morphine injection 2 mg, 2 mg, Intravenous, Q4H PRN, Mark Becerra MD, 2 mg at 02/11/21 1313    multivitamin stress formula tablet 1 tablet, 1 tablet, Oral, BID, CAITIE Kelly, Stopped at 02/10/21 2329    pantoprazole (PROTONIX) EC tablet 40 mg, 40 mg, Oral, Early Morning, CAITIE Oconnor, 40 mg at 02/11/21 0511    vancomycin (VANCOCIN) IVPB (premix in dextrose) 500 mg 100 mL, 500 mg, Intravenous, Daily PRN, CAITIE Kelly    No Known Allergies      Review of Systems:     Constitutional:  No fever, chills or night sweats  The patient complained of weakness and fatigue  She has lost over 20 pounds in the past 6 months  HEENT:  No headaches  No eye pain or discharge  No visual problems  No ear pain or discharge  No sore throat  Respiratory:  Patient complained increased shortness of breath shortness of breath, cough, wheezing or sputum production  Cardiovascular:  No chest pain  No palpitations  Gastrointestinal:  No abdominal pain  No nausea or vomiting  No change in bowel habits  No blood noted in the stool  Genitourinary:  Negative  Musculoskeletal: No joint pain or swelling  No limitation of range of motion  The patient has ambulatory dysfunction  Skin:  No rashes  No itching  Neurological:  No dizziness  No lightheadedness    No focal weakness  Psychiatric:  Negative  Physical Exam    General:  In no acute distress  Frail  Vitals: BP (!) 89/64 (BP Location: Left arm)   Pulse 84   Temp 98 3 °F (36 8 °C) (Oral)   Resp 16   Wt 38 7 kg (85 lb 5 1 oz)   LMP 05/27/1996 (LMP Unknown)   SpO2 98%   BMI 15 11 kg/m²     HEENT: Pupils are reactive to light and accommodation  Extraocular muscles are intact  No scleral icterus  Respiratory:  Lungs are clear bilaterally  No crackles  No wheezes  Cardiovascular: S1, S2 are normal  No murmurs  No gallops  Abdominal: Soft, non-tender  Normal bowel sounds  No organomegaly  Musculoskeletal: Negative for joint or muscle tenderness or swelling  Skin: No rashes  No erythema  Extremities: No clubbing, cyanosis or edema  Pulses are normal     Neurologic: Alert, oriented to person, place and time  No focal deficits  LABS:    Results Reviewed     Procedure Component Value Units Date/Time    Procalcitonin with AM Reflex [[id number]]  (Abnormal) Collected: 02/10/21 2200    Lab Status: Final result Specimen: Blood from Arm, Left Updated: 02/11/21 0623     Procalcitonin 5 26 ng/ml     T4, free [[id number]]  (Abnormal) Collected: 02/10/21 2200    Lab Status: Final result Specimen: Blood from Arm, Left Updated: 02/11/21 0540     Free T4 0 27 ng/dL     Lactic acid 2 Hours [[id number]]  (Abnormal) Collected: 02/11/21 0034    Lab Status: Final result Specimen: Blood Updated: 02/11/21 0109     LACTIC ACID 2 2 mmol/L     Narrative:      Result may be elevated if tourniquet was used during collection  Lactic acid, plasma [[id number]]  (Abnormal) Collected: 02/10/21 2217    Lab Status: Final result Specimen: Blood from Arm, Left Updated: 02/10/21 2309     LACTIC ACID 2 6 mmol/L     Narrative:      Result may be elevated if tourniquet was used during collection      Fingerstick Glucose (POCT) [[id number]]  (Normal) Collected: 02/10/21 2228    Lab Status: Final result Updated: 02/10/21 2253 POC Glucose 127 mg/dl     Lactic acid, plasma [[id number]]  (Abnormal) Collected: 02/10/21 2032    Lab Status: Final result Specimen: Blood from Arm, Left Updated: 02/10/21 2129     LACTIC ACID 2 6 mmol/L     Narrative:      Result may be elevated if tourniquet was used during collection  Blood culture #1 [[id number]] Collected: 02/10/21 1626    Lab Status: Preliminary result Specimen: Blood from Arm, Left Updated: 02/10/21 2101     Blood Culture Received in Microbiology Lab  Culture in Progress  Blood culture #2 [[id number]] Collected: 02/10/21 1626    Lab Status: Preliminary result Specimen: Blood from Hand, Left Updated: 02/10/21 2101     Blood Culture Received in Microbiology Lab  Culture in Progress  Lactic acid, plasma [[id number]]     Lab Status: No result Specimen: Blood     Lactic acid 2 Hours [[id number]]  (Abnormal) Collected: 02/10/21 1820    Lab Status: Final result Specimen: Blood from Arm, Left Updated: 02/10/21 2022     LACTIC ACID 4 9 mmol/L     Narrative:      Result may be elevated if tourniquet was used during collection  CBC and differential [[id number]]  (Abnormal) Collected: 02/10/21 1820    Lab Status: Final result Specimen: Blood from Arm, Left Updated: 02/10/21 2016     WBC 9 86 Thousand/uL      RBC 3 35 Million/uL      Hemoglobin 9 4 g/dL      Hematocrit 29 8 %      MCV 89 fL      MCH 28 1 pg      MCHC 31 5 g/dL      RDW 16 1 %      MPV 8 9 fL      Platelets 798 Thousands/uL      nRBC 0 /100 WBCs     Narrative: This is an appended report  These results have been appended to a previously verified report      Manual Differential(PHLEBS Do Not Order) [[id number]]  (Abnormal) Collected: 02/10/21 1820    Lab Status: Final result Specimen: Blood from Arm, Left Updated: 02/10/21 2016     Segmented % 74 %      Bands % 16 %      Lymphocytes % 6 %      Monocytes % 2 %      Eosinophils, % 0 %      Basophils % 1 %      Metamyelocytes% 1 %      Absolute Neutrophils 8 87 Thousand/uL Lymphocytes Absolute 0 59 Thousand/uL      Monocytes Absolute 0 20 Thousand/uL      Eosinophils Absolute 0 00 Thousand/uL      Basophils Absolute 0 10 Thousand/uL      Total Counted 100     Anisocytosis Present     Hypochromia Present     Platelet Estimate Adequate    Urine Microscopic [[id number]]  (Normal) Collected: 02/10/21 1844    Lab Status: Final result Specimen: Urine, Clean Catch Updated: 02/10/21 1921     RBC, UA 0-1 /hpf      WBC, UA 0-1 /hpf      Epithelial Cells Occasional /hpf      Bacteria, UA Occasional /hpf     Urine Macroscopic, POC [[id number]]  (Abnormal) Collected: 02/10/21 1844    Lab Status: Final result Specimen: Urine Updated: 02/10/21 1846     Color, UA Yellow     Clarity, UA Clear     pH, UA 6 0     Leukocytes, UA Negative     Nitrite, UA Negative     Protein, UA Trace mg/dl      Glucose, UA Negative mg/dl      Ketones, UA Negative mg/dl      Urobilinogen, UA 0 2 E U /dl      Bilirubin, UA Negative     Blood, UA Negative     Specific Gravity, UA >=1 030    Narrative:      CLINITEK RESULT    COVID19, Influenza A/B, RSV PCR, [facility] [[id number]]  (Normal) Collected: 02/10/21 1627    Lab Status: Final result Specimen: Nares from Nasopharyngeal Swab Updated: 02/10/21 1730     SARS-CoV-2 Negative     INFLUENZA A PCR Negative     INFLUENZA B PCR Negative     RSV PCR Negative    Narrative: This test has been authorized by FDA under an EUA (Emergency Use Assay) for use by authorized laboratories  Clinical caution and judgement should be used with the interpretation of these results with consideration of the clinical impression and other laboratory testing  Testing reported as "Positive" or "Negative" has been proven to be accurate according to standard laboratory validation requirements  All testing is performed with control materials showing appropriate reactivity at standard intervals      Lactic acid [[id number]]  (Abnormal) Collected: 02/10/21 1626    Lab Status: Final result Specimen: Blood from Arm, Left Updated: 02/10/21 1729     LACTIC ACID 6 0 mmol/L     Narrative:      Result may be elevated if tourniquet was used during collection  Comprehensive metabolic panel [[id number]]  (Abnormal) Collected: 02/10/21 1626    Lab Status: Final result Specimen: Blood from Arm, Left Updated: 02/10/21 1713     Sodium 133 mmol/L      Potassium 3 0 mmol/L      Chloride 94 mmol/L      CO2 27 mmol/L      ANION GAP 12 mmol/L      BUN 22 mg/dL      Creatinine 1 37 mg/dL      Glucose 124 mg/dL      Calcium 7 8 mg/dL      Corrected Calcium 9 0 mg/dL      AST 96 U/L      ALT 75 U/L      Alkaline Phosphatase 246 U/L      Total Protein 6 1 g/dL      Albumin 2 5 g/dL      Total Bilirubin 0 57 mg/dL      eGFR 39 ml/min/1 73sq m     Narrative:      [place] guidelines for Chronic Kidney Disease (CKD):     Stage 1 with normal or high GFR (GFR > 90 mL/min/1 73 square meters)    Stage 2 Mild CKD (GFR = 60-89 mL/min/1 73 square meters)    Stage 3A Moderate CKD (GFR = 45-59 mL/min/1 73 square meters)    Stage 3B Moderate CKD (GFR = 30-44 mL/min/1 73 square meters)    Stage 4 Severe CKD (GFR = 15-29 mL/min/1 73 square meters)    Stage 5 End Stage CKD (GFR <15 mL/min/1 73 square meters)  Note: GFR calculation is accurate only with a steady state creatinine    TSH [[id number]]  (Abnormal) Collected: 02/10/21 1626    Lab Status: Final result Specimen: Blood from Arm, Left Updated: 02/10/21 1713     TSH 3RD GENERATON 76 050 uIU/mL     Narrative:      Patients undergoing fluorescein dye angiography may retain small amounts of fluorescein in the body for 48-72 hours post procedure  Samples containing fluorescein can produce falsely depressed TSH values  If the patient had this procedure,a specimen should be resubmitted post fluorescein clearance        Magnesium [[id number]]  (Abnormal) Collected: 02/10/21 1626    Lab Status: Final result Specimen: Blood from Arm, Left Updated: 02/10/21 1713     Magnesium 1 2 mg/dL     NT-BNP PRO [[id number]]  (Abnormal) Collected: 02/10/21 1626    Lab Status: Final result Specimen: Blood from Arm, Left Updated: 02/10/21 1713     NT-proBNP 679 pg/mL     Protime-INR [[id number]]  (Abnormal) Collected: 02/10/21 1626    Lab Status: Final result Specimen: Blood from Arm, Left Updated: 02/10/21 1706     Protime 14 8 seconds      INR 1 15    APTT [[id number]]  (Normal) Collected: 02/10/21 1626    Lab Status: Final result Specimen: Blood from Arm, Left Updated: 02/10/21 1706     PTT 23 seconds     Narrative:      Verified by repeat    Troponin I [[id number]]  (Normal) Collected: 02/10/21 1626    Lab Status: Final result Specimen: Blood from Arm, Left Updated: 02/10/21 1704     Troponin I <0 02 ng/mL               Xr Chest 1 View Portable    Result Date: 2/10/2021  Narrative: CHEST INDICATION:   dyspnea, asthenia  Metastatic breast cancer  COMPARISON:  PET CT from 10/16/2020  EXAM PERFORMED/VIEWS:  XR CHEST PORTABLE FINDINGS: Cardiomediastinal silhouette appears unremarkable  Persistent moderate right effusion with increased groundglass opacity in the right base  Left lung clear  No pneumothorax  Right axillary lymph node dissection  Blastic bone metastases  Cervicothoracic fusion  Impression: Persistent moderate right effusion with increased groundglass opacity in the right base which could be due to atelectasis, scar, and/or lymphangitic spread of tumor  Redemonstration of blastic bone metastases  Workstation performed: [id number]     Fl Barium Swallow Video W Speech    Result Date: 2/11/2021  Narrative: A video barium swallow study was performed by the Department of Speech Pathology  Please refer to the report for the official interpretation  The images are stored for archival purposes only  Study images were not formally reviewed by the Radiology Department

## 2021-02-11 NOTE — H&P
[facility]    H&P- Phylliss Overlie 1950, 79 y o  female MRN: [id number]    Unit/Bed#: ED 15 Encounter: [id number]    Primary Care Provider: Hannah Ojeda DO   Date and time admitted to hospital: 2/10/2021  3:11 PM        * Dyspnea  Assessment & Plan   Presentation:  Patient presents with complaints of occasional shortness of breath, generalized weakness, and chills   COVID/Influenza/RSV: Negative   Chest x-ray: "Persistent moderate right effusion with increased groundglass opacity in the right base which could be due to atelectasis, scar, and/or lymphangitic spread of tumor  Redemonstration of blastic bone metastases "   Troponin <0 02      SIRS criteria:  Tachycardia only  Patient did not 2 meet sirs criteria upon admission   Suspected source:  Right lower lobe pneumonia? versus right pleural effusion   Lactic acid: 6 0   IV Fluids:  Patient received total of 1200 cc of normal saline   IV antibiotics:  Patient received IV cefepime in ER  Continue IV cefepime and IV vancomycin for now pending procalcitonin levels   Follow up on culture results   Monitor vital signs, laboratory studies   Consider IR consult for thoracentesis if worsening respiratory status  Elevated lactic acid level  Assessment & Plan  · Present on admission, lactic acid level of 6 0   · Patient received 1200 cc normal saline with repeat lactic 4 6  · Suspect in the setting of severe dehydration versus infectious etiology  · Continue lactic acid levels q 2 hours until < 2/    Acute respiratory failure ([address] )  Assessment & Plan  · Upon arrival, oxygen saturation of 88% on room air  · Suspect multifactorial in the setting of possible RLL pneumonia and right pleural effusion  · Patient currently requiring 2L, oxygen saturation of 97%  · Respiratory protocol  · Wean supplemental oxygen       CHANTE (acute kidney injury) ([address] )  Assessment & Plan  Creatinine upon admission: 1 37  Baseline: 0 6-0 8  Secondary to dehydration  Treatment:  IV hydration  Monitor BMP  Pleural effusion on right  Assessment & Plan  · Recurrent  · Suspect in the setting of malignancy  · Noted on chest x-ray  · Consider IR consult for thoracentesis while inpatient  Hypokalemia  Assessment & Plan   Potassium level upon admission: 3 0   Replete and monitor BMP  Hypomagnesemia  Assessment & Plan  · Magnesium level upon admission:  1 2  · Replete and monitor magnesium level  Breast cancer metastasized to bone, right (HCC)  Assessment & Plan  · Stage IV breast cancer with metastasis to bone  · Patient known to Dr Orlena Litten as an outpatient  · Patient currently on Afinitor and Aromasin, last treatment mid January 2021  · Continue outpatient follow up  · Continue home pain regimen of morphine 7 5 mg q 4 hours for moderate pain and 15 mg q 4 hours for severe pain  Anemia  Assessment & Plan  · Chronic due to malignancy  · Present on admission, hemoglobin of 9 4  · Baseline appears to be between 9-10  · Continue ferrous sulfate daily  Unspecified protein-calorie malnutrition ([facility] )  Assessment & Plan  Malnutrition Findings:           BMI Findings: Body mass index is 15 11 kg/m²  · Nutrition consult  · Appreciate Nutrition recommendations  Diabetes [facility])  Assessment & Plan  Lab Results   Component Value Date    HGBA1C 5 6 11/20/2020       No results for input(s): POCGLU in the last 72 hours  Blood Sugar Average: Last 72 hrs:  ·  Home regimen of metformin b i d   · Accu-Cheks and sliding scale insulin  · Hypoglycemia protocol  Elevated alkaline phosphatase level  Assessment & Plan  · Chronic  · Present on admission, alkaline phosphatase 246  · Monitor CMP  VTE Prophylaxis: Heparin  / sequential compression device   Code Status: Full  POLST: POLST form is not discussed and not completed at this time        Anticipated Length of Stay:  Patient will be admitted on an Inpatient basis with an anticipated length of stay of  < 2 midnights  Justification for Hospital Stay: IV antibiotics    Total Time for Visit, including Counseling / Coordination of Care: 1 hour  Greater than 50% of this total time spent on direct patient counseling and coordination of care  Chief Complaint:   Dyspnea, generalized weakness and chills  History of Present Illness:    Denise Felipe is a 79 y o  female with a past medical history of stage IV breast cancer with metastasis to the bone and anemia who presents with occasional dyspnea, generalized weakness and chills  Patient reports noted fevers at home  Patient reports she was previously on p o  Chemotherapy with last treatment in January 2020  Patient reports for the last week that she has been feeling unwell  She states that she called her oncologist, Dr Grecia Atwood, who recommended that she come to the ER for further evaluation  Upon evaluation in the ER, patient was noted to have oxygen saturation of 85-80% on room air  Patient was placed on supplemental oxygen  Chest x-ray displayed moderate right pleural effusion and possible right lower lobe pneumonia versus increase in tumor size  Patient will be admitted for CHANTE, lactic acidosis, IV antibiotics and oncology consult  Review of Systems:    Review of Systems   Constitutional: Positive for appetite change, chills and fatigue  Negative for fever  Respiratory: Positive for shortness of breath  Negative for cough and chest tightness  Cardiovascular: Negative for chest pain and palpitations  Gastrointestinal: Negative for abdominal pain, nausea and vomiting  Musculoskeletal: Positive for arthralgias  Neurological: Negative for dizziness  All other systems reviewed and are negative        Past Medical and Surgical History:     Past Medical History:   Diagnosis Date    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 3/25/2020    BRCA1 negative     BRCA2 negative     Breast cancer ([address] ) 05/05/1996    right     Cancer [facility])     breast 1996    Cancer ([facility] )     bone      Compression fracture of thoracic vertebra ([facility] )     Diabetes mellitus ([facility] )     History of chemotherapy 1996    History of therapeutic radiation     Tendonitis        Past Surgical History:   Procedure Laterality Date    CHOLECYSTECTOMY      COLONOSCOPY      CT NEEDLE BIOPSY BONE  12/16/2019    IR THORACENTESIS  9/28/2020    MASTECTOMY Right 05/05/1996    Right side reconstruction    NECK SURGERY      NE ESOPHAGOGASTRODUODENOSCOPY TRANSORAL DIAGNOSTIC N/A 1/24/2019    Procedure: ESOPHAGOGASTRODUODENOSCOPY (EGD); Surgeon: Shelodn Carr MD;  [facility]; Service: Gastroenterology    TONSILLECTOMY      UPPER GASTROINTESTINAL ENDOSCOPY         Meds/Allergies:    Prior to Admission medications    Medication Sig Start Date End Date Taking?  Authorizing Provider   acetaminophen (TYLENOL) 325 mg tablet Take 2 tablets (650 mg total) by mouth 3 (three) times a day with meals 7/6/20  Yes Roslyn Guerra MD   b complex vitamins capsule Take 1 capsule by mouth 2 (two) times a day 6/2/20  Yes Roslyn Guerra MD   Blood Glucose Calibration (ACCU-CHEK COMPACT PLUS CONTROL) SOLN by In Vitro route daily 6/2/20  Yes Roslyn Guerra MD   Blood Glucose Monitoring Suppl (ACCU-CHEK COMPACT CARE KIT) KIT by Does not apply route every evening 6/2/20  Yes Roslyn Guerra MD   calcium carbonate (OS-CLARENCE) 1250 (500 Ca) MG chewable tablet Chew 1 tablet (1,250 mg total) daily 1/20/19  Yes Matty Brink PA-C   Cholecalciferol (VITAMIN D3) 5000 units CAPS Take 1 capsule by mouth daily   Yes Historical Provider, MD   everolimus (Afinitor) 7 5 MG tablet Take 1 tablet (7 5 mg total) by mouth daily 1/15/21  Yes Mariluz Santos MD   exemestane (AROMASIN) 25 MG tablet Take 1 tablet (25 mg total) by mouth daily 1/15/21  Yes Mariluz Santos MD   ferrous sulfate 325 (65 Fe) mg tablet Take 1 tablet (325 mg total) by mouth every other day 11/14/19  Yes Evelyn Renetta Flores MD   furosemide (LASIX) 20 mg tablet TAKE 1 TABLET BY MOUTH EVERY DAY 2/5/21  Yes Wendy Iqbal MD   gabapentin (NEURONTIN) 300 mg capsule TAKE 1 CAPSULE BY MOUTH TWICE A DAY 5/26/20  Yes Deyanira Huitron MD   glucose blood (Accu-Chek Compact Plus) test strip Use as instructed 6/2/20  Yes Deyanira Huitron MD   KRILL OIL PO Take by mouth   Yes Historical Provider, MD   LORazepam (ATIVAN) 1 mg tablet Take 1-2 tablets (1-2 mg total) by mouth daily at bedtime TAKE 2MG (2 TABLETS) AT BEDTIME  1/12/21  Yes CAITIE Aguilar   metFORMIN (GLUCOPHAGE) 500 mg tablet TAKE 1 TABLET BY MOUTH TWICE A DAY WITH MEALS 8/24/20  Yes Cinthia Fuentes PA-C   morphine (MSIR) 15 mg tablet Take 1 tablet (15 mg total) by mouth every 4 (four) hours as needed (cancer pain) 1/2 tab for moderate pain, 1 full tab for severe painMax Daily Amount: 90 mg 12/17/20  Yes Andrew Zarate MD   naloxone [facility]) 4 mg/0 1 mL nasal spray Administer 1 spray into a nostril  If breathing does not return to normal or if breathing difficulty resumes after 2-3 minutes, give another dose in the other nostril using a new spray  9/21/20  Yes Andrew Zarate MD   omeprazole (PriLOSEC) 40 MG capsule Take 1 capsule (40 mg total) by mouth daily 9/21/20  Yes Andrew Zarate MD   potassium chloride (MICRO-K) 10 MEQ CR capsule Take 10 mEq by mouth daily Take 1 tablet daily   Yes Historical Provider, MD   potassium chloride (MICRO-K) 10 MEQ CR capsule Take 1 capsule (10 mEq total) by mouth daily 1/13/21  Yes Wendy Iqbal MD   senna (SENOKOT) 8 6 mg Take 1 tablet by mouth as needed for constipation   Yes Historical Provider, MD     I have reviewed home medications with patient personally  Allergies: No Known Allergies    Social History:     Marital Status:     Occupation:  Unknown  Patient Pre-hospital Living Situation:  Private residence  Patient Pre-hospital Level of Mobility:  Independent  Patient Pre-hospital Diet Restrictions: None  Substance Use History:   Social History     Substance and Sexual Activity   Alcohol Use No     Social History     Tobacco Use   Smoking Status Never Smoker   Smokeless Tobacco Never Used     Social History     Substance and Sexual Activity   Drug Use Yes    Types: Morphine    Comment: for cancer pain       Family History:    non-contributory    Physical Exam:     Vitals:   Blood Pressure: 107/54 (02/10/21 1900)  Pulse: 58 (02/10/21 1900)  Temperature: 99 8 °F (37 7 °C) (02/10/21 1522)  Temp Source: Oral (02/10/21 1522)  Respirations: 16 (02/10/21 1900)  Weight - Scale: 38 7 kg (85 lb 5 1 oz) (02/10/21 1518)  SpO2: 97 % (02/10/21 1900)    Physical Exam  Vitals signs and nursing note reviewed  Constitutional:       General: She is not in acute distress  Appearance: Normal appearance  She is ill-appearing  HENT:      Head: Normocephalic and atraumatic  Nose: Nose normal    Eyes:      General: No scleral icterus  Extraocular Movements: Extraocular movements intact  Conjunctiva/sclera: Conjunctivae normal    Neck:      Musculoskeletal: Normal range of motion  Cardiovascular:      Rate and Rhythm: Normal rate and regular rhythm  Pulses: Normal pulses  Heart sounds: Normal heart sounds  No murmur  Pulmonary:      Effort: Pulmonary effort is normal  No respiratory distress  Breath sounds: Decreased breath sounds and rhonchi present  No wheezing or rales  Abdominal:      General: Bowel sounds are normal  There is no distension  Palpations: Abdomen is soft  Tenderness: There is no abdominal tenderness  Comments: Cachectic   Musculoskeletal:         General: No swelling, tenderness or deformity  Skin:     General: Skin is warm and dry  Capillary Refill: Capillary refill takes 2 to 3 seconds  Neurological:      Mental Status: She is alert and oriented to person, place, and time     Psychiatric:         Speech: Speech normal              Additional Data: Lab Results: I have personally reviewed pertinent reports  Results from last 7 days   Lab Units 02/10/21  1820   WBC Thousand/uL 9 86   HEMOGLOBIN g/dL 9 4*   HEMATOCRIT % 29 8*   PLATELETS Thousands/uL 330   BANDS PCT % 16*   LYMPHO PCT % 6*   MONO PCT % 2*   EOS PCT % 0     Results from last 7 days   Lab Units 02/10/21  1626   SODIUM mmol/L 133*   POTASSIUM mmol/L 3 0*   CHLORIDE mmol/L 94*   CO2 mmol/L 27   BUN mg/dL 22   CREATININE mg/dL 1 37*   ANION GAP mmol/L 12   CALCIUM mg/dL 7 8*   ALBUMIN g/dL 2 5*   TOTAL BILIRUBIN mg/dL 0 57   ALK PHOS U/L 246*   ALT U/L 75   AST U/L 96*   GLUCOSE RANDOM mg/dL 124     Results from last 7 days   Lab Units 02/10/21  1626   INR  1 15             Results from last 7 days   Lab Units 02/10/21  2032 02/10/21  1820 02/10/21  1626   LACTIC ACID mmol/L 2 6* 4 9* 6 0*       Imaging: I have personally reviewed pertinent reports  XR chest 1 view portable   ED Interpretation by Selin Last PA-C (02/10 1743)   Moderate persistent right effusion with increased ground-glass opacity in the right base which could be due to atelectasis, scar, and/or lymphangitis ache spread of tumor  Redemonstration of blastic bone metastasis      Final Result by Grayson Barahona MD (02/10 1610)      Persistent moderate right effusion with increased groundglass opacity in the right base which could be due to atelectasis, scar, and/or lymphangitic spread of tumor  Redemonstration of blastic bone metastases  Workstation performed: [id number]             EKG, Pathology, and Other Studies Reviewed on Admission:   · EKG:  Sinus rhythm with PACs, heart rate 97  Allscripts / Epic Records Reviewed: Yes     ** Please Note: This note has been constructed using a voice recognition system   **

## 2021-02-11 NOTE — PLAN OF CARE
Problem: Potential for Falls  Goal: Patient will remain free of falls  Description: INTERVENTIONS:  - Assess patient frequently for physical needs  -  Identify cognitive and physical deficits and behaviors that affect risk of falls    -  [place] fall precautions as indicated by assessment   - Educate patient/family on patient safety including physical limitations  - Instruct patient to call for assistance with activity based on assessment  - Modify environment to reduce risk of injury  - Consider OT/PT consult to assist with strengthening/mobility  Outcome: Progressing     Problem: PAIN - ADULT  Goal: Verbalizes/displays adequate comfort level or baseline comfort level  Description: Interventions:  - Encourage patient to monitor pain and request assistance  - Assess pain using appropriate pain scale  - Administer analgesics based on type and severity of pain and evaluate response  - Implement non-pharmacological measures as appropriate and evaluate response  - Consider cultural and social influences on pain and pain management  - Notify physician/advanced practitioner if interventions unsuccessful or patient reports new pain  Outcome: Progressing     Problem: INFECTION - ADULT  Goal: Absence or prevention of progression during hospitalization  Description: INTERVENTIONS:  - Assess and monitor for signs and symptoms of infection  - Monitor lab/diagnostic results  - Monitor all insertion sites, i e  indwelling lines, tubes, and drains  - Monitor endotracheal if appropriate and nasal secretions for changes in amount and color  - [place] appropriate cooling/warming therapies per order  - Administer medications as ordered  - Instruct and encourage patient and family to use good hand hygiene technique  - Identify and instruct in appropriate isolation precautions for identified infection/condition  Outcome: Progressing  Goal: Absence of fever/infection during neutropenic period  Description: INTERVENTIONS:  - Monitor WBC    Outcome: Progressing     Problem: SAFETY ADULT  Goal: Patient will remain free of falls  Description: INTERVENTIONS:  - Assess patient frequently for physical needs  -  Identify cognitive and physical deficits and behaviors that affect risk of falls    -  [place] fall precautions as indicated by assessment   - Educate patient/family on patient safety including physical limitations  - Instruct patient to call for assistance with activity based on assessment  - Modify environment to reduce risk of injury  - Consider OT/PT consult to assist with strengthening/mobility  Outcome: Progressing  Goal: Maintain or return to baseline ADL function  Description: INTERVENTIONS:  -  Assess patient's ability to carry out ADLs; assess patient's baseline for ADL function and identify physical deficits which impact ability to perform ADLs (bathing, care of mouth/teeth, toileting, grooming, dressing, etc )  - Assess/evaluate cause of self-care deficits   - Assess range of motion  - Assess patient's mobility; develop plan if impaired  - Assess patient's need for assistive devices and provide as appropriate  - Encourage maximum independence but intervene and supervise when necessary  - Involve family in performance of ADLs  - Assess for home care needs following discharge   - Consider OT consult to assist with ADL evaluation and planning for discharge  - Provide patient education as appropriate  Outcome: Progressing  Goal: Maintain or return mobility status to optimal level  Description: INTERVENTIONS:  - Assess patient's baseline mobility status (ambulation, transfers, stairs, etc )    - Identify cognitive and physical deficits and behaviors that affect mobility  - Identify mobility aids required to assist with transfers and/or ambulation (gait belt, sit-to-stand, lift, walker, cane, etc )  - [place] fall precautions as indicated by assessment  - Record patient progress and toleration of activity level on Mobility SBAR; progress patient to next Phase/Stage  - Instruct patient to call for assistance with activity based on assessment  - Consider rehabilitation consult to assist with strengthening/weightbearing, etc   Outcome: Progressing     Problem: DISCHARGE PLANNING  Goal: Discharge to home or other facility with appropriate resources  Description: INTERVENTIONS:  - Identify barriers to discharge w/patient and caregiver  - Arrange for needed discharge resources and transportation as appropriate  - Identify discharge learning needs (meds, wound care, etc )  - Arrange for interpretive services to assist at discharge as needed  - Refer to Case Management Department for coordinating discharge planning if the patient needs post-hospital services based on physician/advanced practitioner order or complex needs related to functional status, cognitive ability, or social support system  Outcome: Progressing     Problem: Knowledge Deficit  Goal: Patient/family/caregiver demonstrates understanding of disease process, treatment plan, medications, and discharge instructions  Description: Complete learning assessment and assess knowledge base    Interventions:  - Provide teaching at level of understanding  - Provide teaching via preferred learning methods  Outcome: Progressing     Problem: Prexisting or High Potential for Compromised Skin Integrity  Goal: Skin integrity is maintained or improved  Description: INTERVENTIONS:  - Identify patients at risk for skin breakdown  - Assess and monitor skin integrity  - Assess and monitor nutrition and hydration status  - Monitor labs   - Assess for incontinence   - Turn and reposition patient  - Assist with mobility/ambulation  - Relieve pressure over bony prominences  - Avoid friction and shearing  - Provide appropriate hygiene as needed including keeping skin clean and dry  - Evaluate need for skin moisturizer/barrier cream  - Collaborate with interdisciplinary team   - Patient/family teaching  - Consider wound care consult   Outcome: Progressing     Problem: Nutrition/Hydration-ADULT  Goal: Nutrient/Hydration intake appropriate for improving, restoring or maintaining nutritional needs  Description: Monitor and assess patient's nutrition/hydration status for malnutrition  Collaborate with interdisciplinary team and initiate plan and interventions as ordered  Monitor patient's weight and dietary intake as ordered or per policy  Utilize nutrition screening tool and intervene as necessary  Determine patient's food preferences and provide high-protein, high-caloric foods as appropriate       INTERVENTIONS:  - Monitor oral intake, urinary output, labs, and treatment plans  - Assess nutrition and hydration status and recommend course of action  - Evaluate amount of meals eaten  - Assist patient with eating if necessary   - Allow adequate time for meals  - Recommend/ encourage appropriate diets, oral nutritional supplements, and vitamin/mineral supplements  - Order, calculate, and assess calorie counts as needed  - Recommend, monitor, and adjust tube feedings and TPN/PPN based on assessed needs  - Assess need for intravenous fluids  - Provide specific nutrition/hydration education as appropriate  - Include patient/family/caregiver in decisions related to nutrition  Outcome: Progressing

## 2021-02-11 NOTE — ASSESSMENT & PLAN NOTE
· Present on admission, lactic acid level of 6 0   · Patient received 1200 cc normal saline with repeat lactic 4 6  · Suspect in the setting of severe dehydration versus infectious etiology    · Continue lactic acid levels q 2 hours until < 2/

## 2021-02-11 NOTE — ASSESSMENT & PLAN NOTE
· Chronic due to malignancy  · Present on admission, hemoglobin of 9 4  · Baseline appears to be between 9-10  · Continue ferrous sulfate daily

## 2021-02-11 NOTE — PLAN OF CARE
Problem: Potential for Falls  Goal: Patient will remain free of falls  Description: INTERVENTIONS:  - Assess patient frequently for physical needs  -  Identify cognitive and physical deficits and behaviors that affect risk of falls    -  [place] fall precautions as indicated by assessment   - Educate patient/family on patient safety including physical limitations  - Instruct patient to call for assistance with activity based on assessment  - Modify environment to reduce risk of injury  - Consider OT/PT consult to assist with strengthening/mobility  Outcome: Progressing     Problem: PAIN - ADULT  Goal: Verbalizes/displays adequate comfort level or baseline comfort level  Description: Interventions:  - Encourage patient to monitor pain and request assistance  - Assess pain using appropriate pain scale  - Administer analgesics based on type and severity of pain and evaluate response  - Implement non-pharmacological measures as appropriate and evaluate response  - Consider cultural and social influences on pain and pain management  - Notify physician/advanced practitioner if interventions unsuccessful or patient reports new pain  Outcome: Progressing     Problem: INFECTION - ADULT  Goal: Absence or prevention of progression during hospitalization  Description: INTERVENTIONS:  - Assess and monitor for signs and symptoms of infection  - Monitor lab/diagnostic results  - Monitor all insertion sites, i e  indwelling lines, tubes, and drains  - Monitor endotracheal if appropriate and nasal secretions for changes in amount and color  - [place] appropriate cooling/warming therapies per order  - Administer medications as ordered  - Instruct and encourage patient and family to use good hand hygiene technique  - Identify and instruct in appropriate isolation precautions for identified infection/condition  Outcome: Progressing  Goal: Absence of fever/infection during neutropenic period  Description: INTERVENTIONS:  - Monitor WBC    Outcome: Progressing     Problem: SAFETY ADULT  Goal: Patient will remain free of falls  Description: INTERVENTIONS:  - Assess patient frequently for physical needs  -  Identify cognitive and physical deficits and behaviors that affect risk of falls    -  [place] fall precautions as indicated by assessment   - Educate patient/family on patient safety including physical limitations  - Instruct patient to call for assistance with activity based on assessment  - Modify environment to reduce risk of injury  - Consider OT/PT consult to assist with strengthening/mobility  Outcome: Progressing  Goal: Maintain or return to baseline ADL function  Description: INTERVENTIONS:  -  Assess patient's ability to carry out ADLs; assess patient's baseline for ADL function and identify physical deficits which impact ability to perform ADLs (bathing, care of mouth/teeth, toileting, grooming, dressing, etc )  - Assess/evaluate cause of self-care deficits   - Assess range of motion  - Assess patient's mobility; develop plan if impaired  - Assess patient's need for assistive devices and provide as appropriate  - Encourage maximum independence but intervene and supervise when necessary  - Involve family in performance of ADLs  - Assess for home care needs following discharge   - Consider OT consult to assist with ADL evaluation and planning for discharge  - Provide patient education as appropriate  Outcome: Progressing  Goal: Maintain or return mobility status to optimal level  Description: INTERVENTIONS:  - Assess patient's baseline mobility status (ambulation, transfers, stairs, etc )    - Identify cognitive and physical deficits and behaviors that affect mobility  - Identify mobility aids required to assist with transfers and/or ambulation (gait belt, sit-to-stand, lift, walker, cane, etc )  - [place] fall precautions as indicated by assessment  - Record patient progress and toleration of activity level on Mobility SBAR; progress patient to next Phase/Stage  - Instruct patient to call for assistance with activity based on assessment  - Consider rehabilitation consult to assist with strengthening/weightbearing, etc   Outcome: Progressing     Problem: DISCHARGE PLANNING  Goal: Discharge to home or other facility with appropriate resources  Description: INTERVENTIONS:  - Identify barriers to discharge w/patient and caregiver  - Arrange for needed discharge resources and transportation as appropriate  - Identify discharge learning needs (meds, wound care, etc )  - Arrange for interpretive services to assist at discharge as needed  - Refer to Case Management Department for coordinating discharge planning if the patient needs post-hospital services based on physician/advanced practitioner order or complex needs related to functional status, cognitive ability, or social support system  Outcome: Progressing     Problem: Knowledge Deficit  Goal: Patient/family/caregiver demonstrates understanding of disease process, treatment plan, medications, and discharge instructions  Description: Complete learning assessment and assess knowledge base    Interventions:  - Provide teaching at level of understanding  - Provide teaching via preferred learning methods  Outcome: Progressing

## 2021-02-11 NOTE — ASSESSMENT & PLAN NOTE
Lab Results   Component Value Date    HGBA1C 5 6 11/20/2020       No results for input(s): POCGLU in the last 72 hours  Blood Sugar Average: Last 72 hrs:  ·  Home regimen of metformin b i d   · Accu-Cheks and sliding scale insulin  · Hypoglycemia protocol

## 2021-02-11 NOTE — PROGRESS NOTES
Vancomycin IV Pharmacy-to-Dose Consultation    Caty Cornejo is a 79 y o  female who is currently receiving Vancomycin IV with management by the Pharmacy Consult service  Assessment/Plan:  The patient was reviewed  Patient has CHANTE so changed Vancomycin to pulse dosing  Random level scheduled for 1800 tonight  Vancomycin will be re dosed when level is less than 20  We will continue to follow the patients culture results and clinical progress daily      Azael Beckwith, Pharmacist

## 2021-02-11 NOTE — PROCEDURES
[facility]  [facility] [place]  [address]  [address]  [address] [facility] [address]  Dept: [address] [facility] Urology Office Note - Established    Patient:  Zachary Garcia  YOB: 1955  Date: 11/30/2017    The patient is a 58 y.o. male who presents today for evaluation of the following problems:   Chief Complaint   Patient presents with    Testicle Pain     swelling, had chills and fever this AM       HPI  Here for Acute onset LT testicular swelling, fever earlier today, chills  and diaphoresis. He has Hx of epididymitis in the past- wa stx with Levaquin with complete resolution of s/s. Summary of old records: N/A    Additional History: N/A    Procedures Today: N/A    Urinalysis today:  No results found for this visit on 11/30/17. Last several PSA's:  Lab Results   Component Value Date    PSA 6.29 (H) 08/05/2017    PSA 6.96 (H) 04/29/2017    PSA 5.00 (H) 12/22/2016     Last total testosterone:  No results found for: TESTOSTERONE    AUA Symptom Score (11/30/2017):                                Last BUN and creatinine:  Lab Results   Component Value Date    BUN 16 01/18/2017     Lab Results   Component Value Date    CREATININE 0.89 01/18/2017       Additional Lab/Culture results: none    Imaging Reviewed during this Office Visit: none  (results were independently reviewed by physician and radiology report verified)    PAST MEDICAL, FAMILY AND SOCIAL HISTORY UPDATE:  Past Medical History:   Diagnosis Date    Elevated PSA     Full dentures     Upper only    Prostate cancer ([address] 2008    seeds implanted    Wears glasses      Past Surgical History:   Procedure Laterality Date    COLONOSCOPY      DENTAL SURGERY      teeth extraction    PROSTATE BIOPSY      x 2    PROSTATE BIOPSY  03/23/2017    PROSTATE BIOPSY N/A 3/23/2017    PROSTATE BIOPSY WITH ULTRASOUND, *DETAILED BIOPSY*   performed by Meliza Corrigan MD at [id number] [place] [address] Speech-Language Pathology Video Barium Swallow Study        Patient Name: Kamari CHAN Date: 2/11/2021     Problem List  Patient Active Problem List   Diagnosis    Elevated alkaline phosphatase level    Anemia    Compression fracture of thoracic spine, non-traumatic (HCC)    Anxiety disorder    Diabetes ([address]    Hypokalemia    Hypomagnesemia    Breast cancer metastasized to bone, right (HCC)    Unspecified protein-calorie malnutrition ([address]    Bone metastasis ([address]    Lung nodules    Heme positive stool    Leukopenia    Hypocalcemia    Neutropenia (HCC)    Localized edema    Lytic bone lesion of femur    Non-intractable vomiting with nausea    Cancer-related pain    Hypertension    Malignant neoplasm of upper-outer quadrant of right breast in female, estrogen receptor positive ([address]    Ambulatory dysfunction    Brachial plexopathy    Copy of advanced directive obtained    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[address]    Drug-induced leukopenia (HCC)    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    Malignant cachexia (HCC)    Lymphedema    Palliative care patient    Pleural effusion on right    Subcutaneous nodules    Dyspnea    CHANTE (acute kidney injury) (HCC)    Elevated lactic acid level    Acute respiratory failure (HCC)       Past Medical History  Past Medical History:   Diagnosis Date    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    BRCA1 negative     BRCA2 negative     Breast cancer ([address] 05/05/1996    right     Cancer ([address]     breast 1996    Cancer ([address]     bone      Compression fracture of thoracic vertebra (HCC)     Diabetes mellitus ([address]     History of chemotherapy 1996    History of therapeutic radiation     Tendonitis        Past Surgical History  Past Surgical History:   Procedure Laterality Date    CHOLECYSTECTOMY      COLONOSCOPY      CT NEEDLE BIOPSY BONE  12/16/2019    IR THORACENTESIS  9/28/2020    MASTECTOMY Right 05/05/1996    Right side BRACHYTHERAPY  7/9/10     Family History   Problem Relation Age of Onset    Prostate Cancer Father     Cancer Father     Heart Disease Maternal Uncle      Outpatient Prescriptions Marked as Taking for the 11/30/17 encounter (Office Visit) with rBenda Krueger CNP   Medication Sig Dispense Refill    doxycycline monohydrate (MONODOX) 100 MG capsule Take 1 capsule by mouth 2 times daily 42 capsule 0    ibuprofen (ADVIL;MOTRIN) 400 MG tablet Take 1 tablet by mouth every 6 hours as needed for Pain 20 tablet 1    acetaminophen (TYLENOL) 500 MG tablet Take 1,000 mg by mouth every 6 hours as needed for Pain         Bactrim [sulfamethoxazole-trimethoprim] and Ciprofloxacin  History   Smoking Status    Current Every Day Smoker    Packs/day: 1.00    Types: Cigarettes   Smokeless Tobacco    Never Used     (If patient a smoker, smoking cessation counseling offered)    History   Alcohol Use    Yes     Comment: once monthly       REVIEW OF SYSTEMS:  Review of Systems   Constitutional: Positive for chills and fever. Negative for activity change. Eyes: Negative for pain, redness and visual disturbance. Respiratory: Negative for cough, shortness of breath and wheezing. Cardiovascular: Negative for chest pain and leg swelling. Gastrointestinal: Positive for abdominal pain. Negative for nausea and vomiting. Genitourinary: Positive for scrotal swelling, testicular pain and urgency. Negative for difficulty urinating, discharge, dysuria, flank pain, frequency and hematuria. Musculoskeletal: Negative for back pain, joint swelling and myalgias. Skin: Negative for color change and rash. Neurological: Negative for dizziness, tremors and numbness. Hematological: Negative for adenopathy. Does not bruise/bleed easily. Physical Exam:      Vitals:    11/30/17 1634   BP: 128/86   Pulse: 106   Resp: 16   Temp: 98.2 °F (36.8 °C)     Body mass index is 32.55 kg/m².   Patient is a 58 y.o. male in no acute distress and reconstruction    NECK SURGERY      IL ESOPHAGOGASTRODUODENOSCOPY TRANSORAL DIAGNOSTIC N/A 1/24/2019    Procedure: ESOPHAGOGASTRODUODENOSCOPY (EGD); Surgeon: Rosa Anguiano MD;  [facility]; Service: Gastroenterology    TONSILLECTOMY      UPPER GASTROINTESTINAL ENDOSCOPY           General Information;  79 y o  female seen at bedside for swallow evaluation, recommended VBS to further assess swallow function  Consistencies Assessed and Performance   Pt was seen in radiology for a Video Barium Swallow Study, seated in the upright position and viewed laterally with the following consistencies:      Administered: thin liquids, nectar thick, puree and mechanical soft solids    Results are as follows:     **Images are available for review on PACS      Oral stage:  WNL across all consistencies  Lip closure: WFL  Mastication: WFL  Bolus formation: WFL  Bolus control: WFL  Transfer: WFL; piecemeal deglutition noted at times w/ soft solids  Residue: none     Pharyngeal stage:  Swallow promptness: Timely across consistencies   Epiglottic inversion: incomplete, noted to hit PPW  Hyolaryngeal rise: Variable, adequate-mildly reduced  Pharyngeal constriction: adequate    THIN LIQUIDS BY CUP  No premature spill noted  Gross deep penetration to VC w/ epiglottic undercoat and laryngeal vestibule retention eventually resulting in episodic trace silent aspiration  NECTAR THICK LIQUIDS BY TSP  Spill to the valleculae and just over epiglottis reaching pyriforms was noted on initial presentation  No additional spill on any other presentations  Intermittent penetration to VC was noted w/ mild epiglottic undercoat, and intermittent retention in laryngeal vestibule  Intermittent mild vallecular retention  Mod/max retention in the hypopharynx  Trace nasoregurgitation noted  PUREE  No spill noted  Transient penetration was observed intermittently w/o epiglottic undercoat  Moderate retention was noted in valleculae  Mild-moderate pyriform retention, w/ mild-moderate PPW coating  SOFT SOLIDS  No spill noted  Moderate-max vallecular retention, w/ mild retention in pyriforms  Strategies & Additional Information:  Pt was noted to have growth at C3-C4, which negatively impacts bolus flow through the pharynx, resulting in above noted post-swallow retention, reduced epiglottic inversion, and UES relaxation  Noted to clear throat in response to post-swallow retention  Episodes of trace aspiration were noted to be silent  However, when cued, pt was able to clear aspirated material and epiglottic undercoat/laryngeal retention w/ strong cough  No benefit was observed from use of chin tuck, w/ limited benefit from breath hold  Pt did benefit from use of multiple effortful swallows which she did independently, at times requiring 3-5 swallows to clear post-swallow retention  Esophageal stage:   Reduced UES relaxation noted throughout all consistencies limited bolus passage  Assessment limited to cervical esophagus  Assessment Summary; Pt presents with moderate pharyngeal dysphagia as described above  Primary concerns include growth noted at C3-C4 resulting in reduced epiglottic inversion/airway closure, reduced bolus passage through pharynx and cricopharyngeal junction  Thin in turn results in post-swallow retention, penetration, and episodic trace silent aspiration  Aspiration was only noted w/ thin liquids today  No cough response was noted w/ episodic trace silent aspiration or on laryngeal retention  However, pt able to clear retention when cued to use strong cough  Pt did benefit from use of multiple effortful swallows and smaller presentations to reduce aspiration/penetration risk and post-swallow residuals  Given significant weight loss, fatigue, and discomfort, pt presents w/ high risk for ongoing malnutrition/dehydration and would benefit from alternative means of nutrition/hydration   However, pt alert and oriented to person, place and time. Physical Exam  Constitutional: Patient in no acute distress. Neuro: Alert and oriented to person, place and time. Psych: Mood normal, affect normal  Skin: No rash noted  HEENT: Head: Normocephalic and atraumatic  Conjunctivae and EOM are normal. Pupils are equal, round  Nose: Normal  Right External Ear: Normal; Left External Ear: Normal  Mouth: Mucosa Moist  Neck: Supple  Lungs: Respiratory effort is normal  Cardiovascular: Warm & Pink  Abdomen: Soft, non-tender, non-distended   Lymphatics: No palpable lymphadenopathy. Bladder non-tender and not distended. Musculoskeletal: Normal gait and station  Testiscles: Lt testicle tender to light touch, scrotal skin on LT taunt, no penile drng      Assessment and Plan      1. Scrotal pain    2. Scrotal edema    3. H/O epididymitis           Plan:    discussed with  [address] [facility] [facility]- he would like pt seen in ER. US was unable to do stat exam when called- done for the day. No Follow-up on file.     Prescriptions Ordered:  Orders Placed This Encounter   Medications    doxycycline monohydrate (MONODOX) 100 MG capsule     Sig: Take 1 capsule by mouth 2 times daily     Dispense:  42 capsule     Refill:  0      Orders Placed:  Orders Placed This Encounter   Procedures    US DUP ABD PEL RETRO SCROT COMPLETE     Standing Status:   Future     Standing Expiration Date:   11/30/2018     Order Specific Question:   Reason for exam:     Answer:   scrotal pain          Uzma Barron, [facility] reports she is not interested in PEG placement at this time  Recommendations; Recommend mechanically altered/level 2 diet and nectar thick liquids, with upright posture, only feed when fully alert, slow rate of feeding, small bites/sips, multiple effortful swallow and alternating bites and sips  Recommended Form of Meds: crushed with puree   Intermittent Supervision  Aspiration precautions   Aspiration precautions posted  Goals:  Pt will tolerate least restrictive diet w/out s/s aspiration or pharyngeal difficulties  Dysphagia Goals: pt will tolerate dysphagia 2 with NTL without s/s of aspiration x72 hours and pt will demonstrate accurate use of multiple effortful swallows, small bites and sips strategy with 80% accuracy given minimalcues      Treatment Recommendations: effortful swallows, small bites and sips

## 2021-02-11 NOTE — ASSESSMENT & PLAN NOTE
 Presentation:  Patient presents with complaints of occasional shortness of breath, generalized weakness, and chills   COVID/Influenza/RSV: Negative   Chest x-ray: "Persistent moderate right effusion with increased groundglass opacity in the right base which could be due to atelectasis, scar, and/or lymphangitic spread of tumor  Redemonstration of blastic bone metastases "   Troponin <0 02      SIRS criteria:  Tachycardia only  Patient did not 2 meet sirs criteria upon admission   Suspected source:  Right lower lobe pneumonia? versus right pleural effusion   Lactic acid: 6 0   IV Fluids:  Patient received total of 1200 cc of normal saline   IV antibiotics:  Patient received IV cefepime in ER  Continue IV cefepime and IV vancomycin for now pending procalcitonin levels   Follow up on culture results   Monitor vital signs, laboratory studies   Consider IR consult for thoracentesis if worsening respiratory status

## 2021-02-11 NOTE — PROGRESS NOTES
Around 0900 I went into patient room to give dose of IV morphine  Patient was acutely lethargic and minimally responsive  Vitals at that time were O2 76% on 4L, RR 12, bp96/51  Oxygen increased to 8L nasal cannula and O2 went to 82% and a non-rebreather was applied  With 8L and non-rebreather O2 was 90%  Respiratory came to bedside and placed patient on 12L midflow  IV morphine dose held  SLIM made aware  Will continue to monitor   Corrine Lemus RN

## 2021-02-11 NOTE — PLAN OF CARE
Change diet to dysphagia 2, NTL, meds crushed puree  Encourage pt to swallow hard and use multiple swallows for each bite/sip  Small bites and sips, sitting upright

## 2021-02-11 NOTE — ASSESSMENT & PLAN NOTE
· Recurrent  · Suspect in the setting of malignancy  · Noted on chest x-ray  · Consider IR consult for thoracentesis while inpatient

## 2021-02-11 NOTE — PLAN OF CARE
Problem: Potential for Falls  Goal: Patient will remain free of falls  Description: INTERVENTIONS:  - Assess patient frequently for physical needs  -  Identify cognitive and physical deficits and behaviors that affect risk of falls    -  [place] fall precautions as indicated by assessment   - Educate patient/family on patient safety including physical limitations  - Instruct patient to call for assistance with activity based on assessment  - Modify environment to reduce risk of injury  - Consider OT/PT consult to assist with strengthening/mobility  Outcome: Progressing     Problem: PAIN - ADULT  Goal: Verbalizes/displays adequate comfort level or baseline comfort level  Description: Interventions:  - Encourage patient to monitor pain and request assistance  - Assess pain using appropriate pain scale  - Administer analgesics based on type and severity of pain and evaluate response  - Implement non-pharmacological measures as appropriate and evaluate response  - Consider cultural and social influences on pain and pain management  - Notify physician/advanced practitioner if interventions unsuccessful or patient reports new pain  Outcome: Progressing     Problem: INFECTION - ADULT  Goal: Absence or prevention of progression during hospitalization  Description: INTERVENTIONS:  - Assess and monitor for signs and symptoms of infection  - Monitor lab/diagnostic results  - Monitor all insertion sites, i e  indwelling lines, tubes, and drains  - Monitor endotracheal if appropriate and nasal secretions for changes in amount and color  - [place] appropriate cooling/warming therapies per order  - Administer medications as ordered  - Instruct and encourage patient and family to use good hand hygiene technique  - Identify and instruct in appropriate isolation precautions for identified infection/condition  Outcome: Progressing  Goal: Absence of fever/infection during neutropenic period  Description: INTERVENTIONS:  - Monitor WBC    Outcome: Progressing     Problem: SAFETY ADULT  Goal: Patient will remain free of falls  Description: INTERVENTIONS:  - Assess patient frequently for physical needs  -  Identify cognitive and physical deficits and behaviors that affect risk of falls    -  [place] fall precautions as indicated by assessment   - Educate patient/family on patient safety including physical limitations  - Instruct patient to call for assistance with activity based on assessment  - Modify environment to reduce risk of injury  - Consider OT/PT consult to assist with strengthening/mobility  Outcome: Progressing  Goal: Maintain or return to baseline ADL function  Description: INTERVENTIONS:  -  Assess patient's ability to carry out ADLs; assess patient's baseline for ADL function and identify physical deficits which impact ability to perform ADLs (bathing, care of mouth/teeth, toileting, grooming, dressing, etc )  - Assess/evaluate cause of self-care deficits   - Assess range of motion  - Assess patient's mobility; develop plan if impaired  - Assess patient's need for assistive devices and provide as appropriate  - Encourage maximum independence but intervene and supervise when necessary  - Involve family in performance of ADLs  - Assess for home care needs following discharge   - Consider OT consult to assist with ADL evaluation and planning for discharge  - Provide patient education as appropriate  Outcome: Progressing  Goal: Maintain or return mobility status to optimal level  Description: INTERVENTIONS:  - Assess patient's baseline mobility status (ambulation, transfers, stairs, etc )    - Identify cognitive and physical deficits and behaviors that affect mobility  - Identify mobility aids required to assist with transfers and/or ambulation (gait belt, sit-to-stand, lift, walker, cane, etc )  - [place] fall precautions as indicated by assessment  - Record patient progress and toleration of activity level on Mobility SBAR; progress patient to next Phase/Stage  - Instruct patient to call for assistance with activity based on assessment  - Consider rehabilitation consult to assist with strengthening/weightbearing, etc   Outcome: Progressing     Problem: DISCHARGE PLANNING  Goal: Discharge to home or other facility with appropriate resources  Description: INTERVENTIONS:  - Identify barriers to discharge w/patient and caregiver  - Arrange for needed discharge resources and transportation as appropriate  - Identify discharge learning needs (meds, wound care, etc )  - Arrange for interpretive services to assist at discharge as needed  - Refer to Case Management Department for coordinating discharge planning if the patient needs post-hospital services based on physician/advanced practitioner order or complex needs related to functional status, cognitive ability, or social support system  Outcome: Progressing     Problem: Knowledge Deficit  Goal: Patient/family/caregiver demonstrates understanding of disease process, treatment plan, medications, and discharge instructions  Description: Complete learning assessment and assess knowledge base    Interventions:  - Provide teaching at level of understanding  - Provide teaching via preferred learning methods  Outcome: Progressing     Problem: Prexisting or High Potential for Compromised Skin Integrity  Goal: Skin integrity is maintained or improved  Description: INTERVENTIONS:  - Identify patients at risk for skin breakdown  - Assess and monitor skin integrity  - Assess and monitor nutrition and hydration status  - Monitor labs   - Assess for incontinence   - Turn and reposition patient  - Assist with mobility/ambulation  - Relieve pressure over bony prominences  - Avoid friction and shearing  - Provide appropriate hygiene as needed including keeping skin clean and dry  - Evaluate need for skin moisturizer/barrier cream  - Collaborate with interdisciplinary team   - Patient/family teaching  - Consider wound care consult   Outcome: Progressing     Problem: Nutrition/Hydration-ADULT  Goal: Nutrient/Hydration intake appropriate for improving, restoring or maintaining nutritional needs  Description: Monitor and assess patient's nutrition/hydration status for malnutrition  Collaborate with interdisciplinary team and initiate plan and interventions as ordered  Monitor patient's weight and dietary intake as ordered or per policy  Utilize nutrition screening tool and intervene as necessary  Determine patient's food preferences and provide high-protein, high-caloric foods as appropriate       INTERVENTIONS:  - Monitor oral intake, urinary output, labs, and treatment plans  - Assess nutrition and hydration status and recommend course of action  - Evaluate amount of meals eaten  - Assist patient with eating if necessary   - Allow adequate time for meals  - Recommend/ encourage appropriate diets, oral nutritional supplements, and vitamin/mineral supplements  - Order, calculate, and assess calorie counts as needed  - Recommend, monitor, and adjust tube feedings and TPN/PPN based on assessed needs  - Assess need for intravenous fluids  - Provide specific nutrition/hydration education as appropriate  - Include patient/family/caregiver in decisions related to nutrition  Outcome: Progressing

## 2021-02-11 NOTE — PROGRESS NOTES
Ivette Roper  1950   Ms Soheila Be is a 76 y o  female       Chief Complaint   Patient presents with    Breast Cancer       Cancer Staging  No matching staging information was found for the patient  Breast cancer metastasized to bone, right [facility])    1996 Surgery     right modified radical mastectomy followed by adjuvant systemic therapy but no radiation  6/28/2004 Biopsy     Final Diagnosis  A  Lymph Node, right supraclavicular, core biopsy (14 slides, [id number], [facility]; collected on 6/28/2004):  -  Consistent with metastatic carcinoma, compatible with breast origin (see note)  2004 -  Radiation     salvage radiation therapy at an outside facility to right supraclavicular nodes at [facility]         12/23/2018 Initial Diagnosis     Breast cancer metastasized to bone, right ([address] )       12/26/2018 - 1/9/2019 Radiation     3000 cGy in 10 fractions to  T11 through the bottom of the SI joints  Dr Tj Hurst         1/16/2019 -  Chemotherapy     fulvestrant (FASLODEX) IM injection   Plan was to start Ibrance             Clinical Trial: no        Interval History  Ms Soheila Be Is a 59-year-old female with an extensive breast cancer history dating back to 56  She underwent right modified radical mastectomy followed by adjuvant systemic therapy but no radiation  She then developed axillary/ supraclavicular recurrence in 2004 and underwent salvage radiation therapy at an outside facility  She had no evidence of disease for the next 14 years until recently presenting with fatigue, unintentional weight loss, and worsening low back pain with imaging revealing extensive osseous metastatic disease with a pathologic fracture at T12 with 5 mm of retropulsion but no cord compression or neurologic compromise  She has now completed a course of palliative radiation therapy to T11 through the bottom of SI joints      First follow up since completing T11 through SI joint radiation completed on 1/9/19 1/14/19 bone scan  IMPRESSION:  Extensive and widespread osseous metastatic disease  Consider radiographs of the femurs bilaterally to assess for any lesions predisposing to pathologic fracture  1/16/19 fulvestrant (FASLODEX) IM injection  Ibrance on hold due to low WBC    presented to the hospital on 1/18/2019 due to paresthesias that she felt in her face during a bone scan  On admission the patient was found to have multiple electrolyte abnormalities such as hypokalemia, hypocalcemia, and hypomagnesemia    1/18/19 Right femur x rays  IMPRESSION:   No significant lytic or blastic bone lesion seen in the right femur to correspond to the bone scan abnormality  1/18/19 Left femur x rays  IMPRESSION:     Significant metastatic lesions seen within the femoral shaft most predominantly within the lower diaphysis region with some cortical thinning along the anterior aspect of the endosteal surface of the cortex  This might predispose to pathologic fracture  Consider orthopedic consultation  1/22/19 Dr Ifeoma Smith  "She had bone scan recently and that showed extensive metastatic disease and also cortical thinning of left femur needing orthopedic consultation and if not surgery and radiation and that is being arranged  If surgery then maybe we will have  tissue  sample to study"  Will continue IV hydration                                                                              1/23/19 Seen by Dr Burke Harris, ortho  Plan     Left distal femoral shaft lytic bone lesions  1  Patient has no pain in her lower extremities at this time  Explained that she may require prophylactic fixation in the future, at which time a biopsy will be obtained  She was instructed to contact me if she develops any pain, particularly in the left thigh, as this likely indicates significant compromise of the femoral cortex which can result in femur fracture    2  Home physical therapy for strengthening and knee brace as needed for activity to address ongoing weakness after radiation therapy  3  Follow-up in four weeks for repeat evaluation, or sooner as indicated    1/24/19 EGD  IMPRESSIONS:    1  Severe gastritis, suspect may be secondary to radiation  2  Numerous gastric polyps, suspect may be inflammatory  1/28/19 Alvena Inches, CRNP  taking morphine IR 15mg BID and taking Tylenol PRN  Screening  Tobacco  Current tobacco user: no  If yes, brief counseling provided: NA    Hypertension  Hypertension screening performed: yes  Normotensive:  no  If no, referred to PCP: n/a    Depression Screening  Screened for depression using PHQ-2: yes    Screened for depression using PHQ-9:  yes  Screening positive or negative:  positive  If score >4, was any of the following actions taken?    Additional evaluation for depression, suicide risk assesment, referral to PCP or psychiatry, medication started:  yes    Advanced Care Planning for Patients >65 years  Advanced Care Planning Discussed:  yes  Patient named surrogate decision maker or care plan in chart: no    [unfilled]  Health Maintenance   Topic Date Due    Hepatitis C Screening  1950    Depression Screening PHQ  1950    Medicare Annual Wellness Visit (AWV)  1950    CRC Screening: Colonoscopy  1950    DTaP,Tdap,and Td Vaccines (1 - Tdap) 07/29/1971    Fall Risk  07/29/2015    Urinary Incontinence Screening  07/29/2015    Pneumococcal PPSV23/PCV13 65+ Years / High and Highest Risk (1 of 2 - PCV13) 07/29/2015    INFLUENZA VACCINE  Completed       Patient Active Problem List   Diagnosis    Elevated alkaline phosphatase level    Anemia    Compression fracture of thoracic spine, non-traumatic (HCC)    Anxiety disorder    Hypokalemia    Hypomagnesemia    Breast cancer metastasized to bone, right (HCC)    Severe protein-calorie malnutrition (HCC)    Bone metastasis ([facility] )    Lung nodules    Absolute anemia    Heme positive stool    Leukopenia    Hypocalcemia    Diarrhea    Dehydration    Neutropenia (HCC)    Localized edema    Hypoalbuminemia    Lytic bone lesion of femur    Non-intractable vomiting with nausea     Past Medical History:   Diagnosis Date    Cancer [facility])     breast 1996    Cancer ([facility] )     bone      Compression fracture of thoracic vertebra (HCC)     Diabetes mellitus ([facility] )     History of therapeutic radiation     Tendonitis      Past Surgical History:   Procedure Laterality Date    CHOLECYSTECTOMY      MASTECTOMY      Right side reconstruction    NECK SURGERY      AR ESOPHAGOGASTRODUODENOSCOPY TRANSORAL DIAGNOSTIC N/A 1/24/2019    Procedure: ESOPHAGOGASTRODUODENOSCOPY (EGD); Surgeon: Stalin Sullivan MD;  [facility]; Service: Gastroenterology    TONSILLECTOMY       Family History   Problem Relation Age of Onset    Diabetes Mother     Cancer Mother     Lymphoma Father     KAROLINA disease Brother      Social History     Social History    Marital status:      Spouse name: N/A    Number of children: N/A    Years of education: N/A     Occupational History    Not on file       Social History Main Topics    Smoking status: Never Smoker    Smokeless tobacco: Never Used    Alcohol use No    Drug use: No    Sexual activity: Not on file     Other Topics Concern    Not on file     Social History Narrative    No narrative on file       Current Outpatient Prescriptions:     acetaminophen (TYLENOL) 325 mg tablet, Take 2 tablets (650 mg total) by mouth 4 (four) times a day (with meals and at bedtime), Disp: 80 tablet, Rfl: 1    calcium carbonate (OS-CLARENCE) 1250 (500 Ca) MG chewable tablet, Chew 1 tablet (1,250 mg total) daily, Disp: 30 tablet, Rfl: 0    Cholecalciferol (VITAMIN D3) 2000 units TABS, Take 2,000 Units by mouth daily, Disp: , Rfl:     ferrous sulfate 325 (65 Fe) mg tablet, Take 1 tablet (325 mg total) by mouth daily with breakfast, Disp: 30 tablet, Rfl: 1    gabapentin (NEURONTIN) 100 mg capsule, Take 1 capsule (100 mg total) by mouth 2 (two) times a day, Disp: 60 capsule, Rfl: 1    lisinopril (ZESTRIL) 5 mg tablet, Take 5 mg by mouth daily  , Disp: , Rfl:     loperamide (IMODIUM) 2 mg capsule, Take 1 capsule (2 mg total) by mouth 4 (four) times a day as needed for diarrhea, Disp: , Rfl: 0    LORazepam (ATIVAN) 1 mg tablet, Take 2mg (2 tablets) qhs , Disp: 60 tablet, Rfl: 0    metoclopramide (REGLAN) 10 mg tablet, Take 1 tablet (10 mg total) by mouth every 6 (six) hours as needed (Nausea or Vomiting), Disp: 40 tablet, Rfl: 0    morphine (MSIR) 15 mg tablet, Use 1 tab BID scheduled to anticipate cancer pain  May have 1 tabs q4hrs PRN mod-severe pain , Disp: 75 tablet, Rfl: 0    MULTIPLE VITAMINS PO, Take by mouth, Disp: , Rfl:     nystatin (MYCOSTATIN) 100,000 units/mL suspension, Apply 5 mL (500,000 Units total) to the mouth or throat 4 (four) times a day, Disp: 60 mL, Rfl: 0    Omega-3 Fatty Acids (FISH OIL PO), Take 2 g by mouth, Disp: , Rfl:     omeprazole (PriLOSEC) 40 MG capsule, Take 1 capsule (40 mg total) by mouth daily, Disp: 30 capsule, Rfl: 3    ondansetron (ZOFRAN-ODT) 8 mg disintegrating tablet, Take 1 tablet (8 mg total) by mouth 3 (three) times a day, Disp: 90 tablet, Rfl: 1    Palbociclib (IBRANCE) 125 MG, Take 1 capsule (125 mg total) by mouth daily For 21 days then 7 off, Disp: 21 capsule, Rfl: 5    potassium chloride (K-DUR,KLOR-CON) 20 mEq tablet, Take 1 tablet (20 mEq total) by mouth daily, Disp: 30 tablet, Rfl: 0    pravastatin (PRAVACHOL) 40 mg tablet, Take 40 mg by mouth daily  , Disp: , Rfl:     sucralfate (CARAFATE) 1 g/10 mL suspension, Take 10 mL (1 g total) by mouth 2 (two) times a day, Disp: 420 mL, Rfl: 2  No current facility-administered medications for this visit     No Known Allergies    Review of Systems:  Review of Systems   Constitutional: Positive for activity change, appetite change (Poor, but some improvement), chills, fatigue and unexpected weight change  HENT: Positive for trouble swallowing  IV hydration once per week   Eyes: Negative  Respiratory: Negative  Cardiovascular: Positive for leg swelling (bilateral, left more prominent  )  Gastrointestinal: Positive for abdominal pain (One time last week - gas ) and nausea  Endocrine: Negative  Genitourinary: Negative  Musculoskeletal: Positive for arthralgias (achiness in legs ), back pain (Feels some improvement since radiation, but now starting to hurt more again ) and gait problem  Back pain 0/10-8/10 in T12 area  Wears brace  Using walker  Skin:        Right arm burn from stove  Allergic/Immunologic: Negative  Neurological: Positive for dizziness, syncope (had fainted while shopping in December), light-headedness (right side                                                            ) and numbness (right arm and hand)  Uses shower bar, and walker due to dizziness   Hematological: Negative  Vitals:    02/06/19 1103   BP: 110/65   Pulse: 68   Resp: 14   Temp: 98 1 °F (36 7 °C)       Pain Score: 0-No pain (02 98)    Imaging:Xr Femur 2 Views Left    Result Date: 1/18/2019  Narrative: LEFT FEMUR INDICATION:   Discomfort, abnormal bone scan  COMPARISON:  Bone scan dated 1/14/2019 VIEWS:  XR FEMUR 2 VW LEFT FINDINGS: There is no fracture or dislocation  There is mild degenerative arthritis in the knee medially and laterally  There is a bone spur at the top of the patella, superficially  Corresponding to the areas of abnormal radiotracer uptake within the left femur, there are numerous areas of mixed lytic and sclerotic bony metastases visible predominantly along the femoral shaft  There are a few small isolated lucent lesions in the mid to upper femoral shaft with some endosteal scalloping noted  A more significant area of mixed lytic and sclerotic metastatic disease is seen within the lower femoral shaft    There does appear to be some endosteal scalloping and cortical thinning mostly along the anterior aspect of the bone on the lateral view over the lower portion of the femur  This does not appear critically thinned at this time, however, it might still predispose to pathologic fracture  Soft tissues are unremarkable  Impression: Significant metastatic lesions seen within the femoral shaft most predominantly within the lower diaphysis region with some cortical thinning along the anterior aspect of the endosteal surface of the cortex  This might predispose to pathologic fracture  Consider orthopedic consultation  Workstation performed: [id number]     Xr Femur 2 Views Right    Result Date: 1/18/2019  Narrative: RIGHT FEMUR INDICATION:   Discomfort, abnormal bone scan  COMPARISON:  Bone scan dated 1/14/2019 VIEWS:  XR FEMUR 2 VW RIGHT FINDINGS: There is no fracture or dislocation  No significant degenerative change seen at the hip joint  Perhaps mild joint space narrowing in the knee  There is some bone spur formation at the top of the patella, superficially  The area of abnormal radiotracer uptake along the diaphysis of the right femur on the prior bone scan does not seem to have any visible radiographic correlate  There is no significant lytic or blastic bone lesion identified  There does not appear to be  any cortical thinning which would predispose to a pathologic fracture at this time  There are atherosclerotic calcifications  Soft tissues are otherwise unremarkable  Impression: No significant lytic or blastic bone lesion seen in the right femur to correspond to the bone scan abnormality  Workstation performed: [id number]     Nm Bone Scan Whole Body    Result Date: 1/14/2019  Narrative: BONE SCAN  WHOLE BODY INDICATION: History of metastatic breast carcinoma  PREVIOUS FILM CORRELATION:    There are no prior bone scans available for comparison    Comparison is made to prior thoracic and lumbar MRI studies from December 2018 and prior head CT 3/19/2008 TECHNIQUE:   This study was performed following the intravenous administration of 26 6 mCi Tc-99m labeled MDP  Delayed, anterior and posterior whole body images were acquired, 2-3 hours after radiopharmaceutical administration  FINDINGS:  Examination demonstrates extensive osseous metastatic disease  Involvement of the following areas is noted: Skull and face bilaterally, cervical spine, left shoulder girdle and left humerus, multiple ribs, extensive involvement throughout the thoracic and lumbar spine, pelvic and sacral metastases bilaterally, left greater trochanteric lesion, and metastatic lesions within both femoral shafts, distal aspect of left femur, and left fibula         Impression: Extensive and widespread osseous metastatic disease  Consider radiographs of the femurs bilaterally to assess for any lesions predisposing to pathologic fracture   Workstation performed: [id number]       Teaching Chart(s)/Patient

## 2021-02-12 PROBLEM — R62.7 FAILURE TO THRIVE IN ADULT: Status: ACTIVE | Noted: 2021-02-12

## 2021-02-12 PROBLEM — E87.8 ELECTROLYTE DISTURBANCE: Status: ACTIVE | Noted: 2021-02-12

## 2021-02-12 PROBLEM — J96.01 ACUTE RESPIRATORY FAILURE WITH HYPOXIA (HCC): Status: ACTIVE | Noted: 2021-02-10

## 2021-02-12 PROBLEM — E43 SEVERE PROTEIN-CALORIE MALNUTRITION (HCC): Status: ACTIVE | Noted: 2021-02-12

## 2021-02-12 LAB
GLUCOSE SERPL-MCNC: 134 MG/DL (ref 65–140)
GLUCOSE SERPL-MCNC: 142 MG/DL (ref 65–140)
GLUCOSE SERPL-MCNC: 175 MG/DL (ref 65–140)
GLUCOSE SERPL-MCNC: 65 MG/DL (ref 65–140)
GLUCOSE SERPL-MCNC: 67 MG/DL (ref 65–140)
GLUCOSE SERPL-MCNC: 68 MG/DL (ref 65–140)
GLUCOSE SERPL-MCNC: 80 MG/DL (ref 65–140)
PROCALCITONIN SERPL-MCNC: 7.73 NG/ML
VANCOMYCIN SERPL-MCNC: 13.1 UG/ML

## 2021-02-12 PROCEDURE — 84145 PROCALCITONIN (PCT): CPT | Performed by: NURSE PRACTITIONER

## 2021-02-12 PROCEDURE — 94762 N-INVAS EAR/PLS OXIMTRY CONT: CPT

## 2021-02-12 PROCEDURE — 82948 REAGENT STRIP/BLOOD GLUCOSE: CPT

## 2021-02-12 PROCEDURE — 99233 SBSQ HOSP IP/OBS HIGH 50: CPT | Performed by: INTERNAL MEDICINE

## 2021-02-12 PROCEDURE — 99222 1ST HOSP IP/OBS MODERATE 55: CPT | Performed by: INTERNAL MEDICINE

## 2021-02-12 PROCEDURE — 05HA33Z INSERTION OF INFUSION DEVICE INTO LEFT BRACHIAL VEIN, PERCUTANEOUS APPROACH: ICD-10-PCS | Performed by: INTERNAL MEDICINE

## 2021-02-12 PROCEDURE — 80202 ASSAY OF VANCOMYCIN: CPT | Performed by: INTERNAL MEDICINE

## 2021-02-12 PROCEDURE — 94760 N-INVAS EAR/PLS OXIMETRY 1: CPT

## 2021-02-12 RX ORDER — DEXTROSE AND SODIUM CHLORIDE 5; .45 G/100ML; G/100ML
50 INJECTION, SOLUTION INTRAVENOUS CONTINUOUS
Status: DISCONTINUED | OUTPATIENT
Start: 2021-02-12 | End: 2021-02-15 | Stop reason: HOSPADM

## 2021-02-12 RX ORDER — MORPHINE SULFATE 100 MG/5ML
10 SOLUTION, CONCENTRATE ORAL EVERY 4 HOURS PRN
Status: DISCONTINUED | OUTPATIENT
Start: 2021-02-12 | End: 2021-02-12

## 2021-02-12 RX ORDER — OXYCODONE HCL 5 MG/5 ML
5 SOLUTION, ORAL ORAL EVERY 4 HOURS PRN
Status: DISCONTINUED | OUTPATIENT
Start: 2021-02-12 | End: 2021-02-15 | Stop reason: HOSPADM

## 2021-02-12 RX ORDER — VANCOMYCIN HYDROCHLORIDE 500 MG/100ML
15 INJECTION, SOLUTION INTRAVENOUS ONCE
Status: COMPLETED | OUTPATIENT
Start: 2021-02-12 | End: 2021-02-12

## 2021-02-12 RX ORDER — HYDROMORPHONE HCL/PF 1 MG/ML
0.5 SYRINGE (ML) INJECTION EVERY 4 HOURS PRN
Status: DISCONTINUED | OUTPATIENT
Start: 2021-02-12 | End: 2021-02-15 | Stop reason: HOSPADM

## 2021-02-12 RX ORDER — VANCOMYCIN HYDROCHLORIDE 500 MG/100ML
15 INJECTION, SOLUTION INTRAVENOUS DAILY PRN
Status: DISCONTINUED | OUTPATIENT
Start: 2021-02-12 | End: 2021-02-13

## 2021-02-12 RX ORDER — ACETAMINOPHEN 325 MG/1
975 TABLET ORAL EVERY 8 HOURS SCHEDULED
Status: DISCONTINUED | OUTPATIENT
Start: 2021-02-12 | End: 2021-02-15 | Stop reason: HOSPADM

## 2021-02-12 RX ORDER — OXYCODONE HCL 5 MG/5 ML
10 SOLUTION, ORAL ORAL EVERY 4 HOURS PRN
Status: DISCONTINUED | OUTPATIENT
Start: 2021-02-12 | End: 2021-02-15 | Stop reason: HOSPADM

## 2021-02-12 RX ORDER — POLYETHYLENE GLYCOL 3350 17 G/17G
17 POWDER, FOR SOLUTION ORAL DAILY PRN
Status: DISCONTINUED | OUTPATIENT
Start: 2021-02-12 | End: 2021-02-15 | Stop reason: HOSPADM

## 2021-02-12 RX ORDER — MORPHINE SULFATE 100 MG/5ML
5 SOLUTION, CONCENTRATE ORAL EVERY 4 HOURS PRN
Status: DISCONTINUED | OUTPATIENT
Start: 2021-02-12 | End: 2021-02-12

## 2021-02-12 RX ADMIN — ACETAMINOPHEN 975 MG: 325 TABLET, FILM COATED ORAL at 21:08

## 2021-02-12 RX ADMIN — VANCOMYCIN HYDROCHLORIDE 500 MG: 500 INJECTION, SOLUTION INTRAVENOUS at 11:30

## 2021-02-12 RX ADMIN — DEXTROSE AND SODIUM CHLORIDE 50 ML/HR: 5; .45 INJECTION, SOLUTION INTRAVENOUS at 14:48

## 2021-02-12 RX ADMIN — MORPHINE SULFATE 2 MG: 2 INJECTION, SOLUTION INTRAMUSCULAR; INTRAVENOUS at 13:15

## 2021-02-12 RX ADMIN — HEPARIN SODIUM 5000 UNITS: 5000 INJECTION INTRAVENOUS; SUBCUTANEOUS at 21:08

## 2021-02-12 RX ADMIN — CEFEPIME HYDROCHLORIDE 2000 MG: 2 INJECTION, POWDER, FOR SOLUTION INTRAVENOUS at 18:04

## 2021-02-12 RX ADMIN — HEPARIN SODIUM 5000 UNITS: 5000 INJECTION INTRAVENOUS; SUBCUTANEOUS at 14:48

## 2021-02-12 RX ADMIN — ACETAMINOPHEN 975 MG: 325 TABLET, FILM COATED ORAL at 18:39

## 2021-02-12 RX ADMIN — MORPHINE SULFATE 2 MG: 2 INJECTION, SOLUTION INTRAMUSCULAR; INTRAVENOUS at 05:57

## 2021-02-12 RX ADMIN — GABAPENTIN 300 MG: 300 CAPSULE ORAL at 18:39

## 2021-02-12 RX ADMIN — MORPHINE SULFATE 15 MG: 15 TABLET ORAL at 11:05

## 2021-02-12 RX ADMIN — HEPARIN SODIUM 5000 UNITS: 5000 INJECTION INTRAVENOUS; SUBCUTANEOUS at 05:57

## 2021-02-12 RX ADMIN — OXYCODONE HYDROCHLORIDE 10 MG: 5 SOLUTION ORAL at 18:01

## 2021-02-12 RX ADMIN — LORAZEPAM 2 MG: 1 TABLET ORAL at 21:08

## 2021-02-12 NOTE — ASSESSMENT & PLAN NOTE
· Recurrent  · Suspect in the setting of malignancy  · Noted on chest x-ray    · Given IV lasix w/ improvement in oxygenation  · Patient considering hospice, no need for thoracentesis at this time

## 2021-02-12 NOTE — ASSESSMENT & PLAN NOTE
Malnutrition Findings:    muscle wasting, temporal pad loss     BMI Findings: Body mass index is 15 11 kg/m²  · Nutrition consult  · Appreciate Nutrition recommendations

## 2021-02-12 NOTE — PROGRESS NOTES
Vancomycin IV Pharmacy-to-Dose Consultation    Kenyatta Davidson is a 79 y o  female who is currently receiving Vancomycin IV with management by the Pharmacy Consult service  Assessment/Plan:  The patient was reviewed  Renal function is stable and no signs or symptoms of nephrotoxicity and/or infusion reactions were documented in the chart  Based on todays assessment and random vancomycin level of 3 7, will increase PRN dose to 750 mg, schedule a dose for this evening, and obtain a 12 hour level tomorrow at 0800  We will continue to follow the patients culture results and clinical progress daily      Ollie Del Angel, Pharmacist

## 2021-02-12 NOTE — ASSESSMENT & PLAN NOTE
· Recurrent  · Suspect in the setting of malignancy  · Noted on chest x-ray    · Given IV lasix  · Patient considering hospice, no need for thoracentesis at this time

## 2021-02-12 NOTE — PROGRESS NOTES
Oncology Progress Note      Patient Name: Kain Gordon  : 1950  Age: 79 y o  Sex: female  MRN: [id number]  Unit/Bed#: S [facility]  Encounter: [id number]      ASSESSMENT:   1  Shortness of breath  The patient presented complaining of shortness of breath, weakness and chills  Testing for COVID-19/Influenza/RSV was negative  The chest x-ray shows increased ground-glass opacity in the right lung base and a moderate right pleural effusion  The patient was initiated on IV antibiotic therapy with cefepime for a suspected pneumonia      2  Acute respiratory failure with hypoxemia  The patient presented with shortness of breath on had an oxygen saturation of 88% on room air which improved to 97% on 2 liter/minute of oxygen     3  Stage IV breast cancer  The patient has metastatic breast cancer with diffuse bony metastases seen on a PET-CT scan done on 10/16/2020  The patient is currently on Afinitor on Aromasin  She is being followed by Dr Breen Means  The patient is complaining of diffuse bone pain  4  Anemia  This is likely due to the patient's underlying malignancy with extensive bone metastases      5  Failure to thrive  The patient is eating poorly and has been steadily losing weight      Patient Active Problem List   Diagnosis    Elevated alkaline phosphatase level    Anemia    Compression fracture of thoracic spine, non-traumatic (HCC)    Anxiety disorder    Type 2 diabetes mellitus without complication, without long-term current use of insulin (HCC)    Hypokalemia    Hypomagnesemia    Breast cancer metastasized to bone, right (HCC)    Unspecified protein-calorie malnutrition (HCC)    Bone metastasis (HCC)    Lung nodules    Heme positive stool    Leukopenia    Hypocalcemia    Neutropenia (HCC)    Localized edema    Lytic bone lesion of femur    Non-intractable vomiting with nausea    Cancer-related pain    Hypertension    Malignant neoplasm of upper-outer quadrant of right breast in female, estrogen receptor positive ([facility] [address] )    Ambulatory dysfunction    Brachial plexopathy    Copy of advanced directive obtained    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[facility] [address] )    Drug-induced leukopenia ([facility] [address] )    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    Malignant cachexia ([facility] [address] [facility]    Lymphedema    Palliative care patient    Pleural effusion on right    Subcutaneous nodules    Dyspnea    CHANTE (acute kidney injury) (HCC)    Elevated lactic acid level    Acute respiratory failure with hypoxia (HCC)    Severe protein-calorie malnutrition (HCC)    Electrolyte disturbance    Failure to thrive in adult       PLAN:  The patient was evaluated by a Palliative Care Medicine and the issue of hospice was further discussed with the patient  At this point the patient is a candidate for best supportive care an optimization of her pain control  In the meantime she will be continued on IV antibiotic therapy  SUBJECTIVE:  The patient was examined at the bedside  She complained of pain all over body  She has been sleeping poorly due to inability to find a comfortable position  Her appetite has been very poor  Chief Complaint:  Body aches and pains  History of  Present Illness: The patient is a 77-year-old female with a history of metastatic breast cancer to the bone and chronic anemia  She was initially diagnosed with right breast cancer in 1996 and had a right mastectomy followed by adjuvant chemotherapy  The patient had recurrent disease after a biopsy of a supraclavicular lymph node showed receptor positive adenocarcinoma of breast origin  She had excision of the lesion and was then on letrozole (Femara) for 10 years until 2015  The patient subsequently developed metastatic disease with bony metastases in December, 2018  She was started on Faslodex and Augustus was later added  The patient was treated with denosumab Chante Rodríguez) with calcium and vitamin-D  She is currently on Afinitor and Aromasin    The patient presented to the ED complaining of increasing shortness of breath  A chest x-ray showed persistent right lower lobe ground-glass opacities and a right pleural effusion  The patient was noted to be in acute respiratory failure with hypoxemia  The oxygen saturation on room air was 88% and went up to 98% after she was placed on oxygen by nasal cannula at 2 liters/minute  The patient complained of fever, chills and night sweats  She was initiated on IV antibiotic therapy for presumed pneumonia  The shortness of breath improved significantly since admission  She is complaining of generalized body aches and pains which have been keeping her from sleeping  She has been losing weight steadily losing more than 20 pounds in the past 6 months  She continues to have a poor appetite        OBJECTIVE:    General:  In no acute distress  Frail  Vitals: /62 (BP Location: Left arm)   Pulse 81   Temp 98 2 °F (36 8 °C) (Oral)   Resp 18   Wt 38 7 kg (85 lb 5 1 oz)   LMP 05/27/1996 (LMP Unknown)   SpO2 93%   BMI 15 11 kg/m²     HEENT: Pupils are reactive to light and accommodation  Extraocular muscles intact  No scleral icterus  Respiratory:  Lungs are clear bilaterally  No crackles  No wheezes  Cardiovascular: S1, S2 are normal  No murmurs  No gallops  Abdominal: Soft, non-tender  Normal bowel sounds  No organomegaly  Musculoskeletal:  The patient has generalized muscle wasting  Skin: No rashes  No erythema  Extremities: No clubbing, cyanosis or edema  Neurologic: Alert, oriented to person, place and time  No focal deficits        LABS:    Recent Results (from the past 48 hour(s))   T4, free    Collection Time: 02/10/21 10:00 PM   Result Value Ref Range    Free T4 0 27 (L) 0 76 - 1 46 ng/dL   Procalcitonin with AM Reflex    Collection Time: 02/10/21 10:00 PM   Result Value Ref Range    Procalcitonin 5 26 (H) <=0 25 ng/ml   Lactic acid, plasma    Collection Time: 02/10/21 10:17 PM   Result Value Ref Range LACTIC ACID 2 6 (HH) 0 5 - 2 0 mmol/L   Fingerstick Glucose (POCT)    Collection Time: 02/10/21 10:28 PM   Result Value Ref Range    POC Glucose 127 65 - 140 mg/dl   Lactic acid 2 Hours    Collection Time: 02/11/21 12:34 AM   Result Value Ref Range    LACTIC ACID 2 2 (HH) 0 5 - 2 0 mmol/L   Lactic acid 2 Hours    Collection Time: 02/11/21  2:25 AM   Result Value Ref Range    LACTIC ACID 1 8 0 5 - 2 0 mmol/L   Fingerstick Glucose (POCT)    Collection Time: 02/11/21  7:09 AM   Result Value Ref Range    POC Glucose 97 65 - 140 mg/dl   Comprehensive metabolic panel    Collection Time: 02/11/21  8:32 AM   Result Value Ref Range    Sodium 134 (L) 136 - 145 mmol/L    Potassium 3 6 3 5 - 5 3 mmol/L    Chloride 98 (L) 100 - 108 mmol/L    CO2 28 21 - 32 mmol/L    ANION GAP 8 4 - 13 mmol/L    BUN 20 5 - 25 mg/dL    Creatinine 1 28 0 60 - 1 30 mg/dL    Glucose 97 65 - 140 mg/dL    Calcium 7 6 (L) 8 3 - 10 1 mg/dL    Corrected Calcium 9 2 8 3 - 10 1 mg/dL    AST 70 (H) 5 - 45 U/L    ALT 59 12 - 78 U/L    Alkaline Phosphatase 197 (H) 46 - 116 U/L    Total Protein 5 4 (L) 6 4 - 8 2 g/dL    Albumin 2 0 (L) 3 5 - 5 0 g/dL    Total Bilirubin 0 61 0 20 - 1 00 mg/dL    eGFR 42 ml/min/1 73sq m   CBC and differential    Collection Time: 02/11/21  8:32 AM   Result Value Ref Range    WBC 12 14 (H) 4 31 - 10 16 Thousand/uL    RBC 3 18 (L) 3 81 - 5 12 Million/uL    Hemoglobin 8 8 (L) 11 5 - 15 4 g/dL    Hematocrit 27 7 (L) 34 8 - 46 1 %    MCV 87 82 - 98 fL    MCH 27 7 26 8 - 34 3 pg    MCHC 31 8 31 4 - 37 4 g/dL    RDW 16 1 (H) 11 6 - 15 1 %    MPV 8 9 8 9 - 12 7 fL    Platelets 849 481 - 264 Thousands/uL    nRBC 0 /100 WBCs    Neutrophils Relative 91 (H) 43 - 75 %    Immat GRANS % 1 0 - 2 %    Lymphocytes Relative 4 (L) 14 - 44 %    Monocytes Relative 4 4 - 12 %    Eosinophils Relative 0 0 - 6 %    Basophils Relative 0 0 - 1 %    Neutrophils Absolute 11 06 (H) 1 85 - 7 62 Thousands/µL    Immature Grans Absolute 0 09 0 00 - 0 20 Thousand/uL    Lymphocytes Absolute 0 49 (L) 0 60 - 4 47 Thousands/µL    Monocytes Absolute 0 47 0 17 - 1 22 Thousand/µL    Eosinophils Absolute 0 00 0 00 - 0 61 Thousand/µL    Basophils Absolute 0 03 0 00 - 0 10 Thousands/µL   Fingerstick Glucose (POCT)    Collection Time: 02/11/21 11:17 AM   Result Value Ref Range    POC Glucose 92 65 - 140 mg/dl   Fingerstick Glucose (POCT)    Collection Time: 02/11/21  4:33 PM   Result Value Ref Range    POC Glucose 96 65 - 140 mg/dl   Vancomycin, random    Collection Time: 02/11/21  6:18 PM   Result Value Ref Range    Vancomycin Rm 3 7 ug/mL   Fingerstick Glucose (POCT)    Collection Time: 02/11/21  8:36 PM   Result Value Ref Range    POC Glucose 114 65 - 140 mg/dl   Procalcitonin Reflex    Collection Time: 02/12/21  5:51 AM   Result Value Ref Range    Procalcitonin 7 73 (H) <=0 25 ng/ml   Fingerstick Glucose (POCT)    Collection Time: 02/12/21  7:22 AM   Result Value Ref Range    POC Glucose 80 65 - 140 mg/dl   Vancomycin, random    Collection Time: 02/12/21  8:57 AM   Result Value Ref Range    Vancomycin Rm 13 1 ug/mL   Fingerstick Glucose (POCT)    Collection Time: 02/12/21 11:08 AM   Result Value Ref Range    POC Glucose 65 65 - 140 mg/dl   Fingerstick Glucose (POCT)    Collection Time: 02/12/21 11:32 AM   Result Value Ref Range    POC Glucose 67 65 - 140 mg/dl   Fingerstick Glucose (POCT)    Collection Time: 02/12/21 11:48 AM   Result Value Ref Range    POC Glucose 68 65 - 140 mg/dl   Fingerstick Glucose (POCT)    Collection Time: 02/12/21 12:37 PM   Result Value Ref Range    POC Glucose 142 (H) 65 - 140 mg/dl   Fingerstick Glucose (POCT)    Collection Time: 02/12/21  4:56 PM   Result Value Ref Range    POC Glucose 175 (H) 65 - 140 mg/dl         Xr Chest 1 View Portable    Result Date: 2/10/2021  Narrative: CHEST INDICATION:   dyspnea, asthenia  Metastatic breast cancer  COMPARISON:  PET CT from 10/16/2020   EXAM PERFORMED/VIEWS:  XR CHEST PORTABLE FINDINGS: Cardiomediastinal silhouette appears unremarkable  Persistent moderate right effusion with increased groundglass opacity in the right base  Left lung clear  No pneumothorax  Right axillary lymph node dissection  Blastic bone metastases  Cervicothoracic fusion  Impression: Persistent moderate right effusion with increased groundglass opacity in the right base which could be due to atelectasis, scar, and/or lymphangitic spread of tumor  Redemonstration of blastic bone metastases  Workstation performed: [id number]     Fl Barium Swallow Video W Speech    Result Date: 2/11/2021  Narrative: A video barium swallow study was performed by the Department of Speech Pathology  Please refer to the report for the official interpretation  The images are stored for archival purposes only  Study images were not formally reviewed by the Radiology Department

## 2021-02-12 NOTE — ASSESSMENT & PLAN NOTE
Lab Results   Component Value Date    HGBA1C 5 6 11/20/2020       Recent Labs     02/12/21  1108 02/12/21  1132 02/12/21  1148 02/12/21  1237   POCGLU 65 67 68 142*       Blood Sugar Average: Last 72 hrs:  · (P) 94 8 Hold Home regimen of metformin b i d   · Accu-Cheks and sliding scale insulin  · Hypoglycemia protocol

## 2021-02-12 NOTE — PLAN OF CARE
Problem: Potential for Falls  Goal: Patient will remain free of falls  Description: INTERVENTIONS:  - Assess patient frequently for physical needs  -  Identify cognitive and physical deficits and behaviors that affect risk of falls    -  [place] fall precautions as indicated by assessment   - Educate patient/family on patient safety including physical limitations  - Instruct patient to call for assistance with activity based on assessment  - Modify environment to reduce risk of injury  - Consider OT/PT consult to assist with strengthening/mobility  Outcome: Progressing     Problem: PAIN - ADULT  Goal: Verbalizes/displays adequate comfort level or baseline comfort level  Description: Interventions:  - Encourage patient to monitor pain and request assistance  - Assess pain using appropriate pain scale  - Administer analgesics based on type and severity of pain and evaluate response  - Implement non-pharmacological measures as appropriate and evaluate response  - Consider cultural and social influences on pain and pain management  - Notify physician/advanced practitioner if interventions unsuccessful or patient reports new pain  Outcome: Progressing     Problem: INFECTION - ADULT  Goal: Absence or prevention of progression during hospitalization  Description: INTERVENTIONS:  - Assess and monitor for signs and symptoms of infection  - Monitor lab/diagnostic results  - Monitor all insertion sites, i e  indwelling lines, tubes, and drains  - Monitor endotracheal if appropriate and nasal secretions for changes in amount and color  - [place] appropriate cooling/warming therapies per order  - Administer medications as ordered  - Instruct and encourage patient and family to use good hand hygiene technique  - Identify and instruct in appropriate isolation precautions for identified infection/condition  Outcome: Progressing  Goal: Absence of fever/infection during neutropenic period  Description: INTERVENTIONS:  - Monitor WBC    Outcome: Progressing     Problem: SAFETY ADULT  Goal: Patient will remain free of falls  Description: INTERVENTIONS:  - Assess patient frequently for physical needs  -  Identify cognitive and physical deficits and behaviors that affect risk of falls    -  [place] fall precautions as indicated by assessment   - Educate patient/family on patient safety including physical limitations  - Instruct patient to call for assistance with activity based on assessment  - Modify environment to reduce risk of injury  - Consider OT/PT consult to assist with strengthening/mobility  Outcome: Progressing  Goal: Maintain or return to baseline ADL function  Description: INTERVENTIONS:  -  Assess patient's ability to carry out ADLs; assess patient's baseline for ADL function and identify physical deficits which impact ability to perform ADLs (bathing, care of mouth/teeth, toileting, grooming, dressing, etc )  - Assess/evaluate cause of self-care deficits   - Assess range of motion  - Assess patient's mobility; develop plan if impaired  - Assess patient's need for assistive devices and provide as appropriate  - Encourage maximum independence but intervene and supervise when necessary  - Involve family in performance of ADLs  - Assess for home care needs following discharge   - Consider OT consult to assist with ADL evaluation and planning for discharge  - Provide patient education as appropriate  Outcome: Progressing  Goal: Maintain or return mobility status to optimal level  Description: INTERVENTIONS:  - Assess patient's baseline mobility status (ambulation, transfers, stairs, etc )    - Identify cognitive and physical deficits and behaviors that affect mobility  - Identify mobility aids required to assist with transfers and/or ambulation (gait belt, sit-to-stand, lift, walker, cane, etc )  - [place] fall precautions as indicated by assessment  - Record patient progress and toleration of activity level on Mobility SBAR; progress patient to next Phase/Stage  - Instruct patient to call for assistance with activity based on assessment  - Consider rehabilitation consult to assist with strengthening/weightbearing, etc   Outcome: Progressing     Problem: DISCHARGE PLANNING  Goal: Discharge to home or other facility with appropriate resources  Description: INTERVENTIONS:  - Identify barriers to discharge w/patient and caregiver  - Arrange for needed discharge resources and transportation as appropriate  - Identify discharge learning needs (meds, wound care, etc )  - Arrange for interpretive services to assist at discharge as needed  - Refer to Case Management Department for coordinating discharge planning if the patient needs post-hospital services based on physician/advanced practitioner order or complex needs related to functional status, cognitive ability, or social support system  Outcome: Progressing     Problem: Knowledge Deficit  Goal: Patient/family/caregiver demonstrates understanding of disease process, treatment plan, medications, and discharge instructions  Description: Complete learning assessment and assess knowledge base    Interventions:  - Provide teaching at level of understanding  - Provide teaching via preferred learning methods  Outcome: Progressing     Problem: Prexisting or High Potential for Compromised Skin Integrity  Goal: Skin integrity is maintained or improved  Description: INTERVENTIONS:  - Identify patients at risk for skin breakdown  - Assess and monitor skin integrity  - Assess and monitor nutrition and hydration status  - Monitor labs   - Assess for incontinence   - Turn and reposition patient  - Assist with mobility/ambulation  - Relieve pressure over bony prominences  - Avoid friction and shearing  - Provide appropriate hygiene as needed including keeping skin clean and dry  - Evaluate need for skin moisturizer/barrier cream  - Collaborate with interdisciplinary team   - Patient/family teaching  - Consider wound care consult   Outcome: Progressing     Problem: Nutrition/Hydration-ADULT  Goal: Nutrient/Hydration intake appropriate for improving, restoring or maintaining nutritional needs  Description: Monitor and assess patient's nutrition/hydration status for malnutrition  Collaborate with interdisciplinary team and initiate plan and interventions as ordered  Monitor patient's weight and dietary intake as ordered or per policy  Utilize nutrition screening tool and intervene as necessary  Determine patient's food preferences and provide high-protein, high-caloric foods as appropriate       INTERVENTIONS:  - Monitor oral intake, urinary output, labs, and treatment plans  - Assess nutrition and hydration status and recommend course of action  - Evaluate amount of meals eaten  - Assist patient with eating if necessary   - Allow adequate time for meals  - Recommend/ encourage appropriate diets, oral nutritional supplements, and vitamin/mineral supplements  - Order, calculate, and assess calorie counts as needed  - Recommend, monitor, and adjust tube feedings and TPN/PPN based on assessed needs  - Assess need for intravenous fluids  - Provide specific nutrition/hydration education as appropriate  - Include patient/family/caregiver in decisions related to nutrition  Outcome: Progressing

## 2021-02-12 NOTE — ASSESSMENT & PLAN NOTE
Lab Results   Component Value Date    HGBA1C 5 6 11/20/2020       Recent Labs     02/11/21  1117 02/11/21  1633 02/11/21 2036 02/12/21  0722   POCGLU 92 96 114 80       Blood Sugar Average: Last 72 hrs:  · (P) 101 Hold Home regimen of metformin b i d   · Accu-Cheks and sliding scale insulin  · Hypoglycemia protocol

## 2021-02-12 NOTE — ASSESSMENT & PLAN NOTE
· Stage IV breast cancer with metastasis to bone  · Patient known to Dr Mulu Myers as an outpatient  · Patient currently on Afinitor and Aromasin, last treatment mid January 2021  · Continue outpatient follow up  · Continue home pain regimen of morphine 7 5 mg q 4 hours for moderate pain and 15 mg q 4 hours for severe pain    · Oncology consulted  · Palliative care consulted

## 2021-02-12 NOTE — ASSESSMENT & PLAN NOTE
· Stage IV breast cancer with metastasis to bone  · Patient known to Dr Jamshid Plasencia as an outpatient  · Patient currently on Afinitor and Aromasin, last treatment mid January 2021  · Continue outpatient follow up    · Changed pain control regimen to liquid for easier administration  · Oncology consulted  · Palliative care consulted

## 2021-02-12 NOTE — CONSULTS
Consultation - Palliative & Supportive Care   Yeni Sanabria  79 y o   female  S /S -01   MRN: [id number]  Encounter: [id number]    ASSESSMENT:    Patient Active Problem List   Diagnosis    Elevated alkaline phosphatase level    Anemia    Compression fracture of thoracic spine, non-traumatic (HCC)    Anxiety disorder    Type 2 diabetes mellitus without complication, without long-term current use of insulin (HCC)    Hypokalemia    Hypomagnesemia    Breast cancer metastasized to bone, right (HCC)    Unspecified protein-calorie malnutrition (HCC)    Bone metastasis (HCC)    Lung nodules    Heme positive stool    Leukopenia    Hypocalcemia    Neutropenia (HCC)    Localized edema    Lytic bone lesion of femur    Non-intractable vomiting with nausea    Cancer-related pain    Hypertension    Malignant neoplasm of upper-outer quadrant of right breast in female, estrogen receptor positive ([facility])    Ambulatory dysfunction    Brachial plexopathy    Copy of advanced directive obtained    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[facility])    Drug-induced leukopenia (HCC)    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    Malignant cachexia ([facility])    Lymphedema    Palliative care patient    Pleural effusion on right    Subcutaneous nodules    Dyspnea    CHANTE (acute kidney injury) (HCC)    Elevated lactic acid level    Acute respiratory failure with hypoxia (HCC)    Severe protein-calorie malnutrition (HCC)    Electrolyte disturbance       70F w/ breast cancer widely metastatic to bone admitted w/ pleural effusion, rapid weight loss, weakness, FTT, lactic acidosis  [facility] consulted for goals of care  Patient requesting hospice in the home; family supports this decision  PLAN:    1  Goals:    Patient had wished to discuss goals w/ this provider as we have met in the past  Today she states she wishes to go home and remain home, surrounded by family, and remain comfortable for however much time she has remaining     Patient demonstrates mild confusion and significant short-term memory loss and only demonstrated limited decisional capacity  [facility]  [facility] verified with family (son Serene Balderas, sister [facility]) that this is appropriate and they agree  They clearly appreciate patient's recent decline and wish patient to return home and remain comfortable   Hospice referral placed and  (scheduled for 2/13/21) notified via EMR message   Patient may qualify for hospice IPU if there is a shift in trajectory prior to discharge, she can no longer take PO medications, etc    To provide for the best chance to transition to home hospice, [facility] continuing current antibiotic regimen (which requires lab draws) and IVF w/ D5 until just prior to discharge, unless the situation changes   D/t weakness patient would not be safe to discharge home alone; however, family states they will stay with her  Patient states she has made arrangements to pay for private-hire care in the home ("all the papers are in my lockbox at home") but her sister states they would like to try to care for her themselves, first, and would hire help if needed  2  Social Support:   Supportive listening provided   Normalized experience of patient and family   Provided anxiety containment   Pastoral Care consult placed   Called patient's  (Rev Amirah Friending at [facility] in [address], [phone]) and LVM     3  Symptom management:   Continue current symptom management regimen  Unsure why change from [facility] to oxycodone was made but support this change as long as it meets her pain needs   [facility] supports escalation of analgesic regimen as appropriate over the weekend   Patient finds IV hydromorphone helpful; continue   Updated bowel regimen   Changed Tylenol from PRN to 975mg TID ATC        Code status: Level 3 - DNAR and DNI   Decisional apparatus:  Patient does not have full capacity to make medical decisions on my exam today  She demonstrated limited decisional capacity  Patient's named substitute decision makers are first her son Nickolas Camacho then her sister Rickie Alexander  (per dPOA+LW)  Advance Directive / Living Will / POLST:  Durable Power of  and Living Will signed in 2019, reviewed in EMR  I have reviewed the patient's controlled substance dispensing history in the Prescription Drug Monitoring Program in compliance with the [facility] regulations before prescribing any controlled substances  We appreciate the opportunity to participate in this patient's care  We will continue to follow  Please do not hesitate to contact our on-call provider through our clinic answering service at [phone] should you have acute symptom control concerns  IDENTIFICATION:  Inpatient consult to Palliative Care  Consult performed by: Shaina Kumar MD  Consult ordered by: Eloy oClmenares MD    Inpatient consult to Palliative Care  Consult performed by: Shaina Kumar MD  Consult ordered by: Warren Boyer MD      Reason for Consult / Principal Problem: goals    HISTORY OF PRESENT ILLNESS:    Ethan Otoole is a 79 y o  female with 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 s/p chemotherapy + immunotherapy + RT, anxiety, GCSF-induced bone pain, radiation-induced brachial plexopathy, protein calorie malnutrition, lymphedema  She follows w/ Dr Lamar Alvarado PA-C (Medical Oncology)  Systemic treatment included exemestane (Armoasin) and everolimus (Afinitor); she had tolerance issues w/ previous regimen  Patient [facility] to [facility]; last seen by me in our outpatient clinic 12/17/20 for symptom management, psychosocial support  [facility]10[facility]21 c/o generalized weakness, malaise, dizziness, chest discomfort, dyspnea, chills  COVID19 negative   Admitted for persistent moderate R pleural effusion and increased GGO at R lung base which may be progression of malignancy, lactic acidosis, acute hypoxic respiratory failure  Patient noted to have 19 8% TBW loss in 1 month (1/11/21 weighed 106 lb, 2/10/21 weighed 85 lb) w/ objective signs of cachexia  VSS done today; results pending  MRI from 11/23/20 had shown widespread progressive metastatic disease  Pain has been too high to participate in PT evaluation earlier today  Had required on O2 via [address] but now tolerating RA at rest     Pearly Roderick and exam limited by: patient's confusion and poor short-term memory    Review of Systems   Constitutional: Positive for activity change, appetite change, fatigue and unexpected weight change  HENT: Positive for trouble swallowing  Negative for sinus pressure and sinus pain  Eyes: Negative for redness  Respiratory: Positive for shortness of breath  Negative for cough  Cardiovascular: Negative for leg swelling  Endocrine: Negative for polydipsia and polyphagia  Musculoskeletal: Positive for arthralgias and myalgias  Bone pain  Skin: Positive for pallor  Allergic/Immunologic: Positive for immunocompromised state  Neurological: Positive for dizziness and weakness  Negative for facial asymmetry  Psychiatric/Behavioral: Positive for confusion, decreased concentration and dysphoric mood  Negative for agitation and behavioral problems  The patient is nervous/anxious          Past Medical History:   Diagnosis Date    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    BRCA1 negative     BRCA2 negative     Breast cancer ([facility] ) 05/05/1996    right     Cancer [facility])     breast 1996    Cancer ([facility] [address] )     bone      Compression fracture of thoracic vertebra ([facility] [address] )     Diabetes mellitus ([facility] [address] )     History of chemotherapy 1996    History of therapeutic radiation     Tendonitis      Past Surgical History:   Procedure Laterality Date    CHOLECYSTECTOMY      COLONOSCOPY      CT NEEDLE BIOPSY BONE  12/16/2019    IR THORACENTESIS  9/28/2020    MASTECTOMY Right 05/05/1996    Right side reconstruction    NECK SURGERY      GA ESOPHAGOGASTRODUODENOSCOPY TRANSORAL DIAGNOSTIC N/A 1/24/2019    Procedure: ESOPHAGOGASTRODUODENOSCOPY (EGD); Surgeon: Alexander Carreon MD;  [facility]; Service: Gastroenterology    TONSILLECTOMY      UPPER GASTROINTESTINAL ENDOSCOPY       Social History     Socioeconomic History    Marital status:      Spouse name: Not on file    Number of children: 2    Years of education: Not on file    Highest education level: Not on file   Occupational History    Occupation: Retired   Social Needs    Financial resource strain: Not on file    Food insecurity     Worry: Not on file     Inability: Not on file   [employer] needs     Medical: Not on file     Non-medical: Not on file   Tobacco Use    Smoking status: Never Smoker    Smokeless tobacco: Never Used   Substance and Sexual Activity    Alcohol use: No     Alcohol/week: 0 0 standard drinks     Frequency: Never     Binge frequency: Never    Drug use: Yes     Types: Morphine     Comment: for cancer pain    Sexual activity: Not on file   Lifestyle    Physical activity     Days per week: Not on file     Minutes per session: Not on file    Stress: Not on file   Relationships    Social connections     Talks on phone: Not on file     Gets together: Not on file     Attends [employer] service: Not on file     Active member of club or organization: Not on file     Attends meetings of clubs or organizations: Not on file     Relationship status: Not on file    Intimate partner violence     Fear of current or ex partner: Not on file     Emotionally abused: Not on file     Physically abused: Not on file     Forced sexual activity: Not on file   Other Topics Concern    Not on file   Social History Narrative    Lives alone  Family supportive       Family History   Problem Relation Age of Onset    Diabetes Mother     Cancer Mother     Lymphoma Father     KAROLINA disease Brother     Colon cancer Maternal Grandfather     No Known Problems Son     No Known Problems Son     No Known Problems Sister     No Known Problems Brother     No Known Problems Sister        MEDICATIONS / ALLERGIES:  all current active meds have been reviewed and current meds:   Current Facility-Administered Medications   Medication Dose Route Frequency    acetaminophen (TYLENOL) tablet 975 mg  975 mg Oral Q8H [facility]    barium sulfate 60 % cream 2 5 g  2 5 g Oral Once in imaging    barium sulfate 98 % oral suspension 50 mL  50 mL Oral Once in imaging    calcium carbonate (OYSTER SHELL,OSCAL) 500 mg tablet 1 tablet  1 tablet Oral Daily With Breakfast    cefepime (MAXIPIME) 2 g/50 mL dextrose IVPB  2,000 mg Intravenous Q24H    cholecalciferol (VITAMIN D3) tablet 1,000 Units  1,000 Units Oral Daily    dextrose 5 % and sodium chloride 0 45 % infusion  50 mL/hr Intravenous Continuous    docusate sodium (COLACE) capsule 100 mg  100 mg Oral BID    ferrous sulfate tablet 325 mg  325 mg Oral Every Other Day    gabapentin (NEURONTIN) capsule 300 mg  300 mg Oral BID    heparin (porcine) subcutaneous injection 5,000 Units  5,000 Units Subcutaneous Q8H [facility]    HYDROmorphone (DILAUDID) injection 0 5 mg  0 5 mg Intravenous Q4H PRN    insulin lispro (HumaLOG) 100 units/mL subcutaneous injection 1-5 Units  1-5 Units Subcutaneous TID AC    insulin lispro (HumaLOG) 100 units/mL subcutaneous injection 1-5 Units  1-5 Units Subcutaneous HS    levothyroxine tablet 25 mcg  25 mcg Oral Early Morning    LORazepam (ATIVAN) tablet 2 mg  2 mg Oral HS    multivitamin stress formula tablet 1 tablet  1 tablet Oral BID    oxyCODONE (ROXICODONE) oral solution 10 mg  10 mg Oral Q4H PRN    oxyCODONE (ROXICODONE) oral solution 5 mg  5 mg Oral Q4H PRN    pantoprazole (PROTONIX) EC tablet 40 mg  40 mg Oral Early Morning    polyethylene glycol (MIRALAX) packet 17 g  17 g Oral Daily PRN    vancomycin (VANCOCIN) IVPB (premix in dextrose) 500 mg 100 mL  15 mg/kg Intravenous Daily PRN No Known Allergies    OBJECTIVE:  /62 (BP Location: Left arm)   Pulse 81   Temp 98 2 °F (36 8 °C) (Oral)   Resp 18   Wt 38 7 kg (85 lb 5 1 oz)   LMP 05/27/1996 (LMP Unknown)   SpO2 93%   BMI 15 11 kg/m²   Nursing notes and vital signs reviewed  Physical Exam:  Constitutional: Pale, frail, debilitated, cachectic, chronically and acutely ill-appearing elderly female  Appears older than chronological age  Temporal wasting  Prominent clavicles  Head: Normocephalic and atraumatic  Eyes: EOM are normal  No ocular discharge  No scleral icterus  Neck: No visible adenopathy or masses  Respiratory: Effort normal  No stridor  No respiratory distress  Gastrointestinal: No abdominal distension  Musculoskeletal: No edema  Neurological: Alert, oriented to self and situation but confused about hospital timeline and health timeline over recent months  Answers some questions correctly  RUE weakness noted  Follows most commands appropriately  Significant short-term memory loss  Skin: Dry, no diaphoresis  Psychiatric: Dysphoric mood  Mildly anxious re: her memory loss  No agitation  No audio or visual hallucinations  Lab Results: I have personally reviewed pertinent labs  Albumin:  0   Lab Value Date/Time    ALB 2 0 (L) 02/11/2021 9336     Imaging Studies: I have personally reviewed pertinent reports  EKG, Pathology, and Other Studies: I have personally reviewed pertinent reports  Counseling / Coordination of Care: Total floor / unit time spent today 60 minutes, including phone calls to patient's son ([phone]) and sister ([id number])  Greater than 50% of total time was spent with the patient and / or family counseling and / or coordination of care   A description of the counseling / coordination of care: symptom assessment and management, medication review and adjustment, psychosocial support, chart review, imaging review, lab review, advanced directives, goals of care and hospice services  Areli Zapien MD  [facility] Palliative and Supportive Care      Portions of this document may have been created using dictation software and as such some "sound alike" terms may have been generated by the system  Do not hesitate to contact me with any questions or clarifications

## 2021-02-12 NOTE — OCCUPATIONAL THERAPY NOTE
Occupational Therapy Evaluation     Patient Name: Saranya Linton  [id number] Date: 2/12/2021  Problem List  Principal Problem:    Dyspnea  Active Problems:    Elevated alkaline phosphatase level    Anemia    Diabetes (HCC)    Hypokalemia    Hypomagnesemia    Breast cancer metastasized to bone, right (HCC)    Unspecified protein-calorie malnutrition (HCC)    Pleural effusion on right    CHANTE (acute kidney injury) ([facility] [address] )    Elevated lactic acid level    Acute respiratory failure with hypoxia [facility])    Past Medical History  Past Medical History:   Diagnosis Date    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[facility] [address] ) 3/25/2020    BRCA1 negative     BRCA2 negative     Breast cancer ([facility] [address] ) 05/05/1996    right     Cancer ([facility] [address] )     breast 1996    Cancer ([facility] [address] )     bone      Compression fracture of thoracic vertebra (HCC)     Diabetes mellitus ([facility] [address] )     History of chemotherapy 1996    History of therapeutic radiation     Tendonitis      Past Surgical History  Past Surgical History:   Procedure Laterality Date    CHOLECYSTECTOMY      COLONOSCOPY      CT NEEDLE BIOPSY BONE  12/16/2019    IR THORACENTESIS  9/28/2020    MASTECTOMY Right 05/05/1996    Right side reconstruction    NECK SURGERY      SD ESOPHAGOGASTRODUODENOSCOPY TRANSORAL DIAGNOSTIC N/A 1/24/2019    Procedure: ESOPHAGOGASTRODUODENOSCOPY (EGD); Surgeon: Oscar Gregory MD;  [facility]; Service: Gastroenterology    TONSILLECTOMY      UPPER GASTROINTESTINAL ENDOSCOPY          02/12/21 1110   OT Last Visit   OT Visit Date 02/12/21  (Friday)   Note Type   Note type Evaluation   Cancel Reasons Other  (pt declined due to pain)   Activity Tolerance   Medical Staff Made Aware care coordination w/ Cosme Stone and [facility]   Nurse Made Aware care coordination w/ John RUEDA   Assessment   Assessment OT orders received and chart review completed  Attempted to see pt for OT eval  Pt declined despite encouragement due to pain   Pt able to manage thickened beverage w/ + time after set- up taking small sips  Pt required + time and small bites of applesauce to manage meds w/ nursing  Will continue to follow as appropriate and schedule allows   Aide Vasquez OTR/L    Addendum: 1418-Pt is currently pending a palliative care consult  Will hold on OT evaluation until a plan of care is established following the palliative consult  Per RN and PCA pt demonstrated limited activity tolerance to bath due to pain and temperature   Will continue to follow     Aide Vasquez OTR/L

## 2021-02-12 NOTE — PROGRESS NOTES
Progress Note - Kain Gordon 1950, 79 y o  female MRN: [id number]    Unit/Bed#: S -01 Encounter: [id number]    Primary Care Provider: Jenna Echavarria DO   Date and time admitted to hospital: 2/10/2021  3:11 PM        Acute respiratory failure with hypoxia ([facility] [address] )  Assessment & Plan  · Upon arrival, oxygen saturation of 88% on room air  · Suspect multifactorial in the setting of possible RLL pneumonia and right pleural effusion  · OOB as tolerated  · Maintain O2 > 90%  · Respiratory protocol  · Wean supplemental oxygen  CHANTE (acute kidney injury) ([facility] [address] )  Assessment & Plan  Creatinine upon admission: 1 37  Baseline: 0 6-0 8  Secondary to dehydration  Treatment:  IV hydration  Monitor BMP  Pleural effusion on right  Assessment & Plan  · Recurrent  · Suspect in the setting of malignancy  · Noted on chest x-ray  · Given IV lasix  · Patient considering hospice, no need for thoracentesis at this time    Unspecified protein-calorie malnutrition [facility])  Assessment & Plan  Malnutrition Findings:    muscle wasting, temporal pad loss     BMI Findings: Body mass index is 15 11 kg/m²  · Nutrition consult  · Appreciate Nutrition recommendations  Breast cancer metastasized to bone, right (HCC)  Assessment & Plan  · Stage IV breast cancer with metastasis to bone  · Patient known to Dr Jamshid Plasencia as an outpatient  · Patient currently on Afinitor and Aromasin, last treatment mid January 2021  · Continue outpatient follow up  · Continue home pain regimen of morphine 7 5 mg q 4 hours for moderate pain and 15 mg q 4 hours for severe pain  · Oncology consulted  · Palliative care consulted    Hypomagnesemia  Assessment & Plan  · Magnesium level upon admission:  1 2  · Replete and monitor magnesium level  Hypokalemia  Assessment & Plan   Potassium level upon admission: 3 0   Replete and monitor BMP      Diabetes [facility])  Assessment & Plan  Lab Results   Component Value Date    HGBA1C 5 6 11/20/2020 Recent Labs     02/11/21  1117 02/11/21  1633 02/11/21  2036 02/12/21  0722   POCGLU 92 96 114 80       Blood Sugar Average: Last 72 hrs:  · (P) 101 Hold Home regimen of metformin b i d   · Accu-Cheks and sliding scale insulin  · Hypoglycemia protocol  Anemia  Assessment & Plan  · Chronic due to malignancy  · Present on admission, hemoglobin of 9 4  · Baseline appears to be between 9-10  · Continue ferrous sulfate daily  * Dyspnea  Assessment & Plan   Presentation:  Patient presents with complaints of occasional shortness of breath, generalized weakness, and chills   COVID/Influenza/RSV: Negative   Chest x-ray: "Persistent moderate right effusion with increased groundglass opacity in the right base which could be due to atelectasis, scar, and/or lymphangitic spread of tumor  Redemonstration of blastic bone metastases "   Continue IV cefepime and IV vancomycin for now pending procalcitonin levels   Follow up on culture results   Given IV lasix with relief    Wean O2 as tolerated      VTE Pharmacologic Prophylaxis:   Pharmacologic: Heparin  Mechanical VTE Prophylaxis in Place: Yes    Patient Centered Rounds: I have performed bedside rounds with nursing staff today  Discussions with Specialists or Other Care Team Provider: palliative care    Education and Discussions with Family / Patient: discussed plans with patient    Time Spent for Care: 45 minutes  More than 50% of total time spent on counseling and coordination of care as described above  Current Length of Stay: 2 day(s)    Current Patient Status: Inpatient   Certification Statement: The patient will continue to require additional inpatient hospital stay due to IV abx, oxygen supp    Discharge Plan: pending hospital course    Code Status: Level 3 - DNAR and DNI      Subjective:   Patient extremely short of breath  Possibly aspirating as having difficulty with pills   Discussed possibility of hospice care, patient would like to discuss with Dr Caroline Browning first     Objective:     Vitals:   Temp (24hrs), Av 5 °F (36 9 °C), Min:98 3 °F (36 8 °C), Max:98 7 °F (37 1 °C)    Temp:  [98 3 °F (36 8 °C)-98 7 °F (37 1 °C)] 98 7 °F (37 1 °C)  HR:  [72-84] 84  Resp:  [16] 16  BP: ()/(57-72) 94/58  SpO2:  [90 %-100 %] 97 %  Body mass index is 15 11 kg/m²  Input and Output Summary (last 24 hours): Intake/Output Summary (Last 24 hours) at 2021 0903  Last data filed at 2021 5145  Gross per 24 hour   Intake 0 ml   Output 900 ml   Net -900 ml       Physical Exam:     Physical Exam  Vitals signs and nursing note reviewed  Constitutional:       Appearance: She is ill-appearing  Comments: Frail, cachetic, malnourished   HENT:      Head: Normocephalic and atraumatic  Right Ear: External ear normal       Left Ear: External ear normal       Nose: Nose normal       Mouth/Throat:      Mouth: Mucous membranes are moist       Pharynx: Oropharynx is clear  Eyes:      Conjunctiva/sclera: Conjunctivae normal       Pupils: Pupils are equal, round, and reactive to light  Neck:      Musculoskeletal: Neck supple  No muscular tenderness  Cardiovascular:      Rate and Rhythm: Normal rate and regular rhythm  Pulses: Normal pulses  Heart sounds: Normal heart sounds  Pulmonary:      Effort: Pulmonary effort is normal       Breath sounds: Rales present  Abdominal:      General: Abdomen is flat  Bowel sounds are normal       Palpations: Abdomen is soft  Musculoskeletal:         General: No swelling or tenderness  Skin:     General: Skin is warm and dry  Capillary Refill: Capillary refill takes less than 2 seconds  Neurological:      General: No focal deficit present  Mental Status: She is alert and oriented to person, place, and time  Mental status is at baseline  Psychiatric:         Behavior: Behavior normal          Thought Content:  Thought content normal          Judgment: Judgment normal  Comments: depressed         Additional Data:     Labs:    Results from last 7 days   Lab Units 02/11/21  0832 02/10/21  1820   WBC Thousand/uL 12 14* 9 86   HEMOGLOBIN g/dL 8 8* 9 4*   HEMATOCRIT % 27 7* 29 8*   PLATELETS Thousands/uL 317 330   BANDS PCT %  --  16*   NEUTROS PCT % 91*  --    LYMPHS PCT % 4*  --    LYMPHO PCT %  --  6*   MONOS PCT % 4  --    MONO PCT %  --  2*   EOS PCT % 0 0     Results from last 7 days   Lab Units 02/11/21  0832   SODIUM mmol/L 134*   POTASSIUM mmol/L 3 6   CHLORIDE mmol/L 98*   CO2 mmol/L 28   BUN mg/dL 20   CREATININE mg/dL 1 28   ANION GAP mmol/L 8   CALCIUM mg/dL 7 6*   ALBUMIN g/dL 2 0*   TOTAL BILIRUBIN mg/dL 0 61   ALK PHOS U/L 197*   ALT U/L 59   AST U/L 70*   GLUCOSE RANDOM mg/dL 97     Results from last 7 days   Lab Units 02/10/21  1626   INR  1 15     Results from last 7 days   Lab Units 02/12/21  0722 02/11/21  2036 02/11/21  1633 02/11/21  1117 02/11/21  0709 02/10/21  2228   POC GLUCOSE mg/dl 80 114 96 92 97 127         Results from last 7 days   Lab Units 02/11/21  0225 02/11/21  0034 02/10/21  2217 02/10/21  2200 02/10/21  2032   LACTIC ACID mmol/L 1 8 2 2* 2 6*  --  2 6*   PROCALCITONIN ng/ml  --   --   --  5 26*  --            * I Have Reviewed All Lab Data Listed Above  * Additional Pertinent Lab Tests Reviewed: All Labs Within Last 24 Hours Reviewed    Imaging:    Imaging Reports Reviewed Today Include: CXR  Imaging Personally Reviewed by Myself Includes:  CXR    Recent Cultures (last 7 days):     Results from last 7 days   Lab Units 02/10/21  1626   BLOOD CULTURE  No Growth at 24 hrs  No Growth at 24 hrs         Last 24 Hours Medication List:   Current Facility-Administered Medications   Medication Dose Route Frequency Provider Last Rate    acetaminophen  650 mg Oral Q6H PRN Velma Holstein, CRNP      barium sulfate  2 5 g Oral Once in imaging Estefania Mcgraw MD      barium sulfate  50 mL Oral Once in imaging Estefania Mcgraw MD      calcium carbonate  1 tablet Oral Daily With Breakfast CAITIE Calvert      cefepime  2,000 mg Intravenous Q24H CAITIE Calvert 2,000 mg (02/11/21 3146)    cholecalciferol  1,000 Units Oral Daily CAITIE Calvert      docusate sodium  100 mg Oral BID CAITIE Calvert      ferrous sulfate  325 mg Oral Every Other Day CAITIE Calvert      gabapentin  300 mg Oral BID CAITIE Calvert      heparin (porcine)  5,000 Units Subcutaneous Q8H [address] CAITIE Barahona      insulin lispro  1-5 Units Subcutaneous TID AC CAITIE Oconnor      insulin lispro  1-5 Units Subcutaneous HS CAITIE Calvert      levothyroxine  25 mcg Oral Early Morning Danielle Salguero MD      LORazepam  2 mg Oral HS CAITIE Calvert      morphine  15 mg Oral Q4H PRN CAITIE Calvert      morphine  7 5 mg Oral Q4H PRN CAITIE Calvert      morphine injection  2 mg Intravenous Q4H PRN Danielle Salguero MD      multivitamin stress formula  1 tablet Oral BID CAITIE Calvert      pantoprazole  40 mg Oral Early Morning CAITIE Calvert      vancomycin  750 mg Intravenous Daily PRN Danielle Salguero MD          Today, Patient Was Seen By: Danielle Salguero MD    ** Please Note: Dictation voice to text software may have been used in the creation of this document   **

## 2021-02-12 NOTE — PROGRESS NOTES
Patient asleep in bed  No visible signs of distress noted at this time  Oxygen saturation 100% on 8L midflo  Decreased to 6L midflo  Bed low and locked with call bell within reach  Will continue to monitor   Suyapa Salinas RN

## 2021-02-12 NOTE — ASSESSMENT & PLAN NOTE
 Presentation:  Patient presents with complaints of occasional shortness of breath, generalized weakness, and chills   COVID/Influenza/RSV: Negative   Chest x-ray: "Persistent moderate right effusion with increased groundglass opacity in the right base which could be due to atelectasis, scar, and/or lymphangitic spread of tumor  Redemonstration of blastic bone metastases "   Continue IV cefepime and IV vancomycin for now pending procalcitonin levels   Follow up on culture results     Given IV lasix with relief    Wean O2 as tolerated

## 2021-02-12 NOTE — ASSESSMENT & PLAN NOTE
Malnutrition Findings:   Adult Malnutrition type: Chronic illness  Adult Degree of Malnutrition: Other severe protein calorie malnutrition(related to catabolic illness, inadequate energy intake)    BMI Findings:  Adult BMI Classifications: Underweight < 18 5     Body mass index is 15 11 kg/m²     Continue with supp

## 2021-02-12 NOTE — PROGRESS NOTES
Progress Note - Cheryl Del Castillo 1950, 79 y o  female MRN: [id number]    Unit/Bed#: S -01 Encounter: [id number]    Primary Care Provider: Jesika Salazar DO   Date and time admitted to hospital: 2/10/2021  3:11 PM        Electrolyte disturbance  Assessment & Plan  With hyponatremia, hypokalemia and hypomagnesemia   Monitor and replete as necessary  Likely due to poor PO intake    Severe protein-calorie malnutrition ([facility] [address] )  Assessment & Plan  Malnutrition Findings:   Adult Malnutrition type: Chronic illness  Adult Degree of Malnutrition: Other severe protein calorie malnutrition(related to catabolic illness, inadequate energy intake)    BMI Findings:  Adult BMI Classifications: Underweight < 18 5     Body mass index is 15 11 kg/m²  Continue with supp    Acute respiratory failure with hypoxia (HCC)  Assessment & Plan  · Upon arrival, oxygen saturation of 88% on room air  · Suspect multifactorial in the setting of possible RLL pneumonia and right pleural effusion  · OOB as tolerated  · Maintain O2 > 90%  · Respiratory protocol  · Wean supplemental oxygen  CHANTE (acute kidney injury) ([facility] [address] )  Assessment & Plan  Creatinine upon admission: 1 37  Baseline: 0 6-0 8  Secondary to dehydration  Treatment:  IV hydration  Monitor BMP  Pleural effusion on right  Assessment & Plan  · Recurrent  · Suspect in the setting of malignancy  · Noted on chest x-ray  · Given IV lasix w/ improvement in oxygenation  · Patient considering hospice, no need for thoracentesis at this time    Breast cancer metastasized to bone, right [facility])  Assessment & Plan  · Stage IV breast cancer with metastasis to bone  · Patient known to Dr aKz Nation as an outpatient  · Patient currently on Afinitor and Aromasin, last treatment mid January 2021  · Continue outpatient follow up    · Changed pain control regimen to liquid for easier administration  · Oncology consulted  · Palliative care consulted    Type 2 diabetes mellitus without complication, without long-term current use of insulin [facility])  Assessment & Plan  Lab Results   Component Value Date    HGBA1C 5 6 11/20/2020       Recent Labs     02/12/21  1108 02/12/21  1132 02/12/21  1148 02/12/21  1237   POCGLU 65 67 68 142*       Blood Sugar Average: Last 72 hrs:  · (P) 94 8 Hold Home regimen of metformin b i d   · Accu-Cheks and sliding scale insulin  · Hypoglycemia protocol  Anemia  Assessment & Plan  · Chronic due to malignancy  · Present on admission, hemoglobin of 9 4  · Baseline appears to be between 9-10  · Continue ferrous sulfate daily  * Dyspnea  Assessment & Plan   Presentation:  Patient presents with complaints of occasional shortness of breath, generalized weakness, and chills   COVID/Influenza/RSV: Negative   Chest x-ray: "Persistent moderate right effusion with increased groundglass opacity in the right base which could be due to atelectasis, scar, and/or lymphangitic spread of tumor  Redemonstration of blastic bone metastases "   Continue IV cefepime and IV vancomycin for now pending procalcitonin levels   Follow up on culture results   Given IV lasix with relief    Wean O2 as tolerated      VTE Pharmacologic Prophylaxis:   Pharmacologic: Heparin  Mechanical VTE Prophylaxis in Place: Yes    Patient Centered Rounds: I have performed bedside rounds with nursing staff today  Discussions with Specialists or Other Care Team Provider: CM, palliative    Education and Discussions with Family / Patient: discussed plans with patient and sister    Time Spent for Care: 45 minutes  More than 50% of total time spent on counseling and coordination of care as described above      Current Length of Stay: 2 day(s)    Current Patient Status: Inpatient   Certification Statement: The patient will continue to require additional inpatient hospital stay due to IV antibiotics, respiratory failure requiring oxygen    Discharge Plan:  Pending hospital course    Code Status: Level 3 - DNAR and DNI      Subjective:   Patient states she feels ill  She would like to discuss with palliative Care possibility of hospice  Had a lengthy discussion with patient, regarding hospice versus pursuing active treatment  Patient is unsure and would like to discuss with her palliative care doctor  Objective:     Vitals:   Temp (24hrs), Av 5 °F (36 9 °C), Min:98 2 °F (36 8 °C), Max:98 7 °F (37 1 °C)    Temp:  [98 2 °F (36 8 °C)-98 7 °F (37 1 °C)] 98 2 °F (36 8 °C)  HR:  [72-84] 81  Resp:  [16-19] 18  BP: ()/(55-72) 120/62  SpO2:  [91 %-99 %] 93 %  Body mass index is 15 11 kg/m²  Input and Output Summary (last 24 hours): Intake/Output Summary (Last 24 hours) at 2021 1644  Last data filed at 2021 1448  Gross per 24 hour   Intake 1430 ml   Output 300 ml   Net 1130 ml       Physical Exam:     Physical Exam  Vitals signs and nursing note reviewed  Constitutional:       Appearance: She is ill-appearing  Comments: Cachectic, frail   HENT:      Head: Normocephalic and atraumatic  Right Ear: External ear normal       Left Ear: External ear normal       Nose: Nose normal       Mouth/Throat:      Mouth: Mucous membranes are moist       Pharynx: Oropharynx is clear  Eyes:      Conjunctiva/sclera: Conjunctivae normal       Pupils: Pupils are equal, round, and reactive to light  Neck:      Musculoskeletal: Neck supple  No muscular tenderness  Cardiovascular:      Rate and Rhythm: Normal rate and regular rhythm  Pulses: Normal pulses  Heart sounds: Normal heart sounds  Pulmonary:      Effort: Pulmonary effort is normal       Breath sounds: Rhonchi present  Abdominal:      General: Abdomen is flat  Bowel sounds are normal       Palpations: Abdomen is soft  Musculoskeletal:         General: No swelling or tenderness  Skin:     General: Skin is warm and dry  Capillary Refill: Capillary refill takes less than 2 seconds     Neurological:      General: No focal deficit present  Mental Status: She is alert and oriented to person, place, and time  Mental status is at baseline  Psychiatric:         Behavior: Behavior normal          Thought Content: Thought content normal          Judgment: Judgment normal       Comments: Depressed         Additional Data:     Labs:    Results from last 7 days   Lab Units 02/11/21  0832 02/10/21  1820   WBC Thousand/uL 12 14* 9 86   HEMOGLOBIN g/dL 8 8* 9 4*   HEMATOCRIT % 27 7* 29 8*   PLATELETS Thousands/uL 317 330   BANDS PCT %  --  16*   NEUTROS PCT % 91*  --    LYMPHS PCT % 4*  --    LYMPHO PCT %  --  6*   MONOS PCT % 4  --    MONO PCT %  --  2*   EOS PCT % 0 0     Results from last 7 days   Lab Units 02/11/21  0832   SODIUM mmol/L 134*   POTASSIUM mmol/L 3 6   CHLORIDE mmol/L 98*   CO2 mmol/L 28   BUN mg/dL 20   CREATININE mg/dL 1 28   ANION GAP mmol/L 8   CALCIUM mg/dL 7 6*   ALBUMIN g/dL 2 0*   TOTAL BILIRUBIN mg/dL 0 61   ALK PHOS U/L 197*   ALT U/L 59   AST U/L 70*   GLUCOSE RANDOM mg/dL 97     Results from last 7 days   Lab Units 02/10/21  1626   INR  1 15     Results from last 7 days   Lab Units 02/12/21  1237 02/12/21  1148 02/12/21  1132 02/12/21  1108 02/12/21  0722 02/11/21  2036 02/11/21  1633 02/11/21  1117 02/11/21  0709 02/10/21  2228   POC GLUCOSE mg/dl 142* 68 67 65 80 114 96 92 97 127         Results from last 7 days   Lab Units 02/12/21  0551 02/11/21  0225 02/11/21  0034 02/10/21  2217 02/10/21  2200 02/10/21  2032   LACTIC ACID mmol/L  --  1 8 2 2* 2 6*  --  2 6*   PROCALCITONIN ng/ml 7 73*  --   --   --  5 26*  --            * I Have Reviewed All Lab Data Listed Above  * Additional Pertinent Lab Tests Reviewed: No New Labs Available For Today    Imaging:    Imaging Reports Reviewed Today Include:  None  Imaging Personally Reviewed by Myself Includes:  None    Recent Cultures (last 7 days):     Results from last 7 days   Lab Units 02/10/21  7860   BLOOD CULTURE  No Growth at 24 hrs    No Growth at 24 hrs        Last 24 Hours Medication List:   Current Facility-Administered Medications   Medication Dose Route Frequency Provider Last Rate    acetaminophen  650 mg Oral Q6H PRN CAITIE Loepz      barium sulfate  2 5 g Oral Once in imaging Yusuf Matias MD      barium sulfate  50 mL Oral Once in imaging Yusuf Matias MD      calcium carbonate  1 tablet Oral Daily With Breakfast CAITIE Lopez      cefepime  2,000 mg Intravenous Q24H CAITIE Lopez 2,000 mg (02/11/21 1756)    cholecalciferol  1,000 Units Oral Daily CAITIE Oconnor      dextrose 5 % and sodium chloride 0 45 %  50 mL/hr Intravenous Continuous Army Efra MD 50 mL/hr (02/12/21 1448)    docusate sodium  100 mg Oral BID CAITIE Lopez      ferrous sulfate  325 mg Oral Every Other Day CAITIE Lopez      gabapentin  300 mg Oral BID CAITIE Lopez      heparin (porcine)  5,000 Units Subcutaneous [facility] CAITIE Lopez      HYDROmorphone  0 5 mg Intravenous Q4H PRN Army Efra MD      insulin lispro  1-5 Units Subcutaneous TID AC CAITIE Oconnor      insulin lispro  1-5 Units Subcutaneous HS CAITIE Lopez      levothyroxine  25 mcg Oral Early Morning Army Efra MD      LORazepam  2 mg Oral HS CAITIE Lopez      multivitamin stress formula  1 tablet Oral BID CAITIE Lopez      oxyCODONE  10 mg Oral Q4H PRN Army Efra MD      oxyCODONE  5 mg Oral Q4H PRN Army Efra MD      pantoprazole  40 mg Oral Early Morning CAITIE Lopez      vancomycin  15 mg/kg Intravenous Daily PRN Army Efra MD          Today, Patient Was Seen By: Army Efra MD    ** Please Note: Dictation voice to text software may have been used in the creation of this document   **

## 2021-02-12 NOTE — PROCEDURES
Midline Insertion    Date/Time: 2/12/2021 2:37 PM  Performed by: Diana Crystal RN  Authorized by: Jass Pena MD     Patient location:  Bedside  Other Assisting Provider: Yes (comment) Tamar Dunlap)    Consent:     Consent obtained:  Verbal    Consent given by:  Patient    Risks discussed:  Bleeding, incorrect placement and arterial puncture    Alternatives discussed:  Delayed treatment  Universal protocol:     Procedure explained and questions answered to patient or proxy's satisfaction: yes      Relevant documents present and verified: yes      Site/side marked: yes      Immediately prior to procedure, a time out was called: yes      Patient identity confirmed:  Verbally with patient, arm band and hospital-assigned identification number  Pre-procedure details:     Hand hygiene: Hand hygiene performed prior to insertion      Sterile barrier technique: All elements of maximal sterile technique followed      Skin preparation:  ChloraPrep    Skin preparation agent: Skin preparation agent completely dried prior to procedure    Indications:     Midline indications: new site and other (comment)      Midline indications comment:  Labs  Anesthesia (see MAR for exact dosages): Anesthesia method:  None  Procedure details:     Location:  Brachial    Laterality:  Left    Catheter size:  18 gauge    Landmarks identified: yes      Ultrasound guidance: yes      Ultrasound image availability:  Not saved    Sterile ultrasound techniques: Sterile gel and sterile probe covers were used      Number of attempts:  1    Successful placement: yes      Catheter length (cm):  10    Exposed catheter length (cm):  0    Lot number:  [id number]  Post-procedure details:     Post-procedure:  Dressing applied and securement device placed    Assessment:  Blood return through all ports    Post-procedure complications: none      Patient tolerance of procedure:   Tolerated well, no immediate complications  Comments:      REF: [id number]

## 2021-02-12 NOTE — ASSESSMENT & PLAN NOTE
With hyponatremia, hypokalemia and hypomagnesemia   Monitor and replete as necessary  Likely due to poor PO intake

## 2021-02-12 NOTE — PHYSICAL THERAPY NOTE
PHYSICAL THERAPY CANCELLATION NOTE    Patient Name: Dorian Hurley  QDSDQ'[id number] Date: 2/12/2021 02/12/21 1115   PT Last Visit   PT Visit Date 02/12/21   Note Type   Cancel Reasons Other   Activity Tolerance   Medical Staff Made Aware Spoke to OT Liz   Nurse Made Aware Spoke to MARYBETH Arguelles   Assessment   Assessment PT orders received, chart review performed  Contact made w/ pt who reports pain all over, is currently getting PO pain meds w/ RN  Pt apologizes profusely, requesting therapists to return later  PT will continue to follow as appropriate and schedule allows to perform PT evaluation     Myranda Puckett, PT, DPT       ** Addendum 0538 on 2/12/2021: pt is currently pending a palliative care consult  Will hold on PT evaluation until a plan of care is established following the palliative consult

## 2021-02-12 NOTE — ASSESSMENT & PLAN NOTE
· Upon arrival, oxygen saturation of 88% on room air  · Suspect multifactorial in the setting of possible RLL pneumonia and right pleural effusion  · OOB as tolerated  · Maintain O2 > 90%  · Respiratory protocol  · Wean supplemental oxygen

## 2021-02-12 NOTE — PROGRESS NOTES
Vancomycin IV Pharmacy-to-Dose Consultation    Caty Cornejo is a 79 y o  female who is currently receiving Vancomycin IV with management by the Pharmacy Consult service  Assessment/Plan:  The patient was reviewed  Renal function is stable and no signs or symptoms of nephrotoxicity and/or infusion reactions were documented in the chart  Based on todays assessment (level =13 1) give 15mg/kg dose now, and recheck level in 24 hours at approximately 1030 on 2/13/2021  Re-dose for level <20  We will continue to follow the patients culture results and clinical progress daily      Maren Topete, Pharmacist

## 2021-02-13 PROBLEM — E46 UNSPECIFIED PROTEIN-CALORIE MALNUTRITION (HCC): Chronic | Status: RESOLVED | Noted: 2018-12-24 | Resolved: 2021-02-13

## 2021-02-13 LAB
ANION GAP SERPL CALCULATED.3IONS-SCNC: 7 MMOL/L (ref 4–13)
BUN SERPL-MCNC: 25 MG/DL (ref 5–25)
CALCIUM SERPL-MCNC: 8.2 MG/DL (ref 8.3–10.1)
CHLORIDE SERPL-SCNC: 99 MMOL/L (ref 100–108)
CO2 SERPL-SCNC: 28 MMOL/L (ref 21–32)
CREAT SERPL-MCNC: 1.26 MG/DL (ref 0.6–1.3)
ERYTHROCYTE [DISTWIDTH] IN BLOOD BY AUTOMATED COUNT: 16.4 % (ref 11.6–15.1)
GFR SERPL CREATININE-BSD FRML MDRD: 43 ML/MIN/1.73SQ M
GLUCOSE SERPL-MCNC: 114 MG/DL (ref 65–140)
GLUCOSE SERPL-MCNC: 128 MG/DL (ref 65–140)
GLUCOSE SERPL-MCNC: 172 MG/DL (ref 65–140)
GLUCOSE SERPL-MCNC: 82 MG/DL (ref 65–140)
GLUCOSE SERPL-MCNC: 91 MG/DL (ref 65–140)
HCT VFR BLD AUTO: 27.9 % (ref 34.8–46.1)
HGB BLD-MCNC: 8.2 G/DL (ref 11.5–15.4)
MCH RBC QN AUTO: 27.5 PG (ref 26.8–34.3)
MCHC RBC AUTO-ENTMCNC: 29.4 G/DL (ref 31.4–37.4)
MCV RBC AUTO: 94 FL (ref 82–98)
PLATELET # BLD AUTO: 286 THOUSANDS/UL (ref 149–390)
PMV BLD AUTO: 9.2 FL (ref 8.9–12.7)
POTASSIUM SERPL-SCNC: 3.5 MMOL/L (ref 3.5–5.3)
RBC # BLD AUTO: 2.98 MILLION/UL (ref 3.81–5.12)
SODIUM SERPL-SCNC: 134 MMOL/L (ref 136–145)
VANCOMYCIN SERPL-MCNC: 12.4 UG/ML
WBC # BLD AUTO: 8.99 THOUSAND/UL (ref 4.31–10.16)

## 2021-02-13 PROCEDURE — 80048 BASIC METABOLIC PNL TOTAL CA: CPT | Performed by: INTERNAL MEDICINE

## 2021-02-13 PROCEDURE — 80202 ASSAY OF VANCOMYCIN: CPT | Performed by: INTERNAL MEDICINE

## 2021-02-13 PROCEDURE — 85027 COMPLETE CBC AUTOMATED: CPT | Performed by: INTERNAL MEDICINE

## 2021-02-13 PROCEDURE — 99232 SBSQ HOSP IP/OBS MODERATE 35: CPT | Performed by: INTERNAL MEDICINE

## 2021-02-13 PROCEDURE — 82948 REAGENT STRIP/BLOOD GLUCOSE: CPT

## 2021-02-13 RX ORDER — VANCOMYCIN HYDROCHLORIDE 500 MG/100ML
15 INJECTION, SOLUTION INTRAVENOUS ONCE
Status: COMPLETED | OUTPATIENT
Start: 2021-02-13 | End: 2021-02-13

## 2021-02-13 RX ADMIN — ACETAMINOPHEN 975 MG: 325 TABLET, FILM COATED ORAL at 21:32

## 2021-02-13 RX ADMIN — LEVOTHYROXINE SODIUM 25 MCG: 25 TABLET ORAL at 05:04

## 2021-02-13 RX ADMIN — INSULIN LISPRO 1 UNITS: 100 INJECTION, SOLUTION INTRAVENOUS; SUBCUTANEOUS at 21:33

## 2021-02-13 RX ADMIN — PANTOPRAZOLE SODIUM 40 MG: 40 TABLET, DELAYED RELEASE ORAL at 05:04

## 2021-02-13 RX ADMIN — HEPARIN SODIUM 5000 UNITS: 5000 INJECTION INTRAVENOUS; SUBCUTANEOUS at 05:04

## 2021-02-13 RX ADMIN — GABAPENTIN 300 MG: 300 CAPSULE ORAL at 08:26

## 2021-02-13 RX ADMIN — OXYCODONE HYDROCHLORIDE 10 MG: 5 SOLUTION ORAL at 09:28

## 2021-02-13 RX ADMIN — HEPARIN SODIUM 5000 UNITS: 5000 INJECTION INTRAVENOUS; SUBCUTANEOUS at 13:45

## 2021-02-13 RX ADMIN — HEPARIN SODIUM 5000 UNITS: 5000 INJECTION INTRAVENOUS; SUBCUTANEOUS at 21:32

## 2021-02-13 RX ADMIN — CEFEPIME HYDROCHLORIDE 2000 MG: 2 INJECTION, POWDER, FOR SOLUTION INTRAVENOUS at 17:12

## 2021-02-13 RX ADMIN — OXYCODONE HYDROCHLORIDE 10 MG: 5 SOLUTION ORAL at 13:45

## 2021-02-13 RX ADMIN — ACETAMINOPHEN 975 MG: 325 TABLET, FILM COATED ORAL at 05:04

## 2021-02-13 RX ADMIN — VANCOMYCIN HYDROCHLORIDE 500 MG: 500 INJECTION, SOLUTION INTRAVENOUS at 13:45

## 2021-02-13 RX ADMIN — ACETAMINOPHEN 975 MG: 325 TABLET, FILM COATED ORAL at 13:45

## 2021-02-13 RX ADMIN — LORAZEPAM 2 MG: 1 TABLET ORAL at 21:32

## 2021-02-13 NOTE — PROGRESS NOTES
Vancomycin IV Pharmacy-to-Dose Consultation    Marcial Bryant is a 79 y o  female who is currently receiving Vancomycin IV with management by the Pharmacy Consult service  Assessment/Plan:  The patient was reviewed  Renal function is stable and no signs or symptoms of nephrotoxicity and/or infusion reactions were documented in the chart  Based on todays assessment, random level resulted as 12 4  will give a dose of 500 mg IV for level <20 with a plan for a random level on 02/14 at 1300 , consider changing to schedule dosing if keeps to improve     We will continue to follow the patients culture results and clinical progress daily      Abran Perez, Pharmacist

## 2021-02-13 NOTE — CASE MANAGEMENT
CM communicated via [facility] with Hospice liaisonAmber to discuss patients choice for home hospice  Per Silvia Turk, [facility] cannot see pt at home until Tuesday  Silvia Turk is ordering DME  CM will confirm delivery of DME with hospice and arrange transportation home  Pt's sister, Marissa Wiseman and her son, Ben Ortiz will provide 24 hour care at home per CM discussion with pt's physician and hospice liaison  CM will continue to follow and assist with DC needs

## 2021-02-13 NOTE — ASSESSMENT & PLAN NOTE
· Stage IV breast cancer with metastasis to bone  · Patient known to Dr Santi Jimenez as an outpatient  · Patient currently on Afinitor and Aromasin, last treatment mid January 2021  · Continue outpatient follow up    · Changed pain control regimen to liquid for easier administration  · Oncology consulted  · Palliative care consulted- patient has decided to go home with hospice

## 2021-02-13 NOTE — PROGRESS NOTES
Progress Note - Lisa Lin 1950, 79 y o  female MRN: [id number]    Unit/Bed#: S -01 Encounter: [id number]    Primary Care Provider: Shila Alvares DO   Date and time admitted to hospital: 2/10/2021  3:11 PM        Failure to thrive in adult  Assessment & Plan  As above    Electrolyte disturbance  Assessment & Plan  With hyponatremia, hypokalemia and hypomagnesemia   Monitor and replete as necessary  Likely due to poor PO intake    Severe protein-calorie malnutrition (HCC)  Assessment & Plan  Malnutrition Findings:   Adult Malnutrition type: Chronic illness  Adult Degree of Malnutrition: Other severe protein calorie malnutrition(related to catabolic illness, inadequate energy intake)    BMI Findings:  Adult BMI Classifications: Underweight < 18 5     Body mass index is 15 11 kg/m²  Continue with supp    Acute respiratory failure with hypoxia (HCC)  Assessment & Plan  · Upon arrival, oxygen saturation of 88% on room air  · Suspect multifactorial in the setting of possible RLL pneumonia and right pleural effusion  · OOB as tolerated  · Maintain O2 > 90%  · Respiratory protocol  · Wean supplemental oxygen  CHANTE (acute kidney injury) ([facility] [address] )  Assessment & Plan  Creatinine upon admission: 1 37  Baseline: 0 6-0 8  Secondary to dehydration  Treatment:  IV hydration  Monitor BMP  Resolved     Breast cancer metastasized to bone, right ([facility] [address] )  Assessment & Plan  · Stage IV breast cancer with metastasis to bone  · Patient known to Dr Deisy Mojica as an outpatient  · Patient currently on Afinitor and Aromasin, last treatment mid January 2021  · Continue outpatient follow up    · Changed pain control regimen to liquid for easier administration  · Oncology consulted  · Palliative care consulted- patient has decided to go home with hospice    Type 2 diabetes mellitus without complication, without long-term current use of insulin [facility])  Assessment & Plan  Lab Results   Component Value Date    HGBA1C 5 6 11/20/2020       Recent Labs     02/12/21  2113 02/13/21  0745 02/13/21  1111 02/13/21  1558   POCGLU 134 114 128 91       Blood Sugar Average: Last 72 hrs:  · (P) 106 Hold Home regimen of metformin b i d   · Accu-Cheks and sliding scale insulin  · Hypoglycemia protocol  Anemia  Assessment & Plan  · Chronic due to malignancy  · Present on admission, hemoglobin of 9 4  · Baseline appears to be between 9-10  · Continue ferrous sulfate daily  * Dyspnea  Assessment & Plan   Presentation:  Patient presents with complaints of occasional shortness of breath, generalized weakness, and chills   COVID/Influenza/RSV: Negative   Chest x-ray: "Persistent moderate right effusion with increased groundglass opacity in the right base which could be due to atelectasis, scar, and/or lymphangitic spread of tumor  Redemonstration of blastic bone metastases "   Continue IV cefepime and IV vancomycin for now pending procalcitonin levels   Follow up on culture results   Given IV lasix with relief    Wean O2 as tolerated    Unspecified protein-calorie malnutrition (HCC)-resolved as of 2/13/2021  Assessment & Plan  Malnutrition Findings:   Adult Malnutrition type: Chronic illnessmuscle wasting, temporal pad loss  Adult Degree of Malnutrition: Other severe protein calorie malnutrition(related to catabolic illness, inadequate energy intake)  BMI Findings:  Adult BMI Classifications: Underweight < 18 5   Body mass index is 15 11 kg/m²  · Nutrition consult  · Appreciate Nutrition recommendations  VTE Pharmacologic Prophylaxis:   Pharmacologic: Heparin  Mechanical VTE Prophylaxis in Place: Yes    Patient Centered Rounds: I have performed bedside rounds with nursing staff today  Discussions with Specialists or Other Care Team Provider: CM    Education and Discussions with Family / Patient:  Discussed plans with patient and patient's sister    Time Spent for Care: 30 minutes    More than 50% of total time spent on counseling and coordination of care as described above  Current Length of Stay: 3 day(s)    Current Patient Status: Inpatient   Certification Statement: The patient will continue to require additional inpatient hospital stay due to Being hospice placement    Discharge Plan:  Home    Code Status: Level 3 - DNAR and DNI      Subjective:   Patient remains very lethargic this morning  Unable to eat much  Objective:     Vitals:   Temp (24hrs), Av 1 °F (36 7 °C), Min:97 6 °F (36 4 °C), Max:98 5 °F (36 9 °C)    Temp:  [97 6 °F (36 4 °C)-98 5 °F (36 9 °C)] 98 5 °F (36 9 °C)  HR:  [72-76] 76  Resp:  [14-16] 14  BP: (100-122)/(62-72) 122/62  SpO2:  [92 %-96 %] 92 %  Body mass index is 15 11 kg/m²  Input and Output Summary (last 24 hours):        Intake/Output Summary (Last 24 hours) at 2021 1707  Last data filed at 2021 0700  Gross per 24 hour   Intake 300 ml   Output 0 ml   Net 300 ml       Physical Exam:     Physical Exam    Additional Data:     Labs:    Results from last 7 days   Lab Units 21  0456 21  0832 02/10/21  1820   WBC Thousand/uL 8 99 12 14* 9 86   HEMOGLOBIN g/dL 8 2* 8 8* 9 4*   HEMATOCRIT % 27 9* 27 7* 29 8*   PLATELETS Thousands/uL 286 317 330   BANDS PCT %  --   --  16*   NEUTROS PCT %  --  91*  --    LYMPHS PCT %  --  4*  --    LYMPHO PCT %  --   --  6*   MONOS PCT %  --  4  --    MONO PCT %  --   --  2*   EOS PCT %  --  0 0     Results from last 7 days   Lab Units 21  0456 21  0832   SODIUM mmol/L 134* 134*   POTASSIUM mmol/L 3 5 3 6   CHLORIDE mmol/L 99* 98*   CO2 mmol/L 28 28   BUN mg/dL 25 20   CREATININE mg/dL 1 26 1 28   ANION GAP mmol/L 7 8   CALCIUM mg/dL 8 2* 7 6*   ALBUMIN g/dL  --  2 0*   TOTAL BILIRUBIN mg/dL  --  0 61   ALK PHOS U/L  --  197*   ALT U/L  --  59   AST U/L  --  70*   GLUCOSE RANDOM mg/dL 82 97     Results from last 7 days   Lab Units 02/10/21  1626   INR  1 15     Results from last 7 days   Lab Units 21  1558 21  1111 02/13/21  0745 02/12/21  2113 02/12/21  1656 02/12/21  1237 02/12/21  1148 02/12/21  1132 02/12/21  1108 02/12/21  0722 02/11/21  2036 02/11/21  1633   POC GLUCOSE mg/dl 91 128 114 134 175* 142* 68 67 65 80 114 96         Results from last 7 days   Lab Units 02/12/21  0551 02/11/21  0225 02/11/21  0034 02/10/21  2217 02/10/21  2200 02/10/21  2032   LACTIC ACID mmol/L  --  1 8 2 2* 2 6*  --  2 6*   PROCALCITONIN ng/ml 7 73*  --   --   --  5 26*  --            * I Have Reviewed All Lab Data Listed Above  * Additional Pertinent Lab Tests Reviewed: All Labs Within Last 24 Hours Reviewed    Imaging:    Imaging Reports Reviewed Today Include:  None  Imaging Personally Reviewed by Myself Includes:  None    Recent Cultures (last 7 days):     Results from last 7 days   Lab Units 02/10/21  1626   BLOOD CULTURE  No Growth at 48 hrs  No Growth at 48 hrs         Last 24 Hours Medication List:   Current Facility-Administered Medications   Medication Dose Route Frequency Provider Last Rate    acetaminophen  975 mg Oral [facility] Zechariah Garcia MD      barium sulfate  2 5 g Oral Once in imaging Reyna Lane MD      barium sulfate  50 mL Oral Once in imaging Reyna Lane MD      calcium carbonate  1 tablet Oral Daily With Breakfast CAITIE Benítez      cefepime  2,000 mg Intravenous Q24H CAITIE Benítez 2,000 mg (02/12/21 1804)    cholecalciferol  1,000 Units Oral Daily CAITIE Oconnor      dextrose 5 % and sodium chloride 0 45 %  50 mL/hr Intravenous Continuous Kerry Turner MD 50 mL/hr (02/12/21 1448)    docusate sodium  100 mg Oral BID CAITIE Benítez      ferrous sulfate  325 mg Oral Every Other Day CAITIE Benítez      gabapentin  300 mg Oral BID CAITIE Benítez      heparin (porcine)  5,000 Units Subcutaneous [facility] CAITIE Benítez      HYDROmorphone  0 5 mg Intravenous Q4H PRN Kerry Turner MD      insulin lispro  1-5 Units Subcutaneous TID [facility] CAITIE Pearson      insulin lispro  1-5 Units Subcutaneous HS CAITIE Carpenter      levothyroxine  25 mcg Oral Early Morning Mario Alberto Quiroga MD      LORazepam  2 mg Oral HS CAITIE Carpenter      multivitamin stress formula  1 tablet Oral BID CAITIE Carpenter      oxyCODONE  10 mg Oral Q4H PRN Mario Alberto Quiroga MD      oxyCODONE  5 mg Oral Q4H PRN Mario Alberto Quiroga MD      pantoprazole  40 mg Oral Early Morning CAITIE Carpenter      polyethylene glycol  17 g Oral Daily PRN Lanette Cardona MD      vancomycin  15 mg/kg Intravenous Daily PRN Mario Alberto Quiroga MD          Today, Patient Was Seen By: Mario Alberto Quiroga MD    ** Please Note: Dictation voice to text software may have been used in the creation of this document   **

## 2021-02-13 NOTE — PHYSICAL THERAPY NOTE
Physical Therapy Discontinue Note        Order received to discontinue PT  Please reconsult as appropriate      Anisa Dockery, PT

## 2021-02-13 NOTE — PROGRESS NOTES
Patient appears to be slightly anxious and is complaining of generalized pain  Tylenol given for pain and patient repositioned in bed  Vital signs stable  Oxygen saturation in 90s on 4L nasal cannula  No other complaints or signs of distress noted at this time  Will continue to monitor

## 2021-02-13 NOTE — HOSPICE NOTE
Liaison referral received  Pt has been approved for home Hospice  Start of care services can start Thursday 2 18 21  Will check will CM to see if family is ok with that date or if they need to seek another agency to see if start of care can be sooner  Called pts sister Nelly May twice on both numbers and waiting for a call back  Pt will need 24/7 when at home for a safe discharge   Liaison can order DME once plan is confirmed

## 2021-02-13 NOTE — ASSESSMENT & PLAN NOTE
Malnutrition Findings:   Adult Malnutrition type: Chronic illnessmuscle wasting, temporal pad loss  Adult Degree of Malnutrition: Other severe protein calorie malnutrition(related to catabolic illness, inadequate energy intake)  BMI Findings:  Adult BMI Classifications: Underweight < 18 5   Body mass index is 15 11 kg/m²  · Nutrition consult  · Appreciate Nutrition recommendations

## 2021-02-13 NOTE — PLAN OF CARE
Problem: Potential for Falls  Goal: Patient will remain free of falls  Description: INTERVENTIONS:  - Assess patient frequently for physical needs  -  Identify cognitive and physical deficits and behaviors that affect risk of falls    -  [place] fall precautions as indicated by assessment   - Educate patient/family on patient safety including physical limitations  - Instruct patient to call for assistance with activity based on assessment  - Modify environment to reduce risk of injury  - Consider OT/PT consult to assist with strengthening/mobility  Outcome: Progressing     Problem: PAIN - ADULT  Goal: Verbalizes/displays adequate comfort level or baseline comfort level  Description: Interventions:  - Encourage patient to monitor pain and request assistance  - Assess pain using appropriate pain scale  - Administer analgesics based on type and severity of pain and evaluate response  - Implement non-pharmacological measures as appropriate and evaluate response  - Consider cultural and social influences on pain and pain management  - Notify physician/advanced practitioner if interventions unsuccessful or patient reports new pain  Outcome: Progressing     Problem: INFECTION - ADULT  Goal: Absence or prevention of progression during hospitalization  Description: INTERVENTIONS:  - Assess and monitor for signs and symptoms of infection  - Monitor lab/diagnostic results  - Monitor all insertion sites, i e  indwelling lines, tubes, and drains  - Monitor endotracheal if appropriate and nasal secretions for changes in amount and color  - [place] appropriate cooling/warming therapies per order  - Administer medications as ordered  - Instruct and encourage patient and family to use good hand hygiene technique  - Identify and instruct in appropriate isolation precautions for identified infection/condition  Outcome: Progressing  Goal: Absence of fever/infection during neutropenic period  Description: INTERVENTIONS:  - Monitor WBC    Outcome: Progressing     Problem: SAFETY ADULT  Goal: Patient will remain free of falls  Description: INTERVENTIONS:  - Assess patient frequently for physical needs  -  Identify cognitive and physical deficits and behaviors that affect risk of falls    -  [place] fall precautions as indicated by assessment   - Educate patient/family on patient safety including physical limitations  - Instruct patient to call for assistance with activity based on assessment  - Modify environment to reduce risk of injury  - Consider OT/PT consult to assist with strengthening/mobility  Outcome: Progressing  Goal: Maintain or return to baseline ADL function  Description: INTERVENTIONS:  -  Assess patient's ability to carry out ADLs; assess patient's baseline for ADL function and identify physical deficits which impact ability to perform ADLs (bathing, care of mouth/teeth, toileting, grooming, dressing, etc )  - Assess/evaluate cause of self-care deficits   - Assess range of motion  - Assess patient's mobility; develop plan if impaired  - Assess patient's need for assistive devices and provide as appropriate  - Encourage maximum independence but intervene and supervise when necessary  - Involve family in performance of ADLs  - Assess for home care needs following discharge   - Consider OT consult to assist with ADL evaluation and planning for discharge  - Provide patient education as appropriate  Outcome: Progressing  Goal: Maintain or return mobility status to optimal level  Description: INTERVENTIONS:  - Assess patient's baseline mobility status (ambulation, transfers, stairs, etc )    - Identify cognitive and physical deficits and behaviors that affect mobility  - Identify mobility aids required to assist with transfers and/or ambulation (gait belt, sit-to-stand, lift, walker, cane, etc )  - [place] fall precautions as indicated by assessment  - Record patient progress and toleration of activity level on Mobility SBAR; progress patient to next Phase/Stage  - Instruct patient to call for assistance with activity based on assessment  - Consider rehabilitation consult to assist with strengthening/weightbearing, etc   Outcome: Progressing

## 2021-02-13 NOTE — ASSESSMENT & PLAN NOTE
Lab Results   Component Value Date    HGBA1C 5 6 11/20/2020       Recent Labs     02/12/21  2113 02/13/21  0745 02/13/21  1111 02/13/21  1558   POCGLU 134 114 128 91       Blood Sugar Average: Last 72 hrs:  · (P) 106 Hold Home regimen of metformin b i d   · Accu-Cheks and sliding scale insulin  · Hypoglycemia protocol

## 2021-02-13 NOTE — ASSESSMENT & PLAN NOTE
Creatinine upon admission: 1 37  Baseline: 0 6-0 8  Secondary to dehydration  Treatment:  IV hydration  Monitor BMP    Resolved

## 2021-02-14 PROBLEM — Z51.5 HOSPICE CARE: Status: ACTIVE | Noted: 2021-02-14

## 2021-02-14 LAB
GLUCOSE SERPL-MCNC: 102 MG/DL (ref 65–140)
GLUCOSE SERPL-MCNC: 133 MG/DL (ref 65–140)
GLUCOSE SERPL-MCNC: 137 MG/DL (ref 65–140)
GLUCOSE SERPL-MCNC: 80 MG/DL (ref 65–140)

## 2021-02-14 PROCEDURE — 82948 REAGENT STRIP/BLOOD GLUCOSE: CPT

## 2021-02-14 PROCEDURE — 99232 SBSQ HOSP IP/OBS MODERATE 35: CPT | Performed by: INTERNAL MEDICINE

## 2021-02-14 RX ORDER — OXYCODONE HCL 5 MG/5 ML
5 SOLUTION, ORAL ORAL EVERY 4 HOURS PRN
Qty: 100 ML | Refills: 0 | Status: SHIPPED | OUTPATIENT
Start: 2021-02-14

## 2021-02-14 RX ORDER — LORAZEPAM 2 MG/1
2 TABLET ORAL
Qty: 5 TABLET | Refills: 0 | Status: SHIPPED | OUTPATIENT
Start: 2021-02-14

## 2021-02-14 RX ADMIN — LORAZEPAM 2 MG: 1 TABLET ORAL at 22:09

## 2021-02-14 RX ADMIN — LEVOTHYROXINE SODIUM 25 MCG: 25 TABLET ORAL at 05:42

## 2021-02-14 RX ADMIN — GABAPENTIN 300 MG: 300 CAPSULE ORAL at 10:17

## 2021-02-14 RX ADMIN — PANTOPRAZOLE SODIUM 40 MG: 40 TABLET, DELAYED RELEASE ORAL at 05:42

## 2021-02-14 RX ADMIN — DEXTROSE AND SODIUM CHLORIDE 50 ML/HR: 5; .45 INJECTION, SOLUTION INTRAVENOUS at 10:38

## 2021-02-14 RX ADMIN — OXYCODONE HYDROCHLORIDE 10 MG: 5 SOLUTION ORAL at 10:24

## 2021-02-14 RX ADMIN — HEPARIN SODIUM 5000 UNITS: 5000 INJECTION INTRAVENOUS; SUBCUTANEOUS at 05:42

## 2021-02-14 RX ADMIN — HEPARIN SODIUM 5000 UNITS: 5000 INJECTION INTRAVENOUS; SUBCUTANEOUS at 22:10

## 2021-02-14 RX ADMIN — ACETAMINOPHEN 975 MG: 325 TABLET, FILM COATED ORAL at 22:10

## 2021-02-14 RX ADMIN — ACETAMINOPHEN 975 MG: 325 TABLET, FILM COATED ORAL at 05:42

## 2021-02-14 RX ADMIN — GABAPENTIN 300 MG: 300 CAPSULE ORAL at 17:34

## 2021-02-14 RX ADMIN — DOCUSATE SODIUM 100 MG: 100 CAPSULE, LIQUID FILLED ORAL at 17:34

## 2021-02-14 RX ADMIN — CEFEPIME HYDROCHLORIDE 2000 MG: 2 INJECTION, POWDER, FOR SOLUTION INTRAVENOUS at 17:38

## 2021-02-14 RX ADMIN — OXYCODONE HYDROCHLORIDE 10 MG: 5 SOLUTION ORAL at 17:33

## 2021-02-14 RX ADMIN — Medication 1000 UNITS: at 10:18

## 2021-02-14 NOTE — ASSESSMENT & PLAN NOTE
 Presentation:  Patient presents with complaints of occasional shortness of breath, generalized weakness, and chills   COVID/Influenza/RSV: Negative   Chest x-ray: "Persistent moderate right effusion with increased groundglass opacity in the right base which could be due to atelectasis, scar, and/or lymphangitic spread of tumor  Redemonstration of blastic bone metastases "   Continue IV cefepime   Follow up on culture results     Wean O2 as tolerated

## 2021-02-14 NOTE — ASSESSMENT & PLAN NOTE
· Stage IV breast cancer with metastasis to bone  · Patient known to Dr Orlena Litten as an outpatient  · Patient currently on Afinitor and Aromasin, last treatment mid January 2021  · Continue outpatient follow up    · Changed pain control regimen to liquid for easier administration  · Oncology consulted  · Palliative care consulted- patient has decided to go home with hospice

## 2021-02-14 NOTE — HOSPICE NOTE
Confirmed with patients sister that the DME did arrive and it has  Asked Dr Daryl Jewell to send scripts to pts pharmacy for Ativan and her oxycodone  Sister Betzaida Dedra knows to pick that up prior to pts discharge  Provided her our 24 hour on call number  She does request that the transport time home be after noon  CM aware

## 2021-02-14 NOTE — ASSESSMENT & PLAN NOTE
· Upon arrival, oxygen saturation of 88% on room air  · Suspect multifactorial in the setting of possible RLL pneumonia and right pleural effusion  · OOB as tolerated  · Maintain O2 > 90%  · Respiratory protocol  · Wean supplemental oxygen     · Resolved

## 2021-02-14 NOTE — PROGRESS NOTES
Progress Note - [employer] 1950, 79 y o  female MRN: [id number]    Unit/Bed#: S -01 Encounter: [id number]    Primary Care Provider: Kenia Dawson DO   Date and time admitted to hospital: 2/10/2021  3:11 PM        Hospice care  Assessment & Plan  Will continue Ativan and oxycodone liquid for anxiety and pain    Failure to thrive in adult  Assessment & Plan  As above    Electrolyte disturbance  Assessment & Plan  With hyponatremia, hypokalemia and hypomagnesemia   Monitor and replete as necessary  Likely due to poor PO intake    Severe protein-calorie malnutrition (HCC)  Assessment & Plan  Malnutrition Findings:   Adult Malnutrition type: Chronic illness  Adult Degree of Malnutrition: Other severe protein calorie malnutrition(related to catabolic illness, inadequate energy intake)    BMI Findings:  Adult BMI Classifications: Underweight < 18 5     Body mass index is 15 11 kg/m²  Continue with supp    Acute respiratory failure with hypoxia (HCC)  Assessment & Plan  · Upon arrival, oxygen saturation of 88% on room air  · Suspect multifactorial in the setting of possible RLL pneumonia and right pleural effusion  · OOB as tolerated  · Maintain O2 > 90%  · Respiratory protocol  · Wean supplemental oxygen  · Resolved     CHANTE (acute kidney injury) ([facility] [address] )  Assessment & Plan  Creatinine upon admission: 1 37  Baseline: 0 6-0 8  Secondary to dehydration  Treatment:  IV hydration  Monitor BMP  Resolved     Breast cancer metastasized to bone, right ([address] )  Assessment & Plan  · Stage IV breast cancer with metastasis to bone  · Patient known to Dr Trini Frank as an outpatient  · Patient currently on Afinitor and Aromasin, last treatment mid January 2021  · Continue outpatient follow up    · Changed pain control regimen to liquid for easier administration  · Oncology consulted  · Palliative care consulted- patient has decided to go home with hospice    Type 2 diabetes mellitus without complication, without long-term current use of insulin [facility])  Assessment & Plan  Lab Results   Component Value Date    HGBA1C 5 6 11/20/2020       Recent Labs     02/13/21  1558 02/13/21  2118 02/14/21  0732 02/14/21  1111   POCGLU 91 172* 80 137       Blood Sugar Average: Last 72 hrs:  · (P) 232 [id number] Hold Home regimen of metformin b i d   · Accu-Cheks and sliding scale insulin  · Hypoglycemia protocol  Anemia  Assessment & Plan  · Chronic due to malignancy  · Present on admission, hemoglobin of 9 4  · Baseline appears to be between 9-10  · Continue ferrous sulfate daily  * Dyspnea  Assessment & Plan   Presentation:  Patient presents with complaints of occasional shortness of breath, generalized weakness, and chills   COVID/Influenza/RSV: Negative   Chest x-ray: "Persistent moderate right effusion with increased groundglass opacity in the right base which could be due to atelectasis, scar, and/or lymphangitic spread of tumor  Redemonstration of blastic bone metastases "   Continue IV cefepime   Follow up on culture results   Wean O2 as tolerated      VTE Pharmacologic Prophylaxis:   Pharmacologic: Heparin  Mechanical VTE Prophylaxis in Place: Yes    Patient Centered Rounds: I have performed bedside rounds with nursing staff today  Discussions with Specialists or Other Care Team Provider: none     Education and Discussions with Family / Patient: discussed plan with patient and sister    Time Spent for Care: 30 minutes  More than 50% of total time spent on counseling and coordination of care as described above  Current Length of Stay: 4 day(s)    Current Patient Status: Inpatient   Certification Statement: The patient will continue to require additional inpatient hospital stay due to to go home on hospice tomorrow    Discharge Plan: home on hospice    Code Status: Level 3 - DNAR and DNI      Subjective:   Patient states she has mild pain and otherwise no complaints      Objective:     Vitals:   Temp (24hrs), Av 5 °F (36 9 °C), Min:98 2 °F (36 8 °C), Max:98 8 °F (37 1 °C)    Temp:  [98 2 °F (36 8 °C)-98 8 °F (37 1 °C)] 98 2 °F (36 8 °C)  HR:  [76-80] 78  Resp:  [14] 14  BP: ()/(58-68) 98/58  SpO2:  [92 %-97 %] 97 %  Body mass index is 15 11 kg/m²  Input and Output Summary (last 24 hours): Intake/Output Summary (Last 24 hours) at 2021 1421  Last data filed at 2021 1038  Gross per 24 hour   Intake 1768 ml   Output 900 ml   Net 868 ml       Physical Exam:     Physical Exam  Vitals signs and nursing note reviewed  Constitutional:       Appearance: She is ill-appearing  Comments: Frail, cachectic   HENT:      Head: Normocephalic and atraumatic  Right Ear: External ear normal       Left Ear: External ear normal       Nose: Nose normal       Mouth/Throat:      Mouth: Mucous membranes are moist       Pharynx: Oropharynx is clear  Eyes:      Conjunctiva/sclera: Conjunctivae normal       Pupils: Pupils are equal, round, and reactive to light  Neck:      Musculoskeletal: Neck supple  No muscular tenderness  Cardiovascular:      Rate and Rhythm: Normal rate and regular rhythm  Pulses: Normal pulses  Heart sounds: Normal heart sounds  Pulmonary:      Effort: Pulmonary effort is normal       Breath sounds: Normal breath sounds  Abdominal:      General: Abdomen is flat  Bowel sounds are normal       Palpations: Abdomen is soft  Musculoskeletal:         General: No swelling or tenderness  Skin:     General: Skin is warm and dry  Capillary Refill: Capillary refill takes less than 2 seconds  Neurological:      General: No focal deficit present  Mental Status: She is alert and oriented to person, place, and time  Mental status is at baseline  Psychiatric:         Behavior: Behavior normal          Thought Content:  Thought content normal          Judgment: Judgment normal       Comments: Depressed         Additional Data:     Labs:    Results from last 7 days   Lab Units 02/13/21  0456 02/11/21  0832 02/10/21  1820   WBC Thousand/uL 8 99 12 14* 9 86   HEMOGLOBIN g/dL 8 2* 8 8* 9 4*   HEMATOCRIT % 27 9* 27 7* 29 8*   PLATELETS Thousands/uL 286 317 330   BANDS PCT %  --   --  16*   NEUTROS PCT %  --  91*  --    LYMPHS PCT %  --  4*  --    LYMPHO PCT %  --   --  6*   MONOS PCT %  --  4  --    MONO PCT %  --   --  2*   EOS PCT %  --  0 0     Results from last 7 days   Lab Units 02/13/21  0456 02/11/21  0832   SODIUM mmol/L 134* 134*   POTASSIUM mmol/L 3 5 3 6   CHLORIDE mmol/L 99* 98*   CO2 mmol/L 28 28   BUN mg/dL 25 20   CREATININE mg/dL 1 26 1 28   ANION GAP mmol/L 7 8   CALCIUM mg/dL 8 2* 7 6*   ALBUMIN g/dL  --  2 0*   TOTAL BILIRUBIN mg/dL  --  0 61   ALK PHOS U/L  --  197*   ALT U/L  --  59   AST U/L  --  70*   GLUCOSE RANDOM mg/dL 82 97     Results from last 7 days   Lab Units 02/10/21  1626   INR  1 15     Results from last 7 days   Lab Units 02/14/21  1111 02/14/21  0732 02/13/21  2118 02/13/21  1558 02/13/21  1111 02/13/21  0745 02/12/21  2113 02/12/21  1656 02/12/21  1237 02/12/21  1148 02/12/21  1132 02/12/21  1108   POC GLUCOSE mg/dl 137 80 172* 91 128 114 134 175* 142* 68 67 65         Results from last 7 days   Lab Units 02/12/21  0551 02/11/21  0225 02/11/21  0034 02/10/21  2217 02/10/21  2200 02/10/21  2032   LACTIC ACID mmol/L  --  1 8 2 2* 2 6*  --  2 6*   PROCALCITONIN ng/ml 7 73*  --   --   --  5 26*  --            * I Have Reviewed All Lab Data Listed Above  * Additional Pertinent Lab Tests Reviewed: No New Labs Available For Today    Imaging:    Imaging Reports Reviewed Today Include:  None  Imaging Personally Reviewed by Myself Includes:  None    Recent Cultures (last 7 days):     Results from last 7 days   Lab Units 02/10/21  1626   BLOOD CULTURE  No Growth at 72 hrs  No Growth at 72 hrs         Last 24 Hours Medication List:   Current Facility-Administered Medications   Medication Dose Route Frequency Provider Last Rate    acetaminophen 975 mg Oral [facility] Quintin Collet, MD      barium sulfate  2 5 g Oral Once in imaging Elder Marrero MD      barium sulfate  50 mL Oral Once in imaging Elder Marrero MD      cefepime  2,000 mg Intravenous Q24H Britney MELISSA KirbyNP 2,000 mg (02/13/21 1712)    dextrose 5 % and sodium chloride 0 45 %  50 mL/hr Intravenous Continuous Philipp Dubois MD 50 mL/hr (02/14/21 1038)    docusate sodium  100 mg Oral BID Britney CAITIE Kirby      gabapentin  300 mg Oral BID Britney CAITIE Kirby      heparin (porcine)  5,000 Units Subcutaneous [facility] Britney CAITIE Kirby      HYDROmorphone  0 5 mg Intravenous Q4H PRN Philipp Dubois MD      insulin lispro  1-5 Units Subcutaneous TID AC Sophie Michel, CAITIE      insulin lispro  1-5 Units Subcutaneous HS Britney Mirta, CAITIE      levothyroxine  25 mcg Oral Early Morning Philipp Dubois MD      LORazepam  2 mg Oral HS Britney CAITIE Kirby      oxyCODONE  10 mg Oral Q4H PRN Philipp Dubois MD      oxyCODONE  5 mg Oral Q4H PRN Philipp Dubois MD      pantoprazole  40 mg Oral Early Morning Britney CAITIE Kirby      polyethylene glycol  17 g Oral Daily PRN Quintin Collet, MD          Today, Patient Was Seen By: Philipp Dubois MD    ** Please Note: Dictation voice to text software may have been used in the creation of this document   **

## 2021-02-14 NOTE — CASE MANAGEMENT
CM confirmed DME delivery with Jese Lopez , Hospice liaison  RX have been ordered and sent to pt's pharmacy  DC plan remains for tomorrow with transport time requested by family for after 12 Noon  CM will arrange BLS transport tomorrow

## 2021-02-14 NOTE — PROGRESS NOTES
Pt requested to be bathed this afternoon  She was bathed this morning but she doesn't recall it  I went in three times to help her wash up and each time she wanted to continue to sleep   RN aware

## 2021-02-14 NOTE — ASSESSMENT & PLAN NOTE
Lab Results   Component Value Date    HGBA1C 5 6 11/20/2020       Recent Labs     02/13/21  1558 02/13/21  2118 02/14/21  0732 02/14/21  1111   POCGLU 91 172* 80 137       Blood Sugar Average: Last 72 hrs:  · (P) 970 [id number] Hold Home regimen of metformin b i d   · Accu-Cheks and sliding scale insulin  · Hypoglycemia protocol

## 2021-02-15 VITALS
WEIGHT: 85.32 LBS | RESPIRATION RATE: 18 BRPM | SYSTOLIC BLOOD PRESSURE: 119 MMHG | DIASTOLIC BLOOD PRESSURE: 75 MMHG | BODY MASS INDEX: 15.11 KG/M2 | OXYGEN SATURATION: 97 % | TEMPERATURE: 97.8 F | HEART RATE: 83 BPM

## 2021-02-15 PROBLEM — R79.89 ELEVATED LACTIC ACID LEVEL: Status: RESOLVED | Noted: 2021-02-10 | Resolved: 2021-02-15

## 2021-02-15 PROBLEM — E83.42 HYPOMAGNESEMIA: Status: RESOLVED | Noted: 2018-12-23 | Resolved: 2021-02-15

## 2021-02-15 PROBLEM — R06.00 DYSPNEA: Status: RESOLVED | Noted: 2021-02-10 | Resolved: 2021-02-15

## 2021-02-15 PROBLEM — E87.6 HYPOKALEMIA: Status: RESOLVED | Noted: 2018-12-23 | Resolved: 2021-02-15

## 2021-02-15 PROBLEM — J96.01 ACUTE RESPIRATORY FAILURE WITH HYPOXIA (HCC): Status: RESOLVED | Noted: 2021-02-10 | Resolved: 2021-02-15

## 2021-02-15 PROBLEM — N17.9 AKI (ACUTE KIDNEY INJURY) (HCC): Status: RESOLVED | Noted: 2021-02-10 | Resolved: 2021-02-15

## 2021-02-15 PROBLEM — E87.8 ELECTROLYTE DISTURBANCE: Status: RESOLVED | Noted: 2021-02-12 | Resolved: 2021-02-15

## 2021-02-15 LAB
BACTERIA BLD CULT: NORMAL
BACTERIA BLD CULT: NORMAL
GLUCOSE SERPL-MCNC: 71 MG/DL (ref 65–140)
GLUCOSE SERPL-MCNC: 78 MG/DL (ref 65–140)

## 2021-02-15 PROCEDURE — 99239 HOSP IP/OBS DSCHRG MGMT >30: CPT | Performed by: INTERNAL MEDICINE

## 2021-02-15 PROCEDURE — 82948 REAGENT STRIP/BLOOD GLUCOSE: CPT

## 2021-02-15 RX ADMIN — DEXTROSE AND SODIUM CHLORIDE 50 ML/HR: 5; .45 INJECTION, SOLUTION INTRAVENOUS at 08:55

## 2021-02-15 RX ADMIN — LEVOTHYROXINE SODIUM 25 MCG: 25 TABLET ORAL at 05:42

## 2021-02-15 RX ADMIN — HEPARIN SODIUM 5000 UNITS: 5000 INJECTION INTRAVENOUS; SUBCUTANEOUS at 05:42

## 2021-02-15 RX ADMIN — OXYCODONE HYDROCHLORIDE 5 MG: 5 SOLUTION ORAL at 14:01

## 2021-02-15 RX ADMIN — PANTOPRAZOLE SODIUM 40 MG: 40 TABLET, DELAYED RELEASE ORAL at 05:42

## 2021-02-15 RX ADMIN — HEPARIN SODIUM 5000 UNITS: 5000 INJECTION INTRAVENOUS; SUBCUTANEOUS at 14:01

## 2021-02-15 RX ADMIN — ACETAMINOPHEN 975 MG: 325 TABLET, FILM COATED ORAL at 05:42

## 2021-02-15 NOTE — TRANSPORTATION MEDICAL NECESSITY
Section I - General Information    Name of Patient: Yany Miller                 : 1950    Medicare #: [id number]  Transport Date: 02/15/21 (PCS is valid for round trips on this date and for all repetitive trips in the 60-day range as noted below )  Origin: [address] [place] [address] Road: Patient's home: [address]  Is the pt's stay covered under Medicare Part A (PPS/DRG)   [x]     Closest appropriate facility? If no, why is transport to more distant facility required? Yes  If hospice pt, is this transport related to pt's terminal illness? No       Section II - Medical Necessity Questionnaire  Ambulance transportation is medically necessary only if other means of transport are contraindicated or would be potentially harmful to the patient  To meet this requirement, the patient must either be "bed confined" or suffer from a condition such that transport by means other than ambulance is contraindicated by the patient's condition  The following questions must be answered by the medical professional signing below for this form to be valid:    1)  Describe the MEDICAL CONDITION (physical and/or mental) of this patient AT [address] [place] [address] that requires the patient to be transported in an ambulance and why transport by other means is contraindicated by the patient's condition:  Patient will be going home to start Hospice Care  Assist X 2  Patient is [facility] High Fall Risk and has weakness related to dyspnea and failure to thrive related to stage IV breast cancer with bone mets  Pain with movement related to metastatic breast cancer with bone mets  2) Is the patient "bed confined" as defined below?      No  To be "be confined" the patient must satisfy all three of the following conditions: (1) unable to get up from bed without Assistance; AND (2) unable to ambulate; AND (3) unable to sit in a chair or wheelchair  3) Can this patient safely be transported by car or wheelchair van (i e , seated during transport without a medical attendant or monitoring)? No    4) In addition to completing questions 1-3 above, please check any of the following conditions that apply*:   *Note: supporting documentation for any boxes checked must be maintained in the patient's medical records  If hosp-hosp transfer, describe services needed at 2nd facility not available at 1st facility? Moderate/severe pain on movement   Medical attendant required   Unable to tolerate seated position for time needed to transport   Other(specify) Patient will be going home to start Hospice Care  Assist X 2  Patient is [facility] High Fall Risk and has weakness related to dyspnea and failure to thrive related to stage IV breast cancer with bone mets  Pain with movement related to metastatic breast cancer with bone mets  Section III - Signature of Physician or Healthcare Professional  I certify that the above information is true and correct based on my evaluation of this patient, and represent that the patient requires transport by ambulance and that other forms of transport are contraindicated  I understand that this information will be used by the Centers for Medicare and Medicaid Services (CMS) to support the determination of medical necessity for ambulance services, and I represent that I have personal knowledge of the patient's condition at time of transport  []  If this box is checked, I also certify that the patient is physically or mentally incapable of signing the ambulance service's claim and that the institution with which I am affiliated has furnished care, services, or assistance to the patient  My signature below is made on behalf of the patient pursuant to 42 CFR §424 36(b)(4)   In accordance with 42 CFR §424 37, the specific reason(s) that the patient is physically or mentally incapable of signing the claim form is as follows: NA      Signature of Physician* or Healthcare Professional______________________________________________________________  Signature Date 02/15/21 (For scheduled repetitive transports, this form is not valid for transports performed more than 60 days after this date)    Printed Name & Credentials of Physician or Healthcare Professional (MD, DO, RN, etc ) Ella Mc  *Form must be signed by patient's attending physician for scheduled, repetitive transports   For non-repetitive, unscheduled ambulance transports, if unable to obtain the signature of the attending physician, any of the following may sign (choose appropriate option below)  [] Physician Assistant []  Clinical Nurse Specialist []  Registered Nurse  []  Nurse Practitioner  [x] Discharge Planner

## 2021-02-15 NOTE — PLAN OF CARE
Problem: Potential for Falls  Goal: Patient will remain free of falls  Description: INTERVENTIONS:  - Assess patient frequently for physical needs  -  Identify cognitive and physical deficits and behaviors that affect risk of falls    -  [place] fall precautions as indicated by assessment   - Educate patient/family on patient safety including physical limitations  - Instruct patient to call for assistance with activity based on assessment  - Modify environment to reduce risk of injury  - Consider OT/PT consult to assist with strengthening/mobility  Outcome: Progressing     Problem: PAIN - ADULT  Goal: Verbalizes/displays adequate comfort level or baseline comfort level  Description: Interventions:  - Encourage patient to monitor pain and request assistance  - Assess pain using appropriate pain scale  - Administer analgesics based on type and severity of pain and evaluate response  - Implement non-pharmacological measures as appropriate and evaluate response  - Consider cultural and social influences on pain and pain management  - Notify physician/advanced practitioner if interventions unsuccessful or patient reports new pain  Outcome: Progressing     Problem: INFECTION - ADULT  Goal: Absence or prevention of progression during hospitalization  Description: INTERVENTIONS:  - Assess and monitor for signs and symptoms of infection  - Monitor lab/diagnostic results  - Monitor all insertion sites, i e  indwelling lines, tubes, and drains  - Monitor endotracheal if appropriate and nasal secretions for changes in amount and color  - [place] appropriate cooling/warming therapies per order  - Administer medications as ordered  - Instruct and encourage patient and family to use good hand hygiene technique  - Identify and instruct in appropriate isolation precautions for identified infection/condition  Outcome: Progressing  Goal: Absence of fever/infection during neutropenic period  Description: INTERVENTIONS:  - Monitor WBC    Outcome: Progressing     Problem: SAFETY ADULT  Goal: Patient will remain free of falls  Description: INTERVENTIONS:  - Assess patient frequently for physical needs  -  Identify cognitive and physical deficits and behaviors that affect risk of falls    -  [place] fall precautions as indicated by assessment   - Educate patient/family on patient safety including physical limitations  - Instruct patient to call for assistance with activity based on assessment  - Modify environment to reduce risk of injury  - Consider OT/PT consult to assist with strengthening/mobility  Outcome: Progressing  Goal: Maintain or return to baseline ADL function  Description: INTERVENTIONS:  -  Assess patient's ability to carry out ADLs; assess patient's baseline for ADL function and identify physical deficits which impact ability to perform ADLs (bathing, care of mouth/teeth, toileting, grooming, dressing, etc )  - Assess/evaluate cause of self-care deficits   - Assess range of motion  - Assess patient's mobility; develop plan if impaired  - Assess patient's need for assistive devices and provide as appropriate  - Encourage maximum independence but intervene and supervise when necessary  - Involve family in performance of ADLs  - Assess for home care needs following discharge   - Consider OT consult to assist with ADL evaluation and planning for discharge  - Provide patient education as appropriate  Outcome: Progressing  Goal: Maintain or return mobility status to optimal level  Description: INTERVENTIONS:  - Assess patient's baseline mobility status (ambulation, transfers, stairs, etc )    - Identify cognitive and physical deficits and behaviors that affect mobility  - Identify mobility aids required to assist with transfers and/or ambulation (gait belt, sit-to-stand, lift, walker, cane, etc )  - [place] fall precautions as indicated by assessment  - Record patient progress and toleration of activity level on Mobility SBAR; progress patient to next Phase/Stage  - Instruct patient to call for assistance with activity based on assessment  - Consider rehabilitation consult to assist with strengthening/weightbearing, etc   Outcome: Progressing     Problem: DISCHARGE PLANNING  Goal: Discharge to home or other facility with appropriate resources  Description: INTERVENTIONS:  - Identify barriers to discharge w/patient and caregiver  - Arrange for needed discharge resources and transportation as appropriate  - Identify discharge learning needs (meds, wound care, etc )  - Arrange for interpretive services to assist at discharge as needed  - Refer to Case Management Department for coordinating discharge planning if the patient needs post-hospital services based on physician/advanced practitioner order or complex needs related to functional status, cognitive ability, or social support system  Outcome: Progressing     Problem: Knowledge Deficit  Goal: Patient/family/caregiver demonstrates understanding of disease process, treatment plan, medications, and discharge instructions  Description: Complete learning assessment and assess knowledge base    Interventions:  - Provide teaching at level of understanding  - Provide teaching via preferred learning methods  Outcome: Progressing     Problem: Prexisting or High Potential for Compromised Skin Integrity  Goal: Skin integrity is maintained or improved  Description: INTERVENTIONS:  - Identify patients at risk for skin breakdown  - Assess and monitor skin integrity  - Assess and monitor nutrition and hydration status  - Monitor labs   - Assess for incontinence   - Turn and reposition patient  - Assist with mobility/ambulation  - Relieve pressure over bony prominences  - Avoid friction and shearing  - Provide appropriate hygiene as needed including keeping skin clean and dry  - Evaluate need for skin moisturizer/barrier cream  - Collaborate with interdisciplinary team   - Patient/family teaching  - Consider wound care consult   Outcome: Progressing     Problem: Nutrition/Hydration-ADULT  Goal: Nutrient/Hydration intake appropriate for improving, restoring or maintaining nutritional needs  Description: Monitor and assess patient's nutrition/hydration status for malnutrition  Collaborate with interdisciplinary team and initiate plan and interventions as ordered  Monitor patient's weight and dietary intake as ordered or per policy  Utilize nutrition screening tool and intervene as necessary  Determine patient's food preferences and provide high-protein, high-caloric foods as appropriate       INTERVENTIONS:  - Monitor oral intake, urinary output, labs, and treatment plans  - Assess nutrition and hydration status and recommend course of action  - Evaluate amount of meals eaten  - Assist patient with eating if necessary   - Allow adequate time for meals  - Recommend/ encourage appropriate diets, oral nutritional supplements, and vitamin/mineral supplements  - Order, calculate, and assess calorie counts as needed  - Recommend, monitor, and adjust tube feedings and TPN/PPN based on assessed needs  - Assess need for intravenous fluids  - Provide specific nutrition/hydration education as appropriate  - Include patient/family/caregiver in decisions related to nutrition  Outcome: Progressing

## 2021-02-15 NOTE — NURSING NOTE
IV removed  AVS reviewed with patient  Patient discharged with transport team in stable condition   Sravan West RN

## 2021-02-15 NOTE — HOSPICE NOTE
Pts sister called me today asking for the discharge time  I let CM to please call sister with transport time home  She would also like a list of care giving agencies sent home with her  CM aware to sent list home  Pt will be opened to hospice tomorrow   Sister aware as well

## 2021-02-15 NOTE — CASE MANAGEMENT
CM was notified by [facility] that patient is medically cleared for discharge to home with  home hospice  CM verified with hospice liaison Craig Schuler that equipment is delivered and patient is accepted with  Hospice  Patient requires transportation  CM spoke with sister Craig Schuler at [phone] to verify patient's address and 3 steps to enter; family is going to be at the patient's home after 12 noon  CM sent BLS transportation request to [facility] via [employer]  Med necessity completed, uploaded to [employer], and a copy placed in the black bin  [facility] DNR form in label book  Awaiting transportation time from [facility]

## 2021-02-15 NOTE — DISCHARGE SUMMARY
Discharge Summary - [facility]    Patient Information: Jonathan Kitchen 79 y o  female MRN: [id number]  Unit/Bed#: S -01 Encounter: [id number]    Discharging Physician / Practitioner: Jonathan Duarte MD  PCP: Silvia Sellers DO  Admission Date: 2/10/2021  Discharge Date: 02/15/21    Reason for Admission: SOB    Discharge Diagnoses:     Principal Problem (Resolved):    Dyspnea  Active Problems:    Elevated alkaline phosphatase level    Anemia    Type 2 diabetes mellitus without complication, without long-term current use of insulin (HCC)    Breast cancer metastasized to bone, right (HCC)    Pleural effusion on right    Severe protein-calorie malnutrition ([address] )    Failure to thrive in adult    Hospice care  Resolved Problems:    Hypokalemia    Hypomagnesemia    Unspecified protein-calorie malnutrition (HCC)    CHANTE (acute kidney injury) ([address] )    Elevated lactic acid level    Acute respiratory failure with hypoxia ([address] )    Electrolyte disturbance      Consultations During Hospital Stay:  · Palliative care  · Hospice     Procedures Performed:   · None    Significant Findings:   · Dyspnea secondary to R pleural effusion  · CHANTE  · Acute hypoxic respiratory failure  · Severe protein calorie malnutrition    Incidental Findings:   · None     Test Results Pending at Discharge (will require follow up): · None     Outpatient Tests Requested:  · None    Complications:  None    Hospital Course:     Jonathan Kitchen is a 79 y o  female patient who originally presented to the hospital on 2/10/2021 due to shortness of breath  Patient was found to have a large right pleural effusion  She was treated with IV diuretics which improved her breathing  She was also found to have a pneumonia which she was treated with IV cefepime  She also presented with an CHANTE which improved with IV fluids  Patient has not had much p o  Intake due to difficulty with swallowing    Speech therapy assessed along with modified barium swallow which showed continue dysphagia  Goals of care discussion was had with patient and she decided she would like to be put on hospice  Patient would like to go home on hospice to be with her family  Hospice liaison came and evaluated and DME was delivered to patient's home  Patient was discharged to family care on hospice  Condition at Discharge: stable     Discharge Day Visit / Exam:     Subjective:  Patient states she has right arm pain due to her lymphedema  Otherwise no complaints  Vitals: Blood Pressure: 126/68 (02/15/21 0749)  Pulse: 70 (02/15/21 0749)  Temperature: 97 9 °F (36 6 °C) (02/15/21 0749)  Temp Source: Oral (02/15/21 0749)  Respirations: 18 (02/15/21 0749)  Weight - Scale: 38 7 kg (85 lb 5 1 oz) (02/10/21 1518)  SpO2: 94 % (02/15/21 0749)  Exam:   Physical Exam  Vitals signs and nursing note reviewed  Constitutional:       Appearance: She is ill-appearing  Comments: Frail, cachectic   HENT:      Head: Normocephalic and atraumatic  Right Ear: External ear normal       Left Ear: External ear normal       Nose: Nose normal       Mouth/Throat:      Mouth: Mucous membranes are moist       Pharynx: Oropharynx is clear  Eyes:      Conjunctiva/sclera: Conjunctivae normal       Pupils: Pupils are equal, round, and reactive to light  Neck:      Musculoskeletal: Neck supple  No muscular tenderness  Cardiovascular:      Rate and Rhythm: Normal rate and regular rhythm  Pulses: Normal pulses  Heart sounds: Normal heart sounds  Pulmonary:      Effort: Pulmonary effort is normal       Breath sounds: Normal breath sounds  Abdominal:      General: Abdomen is flat  Bowel sounds are normal  There is no distension  Palpations: Abdomen is soft  Tenderness: There is no abdominal tenderness  Musculoskeletal:         General: No tenderness  Comments: Right upper extremity swelling   Skin:     General: Skin is warm and dry        Capillary Refill: Capillary refill takes less than 2 seconds  Findings: No erythema or rash  Neurological:      General: No focal deficit present  Mental Status: She is alert and oriented to person, place, and time  Mental status is at baseline  Psychiatric:         Behavior: Behavior normal          Thought Content: Thought content normal          Judgment: Judgment normal       Comments: Depressed, tearful         Discharge instructions/Information to patient and family:   See after visit summary for information provided to patient and family  Provisions for Follow-Up Care:  See after visit summary for information related to follow-up care and any pertinent home health orders  Disposition:     Home    For Discharges to [facility] SNF:   · Not Applicable to this Patient - Not Applicable to this Patient    Planned Readmission:  No     Discharge Statement:  I spent 36 minutes discharging the patient  This time was spent on the day of discharge  I had direct contact with the patient on the day of discharge  Greater than 50% of the total time was spent examining patient, answering all patient questions, arranging and discussing plan of care with patient as well as directly providing post-discharge instructions  Additional time then spent on discharge activities  Discharge Medications:  See after visit summary for reconciled discharge medications provided to patient and family  ** Please Note: Dragon 360 Dictation voice to text software may have been used in the creation of this document   **

## 2021-02-15 NOTE — DISCHARGE INSTR - AVS FIRST PAGE
Dear Dorian Hurley,     It was our pleasure to care for you here at [facility]  It is our hope that we were always able to exceed the expected standards for your care during your stay  You were hospitalized due to pneumonia and pleural effusion  You were cared for on the 3rd floor under the service of Demetrio Hsieh MD with the [facility] who covers for your primary care physician (PCP), Justus Gonzales, DO, while you were hospitalized  If you have any questions or concerns related to this hospitalization, you may contact us at [phone]  For follow up as well as medication refills, we recommend that you follow up with your primary care physician  A registered nurse will reach out to you by phone within a few days after your discharge to answer any additional questions that you may have after going home  However, at this time we provide for you here, the most important instructions / recommendations at discharge:     · Notable Medication Adjustments -   · Start ativan as needed for anxiety  · Start oxycodone liquid for pain control as needed 5mL by mouth every 4 hours for moderate pain, may increase to 10mL every 4 hours as needed for severe pain   · Important follow up information -   · Please call for any questions or reach out to hospice 24 hr nursing line  · Please review this entire after visit summary as additional general instructions including medication list, appointments, activity, diet, any pertinent wound care, and other additional recommendations from your care team that may be provided for you        Sincerely,     Demetrio Hsieh MD

## 2021-02-20 DIAGNOSIS — C50.911 BREAST CANCER METASTASIZED TO BONE, RIGHT (HCC): Primary | Chronic | ICD-10-CM

## 2021-02-20 DIAGNOSIS — Z51.5 HOSPICE CARE PATIENT: ICD-10-CM

## 2021-02-20 DIAGNOSIS — C79.51 BREAST CANCER METASTASIZED TO BONE, RIGHT (HCC): Primary | Chronic | ICD-10-CM

## 2021-02-20 RX ORDER — DEXAMETHASONE 4 MG/1
8 TABLET ORAL
Qty: 8 TABLET | Refills: 0 | Status: SHIPPED | OUTPATIENT
Start: 2021-02-20

## 2021-02-20 RX ORDER — ACETAMINOPHEN 160 MG/5ML
975 SUSPENSION ORAL 3 TIMES DAILY
Qty: 236 ML | Refills: 0 | Status: SHIPPED | OUTPATIENT
Start: 2021-02-20

## 2021-02-20 RX ORDER — OXYCODONE HCL 20 MG/ML
10 CONCENTRATE, ORAL ORAL
Qty: 30 ML | Refills: 0 | Status: SHIPPED | OUTPATIENT
Start: 2021-02-20

## 2021-02-20 RX ORDER — PETROLATUM 0.51 G/G
OINTMENT TOPICAL 4 TIMES DAILY PRN
Qty: 1 TUBE | Refills: 0 | Status: SHIPPED | OUTPATIENT
Start: 2021-02-20

## 2021-02-20 NOTE — PROGRESS NOTES
2/20/2021 10:37 AM  [facility] patient requests refill of CII medication  [facility] order did not arrive  Filled electronically via Epic as per [place] State Law  Requested Prescriptions     Signed Prescriptions Disp Refills    oxyCODONE (ROXICODONE) 100 MG/5ML concentrated solution 30 mL 0     Sig: Take 0 5 mL (10 mg total) by mouth every 4 (four) hours And every 2 hours as needed for pain and shortness of breathMax Daily Amount: 60 mg    acetaminophen (TYLENOL) 160 mg/5 mL liquid 236 mL 0     Sig: Take 30 5 mL (975 mg total) by mouth 3 (three) times a day    dexamethasone (DECADRON) 4 mg tablet 8 tablet 0     Sig: Take 2 tablets (8 mg total) by mouth daily with breakfast    Skin Protectants, Misc  (Balmex Skin Protectant) OINT 1 Tube 0     Sig: Apply topically 4 (four) times a day as needed (prevent skin breakdown)       [address] Answering Service: [phone]  You can find me on [employer]!

## 2023-08-31 NOTE — ASSESSMENT & PLAN NOTE
· This is a known problem   She has a back brace  · Continue to use back brace as directed Detail Level: Detailed Detail Level: Generalized Hide Include Location In Plan Question?: No

## (undated) DEVICE — SINGLE-USE BIOPSY FORCEPS: Brand: RADIAL JAW 4